# Patient Record
Sex: FEMALE | Race: WHITE | NOT HISPANIC OR LATINO | Employment: OTHER | URBAN - METROPOLITAN AREA
[De-identification: names, ages, dates, MRNs, and addresses within clinical notes are randomized per-mention and may not be internally consistent; named-entity substitution may affect disease eponyms.]

---

## 2021-12-15 ENCOUNTER — HOSPITAL ENCOUNTER (EMERGENCY)
Facility: HOSPITAL | Age: 81
Discharge: HOME | End: 2021-12-15
Attending: EMERGENCY MEDICINE | Admitting: EMERGENCY MEDICINE

## 2021-12-15 ENCOUNTER — APPOINTMENT (OUTPATIENT)
Dept: GENERAL RADIOLOGY | Facility: HOSPITAL | Age: 81
End: 2021-12-15

## 2021-12-15 VITALS
SYSTOLIC BLOOD PRESSURE: 149 MMHG | RESPIRATION RATE: 18 BRPM | TEMPERATURE: 98 F | DIASTOLIC BLOOD PRESSURE: 89 MMHG | HEART RATE: 90 BPM | OXYGEN SATURATION: 99 %

## 2021-12-15 DIAGNOSIS — I10 HYPERTENSION, UNSPECIFIED TYPE: ICD-10-CM

## 2021-12-15 DIAGNOSIS — R06.02 SHORTNESS OF BREATH: Primary | ICD-10-CM
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
## 2021-12-15 PROCEDURE — 99283 EMERGENCY DEPT VISIT LOW MDM: CPT

## 2021-12-15 PROCEDURE — 71046 X-RAY EXAM CHEST 2 VIEWS: CPT
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
## 2021-12-15 NOTE — DISCHARGE INSTRUCTIONS
Continue current home medications  Activity as tolerated  Follow up with PMD in 1 week for recheck  Return to the ER for fever, chills, chest pain, shortness of breath, neck pain, back pain, dizziness, weakness or any new or worsening symptoms
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
## 2023-04-21 ENCOUNTER — APPOINTMENT (OUTPATIENT)
Dept: WOMENS IMAGING | Facility: HOSPITAL | Age: 83
End: 2023-04-21
Payer: MEDICARE

## 2023-04-21 DIAGNOSIS — C50.919 MALIGNANT NEOPLASM OF FEMALE BREAST, UNSPECIFIED ESTROGEN RECEPTOR STATUS, UNSPECIFIED LATERALITY, UNSPECIFIED SITE OF BREAST: Primary | ICD-10-CM

## 2023-04-21 PROCEDURE — 88305 TISSUE EXAM BY PATHOLOGIST: CPT

## 2023-04-21 PROCEDURE — A4648 IMPLANTABLE TISSUE MARKER: HCPCS | Performed by: RADIOLOGY

## 2023-04-21 PROCEDURE — 19083 BX BREAST 1ST LESION US IMAG: CPT | Performed by: RADIOLOGY

## 2023-04-21 PROCEDURE — 76642 ULTRASOUND BREAST LIMITED: CPT | Performed by: RADIOLOGY

## 2023-04-21 PROCEDURE — G0279 TOMOSYNTHESIS, MAMMO: HCPCS | Performed by: RADIOLOGY

## 2023-04-21 PROCEDURE — 77066 DX MAMMO INCL CAD BI: CPT | Performed by: RADIOLOGY

## 2023-04-21 PROCEDURE — 88361 TUMOR IMMUNOHISTOCHEM/COMPUT: CPT

## 2023-04-21 PROCEDURE — 38505 NEEDLE BIOPSY LYMPH NODES: CPT | Performed by: RADIOLOGY

## 2023-04-27 ENCOUNTER — TELEPHONE (OUTPATIENT)
Dept: SURGERY | Facility: CLINIC | Age: 83
End: 2023-04-27
Payer: MEDICARE

## 2023-04-27 DIAGNOSIS — C50.919 MALIGNANT NEOPLASM OF FEMALE BREAST, UNSPECIFIED ESTROGEN RECEPTOR STATUS, UNSPECIFIED LATERALITY, UNSPECIFIED SITE OF BREAST: Primary | ICD-10-CM

## 2023-04-27 DIAGNOSIS — C50.411 MALIGNANT NEOPLASM OF UPPER-OUTER QUADRANT OF RIGHT FEMALE BREAST, UNSPECIFIED ESTROGEN RECEPTOR STATUS: ICD-10-CM

## 2023-04-27 NOTE — TELEPHONE ENCOUNTER
LM FOR PT TO CALL ME BACK REGARDING BONE SCAN & CT C,A,P SCHEDULED ON 5/11 @ 9 AM, ARRIVAL 8:45 AM FOR [facility]. CT'S WILL FOLLOW THE BONE SCAN. PT'S TO HAVE LIQUIDS ONLY 4 HRS PRIOR TO 12:30 PM. THE MRI BREAST IS SCHEDULED ON 4/29 @ 3:30 PM, ARRIVAL 3 PM AT [facility]. PT'S TO WEAR NO METALS TO THE MRI APPT.

## 2023-04-29 ENCOUNTER — HOSPITAL ENCOUNTER (OUTPATIENT)
Dept: MRI IMAGING | Facility: HOSPITAL | Age: 83
Discharge: HOME | End: 2023-04-29
Payer: MEDICARE

## 2023-04-29 DIAGNOSIS — C50.411 MALIGNANT NEOPLASM OF UPPER-OUTER QUADRANT OF RIGHT FEMALE BREAST, UNSPECIFIED ESTROGEN RECEPTOR STATUS: ICD-10-CM

## 2023-04-29 DIAGNOSIS — C50.919 MALIGNANT NEOPLASM OF FEMALE BREAST, UNSPECIFIED ESTROGEN RECEPTOR STATUS, UNSPECIFIED LATERALITY, UNSPECIFIED SITE OF BREAST: ICD-10-CM

## 2023-04-29 PROCEDURE — 0 GADOBENATE DIMEGLUMINE 529 MG/ML SOLUTION: Performed by: STUDENT IN AN ORGANIZED HEALTH CARE EDUCATION/TRAINING PROGRAM

## 2023-04-29 PROCEDURE — 82565 ASSAY OF CREATININE: CPT

## 2023-04-29 PROCEDURE — C8937 CAD BREAST MRI: HCPCS

## 2023-04-29 PROCEDURE — A9577 INJ MULTIHANCE: HCPCS | Performed by: STUDENT IN AN ORGANIZED HEALTH CARE EDUCATION/TRAINING PROGRAM

## 2023-04-29 PROCEDURE — C8908 MRI W/O FOL W/CONT, BREAST,: HCPCS

## 2023-04-29 RX ADMIN — GADOBENATE DIMEGLUMINE 15 ML: 529 INJECTION, SOLUTION INTRAVENOUS at 16:05

## 2023-04-30 LAB — CREAT BLDA-MCNC: 0.9 MG/DL (ref 0.6–1.3)

## 2023-05-01 ENCOUNTER — OFFICE VISIT (OUTPATIENT)
Dept: SURGERY | Facility: CLINIC | Age: 83
End: 2023-05-01
Payer: MEDICARE

## 2023-05-01 VITALS
BODY MASS INDEX: 28.74 KG/M2 | SYSTOLIC BLOOD PRESSURE: 150 MMHG | DIASTOLIC BLOOD PRESSURE: 85 MMHG | HEIGHT: 61 IN | WEIGHT: 152.2 LBS

## 2023-05-01 DIAGNOSIS — D49.89 NEOPLASM OF UNSPECIFIED BEHAVIOR OF OTHER SPECIFIED SITES: ICD-10-CM

## 2023-05-01 DIAGNOSIS — C50.919 MALIGNANT NEOPLASM OF FEMALE BREAST, UNSPECIFIED ESTROGEN RECEPTOR STATUS, UNSPECIFIED LATERALITY, UNSPECIFIED SITE OF BREAST: Primary | ICD-10-CM

## 2023-05-01 PROCEDURE — 88305 TISSUE EXAM BY PATHOLOGIST: CPT | Performed by: STUDENT IN AN ORGANIZED HEALTH CARE EDUCATION/TRAINING PROGRAM

## 2023-05-01 RX ORDER — VALSARTAN AND HYDROCHLOROTHIAZIDE 160; 12.5 MG/1; MG/1
1 TABLET, FILM COATED ORAL DAILY
COMMUNITY
Start: 2023-04-04

## 2023-05-01 RX ORDER — LINAGLIPTIN 5 MG/1
1 TABLET, FILM COATED ORAL DAILY
COMMUNITY
Start: 2023-04-04

## 2023-05-01 RX ORDER — GLIPIZIDE AND METFORMIN HCL 2.5; 5 MG/1; MG/1
TABLET, FILM COATED ORAL
COMMUNITY
Start: 2023-02-02

## 2023-05-01 RX ORDER — SIMVASTATIN 10 MG
10 TABLET ORAL NIGHTLY
COMMUNITY
Start: 2023-02-01

## 2023-05-01 NOTE — PROGRESS NOTES
General Surgery Breast Cancer History and Physical Exam     Summary:    Veronica Royal is a 82 y.o. 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      A multidisciplinary plan has been formulated for the patient:    (1) Breast Surgical Oncology:  -Follow up [employer] 9 panel genetic testing. I will call her with results.   -Nurse navigator consult.   -Punch biopsy performed today in the office to evaluate for an inflammatory component.  We did discuss that with locally advanced disease, will need to be seen by oncology to evaluate for the need for neoadjuvant chemotherapy.  -Plastic surgery referral deferred at this time.    (2) Medical Oncology:  -Referral to Dr. Yee to evaluate for neoadjuvant chemotherapy.    (3) Radiation Oncology:  -Will refer postoperatively for evaluation for radiation therapy as clinically indicated.    Referring Provider: No ref. provider found    Chief Complaint: breast mass    History of Present Illness: Ms. Veronica Royal is a 82 y.o. year old lady, seen at the request of No ref. provider found for a new diagnosis of right breast cancer.      This was initially detected as a palpable mass in her right breast. She first noticed it 2-3 months ago. Her last mammogram was 25 years ago. She denies any prior history of abnormal mammograms or breast biopsies. Her work-up is detailed in the oncologic history below.     She denies any breast lumps, pain, skin changes, or nipple discharge. She denies any family history of breast cancer. Her sister had ovarian cancer 25 years ago at age 58.      Workup of Current Diagnosis:    4/21/2023 bilateral diagnostic mammogram with ultrasound:  Finding 1: There is a high density irregular mass measuring 44 mm with spiculated margins and associated amorphous and fine pleomorphic calcifications, skin retraction, and  skin and trabecular thickening in the right breast at 9:00.  On ultrasound there is an irregular mass with indistinct and spiculated margins in the right breast at 9:00 3 cm from the nipple.  The mass extends into the skin there is visible external ulceration and scabbing.  Diffuse skin thickening and focal skin retraction are noted with skin thickening up to 11 mm.  The mass measures 38 x 28 x 42 mm.  Highly suggestive of malignancy.  Ultrasound-guided biopsy is recommended.  Finding 2: There are true intramammary lymph nodes measuring 5 mm and 6 mm seen in the posterior upper outer axillary tail that are slightly round.  There is visualization of one of the 2 right axillary lymph nodes that measure 5 mm with borderline cortical thickening.  Suspicious.  Appropriate action should be taken.  Finding 3: There are 2 prominent right axillary lymph nodes largest which measures 26 mm and contains extensive coarse heterogeneous calcifications.  Suspicious.  Ultrasound-guided biopsy is recommended.  Finding 4: On ultrasound there is an asymmetry in the superior region of the left breast that resolved with spot compression benign.  BI-RADS Category 5    4/21/2023 right breast ultrasound-guided biopsy:  The right breast at 9:00 was imaged and the mass was localized.  8 cores were obtained.  A mini cork tissue marker was placed.  Clip was in the expected position.  Pathology returned as invasive ductal carcinoma grade 3 which is malignant and concordant.  Surgical consult is recommended.    The patient's right breast at the axillary position was imaged and the abnormal lymph node was localized.  4 cores were obtained.  A spring shaped HydroMARK tissue marker was placed.  Clip was in the expected position.  Pathology returned as high-grade invasive mammary carcinoma with apron features.  The differential includes multifocal carcinoma versus a completely replaced lymph node.  Pathology is malignant and concordant.    4/21/2023  pathology:   1.  Breast, right, 9:00, biopsy:  Invasive mammary carcinoma, grade 3  2.  Axilla, right, biopsy:  High-grade invasive mammary carcinoma with apocrine features involving soft tissue and areas of necrosis    2023 Bilateral Breast MRI   IMPRESSION:  1. Biopsy-proven malignancy in the right breast in the posterior one third at the 9 o'clock position that measures on the order of 4.3 cm in greatest dimension and contains the internal mini cork-shaped metallic clip. The mass shows central hypoenhancement suggestive of central necrosis. Attachment to the overlying skin as described is noted and there is diffuse right breast skin edema and trabecular edema. Evidence of right axillary adenopathy is noted.  2. There are no findings suspicious for malignancy in the left breast.  BI-RADS category 6: Known biopsy-proven malignancy.    Gynecologic History:   . P:1 AB:1  Age at first childbirth: 35  Lactation/How long: none  Age at menarche: 11  Age at menopause: 50  Total years of oral contraceptive use: 3-4 years previously  Total years of hormone replacement therapy: none    Past Medical History:   • DM  • HTN  • HLD    Past Surgical History:    • None    Family History:    • As above    Social History:  • Denies tobacco use  • Occasional alcohol use    Allergies:   No Known Allergies    Medications:     Current Outpatient Medications:   •  glipiZIDE-metFORMIN (METAGLIP) 2.5-500 MG per tablet, TAKE 1 TABLET BY MOUTH THREE TIMES DAILY(2 TABLETS IN THE MORNING AND 1 TABLET IN THE EVENING), Disp: , Rfl:   •  simvastatin (ZOCOR) 10 MG tablet, Take 1 tablet by mouth Every Night., Disp: , Rfl:   •  Tradjenta 5 MG tablet tablet, Take 1 tablet by mouth Daily., Disp: , Rfl:   •  valsartan-hydrochlorothiazide (DIOVAN-HCT) 160-12.5 MG per tablet, Take 1 tablet by mouth Daily., Disp: , Rfl:     Laboratory Values:    Labs from 2023 reviewed    Review of Systems:   Influenza-like illness: no fever, no  cough, no  sore  throat, no  body aches, no loss of sense of taste or smell, no known exposure to person with Covid-19.  Constitutional: Negative for fevers or chills  HENT: Negative for hearing loss or runny nose  Eyes: Negative for vision changes or scleral icterus  Respiratory: Negative for cough or shortness of breath  Cardiovascular: Negative for chest pain or heart palpitations  Gastrointestinal: Negative for abdominal pain, nausea, vomiting, constipation, melena, or hematochezia  Genitourinary: Negative for hematuria or dysuria  Musculoskeletal: Negative for joint swelling or gait instability  Neurologic: Negative for tremors or seizures  Psychiatric: Negative for suicidal ideations or depression  All other systems reviewed and negative    Physical Exam:   • ECO - Asymptomatic  • Constitutional: Well-developed well-nourished, no acute distress  • Eyes: Conjunctiva normal, sclera nonicteric  • ENMT: Hearing grossly normal, oral mucosa moist  • Neck: Supple, no palpable mass, trachea midline  • Respiratory: Clear to auscultation, normal inspiratory effort  • Cardiovascular: Regular rate, no peripheral edema, no jugular venous distention  • Breast: symmetric  o Right: Ulcerated mass in the right outer mid-lower breast approximately 3x3cm of ulceration, palpable firm mobile mass, palpable axillary adenopathy  o Left: No visible abnormalities on inspection while seated, with arms raised or hands on hips. No masses, skin changes, or nipple abnormalities.  o Biopsy site appreciated in right outer breast, otherwise no skin changes.   o No clinical chest wall involvement.  • Gastrointestinal: Soft, nontender  • Lymphatics (palpable nodes): No left sided cervical, supraclavicular or axillary lymphadenopathy  • Skin:  Warm, dry, no rash on visualized skin surfaces  • Musculoskeletal: Symmetric strength, normal gait  • Psychiatric: Alert and oriented ×3, normal affect     Discussion:  I had an extensive discussion with the patient  and her family about the nature of her breast cancer diagnosis. We reviewed the components of breast tissue including ducts and lobules. We reviewed her pathology report in detail. We reviewed breast cancer histology, including stage, grade, ER/NJ receptors, HER2 receptors and how this applies to her diagnosis. We reviewed the basics of systemic and local/regional management of breast cancer.     We discussed that most breast cancer is not hereditary, however given her personal history, this may play a role in her case. I believe genetic testing is warranted and could affect surgical decision making.     We reviewed potential surgical treatments to include partial mastectomy, mastectomy, sentinel lymph node biopsy and axillary node dissection and discussed the rationale associated with each approach. Regarding radiation therapy, we discussed that radiation is indicated in all cases of breast conservation and in only limited circumstances following mastectomy. We discussed that the primary goal of adjuvant radiation is to decrease the likelihood of local recurrence.     We discussed axillary staging. I described the procedure for sentinel lymph node biopsy in detail, including the preoperative injection of technetium sulfur colloid and intraoperative injection of lymphazurin blue dye. I explained that this is a mapping test and not a cancer test, that all of the lymph nodes containing these dyes will be removed for complete testing by pathology, and that the results could impact the decision for adjuvant treatment or additional surgery.    I described additional risks and potential complications associated with surgery, including, but not limited to, bleeding, infection, complications related to blue dye, lymphedema, deformity/poor cosmetic result, chronic pain, neurovascular injury, numbness, seroma, hematoma, deep venous thrombosis, skin flap necrosis, disease recurrence and the possibility of requiring additional  surgery. We also discussed other treatment options including the option of not undergoing any surgical treatment and the risks associated with this including disease progression. She expressed an understanding of these factors and wished to proceed.      We discussed that in her case, systemic treatment would involve endocrine therapy and possibly chemotherapy. She will be referred to medical oncology to discuss this further.     SHAR MCELROY M.D.  General and Endoscopic Surgery  [facility]    [address], [address]  [place], [place], [address]  P: [phone]  F: [phone]

## 2023-05-01 NOTE — H&P (VIEW-ONLY)
General Surgery Breast Cancer History and Physical Exam     Summary:    Veronica Royal is a 82 y.o. 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      A multidisciplinary plan has been formulated for the patient:    (1) Breast Surgical Oncology:  -Follow up [employer] 9 panel genetic testing. I will call her with results.   -Nurse navigator consult.   -Punch biopsy performed today in the office to evaluate for an inflammatory component.  We did discuss that with locally advanced disease, will need to be seen by oncology to evaluate for the need for neoadjuvant chemotherapy.  -Plastic surgery referral deferred at this time.    (2) Medical Oncology:  -Referral to Dr. Yee to evaluate for neoadjuvant chemotherapy.    (3) Radiation Oncology:  -Will refer postoperatively for evaluation for radiation therapy as clinically indicated.    Referring Provider: No ref. provider found    Chief Complaint: breast mass    History of Present Illness: Ms. Veronica Royal is a 82 y.o. year old lady, seen at the request of No ref. provider found for a new diagnosis of right breast cancer.      This was initially detected as a palpable mass in her right breast. She first noticed it 2-3 months ago. Her last mammogram was 25 years ago. She denies any prior history of abnormal mammograms or breast biopsies. Her work-up is detailed in the oncologic history below.     She denies any breast lumps, pain, skin changes, or nipple discharge. She denies any family history of breast cancer. Her sister had ovarian cancer 25 years ago at age 58.      Workup of Current Diagnosis:    4/21/2023 bilateral diagnostic mammogram with ultrasound:  Finding 1: There is a high density irregular mass measuring 44 mm with spiculated margins and associated amorphous and fine pleomorphic calcifications, skin retraction, and  skin and trabecular thickening in the right breast at 9:00.  On ultrasound there is an irregular mass with indistinct and spiculated margins in the right breast at 9:00 3 cm from the nipple.  The mass extends into the skin there is visible external ulceration and scabbing.  Diffuse skin thickening and focal skin retraction are noted with skin thickening up to 11 mm.  The mass measures 38 x 28 x 42 mm.  Highly suggestive of malignancy.  Ultrasound-guided biopsy is recommended.  Finding 2: There are true intramammary lymph nodes measuring 5 mm and 6 mm seen in the posterior upper outer axillary tail that are slightly round.  There is visualization of one of the 2 right axillary lymph nodes that measure 5 mm with borderline cortical thickening.  Suspicious.  Appropriate action should be taken.  Finding 3: There are 2 prominent right axillary lymph nodes largest which measures 26 mm and contains extensive coarse heterogeneous calcifications.  Suspicious.  Ultrasound-guided biopsy is recommended.  Finding 4: On ultrasound there is an asymmetry in the superior region of the left breast that resolved with spot compression benign.  BI-RADS Category 5    4/21/2023 right breast ultrasound-guided biopsy:  The right breast at 9:00 was imaged and the mass was localized.  8 cores were obtained.  A mini cork tissue marker was placed.  Clip was in the expected position.  Pathology returned as invasive ductal carcinoma grade 3 which is malignant and concordant.  Surgical consult is recommended.    The patient's right breast at the axillary position was imaged and the abnormal lymph node was localized.  4 cores were obtained.  A spring shaped HydroMARK tissue marker was placed.  Clip was in the expected position.  Pathology returned as high-grade invasive mammary carcinoma with apron features.  The differential includes multifocal carcinoma versus a completely replaced lymph node.  Pathology is malignant and concordant.    4/21/2023  pathology:   1.  Breast, right, 9:00, biopsy:  Invasive mammary carcinoma, grade 3  2.  Axilla, right, biopsy:  High-grade invasive mammary carcinoma with apocrine features involving soft tissue and areas of necrosis    2023 Bilateral Breast MRI   IMPRESSION:  1. Biopsy-proven malignancy in the right breast in the posterior one third at the 9 o'clock position that measures on the order of 4.3 cm in greatest dimension and contains the internal mini cork-shaped metallic clip. The mass shows central hypoenhancement suggestive of central necrosis. Attachment to the overlying skin as described is noted and there is diffuse right breast skin edema and trabecular edema. Evidence of right axillary adenopathy is noted.  2. There are no findings suspicious for malignancy in the left breast.  BI-RADS category 6: Known biopsy-proven malignancy.    Gynecologic History:   . P:1 AB:1  Age at first childbirth: 35  Lactation/How long: none  Age at menarche: 11  Age at menopause: 50  Total years of oral contraceptive use: 3-4 years previously  Total years of hormone replacement therapy: none    Past Medical History:   • DM  • HTN  • HLD    Past Surgical History:    • None    Family History:    • As above    Social History:  • Denies tobacco use  • Occasional alcohol use    Allergies:   No Known Allergies    Medications:     Current Outpatient Medications:   •  glipiZIDE-metFORMIN (METAGLIP) 2.5-500 MG per tablet, TAKE 1 TABLET BY MOUTH THREE TIMES DAILY(2 TABLETS IN THE MORNING AND 1 TABLET IN THE EVENING), Disp: , Rfl:   •  simvastatin (ZOCOR) 10 MG tablet, Take 1 tablet by mouth Every Night., Disp: , Rfl:   •  Tradjenta 5 MG tablet tablet, Take 1 tablet by mouth Daily., Disp: , Rfl:   •  valsartan-hydrochlorothiazide (DIOVAN-HCT) 160-12.5 MG per tablet, Take 1 tablet by mouth Daily., Disp: , Rfl:     Laboratory Values:    Labs from 2023 reviewed    Review of Systems:   Influenza-like illness: no fever, no  cough, no  sore  throat, no  body aches, no loss of sense of taste or smell, no known exposure to person with Covid-19.  Constitutional: Negative for fevers or chills  HENT: Negative for hearing loss or runny nose  Eyes: Negative for vision changes or scleral icterus  Respiratory: Negative for cough or shortness of breath  Cardiovascular: Negative for chest pain or heart palpitations  Gastrointestinal: Negative for abdominal pain, nausea, vomiting, constipation, melena, or hematochezia  Genitourinary: Negative for hematuria or dysuria  Musculoskeletal: Negative for joint swelling or gait instability  Neurologic: Negative for tremors or seizures  Psychiatric: Negative for suicidal ideations or depression  All other systems reviewed and negative    Physical Exam:   • ECO - Asymptomatic  • Constitutional: Well-developed well-nourished, no acute distress  • Eyes: Conjunctiva normal, sclera nonicteric  • ENMT: Hearing grossly normal, oral mucosa moist  • Neck: Supple, no palpable mass, trachea midline  • Respiratory: Clear to auscultation, normal inspiratory effort  • Cardiovascular: Regular rate, no peripheral edema, no jugular venous distention  • Breast: symmetric  o Right: Ulcerated mass in the right outer mid-lower breast approximately 3x3cm of ulceration, palpable firm mobile mass, palpable axillary adenopathy  o Left: No visible abnormalities on inspection while seated, with arms raised or hands on hips. No masses, skin changes, or nipple abnormalities.  o Biopsy site appreciated in right outer breast, otherwise no skin changes.   o No clinical chest wall involvement.  • Gastrointestinal: Soft, nontender  • Lymphatics (palpable nodes): No left sided cervical, supraclavicular or axillary lymphadenopathy  • Skin:  Warm, dry, no rash on visualized skin surfaces  • Musculoskeletal: Symmetric strength, normal gait  • Psychiatric: Alert and oriented ×3, normal affect     Discussion:  I had an extensive discussion with the patient  and her family about the nature of her breast cancer diagnosis. We reviewed the components of breast tissue including ducts and lobules. We reviewed her pathology report in detail. We reviewed breast cancer histology, including stage, grade, ER/OH receptors, HER2 receptors and how this applies to her diagnosis. We reviewed the basics of systemic and local/regional management of breast cancer.     We discussed that most breast cancer is not hereditary, however given her personal history, this may play a role in her case. I believe genetic testing is warranted and could affect surgical decision making.     We reviewed potential surgical treatments to include partial mastectomy, mastectomy, sentinel lymph node biopsy and axillary node dissection and discussed the rationale associated with each approach. Regarding radiation therapy, we discussed that radiation is indicated in all cases of breast conservation and in only limited circumstances following mastectomy. We discussed that the primary goal of adjuvant radiation is to decrease the likelihood of local recurrence.     We discussed axillary staging. I described the procedure for sentinel lymph node biopsy in detail, including the preoperative injection of technetium sulfur colloid and intraoperative injection of lymphazurin blue dye. I explained that this is a mapping test and not a cancer test, that all of the lymph nodes containing these dyes will be removed for complete testing by pathology, and that the results could impact the decision for adjuvant treatment or additional surgery.    I described additional risks and potential complications associated with surgery, including, but not limited to, bleeding, infection, complications related to blue dye, lymphedema, deformity/poor cosmetic result, chronic pain, neurovascular injury, numbness, seroma, hematoma, deep venous thrombosis, skin flap necrosis, disease recurrence and the possibility of requiring additional  surgery. We also discussed other treatment options including the option of not undergoing any surgical treatment and the risks associated with this including disease progression. She expressed an understanding of these factors and wished to proceed.      We discussed that in her case, systemic treatment would involve endocrine therapy and possibly chemotherapy. She will be referred to medical oncology to discuss this further.     SHAR MCELROY M.D.  General and Endoscopic Surgery  [facility]    [address], [address]  [place], [place], [address]  P: [phone]  F: [phone]

## 2023-05-02 ENCOUNTER — TELEPHONE (OUTPATIENT)
Dept: SURGERY | Facility: CLINIC | Age: 83
End: 2023-05-02
Payer: MEDICARE

## 2023-05-02 ENCOUNTER — PATIENT OUTREACH (OUTPATIENT)
Dept: OTHER | Facility: HOSPITAL | Age: 83
End: 2023-05-02
Payer: MEDICARE

## 2023-05-02 NOTE — TELEPHONE ENCOUNTER
SPOKE TO PT REGARDING ECHO THAT'S SCHEDULED ON 5/9 @ 10 AM, ARRIVAL 9:45 AM FOR [facility], [address] [facility], [facility] [place]. GAVE TESTING LOCATION OVER THE PHONE TO PT.

## 2023-05-02 NOTE — PROGRESS NOTES
Referral received from Dr. Michael's office. I called Ms. Royal's daughter and introduced myself and navigational services. She stated the consult with Dr. Michael went well and she will not know next steps until the scans come back. They have a good understanding that chemo will be needed they will meet with oncology in the next week or so. She was able to verbalize teach back on her plan of care.      She stated she has a wonderful support system with family and friends. She stated she feels comfortable talking to them about needs or issues.      She stated she has no financial or transportation concerns at this time. She has no resource needs or ongoing concerns at this time.      She stated she is doing well despite her diagnosis. We discussed we have support options if the need arises. She was thankful for the information.      We discussed integrative therapies and other services [facility]. She will received a navigation folder with the following information in the mail:     [facility], Cancer and Restorative Exercise (CARE), [employer] Exercise program, Guide for the Newly Diagnosed, [employer], [facility], Massage Therapy, Reiki Therapy, [employer] [place], Cancer Nutrition, and Survivorship Clinic.     She verbalized appreciation for navigational services and she has my contact information and will call with any questions that arise.

## 2023-05-03 LAB
LAB AP CASE REPORT: NORMAL
LAB AP CLINICAL INFORMATION: NORMAL
LAB AP DIAGNOSIS COMMENT: NORMAL
PATH REPORT.FINAL DX SPEC: NORMAL
PATH REPORT.GROSS SPEC: NORMAL

## 2023-05-04 ENCOUNTER — LAB REQUISITION (OUTPATIENT)
Dept: LAB | Facility: HOSPITAL | Age: 83
End: 2023-05-04
Payer: MEDICARE

## 2023-05-04 DIAGNOSIS — Z00.00 ENCOUNTER FOR GENERAL ADULT MEDICAL EXAMINATION WITHOUT ABNORMAL FINDINGS: ICD-10-CM

## 2023-05-04 PROCEDURE — 88342 IMHCHEM/IMCYTCHM 1ST ANTB: CPT | Performed by: STUDENT IN AN ORGANIZED HEALTH CARE EDUCATION/TRAINING PROGRAM

## 2023-05-05 LAB
LAB AP CASE REPORT: NORMAL
LAB AP DIAGNOSIS COMMENT: NORMAL
PATH REPORT.FINAL DX SPEC: NORMAL
PATH REPORT.GROSS SPEC: NORMAL

## 2023-05-09 ENCOUNTER — HOSPITAL ENCOUNTER (OUTPATIENT)
Dept: CARDIOLOGY | Facility: HOSPITAL | Age: 83
Discharge: HOME | End: 2023-05-09
Admitting: STUDENT IN AN ORGANIZED HEALTH CARE EDUCATION/TRAINING PROGRAM
Payer: MEDICARE

## 2023-05-09 VITALS
HEART RATE: 111 BPM | SYSTOLIC BLOOD PRESSURE: 140 MMHG | DIASTOLIC BLOOD PRESSURE: 84 MMHG | WEIGHT: 152 LBS | HEIGHT: 61 IN | OXYGEN SATURATION: 94 % | BODY MASS INDEX: 28.7 KG/M2

## 2023-05-09 DIAGNOSIS — D49.89 NEOPLASM OF UNSPECIFIED BEHAVIOR OF OTHER SPECIFIED SITES: ICD-10-CM

## 2023-05-09 DIAGNOSIS — C50.919 MALIGNANT NEOPLASM OF FEMALE BREAST, UNSPECIFIED ESTROGEN RECEPTOR STATUS, UNSPECIFIED LATERALITY, UNSPECIFIED SITE OF BREAST: ICD-10-CM

## 2023-05-09 LAB
AORTIC ARCH: 1.6 CM
ASCENDING AORTA: 2.1 CM
BH CV ECHO LEFT VENTRICLE GLOBAL LONGITUDINAL STRAIN: -20.3 %
BH CV ECHO MEAS - ACS: 1.39 CM
BH CV ECHO MEAS - AO MAX PG: 10.1 MMHG
BH CV ECHO MEAS - AO MEAN PG: 5.6 MMHG
BH CV ECHO MEAS - AO ROOT DIAM: 2.44 CM
BH CV ECHO MEAS - AO V2 MAX: 158.8 CM/SEC
BH CV ECHO MEAS - AO V2 VTI: 27.8 CM
BH CV ECHO MEAS - AVA(I,D): 1.59 CM2
BH CV ECHO MEAS - EDV(CUBED): 75.5 ML
BH CV ECHO MEAS - EDV(MOD-SP2): 78 ML
BH CV ECHO MEAS - EDV(MOD-SP4): 87 ML
BH CV ECHO MEAS - EF(MOD-BP): 51.4 %
BH CV ECHO MEAS - EF(MOD-SP2): 50 %
BH CV ECHO MEAS - EF(MOD-SP4): 55.2 %
BH CV ECHO MEAS - ESV(CUBED): 21.2 ML
BH CV ECHO MEAS - ESV(MOD-SP2): 39 ML
BH CV ECHO MEAS - ESV(MOD-SP4): 39 ML
BH CV ECHO MEAS - FS: 34.6 %
BH CV ECHO MEAS - IVS/LVPW: 0.96 CM
BH CV ECHO MEAS - IVSD: 0.86 CM
BH CV ECHO MEAS - LAT PEAK E' VEL: 5 CM/SEC
BH CV ECHO MEAS - LV DIASTOLIC VOL/BSA (35-75): 51.8 CM2
BH CV ECHO MEAS - LV MASS(C)D: 116.2 GRAMS
BH CV ECHO MEAS - LV MAX PG: 3.6 MMHG
BH CV ECHO MEAS - LV MEAN PG: 1.91 MMHG
BH CV ECHO MEAS - LV SYSTOLIC VOL/BSA (12-30): 23.2 CM2
BH CV ECHO MEAS - LV V1 MAX: 94.5 CM/SEC
BH CV ECHO MEAS - LV V1 VTI: 16.6 CM
BH CV ECHO MEAS - LVIDD: 4.2 CM
BH CV ECHO MEAS - LVIDS: 2.8 CM
BH CV ECHO MEAS - LVOT AREA: 2.7 CM2
BH CV ECHO MEAS - LVOT DIAM: 1.84 CM
BH CV ECHO MEAS - LVPWD: 0.9 CM
BH CV ECHO MEAS - MED PEAK E' VEL: 6.1 CM/SEC
BH CV ECHO MEAS - MR MAX PG: 105.3 MMHG
BH CV ECHO MEAS - MR MAX VEL: 513.1 CM/SEC
BH CV ECHO MEAS - MV A DUR: 0.11 SEC
BH CV ECHO MEAS - MV A MAX VEL: 154.2 CM/SEC
BH CV ECHO MEAS - MV DEC SLOPE: 1068 CM/SEC2
BH CV ECHO MEAS - MV DEC TIME: 0.12 MSEC
BH CV ECHO MEAS - MV E MAX VEL: 99.8 CM/SEC
BH CV ECHO MEAS - MV E/A: 0.65
BH CV ECHO MEAS - MV MAX PG: 12.8 MMHG
BH CV ECHO MEAS - MV MEAN PG: 6 MMHG
BH CV ECHO MEAS - MV P1/2T: 31.3 MSEC
BH CV ECHO MEAS - MV V2 VTI: 25.6 CM
BH CV ECHO MEAS - MVA(P1/2T): 7 CM2
BH CV ECHO MEAS - MVA(VTI): 1.72 CM2
BH CV ECHO MEAS - PA ACC TIME: 0.07 SEC
BH CV ECHO MEAS - PA PR(ACCEL): 48.1 MMHG
BH CV ECHO MEAS - PA V2 MAX: 82.8 CM/SEC
BH CV ECHO MEAS - PULM A REVS DUR: 0.12 SEC
BH CV ECHO MEAS - PULM A REVS VEL: 25.7 CM/SEC
BH CV ECHO MEAS - PULM DIAS VEL: 46.6 CM/SEC
BH CV ECHO MEAS - PULM S/D: 0.85
BH CV ECHO MEAS - PULM SYS VEL: 39.6 CM/SEC
BH CV ECHO MEAS - QP/QS: 0.75
BH CV ECHO MEAS - RAP SYSTOLE: 3 MMHG
BH CV ECHO MEAS - RV MAX PG: 2.43 MMHG
BH CV ECHO MEAS - RV V1 MAX: 78 CM/SEC
BH CV ECHO MEAS - RV V1 VTI: 13.2 CM
BH CV ECHO MEAS - RVOT DIAM: 1.79 CM
BH CV ECHO MEAS - RVSP: 17 MMHG
BH CV ECHO MEAS - SI(MOD-SP2): 23.2 ML/M2
BH CV ECHO MEAS - SI(MOD-SP4): 28.6 ML/M2
BH CV ECHO MEAS - SUP REN AO DIAM: 2 CM
BH CV ECHO MEAS - SV(LVOT): 44.2 ML
BH CV ECHO MEAS - SV(MOD-SP2): 39 ML
BH CV ECHO MEAS - SV(MOD-SP4): 48 ML
BH CV ECHO MEAS - SV(RVOT): 33.1 ML
BH CV ECHO MEAS - TAPSE (>1.6): 2 CM
BH CV ECHO MEAS - TR MAX PG: 14 MMHG
BH CV ECHO MEAS - TR MAX VEL: 187.3 CM/SEC
BH CV ECHO MEASUREMENTS AVERAGE E/E' RATIO: 17.98
BH CV XLRA - RV BASE: 2.17 CM
BH CV XLRA - RV LENGTH: 6.5 CM
BH CV XLRA - RV MID: 2.31 CM
BH CV XLRA - TDI S': 16.3 CM/SEC
LEFT ATRIUM VOLUME INDEX: 21.5 ML/M2
MAXIMAL PREDICTED HEART RATE: 138 BPM
SINUS: 2.44 CM
STJ: 2.4 CM
STRESS TARGET HR: 117 BPM

## 2023-05-09 PROCEDURE — 93356 MYOCRD STRAIN IMG SPCKL TRCK: CPT

## 2023-05-09 PROCEDURE — 93306 TTE W/DOPPLER COMPLETE: CPT

## 2023-05-09 PROCEDURE — 25510000001 PERFLUTREN (DEFINITY) 8.476 MG IN SODIUM CHLORIDE (PF) 0.9 % 10 ML INJECTION: Performed by: STUDENT IN AN ORGANIZED HEALTH CARE EDUCATION/TRAINING PROGRAM

## 2023-05-09 RX ADMIN — PERFLUTREN 1.5 ML: 6.52 INJECTION, SUSPENSION INTRAVENOUS at 10:31

## 2023-05-11 ENCOUNTER — TELEPHONE (OUTPATIENT)
Dept: SURGERY | Facility: CLINIC | Age: 83
End: 2023-05-11
Payer: MEDICARE

## 2023-05-11 ENCOUNTER — HOSPITAL ENCOUNTER (OUTPATIENT)
Dept: CT IMAGING | Facility: HOSPITAL | Age: 83
Discharge: HOME | End: 2023-05-11
Admitting: STUDENT IN AN ORGANIZED HEALTH CARE EDUCATION/TRAINING PROGRAM
Payer: MEDICARE

## 2023-05-11 ENCOUNTER — HOSPITAL ENCOUNTER (OUTPATIENT)
Dept: NUCLEAR MEDICINE | Facility: HOSPITAL | Age: 83
Discharge: HOME | End: 2023-05-11
Payer: MEDICARE

## 2023-05-11 DIAGNOSIS — C50.919 MALIGNANT NEOPLASM OF FEMALE BREAST, UNSPECIFIED ESTROGEN RECEPTOR STATUS, UNSPECIFIED LATERALITY, UNSPECIFIED SITE OF BREAST: ICD-10-CM

## 2023-05-11 LAB — CREAT BLDA-MCNC: 1.3 MG/DL (ref 0.6–1.3)

## 2023-05-11 PROCEDURE — 78306 BONE IMAGING WHOLE BODY: CPT

## 2023-05-11 PROCEDURE — 0 DIATRIZOATE MEGLUMINE & SODIUM PER 1 ML: Performed by: STUDENT IN AN ORGANIZED HEALTH CARE EDUCATION/TRAINING PROGRAM

## 2023-05-11 PROCEDURE — 82565 ASSAY OF CREATININE: CPT

## 2023-05-11 PROCEDURE — 25510000001 IOPAMIDOL 61 % SOLUTION: Performed by: STUDENT IN AN ORGANIZED HEALTH CARE EDUCATION/TRAINING PROGRAM

## 2023-05-11 PROCEDURE — 74177 CT ABD & PELVIS W/CONTRAST: CPT

## 2023-05-11 PROCEDURE — A9503 TC99M MEDRONATE: HCPCS | Performed by: STUDENT IN AN ORGANIZED HEALTH CARE EDUCATION/TRAINING PROGRAM

## 2023-05-11 PROCEDURE — 71260 CT THORAX DX C+: CPT

## 2023-05-11 PROCEDURE — 0 TECHNETIUM MEDRONATE KIT: Performed by: STUDENT IN AN ORGANIZED HEALTH CARE EDUCATION/TRAINING PROGRAM

## 2023-05-11 RX ORDER — TC 99M MEDRONATE 20 MG/10ML
20 INJECTION, POWDER, LYOPHILIZED, FOR SOLUTION INTRAVENOUS
Status: COMPLETED | OUTPATIENT
Start: 2023-05-11 | End: 2023-05-11

## 2023-05-11 RX ADMIN — Medication 20 MILLICURIE: at 09:55

## 2023-05-11 RX ADMIN — IOPAMIDOL 85 ML: 612 INJECTION, SOLUTION INTRAVENOUS at 10:43

## 2023-05-11 RX ADMIN — DIATRIZOATE MEGLUMINE AND DIATRIZOATE SODIUM 30 ML: 600; 100 SOLUTION ORAL; RECTAL at 09:33

## 2023-05-11 NOTE — TELEPHONE ENCOUNTER
Tried calling patient back in regards to the [facility] paperwork. No answer, LVM to return my call.

## 2023-05-12 ENCOUNTER — TELEPHONE (OUTPATIENT)
Dept: SURGERY | Facility: CLINIC | Age: 83
End: 2023-05-12
Payer: MEDICARE

## 2023-05-12 NOTE — TELEPHONE ENCOUNTER
Called to inform patient that her staging scans were negative.    Continue with appointment with Dr. Yee 5/16/2023 as scheduled.

## 2023-05-16 ENCOUNTER — LAB (OUTPATIENT)
Dept: LAB | Facility: HOSPITAL | Age: 83
End: 2023-05-16
Payer: MEDICARE

## 2023-05-16 ENCOUNTER — CONSULT (OUTPATIENT)
Dept: ONCOLOGY | Facility: CLINIC | Age: 83
End: 2023-05-16
Payer: MEDICARE

## 2023-05-16 VITALS
TEMPERATURE: 98 F | DIASTOLIC BLOOD PRESSURE: 93 MMHG | HEIGHT: 61 IN | OXYGEN SATURATION: 95 % | SYSTOLIC BLOOD PRESSURE: 178 MMHG | BODY MASS INDEX: 29.09 KG/M2 | WEIGHT: 154.1 LBS | HEART RATE: 100 BPM | RESPIRATION RATE: 16 BRPM

## 2023-05-16 DIAGNOSIS — Z79.899 HIGH RISK MEDICATION USE: ICD-10-CM

## 2023-05-16 DIAGNOSIS — C50.919 INFLAMMATORY CARCINOMA OF BREAST, UNSPECIFIED LATERALITY: Primary | ICD-10-CM

## 2023-05-16 DIAGNOSIS — C50.411 MALIGNANT NEOPLASM OF UPPER-OUTER QUADRANT OF RIGHT BREAST IN FEMALE, ESTROGEN RECEPTOR NEGATIVE: ICD-10-CM

## 2023-05-16 DIAGNOSIS — K74.60 HEPATIC CIRRHOSIS, UNSPECIFIED HEPATIC CIRRHOSIS TYPE, UNSPECIFIED WHETHER ASCITES PRESENT: ICD-10-CM

## 2023-05-16 DIAGNOSIS — N18.9 CHRONIC KIDNEY DISEASE, UNSPECIFIED CKD STAGE: ICD-10-CM

## 2023-05-16 DIAGNOSIS — E87.5 HYPERKALEMIA: ICD-10-CM

## 2023-05-16 DIAGNOSIS — E11.9 TYPE 2 DIABETES MELLITUS WITHOUT COMPLICATION, UNSPECIFIED WHETHER LONG TERM INSULIN USE: Primary | ICD-10-CM

## 2023-05-16 DIAGNOSIS — Z17.1 MALIGNANT NEOPLASM OF UPPER-OUTER QUADRANT OF RIGHT BREAST IN FEMALE, ESTROGEN RECEPTOR NEGATIVE: ICD-10-CM

## 2023-05-16 PROBLEM — Z45.2 ENCOUNTER FOR FITTING AND ADJUSTMENT OF VASCULAR CATHETER: Status: ACTIVE | Noted: 2023-05-16

## 2023-05-16 LAB
BASOPHILS # BLD AUTO: 0.05 10*3/MM3 (ref 0–0.2)
BASOPHILS NFR BLD AUTO: 0.7 % (ref 0–1.5)
DEPRECATED RDW RBC AUTO: 40.3 FL (ref 37–54)
EOSINOPHIL # BLD AUTO: 0.17 10*3/MM3 (ref 0–0.4)
EOSINOPHIL NFR BLD AUTO: 2.4 % (ref 0.3–6.2)
ERYTHROCYTE [DISTWIDTH] IN BLOOD BY AUTOMATED COUNT: 13.2 % (ref 12.3–15.4)
HCT VFR BLD AUTO: 37 % (ref 34–46.6)
HGB BLD-MCNC: 12 G/DL (ref 12–15.9)
IMM GRANULOCYTES # BLD AUTO: 0.03 10*3/MM3 (ref 0–0.05)
IMM GRANULOCYTES NFR BLD AUTO: 0.4 % (ref 0–0.5)
LYMPHOCYTES # BLD AUTO: 1.98 10*3/MM3 (ref 0.7–3.1)
LYMPHOCYTES NFR BLD AUTO: 28 % (ref 19.6–45.3)
MCH RBC QN AUTO: 27.3 PG (ref 26.6–33)
MCHC RBC AUTO-ENTMCNC: 32.4 G/DL (ref 31.5–35.7)
MCV RBC AUTO: 84.3 FL (ref 79–97)
MONOCYTES # BLD AUTO: 0.4 10*3/MM3 (ref 0.1–0.9)
MONOCYTES NFR BLD AUTO: 5.7 % (ref 5–12)
NEUTROPHILS NFR BLD AUTO: 4.44 10*3/MM3 (ref 1.7–7)
NEUTROPHILS NFR BLD AUTO: 62.8 % (ref 42.7–76)
NRBC BLD AUTO-RTO: 0 /100 WBC (ref 0–0.2)
PLATELET # BLD AUTO: 199 10*3/MM3 (ref 140–450)
PMV BLD AUTO: 10.2 FL (ref 6–12)
RBC # BLD AUTO: 4.39 10*6/MM3 (ref 3.77–5.28)
WBC NRBC COR # BLD: 7.07 10*3/MM3 (ref 3.4–10.8)

## 2023-05-16 PROCEDURE — 36415 COLL VENOUS BLD VENIPUNCTURE: CPT

## 2023-05-16 PROCEDURE — 85025 COMPLETE CBC W/AUTO DIFF WBC: CPT

## 2023-05-16 RX ORDER — SODIUM CHLORIDE 0.9 % (FLUSH) 0.9 %
10 SYRINGE (ML) INJECTION AS NEEDED
OUTPATIENT
Start: 2023-05-16

## 2023-05-16 RX ORDER — HEPARIN SODIUM (PORCINE) LOCK FLUSH IV SOLN 100 UNIT/ML 100 UNIT/ML
500 SOLUTION INTRAVENOUS AS NEEDED
OUTPATIENT
Start: 2023-05-16

## 2023-05-16 RX ORDER — IBUPROFEN 200 MG
200 TABLET ORAL EVERY 6 HOURS PRN
COMMUNITY

## 2023-05-16 NOTE — PROGRESS NOTES
Subjective   Veronica Royal is a 82 y.o. female.  Referred by Dr. Michael for right breast inflammatory breast cancer    History of Present Illness   Ms. Royal is a 82-year-old postmenopausal  lady with hypertension, diabetes, hyperlipidemia, chronic kidney disease, hyperkalemia-chronic presented with a palpable abnormality in the right breast.  Subsequent imaging was performed.  Patient has not had a mammogram prior to this in the past 25 years.    4/21/2023-bilateral diagnostic mammogram-high density irregular mass measuring 44 mm with spiculated margins and amorphus and fine pleomorphic calcifications with skin thickening in the right breast at 9:00.  2 intramammary lymph nodes in the upper outer axillary tail region are slightly rounded  2 prominent right axillary lymph nodes largest of which measures 26 mm.  Asymmetry noted in the left breast.    Right breast ultrasound at 9 o'clock position, 3 cm from the nipple there is a 38 x 28 x 42 mm mass.  Skin thickness measuring 11 mm near the mass.  One of the 2 rounded axillary tail lymph nodes noted.  Borderline cortical thickening.  2 abnormal axillary lymph nodes.  1 lymph node displaced loss of normal morphology with a round heterogeneous appearance and echogenic foci.  Most consistent with calcified lymph node measuring 26 mm.  Second lymph node measures 12 mm.  Displaced cortical thickening.  Ultrasound-guided biopsy of the mass in the axillary lymph nodes recommended.    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  Ki-67 38.45%    2.right axillary biopsy-soft tissue involved by invasive ductal carcinoma with apocrine features  Associated microcalcification  No definite lymph node tissue identified.    4/29/2023-MRI of the breast-biopsy-proven  malignancy in the right breast at 9:00 measuring 4.3 cm in greatest dimension.  Attachment overlying skin and diffuse right breast edema noted.  Right axillary lymphadenopathy.  No suspicious findings in the left breast.    5/11/2023-CT of the chest abdomen and pelvis-no evidence of metastatic disease.  Liver is cirrhotic in configuration.    5/11/2023-bone scan negative    Patient presents today to the clinic for discussing neoadjuvant chemotherapy.  She is accompanied by her daughter.  Patient denies any recent changes in weight, she reports some pain at the site of malignancy.  A punch biopsy was performed and was consistent with inflammatory breast cancer.  At the site of punch biopsy there is an ulcerated lesion.    She has poorly controlled diabetes currently on glipizide metformin and Tradjenta.  Hemoglobin A1c recently was noted to be 9.9.    Also has chronic hyperkalemia, explanation unclear.    Blood pressure poorly controlled.    She reports good functional status lives independently.  Able to manage all her bills and independent of ADLs.  She has good support system in terms of her daughter.    Family history significant for brother with pancreatic cancer at the age of 68, sister with ovarian cancer at the age of 58      The following portions of the patient's history were reviewed and updated as appropriate: allergies, current medications, past family history, past medical history, past social history, past surgical history and problem list.    Past Medical History:   Diagnosis Date   • Breast cancer    • Diabetes mellitus    • High cholesterol    • Hypertension         No past surgical history on file.     Family History   Problem Relation Age of Onset   • Heart attack Father    • Ovarian cancer Sister    • Pancreatic cancer Brother         Social History     Socioeconomic History   • Marital status:    Tobacco Use   • Smoking status: Never   • Smokeless tobacco: Never   Vaping Use   • Vaping  "Use: Never used   Substance and Sexual Activity   • Alcohol use: Not Currently   • Drug use: Never   • Sexual activity: Defer        OB History    No obstetric history on file.      Age at menarche-11  Age at first live childbirth-35   2 para 1  1  Age at menopause-50  Oral conceptive pill use for 3 to 4 years    No Known Allergies         Review of Systems   Constitutional: Negative.    HENT: Negative.    Eyes: Negative.    Respiratory: Negative.    Cardiovascular: Negative.    Gastrointestinal: Negative.    Genitourinary: Positive for amenorrhea and breast lump.   Musculoskeletal: Negative.    Allergic/Immunologic: Negative.    Neurological: Negative.    Hematological: Negative.    Psychiatric/Behavioral: Negative.          Objective   Blood pressure 178/93, pulse 100, temperature 98 °F (36.7 °C), temperature source Temporal, resp. rate 16, height 154.9 cm (60.98\"), weight 69.9 kg (154 lb 1.6 oz), SpO2 95 %.   Physical Exam  Breast Exam: Left breast appears normal on inspection.  No palpable abnormalities of the left breast.  Right breast on inspection there is an ulcerated lesion at between 9 to 10 o'clock position.  On palpation there is a 5 cm mass in the upper outer quadrant.  On examination of the right axilla there is a firm mass occupying the anterior aspect of the axilla which measures approximately 3 cm.  Could be matted lymph nodes.    Lab on 2023   Component Date Value Ref Range Status   • WBC 2023 7.07  3.40 - 10.80 10*3/mm3 Final   • RBC 2023 4.39  3.77 - 5.28 10*6/mm3 Final   • Hemoglobin 2023 12.0  12.0 - 15.9 g/dL Final   • Hematocrit 2023 37.0  34.0 - 46.6 % Final   • MCV 2023 84.3  79.0 - 97.0 fL Final   • MCH 2023 27.3  26.6 - 33.0 pg Final   • MCHC 2023 32.4  31.5 - 35.7 g/dL Final   • RDW 2023 13.2  12.3 - 15.4 % Final   • RDW-SD 2023 40.3  37.0 - 54.0 fl Final   • MPV 2023 10.2  6.0 - 12.0 fL Final   • Platelets " 05/16/2023 199  140 - 450 10*3/mm3 Final   • Neutrophil % 05/16/2023 62.8  42.7 - 76.0 % Final   • Lymphocyte % 05/16/2023 28.0  19.6 - 45.3 % Final   • Monocyte % 05/16/2023 5.7  5.0 - 12.0 % Final   • Eosinophil % 05/16/2023 2.4  0.3 - 6.2 % Final   • Basophil % 05/16/2023 0.7  0.0 - 1.5 % Final   • Immature Grans % 05/16/2023 0.4  0.0 - 0.5 % Final   • Neutrophils, Absolute 05/16/2023 4.44  1.70 - 7.00 10*3/mm3 Final   • Lymphocytes, Absolute 05/16/2023 1.98  0.70 - 3.10 10*3/mm3 Final   • Monocytes, Absolute 05/16/2023 0.40  0.10 - 0.90 10*3/mm3 Final   • Eosinophils, Absolute 05/16/2023 0.17  0.00 - 0.40 10*3/mm3 Final   • Basophils, Absolute 05/16/2023 0.05  0.00 - 0.20 10*3/mm3 Final   • Immature Grans, Absolute 05/16/2023 0.03  0.00 - 0.05 10*3/mm3 Final   • nRBC 05/16/2023 0.0  0.0 - 0.2 /100 WBC Final   Hospital Outpatient Visit on 05/11/2023   Component Date Value Ref Range Status   • Creatinine 05/11/2023 1.30  0.60 - 1.30 mg/dL Final    Serial Number: [id number]:  [id number]   Hospital Outpatient Visit on 05/09/2023   Component Date Value Ref Range Status   • Target HR (85%) 05/09/2023 117  bpm Final   • Max. Pred. HR (100%) 05/09/2023 138  bpm Final   • Ascending aorta 05/09/2023 2.1  cm Final   • Aortic arch 05/09/2023 1.6  cm Final   • Abdo Ao Diam 05/09/2023 2.0  cm Final   • EF(MOD-bp) 05/09/2023 51.4  % Final   • LV GLOBAL STRAIN  05/09/2023 -20.3  % Final   • LVIDd 05/09/2023 4.2  cm Final   • LVIDs 05/09/2023 2.8  cm Final   • IVSd 05/09/2023 0.86  cm Final   • LVPWd 05/09/2023 0.90  cm Final   • FS 05/09/2023 34.6  % Final   • IVS/LVPW 05/09/2023 0.96  cm Final   • ESV(cubed) 05/09/2023 21.2  ml Final   • LV Sys Vol (BSA corrected) 05/09/2023 23.2  cm2 Final   • EDV(cubed) 05/09/2023 75.5  ml Final   • LV Aguilar Vol (BSA corrected) 05/09/2023 51.8  cm2 Final   • LVOT area 05/09/2023 2.7  cm2 Final   • LV mass(C)d 05/09/2023 116.2  grams Final   • LVOT diam 05/09/2023 1.84  cm Final   •  EDV(MOD-sp2) 05/09/2023 78.0  ml Final   • EDV(MOD-sp4) 05/09/2023 87.0  ml Final   • ESV(MOD-sp2) 05/09/2023 39.0  ml Final   • ESV(MOD-sp4) 05/09/2023 39.0  ml Final   • SV(MOD-sp2) 05/09/2023 39.0  ml Final   • SV(MOD-sp4) 05/09/2023 48.0  ml Final   • SI(MOD-sp2) 05/09/2023 23.2  ml/m2 Final   • SI(MOD-sp4) 05/09/2023 28.6  ml/m2 Final   • EF(MOD-sp2) 05/09/2023 50.0  % Final   • EF(MOD-sp4) 05/09/2023 55.2  % Final   • MV E max lamonte 05/09/2023 99.8  cm/sec Final   • MV A max lamonte 05/09/2023 154.2  cm/sec Final   • MV dec time 05/09/2023 0.12  msec Final   • MV E/A 05/09/2023 0.65   Final   • Pulm A Revs Dur 05/09/2023 0.12  sec Final   • MV A dur 05/09/2023 0.11  sec Final   • LA ESV Index (BP) 05/09/2023 21.5  ml/m2 Final   • Med Peak E' Lamonte 05/09/2023 6.1  cm/sec Final   • Lat Peak E' Lamonte 05/09/2023 5.0  cm/sec Final   • Avg E/e' ratio 05/09/2023 17.98   Final   • SV(LVOT) 05/09/2023 44.2  ml Final   • SV(RVOT) 05/09/2023 33.1  ml Final   • Qp/Qs 05/09/2023 0.75   Final   • RV Base 05/09/2023 2.17  cm Final   • RV Mid 05/09/2023 2.31  cm Final   • RV Length 05/09/2023 6.5  cm Final   • TAPSE (>1.6) 05/09/2023 2.00  cm Final   • RV S' 05/09/2023 16.3  cm/sec Final   • Pulm Sys Lamonte 05/09/2023 39.6  cm/sec Final   • Pulm Aguilar Lamonte 05/09/2023 46.6  cm/sec Final   • Pulm S/D 05/09/2023 0.85   Final   • Pulm A Revs Lamonte 05/09/2023 25.7  cm/sec Final   • LV V1 max 05/09/2023 94.5  cm/sec Final   • LV V1 max PG 05/09/2023 3.6  mmHg Final   • LV V1 mean PG 05/09/2023 1.91  mmHg Final   • LV V1 VTI 05/09/2023 16.6  cm Final   • Ao pk lamonte 05/09/2023 158.8  cm/sec Final   • Ao max PG 05/09/2023 10.1  mmHg Final   • Ao mean PG 05/09/2023 5.6  mmHg Final   • Ao V2 VTI 05/09/2023 27.8  cm Final   • JEFE(I,D) 05/09/2023 1.59  cm2 Final   • MV max PG 05/09/2023 12.8  mmHg Final   • MV mean PG 05/09/2023 6.0  mmHg Final   • MV V2 VTI 05/09/2023 25.6  cm Final   • MV P1/2t 05/09/2023 31.3  msec Final   • MVA(P1/2t) 05/09/2023 7.0   cm2 Final   • MVA(VTI) 05/09/2023 1.72  cm2 Final   • MV dec slope 05/09/2023 1,068  cm/sec2 Final   • MR max rajan 05/09/2023 513.1  cm/sec Final   • MR max PG 05/09/2023 105.3  mmHg Final   • TR max rajan 05/09/2023 187.3  cm/sec Final   • TR max PG 05/09/2023 14.0  mmHg Final   • RVSP(TR) 05/09/2023 17.0  mmHg Final   • RAP systole 05/09/2023 3.0  mmHg Final   • RVOT diam 05/09/2023 1.79  cm Final   • RV V1 max PG 05/09/2023 2.43  mmHg Final   • RV V1 max 05/09/2023 78.0  cm/sec Final   • RV V1 VTI 05/09/2023 13.2  cm Final   • PA V2 max 05/09/2023 82.8  cm/sec Final   • PA acc time 05/09/2023 0.07  sec Final   • PA pr(Accel) 05/09/2023 48.1  mmHg Final   • Ao root diam 05/09/2023 2.44  cm Final   • ACS 05/09/2023 1.39  cm Final   • Sinus 05/09/2023 2.44  cm Final   • STJ 05/09/2023 2.40  cm Final   Lab Requisition on 05/04/2023   Component Date Value Ref Range Status   • Case Report 05/04/2023    Final                    Value:Surgical Pathology Report                         Case: [id number]                                  Authorizing Provider:  Rosa Michael MD      Collected:           05/04/2023 10:20 AM          Ordering Location:     [facility]  Received:            05/04/2023 10:21 AM                                 LABORATORY                                                                   Pathologist:           Isaiah Dee MD                                                         Specimens:   1) - Consultation Slides, [id number], 2 h&e, 3 UNS                                                  2) - Consultation Slides, [id number], 1 h&e, 3 UNS                                        • Final Diagnosis 05/04/2023    Final                    Value:This result contains rich text formatting which cannot be displayed here.   • Comment 05/04/2023    Final                    Value:This result contains rich text formatting which cannot be displayed here.   • Gross Description 05/04/2023     Final                    Value:This result contains rich text formatting which cannot be displayed here.   Office Visit on 05/01/2023   Component Date Value Ref Range Status   • Case Report 05/01/2023    Final                    Value:Surgical Pathology Report                         Case: [id number]                                  Authorizing Provider:  Rosa Michael MD      Collected:           05/01/2023 02:50 PM          Ordering Location:     [facility]     Received:            05/01/2023 10:33 PM                                 [facility]                                                        Pathologist:           Maki Lopez MD                                                    Specimen:    Breast, Right                                                                             • Clinical Information 05/01/2023    Final                    Value:This result contains rich text formatting which cannot be displayed here.   • Final Diagnosis 05/01/2023    Final                    Value:This result contains rich text formatting which cannot be displayed here.   • Comment 05/01/2023    Final                    Value:This result contains rich text formatting which cannot be displayed here.   • Gross Description 05/01/2023    Final                    Value:This result contains rich text formatting which cannot be displayed here.   Hospital Outpatient Visit on 04/29/2023   Component Date Value Ref Range Status   • Creatinine 04/29/2023 0.90  0.60 - 1.30 mg/dL Final    Serial Number: [id number]:  [id number]        CT Chest With Contrast Diagnostic    Result Date: 5/11/2023  1. Right breast cancer with right axillary lymphadenopathy 2. No convincing evidence of distant metastatic disease. Incidental findings as above.  This report was finalized on 5/11/2023 12:48 PM by Dr. Jasvir Jha M.D.      NM Bone Scan Whole Body    Result Date: 5/12/2023  Abnormal soft tissue activity in  the right breast corresponding to known disease in that location. No evidence of osseous metastasis.  This report was finalized on 5/12/2023 12:04 PM by Dr. Kannan Carr M.D.      CT Abdomen Pelvis With Contrast    Result Date: 5/11/2023  1. Right breast cancer with right axillary lymphadenopathy 2. No convincing evidence of distant metastatic disease. Incidental findings as above.  This report was finalized on 5/11/2023 12:48 PM by Dr. Jasvir Jha M.D.      MRI Breast Bilateral With & Without Contrast    Result Date: 5/1/2023  1. Biopsy-proven malignancy in the right breast in the posterior one third at the 9 o'clock position that measures on the order of 4.3 cm in greatest dimension and contains the internal mini cork-shaped metallic clip. The mass shows central hypoenhancement suggestive of central necrosis. Attachment to the overlying skin as described is noted and there is diffuse right breast skin edema and trabecular edema. Evidence of right axillary adenopathy is noted. 2. There are no findings suspicious for malignancy in the left breast.  BI-RADS category 6: Known biopsy-proven malignancy.  This report was finalized on 5/1/2023 12:30 PM by Dr. Edwardo Smith M.D.           Assessment & Plan       *Right breast inflammatory breast cancer  · T4d N1 M0  · Stage IIIb  · ER negative, AZ negative, HER2 2+ on immunohistochemistry but amplified on FISH.  Invasive ductal carcinoma with apocrine features, grade 3, Ki-67 38%  Discussed at length the details of imaging and pathology report.Discussed the origin of breast cancer from the ducts and the lobules and the histological type of breast cancer based on site of origin. Discussed the tumor size, lymph node status and stage of the cancer. Explained the presence of DCIS. Discussed the receptor status including ER, AZ and her-2 destiny and their significance in determining the biology and treatment. Also discussed the importance of grade and ki-67.   The steps of  curative intent breast cancer treatment have been explained, including surgery, possible chemotherapy, possible radiation and possible endocrine therapy.   CT scans and bone scan without any obvious evidence of metastatic disease  · Discussed the rationale for neoadjuvant chemotherapy for HER2 positive breast cancer as well as for inflammatory breast cancer.  · Given her age and significant comorbidities I would like to start chemotherapy with Taxol Herceptin and Perjeta, and if she tolerates this well we will proceed with Adriamycin and cyclophosphamide.  · Echocardiogram has been performed and ejection fraction normal.  · Liver shows cirrhotic morphology.  · LFTs otherwise normal and total bilirubin normal as well as protein normal.  It appears that the synthetic function of the liver is still within normal limits.  · Adverse effects of chemotherapy including but not limited to myelosuppression, increased risk of infections, nausea, vomiting, arthralgias, alopecia, mucositis, diarrhea, cardiotoxicity, risk of leukemia, hepatotoxicity, risk of allergic reactions, risk of extravasation discussed.  · She will require port placement by Dr. Michael.    *Diabetes  · Poorly controlled  · Referred to endocrinology or diabetic nurse educator    *Hyperkalemia and chronic kidney disease  · Unclear etiology  · Could be secondary to RTA  · Refer to nephrology for further management    *?  Cirrhotic appearance of the liver on the CT scan  · Referred to gastroenterology  · Synthetic function appears to be normal  · We will start with Taxol to see if she would tolerate the chemotherapy     *Hypertension-currently on valsartan and hydrochlorothiazide.  Valsartan could be contributing to hyperkalemia.  Will refer to cardiology ASAP for management of medications and cardiac clearance for proceeding with chemotherapy  Recent echocardiogram normal    *Genetic testing-obtained at Dr. Michael's office, results pending    *Follow-up-1 week  to start Taxol Perjeta Herceptin    95 minutes have been spent on the encounter today including reviewing the medical records, reviewing the imaging, care coordination, face-to-face time and documentation on the same day

## 2023-05-17 ENCOUNTER — TELEPHONE (OUTPATIENT)
Dept: SURGERY | Facility: CLINIC | Age: 83
End: 2023-05-17
Payer: MEDICARE

## 2023-05-17 ENCOUNTER — PREP FOR SURGERY (OUTPATIENT)
Dept: OTHER | Facility: HOSPITAL | Age: 83
End: 2023-05-17
Payer: MEDICARE

## 2023-05-17 DIAGNOSIS — C50.919 MALIGNANT NEOPLASM OF FEMALE BREAST, UNSPECIFIED ESTROGEN RECEPTOR STATUS, UNSPECIFIED LATERALITY, UNSPECIFIED SITE OF BREAST: Primary | ICD-10-CM

## 2023-05-17 RX ORDER — CEFAZOLIN SODIUM 2 G/100ML
2 INJECTION, SOLUTION INTRAVENOUS ONCE
Status: CANCELLED | OUTPATIENT
Start: 2023-05-19 | End: 2023-05-17

## 2023-05-18 ENCOUNTER — TELEPHONE (OUTPATIENT)
Dept: OTHER | Facility: HOSPITAL | Age: 83
End: 2023-05-18
Payer: MEDICARE

## 2023-05-19 ENCOUNTER — TELEPHONE (OUTPATIENT)
Dept: OTHER | Facility: HOSPITAL | Age: 83
End: 2023-05-19
Payer: MEDICARE

## 2023-05-19 ENCOUNTER — APPOINTMENT (OUTPATIENT)
Dept: GENERAL RADIOLOGY | Facility: HOSPITAL | Age: 83
End: 2023-05-19
Payer: MEDICARE

## 2023-05-19 ENCOUNTER — HOSPITAL ENCOUNTER (OUTPATIENT)
Facility: HOSPITAL | Age: 83
Setting detail: HOSPITAL OUTPATIENT SURGERY
Discharge: HOME | End: 2023-05-19
Attending: STUDENT IN AN ORGANIZED HEALTH CARE EDUCATION/TRAINING PROGRAM | Admitting: STUDENT IN AN ORGANIZED HEALTH CARE EDUCATION/TRAINING PROGRAM
Payer: MEDICARE

## 2023-05-19 ENCOUNTER — ANESTHESIA EVENT (OUTPATIENT)
Dept: PERIOP | Facility: HOSPITAL | Age: 83
End: 2023-05-19
Payer: MEDICARE

## 2023-05-19 ENCOUNTER — ANESTHESIA (OUTPATIENT)
Dept: PERIOP | Facility: HOSPITAL | Age: 83
End: 2023-05-19
Payer: MEDICARE

## 2023-05-19 VITALS
SYSTOLIC BLOOD PRESSURE: 141 MMHG | WEIGHT: 154.1 LBS | DIASTOLIC BLOOD PRESSURE: 65 MMHG | HEART RATE: 82 BPM | RESPIRATION RATE: 16 BRPM | TEMPERATURE: 98 F | OXYGEN SATURATION: 95 % | HEIGHT: 60 IN | BODY MASS INDEX: 30.25 KG/M2

## 2023-05-19 DIAGNOSIS — C50.919 MALIGNANT NEOPLASM OF FEMALE BREAST, UNSPECIFIED ESTROGEN RECEPTOR STATUS, UNSPECIFIED LATERALITY, UNSPECIFIED SITE OF BREAST: ICD-10-CM

## 2023-05-19 LAB
GLUCOSE BLDC GLUCOMTR-MCNC: 198 MG/DL (ref 70–130)
GLUCOSE BLDC GLUCOMTR-MCNC: 263 MG/DL (ref 70–130)
GLUCOSE BLDC GLUCOMTR-MCNC: 294 MG/DL (ref 70–130)

## 2023-05-19 PROCEDURE — 77001 FLUOROGUIDE FOR VEIN DEVICE: CPT

## 2023-05-19 PROCEDURE — 63710000001 INSULIN REGULAR HUMAN PER 5 UNITS: Performed by: ANESTHESIOLOGY

## 2023-05-19 PROCEDURE — 76000 FLUOROSCOPY <1 HR PHYS/QHP: CPT

## 2023-05-19 PROCEDURE — 76937 US GUIDE VASCULAR ACCESS: CPT

## 2023-05-19 PROCEDURE — 36561 INSERT TUNNELED CV CATH: CPT | Performed by: STUDENT IN AN ORGANIZED HEALTH CARE EDUCATION/TRAINING PROGRAM

## 2023-05-19 PROCEDURE — C1788 PORT, INDWELLING, IMP: HCPCS | Performed by: STUDENT IN AN ORGANIZED HEALTH CARE EDUCATION/TRAINING PROGRAM

## 2023-05-19 PROCEDURE — 25010000002 PROPOFOL 10 MG/ML EMULSION: Performed by: REGISTERED NURSE

## 2023-05-19 PROCEDURE — 25010000002 CEFAZOLIN IN DEXTROSE 2-4 GM/100ML-% SOLUTION: Performed by: STUDENT IN AN ORGANIZED HEALTH CARE EDUCATION/TRAINING PROGRAM

## 2023-05-19 PROCEDURE — 36561 INSERT TUNNELED CV CATH: CPT

## 2023-05-19 PROCEDURE — 82948 REAGENT STRIP/BLOOD GLUCOSE: CPT

## 2023-05-19 PROCEDURE — 25010000002 KETOROLAC TROMETHAMINE PER 15 MG: Performed by: REGISTERED NURSE

## 2023-05-19 PROCEDURE — 25010000002 HEPARIN (PORCINE) PER 1000 UNITS: Performed by: STUDENT IN AN ORGANIZED HEALTH CARE EDUCATION/TRAINING PROGRAM

## 2023-05-19 DEVICE — PRT INTRO VASC/INTERV VORTEX FILL/HL DETACH/POLYURET/CATH 8F: Type: IMPLANTABLE DEVICE | Site: CHEST WALL | Status: FUNCTIONAL

## 2023-05-19 RX ORDER — SODIUM CHLORIDE 0.9 % (FLUSH) 0.9 %
3 SYRINGE (ML) INJECTION EVERY 12 HOURS SCHEDULED
Status: DISCONTINUED | OUTPATIENT
Start: 2023-05-19 | End: 2023-05-19 | Stop reason: HOSPADM

## 2023-05-19 RX ORDER — SODIUM CHLORIDE, SODIUM LACTATE, POTASSIUM CHLORIDE, CALCIUM CHLORIDE 600; 310; 30; 20 MG/100ML; MG/100ML; MG/100ML; MG/100ML
9 INJECTION, SOLUTION INTRAVENOUS CONTINUOUS
Status: DISCONTINUED | OUTPATIENT
Start: 2023-05-19 | End: 2023-05-19 | Stop reason: HOSPADM

## 2023-05-19 RX ORDER — FLUMAZENIL 0.1 MG/ML
0.2 INJECTION INTRAVENOUS AS NEEDED
Status: DISCONTINUED | OUTPATIENT
Start: 2023-05-19 | End: 2023-05-19 | Stop reason: HOSPADM

## 2023-05-19 RX ORDER — PROMETHAZINE HYDROCHLORIDE 25 MG/1
25 SUPPOSITORY RECTAL ONCE AS NEEDED
Status: DISCONTINUED | OUTPATIENT
Start: 2023-05-19 | End: 2023-05-19 | Stop reason: HOSPADM

## 2023-05-19 RX ORDER — FAMOTIDINE 10 MG/ML
20 INJECTION, SOLUTION INTRAVENOUS ONCE
Status: COMPLETED | OUTPATIENT
Start: 2023-05-19 | End: 2023-05-19

## 2023-05-19 RX ORDER — ONDANSETRON 2 MG/ML
4 INJECTION INTRAMUSCULAR; INTRAVENOUS ONCE AS NEEDED
Status: DISCONTINUED | OUTPATIENT
Start: 2023-05-19 | End: 2023-05-19 | Stop reason: HOSPADM

## 2023-05-19 RX ORDER — HYDROCODONE BITARTRATE AND ACETAMINOPHEN 5; 325 MG/1; MG/1
1 TABLET ORAL ONCE AS NEEDED
Status: DISCONTINUED | OUTPATIENT
Start: 2023-05-19 | End: 2023-05-19 | Stop reason: HOSPADM

## 2023-05-19 RX ORDER — DROPERIDOL 2.5 MG/ML
0.62 INJECTION, SOLUTION INTRAMUSCULAR; INTRAVENOUS
Status: DISCONTINUED | OUTPATIENT
Start: 2023-05-19 | End: 2023-05-19 | Stop reason: HOSPADM

## 2023-05-19 RX ORDER — KETOROLAC TROMETHAMINE 30 MG/ML
INJECTION, SOLUTION INTRAMUSCULAR; INTRAVENOUS AS NEEDED
Status: DISCONTINUED | OUTPATIENT
Start: 2023-05-19 | End: 2023-05-19 | Stop reason: SURG

## 2023-05-19 RX ORDER — FENTANYL CITRATE 50 UG/ML
25 INJECTION, SOLUTION INTRAMUSCULAR; INTRAVENOUS
Status: DISCONTINUED | OUTPATIENT
Start: 2023-05-19 | End: 2023-05-19 | Stop reason: HOSPADM

## 2023-05-19 RX ORDER — IPRATROPIUM BROMIDE AND ALBUTEROL SULFATE 2.5; .5 MG/3ML; MG/3ML
3 SOLUTION RESPIRATORY (INHALATION) ONCE AS NEEDED
Status: DISCONTINUED | OUTPATIENT
Start: 2023-05-19 | End: 2023-05-19 | Stop reason: HOSPADM

## 2023-05-19 RX ORDER — PROPOFOL 10 MG/ML
VIAL (ML) INTRAVENOUS AS NEEDED
Status: DISCONTINUED | OUTPATIENT
Start: 2023-05-19 | End: 2023-05-19 | Stop reason: SURG

## 2023-05-19 RX ORDER — DIPHENHYDRAMINE HYDROCHLORIDE 50 MG/ML
12.5 INJECTION INTRAMUSCULAR; INTRAVENOUS
Status: DISCONTINUED | OUTPATIENT
Start: 2023-05-19 | End: 2023-05-19 | Stop reason: HOSPADM

## 2023-05-19 RX ORDER — LABETALOL HYDROCHLORIDE 5 MG/ML
5 INJECTION, SOLUTION INTRAVENOUS
Status: DISCONTINUED | OUTPATIENT
Start: 2023-05-19 | End: 2023-05-19 | Stop reason: HOSPADM

## 2023-05-19 RX ORDER — LIDOCAINE HYDROCHLORIDE 10 MG/ML
0.5 INJECTION, SOLUTION EPIDURAL; INFILTRATION; INTRACAUDAL; PERINEURAL ONCE AS NEEDED
Status: DISCONTINUED | OUTPATIENT
Start: 2023-05-19 | End: 2023-05-19 | Stop reason: HOSPADM

## 2023-05-19 RX ORDER — EPHEDRINE SULFATE 50 MG/ML
5 INJECTION, SOLUTION INTRAVENOUS ONCE AS NEEDED
Status: DISCONTINUED | OUTPATIENT
Start: 2023-05-19 | End: 2023-05-19 | Stop reason: HOSPADM

## 2023-05-19 RX ORDER — BUPIVACAINE HYDROCHLORIDE AND EPINEPHRINE 5; 5 MG/ML; UG/ML
INJECTION, SOLUTION EPIDURAL; INTRACAUDAL; PERINEURAL AS NEEDED
Status: DISCONTINUED | OUTPATIENT
Start: 2023-05-19 | End: 2023-05-19 | Stop reason: HOSPADM

## 2023-05-19 RX ORDER — FENTANYL CITRATE 50 UG/ML
50 INJECTION, SOLUTION INTRAMUSCULAR; INTRAVENOUS ONCE AS NEEDED
Status: DISCONTINUED | OUTPATIENT
Start: 2023-05-19 | End: 2023-05-19 | Stop reason: HOSPADM

## 2023-05-19 RX ORDER — MIDAZOLAM HYDROCHLORIDE 1 MG/ML
0.5 INJECTION INTRAMUSCULAR; INTRAVENOUS
Status: DISCONTINUED | OUTPATIENT
Start: 2023-05-19 | End: 2023-05-19 | Stop reason: HOSPADM

## 2023-05-19 RX ORDER — SODIUM CHLORIDE 0.9 % (FLUSH) 0.9 %
3-10 SYRINGE (ML) INJECTION AS NEEDED
Status: DISCONTINUED | OUTPATIENT
Start: 2023-05-19 | End: 2023-05-19 | Stop reason: HOSPADM

## 2023-05-19 RX ORDER — PROMETHAZINE HYDROCHLORIDE 25 MG/1
25 TABLET ORAL ONCE AS NEEDED
Status: DISCONTINUED | OUTPATIENT
Start: 2023-05-19 | End: 2023-05-19 | Stop reason: HOSPADM

## 2023-05-19 RX ORDER — HYDROCODONE BITARTRATE AND ACETAMINOPHEN 7.5; 325 MG/1; MG/1
1 TABLET ORAL EVERY 4 HOURS PRN
Status: DISCONTINUED | OUTPATIENT
Start: 2023-05-19 | End: 2023-05-19 | Stop reason: HOSPADM

## 2023-05-19 RX ORDER — HYDRALAZINE HYDROCHLORIDE 20 MG/ML
5 INJECTION INTRAMUSCULAR; INTRAVENOUS
Status: DISCONTINUED | OUTPATIENT
Start: 2023-05-19 | End: 2023-05-19 | Stop reason: HOSPADM

## 2023-05-19 RX ORDER — HYDROMORPHONE HYDROCHLORIDE 1 MG/ML
0.25 INJECTION, SOLUTION INTRAMUSCULAR; INTRAVENOUS; SUBCUTANEOUS
Status: DISCONTINUED | OUTPATIENT
Start: 2023-05-19 | End: 2023-05-19 | Stop reason: HOSPADM

## 2023-05-19 RX ORDER — LIDOCAINE HYDROCHLORIDE 20 MG/ML
INJECTION, SOLUTION EPIDURAL; INFILTRATION; INTRACAUDAL; PERINEURAL AS NEEDED
Status: DISCONTINUED | OUTPATIENT
Start: 2023-05-19 | End: 2023-05-19 | Stop reason: SURG

## 2023-05-19 RX ORDER — NALOXONE HCL 0.4 MG/ML
0.2 VIAL (ML) INJECTION AS NEEDED
Status: DISCONTINUED | OUTPATIENT
Start: 2023-05-19 | End: 2023-05-19 | Stop reason: HOSPADM

## 2023-05-19 RX ORDER — CEFAZOLIN SODIUM 2 G/100ML
2 INJECTION, SOLUTION INTRAVENOUS ONCE
Status: COMPLETED | OUTPATIENT
Start: 2023-05-19 | End: 2023-05-19

## 2023-05-19 RX ADMIN — SODIUM CHLORIDE, POTASSIUM CHLORIDE, SODIUM LACTATE AND CALCIUM CHLORIDE 9 ML/HR: 600; 310; 30; 20 INJECTION, SOLUTION INTRAVENOUS at 13:12

## 2023-05-19 RX ADMIN — KETOROLAC TROMETHAMINE 30 MG: 30 INJECTION, SOLUTION INTRAMUSCULAR at 14:42

## 2023-05-19 RX ADMIN — PROPOFOL 50 MG: 10 INJECTION, EMULSION INTRAVENOUS at 14:23

## 2023-05-19 RX ADMIN — PROPOFOL 75 MCG/KG/MIN: 10 INJECTION, EMULSION INTRAVENOUS at 14:23

## 2023-05-19 RX ADMIN — LIDOCAINE HYDROCHLORIDE 100 MG: 20 INJECTION, SOLUTION EPIDURAL; INFILTRATION; INTRACAUDAL; PERINEURAL at 14:23

## 2023-05-19 RX ADMIN — CEFAZOLIN SODIUM 2 G: 2 INJECTION, SOLUTION INTRAVENOUS at 14:22

## 2023-05-19 RX ADMIN — INSULIN HUMAN 5 UNITS: 100 INJECTION, SOLUTION PARENTERAL at 12:48

## 2023-05-19 RX ADMIN — FAMOTIDINE 20 MG: 10 INJECTION INTRAVENOUS at 13:11

## 2023-05-19 NOTE — ANESTHESIA PREPROCEDURE EVALUATION
Anesthesia Evaluation     NPO Solid Status: > 8 hours  NPO Liquid Status: > 8 hours           Airway   Mallampati: I  TM distance: >3 FB  No difficulty expected  Dental      Pulmonary    Cardiovascular   Exercise tolerance: good (4-7 METS)    (+) hypertension, hyperlipidemia,     ROS comment: •  Left ventricular systolic function is normal. Left ventricular ejection fraction appears to be 56 - 60%.  •  Left ventricular diastolic function is consistent with (grade Ia w/high LAP) impaired relaxation.  •  There is calcification of the aortic valve.  •  Estimated right ventricular systolic pressure from tricuspid regurgitation is normal (<35 mmHg). Calculated right ventricular systolic pressure from tricuspid regurgitation is 17 mmHg.         Neuro/Psych  GI/Hepatic/Renal/Endo    (+)   diabetes mellitus,     Musculoskeletal     Abdominal    Substance History      OB/GYN          Other      history of cancer                  Anesthesia Plan    ASA 3     MAC     intravenous induction     Anesthetic plan, risks, benefits, and alternatives have been provided, discussed and informed consent has been obtained with: patient.    Plan discussed with CRNA.        CODE STATUS:

## 2023-05-19 NOTE — ANESTHESIA POSTPROCEDURE EVALUATION
Patient: Veronica Royal    Procedure Summary     Date: 05/19/23 Room / Location:  [facility] /  [facility]    Anesthesia Start: 1416 Anesthesia Stop: 1451    Procedure: INSERTION VENOUS ACCESS DEVICE Diagnosis:       Malignant neoplasm of female breast, unspecified estrogen receptor status, unspecified laterality, unspecified site of breast      (Malignant neoplasm of female breast, unspecified estrogen receptor status, unspecified laterality, unspecified site of breast [C50.919])    Surgeons: Rosa Michael MD Provider: Rome Singh MD    Anesthesia Type: MAC ASA Status: 3          Anesthesia Type: MAC    Vitals  Vitals Value Taken Time   /65 05/19/23 1454   Temp 36.7 °C (98 °F) 05/19/23 1454   Pulse 82 05/19/23 1454   Resp 16 05/19/23 1454   SpO2 95 % 05/19/23 1454           Post Anesthesia Care and Evaluation    Patient location during evaluation: bedside  Patient participation: complete - patient participated  Level of consciousness: awake  Pain management: adequate    Airway patency: patent  Anesthetic complications: No anesthetic complications  PONV Status: none  Cardiovascular status: acceptable  Respiratory status: acceptable  Hydration status: acceptable  Post Neuraxial Block status: Motor and sensory function returned to baseline

## 2023-05-19 NOTE — TELEPHONE ENCOUNTER
Oncology Social Work  Distress Screening Follow-up    Diagnosis:Right breast inflammatory breast cancer    Distress Level: 7 (5/16/2023  9:00 AM)     Interventions:     Emotional Needs: emotional suppport/coping strategies    Comments:  Spoke to the patient; explained role of OSW and supportive oncology services.  Discussed patient’s distress screening score of 7 from 5/16/23.  OSW actively listened as the patient discussed her distress being caused by all of her doctors appointments.  The patient states that she has her daughter and son who are available to assist her in keeping up with her appointments and transporting her to her appointments.  The patient states that she does still drive and her son actually has taken FMLA to assist her.  The patient states that she lives alone, has a tremendous amount of support, uses no DME and currently denies any supportive oncology needs.  The patient was informed that a letter with OSW contact information will be mailed to her should any future psychosocial needs arise.  YESSI Saunders

## 2023-05-22 ENCOUNTER — OFFICE VISIT (OUTPATIENT)
Dept: ONCOLOGY | Facility: CLINIC | Age: 83
End: 2023-05-22
Payer: MEDICARE

## 2023-05-22 VITALS
TEMPERATURE: 99.1 F | WEIGHT: 152.6 LBS | DIASTOLIC BLOOD PRESSURE: 83 MMHG | HEART RATE: 122 BPM | OXYGEN SATURATION: 95 % | HEIGHT: 60 IN | SYSTOLIC BLOOD PRESSURE: 159 MMHG | BODY MASS INDEX: 29.96 KG/M2

## 2023-05-22 DIAGNOSIS — C50.411 MALIGNANT NEOPLASM OF UPPER-OUTER QUADRANT OF RIGHT BREAST IN FEMALE, ESTROGEN RECEPTOR NEGATIVE: Primary | ICD-10-CM

## 2023-05-22 DIAGNOSIS — Z17.1 MALIGNANT NEOPLASM OF UPPER-OUTER QUADRANT OF RIGHT BREAST IN FEMALE, ESTROGEN RECEPTOR NEGATIVE: Primary | ICD-10-CM

## 2023-05-22 PROCEDURE — 99215 OFFICE O/P EST HI 40 MIN: CPT | Performed by: NURSE PRACTITIONER

## 2023-05-22 PROCEDURE — 1160F RVW MEDS BY RX/DR IN RCRD: CPT | Performed by: NURSE PRACTITIONER

## 2023-05-22 PROCEDURE — 1126F AMNT PAIN NOTED NONE PRSNT: CPT | Performed by: NURSE PRACTITIONER

## 2023-05-22 PROCEDURE — 1159F MED LIST DOCD IN RCRD: CPT | Performed by: NURSE PRACTITIONER

## 2023-05-22 RX ORDER — DEXAMETHASONE 4 MG/1
TABLET ORAL
Qty: 4 TABLET | Refills: 0 | Status: SHIPPED | OUTPATIENT
Start: 2023-05-22

## 2023-05-22 RX ORDER — LIDOCAINE AND PRILOCAINE 25; 25 MG/G; MG/G
CREAM TOPICAL
Qty: 30 G | Refills: 5 | Status: SHIPPED | OUTPATIENT
Start: 2023-05-22

## 2023-05-22 RX ORDER — ONDANSETRON HYDROCHLORIDE 8 MG/1
8 TABLET, FILM COATED ORAL 3 TIMES DAILY PRN
Qty: 30 TABLET | Refills: 5 | Status: SHIPPED | OUTPATIENT
Start: 2023-05-22

## 2023-05-22 NOTE — PROGRESS NOTES
[facility] Hematology/Oncology Treatment Plan Summary    Name: Veronica Royal  [facility]# [id number]  MD: Dr. Yee    Diagnosis:     ICD-10-CM ICD-9-CM   1. Malignant neoplasm of upper-outer quadrant of right breast in female, estrogen receptor negative  C50.411 174.4    Z17.1 V86.1     Stage: IIIB      Goal of treatment: neoadjuvant    Treatment Medication(s):   1. Taxol  2. Herceptin  3. Perjeta  4. Adriamycin  5. Cytoxan    Frequency: Taxol weekly x12, Herceptin Perjeta every 3 weeks along with Taxol.  Followed by Adriamycin Cytoxan every 3 weeks x 4    Number of cycles: 12 weeks of Taxol followed by Adriamycin Cytoxan x4    Starting on: Wednesday, 5/24/2023    Items for home use: Imodium AD (for diarrhea) and Tylenol (for fever and/or pain)    Rx written for: [] Nausea    [] Pre-Treatment   dexamethasone 4mg tabs. 2 tabs twice daily on the day prior to starting paclitaxel , EMLA cream to port site 30 minutes prior to access and ondansetron 8 mg by mouth every 8 hours as needed for nausea    Next Steps: PORT and MUGA/ECHO     Completing Provider: CARL Walker             Date/time: 05/22/2023      Please note: You will be seen by a provider frequently with your treatment plan. This plan may change depending on many factors, if so, this will be discussed with you by your physician.  Last update 03/2022.

## 2023-05-22 NOTE — PROGRESS NOTES
TREATMENT  PREPARATION    Veronica Royal  [id number]  1940    Chief Complaint: Treatment preparation and needs assessment    History of present illness:  Veronica Royal is a 82 y.o. year old female who is here today for treatment preparation and needs assessment.  The patient has been diagnosed with   Encounter Diagnosis   Name Primary?   • Malignant neoplasm of upper-outer quadrant of right breast in female, estrogen receptor negative Yes    and is scheduled to begin treatment with:     Oncology History:    Oncology/Hematology History   Malignant neoplasm of upper-outer quadrant of right breast in female, estrogen receptor negative   5/16/2023 Initial Diagnosis    Malignant neoplasm of upper-outer quadrant of right breast in female, estrogen receptor negative     5/24/2023 -  Chemotherapy    OP BREAST Pertuzumab / Trastuzumab / PACLitaxel         The current medication list and allergy list were reviewed and reconciled.     Past Medical History, Past Surgical History, Social History, Family History have been reviewed and are without significant changes except as mentioned.    Physical Exam:    Vitals:    05/22/23 1525   BP: 159/83   Pulse: (!) 122   Temp: 99.1 °F (37.3 °C)   SpO2: 95%     Vitals:    05/22/23 1525   PainSc: 0-No pain        ECOG score: 0             Physical Exam      NEEDS ASSESSMENTS    Genetics  The patient's new diagnosis and family history have been reviewed for genetic counseling needs. The patient will not be referred..     Psychosocial and Barriers to care  The patient has completed a PHQ-9 Depression Screening and the Distress Thermometer (DT) today.  PHQ-9 results show PHQ-2 Total Score:   PHQ-9 Total Score: PHQ-9 Total Score:       The patient scored their distress today as Distress Level: 7 on a scale of 0-10 with 0 being no distress and 10 being extreme distress. Problems marked by the patient as being an issue for them within the last week include   .      Results were reviewed  along with psychosocial resources offered by our cancer center.  Our Supportive Oncology team will be flagged for a score of 4 or above, and a same day call will be made for a score of 9 or 10.  A mental health referral is offered at that time. Patients who score less than 4 have been educated on our support services and can be referred to our  upon request.  The patient will not be referred to our .       Nutrition  The patient has completed the malnutrition screening today. They scored Malnutrition Screening Tool  Have you recently lost weight without trying?  If yes, how much weight have you lost?: 1--> Yes, 2-13 lbs  Have you been eating poorly because of a decreased appetite?: 0--> No  MST score: 1   with a score of 0-1 meaning not at risk in a score of 2 or greater meaning at risk.  Patients with a score of 3 or higher will be referred to our oncology dietitian for support. Patients beginning at risk treatment regimens or who have dietary concerns will also be referred to our oncology dietitian. The patient will be referred.    Functional Assessment  Persons who are age 70 or greater will be screened for qualification of a comprehensive geriatric assessment by our survivorship nurse practitioner.  Older adults with cancer face unique challenges. These may include an increased risk of drug reactions, financial burdens, and caregiver stress. The patient scored G8 Questionnaire  Has food intake declined over the past 3 months due to loss of appetite, digestive problems, chewing or swallowing difficulties?: No decrease in food intake  Weight loss during the last 3 months: No weight loss  Mobility: Goes out  Neuropsychological Problems: No psychological problems  Body Mass Index (BMI (weight in kg) / (height in m2)): BMI 23 and > 23  Takes more than 3 medications a day: Yes, takes more than 3 prescription drugs per day  In comparison with other people of the same age, how does the patient  "consider his/her health status?: As good  Age: 80 to 85  Total Score: 14 . Patients scoring 14 or lower will referred for an older adult functional assessment with the survivorship advanced practice registered nurse to ensure all needed support is provided as patients plan for their treatments. The patient will be referred.    Intravenous Access Assessment  The patient and I discussed planned intravenous chemo/biotherapy as well as other IV treatments that are often needed throughout the course of treatment. These may include, but are not limited to blood transfusions, antibiotics, and IV hydration. Discussed that depending on selected treatment and vein assessment, patient may require venous access device (VAD) which could include but not limited to a Mediport or PICC line. Risks and benefits of VADs reviewed. The patient will be treated via Port.    Reproductive/Sexual Activity   People should avoid becoming pregnant and should not get a partner pregnant while undergoing chemo/biotherapy.  People of childbearing age should use effective contraception during active therapy. The best recommendation for all people is to use a barrier method for a minimum of 1 week after the last infusion of chemo/biotherapy to prevent your partner being exposed to byproducts from treatment medications in bodily fluids. Effective contraception should be discussed with your oncology team to make sure it is safe to take based on your diagnosis. Possible options include oral contraceptives, barrier methods. Chemo/biotherapy can change your ability to reproduce children in the future.  There are options for fertility preservation. NOT APPLICABLE    Advanced Care Planning  Advance Care Planning   The patient and I discussed advanced care planning, \"Conversations that Matter\".   This service is offered for development of advance directives with a certified ACP facilitator.  The patient does not have an up-to-date advanced directive. This " document is not on file with our office. The patient is interested in an appointment with one of our facilitators to create or update their advanced directives.    Have you reviewed your Advance Directive and is it valid for this stay?: Not applicable          Smoking cessation  Tobacco Use: Low Risk    • Smoking Tobacco Use: Never   • Smokeless Tobacco Use: Never   • Passive Exposure: Not on file       Patient and I discussed their tobacco use history. Referral will not be made for smoking cessation.      Palliative Care  When appropriate, the patient and I discussed the availability palliative care services and when appropriate Hospice care. Palliative care is not the same as Hospice care which was explained to the patient.NOT APPLICABLE.    Survivorship   When appropriate, we discussed that we will refer the patient to survivorship clinic to discuss next steps following completion of planned treatment.  Reviewed this visit will include assessment of your physical, psychological, functional, and spiritual needs as a survivor and the need at attend this visit when scheduled.    TREATMENT EDUCATION    Today I met with the patient to discuss the chemo/biotherapy regimen recommended for treatment of Malignant neoplasm of upper-outer quadrant of right breast in female, estrogen receptor negative  .  The patient was given explanation of treatment premed side effects including office policy that prohibits patients to drive if sedating medications are administered, MD explanation given regarding benefits, side effects, toxicities and goals of treatment.  The patient received a Chemotherapy/Biotherapy Plan Summary including diagnosis and explanation of specific treatment plan.    SIDE EFFECTS:  Common side effects were discussed with the patient and/or significant other.  Discussion included where applicable hair loss/discoloration, anemia/fatigue, infection/chills/fever, appetite, bleeding risk/precautions, constipation,  diarrhea, mouth sores, taste alteration, loss of appetite, nausea/vomiting, peripheral neuropathy, skin/nail changes, rash, muscle aches/weakness, photosensitivity, weight gain/loss, hearing loss, dizziness, menopausal symptoms, menstrual irregularity, sterility, high blood pressure, heart damage, liver damage, lung damage, kidney damage, DVT/PE risk, fluid retention, pleural/pericardial effusion, somnolence, electrolyte/LFT imbalance, vein exercises and/or the possible need for vascular access/port placement.  The patient was advised that although uncommon, leakage of an infused medication from the vein or venous access device may lead to skin breakdown and/or other tissue damage.  The patient was advised that he/she may have pain, bleeding, and/or bruising from the insertion of a needle in their vein or venous access device (port).  The patient was further advised that, in spite of proper technique, infection with redness and irritation may rarely occur at the site where the needle was inserted.  The patient was advised that if complications occur, additional medical treatment is available.  Finally, where applicable we have reviewed rare but potential immune mediated side effects including shortness of breath, cough, chest pain (pneumonitis), abdominal pain, diarrhea (colitis), thyroiditis (hypothyroid or hyperthyroid), hepatitis and liver dysfunction, nephritis and renal dysfunction.    Discussion also included side effects specific to drugs in the treatment plan, specifically:    Treatment Plans     Name Type Plan Dates Plan Provider         Active    OP BREAST Pertuzumab / Trastuzumab / PACLitaxel ONCOLOGY TREATMENT  5/23/2023 - Present Sheila Yee MD                    Questions answered and additional information discussed on topics including:  Anemia, Thrombocytopenia, Neutropenia, Nutrition and appetite changes, Constipation, Diarrhea, Nausea & vomiting, Mouth sores, Alopecia, Nervous system changes,  Pain, Skin & nail changes and Organ toxicities       Assessment and Plan:    Diagnoses and all orders for this visit:    1. Malignant neoplasm of upper-outer quadrant of right breast in female, estrogen receptor negative (Primary)      No orders of the defined types were placed in this encounter.        1. The patient and I have reviewed their diagnosis and scheduled treatment plan. Needs assessment was completed where applicable including genetics, psychosocial needs, barriers to care, VAD evaluation, advanced care planning, survivorship, and palliative care services where indicated. Referrals have been ordered as appropriate based upon evaluation today and patient desires.   2. Chemo/biotherapy teaching was completed today and consent obtained. See separate documentation for further details.  3. Adequate time was given to answer questions.  Patient made aware of their care team members and contact information if they have questions or problems throughout the treatment course.  4. Discussion held and written information provided describing frequency of office visits and ongoing monitoring throughout the treatment plan.     5. Reviewed with patient any prescribed medication sent to pharmacy.  Education provided regarding proper storage, safe handling, and proper disposal of unused medication.  6. Proper handling of body fluids and waste discussed and written information provided.  7. If appropriate, patient had pretreatment labs drawn today.    Learning assessment completed at initial patient encounter. See separate flowsheet. Chemo/biotherapy education comprehension assessed at today's visit.    I spent 55 minutes caring for Veronica on this date of service. This time includes time spent by me in the following activities: preparing for the visit, obtaining and/or reviewing a separately obtained history, performing a medically appropriate examination and/or evaluation, counseling and educating the  patient/family/caregiver, ordering medications, tests, or procedures, referring and communicating with other health care professionals, documenting information in the medical record and care coordination.     Aislinn Gonzalez, APRN   05/22/23

## 2023-05-23 ENCOUNTER — TELEPHONE (OUTPATIENT)
Dept: SURGERY | Facility: CLINIC | Age: 83
End: 2023-05-23
Payer: MEDICARE

## 2023-05-23 NOTE — OP NOTE
PREOPERATIVE DIAGNOSIS:  Right breast cancer    POSTOPERATIVE DIAGNOSIS AND FINDINGS:  same    PROCEDURE:  Left internal jugular Powerport placement with fluoroscopic guidance    DATE OF PROCEDURE:   5/19/2023    SURGEON:  Rosa Mcelroy MD    ASSISTANT:  none    ANESTHESIA:  MAC    EBL:  Minimal    SPECIMEN:  none    DESCRIPTION:  All risks, benefits, and alternatives were explained and informed consent was obtained. The patient was taken to the operating room, transferred onto the operating room table in the supine position, underwent monitored anesthesia, and was prepped and draped in the usual sterile manner.  Half percent marcaine with epinephrine was administered.  The left internal jugular vein was accessed with an 18-gauge needle under ultrasound guidance, and a guidewire was inserted under fluoroscopic guidance into the superior vena cava.  A subcutaneous pocket was then created with electrocautery on the left chest wall. The port was placed in the pocket and secured in two points with 2-0 prolene. The tubing was then tunneled from the subcutaneous pocket to the location of the wire in the neck. The vein dilator and sheath were introduced, and the dilator and guidewire were removed.  The port tubing was inserted to the cavoatrial junction, and position of tip was confirmed with fluoroscopic guidance. The port was connected to the tubing and the cap was secured. Venous blood was easily aspirated from the port, and the port was flushed with heparinized saline. There was good hemostasis noted. The skin was then closed with 3-0 Vicryl deep dermal and 4-0 Vicryl subcuticular sutures. Dermabond was placed over the wound. The patient tolerated the procedure well, and was transferred to the recovery area in stable condition. Recovery room CXR was ordered to confirm placement.    ROSA MCELROY M.D.  General and Endoscopic Surgery  [facility]    [address], [address]  [place], [place],  [address]  P: [phone]  F: [phone]

## 2023-05-24 ENCOUNTER — OFFICE VISIT (OUTPATIENT)
Dept: OTHER | Facility: HOSPITAL | Age: 83
End: 2023-05-24
Payer: MEDICARE

## 2023-05-24 ENCOUNTER — INFUSION (OUTPATIENT)
Dept: ONCOLOGY | Facility: HOSPITAL | Age: 83
End: 2023-05-24
Payer: MEDICARE

## 2023-05-24 VITALS
DIASTOLIC BLOOD PRESSURE: 83 MMHG | BODY MASS INDEX: 29.92 KG/M2 | TEMPERATURE: 97.5 F | SYSTOLIC BLOOD PRESSURE: 137 MMHG | RESPIRATION RATE: 16 BRPM | HEART RATE: 98 BPM | OXYGEN SATURATION: 95 % | WEIGHT: 153.2 LBS

## 2023-05-24 DIAGNOSIS — C50.411 MALIGNANT NEOPLASM OF UPPER-OUTER QUADRANT OF RIGHT BREAST IN FEMALE, ESTROGEN RECEPTOR NEGATIVE: Primary | ICD-10-CM

## 2023-05-24 DIAGNOSIS — Z17.1 MALIGNANT NEOPLASM OF UPPER-OUTER QUADRANT OF RIGHT BREAST IN FEMALE, ESTROGEN RECEPTOR NEGATIVE: Primary | ICD-10-CM

## 2023-05-24 DIAGNOSIS — R73.09 ELEVATED GLUCOSE: ICD-10-CM

## 2023-05-24 LAB
ALBUMIN SERPL-MCNC: 4.1 G/DL (ref 3.5–5.2)
ALBUMIN/GLOB SERPL: 1.3 G/DL
ALP SERPL-CCNC: 52 U/L (ref 39–117)
ALT SERPL W P-5'-P-CCNC: 14 U/L (ref 1–33)
ANION GAP SERPL CALCULATED.3IONS-SCNC: 19 MMOL/L (ref 5–15)
AST SERPL-CCNC: 15 U/L (ref 1–32)
BASOPHILS # BLD AUTO: 0.01 10*3/MM3 (ref 0–0.2)
BASOPHILS NFR BLD AUTO: 0.1 % (ref 0–1.5)
BILIRUB SERPL-MCNC: 0.4 MG/DL (ref 0–1.2)
BUN SERPL-MCNC: 38 MG/DL (ref 8–23)
BUN/CREAT SERPL: 30.4 (ref 7–25)
CALCIUM SPEC-SCNC: 9.8 MG/DL (ref 8.6–10.5)
CHLORIDE SERPL-SCNC: 99 MMOL/L (ref 98–107)
CO2 SERPL-SCNC: 19 MMOL/L (ref 22–29)
CREAT SERPL-MCNC: 1.25 MG/DL (ref 0.57–1)
DEPRECATED RDW RBC AUTO: 39.9 FL (ref 37–54)
EGFRCR SERPLBLD CKD-EPI 2021: 43.1 ML/MIN/1.73
EOSINOPHIL # BLD AUTO: 0 10*3/MM3 (ref 0–0.4)
EOSINOPHIL NFR BLD AUTO: 0 % (ref 0.3–6.2)
ERYTHROCYTE [DISTWIDTH] IN BLOOD BY AUTOMATED COUNT: 13.1 % (ref 12.3–15.4)
GLOBULIN UR ELPH-MCNC: 3.1 GM/DL
GLUCOSE BLDC-MCNC: 337 MG/DL (ref 74–124)
GLUCOSE SERPL-MCNC: 517 MG/DL (ref 65–99)
HCT VFR BLD AUTO: 34.6 % (ref 34–46.6)
HGB BLD-MCNC: 11.3 G/DL (ref 12–15.9)
IMM GRANULOCYTES # BLD AUTO: 0.1 10*3/MM3 (ref 0–0.05)
IMM GRANULOCYTES NFR BLD AUTO: 0.8 % (ref 0–0.5)
LYMPHOCYTES # BLD AUTO: 1.35 10*3/MM3 (ref 0.7–3.1)
LYMPHOCYTES NFR BLD AUTO: 10.3 % (ref 19.6–45.3)
Lab: ABNORMAL
MCH RBC QN AUTO: 27.7 PG (ref 26.6–33)
MCHC RBC AUTO-ENTMCNC: 32.7 G/DL (ref 31.5–35.7)
MCV RBC AUTO: 84.8 FL (ref 79–97)
MONOCYTES # BLD AUTO: 0.3 10*3/MM3 (ref 0.1–0.9)
MONOCYTES NFR BLD AUTO: 2.3 % (ref 5–12)
NEUTROPHILS NFR BLD AUTO: 11.32 10*3/MM3 (ref 1.7–7)
NEUTROPHILS NFR BLD AUTO: 86.5 % (ref 42.7–76)
NRBC BLD AUTO-RTO: 0 /100 WBC (ref 0–0.2)
PLATELET # BLD AUTO: 224 10*3/MM3 (ref 140–450)
PMV BLD AUTO: 10.4 FL (ref 6–12)
POTASSIUM SERPL-SCNC: 4 MMOL/L (ref 3.5–5.2)
PROT SERPL-MCNC: 7.2 G/DL (ref 6–8.5)
RBC # BLD AUTO: 4.08 10*6/MM3 (ref 3.77–5.28)
SODIUM SERPL-SCNC: 137 MMOL/L (ref 136–145)
WBC NRBC COR # BLD: 13.08 10*3/MM3 (ref 3.4–10.8)

## 2023-05-24 PROCEDURE — 96415 CHEMO IV INFUSION ADDL HR: CPT

## 2023-05-24 PROCEDURE — 96376 TX/PRO/DX INJ SAME DRUG ADON: CPT

## 2023-05-24 PROCEDURE — 63710000001 INSULIN REGULAR HUMAN PER 5 UNITS: Performed by: INTERNAL MEDICINE

## 2023-05-24 PROCEDURE — 96413 CHEMO IV INFUSION 1 HR: CPT

## 2023-05-24 PROCEDURE — 96417 CHEMO IV INFUS EACH ADDL SEQ: CPT

## 2023-05-24 PROCEDURE — 25010000002 PERTUZUMAB 420 MG/14ML SOLUTION 420 MG VIAL: Performed by: INTERNAL MEDICINE

## 2023-05-24 PROCEDURE — 85025 COMPLETE CBC W/AUTO DIFF WBC: CPT

## 2023-05-24 PROCEDURE — 96372 THER/PROPH/DIAG INJ SC/IM: CPT

## 2023-05-24 PROCEDURE — 25010000002 DEXAMETHASONE SODIUM PHOSPHATE 100 MG/10ML SOLUTION: Performed by: INTERNAL MEDICINE

## 2023-05-24 PROCEDURE — 25010000002 DIPHENHYDRAMINE PER 50 MG: Performed by: INTERNAL MEDICINE

## 2023-05-24 PROCEDURE — 25010000002 TRASTUZUMAB PER 10 MG: Performed by: INTERNAL MEDICINE

## 2023-05-24 PROCEDURE — 96375 TX/PRO/DX INJ NEW DRUG ADDON: CPT

## 2023-05-24 PROCEDURE — 82948 REAGENT STRIP/BLOOD GLUCOSE: CPT

## 2023-05-24 PROCEDURE — 25010000002 PACLITAXEL PER 1 MG: Performed by: INTERNAL MEDICINE

## 2023-05-24 PROCEDURE — 80053 COMPREHEN METABOLIC PANEL: CPT | Performed by: INTERNAL MEDICINE

## 2023-05-24 RX ORDER — SODIUM CHLORIDE 9 MG/ML
250 INJECTION, SOLUTION INTRAVENOUS ONCE
Status: COMPLETED | OUTPATIENT
Start: 2023-05-24 | End: 2023-05-24

## 2023-05-24 RX ORDER — SODIUM CHLORIDE 9 MG/ML
250 INJECTION, SOLUTION INTRAVENOUS ONCE
OUTPATIENT
Start: 2023-05-31

## 2023-05-24 RX ORDER — FAMOTIDINE 10 MG/ML
20 INJECTION, SOLUTION INTRAVENOUS AS NEEDED
OUTPATIENT
Start: 2023-05-31

## 2023-05-24 RX ORDER — DIPHENHYDRAMINE HYDROCHLORIDE 50 MG/ML
50 INJECTION INTRAMUSCULAR; INTRAVENOUS AS NEEDED
Status: CANCELLED | OUTPATIENT
Start: 2023-05-24

## 2023-05-24 RX ORDER — FAMOTIDINE 10 MG/ML
20 INJECTION, SOLUTION INTRAVENOUS ONCE
Status: COMPLETED | OUTPATIENT
Start: 2023-05-24 | End: 2023-05-24

## 2023-05-24 RX ORDER — DIPHENHYDRAMINE HYDROCHLORIDE 50 MG/ML
50 INJECTION INTRAMUSCULAR; INTRAVENOUS AS NEEDED
OUTPATIENT
Start: 2023-05-31

## 2023-05-24 RX ORDER — FAMOTIDINE 10 MG/ML
20 INJECTION, SOLUTION INTRAVENOUS ONCE
Status: CANCELLED | OUTPATIENT
Start: 2023-05-24 | End: 2023-05-24

## 2023-05-24 RX ORDER — FAMOTIDINE 10 MG/ML
20 INJECTION, SOLUTION INTRAVENOUS AS NEEDED
Status: CANCELLED | OUTPATIENT
Start: 2023-05-24

## 2023-05-24 RX ORDER — FAMOTIDINE 10 MG/ML
20 INJECTION, SOLUTION INTRAVENOUS ONCE
OUTPATIENT
Start: 2023-06-07

## 2023-05-24 RX ORDER — SODIUM CHLORIDE 9 MG/ML
250 INJECTION, SOLUTION INTRAVENOUS ONCE
Status: CANCELLED | OUTPATIENT
Start: 2023-05-24

## 2023-05-24 RX ORDER — DIPHENHYDRAMINE HYDROCHLORIDE 50 MG/ML
50 INJECTION INTRAMUSCULAR; INTRAVENOUS AS NEEDED
OUTPATIENT
Start: 2023-06-07

## 2023-05-24 RX ORDER — FAMOTIDINE 10 MG/ML
20 INJECTION, SOLUTION INTRAVENOUS ONCE
Status: CANCELLED | OUTPATIENT
Start: 2023-05-24

## 2023-05-24 RX ORDER — FAMOTIDINE 10 MG/ML
20 INJECTION, SOLUTION INTRAVENOUS AS NEEDED
OUTPATIENT
Start: 2023-06-07

## 2023-05-24 RX ORDER — SODIUM CHLORIDE 9 MG/ML
250 INJECTION, SOLUTION INTRAVENOUS ONCE
OUTPATIENT
Start: 2023-06-07

## 2023-05-24 RX ORDER — FAMOTIDINE 10 MG/ML
20 INJECTION, SOLUTION INTRAVENOUS ONCE
OUTPATIENT
Start: 2023-05-31

## 2023-05-24 RX ADMIN — DEXAMETHASONE SODIUM PHOSPHATE 12 MG: 10 INJECTION, SOLUTION INTRAMUSCULAR; INTRAVENOUS at 13:26

## 2023-05-24 RX ADMIN — FAMOTIDINE 20 MG: 10 INJECTION INTRAVENOUS at 08:52

## 2023-05-24 RX ADMIN — PACLITAXEL 135 MG: 6 INJECTION, SOLUTION INTRAVENOUS at 14:37

## 2023-05-24 RX ADMIN — TRASTUZUMAB 560 MG: 150 INJECTION, POWDER, LYOPHILIZED, FOR SOLUTION INTRAVENOUS at 11:53

## 2023-05-24 RX ADMIN — SODIUM CHLORIDE 250 ML: 9 INJECTION, SOLUTION INTRAVENOUS at 08:52

## 2023-05-24 RX ADMIN — DIPHENHYDRAMINE HYDROCHLORIDE 25 MG: 50 INJECTION, SOLUTION INTRAMUSCULAR; INTRAVENOUS at 08:52

## 2023-05-24 RX ADMIN — FAMOTIDINE 20 MG: 10 INJECTION INTRAVENOUS at 13:25

## 2023-05-24 RX ADMIN — HUMAN INSULIN 5 UNITS: 100 INJECTION, SOLUTION SUBCUTANEOUS at 11:10

## 2023-05-24 RX ADMIN — PERTUZUMAB 840 MG: 30 INJECTION, SOLUTION, CONCENTRATE INTRAVENOUS at 09:44

## 2023-05-24 RX ADMIN — DIPHENHYDRAMINE HYDROCHLORIDE 25 MG: 50 INJECTION, SOLUTION INTRAMUSCULAR; INTRAVENOUS at 13:45

## 2023-05-24 NOTE — PROGRESS NOTES
"OUTPATIENT ONCOLOGY NUTRITION ASSESSMENT    Patient Name: Veronica Royal  YOB: 1940  MRN: [id number]  Assessment Date: 5/24/2023    COMMENTS: Met patient in the infusion area today. Receiving cycle one TCHP.   Spoke to patient and daughter.   The patient lives alone. Eats breakfast at home, and an early dinner at [facility] almost daily.  Blood sugar was 517 this am at 8am, 337 fingerstick at 12. Ate a packet of oatmeal this am for breakfast.   Educated patient on the importance of including a protein food with every meal and snack to to limit portions of carbohydrates, especially when taking steroids with treatments. Gave examples.  Provided a handout on blood sugar management during treatment.   Will follow for tolerance and labs.           Reason for Assessment New assessment, Nurse Practitioner referral , Education      Diagnosis/Problem   R breast cancer   Treatment Plan Chemotherapy  TCHP   Frequency    Goal of cancer treatment Neoadjuvant   Comments:        Encounter Information        Nutrition/Diet History:     Oral Nutrition Supplements:    Factors/Symptoms Affecting Intake: Hyperglycemia   Comments:      Medical/Surgical History Past Medical History:   Diagnosis Date   • Basal cell carcinoma     Left thumb   • Breast cancer 2023    Right   • Diabetes mellitus    • High cholesterol    • Hypertension        Past Surgical History:   Procedure Laterality Date   • EYE SURGERY      Muscle repair age 21   • VENOUS ACCESS DEVICE (PORT) INSERTION N/A 5/19/2023    Procedure: INSERTION VENOUS ACCESS DEVICE;  Surgeon: Rosa Michael MD;  Location: [facility];  Service: General;  Laterality: N/A;          Anthropometrics        Current Height Ht Readings from Last 1 Encounters:   05/22/23 152.4 cm (60\")      Current Weight Wt Readings from Last 1 Encounters:   05/24/23 69.5 kg (153 lb 3.2 oz)      BMI  29.92   Ideal Body Weight (IBW) 100 lb   Usual Body Weight (UBW) 152 lb   Weight " Change/Trend Stable   Weight History Wt Readings from Last 30 Encounters:   05/24/23 0802 69.5 kg (153 lb 3.2 oz)   05/22/23 1525 69.2 kg (152 lb 9.6 oz)   05/19/23 1225 69.9 kg (154 lb 1.6 oz)   05/16/23 0831 69.9 kg (154 lb 1.6 oz)   05/09/23 1030 68.9 kg (152 lb)   05/01/23 1420 69 kg (152 lb 3.2 oz)          Medications           Current medications: dexamethasone, glipiZIDE-metFORMIN, ibuprofen, lidocaine-prilocaine, linagliptin, ondansetron, simvastatin, and valsartan-hydrochlorothiazide                 Tests/Procedures        Tests/Procedures Chemotherapy     Labs       Pertinent Labs    Results from last 7 days   Lab Units 05/24/23  0800   SODIUM mmol/L 137   POTASSIUM mmol/L 4.0   CHLORIDE mmol/L 99   CO2 mmol/L 19.0*   BUN mg/dL 38*   CREATININE mg/dL 1.25*   CALCIUM mg/dL 9.8   BILIRUBIN mg/dL 0.4   ALK PHOS U/L 52   ALT (SGPT) U/L 14   AST (SGOT) U/L 15   GLUCOSE mg/dL 517*     Results from last 7 days   Lab Units 05/24/23  0800   HEMOGLOBIN g/dL 11.3*   HEMATOCRIT % 34.6   WBC 10*3/mm3 13.08*   ALBUMIN g/dL 4.1     Results from last 7 days   Lab Units 05/24/23  0800   PLATELETS 10*3/mm3 224     No results found for: COVID19  Lab Results   Component Value Date    HGBA1C 9.9 (H) 05/08/2023          Physical Findings        Physical Appearance overweight     Edema  no edema   Gastrointestinal None   Tubes/Drains none   Oral/Mouth Cavity WNL   Skin        Estimated/Assessed Needs        Energy Requirements    Height for Calculation      Weight for Calculation 70 kg   Method for Estimation  20 kcal/kg   EST Needs (kcal/day) 1400 kcals/d       Protein Requirements    Weight for Calculation 70 kg   EST Protein Needs (g/kg) 1.0 gm/kg   EST Daily Needs (g/day) 70 g/d       Fluid Requirements     Method for Estimation 1 mL/kcal    Estimated Needs (mL/day) 1400 ml/d           PES STATEMENT / NUTRITION DIAGNOSIS      Nutrition Dx Problem Problem:    Knowledge Deficit and Limited Adherence to Diet Modifications      Etiology:  Medical diagnosis: Breast cancer  Factors affecting nutrition: Reported/Observed By      Signs/Symptoms:  Information Deficit    Comment:      NUTRITION INTERVENTION / PLAN OF CARE      Intervention Goal(s) Improved nutrition related labs, Provide information, Disease management/therapy, Tolerate PO  and Maintain weight         RD Intervention/Action Follow Tx progress, Recommended/ordered         Recommendations:       PO Diet Consistent CHO diet      Supplements       Snacks          Monitor/Evaluation PO intake, Pertinent labs, Symptoms, Follow up as needed   Education Education provided   --    RD to follow per protocol.    Electronically signed by:  Shaylee Spencer RD, LD  05/24/23 13:15 EDT

## 2023-05-25 ENCOUNTER — OFFICE VISIT (OUTPATIENT)
Dept: GASTROENTEROLOGY | Facility: CLINIC | Age: 83
End: 2023-05-25
Payer: MEDICARE

## 2023-05-25 ENCOUNTER — PREP FOR SURGERY (OUTPATIENT)
Dept: SURGERY | Facility: SURGERY CENTER | Age: 83
End: 2023-05-25
Payer: MEDICARE

## 2023-05-25 ENCOUNTER — LAB (OUTPATIENT)
Dept: LAB | Facility: HOSPITAL | Age: 83
End: 2023-05-25
Payer: MEDICARE

## 2023-05-25 ENCOUNTER — PATIENT OUTREACH (OUTPATIENT)
Dept: OTHER | Facility: HOSPITAL | Age: 83
End: 2023-05-25
Payer: MEDICARE

## 2023-05-25 VITALS
TEMPERATURE: 97 F | OXYGEN SATURATION: 98 % | SYSTOLIC BLOOD PRESSURE: 134 MMHG | BODY MASS INDEX: 30.02 KG/M2 | DIASTOLIC BLOOD PRESSURE: 78 MMHG | HEIGHT: 60 IN | HEART RATE: 103 BPM | WEIGHT: 152.9 LBS

## 2023-05-25 DIAGNOSIS — K74.60 CIRRHOSIS OF LIVER WITHOUT ASCITES, UNSPECIFIED HEPATIC CIRRHOSIS TYPE: Primary | ICD-10-CM

## 2023-05-25 DIAGNOSIS — C50.411 MALIGNANT NEOPLASM OF UPPER-OUTER QUADRANT OF RIGHT BREAST IN FEMALE, ESTROGEN RECEPTOR NEGATIVE: ICD-10-CM

## 2023-05-25 DIAGNOSIS — Z17.1 MALIGNANT NEOPLASM OF UPPER-OUTER QUADRANT OF RIGHT BREAST IN FEMALE, ESTROGEN RECEPTOR NEGATIVE: ICD-10-CM

## 2023-05-25 LAB
ALBUMIN SERPL-MCNC: 4.1 G/DL (ref 3.5–5.2)
ALBUMIN/GLOB SERPL: 1.4 G/DL
ALP SERPL-CCNC: 52 U/L (ref 39–117)
ALPHA-FETOPROTEIN: <2 NG/ML (ref 0–8.3)
ALPHA1 GLOB MFR UR ELPH: 165 MG/DL (ref 90–200)
ALT SERPL W P-5'-P-CCNC: 19 U/L (ref 1–33)
AMMONIA BLD-SCNC: 28 UMOL/L (ref 11–51)
ANION GAP SERPL CALCULATED.3IONS-SCNC: 16.9 MMOL/L (ref 5–15)
AST SERPL-CCNC: 16 U/L (ref 1–32)
BASOPHILS # BLD AUTO: 0.02 10*3/MM3 (ref 0–0.2)
BASOPHILS NFR BLD AUTO: 0.1 % (ref 0–1.5)
BILIRUB SERPL-MCNC: 0.3 MG/DL (ref 0–1.2)
BUN SERPL-MCNC: 46 MG/DL (ref 8–23)
BUN/CREAT SERPL: 34.6 (ref 7–25)
CALCIUM SPEC-SCNC: 9.6 MG/DL (ref 8.6–10.5)
CERULOPLASMIN SERPL-MCNC: 24 MG/DL (ref 19–39)
CHLORIDE SERPL-SCNC: 104 MMOL/L (ref 98–107)
CO2 SERPL-SCNC: 20.1 MMOL/L (ref 22–29)
CREAT SERPL-MCNC: 1.33 MG/DL (ref 0.57–1)
DEPRECATED RDW RBC AUTO: 41 FL (ref 37–54)
EGFRCR SERPLBLD CKD-EPI 2021: 40 ML/MIN/1.73
EOSINOPHIL # BLD AUTO: 0 10*3/MM3 (ref 0–0.4)
EOSINOPHIL NFR BLD AUTO: 0 % (ref 0.3–6.2)
ERYTHROCYTE [DISTWIDTH] IN BLOOD BY AUTOMATED COUNT: 13.3 % (ref 12.3–15.4)
FERRITIN SERPL-MCNC: 48.4 NG/ML (ref 13–150)
GGT SERPL-CCNC: 13 U/L (ref 5–36)
GLOBULIN UR ELPH-MCNC: 2.9 GM/DL
GLUCOSE SERPL-MCNC: 294 MG/DL (ref 65–99)
HAV IGM SERPL QL IA: NORMAL
HBV CORE IGM SERPL QL IA: NORMAL
HBV SURFACE AG SERPL QL IA: NORMAL
HCT VFR BLD AUTO: 34.3 % (ref 34–46.6)
HCV AB SER DONR QL: NORMAL
HGB BLD-MCNC: 11.3 G/DL (ref 12–15.9)
IGA1 MFR SER: 446 MG/DL (ref 70–400)
IGG1 SER-MCNC: 1012 MG/DL (ref 700–1600)
IGM SERPL-MCNC: 48 MG/DL (ref 40–230)
IMM GRANULOCYTES # BLD AUTO: 0.08 10*3/MM3 (ref 0–0.05)
IMM GRANULOCYTES NFR BLD AUTO: 0.6 % (ref 0–0.5)
INR PPP: 1.06 (ref 0.9–1.1)
IRON 24H UR-MRATE: 57 MCG/DL (ref 37–145)
IRON SATN MFR SERPL: 13 % (ref 20–50)
LYMPHOCYTES # BLD AUTO: 1.06 10*3/MM3 (ref 0.7–3.1)
LYMPHOCYTES NFR BLD AUTO: 7.6 % (ref 19.6–45.3)
MCH RBC QN AUTO: 27.8 PG (ref 26.6–33)
MCHC RBC AUTO-ENTMCNC: 32.9 G/DL (ref 31.5–35.7)
MCV RBC AUTO: 84.3 FL (ref 79–97)
MONOCYTES # BLD AUTO: 0.46 10*3/MM3 (ref 0.1–0.9)
MONOCYTES NFR BLD AUTO: 3.3 % (ref 5–12)
NEUTROPHILS NFR BLD AUTO: 12.26 10*3/MM3 (ref 1.7–7)
NEUTROPHILS NFR BLD AUTO: 88.4 % (ref 42.7–76)
NRBC BLD AUTO-RTO: 0 /100 WBC (ref 0–0.2)
PLATELET # BLD AUTO: 238 10*3/MM3 (ref 140–450)
PMV BLD AUTO: 10.4 FL (ref 6–12)
POTASSIUM SERPL-SCNC: 4.2 MMOL/L (ref 3.5–5.2)
PROT SERPL-MCNC: 7 G/DL (ref 6–8.5)
PROTHROMBIN TIME: 13.9 SECONDS (ref 11.7–14.2)
RBC # BLD AUTO: 4.07 10*6/MM3 (ref 3.77–5.28)
SODIUM SERPL-SCNC: 141 MMOL/L (ref 136–145)
TIBC SERPL-MCNC: 429 MCG/DL (ref 298–536)
TRANSFERRIN SERPL-MCNC: 288 MG/DL (ref 200–360)
WBC NRBC COR # BLD: 13.88 10*3/MM3 (ref 3.4–10.8)

## 2023-05-25 PROCEDURE — 86015 ACTIN ANTIBODY EACH: CPT | Performed by: NURSE PRACTITIONER

## 2023-05-25 PROCEDURE — 86038 ANTINUCLEAR ANTIBODIES: CPT | Performed by: NURSE PRACTITIONER

## 2023-05-25 PROCEDURE — 80074 ACUTE HEPATITIS PANEL: CPT | Performed by: NURSE PRACTITIONER

## 2023-05-25 PROCEDURE — 82140 ASSAY OF AMMONIA: CPT | Performed by: NURSE PRACTITIONER

## 2023-05-25 PROCEDURE — 82977 ASSAY OF GGT: CPT | Performed by: NURSE PRACTITIONER

## 2023-05-25 PROCEDURE — 86231 EMA EACH IG CLASS: CPT | Performed by: NURSE PRACTITIONER

## 2023-05-25 PROCEDURE — 36415 COLL VENOUS BLD VENIPUNCTURE: CPT | Performed by: NURSE PRACTITIONER

## 2023-05-25 PROCEDURE — 82105 ALPHA-FETOPROTEIN SERUM: CPT | Performed by: NURSE PRACTITIONER

## 2023-05-25 PROCEDURE — 83540 ASSAY OF IRON: CPT | Performed by: NURSE PRACTITIONER

## 2023-05-25 PROCEDURE — 84466 ASSAY OF TRANSFERRIN: CPT | Performed by: NURSE PRACTITIONER

## 2023-05-25 PROCEDURE — 82728 ASSAY OF FERRITIN: CPT | Performed by: NURSE PRACTITIONER

## 2023-05-25 PROCEDURE — 85610 PROTHROMBIN TIME: CPT | Performed by: NURSE PRACTITIONER

## 2023-05-25 PROCEDURE — 82390 ASSAY OF CERULOPLASMIN: CPT | Performed by: NURSE PRACTITIONER

## 2023-05-25 PROCEDURE — 86364 TISS TRNSGLTMNASE EA IG CLAS: CPT | Performed by: NURSE PRACTITIONER

## 2023-05-25 PROCEDURE — 82784 ASSAY IGA/IGD/IGG/IGM EACH: CPT | Performed by: NURSE PRACTITIONER

## 2023-05-25 PROCEDURE — 86381 MITOCHONDRIAL ANTIBODY EACH: CPT | Performed by: NURSE PRACTITIONER

## 2023-05-25 PROCEDURE — 86225 DNA ANTIBODY NATIVE: CPT | Performed by: NURSE PRACTITIONER

## 2023-05-25 PROCEDURE — 80053 COMPREHEN METABOLIC PANEL: CPT

## 2023-05-25 PROCEDURE — 85025 COMPLETE CBC W/AUTO DIFF WBC: CPT

## 2023-05-25 PROCEDURE — 82103 ALPHA-1-ANTITRYPSIN TOTAL: CPT | Performed by: NURSE PRACTITIONER

## 2023-05-25 RX ORDER — SODIUM CHLORIDE 0.9 % (FLUSH) 0.9 %
10 SYRINGE (ML) INJECTION AS NEEDED
OUTPATIENT
Start: 2023-05-25

## 2023-05-25 RX ORDER — SODIUM CHLORIDE, SODIUM LACTATE, POTASSIUM CHLORIDE, CALCIUM CHLORIDE 600; 310; 30; 20 MG/100ML; MG/100ML; MG/100ML; MG/100ML
30 INJECTION, SOLUTION INTRAVENOUS CONTINUOUS PRN
OUTPATIENT
Start: 2023-05-25

## 2023-05-25 RX ORDER — SODIUM CHLORIDE 0.9 % (FLUSH) 0.9 %
3 SYRINGE (ML) INJECTION EVERY 12 HOURS SCHEDULED
OUTPATIENT
Start: 2023-05-25

## 2023-05-25 NOTE — PROGRESS NOTES
"Chief Complaint   Patient presents with   • Hepatic Disease         History of Present Illness  Patient is an 82-year-old female who presents today for Evaluation.  She was recently diagnosed with breast cancer and as part of this evaluation underwent a CT scan of the abdomen and pelvis that showed cirrhosis of the liver.    Patient reports no past history of liver disease.  She denies alcohol use.  She denies any GI complaints.  She does have some swelling in her ankles but reports this has been present for decades with no change.  No ascites.  Reports some memory issues though feels this is related to aging and not a new problem.    She started chemotherapy yesterday.     Result Review :       CT Abdomen Pelvis With Contrast (05/11/2023 10:43)   Consult with Sheila Yee MD (05/16/2023)   Referral to Gastroenterology for Hepatic cirrhosis, unspecified hepatic cirrhosis type, unspecified whether ascites present (05/16/2023)       Vital Signs:   /78   Pulse 103   Temp 97 °F (36.1 °C)   Ht 152.4 cm (60\")   Wt 69.4 kg (152 lb 14.4 oz)   SpO2 98%   BMI 29.86 kg/m²     Body mass index is 29.86 kg/m².     Physical Exam  Vitals reviewed.   Constitutional:       General: She is not in acute distress.     Appearance: She is well-developed.   HENT:      Head: Normocephalic and atraumatic.   Pulmonary:      Effort: Pulmonary effort is normal. No respiratory distress.   Abdominal:      General: Abdomen is flat.   Skin:     General: Skin is dry.      Coloration: Skin is not pale.   Neurological:      Mental Status: She is alert and oriented to person, place, and time.   Psychiatric:         Thought Content: Thought content normal.           Assessment and Plan    Diagnoses and all orders for this visit:    1. Cirrhosis of liver without ascites, unspecified hepatic cirrhosis type (Primary)  -     AFP Tumor Marker  -     Protime-INR  -     Ammonia  -     US Liver; Future  -     Alpha - 1 - Antitrypsin  -     " ANISH  -     Anti-Smooth Muscle Antibody Titer  -     Ferritin  -     Celiac Disease Panel  -     Ceruloplasmin  -     Gamma GT  -     Hepatitis Panel, Acute  -     IgG, IgA, IgM  -     Iron Profile  -     Mitochondrial Antibodies, M2         Discussion  Patient presents today for evaluation following new diagnosis of cirrhosis.  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  I did recommend additional lab work to evaluate for potential infectious, autoimmune, and metabolic causes of cirrhosis.  We will also check lab work to calculate MELD score and to check alpha-fetoprotein and ammonia level.    Discussed at this time clinically and based on recent lab work cirrhosis appears to be well compensated.  Discussed recommendation for EGD to assess for esophageal varices which we will arrange.  Discussed increased risk of hepatocellular carcinoma in patients with cirrhosis and we will check alpha-fetoprotein level and recommended reimaging in 6 months.          Follow Up   Return in about 6 months (around 11/25/2023).    Patient Instructions   Check lab work today to evaluate for cause of cirrhosis and for further evaluation of cirrhosis.    Schedule EGD to evaluate for esophageal varices.    Recommend vaccination for hepatitis a and B if these have not previously been received.    Due to presence of cirrhosis, do not eat raw shellfish.    For fatty liver, weight loss is recommended. Recommend following a low fat and low sugar diet. Recommend management of diabetes and elevated cholesterol with primary care provider if indicated. Regular exercise is recommended. Alcohol avoidance is recommended.

## 2023-05-25 NOTE — PROGRESS NOTES
Called MsKatie Genny to see how she was doing. Left a message with my contact information and asked her to call back at her convenience.

## 2023-05-26 LAB
ANA SER QL: POSITIVE
DSDNA AB SER-ACNC: 1 IU/ML (ref 0–9)
ENDOMYSIUM IGA SER QL: NEGATIVE
IGA SERPL-MCNC: 441 MG/DL (ref 64–422)
Lab: NORMAL
MITOCHONDRIA M2 IGG SER-ACNC: <20 UNITS (ref 0–20)
SMA IGG SER-ACNC: 9 UNITS (ref 0–19)
TTG IGA SER-ACNC: <2 U/ML (ref 0–3)

## 2023-05-30 ENCOUNTER — OFFICE VISIT (OUTPATIENT)
Dept: ENDOCRINOLOGY | Age: 83
End: 2023-05-30

## 2023-05-30 VITALS
TEMPERATURE: 97.3 F | SYSTOLIC BLOOD PRESSURE: 136 MMHG | DIASTOLIC BLOOD PRESSURE: 80 MMHG | HEART RATE: 91 BPM | OXYGEN SATURATION: 97 % | WEIGHT: 146.4 LBS | HEIGHT: 60 IN | BODY MASS INDEX: 28.74 KG/M2

## 2023-05-30 DIAGNOSIS — K74.60 CIRRHOSIS OF LIVER WITHOUT ASCITES, UNSPECIFIED HEPATIC CIRRHOSIS TYPE: Primary | ICD-10-CM

## 2023-05-30 DIAGNOSIS — E11.65 TYPE 2 DIABETES MELLITUS WITH HYPERGLYCEMIA, WITHOUT LONG-TERM CURRENT USE OF INSULIN: Primary | ICD-10-CM

## 2023-05-30 DIAGNOSIS — E78.5 HYPERLIPIDEMIA, UNSPECIFIED HYPERLIPIDEMIA TYPE: ICD-10-CM

## 2023-05-30 RX ORDER — LANCETS 30 GAUGE
EACH MISCELLANEOUS
Qty: 30 EACH | Refills: 4 | Status: SHIPPED | OUTPATIENT
Start: 2023-05-30

## 2023-05-30 RX ORDER — BLOOD-GLUCOSE METER
KIT MISCELLANEOUS
Qty: 1 EACH | Refills: 0 | Status: SHIPPED | OUTPATIENT
Start: 2023-05-30

## 2023-05-30 NOTE — PROGRESS NOTES
Referring provider: Sheila Yee MD     Reason for consult:  T2DM    HPI:   - 82 year old female here for management of diabetes mellitus type 2  - She is undergoing treatment for breast cancer and required an insulin injection before chemotherapy recently due to BG of 500.  She did take dexamethasone prior to her chemo but her daughter says she will likely not be on long term corticosteroids.  - She states since starting chemotherapy she has lost her appetite  - Has had diabetes for over 10 years  - Complications include CKD 3  - Is currently taking Tradjenta and metformin 500 mg daily/glipizide 2.5 mg, 2 tablets in the am and 1 tablet in the pm  - Denies hypoglycemia  - She does not monitor her BG  - She is on simvastatin 10 mg daily      The following portions of the patient's history were reviewed and updated as appropriate: allergies, current medications, past family history, past medical history, past social history, past surgical history and problem list.      Objective     Vitals:    05/30/23 1443   BP: 136/80   Pulse: 91   Temp: 97.3 °F (36.3 °C)   SpO2: 97%        Physical Exam  Constitutional:       Appearance: Normal appearance.   HENT:      Head: Normocephalic and atraumatic.   Eyes:      General: No scleral icterus.  Pulmonary:      Effort: Pulmonary effort is normal. No respiratory distress.   Neurological:      Mental Status: She is alert.      Gait: Gait normal.   Psychiatric:         Mood and Affect: Mood normal.         Behavior: Behavior normal.         Thought Content: Thought content normal.         Judgment: Judgment normal.       Labs/Imaging:  A1c was 9.9% and GFR was 40 in 5/2023    Assessment & Plan   1. T2DM, uncontrolled due to hyperglycemia  - Is currently taking Tradjenta and metformin 500 mg daily/glipizide 2.5 mg, 2 tablets in the am and 1 tablet in the pm  - Patient is very hesitant to check her BG or take insulin  - I will start by having monitor her BG once daily and see her  back in 1 month  - If she has BG levels over 250 insulin will likely be the best option  - We did discuss dietary changes to minimize BG however she is undergoing chemotherapy and I do not want to be overly restrictive with her diet    2. Hyperlipidemia  - She is on simvastatin 10 mg daily    - Return to clinic in 1 month

## 2023-05-31 ENCOUNTER — INFUSION (OUTPATIENT)
Dept: ONCOLOGY | Facility: HOSPITAL | Age: 83
End: 2023-05-31

## 2023-05-31 ENCOUNTER — OFFICE VISIT (OUTPATIENT)
Dept: ONCOLOGY | Facility: CLINIC | Age: 83
End: 2023-05-31

## 2023-05-31 VITALS — DIASTOLIC BLOOD PRESSURE: 75 MMHG | HEART RATE: 80 BPM | SYSTOLIC BLOOD PRESSURE: 130 MMHG

## 2023-05-31 VITALS
HEART RATE: 75 BPM | DIASTOLIC BLOOD PRESSURE: 63 MMHG | OXYGEN SATURATION: 98 % | WEIGHT: 145.7 LBS | TEMPERATURE: 98 F | BODY MASS INDEX: 28.61 KG/M2 | HEIGHT: 60 IN | SYSTOLIC BLOOD PRESSURE: 103 MMHG

## 2023-05-31 DIAGNOSIS — K52.1 CHEMOTHERAPY INDUCED DIARRHEA: ICD-10-CM

## 2023-05-31 DIAGNOSIS — Z17.1 MALIGNANT NEOPLASM OF UPPER-OUTER QUADRANT OF RIGHT BREAST IN FEMALE, ESTROGEN RECEPTOR NEGATIVE: Primary | ICD-10-CM

## 2023-05-31 DIAGNOSIS — C50.411 MALIGNANT NEOPLASM OF UPPER-OUTER QUADRANT OF RIGHT BREAST IN FEMALE, ESTROGEN RECEPTOR NEGATIVE: Primary | ICD-10-CM

## 2023-05-31 DIAGNOSIS — R63.4 WEIGHT LOSS: ICD-10-CM

## 2023-05-31 DIAGNOSIS — Z79.899 HIGH RISK MEDICATION USE: ICD-10-CM

## 2023-05-31 DIAGNOSIS — T45.1X5A CHEMOTHERAPY INDUCED DIARRHEA: ICD-10-CM

## 2023-05-31 DIAGNOSIS — Z17.1 MALIGNANT NEOPLASM OF UPPER-OUTER QUADRANT OF RIGHT BREAST IN FEMALE, ESTROGEN RECEPTOR NEGATIVE: ICD-10-CM

## 2023-05-31 DIAGNOSIS — C50.411 MALIGNANT NEOPLASM OF UPPER-OUTER QUADRANT OF RIGHT BREAST IN FEMALE, ESTROGEN RECEPTOR NEGATIVE: ICD-10-CM

## 2023-05-31 DIAGNOSIS — Z45.2 ENCOUNTER FOR FITTING AND ADJUSTMENT OF VASCULAR CATHETER: ICD-10-CM

## 2023-05-31 LAB
ALBUMIN SERPL-MCNC: 3.9 G/DL (ref 3.5–5.2)
ALBUMIN/GLOB SERPL: 1.2 G/DL (ref 1.1–2.4)
ALP SERPL-CCNC: 48 U/L (ref 38–116)
ALT SERPL W P-5'-P-CCNC: 43 U/L (ref 0–33)
ANION GAP SERPL CALCULATED.3IONS-SCNC: 15.7 MMOL/L (ref 5–15)
AST SERPL-CCNC: 55 U/L (ref 0–32)
BASOPHILS # BLD AUTO: 0.04 10*3/MM3 (ref 0–0.2)
BASOPHILS NFR BLD AUTO: 0.7 % (ref 0–1.5)
BILIRUB SERPL-MCNC: 0.4 MG/DL (ref 0.2–1.2)
BUN SERPL-MCNC: 46 MG/DL (ref 6–20)
BUN/CREAT SERPL: 34.1 (ref 7.3–30)
CALCIUM SPEC-SCNC: 10.3 MG/DL (ref 8.5–10.2)
CHLORIDE SERPL-SCNC: 104 MMOL/L (ref 98–107)
CO2 SERPL-SCNC: 20.3 MMOL/L (ref 22–29)
CREAT SERPL-MCNC: 1.35 MG/DL (ref 0.6–1.1)
DEPRECATED RDW RBC AUTO: 42.8 FL (ref 37–54)
EGFRCR SERPLBLD CKD-EPI 2021: 39.3 ML/MIN/1.73
EOSINOPHIL # BLD AUTO: 0.16 10*3/MM3 (ref 0–0.4)
EOSINOPHIL NFR BLD AUTO: 2.6 % (ref 0.3–6.2)
ERYTHROCYTE [DISTWIDTH] IN BLOOD BY AUTOMATED COUNT: 13.3 % (ref 12.3–15.4)
GLOBULIN UR ELPH-MCNC: 3.3 GM/DL (ref 1.8–3.5)
GLUCOSE SERPL-MCNC: 251 MG/DL (ref 74–124)
HCT VFR BLD AUTO: 35.1 % (ref 34–46.6)
HGB BLD-MCNC: 11.1 G/DL (ref 12–15.9)
IMM GRANULOCYTES # BLD AUTO: 0.05 10*3/MM3 (ref 0–0.05)
IMM GRANULOCYTES NFR BLD AUTO: 0.8 % (ref 0–0.5)
LYMPHOCYTES # BLD AUTO: 1.84 10*3/MM3 (ref 0.7–3.1)
LYMPHOCYTES NFR BLD AUTO: 30.4 % (ref 19.6–45.3)
MCH RBC QN AUTO: 27.8 PG (ref 26.6–33)
MCHC RBC AUTO-ENTMCNC: 31.6 G/DL (ref 31.5–35.7)
MCV RBC AUTO: 87.8 FL (ref 79–97)
MONOCYTES # BLD AUTO: 0.22 10*3/MM3 (ref 0.1–0.9)
MONOCYTES NFR BLD AUTO: 3.6 % (ref 5–12)
NEUTROPHILS NFR BLD AUTO: 3.74 10*3/MM3 (ref 1.7–7)
NEUTROPHILS NFR BLD AUTO: 61.9 % (ref 42.7–76)
NRBC BLD AUTO-RTO: 0 /100 WBC (ref 0–0.2)
PLATELET # BLD AUTO: 256 10*3/MM3 (ref 140–450)
PMV BLD AUTO: 10.1 FL (ref 6–12)
POTASSIUM SERPL-SCNC: 4.2 MMOL/L (ref 3.5–4.7)
PROT SERPL-MCNC: 7.2 G/DL (ref 6.3–8)
RBC # BLD AUTO: 4 10*6/MM3 (ref 3.77–5.28)
SODIUM SERPL-SCNC: 140 MMOL/L (ref 134–145)
WBC NRBC COR # BLD: 6.05 10*3/MM3 (ref 3.4–10.8)

## 2023-05-31 PROCEDURE — 96375 TX/PRO/DX INJ NEW DRUG ADDON: CPT

## 2023-05-31 PROCEDURE — 25010000002 HEPARIN LOCK FLUSH PER 10 UNITS: Performed by: INTERNAL MEDICINE

## 2023-05-31 PROCEDURE — 96413 CHEMO IV INFUSION 1 HR: CPT

## 2023-05-31 PROCEDURE — 25010000002 PACLITAXEL PER 1 MG: Performed by: INTERNAL MEDICINE

## 2023-05-31 PROCEDURE — 96361 HYDRATE IV INFUSION ADD-ON: CPT

## 2023-05-31 PROCEDURE — 85025 COMPLETE CBC W/AUTO DIFF WBC: CPT

## 2023-05-31 PROCEDURE — 80053 COMPREHEN METABOLIC PANEL: CPT

## 2023-05-31 PROCEDURE — 25010000002 DEXAMETHASONE SODIUM PHOSPHATE 100 MG/10ML SOLUTION: Performed by: INTERNAL MEDICINE

## 2023-05-31 PROCEDURE — 25010000002 DIPHENHYDRAMINE PER 50 MG: Performed by: INTERNAL MEDICINE

## 2023-05-31 RX ORDER — SODIUM CHLORIDE 9 MG/ML
250 INJECTION, SOLUTION INTRAVENOUS ONCE
Status: COMPLETED | OUTPATIENT
Start: 2023-05-31 | End: 2023-05-31

## 2023-05-31 RX ORDER — PANTOPRAZOLE SODIUM 40 MG/1
40 TABLET, DELAYED RELEASE ORAL DAILY
Qty: 30 TABLET | Refills: 3 | Status: SHIPPED | OUTPATIENT
Start: 2023-05-31

## 2023-05-31 RX ORDER — FAMOTIDINE 10 MG/ML
20 INJECTION, SOLUTION INTRAVENOUS ONCE
Status: COMPLETED | OUTPATIENT
Start: 2023-05-31 | End: 2023-05-31

## 2023-05-31 RX ORDER — SODIUM CHLORIDE 0.9 % (FLUSH) 0.9 %
10 SYRINGE (ML) INJECTION AS NEEDED
Status: CANCELLED | OUTPATIENT
Start: 2023-05-31

## 2023-05-31 RX ORDER — HEPARIN SODIUM (PORCINE) LOCK FLUSH IV SOLN 100 UNIT/ML 100 UNIT/ML
500 SOLUTION INTRAVENOUS AS NEEDED
Status: DISCONTINUED | OUTPATIENT
Start: 2023-05-31 | End: 2023-05-31 | Stop reason: HOSPADM

## 2023-05-31 RX ORDER — SODIUM CHLORIDE 9 MG/ML
500 INJECTION, SOLUTION INTRAVENOUS ONCE
Status: COMPLETED | OUTPATIENT
Start: 2023-05-31 | End: 2023-05-31

## 2023-05-31 RX ORDER — HEPARIN SODIUM (PORCINE) LOCK FLUSH IV SOLN 100 UNIT/ML 100 UNIT/ML
500 SOLUTION INTRAVENOUS AS NEEDED
Status: CANCELLED | OUTPATIENT
Start: 2023-05-31

## 2023-05-31 RX ORDER — SODIUM CHLORIDE 0.9 % (FLUSH) 0.9 %
10 SYRINGE (ML) INJECTION AS NEEDED
Status: DISCONTINUED | OUTPATIENT
Start: 2023-05-31 | End: 2023-05-31 | Stop reason: HOSPADM

## 2023-05-31 RX ADMIN — DIPHENHYDRAMINE HYDROCHLORIDE 25 MG: 50 INJECTION, SOLUTION INTRAMUSCULAR; INTRAVENOUS at 13:23

## 2023-05-31 RX ADMIN — FAMOTIDINE 20 MG: 10 INJECTION INTRAVENOUS at 13:02

## 2023-05-31 RX ADMIN — Medication 500 UNITS: at 15:17

## 2023-05-31 RX ADMIN — SODIUM CHLORIDE 500 ML: 9 INJECTION, SOLUTION INTRAVENOUS at 12:17

## 2023-05-31 RX ADMIN — Medication 10 ML: at 15:16

## 2023-05-31 RX ADMIN — SODIUM CHLORIDE 250 ML: 9 INJECTION, SOLUTION INTRAVENOUS at 13:00

## 2023-05-31 RX ADMIN — DEXAMETHASONE SODIUM PHOSPHATE 12 MG: 10 INJECTION, SOLUTION INTRAMUSCULAR; INTRAVENOUS at 13:05

## 2023-05-31 RX ADMIN — PACLITAXEL 135 MG: 6 INJECTION, SOLUTION INTRAVENOUS at 14:11

## 2023-05-31 NOTE — NURSING NOTE
Lab Results   Component Value Date    GLUCOSE 251 (H) 05/31/2023    BUN 46 (H) 05/31/2023    CREATININE 1.35 (H) 05/31/2023    EGFR 39.3 (L) 05/31/2023    BCR 34.1 (H) 05/31/2023    K 4.2 05/31/2023    CO2 20.3 (L) 05/31/2023    CALCIUM 10.3 (H) 05/31/2023    ALBUMIN 3.9 05/31/2023    BILITOT 0.4 05/31/2023    AST 55 (H) 05/31/2023    ALT 43 (H) 05/31/2023   Lab values noted.  OK to treat per LIYAH HENRY.

## 2023-05-31 NOTE — PROGRESS NOTES
Subjective   Veronica Royal is a 82 y.o. female.  Referred by Dr. Michael for right breast inflammatory breast cancer    History of Present Illness   Ms. Royal is a 82-year-old postmenopausal  lady with hypertension, diabetes, hyperlipidemia, chronic kidney disease, hyperkalemia-chronic presented with a palpable abnormality in the right breast.  Subsequent imaging was performed.  Patient has not had a mammogram prior to this in the past 25 years.    4/21/2023-bilateral diagnostic mammogram-high density irregular mass measuring 44 mm with spiculated margins and amorphus and fine pleomorphic calcifications with skin thickening in the right breast at 9:00.  2 intramammary lymph nodes in the upper outer axillary tail region are slightly rounded  2 prominent right axillary lymph nodes largest of which measures 26 mm.  Asymmetry noted in the left breast.    Right breast ultrasound at 9 o'clock position, 3 cm from the nipple there is a 38 x 28 x 42 mm mass.  Skin thickness measuring 11 mm near the mass.  One of the 2 rounded axillary tail lymph nodes noted.  Borderline cortical thickening.  2 abnormal axillary lymph nodes.  1 lymph node displaced loss of normal morphology with a round heterogeneous appearance and echogenic foci.  Most consistent with calcified lymph node measuring 26 mm.  Second lymph node measures 12 mm.  Displaced cortical thickening.  Ultrasound-guided biopsy of the mass in the axillary lymph nodes recommended.    4/21/2023  1.right breast 9:00 biopsy-invasive ductal carcinoma with apocrine features  Grade 3  ER negative  ND negative  HER2 2+ on immunohistochemistry  HER2 amplified on FISH with HER2 copy number of 7.58, OPAL seventeen 3.18, HER2/OPAL 17 ratio of 2.4.  Ki-67 38.45%    2.right axillary biopsy-soft tissue involved by invasive ductal carcinoma with apocrine features  Associated microcalcification  No definite lymph node tissue identified.    4/29/2023-MRI of the breast-biopsy-proven  malignancy in the right breast at 9:00 measuring 4.3 cm in greatest dimension.  Attachment overlying skin and diffuse right breast edema noted.  Right axillary lymphadenopathy.  No suspicious findings in the left breast.    5/11/2023-CT of the chest abdomen and pelvis-no evidence of metastatic disease.  Liver is cirrhotic in configuration.    5/11/2023-bone scan negative    Patient presents today to the clinic for discussing neoadjuvant chemotherapy.  She is accompanied by her daughter.  Patient denies any recent changes in weight, she reports some pain at the site of malignancy.  A punch biopsy was performed and was consistent with inflammatory breast cancer.  At the site of punch biopsy there is an ulcerated lesion.    She has poorly controlled diabetes currently on glipizide metformin and Tradjenta.  Hemoglobin A1c recently was noted to be 9.9.    Also has chronic hyperkalemia, explanation unclear.    Blood pressure poorly controlled.    She reports good functional status lives independently.  Able to manage all her bills and independent of ADLs.  She has good support system in terms of her daughter.    Family history significant for brother with pancreatic cancer at the age of 68, sister with ovarian cancer at the age of 58    INTERVAL HISTORY:   The patient is a 82 y.o. female with the above mentioned history who returns to the office today in anticipation of cycle 1 day 8 Taxol.  1 week ago, she initiated therapy with Taxol, Herceptin, Perjeta.  She is seen today with her daughter.  Unfortunately, she has not felt well this past week.  She has had little to no intake with a 7 pound weight loss.  She has had diarrhea.  She denies significant nausea though reports she is belching frequently.  She reports she has not been taking medications consistently as she is somewhat confused as what medications provide with benefit.  She is seen with her daughter today who expresses frustration regarding the patient's  "inability to manage medications at home by herself.  The patient is rather forgetful regarding the timeline of her diagnosis and symptom onset.  She has several specialist who she follows up with regularly.  She reports she has not had vomiting.  She does report having an upset stomach though took only 1 dose of Zofran over the past week.  She also reports loose bowels though again took only 1 Imodium.  She reported the Imodium \" causes diarrhea\"    The following portions of the patient's history were reviewed and updated as appropriate: allergies, current medications, past family history, past medical history, past social history, past surgical history and problem list.    Past Medical History:   Diagnosis Date   • Basal cell carcinoma     Left thumb   • Breast cancer     Right   • Diabetes mellitus    • High cholesterol    • Hypertension         Past Surgical History:   Procedure Laterality Date   • EYE SURGERY      Muscle repair age 21   • VENOUS ACCESS DEVICE (PORT) INSERTION N/A 2023    Procedure: INSERTION VENOUS ACCESS DEVICE;  Surgeon: Rosa Michael MD;  [place] [facility] [place];  Service: General;  Laterality: N/A;        Family History   Problem Relation Age of Onset   • Alzheimer's disease Mother    • Heart disease Father    • Heart attack Father    • Ovarian cancer Sister    • Pancreatic cancer Brother    • Malig Hyperthermia Neg Hx    • Colon cancer Neg Hx    • Colon polyps Neg Hx    • Crohn's disease Neg Hx    • Irritable bowel syndrome Neg Hx    • Ulcerative colitis Neg Hx         Social History     Socioeconomic History   • Marital status:    Tobacco Use   • Smoking status: Never   • Smokeless tobacco: Never   Vaping Use   • Vaping Use: Never used   Substance and Sexual Activity   • Alcohol use: Not Currently   • Drug use: Never   • Sexual activity: Never        OB History    No obstetric history on file.      Age at menarche-11  Age at first live childbirth-35   2 para 1 " " 1  Age at menopause-50  Oral conceptive pill use for 3 to 4 years    No Known Allergies     Review of Systems  ROS as per HPI    Objective   Blood pressure 103/63, pulse 75, temperature 98 °F (36.7 °C), temperature source Temporal, height 152.4 cm (60\"), weight 66.1 kg (145 lb 11.2 oz), SpO2 98 %.     Physical Exam  Constitutional:       General: She is not in acute distress.     Appearance: She is well-developed.   HENT:      Head: Normocephalic.   Eyes:      Pupils: Pupils are equal, round, and reactive to light.   Cardiovascular:      Rate and Rhythm: Normal rate and regular rhythm.      Pulses: Normal pulses.      Heart sounds: Normal heart sounds.   Pulmonary:      Effort: Pulmonary effort is normal. No respiratory distress.      Breath sounds: Normal breath sounds. No wheezing or rales.   Abdominal:      General: Bowel sounds are normal. There is no distension.      Palpations: Abdomen is soft. There is no mass.      Tenderness: There is no abdominal tenderness.   Musculoskeletal:         General: No deformity. Normal range of motion.      Cervical back: Normal range of motion.   Lymphadenopathy:      Cervical: No cervical adenopathy.   Skin:     General: Skin is warm and dry.      Coloration: Skin is not pale.      Findings: No rash.   Neurological:      Mental Status: She is alert and oriented to person, place, and time.      Cranial Nerves: No cranial nerve deficit.   Psychiatric:         Speech: Speech is tangential.         Behavior: Behavior normal.         Cognition and Memory: Memory is impaired.        I have reexamined the patient and the results are consistent with the previously documented exam. CARL Walker    Breast Exam:   2023: Left breast with bruising from Mediport placement.  Right breast with area of ulceration between the 9 and 10 o'clock position.  A firm fixed approximately 5 cm mass in the upper outer quadrant.  The area of ulceration is approximately 1 cm " without bleeding.  There is no erythema of the breast.  Right axillary adenopathy noted    5/16/2023 left breast appears normal on inspection.  No palpable abnormalities of the left breast.  Right breast on inspection there is an ulcerated lesion at between 9 to 10 o'clock position.  On palpation there is a 5 cm mass in the upper outer quadrant.  On examination of the right axilla there is a firm mass occupying the anterior aspect of the axilla which measures approximately 3 cm.  Could be matted lymph nodes.    Results from last 7 days   Lab Units 05/31/23  1110 05/25/23  1105   WBC 10*3/mm3 6.05 13.88*   NEUTROS ABS 10*3/mm3 3.74 12.26*   HEMOGLOBIN g/dL 11.1* 11.3*   HEMATOCRIT % 35.1 34.3   PLATELETS 10*3/mm3 256 238     Results from last 7 days   Lab Units 05/31/23  1110 05/25/23  1105   SODIUM mmol/L 140 141   POTASSIUM mmol/L 4.2 4.2   CHLORIDE mmol/L 104 104   CO2 mmol/L 20.3* 20.1*   BUN mg/dL 46* 46*   CREATININE mg/dL 1.35* 1.33*   CALCIUM mg/dL 10.3* 9.6   ALBUMIN g/dL 3.9 4.1   BILIRUBIN mg/dL 0.4 0.3   ALK PHOS U/L 48 52   ALT (SGPT) U/L 43* 19   AST (SGOT) U/L 55* 16   GLUCOSE mg/dL 251* 294*   FERRITIN ng/mL  --  48.40   IRON mcg/dL  --  57   TIBC mcg/dL  --  429     Results from last 7 days   Lab Units 05/25/23  1105   INR  1.06       CT Chest With Contrast Diagnostic    Result Date: 5/11/2023  1. Right breast cancer with right axillary lymphadenopathy 2. No convincing evidence of distant metastatic disease. Incidental findings as above.  This report was finalized on 5/11/2023 12:48 PM by Dr. Jasvir Jha M.D.      NM Bone Scan Whole Body    Result Date: 5/12/2023  Abnormal soft tissue activity in the right breast corresponding to known disease in that location. No evidence of osseous metastasis.  This report was finalized on 5/12/2023 12:04 PM by Dr. Kannan Rothpletz, M.D.      CT Abdomen Pelvis With Contrast    Result Date: 5/11/2023  1. Right breast cancer with right axillary lymphadenopathy 2. No  convincing evidence of distant metastatic disease. Incidental findings as above.  This report was finalized on 5/11/2023 12:48 PM by Dr. Jasvir Jha M.D.           Assessment & Plan       *Right breast inflammatory breast cancer  · T4d N1 M0  · Stage IIIb  · ER negative, HI negative, HER2 2+ on immunohistochemistry but amplified on FISH.  Invasive ductal carcinoma with apocrine features, grade 3, Ki-67 38%  Discussed at length the details of imaging and pathology report.Discussed the origin of breast cancer from the ducts and the lobules and the histological type of breast cancer based on site of origin. Discussed the tumor size, lymph node status and stage of the cancer. Explained the presence of DCIS. Discussed the receptor status including ER, HI and her-2 destiny and their significance in determining the biology and treatment. Also discussed the importance of grade and ki-67.   The steps of curative intent breast cancer treatment have been explained, including surgery, possible chemotherapy, possible radiation and possible endocrine therapy.   CT scans and bone scan without any obvious evidence of metastatic disease  · Discussed the rationale for neoadjuvant chemotherapy for HER2 positive breast cancer as well as for inflammatory breast cancer.  · Given her age and significant comorbidities I would like to start chemotherapy with Taxol Herceptin and Perjeta, and if she tolerates this well we will proceed with Adriamycin and cyclophosphamide.  · Echocardiogram has been performed and ejection fraction normal.  · Liver shows cirrhotic morphology.  · LFTs otherwise normal and total bilirubin normal as well as protein normal.  It appears that the synthetic function of the liver is still within normal limits.  · Adverse effects of chemotherapy including but not limited to myelosuppression, increased risk of infections, nausea, vomiting, arthralgias, alopecia, mucositis, diarrhea, cardiotoxicity, risk of leukemia,  hepatotoxicity, risk of allergic reactions, risk of extravasation discussed.  · Mediport placed 5/19/2023  · Taxol, Herceptin, Perjeta initiated 5/24/2023  · Proceed today, 5/31/2023 with C1D8 Taxol    *Diabetes  · Poorly controlled  · Evaluation by endocrinology 5/30/2023 with recommendations for dietary changes and home blood sugar monitoring.  The patient's daughter reports today she is struggling with compliance.    *Hyperkalemia and chronic kidney disease  · Unclear etiology  · Could be secondary to RTA  · Refer to nephrology for further management    *?  Cirrhotic appearance of the liver on the CT scan  · Referred to gastroenterology  · Synthetic function appears to be normal  · We will start with Taxol to see if she would tolerate the chemotherapy   · Slight elevation of intervention studies today with ALT 43, AST 55.  Continue to monitor weekly    *Hypertension-currently on valsartan and hydrochlorothiazide.  Valsartan could be contributing to hyperkalemia.  Will refer to cardiology ASAP for management of medications and cardiac clearance for proceeding with chemotherapy  Recent echocardiogram normal    *Genetic testing-obtained at Dr. Michael's office, results pending    *Decreased oral intake secondary to chemotherapy  · 7 pound weight loss after cycle 1 day 1 Taxol, Herceptin, Perjeta  · Continue to follow-up with oncology dietitian  · Additional IV fluid support provided today    *Frequent belching  · Begin Protonix 40 mg nightly    *Cognitive impairment  · It is unclear if this is the patient's baseline or mental status has been exacerbated by recent chemotherapy  · The patient is struggling with compliance with her medications.  · The patient's daughter expresses frustration today as the patient is struggling to care for herself at home with managing medications and symptom management.  · We will ask oncology social work to follow-up with the patient as well as CARL Antonio to urgently evaluate  the patient for adult functional assessment    *Chemotherapy-induced diarrhea  · Utilizing only a single dose of Imodium over the past week  · Discussed increasing frequency of Imodium with IV fluid support    PLAN:  1. Additional 500 mL normal saline given in the infusion area today for decreased oral intake and weight loss  2. Proceed today with C1 D8 Taxol  3. Begin Protonix 40 mg nightly  4. We reviewed the importance of supportive care including Zofran as needed for nausea and Imodium as needed for diarrhea  5. Referral to CARL Antonio for adult functional assessment  6. Oncology dietitian to follow  7. Oncology social work to follow  8. Return Friday, 6/2/2023 for IV fluid support with 1 L normal saline  9. Return in 1 week for CBC, CMP, nurse practitioner follow-up, C1 D15 Taxol  10. Return in 2 weeks for CBC, CMP, MD follow-up with Dr. Yee, C2 D1 Taxol, Herceptin, Perjeta  11. Clinical cycle exam, the patient's breast cancer seems overall stable today.  We will continue to monitor closely in light of her inflammatory disease and risk of poor tolerance to chemotherapy given her age.     I spent 50 minutes caring for Veronica on this date of service. This time includes time spent by me in the following activities: preparing for the visit, reviewing tests, obtaining and/or reviewing a separately obtained history, performing a medically appropriate examination and/or evaluation, counseling and educating the patient/family/caregiver, ordering medications, tests, or procedures, referring and communicating with other health care professionals, documenting information in the medical record, independently interpreting results and communicating that information with the patient/family/caregiver and care coordination.     Today's plan of care was discussed reviewed with Dr. Yee who is in agreement.  Also spoke with the patient's daughter who was present during the visit today.    Aislinn Gonzalez,  APRN  05/31/2023

## 2023-06-01 ENCOUNTER — PATIENT ROUNDING (BHMG ONLY) (OUTPATIENT)
Dept: ENDOCRINOLOGY | Age: 83
End: 2023-06-01

## 2023-06-02 ENCOUNTER — OFFICE VISIT (OUTPATIENT)
Dept: CARDIOLOGY | Facility: CLINIC | Age: 83
End: 2023-06-02
Payer: MEDICARE

## 2023-06-02 ENCOUNTER — RESEARCH ENCOUNTER (OUTPATIENT)
Dept: CARDIOLOGY | Facility: CLINIC | Age: 83
End: 2023-06-02

## 2023-06-02 ENCOUNTER — HOSPITAL ENCOUNTER (OUTPATIENT)
Dept: CARDIOLOGY | Facility: HOSPITAL | Age: 83
Discharge: HOME | End: 2023-06-02

## 2023-06-02 ENCOUNTER — INFUSION (OUTPATIENT)
Dept: ONCOLOGY | Facility: HOSPITAL | Age: 83
End: 2023-06-02

## 2023-06-02 VITALS
BODY MASS INDEX: 28.66 KG/M2 | HEIGHT: 60 IN | SYSTOLIC BLOOD PRESSURE: 144 MMHG | WEIGHT: 146 LBS | HEART RATE: 75 BPM | DIASTOLIC BLOOD PRESSURE: 68 MMHG

## 2023-06-02 VITALS
OXYGEN SATURATION: 98 % | TEMPERATURE: 97.5 F | SYSTOLIC BLOOD PRESSURE: 126 MMHG | DIASTOLIC BLOOD PRESSURE: 55 MMHG | WEIGHT: 144.8 LBS | BODY MASS INDEX: 28.28 KG/M2 | HEART RATE: 102 BPM | RESPIRATION RATE: 16 BRPM

## 2023-06-02 DIAGNOSIS — N28.9 RENAL INSUFFICIENCY: ICD-10-CM

## 2023-06-02 DIAGNOSIS — I10 PRIMARY HYPERTENSION: ICD-10-CM

## 2023-06-02 DIAGNOSIS — Z79.899 ENCOUNTER FOR MONITORING CARDIOTOXIC DRUG THERAPY: ICD-10-CM

## 2023-06-02 DIAGNOSIS — C50.411 MALIGNANT NEOPLASM OF UPPER-OUTER QUADRANT OF RIGHT BREAST IN FEMALE, ESTROGEN RECEPTOR NEGATIVE: ICD-10-CM

## 2023-06-02 DIAGNOSIS — Z17.1 MALIGNANT NEOPLASM OF UPPER-OUTER QUADRANT OF RIGHT BREAST IN FEMALE, ESTROGEN RECEPTOR NEGATIVE: ICD-10-CM

## 2023-06-02 DIAGNOSIS — R06.02 SOB (SHORTNESS OF BREATH): ICD-10-CM

## 2023-06-02 DIAGNOSIS — Z51.81 ENCOUNTER FOR MONITORING CARDIOTOXIC DRUG THERAPY: Primary | ICD-10-CM

## 2023-06-02 DIAGNOSIS — Z51.81 ENCOUNTER FOR MONITORING CARDIOTOXIC DRUG THERAPY: ICD-10-CM

## 2023-06-02 DIAGNOSIS — C50.411 MALIGNANT NEOPLASM OF UPPER-OUTER QUADRANT OF RIGHT BREAST IN FEMALE, ESTROGEN RECEPTOR NEGATIVE: Primary | ICD-10-CM

## 2023-06-02 DIAGNOSIS — Z17.1 MALIGNANT NEOPLASM OF UPPER-OUTER QUADRANT OF RIGHT BREAST IN FEMALE, ESTROGEN RECEPTOR NEGATIVE: Primary | ICD-10-CM

## 2023-06-02 DIAGNOSIS — Z79.899 ENCOUNTER FOR MONITORING CARDIOTOXIC DRUG THERAPY: Primary | ICD-10-CM

## 2023-06-02 LAB
ANION GAP SERPL CALCULATED.3IONS-SCNC: 14.2 MMOL/L (ref 5–15)
BUN SERPL-MCNC: 43 MG/DL (ref 6–20)
BUN/CREAT SERPL: 39.4 (ref 7.3–30)
CALCIUM SPEC-SCNC: 9.2 MG/DL (ref 8.5–10.2)
CHLORIDE SERPL-SCNC: 107 MMOL/L (ref 98–107)
CO2 SERPL-SCNC: 14.8 MMOL/L (ref 22–29)
CREAT SERPL-MCNC: 1.09 MG/DL (ref 0.6–1.1)
EGFRCR SERPLBLD CKD-EPI 2021: 50.8 ML/MIN/1.73
GLUCOSE SERPL-MCNC: 235 MG/DL (ref 74–124)
NT-PROBNP SERPL-MCNC: 40.2 PG/ML (ref 0–1800)
POTASSIUM SERPL-SCNC: 3.5 MMOL/L (ref 3.5–4.7)
SODIUM SERPL-SCNC: 136 MMOL/L (ref 134–145)
TROPONIN T SERPL HS-MCNC: 12 NG/L

## 2023-06-02 PROCEDURE — 83880 ASSAY OF NATRIURETIC PEPTIDE: CPT | Performed by: INTERNAL MEDICINE

## 2023-06-02 PROCEDURE — 80048 BASIC METABOLIC PNL TOTAL CA: CPT

## 2023-06-02 PROCEDURE — 96360 HYDRATION IV INFUSION INIT: CPT

## 2023-06-02 PROCEDURE — 36415 COLL VENOUS BLD VENIPUNCTURE: CPT

## 2023-06-02 PROCEDURE — 96361 HYDRATE IV INFUSION ADD-ON: CPT

## 2023-06-02 PROCEDURE — 84484 ASSAY OF TROPONIN QUANT: CPT | Performed by: INTERNAL MEDICINE

## 2023-06-02 RX ORDER — LOPERAMIDE HYDROCHLORIDE 2 MG/1
2 CAPSULE ORAL DAILY
COMMUNITY

## 2023-06-02 RX ORDER — SODIUM CHLORIDE 9 MG/ML
1000 INJECTION, SOLUTION INTRAVENOUS ONCE
Status: COMPLETED | OUTPATIENT
Start: 2023-06-02 | End: 2023-06-02

## 2023-06-02 RX ORDER — NIFEDIPINE 30 MG/1
30 TABLET, EXTENDED RELEASE ORAL DAILY
Qty: 30 TABLET | Refills: 11 | COMMUNITY
Start: 2023-06-01 | End: 2024-05-31

## 2023-06-02 RX ADMIN — SODIUM CHLORIDE 1000 ML: 9 INJECTION, SOLUTION INTRAVENOUS at 08:47

## 2023-06-02 NOTE — PROGRESS NOTES
Date of Office Visit: 2023  Encounter Provider: Cassidy Maldonado MD  Place of Service: [facility] CARDIOLOGY  Patient Name: Veronica Royal  :1940    Chief complaint  Consult requested by Dr. Yee for cardio oncology care.    History of Present Illness  Patient is a 82-year-old female with diabetes, hypertension, hyperlipidemia, chronic renal insufficiency, hyperkalemia and cirrhosis..  She was diagnosed with right-sided inflammatory breast cancer 2023.  She is considered for adjuvant chemotherapy with Taxol Herceptin Perjeta and if she tolerates this to proceed with AC therapy.  She started THP 2023.    Baseline echo with EF 56 to 60% with GLS of -20.3% with grade 1A diastolic dysfunction, aortic valve calcification without stenosis, trivial mitral and tricuspid valve regurgitation with normal right-sided pressures.    Patient does not exercise and ambulates modestly around her home.  She notes that blood pressure has been low since initiation of chemotherapy and valsartan hydrochlorothiazide was switched to nifedipine 3 days ago but she has not picked up the nifedipine.  She did not take any antihypertensive today.  She plans to pick this up later today.  She has had no chest pain palpitations.  She has modest dependent edema though none at the moment.  She has had dyspnea on exertion that is slightly worse since starting chemotherapy.  Her blood sugar was quite poorly controlled and she was hyperkalemic.  Both have improved.  Her STOP-BANG score is 2.  Her father had a myocardial infarction at age 55.  She does not smoke.    Blood work on  blood work on 2023 includes potassium 3.5, creatinine 1.09, glucose improved significantly to 36 hemoglobin 11.1.  No recent lipid panel.    Past Medical History:   Diagnosis Date    Basal cell carcinoma     Left thumb    Breast cancer     Right    Diabetes mellitus     High cholesterol     Hypertension      Past  Surgical History:   Procedure Laterality Date    EYE SURGERY      Muscle repair age 21    VENOUS ACCESS DEVICE (PORT) INSERTION N/A 5/19/2023    Procedure: INSERTION VENOUS ACCESS DEVICE;  Surgeon: Rosa Michael MD;  Location: [facility] OR [facility] [place];  Service: General;  Laterality: N/A;     Outpatient Medications Prior to Visit   Medication Sig Dispense Refill    dexamethasone (DECADRON) 4 MG tablet Take 2 tablets in the morning and afternoon the day before first treatment. Take Tuesday 5/23/2023 4 tablet 0    glipiZIDE-metFORMIN (METAGLIP) 2.5-500 MG per tablet TAKE 1 TABLET BY MOUTH THREE TIMES DAILY(2 TABLETS IN THE MORNING AND 1 TABLET IN THE EVENING)      glucose blood test strip Check BG once daily, E11.9 30 each 4    glucose monitor monitoring kit Dispense glucometer with brand based off insurance coverage, check BG daily, E11.9 1 each 0    Lancets misc Check BG once daily, E11.9 30 each 4    lidocaine-prilocaine (EMLA) 2.5-2.5 % cream Apply nickel size amount to port site 30 min before appt time do not rub in cover with plastic wrap 30 g 5    loperamide (IMODIUM) 2 MG capsule Take 1 capsule by mouth Daily.      NIFEdipine XL (PROCARDIA XL) 30 MG 24 hr tablet Take 1 tablet by mouth Daily. 30 tablet 11    ondansetron (ZOFRAN) 8 MG tablet Take 1 tablet by mouth 3 (Three) Times a Day As Needed for Nausea or Vomiting. 30 tablet 5    pantoprazole (PROTONIX) 40 MG EC tablet Take 1 tablet by mouth Daily. 30 tablet 3    simvastatin (ZOCOR) 10 MG tablet Take 1 tablet by mouth Every Night.      Tradjenta 5 MG tablet tablet Take 1 tablet by mouth Daily.      ibuprofen (ADVIL,MOTRIN) 200 MG tablet Take 1 tablet by mouth Every 6 (Six) Hours As Needed for Mild Pain.      valsartan-hydrochlorothiazide (DIOVAN-HCT) 160-12.5 MG per tablet Take 1 tablet by mouth Daily. (Patient not taking: Reported on 6/2/2023)       No facility-administered medications prior to visit.       Allergies as of 06/02/2023    (No Known Allergies)  "    Social History     Socioeconomic History    Marital status:    Tobacco Use    Smoking status: Never    Smokeless tobacco: Never   Vaping Use    Vaping Use: Never used   Substance and Sexual Activity    Alcohol use: Not Currently    Drug use: Never    Sexual activity: Never     Family History   Problem Relation Age of Onset    Alzheimer's disease Mother     Heart disease Father     Heart attack Father     Ovarian cancer Sister     Pancreatic cancer Brother     Malig Hyperthermia Neg Hx     Colon cancer Neg Hx     Colon polyps Neg Hx     Crohn's disease Neg Hx     Irritable bowel syndrome Neg Hx     Ulcerative colitis Neg Hx      Review of Systems   Constitutional: Negative for chills, fever, weight gain and weight loss.   Cardiovascular:  Positive for dyspnea on exertion. Negative for leg swelling.   Respiratory:  Negative for cough, snoring and wheezing.    Hematologic/Lymphatic: Negative for bleeding problem. Does not bruise/bleed easily.   Skin:  Negative for color change.   Musculoskeletal:  Negative for falls, joint pain and myalgias.   Gastrointestinal:  Negative for melena.   Genitourinary:  Negative for hematuria.   Neurological:  Negative for excessive daytime sleepiness.   Psychiatric/Behavioral:  Negative for depression. The patient is not nervous/anxious.       Objective:     Vitals:    06/02/23 1147 06/02/23 1214   BP: 132/74 144/68   BP Location: Left arm Right arm   Pulse: 75    Weight: 66.2 kg (146 lb)    Height: 152.4 cm (60\")      Body mass index is 28.51 kg/m².    Vitals reviewed.   Constitutional:       Appearance: Well-developed.   Eyes:      General: No scleral icterus.        Right eye: No discharge.      Conjunctiva/sclera: Conjunctivae normal.      Pupils: Pupils are equal, round, and reactive to light.   HENT:      Head: Normocephalic.      Nose: Nose normal.   Neck:      Thyroid: No thyromegaly.      Vascular: No JVD.   Pulmonary:      Effort: Pulmonary effort is normal. No " respiratory distress.      Breath sounds: Normal breath sounds. No wheezing. No rales.   Cardiovascular:      Normal rate. Regular rhythm. Normal S1. Normal S2.       Murmurs: There is a grade 1to 2/6 systolic murmur at the URSB and ULSB.      No gallop.    Pulses:     Intact distal pulses.   Edema:     Peripheral edema absent.   Abdominal:      General: Bowel sounds are normal. There is no distension.      Palpations: Abdomen is soft.      Tenderness: There is no abdominal tenderness. There is no rebound.   Musculoskeletal: Normal range of motion.         General: No tenderness.      Cervical back: Normal range of motion and neck supple. Skin:     General: Skin is warm and dry.      Findings: No erythema or rash.   Neurological:      Mental Status: Alert and oriented to person, place, and time.   Psychiatric:         Behavior: Behavior normal.         Thought Content: Thought content normal.         Judgment: Judgment normal.     Lab Review:         ECG 12 Lead    Date/Time: 6/2/2023 12:14 PM  Performed by: Cassidy Maldonado MD  Authorized by: Cassidy Maldonado MD   Comparison: not compared with previous ECG   Rhythm: sinus rhythm    Clinical impression: normal ECG      Assessment:       Diagnosis Plan   1. Encounter for monitoring cardiotoxic drug therapy  ECG 12 Lead    proBNP    High Sensitivity Troponin T      2. SOB (shortness of breath)  proBNP    High Sensitivity Troponin T      3. Malignant neoplasm of upper-outer quadrant of right breast in female, estrogen receptor negative        4. Primary hypertension        5. Renal insufficiency          Plan:       1.  Left-sided inflammatory breast cancer.  Per ESC guidelines she is considered high risk plans are for THP and then to follow with AC therapy if she tolerates THC.  2.  Cardio oncology care.  As above she is a high risk patient and to potentially receive multiple cardiotoxic agents.  She has had dyspnea on exertion and has coronary calcification on CT imaging.   Some involving the left main.  With this in mind we will check a Lexiscan Cardiolite stress test.  3.  Coronary artery disease.  Coronary calcification noted on CT scan of the chest on 5/2023.  As above   4.  Hypertension, lower since chemo and yesterday Valsartan/HCTZ was stopped and to start Nifedipine 30 mg a day and she has not taken any today.  Of note is that she was hyperkalemic and this has improved with further treatment metabolic derangements.  We will check with nephrology if we can consider all the possible use of valsartan once more as there are cardioprotective effects of ARB's and ACE inhibitors in this population.  5.  Hyperlipidemia.  Lipids at [facility] showed elevated triglycerides of 268, HDL slightly low and LDL of 106.  Consider increasing simvastatin to 20 mg nightly.  6.  Diabetes  7   Chronic renal insufficiency baseline creatinine 1.3  8.  Chronic mild anemia as well      Time Spent: I spent 55 minutes caring for Veronica on this date of service. This time includes time spent by me in the following activities: preparing for the visit, reviewing tests, obtaining and/or reviewing a separately obtained history, performing a medically appropriate examination and/or evaluation, counseling and educating the patient/family/caregiver, ordering medications, tests, or procedures, documenting information in the medical record, and independently interpreting results and communicating that information with the patient/family/caregiver.   I spent 1 minutes on the separately reported service of ECG. This time is not included in the time used to support the E/M service also reported today.        Your medication list            Accurate as of June 2, 2023 11:59 PM. If you have any questions, ask your nurse or doctor.                CONTINUE taking these medications        Instructions Last Dose Given Next Dose Due   dexamethasone 4 MG tablet  Commonly known as: DECADRON      Take 2 tablets in the  morning and afternoon the day before first treatment. Take Tuesday 5/23/2023       glipiZIDE-metFORMIN 2.5-500 MG per tablet  Commonly known as: METAGLIP      TAKE 1 TABLET BY MOUTH THREE TIMES DAILY(2 TABLETS IN THE MORNING AND 1 TABLET IN THE EVENING)       glucose blood test strip      Check BG once daily, E11.9       glucose monitor monitoring kit      Dispense glucometer with brand based off insurance coverage, check BG daily, E11.9       Lancets misc      Check BG once daily, E11.9       lidocaine-prilocaine 2.5-2.5 % cream  Commonly known as: EMLA      Apply nickel size amount to port site 30 min before appt time do not rub in cover with plastic wrap       loperamide 2 MG capsule  Commonly known as: IMODIUM      Take 1 capsule by mouth Daily.       NIFEdipine XL 30 MG 24 hr tablet  Commonly known as: PROCARDIA XL      Take 1 tablet by mouth Daily.       ondansetron 8 MG tablet  Commonly known as: ZOFRAN      Take 1 tablet by mouth 3 (Three) Times a Day As Needed for Nausea or Vomiting.       pantoprazole 40 MG EC tablet  Commonly known as: PROTONIX      Take 1 tablet by mouth Daily.       simvastatin 10 MG tablet  Commonly known as: ZOCOR      Take 1 tablet by mouth Every Night.       Tradjenta 5 MG tablet tablet  Generic drug: linagliptin      Take 1 tablet by mouth Daily.                Patient is no longer taking -.  I corrected the med list to reflect this.  I did not stop these medications.      Dictated utilizing Dragon dictation

## 2023-06-02 NOTE — NURSING NOTE
Pt here for IV fluids today. Pt c/o nausea and diarrhea. Pt denies taking anything at home to help improve symptoms. Daughter said she doesn't understand why she needs to take imodium and zofran, states pt thinks her nausea is not r/t chemo. Expressed the importance of pt taking zofran for nausea and imodium for diarrhea, that it would help improve symptoms, and hopefully lead to pt eating and drinking more.     Offered pt to try enterade, pt tasted it and could not tolerate it.     Reviewed above with Dr. Yee who came to chairside to speak with pt. Will check a BMP today and bring pt back in Monday for addition IV fluids.

## 2023-06-05 ENCOUNTER — INFUSION (OUTPATIENT)
Dept: ONCOLOGY | Facility: HOSPITAL | Age: 83
End: 2023-06-05
Payer: MEDICARE

## 2023-06-05 VITALS
WEIGHT: 145 LBS | SYSTOLIC BLOOD PRESSURE: 113 MMHG | HEART RATE: 104 BPM | TEMPERATURE: 97 F | RESPIRATION RATE: 15 BRPM | DIASTOLIC BLOOD PRESSURE: 71 MMHG | OXYGEN SATURATION: 97 % | HEIGHT: 61 IN | BODY MASS INDEX: 27.38 KG/M2

## 2023-06-05 DIAGNOSIS — Z45.2 ENCOUNTER FOR FITTING AND ADJUSTMENT OF VASCULAR CATHETER: Primary | ICD-10-CM

## 2023-06-05 PROBLEM — I10 HYPERTENSION: Status: ACTIVE | Noted: 2023-06-05

## 2023-06-05 PROCEDURE — 25010000002 HEPARIN LOCK FLUSH PER 10 UNITS: Performed by: INTERNAL MEDICINE

## 2023-06-05 PROCEDURE — 96360 HYDRATION IV INFUSION INIT: CPT

## 2023-06-05 PROCEDURE — 96361 HYDRATE IV INFUSION ADD-ON: CPT

## 2023-06-05 RX ORDER — SODIUM CHLORIDE 0.9 % (FLUSH) 0.9 %
10 SYRINGE (ML) INJECTION AS NEEDED
Status: CANCELLED | OUTPATIENT
Start: 2023-06-05

## 2023-06-05 RX ORDER — SODIUM CHLORIDE 9 MG/ML
1000 INJECTION, SOLUTION INTRAVENOUS CONTINUOUS
Status: DISCONTINUED | OUTPATIENT
Start: 2023-06-05 | End: 2023-06-05 | Stop reason: HOSPADM

## 2023-06-05 RX ORDER — HEPARIN SODIUM (PORCINE) LOCK FLUSH IV SOLN 100 UNIT/ML 100 UNIT/ML
500 SOLUTION INTRAVENOUS AS NEEDED
Status: DISCONTINUED | OUTPATIENT
Start: 2023-06-05 | End: 2023-06-05 | Stop reason: HOSPADM

## 2023-06-05 RX ORDER — HEPARIN SODIUM (PORCINE) LOCK FLUSH IV SOLN 100 UNIT/ML 100 UNIT/ML
500 SOLUTION INTRAVENOUS AS NEEDED
Status: CANCELLED | OUTPATIENT
Start: 2023-06-05

## 2023-06-05 RX ORDER — SODIUM CHLORIDE 0.9 % (FLUSH) 0.9 %
10 SYRINGE (ML) INJECTION AS NEEDED
Status: DISCONTINUED | OUTPATIENT
Start: 2023-06-05 | End: 2023-06-05 | Stop reason: HOSPADM

## 2023-06-05 RX ADMIN — Medication 10 ML: at 15:55

## 2023-06-05 RX ADMIN — Medication 500 UNITS: at 15:55

## 2023-06-05 RX ADMIN — SODIUM CHLORIDE 1000 ML: 9 INJECTION, SOLUTION INTRAVENOUS at 14:10

## 2023-06-07 ENCOUNTER — INFUSION (OUTPATIENT)
Dept: ONCOLOGY | Facility: HOSPITAL | Age: 83
End: 2023-06-07
Payer: MEDICARE

## 2023-06-07 ENCOUNTER — OFFICE VISIT (OUTPATIENT)
Dept: ONCOLOGY | Facility: CLINIC | Age: 83
End: 2023-06-07
Payer: MEDICARE

## 2023-06-07 VITALS
HEIGHT: 61 IN | HEART RATE: 78 BPM | SYSTOLIC BLOOD PRESSURE: 121 MMHG | WEIGHT: 144.1 LBS | BODY MASS INDEX: 27.2 KG/M2 | DIASTOLIC BLOOD PRESSURE: 70 MMHG | TEMPERATURE: 97.8 F | OXYGEN SATURATION: 100 %

## 2023-06-07 DIAGNOSIS — C50.411 MALIGNANT NEOPLASM OF UPPER-OUTER QUADRANT OF RIGHT BREAST IN FEMALE, ESTROGEN RECEPTOR NEGATIVE: ICD-10-CM

## 2023-06-07 DIAGNOSIS — Z17.1 MALIGNANT NEOPLASM OF UPPER-OUTER QUADRANT OF RIGHT BREAST IN FEMALE, ESTROGEN RECEPTOR NEGATIVE: ICD-10-CM

## 2023-06-07 DIAGNOSIS — K52.1 DIARRHEA DUE TO DRUG: ICD-10-CM

## 2023-06-07 DIAGNOSIS — Z17.1 MALIGNANT NEOPLASM OF UPPER-OUTER QUADRANT OF RIGHT BREAST IN FEMALE, ESTROGEN RECEPTOR NEGATIVE: Primary | ICD-10-CM

## 2023-06-07 DIAGNOSIS — Z45.2 ENCOUNTER FOR FITTING AND ADJUSTMENT OF VASCULAR CATHETER: ICD-10-CM

## 2023-06-07 DIAGNOSIS — C50.411 MALIGNANT NEOPLASM OF UPPER-OUTER QUADRANT OF RIGHT BREAST IN FEMALE, ESTROGEN RECEPTOR NEGATIVE: Primary | ICD-10-CM

## 2023-06-07 LAB
ALBUMIN SERPL-MCNC: 3.7 G/DL (ref 3.5–5.2)
ALBUMIN/GLOB SERPL: 1.2 G/DL (ref 1.1–2.4)
ALP SERPL-CCNC: 51 U/L (ref 38–116)
ALT SERPL W P-5'-P-CCNC: 26 U/L (ref 0–33)
ANION GAP SERPL CALCULATED.3IONS-SCNC: 15.9 MMOL/L (ref 5–15)
AST SERPL-CCNC: 23 U/L (ref 0–32)
BASOPHILS # BLD AUTO: 0.03 10*3/MM3 (ref 0–0.2)
BASOPHILS NFR BLD AUTO: 0.7 % (ref 0–1.5)
BILIRUB SERPL-MCNC: 0.3 MG/DL (ref 0.2–1.2)
BUN SERPL-MCNC: 32 MG/DL (ref 6–20)
BUN/CREAT SERPL: 24.2 (ref 7.3–30)
CALCIUM SPEC-SCNC: 9.1 MG/DL (ref 8.5–10.2)
CHLORIDE SERPL-SCNC: 108 MMOL/L (ref 98–107)
CO2 SERPL-SCNC: 14.1 MMOL/L (ref 22–29)
CREAT SERPL-MCNC: 1.32 MG/DL (ref 0.6–1.1)
DEPRECATED RDW RBC AUTO: 42.5 FL (ref 37–54)
EGFRCR SERPLBLD CKD-EPI 2021: 40.4 ML/MIN/1.73
EOSINOPHIL # BLD AUTO: 0.03 10*3/MM3 (ref 0–0.4)
EOSINOPHIL NFR BLD AUTO: 0.7 % (ref 0.3–6.2)
ERYTHROCYTE [DISTWIDTH] IN BLOOD BY AUTOMATED COUNT: 13.6 % (ref 12.3–15.4)
GLOBULIN UR ELPH-MCNC: 3.1 GM/DL (ref 1.8–3.5)
GLUCOSE SERPL-MCNC: 265 MG/DL (ref 74–124)
HCT VFR BLD AUTO: 32.3 % (ref 34–46.6)
HGB BLD-MCNC: 10.5 G/DL (ref 12–15.9)
IMM GRANULOCYTES # BLD AUTO: 0.02 10*3/MM3 (ref 0–0.05)
IMM GRANULOCYTES NFR BLD AUTO: 0.5 % (ref 0–0.5)
LYMPHOCYTES # BLD AUTO: 1.61 10*3/MM3 (ref 0.7–3.1)
LYMPHOCYTES NFR BLD AUTO: 37.9 % (ref 19.6–45.3)
MCH RBC QN AUTO: 27.9 PG (ref 26.6–33)
MCHC RBC AUTO-ENTMCNC: 32.5 G/DL (ref 31.5–35.7)
MCV RBC AUTO: 85.9 FL (ref 79–97)
MONOCYTES # BLD AUTO: 0.32 10*3/MM3 (ref 0.1–0.9)
MONOCYTES NFR BLD AUTO: 7.5 % (ref 5–12)
NEUTROPHILS NFR BLD AUTO: 2.24 10*3/MM3 (ref 1.7–7)
NEUTROPHILS NFR BLD AUTO: 52.7 % (ref 42.7–76)
NRBC BLD AUTO-RTO: 0 /100 WBC (ref 0–0.2)
PLATELET # BLD AUTO: 223 10*3/MM3 (ref 140–450)
PMV BLD AUTO: 10.3 FL (ref 6–12)
POTASSIUM SERPL-SCNC: 3.7 MMOL/L (ref 3.5–4.7)
PROT SERPL-MCNC: 6.8 G/DL (ref 6.3–8)
RBC # BLD AUTO: 3.76 10*6/MM3 (ref 3.77–5.28)
SODIUM SERPL-SCNC: 138 MMOL/L (ref 134–145)
WBC NRBC COR # BLD: 4.25 10*3/MM3 (ref 3.4–10.8)

## 2023-06-07 PROCEDURE — 25010000002 DIPHENHYDRAMINE PER 50 MG: Performed by: INTERNAL MEDICINE

## 2023-06-07 PROCEDURE — 80053 COMPREHEN METABOLIC PANEL: CPT

## 2023-06-07 PROCEDURE — 25010000002 DEXAMETHASONE SODIUM PHOSPHATE 100 MG/10ML SOLUTION: Performed by: INTERNAL MEDICINE

## 2023-06-07 PROCEDURE — 25010000002 HEPARIN LOCK FLUSH PER 10 UNITS: Performed by: INTERNAL MEDICINE

## 2023-06-07 PROCEDURE — 25010000002 PACLITAXEL PER 1 MG: Performed by: INTERNAL MEDICINE

## 2023-06-07 PROCEDURE — 96375 TX/PRO/DX INJ NEW DRUG ADDON: CPT

## 2023-06-07 PROCEDURE — 96413 CHEMO IV INFUSION 1 HR: CPT

## 2023-06-07 PROCEDURE — 85025 COMPLETE CBC W/AUTO DIFF WBC: CPT

## 2023-06-07 RX ORDER — SODIUM CHLORIDE 0.9 % (FLUSH) 0.9 %
10 SYRINGE (ML) INJECTION AS NEEDED
Status: DISCONTINUED | OUTPATIENT
Start: 2023-06-07 | End: 2023-06-07 | Stop reason: HOSPADM

## 2023-06-07 RX ORDER — SODIUM CHLORIDE 9 MG/ML
250 INJECTION, SOLUTION INTRAVENOUS ONCE
Status: COMPLETED | OUTPATIENT
Start: 2023-06-07 | End: 2023-06-07

## 2023-06-07 RX ORDER — HEPARIN SODIUM (PORCINE) LOCK FLUSH IV SOLN 100 UNIT/ML 100 UNIT/ML
500 SOLUTION INTRAVENOUS AS NEEDED
Status: DISCONTINUED | OUTPATIENT
Start: 2023-06-07 | End: 2023-06-07 | Stop reason: HOSPADM

## 2023-06-07 RX ORDER — HEPARIN SODIUM (PORCINE) LOCK FLUSH IV SOLN 100 UNIT/ML 100 UNIT/ML
500 SOLUTION INTRAVENOUS AS NEEDED
OUTPATIENT
Start: 2023-06-07

## 2023-06-07 RX ORDER — FAMOTIDINE 10 MG/ML
20 INJECTION, SOLUTION INTRAVENOUS ONCE
Status: COMPLETED | OUTPATIENT
Start: 2023-06-07 | End: 2023-06-07

## 2023-06-07 RX ORDER — SODIUM CHLORIDE 0.9 % (FLUSH) 0.9 %
10 SYRINGE (ML) INJECTION AS NEEDED
OUTPATIENT
Start: 2023-06-07

## 2023-06-07 RX ADMIN — Medication 500 UNITS: at 14:04

## 2023-06-07 RX ADMIN — Medication 10 ML: at 14:04

## 2023-06-07 RX ADMIN — DEXAMETHASONE SODIUM PHOSPHATE 12 MG: 10 INJECTION, SOLUTION INTRAMUSCULAR; INTRAVENOUS at 12:10

## 2023-06-07 RX ADMIN — SODIUM CHLORIDE 250 ML: 9 INJECTION, SOLUTION INTRAVENOUS at 11:49

## 2023-06-07 RX ADMIN — PACLITAXEL 135 MG: 6 INJECTION, SOLUTION INTRAVENOUS at 12:55

## 2023-06-07 RX ADMIN — DIPHENHYDRAMINE HYDROCHLORIDE 25 MG: 50 INJECTION, SOLUTION INTRAMUSCULAR; INTRAVENOUS at 11:51

## 2023-06-07 RX ADMIN — FAMOTIDINE 20 MG: 10 INJECTION INTRAVENOUS at 11:49

## 2023-06-07 NOTE — PROGRESS NOTES
Subjective   Veronica Royal is a 82 y.o. female.  Referred by Dr. Michael for right breast inflammatory breast cancer    History of Present Illness   Ms. Royal is a 82-year-old postmenopausal  lady with hypertension, diabetes, hyperlipidemia, chronic kidney disease, hyperkalemia-chronic presented with a palpable abnormality in the right breast.  Subsequent imaging was performed.  Patient has not had a mammogram prior to this in the past 25 years.    4/21/2023-bilateral diagnostic mammogram-high density irregular mass measuring 44 mm with spiculated margins and amorphus and fine pleomorphic calcifications with skin thickening in the right breast at 9:00.  2 intramammary lymph nodes in the upper outer axillary tail region are slightly rounded  2 prominent right axillary lymph nodes largest of which measures 26 mm.  Asymmetry noted in the left breast.    Right breast ultrasound at 9 o'clock position, 3 cm from the nipple there is a 38 x 28 x 42 mm mass.  Skin thickness measuring 11 mm near the mass.  One of the 2 rounded axillary tail lymph nodes noted.  Borderline cortical thickening.  2 abnormal axillary lymph nodes.  1 lymph node displaced loss of normal morphology with a round heterogeneous appearance and echogenic foci.  Most consistent with calcified lymph node measuring 26 mm.  Second lymph node measures 12 mm.  Displaced cortical thickening.  Ultrasound-guided biopsy of the mass in the axillary lymph nodes recommended.    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  Ki-67 38.45%    2.right axillary biopsy-soft tissue involved by invasive ductal carcinoma with apocrine features  Associated microcalcification  No definite lymph node tissue identified.    4/29/2023-MRI of the breast-biopsy-proven  malignancy in the right breast at 9:00 measuring 4.3 cm in greatest dimension.  Attachment overlying skin and diffuse right breast edema noted.  Right axillary lymphadenopathy.  No suspicious findings in the left breast.    5/11/2023-CT of the chest abdomen and pelvis-no evidence of metastatic disease.  Liver is cirrhotic in configuration.    5/11/2023-bone scan negative    Patient presents today to the clinic for discussing neoadjuvant chemotherapy.  She is accompanied by her daughter.  Patient denies any recent changes in weight, she reports some pain at the site of malignancy.  A punch biopsy was performed and was consistent with inflammatory breast cancer.  At the site of punch biopsy there is an ulcerated lesion.    She has poorly controlled diabetes currently on glipizide metformin and Tradjenta.  Hemoglobin A1c recently was noted to be 9.9.    Also has chronic hyperkalemia, explanation unclear.    Blood pressure poorly controlled.    She reports good functional status lives independently.  Able to manage all her bills and independent of ADLs.  She has good support system in terms of her daughter.    Family history significant for brother with pancreatic cancer at the age of 68, sister with ovarian cancer at the age of 58    INTERVAL HISTORY:   The patient is a 82 y.o. female with the above-mentioned history today for lab review and evaluation prior to cycle 1 day 15 Taxol.  Patient reports that she continues to have issues with diarrhea.  Patient's daughter is assisting with history, although it is somewhat difficult to tell from the patient's given history exactly how severe her symptoms are.  The patient continues to report diarrhea.  She states that yesterday she only required 2 Imodium yesterday morning after having about 1-3 loose bowel movements.  She has not had any diarrhea since then, and has not had any diarrhea this morning.  Patient states the most Imodium she is taking is 6 pills in 1 day.  She  was actually given some Enterade last week while receiving IV fluids at [facility].  She has been drinking 1 bottle per day and does feel like that has slowed down her diarrhea.  Patient was given the wild berry flavor, as that was the only available flavor.  She states that she does not love it, but is currently mixing it with sugar-free Aramis-Aid which makes it tolerable.  She does report some nausea.  She was started on Protonix has not seen improvements in her nausea with the addition of that, but it has helped  She denies vomiting.    She denies neuropathy symptoms.      The following portions of the patient's history were reviewed and updated as appropriate: allergies, current medications, past family history, past medical history, past social history, past surgical history and problem list.    Past Medical History:   Diagnosis Date    Basal cell carcinoma     Left thumb    Breast cancer 2023    Right    Diabetes mellitus     High cholesterol     Hypertension         Past Surgical History:   Procedure Laterality Date    EYE SURGERY      Muscle repair age 21    VENOUS ACCESS DEVICE (PORT) INSERTION N/A 5/19/2023    Procedure: INSERTION VENOUS ACCESS DEVICE;  Surgeon: Rosa Michael MD;  Location: [facility] OR [facility];  Service: General;  Laterality: N/A;        Family History   Problem Relation Age of Onset    Alzheimer's disease Mother     Heart disease Father     Heart attack Father     Ovarian cancer Sister     Pancreatic cancer Brother     Malig Hyperthermia Neg Hx     Colon cancer Neg Hx     Colon polyps Neg Hx     Crohn's disease Neg Hx     Irritable bowel syndrome Neg Hx     Ulcerative colitis Neg Hx         Social History     Socioeconomic History    Marital status:    Tobacco Use    Smoking status: Never    Smokeless tobacco: Never   Vaping Use    Vaping Use: Never used   Substance and Sexual Activity    Alcohol use: Not Currently    Drug use: Never    Sexual activity: Never        OB  "History    No obstetric history on file.      Age at menarche-11  Age at first live childbirth-35   2 para 1  1  Age at menopause-50  Oral conceptive pill use for 3 to 4 years    No Known Allergies     Review of Systems  ROS as per HPI    Objective   Blood pressure 121/70, pulse 78, temperature 97.8 °F (36.6 °C), temperature source Temporal, height 154.9 cm (60.98\"), weight 65.4 kg (144 lb 1.6 oz), SpO2 100 %.     Physical Exam  Constitutional:       General: She is not in acute distress.     Appearance: She is well-developed.   HENT:      Head: Normocephalic.   Eyes:      Pupils: Pupils are equal, round, and reactive to light.   Cardiovascular:      Rate and Rhythm: Normal rate and regular rhythm.      Pulses: Normal pulses.      Heart sounds: Normal heart sounds.   Pulmonary:      Effort: Pulmonary effort is normal. No respiratory distress.      Breath sounds: Normal breath sounds. No wheezing or rales.   Abdominal:      General: Bowel sounds are normal. There is no distension.      Palpations: Abdomen is soft. There is no mass.      Tenderness: There is no abdominal tenderness.   Musculoskeletal:         General: No deformity. Normal range of motion.      Cervical back: Normal range of motion.   Lymphadenopathy:      Cervical: No cervical adenopathy.   Skin:     General: Skin is warm and dry.      Coloration: Skin is not pale.      Findings: No rash.   Neurological:      Mental Status: She is alert and oriented to person, place, and time.      Cranial Nerves: No cranial nerve deficit.   Psychiatric:         Speech: Speech is tangential.         Behavior: Behavior normal.         Cognition and Memory: Memory is impaired.      I have reexamined the patient and the results are consistent with the previously documented exam. CARL Ham    Breast Exam:   2023 left breast remains some faint bruising from Mediport placement.  Right breast with an area of ulceration about 1cm between the " 9 to 10 o'clock position with a Band-Aid covering the area, no significant drainage/bleeding.  Patient has a firm/fixed still measuring approximately 5 cm mass in the upper outer quadrant.    5/31/2023: Left breast with bruising from Mediport placement.  Right breast with area of ulceration between the 9 and 10 o'clock position.  A firm fixed approximately 5 cm mass in the upper outer quadrant.  The area of ulceration is approximately 1 cm without bleeding.  There is no erythema of the breast.  Right axillary adenopathy noted    5/16/2023 left breast appears normal on inspection.  No palpable abnormalities of the left breast.  Right breast on inspection there is an ulcerated lesion at between 9 to 10 o'clock position.  On palpation there is a 5 cm mass in the upper outer quadrant.  On examination of the right axilla there is a firm mass occupying the anterior aspect of the axilla which measures approximately 3 cm.  Could be matted lymph nodes.    Results from last 7 days   Lab Units 06/07/23  1031   WBC 10*3/mm3 4.25   NEUTROS ABS 10*3/mm3 2.24   HEMOGLOBIN g/dL 10.5*   HEMATOCRIT % 32.3*   PLATELETS 10*3/mm3 223     Results from last 7 days   Lab Units 06/07/23  1033 06/02/23  0923   SODIUM mmol/L 138 136   POTASSIUM mmol/L 3.7 3.5   CHLORIDE mmol/L 108* 107   CO2 mmol/L 14.1* 14.8*   BUN mg/dL 32* 43*   CREATININE mg/dL 1.32* 1.09   CALCIUM mg/dL 9.1 9.2   ALBUMIN g/dL 3.7  --    BILIRUBIN mg/dL 0.3  --    ALK PHOS U/L 51  --    ALT (SGPT) U/L 26  --    AST (SGOT) U/L 23  --    GLUCOSE mg/dL 265* 235*             CT Chest With Contrast Diagnostic    Result Date: 5/11/2023  1. Right breast cancer with right axillary lymphadenopathy 2. No convincing evidence of distant metastatic disease. Incidental findings as above.  This report was finalized on 5/11/2023 12:48 PM by Dr. Jasvir Jha M.D.      NM Bone Scan Whole Body    Result Date: 5/12/2023  Abnormal soft tissue activity in the right breast corresponding to  known disease in that location. No evidence of osseous metastasis.  This report was finalized on 5/12/2023 12:04 PM by Dr. Kannan Carr M.D.      CT Abdomen Pelvis With Contrast    Result Date: 5/11/2023  1. Right breast cancer with right axillary lymphadenopathy 2. No convincing evidence of distant metastatic disease. Incidental findings as above.  This report was finalized on 5/11/2023 12:48 PM by Dr. Jasvir Jha M.D.            Assessment & Plan       *Right breast inflammatory breast cancer  T4d N1 M0  Stage IIIb  ER negative, SC negative, HER2 2+ on immunohistochemistry but amplified on FISH.  Invasive ductal carcinoma with apocrine features, grade 3, Ki-67 38%  Discussed at length the details of imaging and pathology report.Discussed the origin of breast cancer from the ducts and the lobules and the histological type of breast cancer based on site of origin. Discussed the tumor size, lymph node status and stage of the cancer. Explained the presence of DCIS. Discussed the receptor status including ER, SC and her-2 destiny and their significance in determining the biology and treatment. Also discussed the importance of grade and ki-67.   The steps of curative intent breast cancer treatment have been explained, including surgery, possible chemotherapy, possible radiation and possible endocrine therapy.   CT scans and bone scan without any obvious evidence of metastatic disease  Discussed the rationale for neoadjuvant chemotherapy for HER2 positive breast cancer as well as for inflammatory breast cancer.  Given her age and significant comorbidities I would like to start chemotherapy with Taxol Herceptin and Perjeta, and if she tolerates this well we will proceed with Adriamycin and cyclophosphamide.  Echocardiogram has been performed and ejection fraction normal.  Liver shows cirrhotic morphology.  LFTs otherwise normal and total bilirubin normal as well as protein normal.  It appears that the synthetic function  of the liver is still within normal limits.  Adverse effects of chemotherapy including but not limited to myelosuppression, increased risk of infections, nausea, vomiting, arthralgias, alopecia, mucositis, diarrhea, cardiotoxicity, risk of leukemia, hepatotoxicity, risk of allergic reactions, risk of extravasation discussed.  Mediport placed 5/19/2023  Taxol, Herceptin, Perjeta initiated 5/24/2023 5/31/2023 with C1D8 Taxol  6/7/2023 C1D15 Taxol    *Diabetes  Poorly controlled  Evaluation by endocrinology 5/30/2023 with recommendations for dietary changes and home blood sugar monitoring.  The patient's daughter reports today she is struggling with compliance.    *Hyperkalemia and chronic kidney disease  Unclear etiology  Could be secondary to RTA  Refer to nephrology for further management    *?  Cirrhotic appearance of the liver on the CT scan  Referred to gastroenterology  Synthetic function appears to be normal  We will start with Taxol to see if she would tolerate the chemotherapy   Slight elevation of intervention studies today with ALT 43, AST 55.  Continue to monitor weekly  6/7/2023 AST 23, ALT 26    *Hypertension-currently on valsartan and hydrochlorothiazide.  Valsartan could be contributing to hyperkalemia.  Will refer to cardiology ASAP for management of medications and cardiac clearance for proceeding with chemotherapy  Recent echocardiogram normal    *Genetic testing-obtained at Dr. Michael's office, results pending    *Decreased oral intake secondary to chemotherapy  7 pound weight loss after cycle 1 day 1 Taxol, Herceptin, Perjeta  Continue to follow-up with oncology dietitian  Additional IV fluid support provided today    *Frequent belching  Begin Protonix 40 mg nightly    *Cognitive impairment  It is unclear if this is the patient's baseline or mental status has been exacerbated by recent chemotherapy  The patient is struggling with compliance with her medications.  The patient's daughter expresses  frustration today as the patient is struggling to care for herself at home with managing medications and symptom management.  We will ask oncology social work to follow-up with the patient as well as CARL Antonio to urgently evaluate the patient for adult functional assessment    *Chemotherapy-induced diarrhea  Utilizing only a single dose of Imodium over the past week  Discussed increasing frequency of Imodium with IV fluid support  6/5/2023 patient given Enterade (Wild Berry flavor).  She has been drinking 1/day and has seen an improvement in her diarrhea.  She does not necessarily like the flavor (currently mixing with sugar-free Aramis-Aid which does help).  Patient will be given orange flavor today and instructed to continue to drink 1 bottle per day for now, optimally would drink 2 bottles per day especially when having problems with diarrhea but we will have her meet with the oncology dietitian next week when she returns for treatment.    PLAN:  Proceed with cycle 1, D15 Taxol.   Continue Enterade.  Continue Zofran for nausea control.  Continue Imodium for diarrhea control.  Continue Protonix 40 mg nightly.  Oncology dietitian to follow.  Return in 1 week for follow-up with Dr. Yee with repeat labs reevaluation due for C2 D1 Taxol, Herceptin, Perjeta.  Call/ return sooner should the patient develop any new concerns or problems.    Patient is on a high risk medication requiring close monitoring for toxicity.      CARL Ham  06/07/2023

## 2023-06-07 NOTE — NURSING NOTE
Lab Results   Component Value Date    GLUCOSE 265 (H) 06/07/2023    BUN 32 (H) 06/07/2023    CREATININE 1.32 (H) 06/07/2023    EGFR 40.4 (L) 06/07/2023    BCR 24.2 06/07/2023    K 3.7 06/07/2023    CO2 14.1 (L) 06/07/2023    CALCIUM 9.1 06/07/2023    ALBUMIN 3.7 06/07/2023    BILITOT 0.3 06/07/2023    AST 23 06/07/2023    ALT 26 06/07/2023    Reviewed labs with Deepthi HENRY. Per CARL okay to treat with Taxol today. No new orders received.   Pt does report still experiencing diarrhea. Pt educated on taking up to 8 Immodiums daily. Pt also given supply of Enterade. Pt v/u.

## 2023-06-08 ENCOUNTER — OFFICE VISIT (OUTPATIENT)
Dept: OTHER | Facility: HOSPITAL | Age: 83
End: 2023-06-08
Payer: MEDICARE

## 2023-06-08 VITALS
HEART RATE: 78 BPM | OXYGEN SATURATION: 99 % | SYSTOLIC BLOOD PRESSURE: 121 MMHG | WEIGHT: 142 LBS | BODY MASS INDEX: 26.84 KG/M2 | TEMPERATURE: 97.3 F | DIASTOLIC BLOOD PRESSURE: 82 MMHG

## 2023-06-08 DIAGNOSIS — H91.93 BILATERAL HEARING LOSS, UNSPECIFIED HEARING LOSS TYPE: ICD-10-CM

## 2023-06-08 DIAGNOSIS — Z71.89 ADVANCED CARE PLANNING/COUNSELING DISCUSSION: ICD-10-CM

## 2023-06-08 DIAGNOSIS — Z91.89 AT HIGH RISK FOR MALNUTRITION: Primary | ICD-10-CM

## 2023-06-08 DIAGNOSIS — C50.919 MALIGNANT NEOPLASM OF FEMALE BREAST, UNSPECIFIED ESTROGEN RECEPTOR STATUS, UNSPECIFIED LATERALITY, UNSPECIFIED SITE OF BREAST: ICD-10-CM

## 2023-06-08 PROCEDURE — G0463 HOSPITAL OUTPT CLINIC VISIT: HCPCS | Performed by: NURSE PRACTITIONER

## 2023-06-08 NOTE — PROGRESS NOTES
[facility] MULTIDISCIPLINARY CLINIC  SURVIVORSHIP VISIT: IN CLINIC  Older Adult Functional Assessment and Advance Care Planning Initial Visit        Veronica Royal is a pleasant 82 y.o. female being followed by Sheila Yee MD for right breast inflammatory carcinoma. Reviewed today in Multidisciplinary Clinic, for initial older adult functional assessment and advance care planning visit.     HPI  Presents today with her daughter Norman whom the patient has given permission to participate in the conversation today.    82-year-old patient with hypertension, diabetes poorly controlled, hyperlipidemia, chronic kidney disease, hyperkalemia chronically who had not had a screening mammogram in 25 years presented with breast abnormality right side.  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  She received cycle 1 neoadjuvant Taxol Perjeta Herceptin on 5/24/2023.  When the patient returned for cycle 1 day 8 Taxol she was found to have had severely diminished appetite with a 7 pound weight loss reporting diarrhea significant nausea and belching and affirms she had not been using her as needed medications consistently and also some concern regarding patient understanding of each medicines indication.    She received cycle 1 day 15 Taxol on 6/7/2023, yesterday.  She is happy to report she has not had any diarrhea or nausea thus far.    The patient notes she may possibly have some hearing loss and her primary care did refer her to an audiologist for evaluation but the patient states she never did follow through.  She had a prior experience with an ENT maybe 6 years ago in which it sounds like they disimpacted some cerumen and it was painful and so she has been a little fearful to get back there.    She also notes some scattered skin lesions particularly on inner aspect of her left thumb which she feels is not bothering her and so she has not sought any further evaluation or treatment.  Her  daughter does note some concern about this.    The patient lives independently in her own home.  She lives by herself.  Her daughter lives maybe 10 minutes away and checks on her frequently.  The patient also has an adult son.  Typically the patient enjoys bowling in the wintertime.  She gets together with 7 or 8 friends about once a week to play rummy and she enjoys going to the [employer].     She does note that her mom had dementia that she lived with at least the last 5 years of her life.    Older Adult Functional Assessment:  The patient's G8 score is 8 today, indicating at risk of functional decline related to cancer treatment.     Patient is able to perform most instrumental activities of daily living independently. totally unable to do own  work.     How many hospitalizations have you had in the last year? 0   Because of a health problem, do you have difficulty pushing or pulling objects like a living room chair? no   Do you use any vision aids? Yes, glasses   Do you use any hearing aids? No   Do you use any mobility aids? No   Do you feel unsteady when standing or walking? No   Do you worry about falling? No   Have you had any falls in the last 6 months? No       History of autoimmune disorders? No   History of organ/stem cell transplant? No     Self-reported symptoms per ESAS listed below  Detailed Symptom Assessment  Symptom Distress  Pain Score: no pain   ESAS Tiredness Score: 6  ESAS Nausea Score: 5  ESAS Depression Score: 5  ESAS Anxiety Score: 3  ESAS Drowsiness Score: 3  ESAS Lack of Appetite Score: 9  ESAS Wellbeing Score: Best wellbeing  ESAS Dyspnea Score: 2  ESAS Source of Information: patient  Health Care Directives/Treatment Preferences  Pre-existing AND/MOST/POLST Order: No  Advance Directive Status: Patient does not have advance directive      TREATMENT TOXICITY PREDICTIVE ASSESSMENT:    RAW SCORE REPORTING:  ECOG 3   G8 Questionnaire 8/17   Mini Nutritional Assessment (MNA) Screening  score: 9/14  Assessment (if screen <11): 7.5/16  Total assessment: 16.5/30   Mini-Cog 4/5   Timed Up and Go (TUG) 3 meters (Goal <12 seconds): 11.6 seconds     TREATMENT HISTORY:     Oncology/Hematology History   Malignant neoplasm of upper-outer quadrant of right breast in female, estrogen receptor negative   5/16/2023 Initial Diagnosis    Malignant neoplasm of upper-outer quadrant of right breast in female, estrogen receptor negative     5/24/2023 -  Chemotherapy    OP BREAST Pertuzumab / Trastuzumab / PACLitaxel           Past Medical History:   Diagnosis Date    Basal cell carcinoma     Left thumb    Breast cancer 2023    Right    Diabetes mellitus     High cholesterol     Hypertension        Past Surgical History:   Procedure Laterality Date    EYE SURGERY      Muscle repair age 21    VENOUS ACCESS DEVICE (PORT) INSERTION N/A 5/19/2023    Procedure: INSERTION VENOUS ACCESS DEVICE;  Surgeon: Rosa Michael MD;  Location: [facility];  Service: General;  Laterality: N/A;       Social History     Socioeconomic History    Marital status:    Tobacco Use    Smoking status: Never    Smokeless tobacco: Never   Vaping Use    Vaping Use: Never used   Substance and Sexual Activity    Alcohol use: Not Currently    Drug use: Never    Sexual activity: Never         No results found for: LDH, URICACID    Lab Results   Component Value Date    GLUCOSE 265 (H) 06/07/2023    BUN 32 (H) 06/07/2023    CREATININE 1.32 (H) 06/07/2023    BCR 24.2 06/07/2023    K 3.7 06/07/2023    CO2 14.1 (L) 06/07/2023    CALCIUM 9.1 06/07/2023    ALBUMIN 3.7 06/07/2023    LABIL2 1.2 03/01/2022    AST 23 06/07/2023    ALT 26 06/07/2023       CBC w/diff          5/25/2023    11:05 5/31/2023    11:10 6/7/2023    10:31   CBC w/Diff   WBC 13.88  6.05  4.25    RBC 4.07  4.00  3.76    Hemoglobin 11.3  11.1  10.5    Hematocrit 34.3  35.1  32.3    MCV 84.3  87.8  85.9    MCH 27.8  27.8  27.9    MCHC 32.9  31.6  32.5    RDW 13.3  13.3  13.6     Platelets 238  256  223    Neutrophil Rel % 88.4  61.9  52.7    Immature Granulocyte Rel % 0.6  0.8  0.5    Lymphocyte Rel % 7.6  30.4  37.9    Monocyte Rel % 3.3  3.6  7.5    Eosinophil Rel % 0.0  2.6  0.7    Basophil Rel % 0.1  0.7  0.7        Allergies as of 06/08/2023    (No Known Allergies)       MEDICATIONS:  Medication list reviewed today    Review of Systems   Constitutional:  Positive for appetite change. Negative for activity change, fatigue and unexpected weight change.   HENT:  Positive for hearing loss.    Respiratory:  Negative for chest tightness and shortness of breath.    Cardiovascular:  Negative for chest pain and leg swelling.   Gastrointestinal:  Positive for diarrhea and nausea. Negative for blood in stool and constipation.   Genitourinary:  Negative for dysuria and hematuria.   Musculoskeletal:  Negative for arthralgias and myalgias.   Neurological:  Negative for weakness.   Psychiatric/Behavioral:  Positive for decreased concentration, dysphoric mood and sleep disturbance. Negative for self-injury and suicidal ideas. The patient is not nervous/anxious.      /82   Pulse 78   Temp 97.3 °F (36.3 °C)   Wt 64.4 kg (142 lb)   SpO2 99%   BMI 26.84 kg/m²     Wt Readings from Last 3 Encounters:   06/08/23 64.4 kg (142 lb)   06/07/23 65.4 kg (144 lb 1.6 oz)   06/05/23 65.8 kg (145 lb)        Pain Score    06/08/23 0925   PainSc: 0-No pain       PHQ-9 Total Score: 8   GAD7 Total Score: 5   Distress Score: N/A        Physical Exam  Constitutional:       Appearance: Normal appearance. She is well-groomed.   HENT:      Head: Normocephalic and atraumatic.      Right Ear: Decreased hearing noted.      Left Ear: Decreased hearing noted.   Cardiovascular:      Rate and Rhythm: Normal rate and regular rhythm.   Pulmonary:      Effort: No tachypnea or respiratory distress.   Abdominal:      General: Abdomen is flat. There is no distension.   Musculoskeletal:      Right ankle: No swelling. No  tenderness. Normal pulse.      Left ankle: No swelling. No tenderness. Normal pulse.   Skin:     General: Skin is warm and dry.      Comments: Crusted lesion inner aspect left thumb   Neurological:      Mental Status: She is alert and oriented to person, place, and time.   Psychiatric:         Attention and Perception: Attention and perception normal.         Mood and Affect: Mood and affect normal.         Speech: Speech normal.         Behavior: Behavior normal. Behavior is cooperative.         Thought Content: Thought content normal.         Cognition and Memory: She exhibits impaired recent memory.         Judgment: Judgment normal.         Advance Care Planning     Patient does not have advance care planning complete, we do not have a copy on file    Patient has not designated a healthcare surrogate: But she is certain she would choose her daughter Norman Root.  They have had discussions regarding the patient's wishes in the past.    Patient describes caregiving for the last 5 years of her mother's life.  Patient reports her mom had dementia and her life was prolonged with artificial nutrition and hydration and reflecting on this she feels like it may not have been the best thing for her and wishes they had more information at the time to make a different decision.  She feels like making the sorts of decisions are important.  She has completed an advance directive in the past but had been told it had .    Explored preferences for life-prolonging treatment: In the event of a sudden accident or illness leaving the patient unable to communicate and with no reasonable chance of recovery she indicates she would not want her life prolonged with mechanical ventilation nor artificial nutrition or hydration.    We reviewed each section of a [place] living will document today and discussed how to complete this to reflect the wishes we discussed today.  She would like to take this home and complete it.  We  "discussed it can be finalized by either having 2 witnesses unrelated to her or having the document notarized.          DISCUSSION HELD TODAY:   GOALS OF CARE:  Cure the cancer      Problems identified:  Nutrition: Mini nutrition assessment screening reveals high risk for malnutrition.  Noted that patient did lose about 7 pounds in 1 week after initiation of her chemotherapy.  There is some been some concern regarding patient understanding and use of as needed medications.  We did review again instructions for Imodium and I did provide some written instructions for her to refer to as well.  She does note she has had some intermittent difficulty swallowing but the patient attributes this to the chemotherapy while her daughter attributes this to dehydration.  I cannot say I have a clear sense of what is going on at this time but I have asked her to continue to monitor and report ongoing symptoms.    She has been connected to oncology dietitian, enrolled in Enterade study, using 1 Enterade daily.  She was is having some significant issues with hyperglycemia upon initiation of therapy.  The patient was advised by nutrition to avoid all carbohydrates however the patient and daughter state at her follow-up with endocrinology they felt this might be too extreme as an approach and were concerned this may be worsening her rapid weight loss.  Patient has been given some modified instructions.  She notes she has gotten the greenlight from her endocrinologist to have 1 small scoop of ice cream a week.  The patient has struggled with diarrhea since initiation of therapy.  We reviewed instructions for Imodium today and I did provide some written instructions for her to refer to a home.  Reinforced take 2 Imodium with initial episode of diarrhea and 1 tablet after each subsequent episode not to exceed 8 tablets within 24 hours  Cognition: Scores 4/5 on mini cog today unable to recall the word \"baby\".  She does note her mom had a " dementia diagnosis for at least the last 5 years of her life.  But the patient notices some mild changes in short-term memory but otherwise no instances of behavior change, wandering, getting lost.  She reports she is generally sleeping okay most nights.  She does not drink any alcohol.  She stays socially engaged and meets with friends weekly.  She does seem to have some pronounced hearing loss and we discussed that this can be a contributing factor to development of mild cognitive impairment and potentially dementia.  I have strongly encouraged her to follow through with the hearing evaluation.  I have offered to get her connected but she does have the name of an office she would be willing to see.  Emphasized that this is an important step in preservation of her cognitive function  Mobility: Timed up and go just over 11 seconds today, gait is steady.  She has no history of falls is not worried about falling and does not have the sense of being unsteady on her feet.  She denies any neuropathic symptoms in her feet or hands.  She does get a little short of breath with prolonged exertion but otherwise stays pretty active.  She will be interested in returning to previous activities after treatment which does include bowling and we discussed we can consider physical therapy for help reestablishing strength and stamina after we get her through the bulk of her treatment.  We also reviewed importance of regular hydration, advised okay to use sugar-free flavoring packets for water to make it more appealing.      Plan and recommendations:  Currently followed by oncology nutrition however I will send them a message regarding updates from endocrinology  Patient continues neoadjuvant treatment for her right inflammatory breast cancer with Taxol Perjeta Herceptin  Continue close follow-up with endocrinology  Continue physical activity and social activities as tolerated  I have asked her to follow-up on her hearing  evaluation  Encouraged her to get to dermatology for evaluation of skin lesion  Written instructions provided for use of Imodium, call Dr. Yee's team if uncontrolled  Reviewed signs and symptoms of peripheral neuropathy to report promptly to her treatment team  We can expect this patient will need routine review and reinforcement of all treatment recommendations, navigation is currently following as well and she has social work. While some baseline mild cognitive impairment with regard to short term memory is suspected, more than anything I fear her hearing loss is contributing to her ability to learn and retain new information. Extended conversation regarding this today.  We have made arrangements for her to return to the clinic in 3 months to review her progress and reassess for any additional needs.  Call my office as needed at [phone] for additional information, resources or support.      Diagnoses and all orders for this visit:    1. At high risk for malnutrition (Primary)    2. Bilateral hearing loss, unspecified hearing loss type    3. Malignant neoplasm of female breast, unspecified estrogen receptor status, unspecified laterality, unspecified site of breast    4. Advanced care planning/counseling discussion            I spent 60 minutes caring for this patient on this date of service by face-to-face counseling. This time includes time spent by me in the following activities: preparing for the visit, reviewing tests, obtaining and/or reviewing a separately obtained history, performing a medically appropriate examination and/or evaluation, counseling and educating the patient/family/caregiver, referring and communicating with other health care professionals, documenting information in the medical record, and care coordination she did added and therefore you out everything is in her thinking all her packet

## 2023-06-09 ENCOUNTER — TELEPHONE (OUTPATIENT)
Dept: OTHER | Facility: HOSPITAL | Age: 83
End: 2023-06-09
Payer: MEDICARE

## 2023-06-09 NOTE — TELEPHONE ENCOUNTER
[facility] MULTIDISCIPLINARY CLINIC  SURVIVORSHIP SERVICES CARE COORDINATION NOTE  PHONE      Call placed to patient  RE:  follow up  older adult functional assessment visit.    Left voice mail for patient    Let her know I was calling to see how she was feeling after her last chemo infusion  and that I am available to clarify any symptom management questions. Let her know I am available today and reminded her to call Dr Yee's office if any concerns over the weekend [phone] .        Patient encouraged to call the office at any point for additional information, resources or support.

## 2023-06-13 ENCOUNTER — TRANSCRIBE ORDERS (OUTPATIENT)
Dept: ADMINISTRATIVE | Facility: HOSPITAL | Age: 83
End: 2023-06-13
Payer: MEDICARE

## 2023-06-13 DIAGNOSIS — I12.9 BENIGN HYPERTENSIVE CKD, STAGE 1-4 OR UNSPECIFIED CHRONIC KIDNEY DISEASE: ICD-10-CM

## 2023-06-13 DIAGNOSIS — N18.30 CHRONIC RENAL FAILURE, STAGE 3 (MODERATE), UNSPECIFIED WHETHER STAGE 3A OR 3B CKD: Primary | ICD-10-CM

## 2023-06-14 ENCOUNTER — INFUSION (OUTPATIENT)
Dept: ONCOLOGY | Facility: HOSPITAL | Age: 83
End: 2023-06-14
Payer: MEDICARE

## 2023-06-14 ENCOUNTER — OFFICE VISIT (OUTPATIENT)
Dept: ONCOLOGY | Facility: CLINIC | Age: 83
End: 2023-06-14
Payer: MEDICARE

## 2023-06-14 ENCOUNTER — NUTRITION (OUTPATIENT)
Dept: OTHER | Facility: HOSPITAL | Age: 83
End: 2023-06-14
Payer: MEDICARE

## 2023-06-14 VITALS
OXYGEN SATURATION: 96 % | SYSTOLIC BLOOD PRESSURE: 126 MMHG | BODY MASS INDEX: 27.02 KG/M2 | HEIGHT: 61 IN | WEIGHT: 143.1 LBS | DIASTOLIC BLOOD PRESSURE: 70 MMHG | RESPIRATION RATE: 16 BRPM | HEART RATE: 102 BPM | TEMPERATURE: 97.1 F

## 2023-06-14 DIAGNOSIS — C50.411 MALIGNANT NEOPLASM OF UPPER-OUTER QUADRANT OF RIGHT BREAST IN FEMALE, ESTROGEN RECEPTOR NEGATIVE: Primary | ICD-10-CM

## 2023-06-14 DIAGNOSIS — C50.411 MALIGNANT NEOPLASM OF UPPER-OUTER QUADRANT OF RIGHT BREAST IN FEMALE, ESTROGEN RECEPTOR NEGATIVE: ICD-10-CM

## 2023-06-14 DIAGNOSIS — Z17.1 MALIGNANT NEOPLASM OF UPPER-OUTER QUADRANT OF RIGHT BREAST IN FEMALE, ESTROGEN RECEPTOR NEGATIVE: ICD-10-CM

## 2023-06-14 DIAGNOSIS — Z17.1 MALIGNANT NEOPLASM OF UPPER-OUTER QUADRANT OF RIGHT BREAST IN FEMALE, ESTROGEN RECEPTOR NEGATIVE: Primary | ICD-10-CM

## 2023-06-14 LAB
ALBUMIN SERPL-MCNC: 3.7 G/DL (ref 3.5–5.2)
ALBUMIN/GLOB SERPL: 1.2 G/DL (ref 1.1–2.4)
ALP SERPL-CCNC: 57 U/L (ref 38–116)
ALT SERPL W P-5'-P-CCNC: 28 U/L (ref 0–33)
ANION GAP SERPL CALCULATED.3IONS-SCNC: 17 MMOL/L (ref 5–15)
AST SERPL-CCNC: 27 U/L (ref 0–32)
BASOPHILS # BLD AUTO: 0.04 10*3/MM3 (ref 0–0.2)
BASOPHILS NFR BLD AUTO: 0.5 % (ref 0–1.5)
BILIRUB SERPL-MCNC: 0.3 MG/DL (ref 0.2–1.2)
BUN SERPL-MCNC: 26 MG/DL (ref 6–20)
BUN/CREAT SERPL: 17.7 (ref 7.3–30)
CALCIUM SPEC-SCNC: 9.7 MG/DL (ref 8.5–10.2)
CHLORIDE SERPL-SCNC: 107 MMOL/L (ref 98–107)
CO2 SERPL-SCNC: 15 MMOL/L (ref 22–29)
CREAT SERPL-MCNC: 1.47 MG/DL (ref 0.6–1.1)
DEPRECATED RDW RBC AUTO: 43.8 FL (ref 37–54)
EGFRCR SERPLBLD CKD-EPI 2021: 35.5 ML/MIN/1.73
EOSINOPHIL # BLD AUTO: 0.05 10*3/MM3 (ref 0–0.4)
EOSINOPHIL NFR BLD AUTO: 0.6 % (ref 0.3–6.2)
ERYTHROCYTE [DISTWIDTH] IN BLOOD BY AUTOMATED COUNT: 14.4 % (ref 12.3–15.4)
GLOBULIN UR ELPH-MCNC: 3.2 GM/DL (ref 1.8–3.5)
GLUCOSE SERPL-MCNC: 249 MG/DL (ref 74–124)
HCT VFR BLD AUTO: 32.9 % (ref 34–46.6)
HGB BLD-MCNC: 10.7 G/DL (ref 12–15.9)
IMM GRANULOCYTES # BLD AUTO: 0.12 10*3/MM3 (ref 0–0.05)
IMM GRANULOCYTES NFR BLD AUTO: 1.4 % (ref 0–0.5)
LYMPHOCYTES # BLD AUTO: 2.19 10*3/MM3 (ref 0.7–3.1)
LYMPHOCYTES NFR BLD AUTO: 25.4 % (ref 19.6–45.3)
MCH RBC QN AUTO: 27.8 PG (ref 26.6–33)
MCHC RBC AUTO-ENTMCNC: 32.5 G/DL (ref 31.5–35.7)
MCV RBC AUTO: 85.5 FL (ref 79–97)
MONOCYTES # BLD AUTO: 0.46 10*3/MM3 (ref 0.1–0.9)
MONOCYTES NFR BLD AUTO: 5.3 % (ref 5–12)
NEUTROPHILS NFR BLD AUTO: 5.77 10*3/MM3 (ref 1.7–7)
NEUTROPHILS NFR BLD AUTO: 66.8 % (ref 42.7–76)
NRBC BLD AUTO-RTO: 0 /100 WBC (ref 0–0.2)
PLATELET # BLD AUTO: 291 10*3/MM3 (ref 140–450)
PMV BLD AUTO: 9.9 FL (ref 6–12)
POTASSIUM SERPL-SCNC: 3.6 MMOL/L (ref 3.5–4.7)
PROT SERPL-MCNC: 6.9 G/DL (ref 6.3–8)
RBC # BLD AUTO: 3.85 10*6/MM3 (ref 3.77–5.28)
SODIUM SERPL-SCNC: 139 MMOL/L (ref 134–145)
WBC NRBC COR # BLD: 8.63 10*3/MM3 (ref 3.4–10.8)

## 2023-06-14 PROCEDURE — 25010000002 DIPHENHYDRAMINE PER 50 MG: Performed by: INTERNAL MEDICINE

## 2023-06-14 PROCEDURE — 99214 OFFICE O/P EST MOD 30 MIN: CPT | Performed by: INTERNAL MEDICINE

## 2023-06-14 PROCEDURE — 25010000002 PACLITAXEL PER 1 MG: Performed by: INTERNAL MEDICINE

## 2023-06-14 PROCEDURE — 1160F RVW MEDS BY RX/DR IN RCRD: CPT | Performed by: INTERNAL MEDICINE

## 2023-06-14 PROCEDURE — 25010000002 TRASTUZUMAB PER 10 MG: Performed by: INTERNAL MEDICINE

## 2023-06-14 PROCEDURE — 85025 COMPLETE CBC W/AUTO DIFF WBC: CPT

## 2023-06-14 PROCEDURE — 96415 CHEMO IV INFUSION ADDL HR: CPT

## 2023-06-14 PROCEDURE — 96417 CHEMO IV INFUS EACH ADDL SEQ: CPT

## 2023-06-14 PROCEDURE — 25010000002 DEXAMETHASONE SODIUM PHOSPHATE 100 MG/10ML SOLUTION: Performed by: INTERNAL MEDICINE

## 2023-06-14 PROCEDURE — 96367 TX/PROPH/DG ADDL SEQ IV INF: CPT

## 2023-06-14 PROCEDURE — 3078F DIAST BP <80 MM HG: CPT | Performed by: INTERNAL MEDICINE

## 2023-06-14 PROCEDURE — 96413 CHEMO IV INFUSION 1 HR: CPT

## 2023-06-14 PROCEDURE — 25010000002 PERTUZUMAB 420 MG/14ML SOLUTION 420 MG VIAL: Performed by: INTERNAL MEDICINE

## 2023-06-14 PROCEDURE — 80053 COMPREHEN METABOLIC PANEL: CPT

## 2023-06-14 PROCEDURE — 1126F AMNT PAIN NOTED NONE PRSNT: CPT | Performed by: INTERNAL MEDICINE

## 2023-06-14 PROCEDURE — 96375 TX/PRO/DX INJ NEW DRUG ADDON: CPT

## 2023-06-14 PROCEDURE — 1159F MED LIST DOCD IN RCRD: CPT | Performed by: INTERNAL MEDICINE

## 2023-06-14 PROCEDURE — 3074F SYST BP LT 130 MM HG: CPT | Performed by: INTERNAL MEDICINE

## 2023-06-14 RX ORDER — SODIUM CHLORIDE 9 MG/ML
250 INJECTION, SOLUTION INTRAVENOUS ONCE
OUTPATIENT
Start: 2023-06-21

## 2023-06-14 RX ORDER — FAMOTIDINE 10 MG/ML
20 INJECTION, SOLUTION INTRAVENOUS ONCE
OUTPATIENT
Start: 2023-06-21

## 2023-06-14 RX ORDER — FAMOTIDINE 10 MG/ML
20 INJECTION, SOLUTION INTRAVENOUS AS NEEDED
OUTPATIENT
Start: 2023-06-28

## 2023-06-14 RX ORDER — FAMOTIDINE 10 MG/ML
20 INJECTION, SOLUTION INTRAVENOUS ONCE
Status: CANCELLED | OUTPATIENT
Start: 2023-06-14

## 2023-06-14 RX ORDER — DIPHENHYDRAMINE HYDROCHLORIDE 50 MG/ML
50 INJECTION INTRAMUSCULAR; INTRAVENOUS AS NEEDED
OUTPATIENT
Start: 2023-06-21

## 2023-06-14 RX ORDER — SODIUM CHLORIDE 9 MG/ML
250 INJECTION, SOLUTION INTRAVENOUS ONCE
Status: CANCELLED | OUTPATIENT
Start: 2023-06-14

## 2023-06-14 RX ORDER — SODIUM CHLORIDE 9 MG/ML
250 INJECTION, SOLUTION INTRAVENOUS ONCE
Status: COMPLETED | OUTPATIENT
Start: 2023-06-14 | End: 2023-06-14

## 2023-06-14 RX ORDER — FAMOTIDINE 10 MG/ML
20 INJECTION, SOLUTION INTRAVENOUS ONCE
Status: COMPLETED | OUTPATIENT
Start: 2023-06-14 | End: 2023-06-14

## 2023-06-14 RX ORDER — SODIUM CHLORIDE 9 MG/ML
1000 INJECTION, SOLUTION INTRAVENOUS CONTINUOUS
Status: CANCELLED
Start: 2023-06-16

## 2023-06-14 RX ORDER — DIPHENHYDRAMINE HYDROCHLORIDE 50 MG/ML
50 INJECTION INTRAMUSCULAR; INTRAVENOUS AS NEEDED
OUTPATIENT
Start: 2023-06-28

## 2023-06-14 RX ORDER — DIAPER,BRIEF,INFANT-TODD,DISP
1 EACH MISCELLANEOUS 2 TIMES DAILY
Qty: 30 G | Refills: 0 | Status: SHIPPED | OUTPATIENT
Start: 2023-06-14

## 2023-06-14 RX ORDER — SODIUM CHLORIDE 9 MG/ML
250 INJECTION, SOLUTION INTRAVENOUS ONCE
OUTPATIENT
Start: 2023-06-28

## 2023-06-14 RX ORDER — FAMOTIDINE 10 MG/ML
20 INJECTION, SOLUTION INTRAVENOUS AS NEEDED
Status: CANCELLED | OUTPATIENT
Start: 2023-06-14

## 2023-06-14 RX ORDER — SODIUM CHLORIDE 9 MG/ML
500 INJECTION, SOLUTION INTRAVENOUS ONCE
Status: COMPLETED | OUTPATIENT
Start: 2023-06-14 | End: 2023-06-14

## 2023-06-14 RX ORDER — FAMOTIDINE 10 MG/ML
20 INJECTION, SOLUTION INTRAVENOUS AS NEEDED
OUTPATIENT
Start: 2023-06-21

## 2023-06-14 RX ORDER — FAMOTIDINE 10 MG/ML
20 INJECTION, SOLUTION INTRAVENOUS ONCE
OUTPATIENT
Start: 2023-06-28

## 2023-06-14 RX ORDER — DIPHENHYDRAMINE HYDROCHLORIDE 50 MG/ML
50 INJECTION INTRAMUSCULAR; INTRAVENOUS AS NEEDED
Status: CANCELLED | OUTPATIENT
Start: 2023-06-14

## 2023-06-14 RX ADMIN — DEXAMETHASONE SODIUM PHOSPHATE 12 MG: 10 INJECTION, SOLUTION INTRAMUSCULAR; INTRAVENOUS at 11:50

## 2023-06-14 RX ADMIN — DIPHENHYDRAMINE HYDROCHLORIDE 25 MG: 50 INJECTION, SOLUTION INTRAMUSCULAR; INTRAVENOUS at 11:32

## 2023-06-14 RX ADMIN — SODIUM CHLORIDE 500 ML: 9 INJECTION, SOLUTION INTRAVENOUS at 10:37

## 2023-06-14 RX ADMIN — SODIUM CHLORIDE 250 ML: 9 INJECTION, SOLUTION INTRAVENOUS at 10:38

## 2023-06-14 RX ADMIN — PACLITAXEL 135 MG: 6 INJECTION, SOLUTION INTRAVENOUS at 12:42

## 2023-06-14 RX ADMIN — TRASTUZUMAB 420 MG: 150 INJECTION, POWDER, LYOPHILIZED, FOR SOLUTION INTRAVENOUS at 12:06

## 2023-06-14 RX ADMIN — PERTUZUMAB 420 MG: 30 INJECTION, SOLUTION, CONCENTRATE INTRAVENOUS at 10:58

## 2023-06-14 RX ADMIN — FAMOTIDINE 20 MG: 10 INJECTION INTRAVENOUS at 11:32

## 2023-06-14 NOTE — PROGRESS NOTES
"OUTPATIENT ONCOLOGY NUTRITION ASSESSMENT    Patient Name: Veronica Royal  YOB: 1940  MRN: [id number]  Assessment Date: 6/14/2023    COMMENTS: Follow up with patient in infusion today. Receiving cycle 2 THP today and additional fluids due to elevated Cr. Patient reports she is having intermittent diarrhea. Also coming back for fluids Friday. The patient reports she knows she is not taking enough fluids. Discussed strategies to increase fluid intake. Also reports decreased appetite. Encouraged small meals that are protein rich with healthy carbohydrates in limited portions. The patient's blood glucose is still elevated butis improved.   She has been taking enterade but was mixing it with sugar free koolaid-- advised patient not to add anything to enterade except for water or ice, however.   Provided her with a box of enterade today to take home- trying different flavor after she was given a taste and she finds it more palatable.   Will follow.           Reason for Assessment Follow up     Diagnosis/Problem   R breast cancer   Treatment Plan Chemotherapy THP   Frequency    Goal of cancer treatment Neoadjuvant   Comments:        Encounter Information        Nutrition/Diet History:     Oral Nutrition Supplements:    Factors/Symptoms Affecting Intake: Anorexia, Diarrhea   Comments:      Medical/Surgical History Past Medical History:   Diagnosis Date   • Basal cell carcinoma     Left thumb   • Breast cancer 2023    Right   • Diabetes mellitus    • High cholesterol    • Hypertension        Past Surgical History:   Procedure Laterality Date   • EYE SURGERY      Muscle repair age 21   • VENOUS ACCESS DEVICE (PORT) INSERTION N/A 5/19/2023    Procedure: INSERTION VENOUS ACCESS DEVICE;  Surgeon: Rosa Michael MD;  Location: [facility];  Service: General;  Laterality: N/A;          Anthropometrics        Current Height Ht Readings from Last 1 Encounters:   06/14/23 154.9 cm (60.98\")      Current Weight Wt " Readings from Last 1 Encounters:   06/14/23 64.9 kg (143 lb 1.6 oz)      Weight Change/Trend Loss  Decreased over last month but stable for the last 2 weeks   Weight History Wt Readings from Last 30 Encounters:   06/14/23 0936 64.9 kg (143 lb 1.6 oz)   06/08/23 0925 64.4 kg (142 lb)   06/07/23 1053 65.4 kg (144 lb 1.6 oz)   06/05/23 1345 65.8 kg (145 lb)   06/02/23 1147 66.2 kg (146 lb)   06/02/23 0841 65.7 kg (144 lb 12.8 oz)   05/31/23 1134 66.1 kg (145 lb 11.2 oz)   05/30/23 1443 66.4 kg (146 lb 6.4 oz)   05/25/23 1008 69.4 kg (152 lb 14.4 oz)   05/24/23 0802 69.5 kg (153 lb 3.2 oz)   05/22/23 1525 69.2 kg (152 lb 9.6 oz)   05/19/23 1225 69.9 kg (154 lb 1.6 oz)   05/16/23 0831 69.9 kg (154 lb 1.6 oz)   05/09/23 1030 68.9 kg (152 lb)   05/01/23 1420 69 kg (152 lb 3.2 oz)          Medications           Current medications: Cholecalciferol, Lancets, NIFEdipine XL, dexamethasone, glipiZIDE-metFORMIN, glucose blood, glucose monitor, hydrocortisone, lidocaine-prilocaine, linagliptin, loperamide, ondansetron, pantoprazole, and simvastatin                 Tests/Procedures        Tests/Procedures Chemotherapy     Labs       Pertinent Labs    Results from last 7 days   Lab Units 06/14/23  0921   SODIUM mmol/L 139   POTASSIUM mmol/L 3.6   CHLORIDE mmol/L 107   CO2 mmol/L 15.0*   BUN mg/dL 26*   CREATININE mg/dL 1.47*   CALCIUM mg/dL 9.7   BILIRUBIN mg/dL 0.3   ALK PHOS U/L 57   ALT (SGPT) U/L 28   AST (SGOT) U/L 27   GLUCOSE mg/dL 249*     Results from last 7 days   Lab Units 06/14/23  0921   HEMOGLOBIN g/dL 10.7*   HEMATOCRIT % 32.9*   WBC 10*3/mm3 8.63   ALBUMIN g/dL 3.7     Results from last 7 days   Lab Units 06/14/23  0921   PLATELETS 10*3/mm3 291     No results found for: COVID19  Lab Results   Component Value Date    HGBA1C 9.9 (H) 05/08/2023          Physical Findings        Physical Appearance alert     Edema  no edema   Gastrointestinal diarrhea   Tubes/Drains none   Oral/Mouth Cavity WNL   Skin      PES STATEMENT  / NUTRITION DIAGNOSIS        Nutrition Dx Problem Problem:    Knowledge Deficit and Limited Adherence to Diet Modifications      Etiology:  Medical diagnosis: Breast cancer  Factors affecting nutrition: Reported/Observed By        Signs/Symptoms:  Information Deficit     Comment:       NUTRITION INTERVENTION / PLAN OF CARE        Intervention Goal(s) Improved nutrition related labs, Provide information, Disease management/therapy, Tolerate PO  and Maintain weight            RD Intervention/Action Follow Tx progress, Recommended/ordered            Recommendations:        PO Diet Consistent CHO diet, small frequent meals, encourage hydration      Supplements  premier protein, Ensure max, or similar      Snacks              Monitor/Evaluation PO intake, Pertinent labs, Symptoms, Follow up as needed   Education Education provided           RD to follow per protocol.    Electronically signed by:  Shaylee Spencer RD, LD  06/14/23 15:22 EDT

## 2023-06-14 NOTE — PROGRESS NOTES
Subjective   Veronica Royal is a 82 y.o. female.  Referred by Dr. Michael for right breast inflammatory breast cancer    History of Present Illness   Ms. Royal is a 82-year-old postmenopausal  lady with hypertension, diabetes, hyperlipidemia, chronic kidney disease, hyperkalemia-chronic presented with a palpable abnormality in the right breast.  Subsequent imaging was performed.  Patient has not had a mammogram prior to this in the past 25 years.    4/21/2023-bilateral diagnostic mammogram-high density irregular mass measuring 44 mm with spiculated margins and amorphus and fine pleomorphic calcifications with skin thickening in the right breast at 9:00.  2 intramammary lymph nodes in the upper outer axillary tail region are slightly rounded  2 prominent right axillary lymph nodes largest of which measures 26 mm.  Asymmetry noted in the left breast.    Right breast ultrasound at 9 o'clock position, 3 cm from the nipple there is a 38 x 28 x 42 mm mass.  Skin thickness measuring 11 mm near the mass.  One of the 2 rounded axillary tail lymph nodes noted.  Borderline cortical thickening.  2 abnormal axillary lymph nodes.  1 lymph node displaced loss of normal morphology with a round heterogeneous appearance and echogenic foci.  Most consistent with calcified lymph node measuring 26 mm.  Second lymph node measures 12 mm.  Displaced cortical thickening.  Ultrasound-guided biopsy of the mass in the axillary lymph nodes recommended.    4/21/2023  1.right breast 9:00 biopsy-invasive ductal carcinoma with apocrine features  Grade 3  ER negative  ND negative  HER2 2+ on immunohistochemistry  HER2 amplified on FISH with HER2 copy number of 7.58, OPAL seventeen 3.18, HER2/OPAL 17 ratio of 2.4.  Ki-67 38.45%    2.right axillary biopsy-soft tissue involved by invasive ductal carcinoma with apocrine features  Associated microcalcification  No definite lymph node tissue identified.    4/29/2023-MRI of the breast-biopsy-proven  malignancy in the right breast at 9:00 measuring 4.3 cm in greatest dimension.  Attachment overlying skin and diffuse right breast edema noted.  Right axillary lymphadenopathy.  No suspicious findings in the left breast.    5/11/2023-CT of the chest abdomen and pelvis-no evidence of metastatic disease.  Liver is cirrhotic in configuration.    5/11/2023-bone scan negative    Patient presents today to the clinic for discussing neoadjuvant chemotherapy.  She is accompanied by her daughter.  Patient denies any recent changes in weight, she reports some pain at the site of malignancy.  A punch biopsy was performed and was consistent with inflammatory breast cancer.  At the site of punch biopsy there is an ulcerated lesion.    She has poorly controlled diabetes currently on glipizide metformin and Tradjenta.  Hemoglobin A1c recently was noted to be 9.9.    Also has chronic hyperkalemia, explanation unclear.    Blood pressure poorly controlled.    She reports good functional status lives independently.  Able to manage all her bills and independent of ADLs.  She has good support system in terms of her daughter.    Family history significant for brother with pancreatic cancer at the age of 68, sister with ovarian cancer at the age of 58    INTERVAL HISTORY:   The patient is a 82 y.o. female with the above-mentioned history presents today for follow-up.  She is scheduled for Taxol Herceptin and Perjeta today.  She has been drinking 1 enterade every day and diarrhea seems to be well controlled.  She maybe has 1-2 loose stools in a day.  She has been using Imodium as needed.  Continues using Zofran as needed for the nausea.  She has a maculopapular rash involving the face the chest and upper extremities.  This started 2 to 3 days ago.  She has not used any topical steroids to help with the rash.  She does report feeling weak and reports a decreased appetite but weight remains stable.  Denies any lightheadedness or dizziness or  "dyspnea.      The following portions of the patient's history were reviewed and updated as appropriate: allergies, current medications, past family history, past medical history, past social history, past surgical history and problem list.    Past Medical History:   Diagnosis Date    Basal cell carcinoma     Left thumb    Breast cancer     Right    Diabetes mellitus     High cholesterol     Hypertension         Past Surgical History:   Procedure Laterality Date    EYE SURGERY      Muscle repair age 21    VENOUS ACCESS DEVICE (PORT) INSERTION N/A 2023    Procedure: INSERTION VENOUS ACCESS DEVICE;  Surgeon: Rosa Michael MD;  [facility] [place];  Service: General;  Laterality: N/A;        Family History   Problem Relation Age of Onset    Alzheimer's disease Mother     Heart disease Father     Heart attack Father     Ovarian cancer Sister     Pancreatic cancer Brother     Malig Hyperthermia Neg Hx     Colon cancer Neg Hx     Colon polyps Neg Hx     Crohn's disease Neg Hx     Irritable bowel syndrome Neg Hx     Ulcerative colitis Neg Hx         Social History     Socioeconomic History    Marital status:    Tobacco Use    Smoking status: Never    Smokeless tobacco: Never   Vaping Use    Vaping Use: Never used   Substance and Sexual Activity    Alcohol use: Not Currently    Drug use: Never    Sexual activity: Never        OB History    No obstetric history on file.      Age at menarche-11  Age at first live childbirth-35   2 para 1  1  Age at menopause-50  Oral conceptive pill use for 3 to 4 years    No Known Allergies     Review of Systems  ROS as per HPI    Objective   Blood pressure 126/70, pulse 102, temperature 97.1 °F (36.2 °C), temperature source Temporal, resp. rate 16, height 154.9 cm (60.98\"), weight 64.9 kg (143 lb 1.6 oz), SpO2 96 %.     Physical Exam  Constitutional:       General: She is not in acute distress.     Appearance: She is well-developed.   HENT:      Head: " Normocephalic.   Eyes:      Pupils: Pupils are equal, round, and reactive to light.   Cardiovascular:      Rate and Rhythm: Normal rate and regular rhythm.      Pulses: Normal pulses.      Heart sounds: Normal heart sounds.   Pulmonary:      Effort: Pulmonary effort is normal. No respiratory distress.      Breath sounds: Normal breath sounds. No wheezing or rales.   Abdominal:      General: Bowel sounds are normal. There is no distension.      Palpations: Abdomen is soft. There is no mass.      Tenderness: There is no abdominal tenderness.   Musculoskeletal:         General: No deformity. Normal range of motion.      Cervical back: Normal range of motion.   Lymphadenopathy:      Cervical: No cervical adenopathy.   Skin:     General: Skin is warm and dry.      Coloration: Skin is not pale.      Findings: No rash.   Neurological:      Mental Status: She is alert and oriented to person, place, and time.      Cranial Nerves: No cranial nerve deficit.   Psychiatric:         Speech: Speech is tangential.         Behavior: Behavior normal.         Cognition and Memory: Memory is impaired.      I have reexamined the patient and the results are consistent with the previously documented exam. Sheila Yee MD    Breast Exam:   6/14/2023-right breast with an ulcerated lesion with a scab at 10 o'clock position, far lateral position.  In the upper outer quadrant there is a 4 x 4.5 cm mass which is firm.  Slight decrease in size compared to prior exam.  Right axilla lymphadenopathy noted..    5/31/2023: Left breast with bruising from Mediport placement.  Right breast with area of ulceration between the 9 and 10 o'clock position.  A firm fixed approximately 5 cm mass in the upper outer quadrant.  The area of ulceration is approximately 1 cm without bleeding.  There is no erythema of the breast.  Right axillary adenopathy noted    5/16/2023 left breast appears normal on inspection.  No palpable abnormalities of the left  breast.  Right breast on inspection there is an ulcerated lesion at between 9 to 10 o'clock position.  On palpation there is a 5 cm mass in the upper outer quadrant.  On examination of the right axilla there is a firm mass occupying the anterior aspect of the axilla which measures approximately 3 cm.  Could be matted lymph nodes.    Results from last 7 days   Lab Units 06/14/23  0921 06/07/23  1031   WBC 10*3/mm3 8.63 4.25   NEUTROS ABS 10*3/mm3 5.77 2.24   HEMOGLOBIN g/dL 10.7* 10.5*   HEMATOCRIT % 32.9* 32.3*   PLATELETS 10*3/mm3 291 223       Results from last 7 days   Lab Units 06/07/23  1033   SODIUM mmol/L 138   POTASSIUM mmol/L 3.7   CHLORIDE mmol/L 108*   CO2 mmol/L 14.1*   BUN mg/dL 32*   CREATININE mg/dL 1.32*   CALCIUM mg/dL 9.1   ALBUMIN g/dL 3.7   BILIRUBIN mg/dL 0.3   ALK PHOS U/L 51   ALT (SGPT) U/L 26   AST (SGOT) U/L 23   GLUCOSE mg/dL 265*               No radiology results for the last 30 days.         Assessment & Plan       *Right breast inflammatory breast cancer  T4d N1 M0  Stage IIIb  ER negative, AK negative, HER2 2+ on immunohistochemistry but amplified on FISH.  Invasive ductal carcinoma with apocrine features, grade 3, Ki-67 38%  Discussed at length the details of imaging and pathology report.Discussed the origin of breast cancer from the ducts and the lobules and the histological type of breast cancer based on site of origin. Discussed the tumor size, lymph node status and stage of the cancer. Explained the presence of DCIS. Discussed the receptor status including ER, AK and her-2 destiny and their significance in determining the biology and treatment. Also discussed the importance of grade and ki-67.   The steps of curative intent breast cancer treatment have been explained, including surgery, possible chemotherapy, possible radiation and possible endocrine therapy.   CT scans and bone scan without any obvious evidence of metastatic disease  Discussed the rationale for neoadjuvant  chemotherapy for HER2 positive breast cancer as well as for inflammatory breast cancer.  Given her age and significant comorbidities I would like to start chemotherapy with Taxol Herceptin and Perjeta, and if she tolerates this well we will proceed with Adriamycin and cyclophosphamide.  Echocardiogram has been performed and ejection fraction normal.  Liver shows cirrhotic morphology.  LFTs otherwise normal and total bilirubin normal as well as protein normal.  It appears that the synthetic function of the liver is still within normal limits.  Adverse effects of chemotherapy including but not limited to myelosuppression, increased risk of infections, nausea, vomiting, arthralgias, alopecia, mucositis, diarrhea, cardiotoxicity, risk of leukemia, hepatotoxicity, risk of allergic reactions, risk of extravasation discussed.  Mediport placed 5/19/2023  Taxol, Herceptin, Perjeta initiated 5/24/2023 5/31/2023 with C1D8 Taxol  6/14/2023-cycle 2-day 1 of Taxol Herceptin and Perjeta  Labs reviewed and stable to proceed with chemotherapy today  Creatinine mildly elevated at 1.47.  Likely secondary to dehydration.  Additional fluids will be administered today    *Diabetes  Poorly controlled  Evaluation by endocrinology 5/30/2023 with recommendations for dietary changes and home blood sugar monitoring.  The patient's daughter reports today she is struggling with compliance.  Emphasized on regular intake of food with diabetes and also to maintain weight.    *Hyperkalemia and chronic kidney disease  Unclear etiology  Could be secondary to RTA  She has an appointment with nephrology.  CMP reviewed and creatinine elevated at 1.47.  500 cc of normal saline will be administered today.  Patient will be brought back again on Friday for additional fluids.    *?  Cirrhotic appearance of the liver on the CT scan  Referred to gastroenterology  Synthetic function appears to be normal  We will start with Taxol to see if she would tolerate the  chemotherapy   Slight elevation of intervention studies today with ALT 43, AST 55.  Continue to monitor weekly  6/14/2023-LFTs normal    *Hypertension-currently on valsartan and hydrochlorothiazide.  Valsartan could be contributing to hyperkalemia.  Will refer to cardiology ASAP for management of medications and cardiac clearance for proceeding with chemotherapy  Recent echocardiogram normal  She has been evaluated by Dr. Maldonado with cardio oncology and scheduled for stress test    *Genetic testing-[employer] 9 gene stat panel negative,     *Decreased oral intake secondary to chemotherapy  Weight remains stable  She continues with decreased oral intake.  We will bring her back for additional IV fluids on Friday    *Frequent belching  Continue Protonix 40 mg daily    *Cognitive impairment  It is unclear if this is the patient's baseline or mental status has been exacerbated by recent chemotherapy  The patient is struggling with compliance with her medications.  The patient's daughter expresses frustration today as the patient is struggling to care for herself at home with managing medications and symptom management.  Evaluated by CARL Antonio     *Chemotherapy-induced diarrhea  6/5/2023 patient given Enterade (Wild Berry flavor).  She has been drinking 1 a day.  She does not necessarily like the flavor (currently mixing with sugar-free Aramis-Aid which does help).  Encouraged her to increase to 2 a day.    *Maculopapular rash  Secondary to HER2 directed therapy  Recommend starting topical steroids to help with the rash  If no improvement then consider doxycycline    PLAN:  Proceed with cycle 2-day 1 of Taxol Herceptin and Perjeta  Continue Enterade.  Continue Zofran for nausea control.  Continue Imodium for diarrhea control.  Continue Protonix 40 mg nightly.  Oncology dietitian to follow.  1 L normal saline on Friday  CARL in 1 week and MD in 2 weeks    Patient is on a high risk medication requiring close monitoring  for toxicity.      Sheila Yee MD  06/14/2023

## 2023-06-16 ENCOUNTER — INFUSION (OUTPATIENT)
Dept: ONCOLOGY | Facility: HOSPITAL | Age: 83
End: 2023-06-16
Payer: MEDICARE

## 2023-06-16 VITALS
TEMPERATURE: 97.5 F | OXYGEN SATURATION: 98 % | RESPIRATION RATE: 16 BRPM | BODY MASS INDEX: 27.53 KG/M2 | DIASTOLIC BLOOD PRESSURE: 68 MMHG | HEART RATE: 102 BPM | WEIGHT: 145.6 LBS | SYSTOLIC BLOOD PRESSURE: 120 MMHG

## 2023-06-16 DIAGNOSIS — Z17.1 MALIGNANT NEOPLASM OF UPPER-OUTER QUADRANT OF RIGHT BREAST IN FEMALE, ESTROGEN RECEPTOR NEGATIVE: Primary | ICD-10-CM

## 2023-06-16 DIAGNOSIS — C50.411 MALIGNANT NEOPLASM OF UPPER-OUTER QUADRANT OF RIGHT BREAST IN FEMALE, ESTROGEN RECEPTOR NEGATIVE: Primary | ICD-10-CM

## 2023-06-16 DIAGNOSIS — Z45.2 ENCOUNTER FOR FITTING AND ADJUSTMENT OF VASCULAR CATHETER: ICD-10-CM

## 2023-06-16 PROCEDURE — 96360 HYDRATION IV INFUSION INIT: CPT

## 2023-06-16 PROCEDURE — 25010000002 HEPARIN LOCK FLUSH PER 10 UNITS: Performed by: INTERNAL MEDICINE

## 2023-06-16 RX ORDER — HEPARIN SODIUM (PORCINE) LOCK FLUSH IV SOLN 100 UNIT/ML 100 UNIT/ML
500 SOLUTION INTRAVENOUS AS NEEDED
OUTPATIENT
Start: 2023-06-16

## 2023-06-16 RX ORDER — SODIUM CHLORIDE 0.9 % (FLUSH) 0.9 %
10 SYRINGE (ML) INJECTION AS NEEDED
OUTPATIENT
Start: 2023-06-16

## 2023-06-16 RX ORDER — SODIUM CHLORIDE 0.9 % (FLUSH) 0.9 %
10 SYRINGE (ML) INJECTION AS NEEDED
Status: DISCONTINUED | OUTPATIENT
Start: 2023-06-16 | End: 2023-06-16 | Stop reason: HOSPADM

## 2023-06-16 RX ORDER — HEPARIN SODIUM (PORCINE) LOCK FLUSH IV SOLN 100 UNIT/ML 100 UNIT/ML
500 SOLUTION INTRAVENOUS AS NEEDED
Status: DISCONTINUED | OUTPATIENT
Start: 2023-06-16 | End: 2023-06-16 | Stop reason: HOSPADM

## 2023-06-16 RX ORDER — SODIUM CHLORIDE 9 MG/ML
1000 INJECTION, SOLUTION INTRAVENOUS CONTINUOUS
Status: DISCONTINUED | OUTPATIENT
Start: 2023-06-16 | End: 2023-06-16 | Stop reason: HOSPADM

## 2023-06-16 RX ADMIN — SODIUM CHLORIDE 1000 ML: 9 INJECTION, SOLUTION INTRAVENOUS at 15:50

## 2023-06-16 RX ADMIN — Medication 500 UNITS: at 17:07

## 2023-06-16 RX ADMIN — Medication 10 ML: at 17:07

## 2023-07-12 ENCOUNTER — HOSPITAL ENCOUNTER (INPATIENT)
Facility: HOSPITAL | Age: 83
LOS: 6 days | Discharge: NURSING, REHAB OR LONG-TERM CARE | DRG: 682 | End: 2023-07-20
Attending: INTERNAL MEDICINE | Admitting: INTERNAL MEDICINE
Payer: MEDICARE

## 2023-07-12 PROBLEM — E11.9 TYPE 2 DIABETES MELLITUS: Status: ACTIVE | Noted: 2023-07-12

## 2023-07-12 PROBLEM — N17.9 AKI (ACUTE KIDNEY INJURY): Status: ACTIVE | Noted: 2023-07-12

## 2023-07-12 PROBLEM — K74.60 CIRRHOSIS OF LIVER: Status: ACTIVE | Noted: 2023-07-12

## 2023-07-12 LAB
ANION GAP SERPL CALCULATED.3IONS-SCNC: 12 MMOL/L (ref 5–15)
BUN SERPL-MCNC: 33 MG/DL (ref 8–23)
BUN/CREAT SERPL: 16.6 (ref 7–25)
CALCIUM SPEC-SCNC: 9.8 MG/DL (ref 8.6–10.5)
CHLORIDE SERPL-SCNC: 111 MMOL/L (ref 98–107)
CO2 SERPL-SCNC: 18 MMOL/L (ref 22–29)
CREAT SERPL-MCNC: 1.99 MG/DL (ref 0.57–1)
EGFRCR SERPLBLD CKD-EPI 2021: 24.7 ML/MIN/1.73
GLUCOSE BLDC GLUCOMTR-MCNC: 173 MG/DL (ref 70–130)
GLUCOSE BLDC GLUCOMTR-MCNC: 192 MG/DL (ref 70–130)
GLUCOSE SERPL-MCNC: 173 MG/DL (ref 65–99)
LIPASE SERPL-CCNC: 21 U/L (ref 13–60)
MAGNESIUM SERPL-MCNC: 1.8 MG/DL (ref 1.6–2.4)
POTASSIUM SERPL-SCNC: 3.1 MMOL/L (ref 3.5–5.2)
SODIUM SERPL-SCNC: 141 MMOL/L (ref 136–145)

## 2023-07-12 PROCEDURE — G0378 HOSPITAL OBSERVATION PER HR: HCPCS

## 2023-07-12 PROCEDURE — 83735 ASSAY OF MAGNESIUM: CPT | Performed by: INTERNAL MEDICINE

## 2023-07-12 PROCEDURE — 83690 ASSAY OF LIPASE: CPT | Performed by: INTERNAL MEDICINE

## 2023-07-12 PROCEDURE — 82948 REAGENT STRIP/BLOOD GLUCOSE: CPT

## 2023-07-12 RX ORDER — DEXTROSE MONOHYDRATE 25 G/50ML
25 INJECTION, SOLUTION INTRAVENOUS
Status: DISCONTINUED | OUTPATIENT
Start: 2023-07-12 | End: 2023-07-20 | Stop reason: HOSPADM

## 2023-07-12 RX ORDER — DIAPER,BRIEF,INFANT-TODD,DISP
1 EACH MISCELLANEOUS 2 TIMES DAILY
Status: DISCONTINUED | OUTPATIENT
Start: 2023-07-12 | End: 2023-07-20 | Stop reason: HOSPADM

## 2023-07-12 RX ORDER — ONDANSETRON 2 MG/ML
4 INJECTION INTRAMUSCULAR; INTRAVENOUS EVERY 6 HOURS PRN
Status: DISCONTINUED | OUTPATIENT
Start: 2023-07-12 | End: 2023-07-20 | Stop reason: HOSPADM

## 2023-07-12 RX ORDER — ATORVASTATIN CALCIUM 20 MG/1
10 TABLET, FILM COATED ORAL DAILY
Status: DISCONTINUED | OUTPATIENT
Start: 2023-07-12 | End: 2023-07-20 | Stop reason: HOSPADM

## 2023-07-12 RX ORDER — INSULIN LISPRO 100 [IU]/ML
2-9 INJECTION, SOLUTION INTRAVENOUS; SUBCUTANEOUS
Status: DISCONTINUED | OUTPATIENT
Start: 2023-07-12 | End: 2023-07-20 | Stop reason: HOSPADM

## 2023-07-12 RX ORDER — OLANZAPINE 5 MG/1
5 TABLET ORAL NIGHTLY
Status: DISCONTINUED | OUTPATIENT
Start: 2023-07-12 | End: 2023-07-20 | Stop reason: HOSPADM

## 2023-07-12 RX ORDER — SODIUM CHLORIDE 9 MG/ML
125 INJECTION, SOLUTION INTRAVENOUS CONTINUOUS
Status: DISCONTINUED | OUTPATIENT
Start: 2023-07-12 | End: 2023-07-14

## 2023-07-12 RX ORDER — ACETAMINOPHEN 160 MG/5ML
650 SOLUTION ORAL EVERY 4 HOURS PRN
Status: DISCONTINUED | OUTPATIENT
Start: 2023-07-12 | End: 2023-07-20 | Stop reason: HOSPADM

## 2023-07-12 RX ORDER — NIFEDIPINE 30 MG/1
30 TABLET, EXTENDED RELEASE ORAL DAILY
Status: DISCONTINUED | OUTPATIENT
Start: 2023-07-12 | End: 2023-07-20 | Stop reason: HOSPADM

## 2023-07-12 RX ORDER — SODIUM CHLORIDE 0.9 % (FLUSH) 0.9 %
10 SYRINGE (ML) INJECTION AS NEEDED
Status: DISCONTINUED | OUTPATIENT
Start: 2023-07-12 | End: 2023-07-20 | Stop reason: HOSPADM

## 2023-07-12 RX ORDER — NICOTINE POLACRILEX 4 MG
15 LOZENGE BUCCAL
Status: DISCONTINUED | OUTPATIENT
Start: 2023-07-12 | End: 2023-07-20 | Stop reason: HOSPADM

## 2023-07-12 RX ORDER — BISACODYL 5 MG/1
5 TABLET, DELAYED RELEASE ORAL DAILY PRN
Status: DISCONTINUED | OUTPATIENT
Start: 2023-07-12 | End: 2023-07-20 | Stop reason: HOSPADM

## 2023-07-12 RX ORDER — ONDANSETRON 4 MG/1
4 TABLET, FILM COATED ORAL EVERY 6 HOURS PRN
Status: DISCONTINUED | OUTPATIENT
Start: 2023-07-12 | End: 2023-07-20 | Stop reason: HOSPADM

## 2023-07-12 RX ORDER — IBUPROFEN 600 MG/1
1 TABLET ORAL
Status: DISCONTINUED | OUTPATIENT
Start: 2023-07-12 | End: 2023-07-20 | Stop reason: HOSPADM

## 2023-07-12 RX ORDER — ACETAMINOPHEN 650 MG/1
650 SUPPOSITORY RECTAL EVERY 4 HOURS PRN
Status: DISCONTINUED | OUTPATIENT
Start: 2023-07-12 | End: 2023-07-20 | Stop reason: HOSPADM

## 2023-07-12 RX ORDER — SODIUM CHLORIDE 9 MG/ML
40 INJECTION, SOLUTION INTRAVENOUS AS NEEDED
Status: DISCONTINUED | OUTPATIENT
Start: 2023-07-12 | End: 2023-07-20 | Stop reason: HOSPADM

## 2023-07-12 RX ORDER — ACETAMINOPHEN 325 MG/1
650 TABLET ORAL EVERY 4 HOURS PRN
Status: DISCONTINUED | OUTPATIENT
Start: 2023-07-12 | End: 2023-07-20 | Stop reason: HOSPADM

## 2023-07-12 RX ORDER — AMOXICILLIN 250 MG
2 CAPSULE ORAL 2 TIMES DAILY
Status: DISCONTINUED | OUTPATIENT
Start: 2023-07-12 | End: 2023-07-20 | Stop reason: HOSPADM

## 2023-07-12 RX ORDER — POLYETHYLENE GLYCOL 3350 17 G/17G
17 POWDER, FOR SOLUTION ORAL DAILY PRN
Status: DISCONTINUED | OUTPATIENT
Start: 2023-07-12 | End: 2023-07-20 | Stop reason: HOSPADM

## 2023-07-12 RX ORDER — SODIUM CHLORIDE 0.9 % (FLUSH) 0.9 %
10 SYRINGE (ML) INJECTION EVERY 12 HOURS SCHEDULED
Status: DISCONTINUED | OUTPATIENT
Start: 2023-07-12 | End: 2023-07-20 | Stop reason: HOSPADM

## 2023-07-12 RX ORDER — BISACODYL 10 MG
10 SUPPOSITORY, RECTAL RECTAL DAILY PRN
Status: DISCONTINUED | OUTPATIENT
Start: 2023-07-12 | End: 2023-07-20 | Stop reason: HOSPADM

## 2023-07-12 RX ORDER — PANTOPRAZOLE SODIUM 40 MG/1
40 TABLET, DELAYED RELEASE ORAL DAILY
Status: DISCONTINUED | OUTPATIENT
Start: 2023-07-12 | End: 2023-07-20 | Stop reason: HOSPADM

## 2023-07-12 RX ADMIN — ATORVASTATIN CALCIUM 10 MG: 20 TABLET, FILM COATED ORAL at 17:17

## 2023-07-12 RX ADMIN — NIFEDIPINE 30 MG: 30 TABLET, FILM COATED, EXTENDED RELEASE ORAL at 20:52

## 2023-07-12 RX ADMIN — SODIUM CHLORIDE 125 ML/HR: 9 INJECTION, SOLUTION INTRAVENOUS at 15:03

## 2023-07-12 RX ADMIN — Medication 10 ML: at 20:54

## 2023-07-12 RX ADMIN — OLANZAPINE 5 MG: 5 TABLET ORAL at 20:52

## 2023-07-12 RX ADMIN — SODIUM CHLORIDE 125 ML/HR: 9 INJECTION, SOLUTION INTRAVENOUS at 20:51

## 2023-07-12 RX ADMIN — HYDROCORTISONE ACETATE 1 APPLICATION: 1 CREAM TOPICAL at 20:52

## 2023-07-12 RX ADMIN — PANTOPRAZOLE SODIUM 40 MG: 40 TABLET, DELAYED RELEASE ORAL at 17:17

## 2023-07-12 NOTE — H&P
Patient Name:  Veronica Royal  YOB: 1940  MRN:  [id number]  Admit Date:  7/12/2023  Patient Care Team:  Princess Cruz MD as PCP - General (Internal Medicine)  Susana Ballesteros, RN as Nurse Navigator (Oncology)  Sheila Yee MD as Consulting Physician (Hematology and Oncology)  Rosa Michael MD as Referring Physician (General Surgery)  Cesar Estrella MD as Consulting Physician (Nephrology)  Shaylee Spencer RD, DAVID as Dietitian (Nutrition)      Subjective   History Present Illness     No chief complaint on file.  KAMILA/nausea vomiting, ER/UT negative HER2 positive breast cancer    Ms. Royal is a 82 y.o. with a history of breast cancer with lymph node involvement, cirrhosis, hypertension, diabetes that presents to [facility] from her oncology clinic after she was found to have acute kidney injury.  She had been having worsening nausea vomiting decreased intake over the past several days but had not notified the oncology clinic.  She reports that she had some diarrhea as well but this has been improving with antidiarrheal treatments.  She is not reporting any abdominal pain or swelling.  No fevers or chills.  She reports that the nausea and vomiting feels better now than it did yesterday evening.  She has had some difficulty swallowing her medications and also keeping up with oral intake family.  Earlier today she was having some difficulty with boost shakes earlier today as well.  She has had some cutaneous breakouts with chemotherapy and reports that these are better.  She is not reporting any chest pain palpitations or shortness of breath.  Not reporting any dysuria or flank pain.  Reports she was due to see nephrology about a left renal mass tomorrow.  This was found on ultrasound last week.      Review of Systems   Constitutional:  Positive for fatigue. Negative for chills and fever.   HENT:  Positive for trouble swallowing. Negative for sore throat.     Eyes:  Negative for pain and redness.   Respiratory:  Negative for cough, shortness of breath and wheezing.    Cardiovascular:  Negative for chest pain, palpitations and leg swelling.   Gastrointestinal:  Positive for diarrhea, nausea and vomiting. Negative for constipation.   Endocrine: Negative for cold intolerance and heat intolerance.   Genitourinary:  Negative for dysuria and flank pain.   Musculoskeletal:  Negative for neck pain and neck stiffness.   Skin:  Negative for pallor and wound.   Allergic/Immunologic: Negative for environmental allergies and food allergies.   Neurological:  Negative for seizures and syncope.   Hematological:  Negative for adenopathy. Does not bruise/bleed easily.   Psychiatric/Behavioral:  Negative for confusion.       Personal History     Past Medical History:   Diagnosis Date   • Basal cell carcinoma     Left thumb   • Breast cancer 2023    Right   • Diabetes mellitus    • High cholesterol    • Hypertension      Past Surgical History:   Procedure Laterality Date   • EYE SURGERY      Muscle repair age 21   • VENOUS ACCESS DEVICE (PORT) INSERTION N/A 5/19/2023    Procedure: INSERTION VENOUS ACCESS DEVICE;  Surgeon: Rosa Michael MD;  [place] [facility] [place] [facility] [place]  Service: General;  Laterality: N/A;     Family History   Problem Relation Age of Onset   • Alzheimer's disease Mother    • Heart disease Father    • Heart attack Father    • Ovarian cancer Sister    • Pancreatic cancer Brother    • Malig Hyperthermia Neg Hx    • Colon cancer Neg Hx    • Colon polyps Neg Hx    • Crohn's disease Neg Hx    • Irritable bowel syndrome Neg Hx    • Ulcerative colitis Neg Hx      Social History     Tobacco Use   • Smoking status: Never   • Smokeless tobacco: Never   Vaping Use   • Vaping Use: Never used   Substance Use Topics   • Alcohol use: Not Currently   • Drug use: Never     No current facility-administered medications on file prior to encounter.     Current Outpatient Medications on File  Prior to Encounter   Medication Sig Dispense Refill   • Cholecalciferol 50 MCG (2000 UT) tablet Take 1 tablet by mouth.     • glipiZIDE-metFORMIN (METAGLIP) 2.5-500 MG per tablet TAKE 1 TABLET BY MOUTH THREE TIMES DAILY(2 TABLETS IN THE MORNING AND 1 TABLET IN THE EVENING)     • glucose blood test strip Check BG once daily, E11.9 30 each 4   • glucose monitor monitoring kit Dispense glucometer with brand based off insurance coverage, check BG daily, E11.9 1 each 0   • hydrocortisone 1 % cream Apply 1 application topically to the appropriate area as directed 2 (Two) Times a Day. 453.6 g 0   • Lancets misc Check BG once daily, E11.9 30 each 4   • lidocaine-prilocaine (EMLA) 2.5-2.5 % cream Apply nickel size amount to port site 30 min before appt time do not rub in cover with plastic wrap 30 g 5   • loperamide (IMODIUM) 2 MG capsule Take 1 capsule by mouth Daily.     • NIFEdipine XL (PROCARDIA XL) 30 MG 24 hr tablet Take 1 tablet by mouth Daily. 30 tablet 11   • OLANZapine (ZyPREXA) 5 MG tablet Take 1 tablet by mouth Every Night. 30 tablet 3   • pantoprazole (PROTONIX) 40 MG EC tablet Take 1 tablet by mouth Daily. 30 tablet 3   • potassium chloride (K-DUR,KLOR-CON) 20 MEQ CR tablet Take 1 tablet by mouth 2 (Two) Times a Day. 60 tablet 1   • simvastatin (ZOCOR) 10 MG tablet Take 1 tablet by mouth Every Night.     • sodium bicarbonate 650 MG tablet Take 1 tablet by mouth.     • Tradjenta 5 MG tablet tablet Take 1 tablet by mouth Daily.     • valsartan-hydrochlorothiazide (DIOVAN-HCT) 160-12.5 MG per tablet Take 1 tablet by mouth Daily.     • dexamethasone (DECADRON) 4 MG tablet Take 2 tablets in the morning and afternoon the day before first treatment. Take Tuesday 5/23/2023 4 tablet 0   • diphenoxylate-atropine (LOMOTIL) 2.5-0.025 MG per tablet Take 1 tablet by mouth 4 (Four) Times a Day As Needed for Diarrhea. 60 tablet 0   • ondansetron (ZOFRAN) 8 MG tablet Take 1 tablet by mouth 3 (Three) Times a Day As Needed for  Nausea or Vomiting. 30 tablet 5   • prochlorperazine (COMPAZINE) 10 MG tablet Take 1 tablet by mouth Every 6 (Six) Hours As Needed for Nausea or Vomiting. 60 tablet 3     No Known Allergies    Objective    Objective     Vital Signs  Temp:  [96.9 °F (36.1 °C)-97.5 °F (36.4 °C)] 96.9 °F (36.1 °C)  Heart Rate:  [77-92] 77  Resp:  [16] 16  BP: (124-143)/(65-81) 143/65  SpO2:  [98 %-100 %] 98 %  on   ;   Device (Oxygen Therapy): room air  There is no height or weight on file to calculate BMI.    Physical Exam  Vitals and nursing note reviewed.   Constitutional:       General: She is not in acute distress.     Appearance: She is not diaphoretic.   HENT:      Head: Atraumatic.   Eyes:      General: No scleral icterus.     Pupils: Pupils are equal, round, and reactive to light.   Cardiovascular:      Rate and Rhythm: Normal rate and regular rhythm.      Pulses: Normal pulses.   Pulmonary:      Effort: Pulmonary effort is normal.      Breath sounds: No wheezing.   Abdominal:      General: There is no distension.      Palpations: Abdomen is soft.      Tenderness: There is no abdominal tenderness. There is no guarding or rebound.   Musculoskeletal:         General: No swelling or tenderness.      Cervical back: Neck supple. No tenderness.   Skin:     General: Skin is warm and dry.   Neurological:      Mental Status: She is alert.      Cranial Nerves: No cranial nerve deficit.   Psychiatric:         Mood and Affect: Mood normal.         Behavior: Behavior normal.       Results Review:  I reviewed the patient's new clinical results.  I reviewed the patient's new imaging results and agree with the interpretation.  I personally viewed and interpreted the patient's EKG/Telemetry data  I reviewed prior records.    Lab Results (last 24 hours)       Procedure Component Value Units Date/Time    CBC and Differential [[id number]]  (Abnormal) Collected: 07/12/23 0854    Specimen: Blood Updated: 07/12/23 0912    Narrative:      The  following orders were created for panel order CBC and Differential.  Procedure                               Abnormality         Status                     ---------                               -----------         ------                     CBC Auto Differential[[id number]]        Abnormal            Final result                 Please view results for these tests on the individual orders.    Comprehensive metabolic panel [[id number]]  (Abnormal) Collected: 07/12/23 0854    Specimen: Blood Updated: 07/12/23 1015     Glucose 238 mg/dL      BUN 37 mg/dL      Creatinine 2.63 mg/dL      Sodium 138 mmol/L      Potassium 3.1 mmol/L      Chloride 104 mmol/L      CO2 14.4 mmol/L      Calcium 10.3 mg/dL      Total Protein 6.8 g/dL      Albumin 3.5 g/dL      ALT (SGPT) 20 U/L      AST (SGOT) 14 U/L      Alkaline Phosphatase 57 U/L      Total Bilirubin 0.4 mg/dL      Globulin 3.3 gm/dL      A/G Ratio 1.1 g/dL      BUN/Creatinine Ratio 14.1     Anion Gap 19.6 mmol/L      eGFR 17.7 mL/min/1.73     Narrative:      GFR Normal >60  Chronic Kidney Disease <60  Kidney Failure <15    The GFR formula is only valid for adults with stable renal function between ages 18 and 70.    CBC Auto Differential [[id number]]  (Abnormal) Collected: 07/12/23 0854    Specimen: Blood Updated: 07/12/23 0912     WBC 10.22 10*3/mm3      RBC 3.84 10*6/mm3      Hemoglobin 10.8 g/dL      Hematocrit 32.9 %      MCV 85.7 fL      MCH 28.1 pg      MCHC 32.8 g/dL      RDW 16.3 %      RDW-SD 49.6 fl      MPV 9.4 fL      Platelets 340 10*3/mm3      Neutrophil % 79.2 %      Lymphocyte % 13.7 %      Monocyte % 4.8 %      Eosinophil % 0.6 %      Basophil % 0.3 %      Immature Grans % 1.4 %      Neutrophils, Absolute 8.10 10*3/mm3      Lymphocytes, Absolute 1.40 10*3/mm3      Monocytes, Absolute 0.49 10*3/mm3      Eosinophils, Absolute 0.06 10*3/mm3      Basophils, Absolute 0.03 10*3/mm3      Immature Grans, Absolute 0.14 10*3/mm3      nRBC 0.0 /100 WBC     POC Glucose  Once [[id number]]  (Abnormal) Collected: 07/12/23 1618    Specimen: Blood Updated: 07/12/23 1619     Glucose 173 mg/dL      Comment: Meter: [id number] : [id number] Asad NIXON               Imaging Results (Last 24 Hours)       ** No results found for the last 24 hours. **            Results for orders placed during the hospital encounter of 05/09/23    Adult Transthoracic Echo Complete W/ Cont if Necessary Per Protocol    Interpretation Summary  •  Left ventricular systolic function is normal. Left ventricular ejection fraction appears to be 56 - 60%.  •  Left ventricular diastolic function is consistent with (grade Ia w/high LAP) impaired relaxation.  •  There is calcification of the aortic valve.  •  Estimated right ventricular systolic pressure from tricuspid regurgitation is normal (<35 mmHg). Calculated right ventricular systolic pressure from tricuspid regurgitation is 17 mmHg.      No orders to display        Assessment/Plan     Active Hospital Problems    Diagnosis  POA   • **KAMILA (acute kidney injury) [N17.9]  Yes   • Cirrhosis of liver [K74.60]  Yes   • Type 2 diabetes mellitus [E11.9]  Yes   • Hypertension [I10]  Yes   • Malignant neoplasm of upper-outer quadrant of right breast in female, estrogen receptor negative [C50.411, Z17.1]  Not Applicable      Resolved Hospital Problems   No resolved problems to display.       Ms. Royal is a 82 y.o.     KAMILA: Most likely prerenal with her history of decreased intake nausea vomiting diarrhea.  She has been given some fluids at oncology clinic prior to having a bed become available here.  Repeat BMP now and also will check a magnesium level.  Continue IV fluids.  Consult nephrology.  Hold nephrotoxic's.  Left renal lesion: Potential mass.  Contrasted CT versus MRI when renal function improved.  Cirrhosis/nausea and vomiting: No abdominal pain.  LFTs were okay.  With the nausea and vomiting I am going to check lipase level.  Potential abdominal imaging  depending on her progression.  Consult SLP with the potential dysphagia issues.  Clear liquid diet trial.  Diabetes with hyperglycemia: Check A1c.  Start SSI.  Hold orals.  Discharged [place]  HER2 positive, ER/[place] negative breast cancer: Oncology consult  PPx: SCD  I discussed the patient's findings and my recommendations with patient, family, and consulting provider.    Kenny Jeter MD  [facility]  07/12/23  16:26 EDT    Dictated portions of note using dragon dictation software.

## 2023-07-12 NOTE — PLAN OF CARE
Problem: Adult Inpatient Plan of Care  Goal: Plan of Care Review  Outcome: Ongoing, Progressing  Flowsheets (Taken 7/12/2023 1422)  Progress: improving  Plan of Care Reviewed With: patient  Goal: Patient-Specific Goal (Individualized)  Outcome: Ongoing, Progressing  Goal: Absence of Hospital-Acquired Illness or Injury  Outcome: Ongoing, Progressing  Goal: Optimal Comfort and Wellbeing  Outcome: Ongoing, Progressing  Goal: Readiness for Transition of Care  Outcome: Ongoing, Progressing  Intervention: Mutually Develop Transition Plan  Recent Flowsheet Documentation  Taken 7/12/2023 1359 by Carmen Burch, RN  Equipment Currently Used at Home: none  Taken 7/12/2023 1356 by Carmen Burch, RN  Transportation Anticipated: family or friend will provide  Patient/Family Anticipated Services at Transition: none  Patient/Family Anticipates Transition to: home     Problem: Fall Injury Risk  Goal: Absence of Fall and Fall-Related Injury  Outcome: Ongoing, Progressing   Goal Outcome Evaluation:  Plan of Care Reviewed With: patient        Progress: improving

## 2023-07-13 PROBLEM — T45.1X5A ANEMIA DUE TO CHEMOTHERAPY: Status: ACTIVE | Noted: 2023-07-13

## 2023-07-13 PROBLEM — D64.81 ANEMIA DUE TO CHEMOTHERAPY: Status: ACTIVE | Noted: 2023-07-13

## 2023-07-13 PROBLEM — N28.89 LEFT RENAL MASS: Status: ACTIVE | Noted: 2023-07-13

## 2023-07-13 PROBLEM — E87.6 HYPOKALEMIA: Status: ACTIVE | Noted: 2023-07-13

## 2023-07-13 LAB
ALBUMIN SERPL-MCNC: 2.7 G/DL (ref 3.5–5.2)
ALBUMIN SERPL-MCNC: 2.7 G/DL (ref 3.5–5.2)
ALBUMIN/GLOB SERPL: 1.3 G/DL
ALP SERPL-CCNC: 46 U/L (ref 39–117)
ALT SERPL W P-5'-P-CCNC: 14 U/L (ref 1–33)
ANION GAP SERPL CALCULATED.3IONS-SCNC: 11.3 MMOL/L (ref 5–15)
ANION GAP SERPL CALCULATED.3IONS-SCNC: 12.6 MMOL/L (ref 5–15)
AST SERPL-CCNC: 15 U/L (ref 1–32)
BACTERIA UR QL AUTO: ABNORMAL /HPF
BASOPHILS # BLD AUTO: 0.02 10*3/MM3 (ref 0–0.2)
BASOPHILS NFR BLD AUTO: 0.4 % (ref 0–1.5)
BILIRUB SERPL-MCNC: 0.4 MG/DL (ref 0–1.2)
BILIRUB UR QL STRIP: NEGATIVE
BUN SERPL-MCNC: 26 MG/DL (ref 8–23)
BUN SERPL-MCNC: 26 MG/DL (ref 8–23)
BUN/CREAT SERPL: 18.2 (ref 7–25)
BUN/CREAT SERPL: 18.4 (ref 7–25)
CALCIUM SPEC-SCNC: 8.6 MG/DL (ref 8.6–10.5)
CALCIUM SPEC-SCNC: 8.7 MG/DL (ref 8.6–10.5)
CHLORIDE SERPL-SCNC: 113 MMOL/L (ref 98–107)
CHLORIDE SERPL-SCNC: 114 MMOL/L (ref 98–107)
CHLORIDE UR-SCNC: 159 MMOL/L
CLARITY UR: ABNORMAL
CO2 SERPL-SCNC: 14.7 MMOL/L (ref 22–29)
CO2 SERPL-SCNC: 15.4 MMOL/L (ref 22–29)
COLOR UR: YELLOW
CREAT SERPL-MCNC: 1.41 MG/DL (ref 0.57–1)
CREAT SERPL-MCNC: 1.43 MG/DL (ref 0.57–1)
CREAT UR-MCNC: 45.8 MG/DL
DEPRECATED RDW RBC AUTO: 46.8 FL (ref 37–54)
EGFRCR SERPLBLD CKD-EPI 2021: 36.7 ML/MIN/1.73
EGFRCR SERPLBLD CKD-EPI 2021: 37.3 ML/MIN/1.73
EOSINOPHIL # BLD AUTO: 0.08 10*3/MM3 (ref 0–0.4)
EOSINOPHIL NFR BLD AUTO: 1.4 % (ref 0.3–6.2)
ERYTHROCYTE [DISTWIDTH] IN BLOOD BY AUTOMATED COUNT: 16 % (ref 12.3–15.4)
GLOBULIN UR ELPH-MCNC: 2.1 GM/DL
GLUCOSE BLDC GLUCOMTR-MCNC: 144 MG/DL (ref 70–130)
GLUCOSE BLDC GLUCOMTR-MCNC: 144 MG/DL (ref 70–130)
GLUCOSE BLDC GLUCOMTR-MCNC: 158 MG/DL (ref 70–130)
GLUCOSE BLDC GLUCOMTR-MCNC: 205 MG/DL (ref 70–130)
GLUCOSE SERPL-MCNC: 136 MG/DL (ref 65–99)
GLUCOSE SERPL-MCNC: 136 MG/DL (ref 65–99)
GLUCOSE UR STRIP-MCNC: NEGATIVE MG/DL
HBA1C MFR BLD: 8.6 % (ref 4.8–5.6)
HCT VFR BLD AUTO: 24.6 % (ref 34–46.6)
HGB BLD-MCNC: 8 G/DL (ref 12–15.9)
HGB UR QL STRIP.AUTO: ABNORMAL
HYALINE CASTS UR QL AUTO: ABNORMAL /LPF
IMM GRANULOCYTES # BLD AUTO: 0.07 10*3/MM3 (ref 0–0.05)
IMM GRANULOCYTES NFR BLD AUTO: 1.3 % (ref 0–0.5)
KETONES UR QL STRIP: NEGATIVE
LEUKOCYTE ESTERASE UR QL STRIP.AUTO: ABNORMAL
LYMPHOCYTES # BLD AUTO: 0.66 10*3/MM3 (ref 0.7–3.1)
LYMPHOCYTES NFR BLD AUTO: 11.9 % (ref 19.6–45.3)
MAGNESIUM SERPL-MCNC: 1.4 MG/DL (ref 1.6–2.4)
MCH RBC QN AUTO: 27 PG (ref 26.6–33)
MCHC RBC AUTO-ENTMCNC: 32.5 G/DL (ref 31.5–35.7)
MCV RBC AUTO: 83.1 FL (ref 79–97)
MONOCYTES # BLD AUTO: 0.39 10*3/MM3 (ref 0.1–0.9)
MONOCYTES NFR BLD AUTO: 7 % (ref 5–12)
NEUTROPHILS NFR BLD AUTO: 4.34 10*3/MM3 (ref 1.7–7)
NEUTROPHILS NFR BLD AUTO: 78 % (ref 42.7–76)
NITRITE UR QL STRIP: POSITIVE
NRBC BLD AUTO-RTO: 0.2 /100 WBC (ref 0–0.2)
PH UR STRIP.AUTO: 5.5 [PH] (ref 5–8)
PHOSPHATE SERPL-MCNC: 2.3 MG/DL (ref 2.5–4.5)
PLATELET # BLD AUTO: 234 10*3/MM3 (ref 140–450)
PMV BLD AUTO: 10.2 FL (ref 6–12)
POTASSIUM SERPL-SCNC: 2.5 MMOL/L (ref 3.5–5.2)
POTASSIUM SERPL-SCNC: 2.6 MMOL/L (ref 3.5–5.2)
POTASSIUM SERPL-SCNC: 3.5 MMOL/L (ref 3.5–5.2)
PROT ?TM UR-MCNC: 36.9 MG/DL
PROT SERPL-MCNC: 4.8 G/DL (ref 6–8.5)
PROT UR QL STRIP: ABNORMAL
PROT/CREAT UR: 805.7 MG/G CREA (ref 0–200)
RBC # BLD AUTO: 2.96 10*6/MM3 (ref 3.77–5.28)
RBC # UR STRIP: ABNORMAL /HPF
REF LAB TEST METHOD: ABNORMAL
SODIUM SERPL-SCNC: 140 MMOL/L (ref 136–145)
SODIUM SERPL-SCNC: 141 MMOL/L (ref 136–145)
SODIUM UR-SCNC: 126 MMOL/L
SP GR UR STRIP: 1.01 (ref 1–1.03)
SQUAMOUS #/AREA URNS HPF: ABNORMAL /HPF
UROBILINOGEN UR QL STRIP: ABNORMAL
WBC # UR STRIP: ABNORMAL /HPF
WBC NRBC COR # BLD: 5.56 10*3/MM3 (ref 3.4–10.8)

## 2023-07-13 PROCEDURE — G0378 HOSPITAL OBSERVATION PER HR: HCPCS

## 2023-07-13 PROCEDURE — 82436 ASSAY OF URINE CHLORIDE: CPT | Performed by: INTERNAL MEDICINE

## 2023-07-13 PROCEDURE — 87186 SC STD MICRODIL/AGAR DIL: CPT | Performed by: INTERNAL MEDICINE

## 2023-07-13 PROCEDURE — 97535 SELF CARE MNGMENT TRAINING: CPT

## 2023-07-13 PROCEDURE — 82570 ASSAY OF URINE CREATININE: CPT | Performed by: INTERNAL MEDICINE

## 2023-07-13 PROCEDURE — 81001 URINALYSIS AUTO W/SCOPE: CPT | Performed by: INTERNAL MEDICINE

## 2023-07-13 PROCEDURE — 87077 CULTURE AEROBIC IDENTIFY: CPT | Performed by: INTERNAL MEDICINE

## 2023-07-13 PROCEDURE — 84300 ASSAY OF URINE SODIUM: CPT | Performed by: INTERNAL MEDICINE

## 2023-07-13 PROCEDURE — 83036 HEMOGLOBIN GLYCOSYLATED A1C: CPT | Performed by: INTERNAL MEDICINE

## 2023-07-13 PROCEDURE — 84132 ASSAY OF SERUM POTASSIUM: CPT | Performed by: HOSPITALIST

## 2023-07-13 PROCEDURE — 99215 OFFICE O/P EST HI 40 MIN: CPT | Performed by: INTERNAL MEDICINE

## 2023-07-13 PROCEDURE — 92610 EVALUATE SWALLOWING FUNCTION: CPT

## 2023-07-13 PROCEDURE — 87086 URINE CULTURE/COLONY COUNT: CPT | Performed by: INTERNAL MEDICINE

## 2023-07-13 PROCEDURE — 80053 COMPREHEN METABOLIC PANEL: CPT | Performed by: INTERNAL MEDICINE

## 2023-07-13 PROCEDURE — 84100 ASSAY OF PHOSPHORUS: CPT | Performed by: INTERNAL MEDICINE

## 2023-07-13 PROCEDURE — 84156 ASSAY OF PROTEIN URINE: CPT | Performed by: INTERNAL MEDICINE

## 2023-07-13 PROCEDURE — 83735 ASSAY OF MAGNESIUM: CPT | Performed by: INTERNAL MEDICINE

## 2023-07-13 PROCEDURE — 85025 COMPLETE CBC W/AUTO DIFF WBC: CPT | Performed by: INTERNAL MEDICINE

## 2023-07-13 PROCEDURE — 97166 OT EVAL MOD COMPLEX 45 MIN: CPT

## 2023-07-13 PROCEDURE — 82948 REAGENT STRIP/BLOOD GLUCOSE: CPT

## 2023-07-13 RX ORDER — POTASSIUM CHLORIDE 750 MG/1
40 TABLET, FILM COATED, EXTENDED RELEASE ORAL DAILY
Status: DISCONTINUED | OUTPATIENT
Start: 2023-07-13 | End: 2023-07-20 | Stop reason: HOSPADM

## 2023-07-13 RX ORDER — POTASSIUM CHLORIDE 750 MG/1
60 TABLET, FILM COATED, EXTENDED RELEASE ORAL ONCE
Status: COMPLETED | OUTPATIENT
Start: 2023-07-13 | End: 2023-07-13

## 2023-07-13 RX ADMIN — HYDROCORTISONE ACETATE 1 APPLICATION: 1 CREAM TOPICAL at 19:54

## 2023-07-13 RX ADMIN — OLANZAPINE 5 MG: 5 TABLET ORAL at 19:52

## 2023-07-13 RX ADMIN — NIFEDIPINE 30 MG: 30 TABLET, FILM COATED, EXTENDED RELEASE ORAL at 08:37

## 2023-07-13 RX ADMIN — ATORVASTATIN CALCIUM 10 MG: 20 TABLET, FILM COATED ORAL at 08:37

## 2023-07-13 RX ADMIN — PANTOPRAZOLE SODIUM 40 MG: 40 TABLET, DELAYED RELEASE ORAL at 08:37

## 2023-07-13 RX ADMIN — SODIUM CHLORIDE 125 ML/HR: 9 INJECTION, SOLUTION INTRAVENOUS at 19:56

## 2023-07-13 RX ADMIN — Medication 10 ML: at 19:54

## 2023-07-13 RX ADMIN — POTASSIUM CHLORIDE 60 MEQ: 750 TABLET, EXTENDED RELEASE ORAL at 08:37

## 2023-07-13 RX ADMIN — Medication 10 ML: at 08:38

## 2023-07-13 RX ADMIN — POTASSIUM CHLORIDE 40 MEQ: 750 TABLET, EXTENDED RELEASE ORAL at 08:38

## 2023-07-13 RX ADMIN — HYDROCORTISONE ACETATE 1 APPLICATION: 1 CREAM TOPICAL at 08:37

## 2023-07-13 NOTE — CONSULTS
[facility] INITIAL INPATIENT CONSULTATION NOTE    REASON FOR CONSULTATION:    Breast cancer currently on neoadjuvant therapy with Taxol, Herceptin, Perjeta  Adverse effects of therapy including nausea, vomiting, diarrhea  Volume depletion  Acute kidney injury  Hypokalemia    HISTORY OF PRESENT ILLNESS:  Veronica Royal is a 82 y.o. female who we are asked to see today in consultation for breast cancer with complications including nausea, vomiting, diarrhea,    She is currently receiving Taxol, Herceptin, and Perjeta for locally advanced breast cancer in the neoadjuvant setting.  She recently received cycle number 3-day 1 of therapy on 7/5/2023.  She presented to the office on 7/12 not doing well with nausea vomiting diarrhea acute kidney injury related to volume depletion and hypokalemia.  Therapy was held and she was admitted for further care.  Otherwise she complains of difficulty swallowing.  Swallowing evaluation is planned.  She states that today she has no nausea vomiting or diarrhea and is feeling better at this time.  In addition she has a rash on her arms and a little bit on her legs.  This does improve with topical hydrocortisone.  It is not pruritic.    Past Medical History:   Diagnosis Date    Basal cell carcinoma     Left thumb    Breast cancer 2023    Right    Diabetes mellitus     High cholesterol     Hypertension        Past Surgical History:   Procedure Laterality Date    EYE SURGERY      Muscle repair age 21    VENOUS ACCESS DEVICE (PORT) INSERTION N/A 5/19/2023    Procedure: INSERTION VENOUS ACCESS DEVICE;  Surgeon: Rosa Michael MD;  [place] [facility] [place] [facility] [place]  Service: General;  Laterality: N/A;       SOCIAL HISTORY:   reports that she has never smoked. She has never used smokeless tobacco. She reports that she does not currently use alcohol. She reports that she does not use drugs.    FAMILY HISTORY:  family history includes Alzheimer's disease in her mother; Heart attack in  "her father; Heart disease in her father; Ovarian cancer in her sister; Pancreatic cancer in her brother.    ALLERGIES:  No Known Allergies    MEDICATIONS:  As listed in the electronic medical record.    Review of Systems   Constitutional:  Positive for fatigue.   Gastrointestinal:  Positive for diarrhea, nausea and vomiting.   Skin:  Positive for rash.   All other systems reviewed and are negative.    Vitals:    07/12/23 1630 07/12/23 2024 07/13/23 0400 07/13/23 0750   BP: 143/65 140/67 132/62 133/64   BP Location: Left arm Left arm Left arm Left arm   Patient Position: Lying Lying Lying Lying   Pulse: 77 76 86 92   Resp: 16 16 16 18   Temp: 96.9 °F (36.1 °C) 96.9 °F (36.1 °C) 97 °F (36.1 °C) 98.2 °F (36.8 °C)   TempSrc: Oral Oral Oral Oral   SpO2: 98% 96% 94% 94%   Weight: 58.7 kg (129 lb 6.4 oz)      Height: 154.9 cm (60.98\")          Physical Exam  Vitals reviewed.   Constitutional:       Appearance: She is well-developed.   HENT:      Head: Normocephalic and atraumatic.      Nose: Nose normal.   Eyes:      Conjunctiva/sclera: Conjunctivae normal.      Pupils: Pupils are equal, round, and reactive to light.   Cardiovascular:      Rate and Rhythm: Normal rate and regular rhythm.      Heart sounds: Normal heart sounds, S1 normal and S2 normal. No murmur heard.    No friction rub. No gallop.   Pulmonary:      Effort: Pulmonary effort is normal. No respiratory distress.      Breath sounds: Normal breath sounds. No stridor. No wheezing, rhonchi or rales.   Chest:      Chest wall: No tenderness.      Comments: Benign-appearing Mediport present in the right subclavian area  Abdominal:      General: Bowel sounds are normal. There is no distension.      Palpations: Abdomen is soft. There is no mass.      Tenderness: There is no abdominal tenderness. There is no guarding or rebound.   Musculoskeletal:         General: Normal range of motion.      Cervical back: Neck supple.   Lymphadenopathy:      Cervical: No cervical " adenopathy.      Upper Body:      Right upper body: No supraclavicular adenopathy.      Left upper body: No supraclavicular adenopathy.   Skin:     General: Skin is warm and dry.      Findings: Rash present. No erythema. Rash is macular.      Comments: Macular rash scattered on her arms and a little bit on her legs   Neurological:      Mental Status: She is alert and oriented to person, place, and time.      Cranial Nerves: No cranial nerve deficit.      Sensory: No sensory deficit.   Psychiatric:         Behavior: Behavior normal.         Thought Content: Thought content normal.         Judgment: Judgment normal.       DIAGNOSTIC DATA:  Results from last 7 days   Lab Units 07/13/23  0602   WBC 10*3/mm3 5.56   HEMOGLOBIN g/dL 8.0*   HEMATOCRIT % 24.6*   PLATELETS 10*3/mm3 234     Lab Results   Component Value Date    NEUTROABS 4.34 07/13/2023     Results from last 7 days   Lab Units 07/13/23  0602   SODIUM mmol/L 140  141   POTASSIUM mmol/L 2.5*  2.6*   CHLORIDE mmol/L 114*  113*   CO2 mmol/L 14.7*  15.4*   BUN mg/dL 26*  26*   CREATININE mg/dL 1.41*  1.43*   GLUCOSE mg/dL 136*  136*   CALCIUM mg/dL 8.7  8.6         Results from last 7 days   Lab Units 07/13/23  0602   MAGNESIUM mg/dL 1.4*       IMAGING:      US Renal Bilateral (07/06/2023 17:31)     CT Abdomen Pelvis With Contrast (05/11/2023 10:43)  CT Chest With Contrast Diagnostic (05/11/2023 10:43)    I did go back and review her CT imaging from 5/11/2023.  There is no definite abnormality present in the left kidney.    ASSESSMENT:  This is a 82 y.o. 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  Invasive ductal carcinoma with apocrine features, grade 3, Ki-67 38%  CT scans and bone scan without any obvious evidence of metastatic disease  Plan Taxol Herceptin and Perjeta, and if she tolerates this well we will proceed with Adriamycin and  cyclophosphamide.  Echocardiogram has been performed and ejection fraction normal.  Liver shows cirrhotic morphology.  Mediport placed 5/19/2023  Taxol, Herceptin, Perjeta initiated 5/24/2023 5/31/2023 C1D8 Taxol  6/28/2023-cycle 2-day 15 of Taxol.  She is overall tolerating therapy well with expected side effects.  7/5/23: cycle 3 day 1 and she received taxol, herceptin, perjeta  7/12/2023-cycle 3-day 8 of TPH.  Taxol will be held today due to severe dehydration, poorly controlled nausea vomiting, KAMILA. Admitted for these symptoms.     *Diabetes  Poorly controlled  Evaluation by endocrinology 5/30/2023 with recommendations for dietary changes and home blood sugar monitoring.  The patient's daughter reports today she is struggling with compliance.  Her oral intake is still fairly poor.  Emphasized on regular food intake to maintain blood sugars.  According to daughter blood sugars have been elevated in the 300s and 400s.  They are not able to control blood sugars adequately.     *Hyperkalemia and chronic kidney disease  Unclear etiology  Could be secondary to RTA  She has an appointment with nephrology.  6/14/2023 CMP reviewed and creatinine elevated at 1.47.  500 cc of normal saline will be administered today. Patient will be brought back again on Friday for additional fluids.  7/5/2023 Potassium 3.1.  Upon further questioning patient was seen by nephrology this past Friday was found to have an elevated potassium at their office and prescribed Kayexalate, this potentially the increase of her diarrhea.  She was advised to discontinue the Kayexalate.  Potassium low at 2.9 on admission 7/12.     *KAMILA/CKD  Creatinine elevated at 2.63, BUN 37 at presentation on 7/12.  Secondary to severe volume depletion.  Calcium also elevated at 10.3  Reason for admission  Creatinine 1.43, from 1.99, from 2.63, from 0.95     *Cirrhotic appearance of the liver on the CT scan  Referred to gastroenterology  Synthetic function appears to  be normal  We will start with Taxol to see if she would tolerate the chemotherapy   Slight elevation of intervention studies today with ALT 43, AST 55.  Continue to monitor weekly  6/28/2023-mild elevation in the liver labs.  Stable compared to previous labs.  Okay to proceed with chemotherapy.     *Decreased oral intake secondary to chemotherapy  She has lost a significant amount of weight..  Continue follow-up with nutrition  Continue entergrade outpatient     *Frequent eructation  Continue Protonix 40 mg daily     *Cognitive impairment  It is unclear if this is the patient's baseline or mental status has been exacerbated by recent chemotherapy  The patient is struggling with compliance with her medications.  The patient's daughter expresses frustration today as the patient is struggling to care for herself at home with managing medications and symptom management.  Evaluated by CARL Antonio      *Chemotherapy-induced diarrhea  6/5/2023 patient given Enterade (Wild Berry flavor).  She has been drinking 1 a day.  She does not necessarily like the flavor (currently mixing with sugar-free Aramis-Aid which does help).  Encouraged her to increase to 2 a day.  6/21/2023 patient reports that she had stopped drinking her Enterade because she was waking up in the middle the night having diarrhea although she did not know if it was due to the Enterade her protein shakes.  I have encouraged the patient to continue Enterade, drinking it in the morning.  Try discontinuing the protein shakes and see how she does.  Patient states that she will try this  Reports 2-3 loose stools.  Continue Enterade and Imodium.  7/5/2023 continuing to have issues with diarrhea up to 3-4 bowel movements a day, patient also recently prescribed Kayexalate for an apparently elevated potassium.  Potassium only 3.1 today.  She advised to discontinue the Kayexalate she was given IV hydration today.  We will also prescribe Lomotil to use if needed  to help better control her diarrhea.  Patient is not drinking Enterade regularly.  This is better controlled with Lomotil     *Maculopapular rash  Secondary to HER2 directed therapy  Recommend starting topical steroids to help with the rash  She quit taking doxycycline as she developed a blister with that.  Medrol Dosepak prescribed.  Continue topical steroids     *Severe nausea and vomiting  Patient has been using Zofran regularly.  Will nee to utilize Zyprexa and Compazine for future outpatient use.  Reason for admission  Olanzapine 5 mg at night recently initiated     *Normocytic anemia  Likely due to chemo and IV fluid hydration  Hgb 8.0, from 10.8, from 9.7    *Renal u/s on 7/6 with possible left renal mass  Not noted on CT imaging from 5/11/23. No further imaging at this time.     RECOMMENDATIONS/PLAN:  Holding chemotherapy which has been poorly tolerated  IV fluids  Potassium repletion  Nephrology following. Valsartan and HCTZ being held  Topical steroids for the rash  Symptom management for nausea and vomiting.  She continues olanzapine 5 mg nightly.  Can further evaluate L kidney as an outpatient when she's clinically doing better and labs have normalized. Breast cancer needs attention first.  Nothing was noted in the left kidney on CT imaging done on 5/11/2023  Dr. Yee plans to obtain an ultrasound of the right breast as an outpatient and then discuss with Dr. Michael the possibility of surgery given poor tolerance of therapy  Daily labs    We will continue to follow.      Kingsley Ortiz MD

## 2023-07-13 NOTE — CONSULTS
Nutrition Services    Patient Name:  Veronica Royal  YOB: 1940  MRN: [id number]  Admit Date:  7/12/2023  Assessment Date:  07/13/23    Comment: Consult per nurse admission screen and MST score 3.    82yoF with malignant neoplasm of upper-quadrant of R breast with lymph node involvement on chemo (Taxol, herceptin perjeta) presents as direct admit from outpatient oncology for management of KAMILA and n/v/d.     Received consult for nutrition assessment per nurse admission screen and MST score 3. Pt followed by outpatient Oncology RD, last seen yesterday and is drinking Enterade daily at home per notes. Pt with difficulty swallowing. Swallow eval completed today with pt cleared for regular texture. SLP suspects dysphagia is related to Xerostomia. Pt admitted on clear liquid diet, diet upgraded to full liquids today per MD. RD will add ONS TID with meals for additional nutrition support. Weight history reviewed with 25 lb (16%) weight loss past 2 months. Pt with OT while RD rounding on floor, will plan to f/u at later date to complete. Pt does meet criteria for severe malnutrition based on decreased PO intake and significant weight loss.     Recommendations:   PO diet advancement as medically appropriate per MD.   Add Boost Soothe TID with meals for additional nutrition support.   Add Boost Glucose Control TID with meals.   If PO intake <50% est needs and medically appropriate, may consider initiating nutrition support.     RD will continue to follow course for PO intake and tolerance.     CLINICAL NUTRITION ASSESSMENT      Reason for Assessment MST score 2+, Nurse Admission Screen     Diagnosis/Problem     KAMILA (acute kidney injury)    Malignant neoplasm of upper-outer quadrant of right breast in female, estrogen receptor negative    Hypertension    Cirrhosis of liver    Type 2 diabetes mellitus    Hypokalemia    Anemia due to chemotherapy    Left renal mass     Medical/Surgical History Past Medical  "History:   Diagnosis Date    Basal cell carcinoma     Left thumb    Breast cancer 2023    Right    Diabetes mellitus     High cholesterol     Hypertension        Past Surgical History:   Procedure Laterality Date    EYE SURGERY      Muscle repair age 21    VENOUS ACCESS DEVICE (PORT) INSERTION N/A 5/19/2023    Procedure: INSERTION VENOUS ACCESS DEVICE;  Surgeon: Rosa Michael MD;  [place]: [facility] [place] [facility] [place]  Service: General;  Laterality: N/A;        Encounter Information        Nutrition History:     Food Preferences:    Supplements:    Factors Affecting Intake: decreased appetite, swallow impairment, taste changes     Anthropometrics        Current Height  Current Weight  BMI kg/m2 Height: 154.9 cm (60.98\")  Weight: 58.7 kg (129 lb 6.4 oz) (07/12/23 1630)  Body mass index is 24.46 kg/m².   Adjusted BMI (if applicable)    BMI Category Normal/Healthy (18.4 - 24.9)       Admission Weight 129 lb (58.7 kg)       Ideal Body Weight (IBW) 105 lb (48 kg)   Adjusted IBW (if applicable)        Usual Body Weight (UBW) 154 lb (5/19/23)   Weight Change/Trend Loss, Amount/Timeframe: 25 lb (16%) weight loss past 2 months per weight history       Weight History Wt Readings from Last 30 Encounters:   07/12/23 1630 58.7 kg (129 lb 6.4 oz)   07/12/23 0935 58.7 kg (129 lb 6.4 oz)   07/07/23 1419 62 kg (136 lb 9.6 oz)   07/05/23 0832 61.5 kg (135 lb 9.6 oz)   06/29/23 1139 65.8 kg (145 lb)   06/28/23 0904 63.6 kg (140 lb 4.8 oz)   06/21/23 0917 65.9 kg (145 lb 3.2 oz)   06/16/23 1532 66 kg (145 lb 9.6 oz)   06/14/23 0936 64.9 kg (143 lb 1.6 oz)   06/08/23 0925 64.4 kg (142 lb)   06/07/23 1053 65.4 kg (144 lb 1.6 oz)   06/05/23 1345 65.8 kg (145 lb)   06/02/23 1147 66.2 kg (146 lb)   06/02/23 0841 65.7 kg (144 lb 12.8 oz)   05/31/23 1134 66.1 kg (145 lb 11.2 oz)   05/30/23 1443 66.4 kg (146 lb 6.4 oz)   05/25/23 1008 69.4 kg (152 lb 14.4 oz)   05/24/23 0802 69.5 kg (153 lb 3.2 oz)   05/22/23 1525 69.2 kg (152 lb 9.6 oz)   05/19/23 " 1225 69.9 kg (154 lb 1.6 oz)   05/16/23 0831 69.9 kg (154 lb 1.6 oz)   05/09/23 1030 68.9 kg (152 lb)   05/01/23 1420 69 kg (152 lb 3.2 oz)             Estimated/Assessed Needs        Current Weight  Weight: 58.7 kg (129 lb 6.4 oz) (07/12/23 1630)       Energy Requirements    Weight for Calculation 58.7 kg   Method for Estimation  30-35 kcal/kg   EST Needs (kcal/day) 5281-2051 kcal       Protein Requirements    Weight for Calculation 58.7 kg   EST Protein Needs (g/kg) 1.2 - 1.5 gm/kg   EST Daily Needs (g/day) 70-88 gm       Fluid Requirements     Method for Estimation 1 mL/kcal    EST Needs (mL/day)      Tests/Procedures        Tests/Procedures Clinical Swallow Evaluation     Labs       Pertinent Labs    Results from last 7 days   Lab Units 07/13/23 0602 07/12/23  1619 07/12/23  0854 07/07/23  1421   SODIUM mmol/L 140  141 141 138 139   POTASSIUM mmol/L 2.5*  2.6* 3.1* 3.1* 2.9*   CHLORIDE mmol/L 114*  113* 111* 104 110*   CO2 mmol/L 14.7*  15.4* 18.0* 14.4* 17.2*   BUN mg/dL 26*  26* 33* 37* 17   CREATININE mg/dL 1.41*  1.43* 1.99* 2.63* 0.95   CALCIUM mg/dL 8.7  8.6 9.8 10.3* 8.7   BILIRUBIN mg/dL 0.4  --  0.4 0.4   ALK PHOS U/L 46  --  57 47   ALT (SGPT) U/L 14  --  20 19   AST (SGOT) U/L 15  --  14 25   GLUCOSE mg/dL 136*  136* 173* 238* 128*     Results from last 7 days   Lab Units 07/13/23  0602 07/12/23  1619 07/12/23  0854 07/07/23  1421   MAGNESIUM mg/dL 1.4* 1.8  --  1.6   PHOSPHORUS mg/dL 2.3*  --   --   --    HEMOGLOBIN g/dL 8.0*  --    < > 9.7*   HEMATOCRIT % 24.6*  --    < > 29.2*   WBC 10*3/mm3 5.56  --    < > 6.19   ALBUMIN g/dL 2.7*  2.7*  --    < > 3.5    < > = values in this interval not displayed.     Results from last 7 days   Lab Units 07/13/23  0602 07/12/23  0854 07/07/23  1421   PLATELETS 10*3/mm3 234 340 236     No results found for: COVID19  Lab Results   Component Value Date    HGBA1C 8.60 (H) 07/13/2023          Medications           Scheduled Medications atorvastatin, 10 mg,  Oral, Daily  hydrocortisone, 1 application , Topical, BID  insulin lispro, 2-9 Units, Subcutaneous, 4x Daily AC & at Bedtime  NIFEdipine XL, 30 mg, Oral, Daily  OLANZapine, 5 mg, Oral, Nightly  pantoprazole, 40 mg, Oral, Daily  potassium chloride, 40 mEq, Oral, Daily  senna-docusate sodium, 2 tablet, Oral, BID  sodium chloride, 10 mL, Intravenous, Q12H       Infusions sodium chloride, 125 mL/hr, Last Rate: 125 mL/hr (07/12/23 2051)       PRN Medications   acetaminophen **OR** acetaminophen **OR** acetaminophen    senna-docusate sodium **AND** polyethylene glycol **AND** bisacodyl **AND** bisacodyl    dextrose    dextrose    glucagon (human recombinant)    ondansetron **OR** ondansetron    sodium chloride    sodium chloride     Physical Findings          Physical Appearance alert, hard of hearing, oriented, room air   Oral/Mouth Cavity WNL   Edema  no edema   Gastrointestinal nausea, non-distended , vomiting, last bowel movement: 7/12   Skin  pressure injury stage 1 lower sacral spine    Tubes/Drains/Lines implantable port   NFPE Pending, due to: pt receiving therapy at time RD rounding     Malnutrition Severity Assessment      Patient meets criteria for : Severe Malnutrition (intake <75% est needs for at least past 3 months, 16% weight loss past 2 months and measurably reduced functional capacity)  Malnutrition Type (last 8 hours)       Malnutrition Severity Assessment       Row Name 07/13/23 1716       Malnutrition Severity Assessment    Malnutrition Type Chronic Disease - Related Malnutrition      Row Name 07/13/23 1716       Insufficient Energy Intake     Insufficient Energy Intake Findings Severe    Insufficient Energy Intake  <75% of est. energy requirement for > or equal to 3 months      Row Name 07/13/23 1716       Unintentional Weight Loss     Unintentional Weight Loss Findings Severe    Unintentional Weight Loss  Weight loss greater than 7.5% in three months  25 lb (16%) weight loss past 2 months per weight  history      Row Name 07/13/23 1716       Declining Functional Status    Declining Functional Status Findings Measurably Reduced      Row Name 07/13/23 1716       Criteria Met (Must meet criteria for severity in at least 2 of these categories: M Wasting, Fat Loss, Fluid, Secondary Signs, Wt. Status, Intake)    Patient meets criteria for  Severe Malnutrition  intake <75% est needs for at least past 3 months, 16% weight loss past 2 months and measurably reduced functional capacity                       Current Nutrition Orders & Evaluation of Intake       Oral Nutrition     Food Allergies NKFA   Current PO Diet Diet: Liquid Diets; Clear Liquid; Texture: Regular Texture (IDDSI 7); Fluid Consistency: Thin (IDDSI 0)   Supplement n/a   PO Evaluation     % PO Intake     # of Days Evaluated    --  PES STATEMENT / NUTRITION DIAGNOSIS      Nutrition Dx Problem  Problem: Malnutrition  Etiology: Medical Diagnosis and Factors Affecting NutritionR inflammatory breast cancer   Signs/Symptoms: Report of Minimal PO Intake, Unintended Weight Change, and Report/Observation    Comment:    --  NUTRITION INTERVENTION / PLAN OF CARE      Intervention Goal(s) Nutrition support treatment, Improved nutrition related labs, Reduce/improve symptoms, Meet estimated needs, Disease management/therapy, Tolerate PO , Advance diet, and Maintain weight         RD Intervention/Action Encourage intake, Follow Tx Progress, Care plan reviewed, and Recommend/ordered:          Prescription/Orders:       PO Diet       Supplements Boost Soothe TID with meals, Boost Glucose Control TID with meals        Snacks       Enteral Nutrition       Parenteral Nutrition    New Prescription Ordered? Yes   --      Monitor/Evaluation Per protocol, I&O, PO intake, Supplement intake, Pertinent labs, Weight, Skin status, GI status, Symptoms, POC/GOC, Swallow function   Discharge Plan/Needs Pending clinical course   Education Will instruct as appropriate   --    RD to follow  per protocol.      Electronically signed by:  Kerline Zeng RD  07/13/23 15:16 EDT

## 2023-07-13 NOTE — CONSULTS
FIRST UROLOGY CONSULT      Patient Identification:  NAME:  Veronica Royal  Age:  82 y.o.   Sex:  female   :  1940   MRN:  [id number]       Chief complaint: Renal mass    History of present illness:  This is an 82 year old woman with a history of breast cancer currently undergoing neoadjuvant chemotherapy who was admitted with KAMILA. Renal US demonstrated a 1.7 cm solid-appearing renam mass of unclear etiology.     Baseline Cr 0.95, most recent 1.43.    She denies flank pain or gross hematuria.    Past medical history:  Past Medical History:   Diagnosis Date    Basal cell carcinoma     Left thumb    Breast cancer     Right    Diabetes mellitus     High cholesterol     Hypertension        Past surgical history:  Past Surgical History:   Procedure Laterality Date    EYE SURGERY      Muscle repair age 21    VENOUS ACCESS DEVICE (PORT) INSERTION N/A 2023    Procedure: INSERTION VENOUS ACCESS DEVICE;  Surgeon: Rosa Michael MD;  Location: [facility] OR [facility];  Service: General;  Laterality: N/A;       Allergies:  Patient has no known allergies.    Home medications:  Facility-Administered Medications Prior to Admission   Medication Dose Route Frequency Provider Last Rate Last Admin    [COMPLETED] ondansetron (ZOFRAN) injection 8 mg  8 mg Intravenous Once Sheila Yee MD   8 mg at 23 1014    [COMPLETED] sodium chloride 0.9 % infusion 1,000 mL  1,000 mL Intravenous Once Sheila Yee MD   Stopped at 23 1135     Medications Prior to Admission   Medication Sig Dispense Refill Last Dose    Cholecalciferol 50 MCG (2000) tablet Take 1 tablet by mouth.   2023    glipiZIDE-metFORMIN (METAGLIP) 2.5-500 MG per tablet TAKE 1 TABLET BY MOUTH THREE TIMES DAILY(2 TABLETS IN THE MORNING AND 1 TABLET IN THE EVENING)   2023    glucose blood test strip Check BG once daily, E11.9 30 each 4 2023    glucose monitor monitoring kit Dispense glucometer with brand based off insurance  coverage, check BG daily, E11.9 1 each 0 7/11/2023    hydrocortisone 1 % cream Apply 1 application topically to the appropriate area as directed 2 (Two) Times a Day. 453.6 g 0 7/11/2023    Lancets misc Check BG once daily, E11.9 30 each 4 7/11/2023    lidocaine-prilocaine (EMLA) 2.5-2.5 % cream Apply nickel size amount to port site 30 min before appt time do not rub in cover with plastic wrap 30 g 5 7/11/2023    loperamide (IMODIUM) 2 MG capsule Take 1 capsule by mouth Daily.   7/11/2023    NIFEdipine XL (PROCARDIA XL) 30 MG 24 hr tablet Take 1 tablet by mouth Daily. 30 tablet 11 7/11/2023    OLANZapine (ZyPREXA) 5 MG tablet Take 1 tablet by mouth Every Night. 30 tablet 3 7/11/2023    pantoprazole (PROTONIX) 40 MG EC tablet Take 1 tablet by mouth Daily. 30 tablet 3 7/11/2023    potassium chloride (K-DUR,KLOR-CON) 20 MEQ CR tablet Take 1 tablet by mouth 2 (Two) Times a Day. 60 tablet 1 7/11/2023    simvastatin (ZOCOR) 10 MG tablet Take 1 tablet by mouth Every Night.   7/11/2023    sodium bicarbonate 650 MG tablet Take 1 tablet by mouth.   7/11/2023    Tradjenta 5 MG tablet tablet Take 1 tablet by mouth Daily.   7/11/2023    valsartan-hydrochlorothiazide (DIOVAN-HCT) 160-12.5 MG per tablet Take 1 tablet by mouth Daily.   7/11/2023    dexamethasone (DECADRON) 4 MG tablet Take 2 tablets in the morning and afternoon the day before first treatment. Take Tuesday 5/23/2023 4 tablet 0 More than a month    diphenoxylate-atropine (LOMOTIL) 2.5-0.025 MG per tablet Take 1 tablet by mouth 4 (Four) Times a Day As Needed for Diarrhea. 60 tablet 0 Unknown    ondansetron (ZOFRAN) 8 MG tablet Take 1 tablet by mouth 3 (Three) Times a Day As Needed for Nausea or Vomiting. 30 tablet 5     prochlorperazine (COMPAZINE) 10 MG tablet Take 1 tablet by mouth Every 6 (Six) Hours As Needed for Nausea or Vomiting. 60 tablet 3 Unknown        Hospital medications:  atorvastatin, 10 mg, Oral, Daily  hydrocortisone, 1 application , Topical,  BID  insulin lispro, 2-9 Units, Subcutaneous, 4x Daily AC & at Bedtime  NIFEdipine XL, 30 mg, Oral, Daily  OLANZapine, 5 mg, Oral, Nightly  pantoprazole, 40 mg, Oral, Daily  potassium chloride, 40 mEq, Oral, Daily  senna-docusate sodium, 2 tablet, Oral, BID  sodium chloride, 10 mL, Intravenous, Q12H      sodium chloride, 125 mL/hr, Last Rate: 125 mL/hr (23)        acetaminophen **OR** acetaminophen **OR** acetaminophen    senna-docusate sodium **AND** polyethylene glycol **AND** bisacodyl **AND** bisacodyl    dextrose    dextrose    glucagon (human recombinant)    ondansetron **OR** ondansetron    sodium chloride    sodium chloride    Family history:  Family History   Problem Relation Age of Onset    Alzheimer's disease Mother     Heart disease Father     Heart attack Father     Ovarian cancer Sister     Pancreatic cancer Brother     Malig Hyperthermia Neg Hx     Colon cancer Neg Hx     Colon polyps Neg Hx     Crohn's disease Neg Hx     Irritable bowel syndrome Neg Hx     Ulcerative colitis Neg Hx        Social history:  Social History     Tobacco Use    Smoking status: Never    Smokeless tobacco: Never   Vaping Use    Vaping Use: Never used   Substance Use Topics    Alcohol use: Not Currently    Drug use: Never       Review of systems:      Positive for:  as per HPI  Negative for:  As per HPI    Objective:  TMax 24 hours:   Temp (24hrs), Av.4 °F (36.3 °C), Min:96.9 °F (36.1 °C), Max:98.2 °F (36.8 °C)      Vitals Ranges:   Temp:  [96.9 °F (36.1 °C)-98.2 °F (36.8 °C)] 97.6 °F (36.4 °C)  Heart Rate:  [74-92] 74  Resp:  [16-18] 18  BP: (124-140)/(62-81) 130/76    Intake/Output Last 3 shifts:  No intake/output data recorded.     Physical Exam:    General Appearance:    Alert, cooperative, NAD   HEENT:    No trauma, pupils reactive, hearing intact   Back:     No CVA tenderness           Abdomen:     Soft, NDNT, no masses, no guarding                   Neuro/Psych:   Orientation intact, mood/affect  pleasant, no focal findings       Results review:   I reviewed the patient's new clinical results.    Data review:  Lab Results (last 24 hours)       Procedure Component Value Units Date/Time    POC Glucose Once [[id number]]  (Abnormal) Collected: 07/13/23 1715    Specimen: Blood Updated: 07/13/23 1716     Glucose 158 mg/dL      Comment: Meter: [id number] : [id number] Mamedova Saodat NA       Potassium [[id number]]  (Normal) Collected: 07/13/23 1557    Specimen: Blood Updated: 07/13/23 1656     Potassium 3.5 mmol/L     Protein / Creatinine Ratio, Urine - Urine, Clean Catch [[id number]]  (Abnormal) Collected: 07/13/23 1024    Specimen: Urine, Clean Catch Updated: 07/13/23 1157     Protein/Creatinine Ratio, Urine 805.7 mg/G Crea      Creatinine, Urine 45.8 mg/dL      Total Protein, Urine 36.9 mg/dL     POC Glucose Once [[id number]]  (Abnormal) Collected: 07/13/23 1117    Specimen: Blood Updated: 07/13/23 1118     Glucose 144 mg/dL      Comment: Meter: [id number] : [id number] [facility] Boxdat NA       Sodium, Urine, Random - Urine, Clean Catch [[id number]] Collected: 07/13/23 1033    Specimen: Urine, Clean Catch Updated: 07/13/23 1055     Sodium, Urine 126 mmol/L     Narrative:      Reference intervals for random urine have not been established.  Clinical usage is dependent upon physician's interpretation in combination with other laboratory tests.       Chloride, Urine, Random - Urine, Clean Catch [[id number]] Collected: 07/13/23 1033    Specimen: Urine, Clean Catch Updated: 07/13/23 1055     Chloride, Urine 159 mmol/L     Narrative:      Reference intervals for random urine have not been established.  Clinical usage is dependent upon physician's interpretation in combination with other laboratory tests.       Urinalysis, Microscopic Only - Urine, Clean Catch [[id number]]  (Abnormal) Collected: 07/13/23 1033    Specimen: Urine, Clean Catch Updated: 07/13/23 1051     RBC, UA 3-5 /HPF      WBC, UA Too Numerous to Count  /HPF      Bacteria, UA 4+ /HPF      Squamous Epithelial Cells, UA 0-2 /HPF      Hyaline Casts, UA None Seen /LPF      Methodology Automated Microscopy    Urinalysis With Culture If Indicated - Urine, Clean Catch [[id number]]  (Abnormal) Collected: 07/13/23 1033    Specimen: Urine, Clean Catch Updated: 07/13/23 1051     Color, UA Yellow     Appearance, UA Cloudy     pH, UA 5.5     Specific Gravity, UA 1.010     Glucose, UA Negative     Ketones, UA Negative     Bilirubin, UA Negative     Blood, UA Trace     Protein, UA 30 mg/dL (1+)     Leuk Esterase, UA Moderate (2+)     Nitrite, UA Positive     Urobilinogen, UA 0.2 E.U./dL    Narrative:      In absence of clinical symptoms, the presence of pyuria, bacteria, and/or nitrites on the urinalysis result does not correlate with infection.    Urine Culture - Urine, Urine, Clean Catch [[id number]] Collected: 07/13/23 1033    Specimen: Urine, Clean Catch Updated: 07/13/23 1051    Comprehensive Metabolic Panel [[id number]]  (Abnormal) Collected: 07/13/23 0602    Specimen: Blood Updated: 07/13/23 0915     Glucose 136 mg/dL      BUN 26 mg/dL      Creatinine 1.41 mg/dL      Sodium 140 mmol/L      Potassium 2.5 mmol/L      Comment: Slight hemolysis detected by analyzer. Results may be affected.        Chloride 114 mmol/L      CO2 14.7 mmol/L      Calcium 8.7 mg/dL      Total Protein 4.8 g/dL      Albumin 2.7 g/dL      ALT (SGPT) 14 U/L      AST (SGOT) 15 U/L      Alkaline Phosphatase 46 U/L      Total Bilirubin 0.4 mg/dL      Globulin 2.1 gm/dL      A/G Ratio 1.3 g/dL      BUN/Creatinine Ratio 18.4     Anion Gap 11.3 mmol/L      eGFR 37.3 mL/min/1.73     Narrative:      GFR Normal >60  Chronic Kidney Disease <60  Kidney Failure <15    The GFR formula is only valid for adults with stable renal function between ages 18 and 70.    POC Glucose Once [[id number]]  (Abnormal) Collected: 07/13/23 0744    Specimen: Blood Updated: 07/13/23 0746     Glucose 144 mg/dL      Comment: Meter:  [id number] : [id number] Asad NIXON       Renal Function Panel [[id number]]  (Abnormal) Collected: 07/13/23 0602    Specimen: Blood Updated: 07/13/23 0738     Glucose 136 mg/dL      BUN 26 mg/dL      Creatinine 1.43 mg/dL      Sodium 141 mmol/L      Potassium 2.6 mmol/L      Chloride 113 mmol/L      CO2 15.4 mmol/L      Calcium 8.6 mg/dL      Albumin 2.7 g/dL      Phosphorus 2.3 mg/dL      Anion Gap 12.6 mmol/L      BUN/Creatinine Ratio 18.2     eGFR 36.7 mL/min/1.73     Narrative:      GFR Normal >60  Chronic Kidney Disease <60  Kidney Failure <15    The GFR formula is only valid for adults with stable renal function between ages 18 and 70.    Magnesium [[id number]]  (Abnormal) Collected: 07/13/23 0602    Specimen: Blood Updated: 07/13/23 0731     Magnesium 1.4 mg/dL     CBC Auto Differential [[id number]]  (Abnormal) Collected: 07/13/23 0602    Specimen: Blood Updated: 07/13/23 0728     WBC 5.56 10*3/mm3      RBC 2.96 10*6/mm3      Hemoglobin 8.0 g/dL      Hematocrit 24.6 %      MCV 83.1 fL      MCH 27.0 pg      MCHC 32.5 g/dL      RDW 16.0 %      RDW-SD 46.8 fl      MPV 10.2 fL      Platelets 234 10*3/mm3      Neutrophil % 78.0 %      Lymphocyte % 11.9 %      Monocyte % 7.0 %      Eosinophil % 1.4 %      Basophil % 0.4 %      Immature Grans % 1.3 %      Neutrophils, Absolute 4.34 10*3/mm3      Lymphocytes, Absolute 0.66 10*3/mm3      Monocytes, Absolute 0.39 10*3/mm3      Eosinophils, Absolute 0.08 10*3/mm3      Basophils, Absolute 0.02 10*3/mm3      Immature Grans, Absolute 0.07 10*3/mm3      nRBC 0.2 /100 WBC     Hemoglobin A1c [[id number]]  (Abnormal) Collected: 07/13/23 0602    Specimen: Blood Updated: 07/13/23 0725     Hemoglobin A1C 8.60 %     Narrative:      Hemoglobin A1C Ranges:    Increased Risk for Diabetes  5.7% to 6.4%  Diabetes                     >= 6.5%  Diabetic Goal                < 7.0%    POC Glucose Once [[id number]]  (Abnormal) Collected: 07/12/23 2024    Specimen: Blood Updated:  07/12/23 2025     Glucose 192 mg/dL      Comment: Meter: [id number] : [id number] Vázquezjuanita Figueroa NA                Imaging:  Imaging Results (Last 24 Hours)       ** No results found for the last 24 hours. **               Assessment:       KAMILA (acute kidney injury)    Malignant neoplasm of upper-outer quadrant of right breast in female, estrogen receptor negative    Hypertension    Cirrhosis of liver    Type 2 diabetes mellitus    Hypokalemia    Anemia due to chemotherapy    Left renal mass    Left renal mass    Plan:     Schedule for follow up in 3-4 months with CT abd w/wo contrast prior. Since she will be having surveillance imaging with Dr. Harrison, she can plan to have this CT concurrently.   - will sign off    Kannan Fisher Jr., MD  07/13/23  18:50 EDT

## 2023-07-13 NOTE — THERAPY EVALUATION
Patient Name: Veronica Royal  : 1940    MRN: [id number]                              Today's Date: 2023       Admit Date: 2023    Visit Dx: No diagnosis found.  Patient Active Problem List   Diagnosis    Malignant neoplasm of upper-outer quadrant of right breast in female, estrogen receptor negative    Encounter for fitting and adjustment of vascular catheter    Hypertension    At high risk for malnutrition    Bilateral hearing loss    Malignant neoplasm of female breast    Advanced care planning/counseling discussion    KAMILA (acute kidney injury)    Cirrhosis of liver    Type 2 diabetes mellitus    Hypokalemia    Anemia due to chemotherapy    Left renal mass     Past Medical History:   Diagnosis Date    Basal cell carcinoma     Left thumb    Breast cancer     Right    Diabetes mellitus     High cholesterol     Hypertension      Past Surgical History:   Procedure Laterality Date    EYE SURGERY      Muscle repair age 21    VENOUS ACCESS DEVICE (PORT) INSERTION N/A 2023    Procedure: INSERTION VENOUS ACCESS DEVICE;  Surgeon: Rosa Michael MD;  Location: [facility] OR [facility];  Service: General;  Laterality: N/A;      General Information       Row Name 23 1541          OT Time and Intention    Document Type evaluation  -CE     Mode of Treatment individual therapy;occupational therapy  -CE       Row Name 23 1541          General Information    Patient Profile Reviewed yes  -CE     Prior Level of Function independent:;ADL's;min assist:;home management;cleaning;shopping  dtr assists with home mgt tasks and pt driving until very recently  -CE     Existing Precautions/Restrictions fall  -CE     Barriers to Rehab previous functional deficit  -CE       Row Name 23 1541          Living Environment    People in Home alone  -CE       Row Name 23 1541          Home Main Entrance    Number of Stairs, Main Entrance five  -CE     Stair Railings, Main Entrance none  -CE       Row Name  Patient to ER w/ complaints of mid abd pain that began two days ago. Complains of nausea and vomiting. 07/13/23 1541          Stairs Within Home, Primary    Stairs, Within Home, Primary pt states she has a trash can in back to use for support  -CE     Number of Stairs, Within Home, Primary none  -CE     Stairs Comment, Within Home, Primary pt has tub/sh combo and 1 level home; pt states she does not own any DME  -CE       Row Name 07/13/23 1541          Cognition    Orientation Status (Cognition) oriented x 3  -CE       Row Name 07/13/23 1541          Safety Issues, Functional Mobility    Impairments Affecting Function (Mobility) balance;endurance/activity tolerance;strength  -CE               User Key  (r) = Recorded By, (t) = Taken By, (c) = Cosigned By      Initials Name Provider Type    CE Latoay Jimenez OT Occupational Therapist                     Mobility/ADL's       Row Name 07/13/23 1543          Bed Mobility    Bed Mobility sit-supine  -CE     Sit-Supine [place] (Bed Mobility) standby assist  -CE     Assistive Device (Bed Mobility) head of bed elevated;bed rails  -CE       Row Name 07/13/23 1543          Transfers    Transfers sit-stand transfer;toilet transfer  -CE       Row Name 07/13/23 1543          Sit-Stand Transfer    Sit-Stand [place] (Transfers) 1 person assist;contact guard  -CE     Assistive Device (Sit-Stand Transfers) walker, front-wheeled  -CE       Row Name 07/13/23 1543          Toilet Transfer    Type (Toilet Transfer) sit-stand;stand-sit  -CE     [place] Level (Toilet Transfer) 1 person assist;contact guard  -CE     Assistive Device (Toilet Transfer) walker, front-wheeled  -CE       Row Name 07/13/23 1543          Functional Mobility    Functional Mobility- Comment Pt able to ambulate short distance to bathroom with CGA x 1 using FWW  -CE       Row Name 07/13/23 1543          Activities of Daily Living    BADL Assessment/Intervention toileting  -CE       Row Name 07/13/23 1543          Toileting Assessment/Training    [place] Level (Toileting) standby assist  -CE      Assistive Devices (Toileting) grab bar/safety frame  -CE               User Key  (r) = Recorded By, (t) = Taken By, (c) = Cosigned By      Initials Name Provider Type    Latoya Parekh OT Occupational Therapist                   Obj/Interventions       Row Name 07/13/23 1544          Sensory Assessment (Somatosensory)    Sensory Assessment (Somatosensory) sensation intact  -CE       Row Name 07/13/23 1544          Vision Assessment/Intervention    Visual Impairment/Limitations WFL;corrective lenses for distance;corrective lenses for reading  -CE       Row Name 07/13/23 1544          Range of Motion Comprehensive    General Range of Motion no range of motion deficits identified  -CE       Row Name 07/13/23 1544          Strength Comprehensive (MMT)    Comment, General Manual Muscle Testing (MMT) Assessment generalized weakness throughout but grossly >/= 3+/5 with activity  -CE       Row Name 07/13/23 1544          Motor Skills    Motor Skills functional endurance  -CE     Functional Endurance fair  -               User Key  (r) = Recorded By, (t) = Taken By, (c) = Cosigned By      Initials Name Provider Type    Latoya Parekh OT Occupational Therapist                   Goals/Plan       Row Name 07/13/23 1547          Transfer Goal 1 (OT)    Activity/Assistive Device (Transfer Goal 1, OT) transfers, all  -CE     [place] Level/Cues Needed (Transfer Goal 1, OT) modified independence  -CE     Time Frame (Transfer Goal 1, OT) short term goal (STG);2 weeks  -CE       Row Name 07/13/23 1547          Dressing Goal 1 (OT)    Activity/Device (Dressing Goal 1, OT) dressing skills, all  -CE     [place]/Cues Needed (Dressing Goal 1, OT) modified independence  -CE     Time Frame (Dressing Goal 1, OT) short term goal (STG);2 weeks  -CE       Row Name 07/13/23 1547          Toileting Goal 1 (OT)    Activity/Device (Toileting Goal 1, OT) toileting skills, all  -CE     [place] Level/Cues Needed  (Toileting Goal 1, OT) modified independence  -CE     Time Frame (Toileting Goal 1, OT) short term goal (STG);2 weeks  -CE       Row Name 07/13/23 1547          Grooming Goal 1 (OT)    Activity/Device (Grooming Goal 1, OT) grooming skills, all  -CE     [place] (Grooming Goal 1, OT) modified independence  -CE     Time Frame (Grooming Goal 1, OT) short term goal (STG);2 weeks  -CE       Row Name 07/13/23 1547          Strength Goal 1 (OT)    Strength Goal 1 (OT) Pt will be Ind w/ HEP focusing on increasing strength in prep for I/ADLs.  -CE     Time Frame (Strength Goal 1, OT) short term goal (STG);2 weeks  -CE       Row Name 07/13/23 154          Therapy Assessment/Plan (OT)    Planned Therapy Interventions (OT) activity tolerance training;adaptive equipment training;BADL retraining;strengthening exercise;transfer/mobility retraining;functional balance retraining  -CE               User Key  (r) = Recorded By, (t) = Taken By, (c) = Cosigned By      Initials Name Provider Type    CE Latoya Jimenez, OT Occupational Therapist                   Clinical Impression       Row Name 07/13/23 154          Pain Assessment    Pretreatment Pain Rating 0/10 - no pain  -CE     Posttreatment Pain Rating 0/10 - no pain  -CE       Row Name 07/13/23 1542          Plan of Care Review    Plan of Care Reviewed With patient  -CE     Outcome Evaluation Pt seen for OT eval after admission for KAMILA. Pt lives alone and has prn assist from dtr for IADLs, although it seems recently pt has declined and endurance/strength are impaired. Pt was able to mobilize in room with CGA x 1 using FWW and complete LB ADLs with SBA using grab bar and FWW. OT will con't to follow for stated goals. Per pt, she does not have someone who could assist on a regular basis in home and thus OT would recommend d/c to SNF.  -CE       Row Name 07/13/23 1543          Therapy Assessment/Plan (OT)    Rehab Potential (OT) good, to achieve stated therapy goals  -CE      Criteria for Skilled Therapeutic Interventions Met (OT) yes  -CE     Therapy Frequency (OT) 3 times/wk  -CE       Row Name 07/13/23 1545          Therapy Plan Review/Discharge Plan (OT)    Anticipated Discharge Disposition (OT) skilled nursing facility  -CE       Row Name 07/13/23 1545          Vital Signs    O2 Delivery Pre Treatment room air  -CE     O2 Delivery Intra Treatment room air  -CE     O2 Delivery Post Treatment room air  -CE     Pre Patient Position Sitting  -CE     Intra Patient Position Standing  -CE     Post Patient Position Supine  -CE       Row Name 07/13/23 1545          Positioning and Restraints    Pre-Treatment Position sitting in chair/recliner  -CE     Post Treatment Position bed  -CE     In Bed notified nsg;supine;call light within reach;encouraged to call for assist;exit alarm on  -CE               User Key  (r) = Recorded By, (t) = Taken By, (c) = Cosigned By      Initials Name Provider Type    CE Latoya Jimenez, OT Occupational Therapist                   Outcome Measures       Row Name 07/13/23 1548          How much help from another is currently needed...    Putting on and taking off regular lower body clothing? 3  -CE     Bathing (including washing, rinsing, and drying) 3  -CE     Toileting (which includes using toilet bed pan or urinal) 3  -CE     Putting on and taking off regular upper body clothing 4  -CE     Taking care of personal grooming (such as brushing teeth) 4  -CE     Eating meals 4  -CE     AM-PAC 6 Clicks Score (OT) 21  -CE       Row Name 07/13/23 1100          How much help from another person do you currently need...    Turning from your back to your side while in flat bed without using bedrails? 4  -AW     Moving from lying on back to sitting on the side of a flat bed without bedrails? 3  -AW     Moving to and from a bed to a chair (including a wheelchair)? 3  -AW     Standing up from a chair using your arms (e.g., wheelchair, bedside chair)? 3  -AW     Climbing  3-5 steps with a railing? 2  -AW     To walk in hospital room? 3  -AW     AM-PAC 6 Clicks Score (PT) 18  -AW     Highest level of mobility 6 --> Walked 10 steps or more  -AW       Row Name 07/13/23 1548          Functional Assessment    Outcome Measure Options AM-PAC 6 Clicks Daily Activity (OT)  -CE               User Key  (r) = Recorded By, (t) = Taken By, (c) = Cosigned By      Initials Name Provider Type    Latoya Parekh OT Occupational Therapist    Carmen Barba RN Registered Nurse                    Occupational Therapy Education       Title: PT OT SLP Therapies (In Progress)       Topic: Occupational Therapy (Done)       Point: ADL training (Done)       Description:   Instruct learner(s) on proper safety adaptation and remediation techniques during self care or transfers.   Instruct in proper use of assistive devices.                  Learning Progress Summary             Patient Acceptance, E, VU,NR by CE at 7/13/2023 1549                         Point: Home exercise program (Done)       Description:   Instruct learner(s) on appropriate technique for monitoring, assisting and/or progressing therapeutic exercises/activities.                  Learning Progress Summary             Patient Acceptance, E, VU,NR by CE at 7/13/2023 1549                         Point: Precautions (Done)       Description:   Instruct learner(s) on prescribed precautions during self-care and functional transfers.                  Learning Progress Summary             Patient Acceptance, E, VU,NR by CE at 7/13/2023 1549                         Point: Body mechanics (Done)       Description:   Instruct learner(s) on proper positioning and spine alignment during self-care, functional mobility activities and/or exercises.                  Learning Progress Summary             Patient Acceptance, E, VU,NR by CE at 7/13/2023 1549                                         User Key       Initials Effective Dates Name Provider Type  Discipline    CE 10/17/22 -  Latoya Jimenez OT Occupational Therapist OT                  OT Recommendation and Plan  Planned Therapy Interventions (OT): activity tolerance training, adaptive equipment training, BADL retraining, strengthening exercise, transfer/mobility retraining, functional balance retraining  Therapy Frequency (OT): 3 times/wk  Plan of Care Review  Plan of Care Reviewed With: patient  Outcome Evaluation: Pt seen for OT eval after admission for KAMILA. Pt lives alone and has prn assist from dtr for IADLs, although it seems recently pt has declined and endurance/strength are impaired. Pt was able to mobilize in room with CGA x 1 using FWW and complete LB ADLs with SBA using grab bar and FWW. OT will con't to follow for stated goals. Per pt, she does not have someone who could assist on a regular basis in home and thus OT would recommend d/c to SNF.     Time Calculation:    Time Calculation- OT       Row Name 07/13/23 1550             Time Calculation- OT    OT Start Time 1309  -CE      OT Stop Time 1332  -CE      OT Time Calculation (min) 23 min  -CE      Total Timed Code Minutes- OT 10 minute(s)  -CE      OT Received On 07/13/23  -CE      OT - Next Appointment 07/17/23  -CE      OT Goal Re-Cert Due Date 07/27/23  -CE         Timed Charges    95330 - OT Self Care/Mgmt Minutes 10  -CE         Total Minutes    Timed Charges Total Minutes 10  -CE       Total Minutes 10  -CE                User Key  (r) = Recorded By, (t) = Taken By, (c) = Cosigned By      Initials Name Provider Type    CE Latoya Jimenez OT Occupational Therapist                  Therapy Charges for Today       Code Description Service Date Service Provider Modifiers Qty    [id number]  OT SELF CARE/MGMT/TRAIN EA 15 MIN 7/13/2023 Latoya Jimenez OT GO 1    [id number]  OT EVAL MOD COMPLEXITY 2 7/13/2023 Latoya Jimenez OT GO 1                 Latoya Jimenez OT  7/13/2023

## 2023-07-13 NOTE — SIGNIFICANT NOTE
07/13/23 1538   OTHER   Discipline physical therapist   Rehab Time/Intention   Session Not Performed other (see comments)  (pt just finished working with OT upon arrival, too fatigued for further mobility. PT to recheck)   Therapy Assessment/Plan (PT)   Criteria for Skilled Interventions Met (PT) yes   Recommendation   PT - Next Appointment 07/14/23

## 2023-07-13 NOTE — CASE MANAGEMENT/SOCIAL WORK
Discharge Planning Assessment  [facility]     Patient Name: Veronica Royal  MRN: [id number]  Today's Date: 7/13/2023    Admit Date: 7/12/2023    Plan: Home, pending medical course.   Discharge Needs Assessment       Row Name 07/13/23 1025       Living Environment    People in Home alone    Current Living Arrangements home    Potentially Unsafe Housing Conditions none    Primary Care Provided by self    Provides Primary Care For no one    Family Caregiver if Needed child(arvind), adult    Quality of Family Relationships helpful;involved;supportive    Able to Return to Prior Arrangements yes       Resource/Environmental Concerns    Resource/Environmental Concerns home accessibility    Home Accessibility Concerns stairs to enter home    Transportation Concerns none       Transition Planning    Patient/Family Anticipates Transition to home    Patient/Family Anticipated Services at Transition none    Transportation Anticipated family or friend will provide       Discharge Needs Assessment    Equipment Currently Used at Home none    Concerns to be Addressed no discharge needs identified;denies needs/concerns at this time    Anticipated Changes Related to Illness none    Equipment Needed After Discharge none    Provided Post Acute Provider List? N/A    Provided Post Acute Provider Quality & Resource List? N/A                   Discharge Plan       Row Name 07/13/23 1025       Plan    Plan Home, pending medical course.    Patient/Family in Agreement with Plan yes    Plan Comments MACK noted. CCP met with patient at bedside, introduced self and explained role. Patient confirmed the information on her face sheet is accurate. Patient states that she lives at home alone. Patient confirmed her PCP is Princess Cruz. Patient denies a history of HH/SNF. Patient states she has a lift chair but denies using or owning any other DME. Patient states her pharmacy is [facility] on [facility]. Patient states she has 5 steps to  enter her home without any handrails. Patient denies any steps inside the home. Patient denies any needs at this time and states one of her children can transport her home at discharge. CCP to follow.                  Continued Care and Services - Admitted Since 7/12/2023    Coordination has not been started for this encounter.       Expected Discharge Date and Time       Expected Discharge Date Expected Discharge Time    Jul 14, 2023            Demographic Summary       Row Name 07/13/23 1024       General Information    Admission Type observation    Arrived From physician office - external    Required Notices Provided Observation Status Notice    Referral Source admission list    Reason for Consult discharge planning    Preferred Language English                   Functional Status       Row Name 07/13/23 1024       Functional Status    Usual Activity Tolerance good    Current Activity Tolerance good       Functional Status, IADL    Medications independent    Meal Preparation independent    Housekeeping independent    Laundry independent    Shopping independent       Mental Status    General Appearance WDL WDL       Mental Status Summary    Recent Changes in Mental Status/Cognitive Functioning no changes       Employment/    Employment Status retired                   Psychosocial    No documentation.                  Abuse/Neglect    No documentation.                  Legal    No documentation.                  Substance Abuse    No documentation.                  Patient Forms    No documentation.

## 2023-07-13 NOTE — CONSULTS
[facility] Consult Note      Patient Name: Veronica Royal  : 1940  MRN: [id number]  Primary Care Physician:  Princess Cruz MD  Referring Physician: Sheila Yee MD  Date of admission: 2023    Subjective     Reason for Consult: Acute kidney injury    HPI:   Veronica Royal is a 82 y.o. female with past medical history of breast cancer negative ER/RI and positive HER 2 positive who presented to the hospital from her oncology clinic after she was found to have acute kidney injury.  The patient reported worsening nausea vomiting and decreased oral intake.  Her labs on admission showed creatinine of 2.6 with potassium 3.1.  Were consulted to help with management of her acute kidney injury hyperkalemia and metabolic acidosis.  Patient was started on IV fluid and her creatinine down to 1.9 mg/dL.    Review of Systems:   14 point review of systems is otherwise negative except for mentioned above on HPI    Personal History     Past Medical History:   Diagnosis Date   • Basal cell carcinoma     Left thumb   • Breast cancer     Right   • Diabetes mellitus    • High cholesterol    • Hypertension        Past Surgical History:   Procedure Laterality Date   • EYE SURGERY      Muscle repair age 21   • VENOUS ACCESS DEVICE (PORT) INSERTION N/A 2023    Procedure: INSERTION VENOUS ACCESS DEVICE;  Surgeon: Rsoa Michael MD;  [place] [facility] [place] [facility] [place]  Service: General;  Laterality: N/A;       Family History: family history includes Alzheimer's disease in her mother; Heart attack in her father; Heart disease in her father; Ovarian cancer in her sister; Pancreatic cancer in her brother.    Social History:  reports that she has never smoked. She has never used smokeless tobacco. She reports that she does not currently use alcohol. She reports that she does not use drugs.    Home Medications:  Prior to Admission medications    Medication Sig Start Date End Date Taking?  Authorizing Provider   Cholecalciferol 50 MCG (2000 UT) tablet Take 1 tablet by mouth.   Yes Annika Downs MD   glipiZIDE-metFORMIN (METAGLIP) 2.5-500 MG per tablet TAKE 1 TABLET BY MOUTH THREE TIMES DAILY(2 TABLETS IN THE MORNING AND 1 TABLET IN THE EVENING) 2/2/23  Yes Annika Downs MD   glucose blood test strip Check BG once daily, E11.9 5/30/23  Yes Shar Pat,    glucose monitor monitoring kit Dispense glucometer with brand based off insurance coverage, check BG daily, E11.9 5/30/23  Yes Shar Pat,    hydrocortisone 1 % cream Apply 1 application topically to the appropriate area as directed 2 (Two) Times a Day. 6/21/23  Yes Deepthi Bender APRN   Lancets misc Check BG once daily, E11.9 5/30/23  Yes Shar Pat DO   lidocaine-prilocaine (EMLA) 2.5-2.5 % cream Apply nickel size amount to port site 30 min before appt time do not rub in cover with plastic wrap 6/21/23  Yes Deepthi Bender APRN   loperamide (IMODIUM) 2 MG capsule Take 1 capsule by mouth Daily.   Yes Annika Downs MD   NIFEdipine XL (PROCARDIA XL) 30 MG 24 hr tablet Take 1 tablet by mouth Daily. 6/1/23 5/31/24 Yes Annika Downs MD   OLANZapine (ZyPREXA) 5 MG tablet Take 1 tablet by mouth Every Night. 7/12/23  Yes Sheila Yee MD   pantoprazole (PROTONIX) 40 MG EC tablet Take 1 tablet by mouth Daily. 5/31/23  Yes Aislinn Gonzalez APRN   potassium chloride (K-DUR,KLOR-CON) 20 MEQ CR tablet Take 1 tablet by mouth 2 (Two) Times a Day. 7/7/23  Yes Deepthi Bender APRN   simvastatin (ZOCOR) 10 MG tablet Take 1 tablet by mouth Every Night. 2/1/23  Yes Annika Downs MD   sodium bicarbonate 650 MG tablet Take 1 tablet by mouth. 7/3/23 7/2/24 Yes Annika Downs MD   Tradjenta 5 MG tablet tablet Take 1 tablet by mouth Daily. 4/4/23  Yes Annika Downs MD   valsartan-hydrochlorothiazide (DIOVAN-HCT) 160-12.5 MG per tablet Take 1 tablet by mouth Daily. 7/4/23   Yes Provider, MD Annika   dexamethasone (DECADRON) 4 MG tablet Take 2 tablets in the morning and afternoon the day before first treatment. Take Tuesday 5/23/2023 5/22/23   Aislinn Gonzalez APRN   diphenoxylate-atropine (LOMOTIL) 2.5-0.025 MG per tablet Take 1 tablet by mouth 4 (Four) Times a Day As Needed for Diarrhea. 7/5/23   Sheila Yee MD   ondansetron (ZOFRAN) 8 MG tablet Take 1 tablet by mouth 3 (Three) Times a Day As Needed for Nausea or Vomiting. 5/22/23   Aislinn Gonzalez APRN   prochlorperazine (COMPAZINE) 10 MG tablet Take 1 tablet by mouth Every 6 (Six) Hours As Needed for Nausea or Vomiting. 7/12/23   Sheila Yee MD       Allergies:  No Known Allergies    Objective     Vitals:   Temp:  [96.9 °F (36.1 °C)-97.5 °F (36.4 °C)] 97 °F (36.1 °C)  Heart Rate:  [76-92] 86  Resp:  [16] 16  BP: (124-143)/(62-81) 132/62  No intake or output data in the 24 hours ending 07/13/23 0722    Physical Exam:    General Appearance: alert, oriented x 3, no acute distress   Skin: warm and dry  HEENT: oral mucosa normal, nonicteric sclera  Neck: supple, no JVD  Lungs: CTA  Heart: RRR, normal S1 and S2  Abdomen: soft, nontender, nondistended  : no palpable bladder  Extremities: no edema, cyanosis or clubbing  Neuro: normal speech and mental status     Scheduled Meds:     atorvastatin, 10 mg, Oral, Daily  hydrocortisone, 1 application , Topical, BID  insulin lispro, 2-9 Units, Subcutaneous, 4x Daily AC & at Bedtime  NIFEdipine XL, 30 mg, Oral, Daily  OLANZapine, 5 mg, Oral, Nightly  pantoprazole, 40 mg, Oral, Daily  senna-docusate sodium, 2 tablet, Oral, BID  sodium chloride, 10 mL, Intravenous, Q12H      IV Meds:   sodium chloride, 125 mL/hr, Last Rate: 125 mL/hr (07/12/23 2051)        Results Reviewed:   I have personally reviewed the results from the time of this admission to 7/13/2023 07:22 EDT     Lab Results   Component Value Date    GLUCOSE 173 (H) 07/12/2023    CALCIUM 9.8 07/12/2023      07/12/2023    K 3.1 (L) 07/12/2023    CO2 18.0 (L) 07/12/2023     (H) 07/12/2023    BUN 33 (H) 07/12/2023    CREATININE 1.99 (H) 07/12/2023    BCR 16.6 07/12/2023    ANIONGAP 12.0 07/12/2023      Lab Results   Component Value Date    MG 1.8 07/12/2023    ALBUMIN 3.5 07/12/2023     US Renal Bilateral    Result Date: 7/8/2023  Examination: Renal sonogram  TECHNIQUE: Sonographic images of the kidneys and urinary bladder were obtained  HISTORY:Chronic renal disease  COMPARISON: None available  FINDINGS: There is a possible left renal mid zone isoechoic mass measuring 1.7 x 1.3 x 1.6 cm. The kidneys are normal in echogenicity, without hydronephrosis, the right measuring approximately 9 cm, and the left 10 cm. The urinary bladder is nearly decompressed, grossly normal, with bilateral ureteral jets seen.      Impression: 1. Left renal possible mass. Consider renal mass protocol CT or MRI if clinically indicated 2. Otherwise normal appearing kidneys, without hydronephrosis seen.  This report was finalized on 7/8/2023 12:38 PM by Dr. Jasvir Jha M.D.        Assessment / Plan     ASSESSMENT:    Acute kidney injury: Appears to be due to intravascular volume depletion.  Creatinine improved down to 1.9 mg/dL with IV hydration.  Renal ultrasound showed possible left renal mass.  We will obtain urine analysis and urine electrolytes.  We will adjust medications for creatinine clearance less than 30 ml/min/m2  Hypokalemia: Replete potassium  History of hypertension: We will hold valsartan and hydrochlorothiazide.  Continue nifedipine  History of dyslipidemia  Nausea and vomiting  History of breast cancer positive HER2 negative ER/ME  Renal mass on latest renal ultrasound: Further work-up related    PLAN:    Continue IV fluid  Replete potassium  Obtain UA and urine electrolytes  Continue to hold valsartan and hydrochlorothiazide for now and at time of discharge  Work-up for renal mass   surveillance labs      Thank you  for involving us in the care of Veronica Royal.  Please feel free to call with any questions.    Cesar Estrella MD  07/13/23  07:22 EDT    [facility]  [phone]

## 2023-07-13 NOTE — PLAN OF CARE
Goal Outcome Evaluation:  Plan of Care Reviewed With: patient           Outcome Evaluation: Pt seen for OT eval after admission for KAMILA. Pt lives alone and has prn assist from dtr for IADLs, although it seems recently pt has declined and endurance/strength are impaired. Pt was able to mobilize in room with CGA x 1 using FWW and complete LB ADLs with SBA using grab bar and FWW. OT will con't to follow for stated goals. Per pt, she does not have someone who could assist on a regular basis in home and thus OT would recommend d/c to SNF.      Anticipated Discharge Disposition (OT): skilled nursing facility

## 2023-07-13 NOTE — PROGRESS NOTES
Name: Veronica Royal ADMIT: 2023   : 1940  PCP: Princess Cruz MD    MRN: [id number] LOS: 0 days   AGE/SEX: 82 y.o. female  ROOM: [facility]     Subjective   Subjective   Feeling better this AM. Tolerating CLD. No N/V/D. No choking. Voiding well. No abd pain or flank pain. No F/C/NS. No SOA or CP or palp.        Objective   Objective   Vital Signs  Temp:  [96.9 °F (36.1 °C)-98.2 °F (36.8 °C)] 98.2 °F (36.8 °C)  Heart Rate:  [76-92] 92  Resp:  [16-18] 18  BP: (124-143)/(62-81) 133/64  SpO2:  [94 %-100 %] 94 %  on   ;   Device (Oxygen Therapy): room air  Body mass index is 24.46 kg/m².  Physical Exam  Vitals and nursing note reviewed. Exam conducted with a chaperone present (RN).   Constitutional:       General: She is not in acute distress.     Appearance: She is ill-appearing (chronically). She is not toxic-appearing or diaphoretic.   HENT:      Head: Normocephalic.      Nose: Nose normal.      Mouth/Throat:      Mouth: Mucous membranes are moist.      Pharynx: Oropharynx is clear.   Eyes:      General: No scleral icterus.        Right eye: No discharge.         Left eye: No discharge.      Extraocular Movements: Extraocular movements intact.      Conjunctiva/sclera: Conjunctivae normal.   Cardiovascular:      Rate and Rhythm: Normal rate and regular rhythm.      Pulses: Normal pulses.      Comments: Port left upper chest  Pulmonary:      Effort: Pulmonary effort is normal. No respiratory distress.      Breath sounds: Normal breath sounds. No wheezing or rales.   Abdominal:      General: Bowel sounds are normal. There is no distension.      Palpations: Abdomen is soft.      Tenderness: There is no abdominal tenderness.   Musculoskeletal:         General: Swelling (doughy chronic edema in BLEs) present. No deformity. Normal range of motion.      Cervical back: Neck supple. No rigidity.   Lymphadenopathy:      Cervical: No cervical adenopathy.   Skin:     General: Skin is warm and dry.      Capillary  Refill: Capillary refill takes less than 2 seconds.      Coloration: Skin is not jaundiced.      Findings: Rash (scattered across upper extremities and right cheek) present.   Neurological:      General: No focal deficit present.      Mental Status: She is alert and oriented to person, place, and time. Mental status is at baseline.      Cranial Nerves: No cranial nerve deficit.      Coordination: Coordination normal.   Psychiatric:         Mood and Affect: Mood normal.         Behavior: Behavior normal.     Results Review     I reviewed the patient's new clinical results.  Results from last 7 days   Lab Units 07/13/23  0602 07/12/23  0854 07/07/23  1421   WBC 10*3/mm3 5.56 10.22 6.19   HEMOGLOBIN g/dL 8.0* 10.8* 9.7*   PLATELETS 10*3/mm3 234 340 236     Results from last 7 days   Lab Units 07/13/23  0602 07/12/23  1619 07/12/23  0854 07/07/23  1421   SODIUM mmol/L 141 141 138 139   POTASSIUM mmol/L 2.6* 3.1* 3.1* 2.9*   CHLORIDE mmol/L 113* 111* 104 110*   CO2 mmol/L 15.4* 18.0* 14.4* 17.2*   BUN mg/dL 26* 33* 37* 17   CREATININE mg/dL 1.43* 1.99* 2.63* 0.95   GLUCOSE mg/dL 136* 173* 238* 128*   EGFR mL/min/1.73 36.7* 24.7* 17.7* 59.9*     Results from last 7 days   Lab Units 07/13/23  0602 07/12/23  0854 07/07/23  1421   ALBUMIN g/dL 2.7* 3.5 3.5   BILIRUBIN mg/dL  --  0.4 0.4   ALK PHOS U/L  --  57 47   AST (SGOT) U/L  --  14 25   ALT (SGPT) U/L  --  20 19     Results from last 7 days   Lab Units 07/13/23  0602 07/12/23  1619 07/12/23  0854 07/07/23  1421   CALCIUM mg/dL 8.6 9.8 10.3* 8.7   ALBUMIN g/dL 2.7*  --  3.5 3.5   MAGNESIUM mg/dL 1.4* 1.8  --  1.6   PHOSPHORUS mg/dL 2.3*  --   --   --        Hemoglobin A1C   Date/Time Value Ref Range Status   07/13/2023 0602 8.60 (H) 4.80 - 5.60 % Final     Glucose   Date/Time Value Ref Range Status   07/13/2023 0744 144 (H) 70 - 130 mg/dL Final     Comment:     Meter: [id number] : [id number] Asad NIXON   07/12/2023 2024 192 (H) 70 - 130 mg/dL Final      Comment:     Meter: [id number] : [id number] Gurpreet NIXON   07/12/2023 1618 173 (H) 70 - 130 mg/dL Final     Comment:     Meter: [id number] : [id number] Asad NIXON       No radiology results for the last day  I have personally reviewed all medications:  Scheduled Medications  atorvastatin, 10 mg, Oral, Daily  hydrocortisone, 1 application , Topical, BID  insulin lispro, 2-9 Units, Subcutaneous, 4x Daily AC & at Bedtime  NIFEdipine XL, 30 mg, Oral, Daily  OLANZapine, 5 mg, Oral, Nightly  pantoprazole, 40 mg, Oral, Daily  potassium chloride, 40 mEq, Oral, Daily  potassium chloride, 60 mEq, Oral, Once  senna-docusate sodium, 2 tablet, Oral, BID  sodium chloride, 10 mL, Intravenous, Q12H    Infusions  sodium chloride, 125 mL/hr, Last Rate: 125 mL/hr (07/12/23 2051)    Diet  Diet: Liquid Diets; Clear Liquid; Texture: Regular Texture (IDDSI 7); Fluid Consistency: Thin (IDDSI 0)    I have personally reviewed:  [x]  Laboratory   []  Microbiology   []  Radiology   []  EKG/Telemetry  []  Cardiology/Vascular   []  Pathology    [x]  Records       Assessment/Plan     Active Hospital Problems    Diagnosis  POA    **KAMILA (acute kidney injury) [N17.9]  Yes    Hypokalemia [E87.6]  Yes    Anemia due to chemotherapy [D64.81, T45.1X5A]  Yes    Cirrhosis of liver [K74.60]  Yes    Type 2 diabetes mellitus [E11.9]  Yes    Hypertension [I10]  Yes    Malignant neoplasm of upper-outer quadrant of right breast in female, estrogen receptor negative [C50.411, Z17.1]  Not Applicable      Resolved Hospital Problems   No resolved problems to display.       81yo woman with h/o DM2, cirrhosis, HTN, HLD, left renal mass, and breast CA, who was directly admitted to our service from Heme/Onc office with KAMILA due to N/V/D and poor po intake.    KAMILA/CKD?: appreciate Renal attention to pt, Cr falling with IVFs, suspect due to dehydration from next dx    N/V/D: Dr. Yee feels due to chemotx intolerance, will ask GI to weigh in as  below, much improved today    Dysphagia: SLP eval pending, tolerating current CLD    Hypokalemia: Renal is replacing orally    Cirrhosis NOS: noted on imaging, LFTs wnl, given GI symptoms and Dr. Yee's plan to refer pt to GI as outpt, will ask them to see here    Left renal mass: have asked  to see for further eval/recs    DM2: OHGs on hold, sugars acceptable on SSI alone for now but of course po intake is fairly limited, A1c 8.6    HTN: BPs acceptable on Nifedipine, continue to hold Diovan-HCT given her KAMILA    Breast CA: Heme/Onc consulted, not tolerating chemotx very well with GI symptoms and skin rash (on topical steroid per Heme/Onc)    SCDs for DVT prophylaxis.  Full code.  Discussed with patient and RN.  Anticipate discharge TBD          Kannan Camacho MD  [facility]  07/13/23  08:29 EDT

## 2023-07-13 NOTE — PLAN OF CARE
Goal Outcome Evaluation:  Plan of Care Reviewed With: patient        Progress: improving  Outcome Evaluation: Patient seen for clinical swallow assessment. Pt reports difficulty initiating a swallow with PO at times, which began with chemo tx. Upper dentures are ill fitting,but no focal weakness noted in oral mech exam. No overt s/s of aspiration with ice, thins via cup/straw, puree, mixed mech soft, or regular solids. SLP recs upgrade to regular and thins, meds as elder. Unsure if team wants a slow progression to full liquids first, therefore will defer diet upgrade to attending. Suspect oral dysphagia complaints are 2/2 Xerostomia. ST to sign off.

## 2023-07-13 NOTE — THERAPY EVALUATION
Acute Care - Speech Language Pathology   Swallow Initial Evaluation [facility]     Patient Name: Veronica Royal  : 1940  MRN: [id number]  Today's Date: 2023               Admit Date: 2023    Visit Dx:   No diagnosis found.  Patient Active Problem List   Diagnosis    Malignant neoplasm of upper-outer quadrant of right breast in female, estrogen receptor negative    Encounter for fitting and adjustment of vascular catheter    Hypertension    At high risk for malnutrition    Bilateral hearing loss    Malignant neoplasm of female breast    Advanced care planning/counseling discussion    KAMILA (acute kidney injury)    Cirrhosis of liver    Type 2 diabetes mellitus    Hypokalemia    Anemia due to chemotherapy    Left renal mass     Past Medical History:   Diagnosis Date    Basal cell carcinoma     Left thumb    Breast cancer     Right    Diabetes mellitus     High cholesterol     Hypertension      Past Surgical History:   Procedure Laterality Date    EYE SURGERY      Muscle repair age 21    VENOUS ACCESS DEVICE (PORT) INSERTION N/A 2023    Procedure: INSERTION VENOUS ACCESS DEVICE;  Surgeon: Rosa Michael MD;  [place] [facility] [place] [facility] [place];  Service: General;  Laterality: N/A;       SLP Recommendation and Plan  SLP Swallowing Diagnosis: swallow WFL/no suspected pharyngeal impairment (23 1500)  SLP Diet Recommendation: regular textures, thin liquids (23)  Recommended Precautions and Strategies: upright posture during/after eating, small bites of food and sips of liquid (23)  SLP Rec. for Method of Medication Administration: meds whole, as tolerated (23)     Monitor for Signs of Aspiration: yes, notify SLP if any concerns (23)              Therapy Frequency (Swallow): evaluation only (23)                                           Plan of Care Reviewed With: patient  Progress: improving  Outcome Evaluation: Patient seen for clinical  swallow assessment. Pt reports difficulty initiating a swallow with PO at times, which began with chemo tx. Upper dentures are ill fitting,but no focal weakness noted in oral mech exam. No overt s/s of aspiration with ice, thins via cup/straw, puree, mixed mech soft, or regular solids. SLP recs upgrade to regular and thins, meds as elder. Suspect oral dysphagia complaints are 2/2 Xerostomia. ST to sign off.      SWALLOW EVALUATION (last 72 hours)       SLP Adult Swallow Evaluation       Row Name 07/13/23 1500                   Rehab Evaluation    Document Type evaluation  -        Patient Effort excellent  -           General Information    Patient Profile Reviewed yes  -        Pertinent History Of Current Problem Right breast inflammatory breast cancer, Decreased oral intake secondary to chemotherapy, Severe nausea and vomiting  -        Current Method of Nutrition clear liquids  -        Precautions/Limitations, Vision WFL with corrective lenses  -        Precautions/Limitations, Hearing WFL;for purposes of eval  -        Prior Level of Function-Communication other (see comments)  cognitive changes  -        Prior Level of Function-Swallowing no diet consistency restrictions  -        Plans/Goals Discussed with patient;agreed upon  -        Barriers to Rehab cognitive status  -           Oral Motor Structure and Function    Dentition Assessment upper dentures/partial in place;lower dentures/partial in place  -           Clinical Swallow Eval    Clinical Swallow Evaluation Summary Patient seen for clinical swallow assessment. Pt reports difficulty initiating a swallow with PO at times, which began with chemo tx. Upper dentures are ill fitting,but no focal weakness noted in oral mech exam. No overt s/s of aspiration with ice, thins via cup/straw, puree, mixed mech soft, or regular solids. SLP recs upgrade to regular and thins, meds as elder. Unsure if team wants a slow progression to full  liquids first, therefore will defer diet upgrade to attending. Suspect oral dysphagia complaints are 2/2 Xerostomia. ST to sign off.  -           SLP Evaluation Clinical Impression    SLP Swallowing Diagnosis swallow WFL/no suspected pharyngeal impairment  -           Recommendations    Therapy Frequency (Swallow) evaluation only  -[facility] Diet Recommendation regular textures;thin liquids  -        Recommended Precautions and Strategies upright posture during/after eating;small bites of food and sips of liquid  -        Oral Care Recommendations Oral Care BID/PRN  -        SLP Rec. for Method of Medication Administration meds whole;as tolerated  -        Monitor for Signs of Aspiration yes;notify SLP if any concerns  -                  User Key  (r) = Recorded By, (t) = Taken By, (c) = Cosigned By      Initials Name Effective Dates     Keesha Sherman MS CCC-SLP 06/16/21 -                     EDUCATION  The patient has been educated in the following areas:   Dysphagia (Swallowing Impairment).              Time Calculation:    Time Calculation- SLP       Row Name 07/13/23 1534             Time Calculation- [facility]    SLP Start Time 1300  -      SLP Received On 07/13/23  -         Untimed Charges    87329-BQ Eval Oral Pharyng Swallow Minutes 60  -         Total Minutes    Untimed Charges Total Minutes 60  -       Total Minutes 60  -                User Key  (r) = Recorded By, (t) = Taken By, (c) = Cosigned By      Initials Name Provider Type     Keesha Sherman MS CCC-SLP Speech and Language Pathologist                    Therapy Charges for Today       Code Description Service Date Service Provider Modifiers Qty    [id number] HC ST EVAL ORAL PHARYNG SWALLOW 4 7/13/2023 Keesha Sherman MS CCC-SLP GN 1                 MS DEEPAK Valdovinos  7/13/2023

## 2023-07-13 NOTE — PLAN OF CARE
Problem: Adult Inpatient Plan of Care  Goal: Plan of Care Review  Outcome: Ongoing, Progressing  Flowsheets (Taken 7/13/2023 0955)  Progress: improving  Plan of Care Reviewed With: patient  Goal: Patient-Specific Goal (Individualized)  Outcome: Ongoing, Progressing  Goal: Absence of Hospital-Acquired Illness or Injury  Outcome: Ongoing, Progressing  Intervention: Identify and Manage Fall Risk  Recent Flowsheet Documentation  Taken 7/13/2023 0800 by Carmen Burch RN  Safety Promotion/Fall Prevention: safety round/check completed  Intervention: Prevent Skin Injury  Recent Flowsheet Documentation  Taken 7/13/2023 0800 by Carmen Burch RN  Body Position: position changed independently  Intervention: Prevent and Manage VTE (Venous Thromboembolism) Risk  Recent Flowsheet Documentation  Taken 7/13/2023 0800 by Carmen Burch RN  Activity Management:   ambulated in room   ambulated to bathroom  Goal: Optimal Comfort and Wellbeing  Outcome: Ongoing, Progressing  Goal: Readiness for Transition of Care  Outcome: Ongoing, Progressing     Problem: Fall Injury Risk  Goal: Absence of Fall and Fall-Related Injury  Outcome: Ongoing, Progressing  Intervention: Identify and Manage Contributors  Recent Flowsheet Documentation  Taken 7/13/2023 0800 by Carmen Burch RN  Medication Review/Management: medications reviewed  Intervention: Promote Injury-Free Environment  Recent Flowsheet Documentation  Taken 7/13/2023 0800 by Carmen Burch RN  Safety Promotion/Fall Prevention: safety round/check completed   Goal Outcome Evaluation:  Plan of Care Reviewed With: patient        Progress: improving

## 2023-07-13 NOTE — PLAN OF CARE
Goal Outcome Evaluation:  Plan of Care Reviewed With: patient           VSS. On heart monitor.     Problem: Adult Inpatient Plan of Care  Goal: Plan of Care Review  Outcome: Ongoing, Progressing  Flowsheets (Taken 7/13/2023 0406)  Progress: improving  Plan of Care Reviewed With: patient  Goal: Patient-Specific Goal (Individualized)  Outcome: Ongoing, Progressing  Goal: Absence of Hospital-Acquired Illness or Injury  Outcome: Ongoing, Progressing  Intervention: Identify and Manage Fall Risk  Recent Flowsheet Documentation  Taken 7/13/2023 0259 by Alyce Musa, RN  Safety Promotion/Fall Prevention: safety round/check completed  Taken 7/13/2023 0055 by Alyce Musa, RN  Safety Promotion/Fall Prevention: safety round/check completed  Taken 7/12/2023 2222 by Alyce Musa, RN  Safety Promotion/Fall Prevention: safety round/check completed  Taken 7/12/2023 2015 by Alyce Musa RN  Safety Promotion/Fall Prevention:   activity supervised   assistive device/personal items within reach   clutter free environment maintained   safety round/check completed  Intervention: Prevent Infection  Recent Flowsheet Documentation  Taken 7/12/2023 2015 by Alyce Musa, RN  Infection Prevention:   hand hygiene promoted   rest/sleep promoted  Goal: Optimal Comfort and Wellbeing  Outcome: Ongoing, Progressing  Intervention: Provide Person-Centered Care  Recent Flowsheet Documentation  Taken 7/12/2023 2015 by Alyce Musa, RN  Trust Relationship/Rapport:   care explained   choices provided   emotional support provided  Goal: Readiness for Transition of Care  Outcome: Ongoing, Progressing     Problem: Fall Injury Risk  Goal: Absence of Fall and Fall-Related Injury  Outcome: Ongoing, Progressing  Intervention: Identify and Manage Contributors  Recent Flowsheet Documentation  Taken 7/12/2023 2015 by Alyce Musa, RN  Medication Review/Management: medications reviewed  Intervention: Promote Injury-Free Environment  Recent Flowsheet  Documentation  Taken 7/13/2023 0259 by Alyce Musa, RN  Safety Promotion/Fall Prevention: safety round/check completed  Taken 7/13/2023 0055 by Alyce Musa, RN  Safety Promotion/Fall Prevention: safety round/check completed  Taken 7/12/2023 2222 by Alyce Musa, RN  Safety Promotion/Fall Prevention: safety round/check completed  Taken 7/12/2023 2015 by Alyce Musa, RN  Safety Promotion/Fall Prevention:   activity supervised   assistive device/personal items within reach   clutter free environment maintained   safety round/check completed

## 2023-07-14 PROBLEM — N39.0 ACUTE UTI (URINARY TRACT INFECTION): Status: ACTIVE | Noted: 2023-07-14

## 2023-07-14 PROBLEM — E83.42 HYPOMAGNESEMIA: Status: ACTIVE | Noted: 2023-07-14

## 2023-07-14 LAB
ALBUMIN SERPL-MCNC: 2.7 G/DL (ref 3.5–5.2)
ALBUMIN/GLOB SERPL: 1.2 G/DL
ALP SERPL-CCNC: 50 U/L (ref 39–117)
ALT SERPL W P-5'-P-CCNC: 13 U/L (ref 1–33)
ANION GAP SERPL CALCULATED.3IONS-SCNC: 8.1 MMOL/L (ref 5–15)
AST SERPL-CCNC: 16 U/L (ref 1–32)
BILIRUB SERPL-MCNC: 0.4 MG/DL (ref 0–1.2)
BUN SERPL-MCNC: 14 MG/DL (ref 8–23)
BUN/CREAT SERPL: 15.1 (ref 7–25)
CALCIUM SPEC-SCNC: 8.3 MG/DL (ref 8.6–10.5)
CHLORIDE SERPL-SCNC: 114 MMOL/L (ref 98–107)
CO2 SERPL-SCNC: 15.9 MMOL/L (ref 22–29)
CREAT SERPL-MCNC: 0.93 MG/DL (ref 0.57–1)
DEPRECATED RDW RBC AUTO: 48.9 FL (ref 37–54)
EGFRCR SERPLBLD CKD-EPI 2021: 61.5 ML/MIN/1.73
ERYTHROCYTE [DISTWIDTH] IN BLOOD BY AUTOMATED COUNT: 16.2 % (ref 12.3–15.4)
GLOBULIN UR ELPH-MCNC: 2.2 GM/DL
GLUCOSE BLDC GLUCOMTR-MCNC: 149 MG/DL (ref 70–130)
GLUCOSE BLDC GLUCOMTR-MCNC: 170 MG/DL (ref 70–130)
GLUCOSE BLDC GLUCOMTR-MCNC: 197 MG/DL (ref 70–130)
GLUCOSE BLDC GLUCOMTR-MCNC: 201 MG/DL (ref 70–130)
GLUCOSE BLDC GLUCOMTR-MCNC: 212 MG/DL (ref 70–130)
GLUCOSE SERPL-MCNC: 199 MG/DL (ref 65–99)
HCT VFR BLD AUTO: 26.2 % (ref 34–46.6)
HGB BLD-MCNC: 8.6 G/DL (ref 12–15.9)
LIPASE SERPL-CCNC: 24 U/L (ref 13–60)
MAGNESIUM SERPL-MCNC: 1.2 MG/DL (ref 1.6–2.4)
MCH RBC QN AUTO: 27.7 PG (ref 26.6–33)
MCHC RBC AUTO-ENTMCNC: 32.8 G/DL (ref 31.5–35.7)
MCV RBC AUTO: 84.2 FL (ref 79–97)
PLATELET # BLD AUTO: 235 10*3/MM3 (ref 140–450)
PMV BLD AUTO: 10.1 FL (ref 6–12)
POTASSIUM SERPL-SCNC: 3.2 MMOL/L (ref 3.5–5.2)
PROT SERPL-MCNC: 4.9 G/DL (ref 6–8.5)
RBC # BLD AUTO: 3.11 10*6/MM3 (ref 3.77–5.28)
SODIUM SERPL-SCNC: 138 MMOL/L (ref 136–145)
WBC NRBC COR # BLD: 6.42 10*3/MM3 (ref 3.4–10.8)

## 2023-07-14 PROCEDURE — 63710000001 ONDANSETRON PER 8 MG: Performed by: INTERNAL MEDICINE

## 2023-07-14 PROCEDURE — 99232 SBSQ HOSP IP/OBS MODERATE 35: CPT | Performed by: INTERNAL MEDICINE

## 2023-07-14 PROCEDURE — 85027 COMPLETE CBC AUTOMATED: CPT | Performed by: HOSPITALIST

## 2023-07-14 PROCEDURE — 83735 ASSAY OF MAGNESIUM: CPT | Performed by: HOSPITALIST

## 2023-07-14 PROCEDURE — 63710000001 INSULIN LISPRO (HUMAN) PER 5 UNITS: Performed by: INTERNAL MEDICINE

## 2023-07-14 PROCEDURE — 80053 COMPREHEN METABOLIC PANEL: CPT | Performed by: HOSPITALIST

## 2023-07-14 PROCEDURE — 97162 PT EVAL MOD COMPLEX 30 MIN: CPT

## 2023-07-14 PROCEDURE — 83690 ASSAY OF LIPASE: CPT | Performed by: HOSPITALIST

## 2023-07-14 PROCEDURE — 25010000002 MAGNESIUM SULFATE 2 GM/50ML SOLUTION: Performed by: INTERNAL MEDICINE

## 2023-07-14 PROCEDURE — 82948 REAGENT STRIP/BLOOD GLUCOSE: CPT

## 2023-07-14 PROCEDURE — 25010000002 CEFTRIAXONE PER 250 MG: Performed by: HOSPITALIST

## 2023-07-14 PROCEDURE — 99221 1ST HOSP IP/OBS SF/LOW 40: CPT

## 2023-07-14 RX ORDER — POTASSIUM CHLORIDE 750 MG/1
60 TABLET, FILM COATED, EXTENDED RELEASE ORAL ONCE
Status: COMPLETED | OUTPATIENT
Start: 2023-07-14 | End: 2023-07-14

## 2023-07-14 RX ORDER — MAGNESIUM SULFATE HEPTAHYDRATE 40 MG/ML
2 INJECTION, SOLUTION INTRAVENOUS ONCE
Status: COMPLETED | OUTPATIENT
Start: 2023-07-14 | End: 2023-07-14

## 2023-07-14 RX ADMIN — HYDROCORTISONE ACETATE 1 APPLICATION: 1 CREAM TOPICAL at 08:42

## 2023-07-14 RX ADMIN — Medication 10 ML: at 20:16

## 2023-07-14 RX ADMIN — INSULIN LISPRO 4 UNITS: 100 INJECTION, SOLUTION INTRAVENOUS; SUBCUTANEOUS at 22:13

## 2023-07-14 RX ADMIN — MAGNESIUM SULFATE HEPTAHYDRATE 2 G: 2 INJECTION, SOLUTION INTRAVENOUS at 08:46

## 2023-07-14 RX ADMIN — PANTOPRAZOLE SODIUM 40 MG: 40 TABLET, DELAYED RELEASE ORAL at 08:52

## 2023-07-14 RX ADMIN — HYDROCORTISONE ACETATE 1 APPLICATION: 1 CREAM TOPICAL at 20:17

## 2023-07-14 RX ADMIN — POTASSIUM CHLORIDE 60 MEQ: 750 TABLET, EXTENDED RELEASE ORAL at 08:48

## 2023-07-14 RX ADMIN — NIFEDIPINE 30 MG: 30 TABLET, FILM COATED, EXTENDED RELEASE ORAL at 08:53

## 2023-07-14 RX ADMIN — Medication 10 ML: at 08:52

## 2023-07-14 RX ADMIN — SODIUM CHLORIDE 125 ML/HR: 9 INJECTION, SOLUTION INTRAVENOUS at 03:19

## 2023-07-14 RX ADMIN — ATORVASTATIN CALCIUM 10 MG: 20 TABLET, FILM COATED ORAL at 08:51

## 2023-07-14 RX ADMIN — CEFTRIAXONE SODIUM 1000 MG: 1 INJECTION, POWDER, FOR SOLUTION INTRAMUSCULAR; INTRAVENOUS at 11:48

## 2023-07-14 RX ADMIN — POTASSIUM CHLORIDE 40 MEQ: 750 TABLET, EXTENDED RELEASE ORAL at 08:48

## 2023-07-14 RX ADMIN — SENNOSIDES AND DOCUSATE SODIUM 2 TABLET: 50; 8.6 TABLET ORAL at 08:51

## 2023-07-14 RX ADMIN — INSULIN LISPRO 2 UNITS: 100 INJECTION, SOLUTION INTRAVENOUS; SUBCUTANEOUS at 12:57

## 2023-07-14 RX ADMIN — ONDANSETRON HYDROCHLORIDE 4 MG: 4 TABLET, FILM COATED ORAL at 20:16

## 2023-07-14 RX ADMIN — LINAGLIPTIN 5 MG: 5 TABLET, FILM COATED ORAL at 11:48

## 2023-07-14 RX ADMIN — OLANZAPINE 5 MG: 5 TABLET ORAL at 20:16

## 2023-07-14 NOTE — PLAN OF CARE
Goal Outcome Evaluation:  Plan of Care Reviewed With: patient      Pt is 81 y/o F admitted to [facility] on 7/12/23 with KAMILA, pt has hx of breast cancer. At baseline pt is indep with mobility, lives alone, 5 [address]. States she does not use AD for ambulation. Pt currently demos min A for bed mobility, CGA for transfers and ambulation up to 15', HHA as pt states she doesn't use AD. Mild unsteadiness noted and VC for safety throughout. Pt demos mobility below baseline and therefore would benefit from acute skilled PT to address functional mobility deficits. Anticipate d/c SNU as pt lives alone.              Anticipated Discharge Disposition (PT): skilled nursing facility

## 2023-07-14 NOTE — PROGRESS NOTES
Continued Stay Note  [facility]     Patient Name: Veronica Royal  MRN: [id number]  Today's Date: 7/14/2023    Admit Date: 7/12/2023    Plan: SNF placement   Discharge Plan       Row Name 07/14/23 1419       Plan    Plan SNF placement    Plan Comments Discussed with patient and her daughter, Norman Root, PT eval and recommendation that patient will require assistance at discharge. They are agreeable to SNF referral for short term rehab to [facility] [place], and [facility]. [facility] is not in network with her insurance. [facility] has no bed available today, but may have some opening up over weekend or by Tuesday. Will f/u with Norman for other choices. CKnabel                   Discharge Codes    No documentation.                 Expected Discharge Date and Time       Expected Discharge Date Expected Discharge Time    Jul 17, 2023               Renu Lemons RN

## 2023-07-14 NOTE — PLAN OF CARE
Goal Outcome Evaluation:  Plan of Care Reviewed With: patient        Progress: improving  Outcome Evaluation: Pt cont on cardiac monitoring. Receiving IV abx. Magnesium and potassium replaced this shift. L-chest port accessed with good blood return. Up in chair and and ambulated to bathroom with asstx1 and walker throughout shift.

## 2023-07-14 NOTE — CONSULTS
Gastroenterology   Initial Inpatient Consult Note    Referring Provider: Dr. Camacho    Reason for Consultation: Cirrhosis on imaging, nausea/vomiting    Subjective     History of present illness:    82 y.o. female patient of Dr. Mc who we have been asked to see for incidental finding of cirrhosis on imaging, nausea and vomiting after she was found to have an acute kidney injury during her oncology visit at [facility] after experiencing decreased oral intake.  She has a significant past medical history of breast cancer followed by Dr. Yee and treated with chemo therapy, chemotherapy induced diarrhea, renal mass followed by nephrology.    She was last evaluated in our office by CARL Shaver on 5/25/2023 with liver serologies to assess for underlying etiology to cirrhosis were completed which were negative and support a Farley induced cirrhosis.  MELD score calculated at this time was 10 with recommendation for follow-up monitoring in 6 months by way of liver ultrasound and AFP.    Patient reports that most concerning symptom at this time is proximal oropharyngeal dysphagia most notably with pills requiring forceful regurgitation.  Denies heartburn.  Reports that nausea and vomiting is controlled on current regimen.    Labs significant for hemoglobin 8.6, albumin 2.7, total protein 4.9, total bilirubin 0.4, transaminases within normal range.      Past Medical History:  Past Medical History:   Diagnosis Date    Basal cell carcinoma     Left thumb    Breast cancer 2023    Right    Diabetes mellitus     High cholesterol     Hypertension      Past Surgical History:  Past Surgical History:   Procedure Laterality Date    EYE SURGERY      Muscle repair age 21    VENOUS ACCESS DEVICE (PORT) INSERTION N/A 5/19/2023    Procedure: INSERTION VENOUS ACCESS DEVICE;  Surgeon: Rosa Michael MD;  [place] [facility] [place] [facility]  Service: General;  Laterality: N/A;      Social History:   Social History      Tobacco Use    Smoking status: Never    Smokeless tobacco: Never   Substance Use Topics    Alcohol use: Not Currently      Family History:  Family History   Problem Relation Age of Onset    Alzheimer's disease Mother     Heart disease Father     Heart attack Father     Ovarian cancer Sister     Pancreatic cancer Brother     Malig Hyperthermia Neg Hx     Colon cancer Neg Hx     Colon polyps Neg Hx     Crohn's disease Neg Hx     Irritable bowel syndrome Neg Hx     Ulcerative colitis Neg Hx        Home Meds:  Facility-Administered Medications Prior to Admission   Medication Dose Route Frequency Provider Last Rate Last Admin    [COMPLETED] ondansetron (ZOFRAN) injection 8 mg  8 mg Intravenous Once Sheila Yee MD   8 mg at 07/12/23 1014    [COMPLETED] sodium chloride 0.9 % infusion 1,000 mL  1,000 mL Intravenous Once Sheila Yee MD   Stopped at 07/12/23 1135     Medications Prior to Admission   Medication Sig Dispense Refill Last Dose    Cholecalciferol 50 MCG (2000 UT) tablet Take 1 tablet by mouth.   7/11/2023    glipiZIDE-metFORMIN (METAGLIP) 2.5-500 MG per tablet TAKE 1 TABLET BY MOUTH THREE TIMES DAILY(2 TABLETS IN THE MORNING AND 1 TABLET IN THE EVENING)   7/11/2023    glucose blood test strip Check BG once daily, E11.9 30 each 4 7/11/2023    glucose monitor monitoring kit Dispense glucometer with brand based off insurance coverage, check BG daily, E11.9 1 each 0 7/11/2023    hydrocortisone 1 % cream Apply 1 application topically to the appropriate area as directed 2 (Two) Times a Day. 453.6 g 0 7/11/2023    Lancets misc Check BG once daily, E11.9 30 each 4 7/11/2023    lidocaine-prilocaine (EMLA) 2.5-2.5 % cream Apply nickel size amount to port site 30 min before appt time do not rub in cover with plastic wrap 30 g 5 7/11/2023    loperamide (IMODIUM) 2 MG capsule Take 1 capsule by mouth Daily.   7/11/2023    NIFEdipine XL (PROCARDIA XL) 30 MG 24 hr tablet Take 1 tablet by mouth Daily. 30 tablet 11  7/11/2023    OLANZapine (ZyPREXA) 5 MG tablet Take 1 tablet by mouth Every Night. 30 tablet 3 7/11/2023    pantoprazole (PROTONIX) 40 MG EC tablet Take 1 tablet by mouth Daily. 30 tablet 3 7/11/2023    potassium chloride (K-DUR,KLOR-CON) 20 MEQ CR tablet Take 1 tablet by mouth 2 (Two) Times a Day. 60 tablet 1 7/11/2023    simvastatin (ZOCOR) 10 MG tablet Take 1 tablet by mouth Every Night.   7/11/2023    sodium bicarbonate 650 MG tablet Take 1 tablet by mouth.   7/11/2023    Tradjenta 5 MG tablet tablet Take 1 tablet by mouth Daily.   7/11/2023    valsartan-hydrochlorothiazide (DIOVAN-HCT) 160-12.5 MG per tablet Take 1 tablet by mouth Daily.   7/11/2023    dexamethasone (DECADRON) 4 MG tablet Take 2 tablets in the morning and afternoon the day before first treatment. Take Tuesday 5/23/2023 4 tablet 0 More than a month    diphenoxylate-atropine (LOMOTIL) 2.5-0.025 MG per tablet Take 1 tablet by mouth 4 (Four) Times a Day As Needed for Diarrhea. 60 tablet 0 Unknown    ondansetron (ZOFRAN) 8 MG tablet Take 1 tablet by mouth 3 (Three) Times a Day As Needed for Nausea or Vomiting. 30 tablet 5     prochlorperazine (COMPAZINE) 10 MG tablet Take 1 tablet by mouth Every 6 (Six) Hours As Needed for Nausea or Vomiting. 60 tablet 3 Unknown     Current Meds:   atorvastatin, 10 mg, Oral, Daily  cefTRIAXone, 1,000 mg, Intravenous, Q24H  hydrocortisone, 1 application , Topical, BID  insulin lispro, 2-9 Units, Subcutaneous, 4x Daily AC & at Bedtime  linagliptin, 5 mg, Oral, Daily  NIFEdipine XL, 30 mg, Oral, Daily  OLANZapine, 5 mg, Oral, Nightly  pantoprazole, 40 mg, Oral, Daily  potassium chloride, 40 mEq, Oral, Daily  senna-docusate sodium, 2 tablet, Oral, BID  sodium chloride, 10 mL, Intravenous, Q12H      Allergies:  No Known Allergies  Review of Systems  Pertinent items are noted in HPI, all other systems reviewed and negative    Objective     Vital Signs  Temp:  [97.6 °F (36.4 °C)-100.3 °F (37.9 °C)] 100.3 °F (37.9 °C)  Heart  Rate:  [] 96  Resp:  [16-18] 16  BP: (112-130)/(60-76) 124/61    Physical Exam:  CONSTITUTIONAL:  today's vital signs reviewed  EARS NOSE THROAT: trachea midline and no deformity of the nares  EYES: no scleral icterus  GASTROINTESTINAL: abdomen is soft, nontender, nondistended with normal active bowel sounds, no masses are appreciated  PSYCHIATRIC: appropriate mood and affect  RESPIRATORY: normal inspiratory effort with no increased work of breathing  NEUROLOGIC: patient is awake and alert  DERMATOLOGIC: skin is warm with no cyanosis  LYMPHATIC: no periumbilical lymphadenopathy     Results Review:              I reviewed the patient's new clinical results.    Results from last 7 days   Lab Units 07/14/23  0538 07/13/23  0602 07/12/23  0854   WBC 10*3/mm3 6.42 5.56 10.22   HEMOGLOBIN g/dL 8.6* 8.0* 10.8*   HEMATOCRIT % 26.2* 24.6* 32.9*   PLATELETS 10*3/mm3 235 234 340     Results from last 7 days   Lab Units 07/14/23  0538 07/13/23  1557 07/13/23  0602 07/12/23  1619 07/12/23  0854   SODIUM mmol/L 138  --  140  141 141 138   POTASSIUM mmol/L 3.2* 3.5 2.5*  2.6* 3.1* 3.1*   CHLORIDE mmol/L 114*  --  114*  113* 111* 104   CO2 mmol/L 15.9*  --  14.7*  15.4* 18.0* 14.4*   BUN mg/dL 14  --  26*  26* 33* 37*   CREATININE mg/dL 0.93  --  1.41*  1.43* 1.99* 2.63*   CALCIUM mg/dL 8.3*  --  8.7  8.6 9.8 10.3*   BILIRUBIN mg/dL 0.4  --  0.4  --  0.4   ALK PHOS U/L 50  --  46  --  57   ALT (SGPT) U/L 13  --  14  --  20   AST (SGOT) U/L 16  --  15  --  14   GLUCOSE mg/dL 199*  --  136*  136* 173* 238*         Lab Results   Lab Value Date/Time    LIPASE 24 07/14/2023 0538    LIPASE 21 07/12/2023 1619       Radiology:  No orders to display       Assessment & Plan   Active Hospital Problems    Diagnosis     **KAMILA (acute kidney injury)     Acute UTI (urinary tract infection)     Hypomagnesemia     Hypokalemia     Anemia due to chemotherapy     Left renal mass     Cirrhosis of liver     Type 2 diabetes mellitus      Hypertension     Malignant neoplasm of upper-outer quadrant of right breast in female, estrogen receptor negative        Assessment:  Malignant neoplasm of upper outer quadrant of right breast  KAMILA likely secondary to #6  Anemia due to chemotherapy  Left renal mass  Type 2 diabetes  Cirrhosis of the liver, negative outpatient liver serologies  Oropharyngeal dysphagia with pills  Nausea and vomiting, improved      Plan:  Continue with supportive care and antiemetics per primary team  Outpatient liver serologies for cirrhosis unremarkable for underlying etiology and likely secondary to TELLEZ  For oropharyngeal dysphagia was placed for bedside speech eval and appreciate recommendations  Clear liquid diet  Monitor H&H per primary  Follow CBC and CMP, monitor for overt signs of GI bleeding.    Thank you for allowing us to participate in the care of this patient.   Please call us with questions or if we can be of further assistance.     Discussed negative outpatient work-up for underlying etiology to cirrhosis and likely secondary to TELLEZ.  They are agreeable with proceeding with bedside speech eval.    I discussed the patients findings and my recommendations with patient, family, and Dr. Mc .             CARL Mcgregor  [facility]  [address] [place] [address]

## 2023-07-14 NOTE — THERAPY EVALUATION
Patient Name: Veronica Royal  : 1940    MRN: [id number]                              Today's Date: 2023       Admit Date: 2023    Visit Dx: No diagnosis found.  Patient Active Problem List   Diagnosis    Malignant neoplasm of upper-outer quadrant of right breast in female, estrogen receptor negative    Encounter for fitting and adjustment of vascular catheter    Hypertension    At high risk for malnutrition    Bilateral hearing loss    Malignant neoplasm of female breast    Advanced care planning/counseling discussion    KAMILA (acute kidney injury)    Cirrhosis of liver    Type 2 diabetes mellitus    Hypokalemia    Anemia due to chemotherapy    Left renal mass    Acute UTI (urinary tract infection)    Hypomagnesemia     Past Medical History:   Diagnosis Date    Basal cell carcinoma     Left thumb    Breast cancer     Right    Diabetes mellitus     High cholesterol     Hypertension      Past Surgical History:   Procedure Laterality Date    EYE SURGERY      Muscle repair age 21    VENOUS ACCESS DEVICE (PORT) INSERTION N/A 2023    Procedure: INSERTION VENOUS ACCESS DEVICE;  Surgeon: Rosa Michael MD;  Location: [facility] [place];  Service: General;  Laterality: N/A;      General Information       Row Name 23 1125          Physical Therapy Time and Intention    Document Type evaluation  -ST     Mode of Treatment individual therapy;physical therapy  -       Row Name 23 1125          General Information    Patient Profile Reviewed yes  -ST     Prior Level of Function independent:;all household mobility;community mobility  -ST     Existing Precautions/Restrictions fall  -ST     Barriers to Rehab previous functional deficit  -       Row Name 23 1125          Living Environment    People in Home alone  -ST       Row Name 23 1125          Home Main Entrance    Number of Stairs, Main Entrance five  -ST     Stair Railings, Main Entrance none  -ST       Row Name 23  1125          Stairs Within Home, Primary    Number of Stairs, Within Home, Primary none  -       Row Name 07/14/23 1125          Cognition    Orientation Status (Cognition) oriented x 3  -       Row Name 07/14/23 1125          Safety Issues, Functional Mobility    Safety Issues Affecting Function (Mobility) awareness of need for assistance;insight into deficits/self-awareness  -     Impairments Affecting Function (Mobility) balance;endurance/activity tolerance;strength  -     Comment, Safety Issues/Impairments (Mobility) gait belt, nonskid socks donned  -               User Key  (r) = Recorded By, (t) = Taken By, (c) = Cosigned By      Initials Name Provider Type    Krys Lamas PT Physical Therapist                   Mobility       Row Name 07/14/23 1126          Bed Mobility    Bed Mobility sit-supine  -     Sit-Supine Crowley (Bed Mobility) minimum assist (75% patient effort)  -     Assistive Device (Bed Mobility) head of bed elevated;bed rails  -[facility] Name 07/14/23 1126          Sit-Stand Transfer    Sit-Stand Crowley (Transfers) 1 person assist;contact guard  -[facility] 07/14/23 1126          Gait/Stairs (Locomotion)    Crowley Level (Gait) verbal cues;contact guard  -     Distance in Feet (Gait) 15' HHA  -     Deviations/Abnormal Patterns (Gait) bilateral deviations;gregory decreased;gait speed decreased;stride length decreased  -     Bilateral Gait Deviations forward flexed posture  -     Comment, (Gait/Stairs) short shuffling steps noted  -               User Key  (r) = Recorded By, (t) = Taken By, (c) = Cosigned By      Initials Name Provider Type    Krys Lamas PT Physical Therapist                   Obj/Interventions       Row Name 07/14/23 1126          Range of Motion Comprehensive    General Range of Motion no range of motion deficits identified  -       Row Name 07/14/23 1126          Strength Comprehensive (MMT)     Comment, General Manual Muscle Testing (MMT) Assessment bilat LE WFL but grossly 3+/5  -ST       Row Name 07/14/23 1126          Balance    Comment, Balance CGA for dynamic balance, mild unsteadiness with ambulation but no LOB  -ST               User Key  (r) = Recorded By, (t) = Taken By, (c) = Cosigned By      Initials Name Provider Type    ST Michael, Krys, PT Physical Therapist                   Goals/Plan       Row Name 07/14/23 1127          Bed Mobility Goal 1 (PT)    Activity/Assistive Device (Bed Mobility Goal 1, PT) bed mobility activities, all  -ST     Addison Level/Cues Needed (Bed Mobility Goal 1, PT) modified independence  -ST     Time Frame (Bed Mobility Goal 1, PT) 1 week  -ST     Progress/Outcomes (Bed Mobility Goal 1, PT) new goal  -ST       Row Name 07/14/23 1127          Transfer Goal 1 (PT)    Activity/Assistive Device (Transfer Goal 1, PT) sit-to-stand/stand-to-sit;bed-to-chair/chair-to-bed  -ST     Addison Level/Cues Needed (Transfer Goal 1, PT) modified independence  -ST     Time Frame (Transfer Goal 1, PT) 1 week  -ST     Progress/Outcome (Transfer Goal 1, PT) new goal  -ST       Row Name 07/14/23 1127          Gait Training Goal 1 (PT)    Activity/Assistive Device (Gait Training Goal 1, PT) gait (walking locomotion);assistive device use  -ST     Addison Level (Gait Training Goal 1, PT) modified independence  -ST     Distance (Gait Training Goal 1, PT) 100'  -ST     Time Frame (Gait Training Goal 1, PT) 1 week  -ST     Progress/Outcome (Gait Training Goal 1, PT) new goal  -ST               User Key  (r) = Recorded By, (t) = Taken By, (c) = Cosigned By      Initials Name Provider Type    ST Michael Krys, PT Physical Therapist                   Clinical Impression       Row Name 07/14/23 1127          Pain    Pretreatment Pain Rating 0/10 - no pain  -ST     Posttreatment Pain Rating 0/10 - no pain  -ST       Row Name 07/14/23 1127          Plan of Care Review     Plan of Care Reviewed With patient  -ST     Outcome Evaluation Pt is 81 y/o F admitted to [facility] on 7/12/23 with KAMILA, pt has hx of breast cancer. At baseline pt is indep with mobility, lives alone, 5 GLENNA. States she does not use AD for ambulation. Pt currently demos min A for bed mobility, CGA for transfers and ambulation up to 15', HHA as pt states she doesn't use AD. Mild unsteadiness noted and VC for safety throughout. Pt demos mobility below baseline and therefore would benefit from acute skilled PT to address functional mobility deficits. Anticipate d/c SNU as pt lives alone.  -ST       Row Name 07/14/23 1127          Therapy Assessment/Plan (PT)    Rehab Potential (PT) fair, will monitor progress closely  -ST     Criteria for Skilled Interventions Met (PT) yes  -ST     Therapy Frequency (PT) 5 times/wk  -ST     Predicted Duration of Therapy Intervention (PT) 1 week  -ST       Row Name 07/14/23 1127          Positioning and Restraints    Pre-Treatment Position in bed  -ST     Post Treatment Position chair  -ST     In Chair reclined;call light within reach;encouraged to call for assist;exit alarm on  -ST               User Key  (r) = Recorded By, (t) = Taken By, (c) = Cosigned By      Initials Name Provider Type    Krys Lamas, PT Physical Therapist                   Outcome Measures       Row Name 07/14/23 1128 07/14/23 0900       How much help from another person do you currently need...    Turning from your back to your side while in flat bed without using bedrails? 4  -ST 4  -ME    Moving from lying on back to sitting on the side of a flat bed without bedrails? 3  -ST 3  -ME    Moving to and from a bed to a chair (including a wheelchair)? 3  -ST 3  -ME    Standing up from a chair using your arms (e.g., wheelchair, bedside chair)? 3  -ST 3  -ME    Climbing 3-5 steps with a railing? 2  -ST 2  -ME    To walk in hospital room? 3  -ST 3  -ME    AM-PAC 6 Clicks Score (PT) 18  -ST 18  -ME    Highest level  of mobility 6 --> Walked 10 steps or more  - 6 --> Walked 10 steps or more  -ME              User Key  (r) = Recorded By, (t) = Taken By, (c) = Cosigned By      Initials Name Provider Type    ME Juliet Woodruff, RN Registered Nurse    Krys Lamas PT Physical Therapist                                 Physical Therapy Education       Title: PT OT SLP Therapies (In Progress)       Topic: Physical Therapy (In Progress)       Point: Mobility training (In Progress)       Learning Progress Summary             Patient Acceptance, E,TB, NR by  at 7/14/2023 1128                         Point: Home exercise program (Not Started)       Learner Progress:  Not documented in this visit.              Point: Body mechanics (In Progress)       Learning Progress Summary             Patient Acceptance, E,TB, NR by  at 7/14/2023 1128                         Point: Precautions (Not Started)       Learner Progress:  Not documented in this visit.                              User Key       Initials Effective Dates Name Provider Type Discipline     09/22/22 -  Krys Rodriguez PT Physical Therapist PT                  PT Recommendation and Plan     Plan of Care Reviewed With: patient  Outcome Evaluation: Pt is 81 y/o F admitted to [facility] on 7/12/23 with KAMILA, pt has hx of breast cancer. At baseline pt is indep with mobility, lives alone, 5 GLENNA. States she does not use AD for ambulation. Pt currently demos min A for bed mobility, CGA for transfers and ambulation up to 15', HHA as pt states she doesn't use AD. Mild unsteadiness noted and VC for safety throughout. Pt demos mobility below baseline and therefore would benefit from acute skilled PT to address functional mobility deficits. Anticipate d/c SNU as pt lives alone.     Time Calculation:    PT Charges       Row Name 07/14/23 1130             Time Calculation    Start Time 1046  -ST      Stop Time 1056  -ST      Time Calculation (min) 10 min  -ST      PT Received On  07/14/23  -ST      PT - Next Appointment 07/17/23  -ST      PT Goal Re-Cert Due Date 07/21/23  -ST         Time Calculation- PT    Total Timed Code Minutes- PT 0 minute(s)  -ST                User Key  (r) = Recorded By, (t) = Taken By, (c) = Cosigned By      Initials Name Provider Type    Krys Lamas, PT Physical Therapist                  Therapy Charges for Today       Code Description Service Date Service Provider Modifiers Qty    65100741913 HC PT EVAL MOD COMPLEXITY 2 7/14/2023 Krys Rodriguez, PT GP 1            PT G-Codes  Outcome Measure Options: AM-PAC 6 Clicks Daily Activity (OT)  AM-PAC 6 Clicks Score (PT): 18  AM-PAC 6 Clicks Score (OT): 21  PT Discharge Summary  Anticipated Discharge Disposition (PT): skilled nursing facility    Krys Rodriguez, PT  7/14/2023     none

## 2023-07-14 NOTE — PROGRESS NOTES
[facility] GROUP INPATIENT PROGRESS NOTE    Length of Stay:  0 days    CHIEF COMPLAINT/REASON FOR VISIT:  Breast cancer currently on neoadjuvant therapy with Taxol, Herceptin, Perjeta  Adverse effects of therapy including nausea, vomiting, diarrhea  Volume depletion  Acute kidney injury  Hypokalemia    SUBJECTIVE: Afebrile. Normotensive. On 2L NC.  Keeping liquids down. Denies pain.      ROS:  14 systems reviewed with pertinent positives and negatives in the HPI. Reviewed today.    OBJECTIVE:  Vitals:    07/13/23 1544 07/13/23 2011 07/14/23 0533 07/14/23 0747   BP: 130/76 112/60 118/64 124/65   BP Location: Left arm Left arm Left arm Left arm   Patient Position: Lying Lying Lying Lying   Pulse: 74 76 103 89   Resp: 18 16 16 16   Temp: 97.6 °F (36.4 °C) 99.8 °F (37.7 °C) 97.6 °F (36.4 °C) 98.7 °F (37.1 °C)   TempSrc: Oral Oral Oral Oral   SpO2: 98% 97% 93% 96%   Weight:       Height:             PHYSICAL EXAMINATION:   General: NAD, in bed, alert/oriented  Chest/Lungs: CTAB, mediport present  Heart: RRR  Abdomen/GI: soft, NT, ND, NABS  Extremities: WWP, no edema  Skin: erythema on the arms improved    DIAGNOSTIC DATA:  Results Review:     I reviewed the patient's new clinical results.    Results from last 7 days   Lab Units 07/14/23  0538   WBC 10*3/mm3 6.42   HEMOGLOBIN g/dL 8.6*   HEMATOCRIT % 26.2*   PLATELETS 10*3/mm3 235     Lab Results   Component Value Date    NEUTROABS 4.34 07/13/2023     Results from last 7 days   Lab Units 07/14/23  0538   SODIUM mmol/L 138   POTASSIUM mmol/L 3.2*   CHLORIDE mmol/L 114*   CO2 mmol/L 15.9*   BUN mg/dL 14   CREATININE mg/dL 0.93   GLUCOSE mg/dL 199*   CALCIUM mg/dL 8.3*         Results from last 7 days   Lab Units 07/14/23  0538   MAGNESIUM mg/dL 1.2*         IMAGING:    None reviewed    ASSESSMENT:  This is a 82 y.o. 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  Invasive ductal carcinoma with apocrine features, grade 3, Ki-67 38%  CT scans and bone scan without any obvious evidence of metastatic disease  Plan Taxol Herceptin and Perjeta, and if she tolerates this well we will proceed with Adriamycin and cyclophosphamide.  Echocardiogram has been performed and ejection fraction normal.  Liver shows cirrhotic morphology.  Mediport placed 5/19/2023  Taxol, Herceptin, Perjeta initiated 5/24/2023 5/31/2023 C1D8 Taxol  6/28/2023-cycle 2-day 15 of Taxol.  She is overall tolerating therapy well with expected side effects.  7/5/23: cycle 3 day 1 and she received taxol, herceptin, perjeta  7/12/2023-cycle 3-day 8 of TPH.  Taxol held due to severe dehydration, poorly controlled nausea vomiting, KAMILA. Admitted for these symptoms.     *Diabetes  Poorly controlled  Evaluation by endocrinology 5/30/2023 with recommendations for dietary changes and home blood sugar monitoring.  The patient's daughter reports today she is struggling with compliance.  Her oral intake is still fairly poor.  Emphasized on regular food intake to maintain blood sugars.  According to daughter blood sugars have been elevated in the 300s and 400s.  They are not able to control blood sugars adequately.     *Hyperkalemia and chronic kidney disease  Unclear etiology  Could be secondary to RTA  She has an appointment with nephrology.  6/14/2023 CMP reviewed and creatinine elevated at 1.47.  500 cc of normal saline will be administered today. Patient will be brought back again on Friday for additional fluids.  7/5/2023 Potassium 3.1.  Upon further questioning patient was seen by nephrology this past Friday was found to have an elevated potassium at their office and prescribed Kayexalate, this potentially the increase of her diarrhea.  She was advised to discontinue the Kayexalate.  Potassium low at 2.9 on admission 7/12.     *KAMILA/CKD  Creatinine elevated at 2.63, BUN 37 at presentation on 7/12.   Secondary to severe volume depletion.  Calcium also elevated at 10.3  Reason for admission  Creatinine 0.93, from 1.43, from 1.99, from 2.63, from 0.95     *Cirrhotic appearance of the liver on the CT scan  Referred to gastroenterology  Synthetic function appears to be normal  We will start with Taxol to see if she would tolerate the chemotherapy   Slight elevation of intervention studies today with ALT 43, AST 55.  Continue to monitor weekly  6/28/2023-mild elevation in the liver labs.  Stable compared to previous labs.  Okay to proceed with chemotherapy.     *Decreased oral intake secondary to chemotherapy  She has lost a significant amount of weight..  Continue follow-up with nutrition  Continue entergrade outpatient     *Frequent eructation  Continue Protonix 40 mg daily     *Cognitive impairment  It is unclear if this is the patient's baseline or mental status has been exacerbated by recent chemotherapy  The patient is struggling with compliance with her medications.  The patient's daughter expresses frustration today as the patient is struggling to care for herself at home with managing medications and symptom management.  Evaluated by CARL Antonio      *Chemotherapy-induced diarrhea  6/5/2023 patient given Enterade (Wild Berry flavor).  She has been drinking 1 a day.  She does not necessarily like the flavor (currently mixing with sugar-free Aramis-Aid which does help).  Encouraged her to increase to 2 a day.  6/21/2023 patient reports that she had stopped drinking her Enterade because she was waking up in the middle the night having diarrhea although she did not know if it was due to the Enterade her protein shakes.  I have encouraged the patient to continue Enterade, drinking it in the morning.  Try discontinuing the protein shakes and see how she does.  Patient states that she will try this  Reports 2-3 loose stools.  Continue Enterade and Imodium.  7/5/2023 continuing to have issues with diarrhea  up to 3-4 bowel movements a day, patient also recently prescribed Kayexalate for an apparently elevated potassium.  Potassium only 3.1 today.  She advised to discontinue the Kayexalate she was given IV hydration today.  We will also prescribe Lomotil to use if needed to help better control her diarrhea.  Patient is not drinking Enterade regularly.  This is better controlled with Lomotil     *Maculopapular rash  Secondary to HER2 directed therapy  Recommend starting topical steroids to help with the rash  She quit taking doxycycline as she developed a blister with that.  Medrol Dosepak prescribed.  Continue topical steroids     *Severe nausea and vomiting  Patient has been using Zofran regularly.  Will nee to utilize Zyprexa and Compazine for future outpatient use.  Reason for admission  Olanzapine 5 mg at night recently initiated     *Normocytic anemia  Likely due to chemo and IV fluid hydration  Hgb 8.6, from 8.0, from 10.8, from 9.7     *Renal u/s on 7/6 with possible left renal mass  Not noted on CT imaging from 5/11/23. No further imaging at this time.      *UTI  Urine culture with >100,000 GNR  On Rocephin    RECOMMENDATIONS/PLAN:  Holding chemotherapy which has been poorly tolerated  IV fluids to be stopped today  Potassium repletion  Rocephin continues, urine culture final results pending  Nephrology following. Valsartan and HCTZ being held for now and at discharge  Topical steroids for the rash  Symptom management for nausea and vomiting.  She continues olanzapine 5 mg nightly.  Can further evaluate L kidney as an outpatient when she's clinically doing better and labs have normalized. Breast cancer needs attention first.  Nothing was noted in the left kidney on CT imaging done on 5/11/2023  Dr. Yee plans to obtain an ultrasound of the right breast as an outpatient and then discuss with Dr. Michael the possibility of surgery given poor tolerance of therapy  Daily labs while admitted  Will follow    Kingsley  MD Angel

## 2023-07-14 NOTE — PROGRESS NOTES
Name: Veronica Royal ADMIT: 2023   : 1940  PCP: Princess Cruz MD    MRN: [id number] LOS: 0 days   AGE/SEX: 82 y.o. female  ROOM: [facility]     Subjective   Subjective   Feeling better again this AM. Tolerating FLD. No N/V. No choking. Voiding well. She does report frequency and urgency. No other LUTS. No abd pain or flank pain. No F/C/NS. No SOA or CP or palp. Had one episode loose stool this AM.       Objective   Objective   Vital Signs  Temp:  [97.4 °F (36.3 °C)-99.8 °F (37.7 °C)] 98.7 °F (37.1 °C)  Heart Rate:  [] 89  Resp:  [16-18] 16  BP: (112-130)/(60-81) 124/65  SpO2:  [93 %-100 %] 96 %  on  Flow (L/min):  [2] 2;   Device (Oxygen Therapy): nasal cannula  Body mass index is 24.46 kg/m².    (No change in exam today)    Physical Exam  Vitals and nursing note reviewed.   Constitutional:       General: She is not in acute distress.     Appearance: She is ill-appearing (chronically). She is not toxic-appearing or diaphoretic.   HENT:      Head: Normocephalic.      Nose: Nose normal.      Mouth/Throat:      Mouth: Mucous membranes are moist.      Pharynx: Oropharynx is clear.   Eyes:      General: No scleral icterus.        Right eye: No discharge.         Left eye: No discharge.      Extraocular Movements: Extraocular movements intact.      Conjunctiva/sclera: Conjunctivae normal.   Cardiovascular:      Rate and Rhythm: Normal rate and regular rhythm.      Pulses: Normal pulses.      Comments: Port left upper chest  Pulmonary:      Effort: Pulmonary effort is normal. No respiratory distress.      Breath sounds: Normal breath sounds. No wheezing or rales.   Abdominal:      General: Bowel sounds are normal. There is no distension.      Palpations: Abdomen is soft.      Tenderness: There is no abdominal tenderness.   Musculoskeletal:         General: Swelling (doughy chronic edema in BLEs) present. No deformity. Normal range of motion.      Cervical back: Neck supple. No rigidity.    Lymphadenopathy:      Cervical: No cervical adenopathy.   Skin:     General: Skin is warm and dry.      Capillary Refill: Capillary refill takes less than 2 seconds.      Coloration: Skin is not jaundiced.      Findings: Rash (scattered across upper extremities and right cheek) present.   Neurological:      General: No focal deficit present.      Mental Status: She is alert and oriented to person, place, and time. Mental status is at baseline.      Cranial Nerves: No cranial nerve deficit.      Coordination: Coordination normal.   Psychiatric:         Mood and Affect: Mood normal.         Behavior: Behavior normal.     Results Review     I reviewed the patient's new clinical results.  Results from last 7 days   Lab Units 07/14/23 0538 07/13/23  0602 07/12/23  0854 07/07/23  1421   WBC 10*3/mm3 6.42 5.56 10.22 6.19   HEMOGLOBIN g/dL 8.6* 8.0* 10.8* 9.7*   PLATELETS 10*3/mm3 235 234 340 236       Results from last 7 days   Lab Units 07/14/23 0538 07/13/23  1557 07/13/23  0602 07/12/23  1619 07/12/23  0854   SODIUM mmol/L 138  --  140  141 141 138   POTASSIUM mmol/L 3.2* 3.5 2.5*  2.6* 3.1* 3.1*   CHLORIDE mmol/L 114*  --  114*  113* 111* 104   CO2 mmol/L 15.9*  --  14.7*  15.4* 18.0* 14.4*   BUN mg/dL 14  --  26*  26* 33* 37*   CREATININE mg/dL 0.93  --  1.41*  1.43* 1.99* 2.63*   GLUCOSE mg/dL 199*  --  136*  136* 173* 238*   EGFR mL/min/1.73 61.5  --  37.3*  36.7* 24.7* 17.7*       Results from last 7 days   Lab Units 07/14/23  0538 07/13/23  0602 07/12/23  0854 07/07/23  1421   ALBUMIN g/dL 2.7* 2.7*  2.7* 3.5 3.5   BILIRUBIN mg/dL 0.4 0.4 0.4 0.4   ALK PHOS U/L 50 46 57 47   AST (SGOT) U/L 16 15 14 25   ALT (SGPT) U/L 13 14 20 19       Results from last 7 days   Lab Units 07/14/23  0538 07/13/23  0602 07/12/23  1619 07/12/23  0854 07/07/23  1421   CALCIUM mg/dL 8.3* 8.7  8.6 9.8 10.3* 8.7   ALBUMIN g/dL 2.7* 2.7*  2.7*  --  3.5 3.5   MAGNESIUM mg/dL 1.2* 1.4* 1.8  --  1.6   PHOSPHORUS mg/dL  --   2.3*  --   --   --          Hemoglobin A1C   Date/Time Value Ref Range Status   07/13/2023 0602 8.60 (H) 4.80 - 5.60 % Final     Glucose   Date/Time Value Ref Range Status   07/14/2023 0749 212 (H) 70 - 130 mg/dL Final     Comment:     Meter: [id number] : [id number] Jason Andrea NA   07/14/2023 0009 170 (H) 70 - 130 mg/dL Final     Comment:     Meter: [id number] : [id number] Erik Dumont NA   07/13/2023 2017 205 (H) 70 - 130 mg/dL Final     Comment:     Meter: [id number] : [id number] Robb Carson NA   07/13/2023 1715 158 (H) 70 - 130 mg/dL Final     Comment:     Meter: [id number] : [id number] Mamedova Saodat NA   07/13/2023 1117 144 (H) 70 - 130 mg/dL Final     Comment:     Meter: [id number] : [id number] Mamedova Saodat NA   07/13/2023 0744 144 (H) 70 - 130 mg/dL Final     Comment:     Meter: [id number] : [id number] Mamedova Saodat NA   07/12/2023 2024 192 (H) 70 - 130 mg/dL Final     Comment:     Meter: [id number] : [id number] Gurpreet Velascoleon NA       No radiology results for the last day  I have personally reviewed all medications:  Scheduled Medications  atorvastatin, 10 mg, Oral, Daily  hydrocortisone, 1 application , Topical, BID  insulin lispro, 2-9 Units, Subcutaneous, 4x Daily AC & at Bedtime  NIFEdipine XL, 30 mg, Oral, Daily  OLANZapine, 5 mg, Oral, Nightly  pantoprazole, 40 mg, Oral, Daily  potassium chloride, 40 mEq, Oral, Daily  senna-docusate sodium, 2 tablet, Oral, BID  sodium chloride, 10 mL, Intravenous, Q12H    Infusions     Diet  Diet: Liquid Diets, Diabetic Diets; Full Liquid; Consistent Carbohydrate; Texture: Regular Texture (IDDSI 7); Fluid Consistency: Thin (IDDSI 0)    I have personally reviewed:  [x]  Laboratory   []  Microbiology   []  Radiology   []  EKG/Telemetry  []  Cardiology/Vascular   []  Pathology    []  Records       Assessment/Plan     Active Hospital Problems    Diagnosis  POA    **KAMILA (acute kidney injury) [N17.9]  Yes    Hypokalemia [E87.6]   Yes    Anemia due to chemotherapy [D64.81, T45.1X5A]  Yes    Left renal mass [N28.89]  Yes    Cirrhosis of liver [K74.60]  Yes    Type 2 diabetes mellitus [E11.9]  Yes    Hypertension [I10]  Yes    Malignant neoplasm of upper-outer quadrant of right breast in female, estrogen receptor negative [C50.411, Z17.1]  Not Applicable      Resolved Hospital Problems   No resolved problems to display.       83yo woman with h/o DM2, cirrhosis, HTN, HLD, left renal mass, and breast CA, who was directly admitted to our service from Heme/Onc office with KAMILA due to N/V/D and poor po intake.    KAMILA/CKD?: appreciate Renal attention to pt, Cr has normalized with IVFs--IVFs dc'd today, suspect due to dehydration from next dx    UTI: UA ordered as part of KAMILA w/u was positive for infection, urine culture growing >100k GNR, will start IV CTX while culture pending    N/V/D: Dr. Yee feels due to chemotx intolerance, will ask GI to weigh in as below, much improved today    Dysphagia: SLP eval noted, swallow difficulties due to dry mouth, tolerating current FLD, will advance to regular today    Hypokalemia  Hypomagnesemia: Renal is replacing K+ orally and Mg++ IV    Cirrhosis NOS: noted on imaging, LFTs wnl, have asked GI to see here for N/V/D, pt seen in their office on May 25th, they felt her cirrhosis was most likely due to TELLEZ    Left renal mass: asked  to see for further eval/recs--they recommended simply repeating CT in 3-4 months    DM2: OHGs on hold, sugars acceptable on SSI alone for now but of course po intake is fairly limited, A1c 8.6, restart Tradjenta today    HTN: BPs remain acceptable on Nifedipine, continue to hold Diovan-HCT given her KAMILA    Breast CA: Heme/Onc consulted, not tolerating chemotx very well with GI symptoms and skin rash (on topical steroid per Heme/Onc)    SCDs for DVT prophylaxis.  Full code.  Discussed with patient and RN.  Anticipate discharge TBD . PT eval pending.        Kannan Camacho,  MD [facility]  07/14/23  09:12 EDT

## 2023-07-14 NOTE — PROGRESS NOTES
"Nutrition Services    Patient Name:  Veronica Royal  YOB: 1940  MRN: [id number]  Admit Date:  7/12/2023  Assessment Date:  07/14/23    FOLLOW UP - CLINICAL NUTRITION    Encounter Information         Reason for Encounter RD f/u.       Current Issues Spoke with pt at bedside who reports poor PO intake for prolonged period of time. Endorses decreased appetite, taste change (every tastes too salty), dry mouth and intermittent nausea past several months. Unsure of weight loss PTA. NFPE completed with moderate/severe subcutaneous fat and muscle wasting observed. Agreeable to ONS TID with meals. Will plan to provide pt with information on management of cancer treatment-related side effects on nutrition. RD will continue to follow course for PO intake and tolerance.      Current Nutrition Orders & Evaluation of Intake       Oral Nutrition     Current PO Diet Diet: Regular/House Diet, Diabetic Diets; Consistent Carbohydrate; Texture: Regular Texture (IDDSI 7); Fluid Consistency: Thin (IDDSI 0)   Supplement Boost Breeze, Boost Soothe, TID   PO Evaluation     % PO Intake % x 3 meals     # of Days Evaluated 2    Factors Affecting Intake  decreased appetite, nausea, taste changes, Other:dry mouth    --  Anthropometrics          Height    Weight Height: 154.9 cm (60.98\")  Weight: 58.7 kg (129 lb 6.4 oz) (07/12/23 1630)    BMI kg/m2 Body mass index is 24.46 kg/m².  Normal/Healthy (18.4 - 24.9)    Weight trend No new weight available     Labs        Pertinent Labs Reviewed, listed below     Results from last 7 days   Lab Units 07/14/23  0538 07/13/23  1557 07/13/23  0602 07/12/23  1619 07/12/23  0854   SODIUM mmol/L 138  --  140  141 141 138   POTASSIUM mmol/L 3.2* 3.5 2.5*  2.6* 3.1* 3.1*   CHLORIDE mmol/L 114*  --  114*  113* 111* 104   CO2 mmol/L 15.9*  --  14.7*  15.4* 18.0* 14.4*   BUN mg/dL 14  --  26*  26* 33* 37*   CREATININE mg/dL 0.93  --  1.41*  1.43* 1.99* 2.63*   CALCIUM mg/dL 8.3*  --  " 8.7  8.6 9.8 10.3*   BILIRUBIN mg/dL 0.4  --  0.4  --  0.4   ALK PHOS U/L 50  --  46  --  57   ALT (SGPT) U/L 13  --  14  --  20   AST (SGOT) U/L 16  --  15  --  14   GLUCOSE mg/dL 199*  --  136*  136* 173* 238*     Results from last 7 days   Lab Units 07/14/23  0538 07/13/23  0602 07/12/23  1619   MAGNESIUM mg/dL 1.2* 1.4* 1.8   PHOSPHORUS mg/dL  --  2.3*  --    HEMOGLOBIN g/dL 8.6* 8.0*  --    HEMATOCRIT % 26.2* 24.6*  --    WBC 10*3/mm3 6.42 5.56  --    ALBUMIN g/dL 2.7* 2.7*  2.7*  --      Results from last 7 days   Lab Units 07/14/23 0538 07/13/23  0602 07/12/23  0854   PLATELETS 10*3/mm3 235 234 340     No results found for: COVID19  Lab Results   Component Value Date    HGBA1C 8.60 (H) 07/13/2023          Medications            Scheduled Medications atorvastatin, 10 mg, Oral, Daily  cefTRIAXone, 1,000 mg, Intravenous, Q24H  hydrocortisone, 1 application , Topical, BID  insulin lispro, 2-9 Units, Subcutaneous, 4x Daily AC & at Bedtime  linagliptin, 5 mg, Oral, Daily  NIFEdipine XL, 30 mg, Oral, Daily  OLANZapine, 5 mg, Oral, Nightly  pantoprazole, 40 mg, Oral, Daily  potassium chloride, 40 mEq, Oral, Daily  senna-docusate sodium, 2 tablet, Oral, BID  sodium chloride, 10 mL, Intravenous, Q12H        Infusions      PRN Medications   acetaminophen **OR** acetaminophen **OR** acetaminophen    senna-docusate sodium **AND** polyethylene glycol **AND** bisacodyl **AND** bisacodyl    dextrose    dextrose    glucagon (human recombinant)    ondansetron **OR** ondansetron    sodium chloride    sodium chloride     Physical Findings          Physical Appearance alert, flat affect, loss of muscle mass, loss of subcutaneous fat, oriented, on oxygen therapy   Oral/Mouth Cavity dry mouth, edentulous, mucositis   Edema  lower extremity , 2+ (mild)   Gastrointestinal last bowel movement: 7/14   Skin  excoriation, pressure injury ( stage 1/lower sacral spine stage/location)   Tubes/Drains/Lines implantable port   NFPE Date  Completed: 7/14   --  NUTRITION INTERVENTION / PLAN OF CARE  Intervention Goal         Intervention Goal(s) Nutrition support treatment, Improved nutrition related labs, Provide information, Reduce/improve symptoms, Meet estimated needs, Disease management/therapy, Establish PO intake, Tolerate PO , and Maintain weight     Nutrition Intervention         RD Action Supplement provided, Encourage intake, Follow Tx Progress, Care plan reviewed, and Recommend/ordered:      Nutrition Prescription         Diet Prescription     Supplement Prescription Boost Glucose Control, Boost Soothe, TID   EN/PN Prescription    New Prescription Ordered? Yes   --  Monitor/Evaluation        Monitor Per protocol, I&O, PO intake, Supplement intake, Pertinent labs, Weight, Skin status, GI status, Symptoms, Swallow function   Discharge Needs Pending clinical course   Education Will instruct as appropriate   --    RD to follow up per protocol.    Electronically signed by:  Kerline Zeng RD  07/14/23 15:05 EDT

## 2023-07-14 NOTE — DISCHARGE PLACEMENT REQUEST
"Veronica Sotelo (82 y.o. Female)       Date of Birth   1940    Social Security Number       Address   [address] [place] [address]    Home Phone   [phone]    MRN   [id number]       Hinduism   [employer]    Marital Status                               Admission Date   7/12/23    Admission Type   Urgent    Admitting Provider   Kenny Jeter MD    Attending Provider   Kannan Camacho MD    Department, Room/Bed   [address], P395/1       Discharge Date       Discharge Disposition       Discharge Destination                                 Attending Provider: Kannan Camacho MD    Allergies: No Known Allergies    Isolation: None   Infection: None   Code Status: CPR    Ht: 154.9 cm (60.98\")   Wt: 58.7 kg (129 lb 6.4 oz)    Admission Cmt: None   Principal Problem: KAMILA (acute kidney injury) [N17.9]                   Active Insurance as of 7/12/2023       Primary Coverage       Payor Plan Insurance Group Employer/Plan Group    ANTHEM MEDICARE REPLACEMENT ANTHEM MEDICARE ADVANTAGE [id number]       Payor Plan Address Payor Plan Phone Number Payor Plan Fax Number Effective Dates    [address] [phone]  1/1/2018 - None Entered    [facility] [id number]         Subscriber Name Subscriber Birth Date Member ID       VERONICA SOTELO 1940 [id number]                     Emergency Contacts        (Rel.) Home Phone Work Phone Mobile Phone    MAK THAKKAR (Daughter) [phone] -- [phone]                "

## 2023-07-14 NOTE — PROGRESS NOTES
[facility] Progress Note      Patient Name: Veronica Royal  : 1940  MRN: [id number]  Primary Care Physician:  Princess Cruz MD  Date of admission: 2023    Subjective     Interval History:     Seen and examined.  Feeling better.  Denies shortness of air or chest pain.  Currently on room air.    Review of Systems:   As noted above    Objective     Vitals:   Temp:  [97.4 °F (36.3 °C)-99.8 °F (37.7 °C)] 97.6 °F (36.4 °C)  Heart Rate:  [] 103  Resp:  [16-18] 16  BP: (112-133)/(60-81) 118/64  Flow (L/min):  [2] 2    Intake/Output Summary (Last 24 hours) at 2023 0736  Last data filed at 2023 1300  Gross per 24 hour   Intake 120 ml   Output 200 ml   Net -80 ml       Physical Exam:    General Appearance: alert, oriented x 3, no acute distress   Skin: warm and dry  HEENT: oral mucosa normal, nonicteric sclera  Neck: supple, no JVD  Lungs: CTA  Heart: RRR, normal S1 and S2  Abdomen: soft, nontender, nondistended  : no palpable bladder  Extremities: no edema, cyanosis or clubbing  Neuro: normal speech and mental status     Scheduled Meds:     atorvastatin, 10 mg, Oral, Daily  hydrocortisone, 1 application , Topical, BID  insulin lispro, 2-9 Units, Subcutaneous, 4x Daily AC & at Bedtime  NIFEdipine XL, 30 mg, Oral, Daily  OLANZapine, 5 mg, Oral, Nightly  pantoprazole, 40 mg, Oral, Daily  potassium chloride, 40 mEq, Oral, Daily  senna-docusate sodium, 2 tablet, Oral, BID  sodium chloride, 10 mL, Intravenous, Q12H      IV Meds:   sodium chloride, 125 mL/hr, Last Rate: 125 mL/hr (23)        Results Reviewed:   I have personally reviewed the results from the time of this admission to 2023 07:36 EDT     Results from last 7 days   Lab Units 23  0538 23  1557 23  0602 23  1619 07/12/23  0854   SODIUM mmol/L 138  --  140  141 141 138   POTASSIUM mmol/L 3.2* 3.5 2.5*  2.6* 3.1* 3.1*   CHLORIDE mmol/L 114*  --  114*  113* 111* 104    CO2 mmol/L 15.9*  --  14.7*  15.4* 18.0* 14.4*   BUN mg/dL 14  --  26*  26* 33* 37*   CREATININE mg/dL 0.93  --  1.41*  1.43* 1.99* 2.63*   CALCIUM mg/dL 8.3*  --  8.7  8.6 9.8 10.3*   BILIRUBIN mg/dL 0.4  --  0.4  --  0.4   ALK PHOS U/L 50  --  46  --  57   ALT (SGPT) U/L 13  --  14  --  20   AST (SGOT) U/L 16  --  15  --  14   GLUCOSE mg/dL 199*  --  136*  136* 173* 238*     Estimated Creatinine Clearance: 38.4 mL/min (by C-G formula based on SCr of 0.93 mg/dL).  Results from last 7 days   Lab Units 07/14/23  0538 07/13/23  0602 07/12/23  1619   MAGNESIUM mg/dL 1.2* 1.4* 1.8   PHOSPHORUS mg/dL  --  2.3*  --          Results from last 7 days   Lab Units 07/14/23  0538 07/13/23  0602 07/12/23  0854 07/07/23  1421   WBC 10*3/mm3 6.42 5.56 10.22 6.19   HEMOGLOBIN g/dL 8.6* 8.0* 10.8* 9.7*   PLATELETS 10*3/mm3 235 234 340 236           Assessment / Plan     ASSESSMENT:  Acute kidney injury: Appears to be due to intravascular volume depletion.  Creatinine improved down to 1.9 mg/dL with IV hydration.  Renal ultrasound showed possible left renal mass.  Urinalysis might be suggestive of UTI.  Will order urine culture  Hypokalemia: Replete potassium  History of hypertension: We will hold valsartan and hydrochlorothiazide.  Continue nifedipine  History of dyslipidemia  Nausea and vomiting  History of breast cancer positive HER2 negative [place]  Renal mass on latest renal ultrasound: Further work-up related     PLAN:     We will discontinue IV fluid   Replete potassium and other electrolytes as needed  Continue to hold valsartan and hydrochlorothiazide for now and at time of discharge  Work-up for renal mass as an outpatient per urology  surveillance labs    Thank you for involving us in the care of Veronica M Genny.  Please feel free to call with any questions.    Cesar Estrella MD  07/14/23  07:36 EDT    [facility]  [phone]

## 2023-07-14 NOTE — PLAN OF CARE
Goal Outcome Evaluation:  Plan of Care Reviewed With: patient        Progress: no change            Problem: Adult Inpatient Plan of Care  Goal: Plan of Care Review  Outcome: Ongoing, Progressing  Flowsheets (Taken 7/14/2023 0024)  Progress: no change  Plan of Care Reviewed With: patient  Goal: Patient-Specific Goal (Individualized)  Outcome: Ongoing, Progressing  Goal: Absence of Hospital-Acquired Illness or Injury  Outcome: Ongoing, Progressing  Intervention: Identify and Manage Fall Risk  Recent Flowsheet Documentation  Taken 7/14/2023 0014 by Alyce Musa RN  Safety Promotion/Fall Prevention: safety round/check completed  Taken 7/13/2023 2200 by Alyce Musa RN  Safety Promotion/Fall Prevention: safety round/check completed  Taken 7/13/2023 2050 by Alyce Musa RN  Safety Promotion/Fall Prevention:   activity supervised   assistive device/personal items within reach   clutter free environment maintained   safety round/check completed  Goal: Optimal Comfort and Wellbeing  Outcome: Ongoing, Progressing  Goal: Readiness for Transition of Care  Outcome: Ongoing, Progressing     Problem: Fall Injury Risk  Goal: Absence of Fall and Fall-Related Injury  Outcome: Ongoing, Progressing  Intervention: Identify and Manage Contributors  Recent Flowsheet Documentation  Taken 7/13/2023 2050 by Alyce Musa RN  Medication Review/Management: medications reviewed  Intervention: Promote Injury-Free Environment  Recent Flowsheet Documentation  Taken 7/14/2023 0014 by Alyce Musa RN  Safety Promotion/Fall Prevention: safety round/check completed  Taken 7/13/2023 2200 by Alyce Musa RN  Safety Promotion/Fall Prevention: safety round/check completed  Taken 7/13/2023 2050 by Alyce Musa RN  Safety Promotion/Fall Prevention:   activity supervised   assistive device/personal items within reach   clutter free environment maintained   safety round/check completed

## 2023-07-14 NOTE — NURSING NOTE
Pt diet is Full Liquids. When entering pt room, pt was eating a cheeseburger and fries. Pt educated on diet order. Pt refusing insulin.

## 2023-07-15 PROBLEM — E43 SEVERE MALNUTRITION: Status: ACTIVE | Noted: 2023-07-15

## 2023-07-15 LAB
ALBUMIN SERPL-MCNC: 2.6 G/DL (ref 3.5–5.2)
ALBUMIN/GLOB SERPL: 1.2 G/DL
ALP SERPL-CCNC: 44 U/L (ref 39–117)
ALT SERPL W P-5'-P-CCNC: 11 U/L (ref 1–33)
ANION GAP SERPL CALCULATED.3IONS-SCNC: 8.3 MMOL/L (ref 5–15)
AST SERPL-CCNC: 15 U/L (ref 1–32)
BACTERIA SPEC AEROBE CULT: ABNORMAL
BILIRUB SERPL-MCNC: 0.3 MG/DL (ref 0–1.2)
BUN SERPL-MCNC: 11 MG/DL (ref 8–23)
BUN/CREAT SERPL: 12.2 (ref 7–25)
CALCIUM SPEC-SCNC: 8 MG/DL (ref 8.6–10.5)
CHLORIDE SERPL-SCNC: 119 MMOL/L (ref 98–107)
CO2 SERPL-SCNC: 14.7 MMOL/L (ref 22–29)
CREAT SERPL-MCNC: 0.9 MG/DL (ref 0.57–1)
DEPRECATED RDW RBC AUTO: 47.6 FL (ref 37–54)
EGFRCR SERPLBLD CKD-EPI 2021: 64 ML/MIN/1.73
ERYTHROCYTE [DISTWIDTH] IN BLOOD BY AUTOMATED COUNT: 15.8 % (ref 12.3–15.4)
GLOBULIN UR ELPH-MCNC: 2.1 GM/DL
GLUCOSE BLDC GLUCOMTR-MCNC: 160 MG/DL (ref 70–130)
GLUCOSE BLDC GLUCOMTR-MCNC: 167 MG/DL (ref 70–130)
GLUCOSE BLDC GLUCOMTR-MCNC: 175 MG/DL (ref 70–130)
GLUCOSE BLDC GLUCOMTR-MCNC: 198 MG/DL (ref 70–130)
GLUCOSE SERPL-MCNC: 126 MG/DL (ref 65–99)
HCT VFR BLD AUTO: 24.4 % (ref 34–46.6)
HGB BLD-MCNC: 8.1 G/DL (ref 12–15.9)
MAGNESIUM SERPL-MCNC: 1.5 MG/DL (ref 1.6–2.4)
MCH RBC QN AUTO: 27.5 PG (ref 26.6–33)
MCHC RBC AUTO-ENTMCNC: 33.2 G/DL (ref 31.5–35.7)
MCV RBC AUTO: 82.7 FL (ref 79–97)
PLATELET # BLD AUTO: 234 10*3/MM3 (ref 140–450)
PMV BLD AUTO: 9.9 FL (ref 6–12)
POTASSIUM SERPL-SCNC: 3 MMOL/L (ref 3.5–5.2)
PROT SERPL-MCNC: 4.7 G/DL (ref 6–8.5)
RBC # BLD AUTO: 2.95 10*6/MM3 (ref 3.77–5.28)
SODIUM SERPL-SCNC: 142 MMOL/L (ref 136–145)
WBC NRBC COR # BLD: 5.13 10*3/MM3 (ref 3.4–10.8)

## 2023-07-15 PROCEDURE — 25010000002 CEFTRIAXONE PER 250 MG: Performed by: HOSPITALIST

## 2023-07-15 PROCEDURE — 25010000002 MAGNESIUM SULFATE 2 GM/50ML SOLUTION: Performed by: INTERNAL MEDICINE

## 2023-07-15 PROCEDURE — 82948 REAGENT STRIP/BLOOD GLUCOSE: CPT

## 2023-07-15 PROCEDURE — 99232 SBSQ HOSP IP/OBS MODERATE 35: CPT | Performed by: INTERNAL MEDICINE

## 2023-07-15 PROCEDURE — 63710000001 ONDANSETRON PER 8 MG: Performed by: INTERNAL MEDICINE

## 2023-07-15 PROCEDURE — 85027 COMPLETE CBC AUTOMATED: CPT | Performed by: HOSPITALIST

## 2023-07-15 PROCEDURE — 83735 ASSAY OF MAGNESIUM: CPT | Performed by: HOSPITALIST

## 2023-07-15 PROCEDURE — 80053 COMPREHEN METABOLIC PANEL: CPT | Performed by: HOSPITALIST

## 2023-07-15 PROCEDURE — 63710000001 INSULIN LISPRO (HUMAN) PER 5 UNITS: Performed by: INTERNAL MEDICINE

## 2023-07-15 PROCEDURE — 92526 ORAL FUNCTION THERAPY: CPT

## 2023-07-15 RX ORDER — POTASSIUM CHLORIDE 750 MG/1
40 TABLET, FILM COATED, EXTENDED RELEASE ORAL EVERY 4 HOURS
Status: DISCONTINUED | OUTPATIENT
Start: 2023-07-15 | End: 2023-07-15

## 2023-07-15 RX ORDER — MAGNESIUM SULFATE HEPTAHYDRATE 40 MG/ML
2 INJECTION, SOLUTION INTRAVENOUS
Status: DISPENSED | OUTPATIENT
Start: 2023-07-15 | End: 2023-07-16

## 2023-07-15 RX ORDER — POTASSIUM CHLORIDE 1.5 G/1.58G
40 POWDER, FOR SOLUTION ORAL 2 TIMES DAILY
Status: DISPENSED | OUTPATIENT
Start: 2023-07-15 | End: 2023-07-16

## 2023-07-15 RX ADMIN — HYDROCORTISONE ACETATE 1 APPLICATION: 1 CREAM TOPICAL at 09:38

## 2023-07-15 RX ADMIN — INSULIN LISPRO 2 UNITS: 100 INJECTION, SOLUTION INTRAVENOUS; SUBCUTANEOUS at 21:41

## 2023-07-15 RX ADMIN — MAGNESIUM SULFATE HEPTAHYDRATE 2 G: 2 INJECTION, SOLUTION INTRAVENOUS at 18:27

## 2023-07-15 RX ADMIN — NIFEDIPINE 30 MG: 30 TABLET, FILM COATED, EXTENDED RELEASE ORAL at 09:35

## 2023-07-15 RX ADMIN — MAGNESIUM SULFATE HEPTAHYDRATE 2 G: 2 INJECTION, SOLUTION INTRAVENOUS at 21:41

## 2023-07-15 RX ADMIN — Medication 10 ML: at 21:43

## 2023-07-15 RX ADMIN — INSULIN LISPRO 2 UNITS: 100 INJECTION, SOLUTION INTRAVENOUS; SUBCUTANEOUS at 09:55

## 2023-07-15 RX ADMIN — OLANZAPINE 5 MG: 5 TABLET ORAL at 21:41

## 2023-07-15 RX ADMIN — LINAGLIPTIN 5 MG: 5 TABLET, FILM COATED ORAL at 09:36

## 2023-07-15 RX ADMIN — INSULIN LISPRO 2 UNITS: 100 INJECTION, SOLUTION INTRAVENOUS; SUBCUTANEOUS at 12:08

## 2023-07-15 RX ADMIN — POTASSIUM CHLORIDE 40 MEQ: 750 TABLET, EXTENDED RELEASE ORAL at 18:25

## 2023-07-15 RX ADMIN — INSULIN LISPRO 2 UNITS: 100 INJECTION, SOLUTION INTRAVENOUS; SUBCUTANEOUS at 17:22

## 2023-07-15 RX ADMIN — POTASSIUM CHLORIDE 40 MEQ: 750 TABLET, EXTENDED RELEASE ORAL at 09:34

## 2023-07-15 RX ADMIN — Medication 10 ML: at 09:34

## 2023-07-15 RX ADMIN — CEFTRIAXONE SODIUM 1000 MG: 1 INJECTION, POWDER, FOR SOLUTION INTRAMUSCULAR; INTRAVENOUS at 09:37

## 2023-07-15 RX ADMIN — HYDROCORTISONE ACETATE 1 APPLICATION: 1 CREAM TOPICAL at 21:43

## 2023-07-15 RX ADMIN — PANTOPRAZOLE SODIUM 40 MG: 40 TABLET, DELAYED RELEASE ORAL at 09:34

## 2023-07-15 RX ADMIN — ATORVASTATIN CALCIUM 10 MG: 20 TABLET, FILM COATED ORAL at 09:34

## 2023-07-15 RX ADMIN — ONDANSETRON HYDROCHLORIDE 4 MG: 4 TABLET, FILM COATED ORAL at 21:41

## 2023-07-15 NOTE — PLAN OF CARE
Goal Outcome Evaluation:  Plan of Care Reviewed With: patient        Progress: no change  Outcome Evaluation: Cardiac monitor in place, VSS, pt had some nausea resolved with antiemetics and clears. Pt had three loose bms with mucus. Hold stool softeners and continue monitoring. Bottom is excoriated and red from BMs cream and foam dressing in place. gait steady, high fall risk due to urgency.

## 2023-07-15 NOTE — PLAN OF CARE
Problem: Adult Inpatient Plan of Care  Goal: Plan of Care Review  Outcome: Ongoing, Progressing   Goal Outcome Evaluation:            Pt seen for bedside swallow evaluation due to concern by her family that she was coughing with with thin liquids.( Per RN). Pt broke her bottom dentures last night and these were not in during the evaluation. Pt exhibited decreased mastication with regular consistency. Throat clearing noted with thin liquids when used as a wash. Increased mastication time also noted due to missing lower dentures. SLP recommends a mechanical sft diet with nectar liquids with meals. Pt may have thin liquids between meals. Thirty minutes before and 30 minutes after. Meals.  SLP also recommend medication crushed in puree. Oral care before and after meals.

## 2023-07-15 NOTE — THERAPY TREATMENT NOTE
Acute Care - Speech Language Pathology   Swallow Treatment Note [facility]     Patient Name: Veronica Royal  : 1940  MRN: [id number]  Today's Date: 7/15/2023               Admit Date: 2023    Visit Dx:   No diagnosis found.  Patient Active Problem List   Diagnosis    Malignant neoplasm of upper-outer quadrant of right breast in female, estrogen receptor negative    Encounter for fitting and adjustment of vascular catheter    Hypertension    At high risk for malnutrition    Bilateral hearing loss    Malignant neoplasm of female breast    Advanced care planning/counseling discussion    KAMILA (acute kidney injury)    Cirrhosis of liver    Type 2 diabetes mellitus    Hypokalemia    Anemia due to chemotherapy    Left renal mass    Acute UTI (urinary tract infection)    Hypomagnesemia     Past Medical History:   Diagnosis Date    Basal cell carcinoma     Left thumb    Breast cancer     Right    Diabetes mellitus     High cholesterol     Hypertension      Past Surgical History:   Procedure Laterality Date    EYE SURGERY      Muscle repair age 21    VENOUS ACCESS DEVICE (PORT) INSERTION N/A 2023    Procedure: INSERTION VENOUS ACCESS DEVICE;  Surgeon: Rosa Michael MD;  [facility]  Service: General;  Laterality: N/A;       SLP Recommendation and Plan     SLP Diet Recommendation: soft to chew textures, nectar thick liquids (07/15/23 0900)  Recommended Precautions and Strategies: upright posture during/after eating, small bites of food and sips of liquid (07/15/23 0900)  SLP Rec. for Method of Medication Administration: meds crushed, with puree (07/15/23 0900)        Recommended Diagnostics: reassess via clinical swallow evaluation (07/15/23 0900)           Therapy Frequency (Swallow): PRN (07/15/23 0900)  Predicted Duration Therapy Intervention (Days): until discharge (07/15/23 0900)                                               SWALLOW EVALUATION (last 72 hours)       SLP Adult  Swallow Evaluation       Row Name 07/15/23 0900 07/13/23 1500                Rehab Evaluation    Document Type evaluation  -LS evaluation  -       Patient Effort good  -LS excellent  -          General Information    Patient Profile Reviewed yes  -LS yes  -       Pertinent History Of Current Problem Right breast inflammatory breast cancer, Decreased oral intake secondary to chemotherapy, Severe nausea and vomiting  - Right breast inflammatory breast cancer, Decreased oral intake secondary to chemotherapy, Severe nausea and vomiting  -       Current Method of Nutrition regular textures;thin liquids  RN reports pt is coughing with thin liquids which  concerns the family.  - clear liquids  -       Precautions/Limitations, Vision WFL;for purposes of eval  -LS WFL with corrective lenses  -       Precautions/Limitations, Hearing WFL;for purposes of eval  -LS WFL;for purposes of eval  -       Prior Level of Function-Communication WFL  - other (see comments)  cognitive changes  -       Prior Level of Function-Swallowing no diet consistency restrictions  pt reported loss of appetite  for periods of time.  - no diet consistency restrictions  -       Plans/Goals Discussed with -- patient;agreed upon  -       Barriers to Rehab -- cognitive status  -          Oral Motor Structure and Function    Dentition Assessment upper dentures/partial in place  Pt has dentures.  However, pt stated she was reashing for something on her table and knocked the container with her dentures onto the floor.  Her bottom dentures broke at one end.  Upper dentures are in tact.  - upper dentures/partial in place;lower dentures/partial in place  -       Secretion Management WNL/WFL  - --       Mucosal Quality moist, healthy  - --          Oral Musculature and Cranial Nerve Assessment    Oral Motor General Assessment WFL  - --          General Eating/Swallowing Observations    Eating/Swallowing Skills self-fed  -  --       Positioning During Eating upright 90 degree;upright in bed  -LS --       Utensils Used spoon;cup  -LS --       Consistencies Trialed regular textures;soft to chew textures;thin liquids;nectar/syrup-thick liquids  -LS --          Clinical Swallow Eval    Oral Prep Phase impaired  -LS --       Oral Transit WFL  -LS --       Oral Residue WFL  -LS --       Pharyngeal Phase suspected pharyngeal impairment  -LS --       Clinical Swallow Evaluation Summary Pt seen for bedside swallow evaluation due to concern by her family that she was having difficulty with thin liquids.  Pt broke her bottom dentures last night and these were not in during the evaluation.  Pt exhibited decreased mastication with regular consistency.  Throat clearing noted with thin liquids when used as a wash.  Increased mastication time also noted due to missing  lower dentures.  SLP recommends a mechanical sft diet with nectar liquids with meals.  Pt may have thin liquids between meals. Thirty minutes before and 30 minutes after.  -LS Patient seen for clinical swallow assessment. Pt reports difficulty initiating a swallow with PO at times, which began with chemo tx. Upper dentures are ill fitting,but no focal weakness noted in oral mech exam. No overt s/s of aspiration with ice, thins via cup/straw, puree, mixed mech soft, or regular solids. SLP recs upgrade to regular and thins, meds as elder. Unsure if team wants a slow progression to full liquids first, therefore will defer diet upgrade to attending. Suspect oral dysphagia complaints are 2/2 Xerostomia. ST to sign off.  -SH          Oral Prep Concerns    Oral Prep Concerns prolonged mastication  -LS --       Prolonged Mastication regular consistencies  -LS --          Pharyngeal Phase Concerns    Pharyngeal Phase Concerns throat clear  -LS --       Throat Clear thin  When used as a wash.  -LS --          SLP Evaluation Clinical Impression    SLP Swallowing Diagnosis -- swallow WFL/no suspected  pharyngeal impairment  -          Recommendations    Therapy Frequency (Swallow) PRN  -LS evaluation only  -       Predicted Duration Therapy Intervention (Days) until discharge  - --       SLP Diet Recommendation soft to chew textures;nectar thick liquids  - regular textures;thin liquids  -       Recommended Diagnostics reassess via clinical swallow evaluation  - --       Recommended Precautions and Strategies upright posture during/after eating;small bites of food and sips of liquid  - upright posture during/after eating;small bites of food and sips of liquid  -       Oral Care Recommendations Oral Care before breakfast, after meals and PRN  - Oral Care BID/PRN  -       SLP Rec. for Method of Medication Administration meds crushed;with puree  - meds whole;as tolerated  -       Monitor for Signs of Aspiration -- yes;notify SLP if any concerns  -                 User Key  (r) = Recorded By, (t) = Taken By, (c) = Cosigned By      Initials Name Effective Dates     Марина Monterroso MS CCC-SLP 06/16/21 -      Keesha Sherman MS CCC-SLP 06/16/21 -                     EDUCATION  The patient has been educated in the following areas:   Dysphagia (Swallowing Impairment).              Time Calculation:       Therapy Charges for Today       Code Description Service Date Service Provider Modifiers Qty    [id number]  ST TREATMENT SWALLOW 5 7/15/2023 Марина Monterroso MS CCC-SLP GN 1                 MS DEEPAK Raymond  7/15/2023

## 2023-07-15 NOTE — PROGRESS NOTES
[facility]  Inpatient Progress Note    Reason for Follow Up: Imaging showing cirrhosis, nausea, vomiting    Subjective     Interval History:   Tolerating diet as per speech.  No diarrhea.  No abdominal pain.  No further nausea or vomiting.    Current Facility-Administered Medications:     acetaminophen (TYLENOL) tablet 650 mg, 650 mg, Oral, Q4H PRN **OR** acetaminophen (TYLENOL) 160 MG/5ML solution 650 mg, 650 mg, Oral, Q4H PRN **OR** acetaminophen (TYLENOL) suppository 650 mg, 650 mg, Rectal, Q4H PRN, Kenny Jeter MD    atorvastatin (LIPITOR) tablet 10 mg, 10 mg, Oral, Daily, Kenny Jeter MD, 10 mg at 07/15/23 0934    sennosides-docusate (PERICOLACE) 8.6-50 MG per tablet 2 tablet, 2 tablet, Oral, BID, 2 tablet at 07/14/23 0851 **AND** polyethylene glycol (MIRALAX) packet 17 g, 17 g, Oral, Daily PRN **AND** bisacodyl (DULCOLAX) EC tablet 5 mg, 5 mg, Oral, Daily PRN **AND** bisacodyl (DULCOLAX) suppository 10 mg, 10 mg, Rectal, Daily PRN, Kenny Jeter MD    cefTRIAXone (ROCEPHIN) 1,000 mg in sodium chloride 0.9 % 100 mL IVPB-VTB, 1,000 mg, Intravenous, Q24H, Kannan Camacho MD, Last Rate: 200 mL/hr at 07/15/23 0937, 1,000 mg at 07/15/23 0937    dextrose (D50W) (25 g/50 mL) IV injection 25 g, 25 g, Intravenous, Q15 Min PRN, Kenny Jeter MD    dextrose (GLUTOSE) oral gel 15 g, 15 g, Oral, Q15 Min PRN, Kenny Jeter MD    glucagon (GLUCAGEN) injection 1 mg, 1 mg, Intramuscular, Q15 Min PRN, Kenny Jeter MD    hydrocortisone 1 % cream 1 application , 1 application , Topical, BID, Kenny Jeter MD, 1 application  at 07/15/23 0938    insulin lispro (HUMALOG/ADMELOG) injection 2-9 Units, 2-9 Units, Subcutaneous, 4x Daily AC & at Bedtime, Kenny Jeter MD, 2 Units at 07/15/23 1208    linagliptin (TRADJENTA) tablet 5 mg, 5 mg, Oral, Daily, Kannan Camacho MD, 5 mg at 07/15/23 0936    NIFEdipine XL (PROCARDIA XL) 24 hr tablet 30 mg, 30 mg, Oral,  Daily, Kenny Jeter MD, 30 mg at 07/15/23 0935    OLANZapine (zyPREXA) tablet 5 mg, 5 mg, Oral, Nightly, Kenny Jeter MD, 5 mg at 07/14/23 2016    ondansetron (ZOFRAN) tablet 4 mg, 4 mg, Oral, Q6H PRN, 4 mg at 07/14/23 2016 **OR** ondansetron (ZOFRAN) injection 4 mg, 4 mg, Intravenous, Q6H PRN, Kenny Jeter MD    pantoprazole (PROTONIX) EC tablet 40 mg, 40 mg, Oral, Daily, Kenny Jeter MD, 40 mg at 07/15/23 0934    potassium chloride (K-DUR,KLOR-CON) ER tablet 40 mEq, 40 mEq, Oral, Daily, Kannan Camacho MD, 40 mEq at 07/15/23 0934    sodium chloride 0.9 % flush 10 mL, 10 mL, Intravenous, Q12H, Kenny Jeter MD, 10 mL at 07/15/23 0934    sodium chloride 0.9 % flush 10 mL, 10 mL, Intravenous, PRN, Kenny Jeter MD    sodium chloride 0.9 % infusion 40 mL, 40 mL, Intravenous, PRN, Kenny Jeter MD  Review of Systems:     The following systems were reviewed and negative: Constitution, pulmonary, cardiac, gastrointestinal, genitourinary        Objective     Vital Signs  Temp:  [97.8 °F (36.6 °C)-99.1 °F (37.3 °C)] 98.3 °F (36.8 °C)  Heart Rate:  [86-99] 87  Resp:  [16-17] 17  BP: ()/(52-64) 115/63  Body mass index is 24.46 kg/m².    Intake/Output Summary (Last 24 hours) at 7/15/2023 1401  Last data filed at 7/15/2023 0948  Gross per 24 hour   Intake 610 ml   Output --   Net 610 ml     I/O this shift:  In: 360 [P.O.:360]  Out: -      Physical Exam:   General: patient awake, alert and cooperative   Eyes: Normal lids and lashes, no scleral icterus   Neck: supple, normal ROM   Skin: warm and dry, not jaundiced   Cardiovascular: regular rhythm and rate, no murmurs auscultated   Pulm: clear to auscultation bilaterally, regular and unlabored   Abdomen: soft, nontender, nondistended; normal bowel sounds   Rectal: deferred   Extremities: no rash or edema   Psychiatric: Normal mood and behavior; memory intact     Results Review:     I reviewed the patient's new clinical  results.    Results from last 7 days   Lab Units 07/15/23  0547 07/14/23  0538 07/13/23  0602   WBC 10*3/mm3 5.13 6.42 5.56   HEMOGLOBIN g/dL 8.1* 8.6* 8.0*   HEMATOCRIT % 24.4* 26.2* 24.6*   PLATELETS 10*3/mm3 234 235 234     Results from last 7 days   Lab Units 07/15/23  0547 07/14/23  0538 07/13/23  1557 07/13/23  0602   SODIUM mmol/L 142 138  --  140  141   POTASSIUM mmol/L 3.0* 3.2* 3.5 2.5*  2.6*   CHLORIDE mmol/L 119* 114*  --  114*  113*   CO2 mmol/L 14.7* 15.9*  --  14.7*  15.4*   BUN mg/dL 11 14  --  26*  26*   CREATININE mg/dL 0.90 0.93  --  1.41*  1.43*   CALCIUM mg/dL 8.0* 8.3*  --  8.7  8.6   BILIRUBIN mg/dL 0.3 0.4  --  0.4   ALK PHOS U/L 44 50  --  46   ALT (SGPT) U/L 11 13  --  14   AST (SGOT) U/L 15 16  --  15   GLUCOSE mg/dL 126* 199*  --  136*  136*         Lab Results   Lab Value Date/Time    LIPASE 24 07/14/2023 0538    LIPASE 21 07/12/2023 1619       Radiology:  No orders to display       Assessment & Plan     Active Hospital Problems    Diagnosis     **KAMILA (acute kidney injury)     Severe malnutrition     Acute UTI (urinary tract infection)     Hypomagnesemia     Hypokalemia     Anemia due to chemotherapy     Left renal mass     Cirrhosis of liver     Type 2 diabetes mellitus     Hypertension     Malignant neoplasm of upper-outer quadrant of right breast in female, estrogen receptor negative        Impression  1.  Cirrhosis: Suspected TELLEZ due to negative outpatient serologies.  Normal transaminases.  Normal synthetic function    2.  Pill dysphagia: Speech therapy evaluated and recommended diet    3.  Nausea and vomiting: Resolved    4.  Right breast cancer    5.  Chemotherapy associated anemia    Plan  Continue supportive care measures  Diet as tolerated and as per speech  Pills in applesauce and crushed when able  Low-sodium diet  Frequent small meals  Continue Zyprexa for nausea and vomiting  Follow-up in the outpatient setting with Dr. Mc's office    I discussed the patients  findings and my recommendations with patient and family.    All necessary PPE, including face mask and eye protection, were worn during this encounter.  Hand sanitization was performed both before and after the patient interaction.    Over 25 minutes was spent reviewing the patient's chart and records, in discussion with the patient, in examination of the patient, and in discussion with members of the patient's medical team.    Keesha Kenney MD

## 2023-07-15 NOTE — PLAN OF CARE
Goal Outcome Evaluation:  Plan of Care Reviewed With: patient        Progress: improving  Outcome Evaluation: Cont on cardiac monitor. Receiving IV abx. Magnesium and potassium replacement initiated. No c/o of nausea or diarrhea this shift; appetite improved. VSS.

## 2023-07-15 NOTE — PROGRESS NOTES
Name: Veronica Royal ADMIT: 2023   : 1940  PCP: Princess Cruz MD    MRN: [id number] LOS: 1 days   AGE/SEX: 82 y.o. female  ROOM: [facility]     Subjective   Subjective     Patient is lying in the bed and does not appear to be any major distress.  Denies nausea, vomiting abdominal pain, chest pain.       Objective   Objective   Vital Signs  Temp:  [97.8 °F (36.6 °C)-98.6 °F (37 °C)] 98.2 °F (36.8 °C)  Heart Rate:  [86-99] 87  Resp:  [16-17] 17  BP: ()/(52-64) 121/61  SpO2:  [94 %-95 %] 94 %  on   ;   Device (Oxygen Therapy): room air  Body mass index is 24.46 kg/m².    (No change in exam today)    Physical Exam  Vitals and nursing note reviewed.   Constitutional:       General: She is not in acute distress.     Appearance: She is ill-appearing (chronically). She is not toxic-appearing or diaphoretic.   HENT:      Head: Normocephalic.      Nose: Nose normal.      Mouth/Throat:      Mouth: Mucous membranes are moist.      Pharynx: Oropharynx is clear.   Eyes:      General: No scleral icterus.        Right eye: No discharge.         Left eye: No discharge.      Extraocular Movements: Extraocular movements intact.      Conjunctiva/sclera: Conjunctivae normal.   Cardiovascular:      Rate and Rhythm: Normal rate and regular rhythm.      Pulses: Normal pulses.      Comments: Port left upper chest  Pulmonary:      Effort: Pulmonary effort is normal. No respiratory distress.      Breath sounds: Normal breath sounds. No wheezing or rales.   Abdominal:      General: Bowel sounds are normal. There is no distension.      Palpations: Abdomen is soft.      Tenderness: There is no abdominal tenderness.   Musculoskeletal:         General: Swelling (doughy chronic edema in BLEs) present. No deformity. Normal range of motion.      Cervical back: Neck supple. No rigidity.   Lymphadenopathy:      Cervical: No cervical adenopathy.   Skin:     General: Skin is warm and dry.      Capillary Refill: Capillary refill  takes less than 2 seconds.      Coloration: Skin is not jaundiced.      Findings: Rash (scattered across upper extremities and right cheek) present.   Neurological:      General: No focal deficit present.      Mental Status: She is alert and oriented to person, place, and time. Mental status is at baseline.      Cranial Nerves: No cranial nerve deficit.      Coordination: Coordination normal.   Psychiatric:         Mood and Affect: Mood normal.         Behavior: Behavior normal.     Results Review     I reviewed the patient's new clinical results.  Results from last 7 days   Lab Units 07/15/23  0547 07/14/23  0538 07/13/23  0602 07/12/23  0854   WBC 10*3/mm3 5.13 6.42 5.56 10.22   HEMOGLOBIN g/dL 8.1* 8.6* 8.0* 10.8*   PLATELETS 10*3/mm3 234 235 234 340       Results from last 7 days   Lab Units 07/15/23  0547 07/14/23  0538 07/13/23  1557 07/13/23 0602 07/12/23  1619   SODIUM mmol/L 142 138  --  140  141 141   POTASSIUM mmol/L 3.0* 3.2* 3.5 2.5*  2.6* 3.1*   CHLORIDE mmol/L 119* 114*  --  114*  113* 111*   CO2 mmol/L 14.7* 15.9*  --  14.7*  15.4* 18.0*   BUN mg/dL 11 14  --  26*  26* 33*   CREATININE mg/dL 0.90 0.93  --  1.41*  1.43* 1.99*   GLUCOSE mg/dL 126* 199*  --  136*  136* 173*   EGFR mL/min/1.73 64.0 61.5  --  37.3*  36.7* 24.7*       Results from last 7 days   Lab Units 07/15/23  0547 07/14/23  0538 07/13/23  0602 07/12/23  0854   ALBUMIN g/dL 2.6* 2.7* 2.7*  2.7* 3.5   BILIRUBIN mg/dL 0.3 0.4 0.4 0.4   ALK PHOS U/L 44 50 46 57   AST (SGOT) U/L 15 16 15 14   ALT (SGPT) U/L 11 13 14 20       Results from last 7 days   Lab Units 07/15/23  0547 07/14/23  0538 07/13/23  0602 07/12/23  1619 07/12/23  0854   CALCIUM mg/dL 8.0* 8.3* 8.7  8.6 9.8 10.3*   ALBUMIN g/dL 2.6* 2.7* 2.7*  2.7*  --  3.5   MAGNESIUM mg/dL 1.5* 1.2* 1.4* 1.8  --    PHOSPHORUS mg/dL  --   --  2.3*  --   --          Hemoglobin A1C   Date/Time Value Ref Range Status   07/13/2023 0602 8.60 (H) 4.80 - 5.60 % Final     Glucose    Date/Time Value Ref Range Status   07/15/2023 1624 175 (H) 70 - 130 mg/dL Final     Comment:     Meter: [id number] : [id number] Ni Vinson NA   07/15/2023 1127 167 (H) 70 - 130 mg/dL Final     Comment:     Meter: [id number] : [id number] Ni Vinson NA   07/15/2023 0946 160 (H) 70 - 130 mg/dL Final     Comment:     Meter: [id number] : [id number] Ranjan Chung RN   07/14/2023 2201 201 (H) 70 - 130 mg/dL Final     Comment:     Meter: [id number] : [id number] Jokalyn Jazzy ELLIOTTA   07/14/2023 1703 149 (H) 70 - 130 mg/dL Final     Comment:     Meter: [id number] : [id number] Jason NIXON   07/14/2023 1226 197 (H) 70 - 130 mg/dL Final     Comment:     Meter: [id number] : [id number] Jason NIXON   07/14/2023 0749 212 (H) 70 - 130 mg/dL Final     Comment:     Meter: [id number] : [id number] Jason NIXON       No radiology results for the last day  I have personally reviewed all medications:  Scheduled Medications  atorvastatin, 10 mg, Oral, Daily  cefTRIAXone, 1,000 mg, Intravenous, Q24H  hydrocortisone, 1 application , Topical, BID  insulin lispro, 2-9 Units, Subcutaneous, 4x Daily AC & at Bedtime  linagliptin, 5 mg, Oral, Daily  NIFEdipine XL, 30 mg, Oral, Daily  OLANZapine, 5 mg, Oral, Nightly  pantoprazole, 40 mg, Oral, Daily  potassium chloride, 40 mEq, Oral, Daily  senna-docusate sodium, 2 tablet, Oral, BID  sodium chloride, 10 mL, Intravenous, Q12H    Infusions     Diet  Diet: Regular/House Diet; Texture: Mechanical Ground (NDD 2); Fluid Consistency: Nectar Thick    I have personally reviewed:  [x]  Laboratory   []  Microbiology   []  Radiology   []  EKG/Telemetry  []  Cardiology/Vascular   []  Pathology    []  Records       Assessment/Plan     Active Hospital Problems    Diagnosis  POA    **KAMILA (acute kidney injury) [N17.9]  Yes    Severe malnutrition [E43]  Yes    Acute UTI (urinary tract infection) [N39.0]  Yes    Hypomagnesemia [E83.42]  No    Hypokalemia [E87.6]   Yes    Anemia due to chemotherapy [D64.81, T45.1X5A]  Yes    Left renal mass [N28.89]  Yes    Cirrhosis of liver [K74.60]  Yes    Type 2 diabetes mellitus [E11.9]  Yes    Hypertension [I10]  Yes    Malignant neoplasm of upper-outer quadrant of right breast in female, estrogen receptor negative [C50.411, Z17.1]  Not Applicable      Resolved Hospital Problems   No resolved problems to display.       81yo woman with h/o DM2, cirrhosis, HTN, HLD, left renal mass, and breast CA, who was directly admitted to our service from Heme/Onc office with KAMILA due to N/V/D and poor po intake.    KAMILA/CKD?:  Creatinine did peak at 2.63 and has improved to 0.90.  Appears to have underlying CKD stage III and avoid nephrotoxins.  Off IV fluids.    UTI: Cultures grew gram-negative organism and is currently on IV Rocephin which will be continued.    N/V/D: Likely due to chemotherapy and these are improving.  GI did evaluate and no plans for any endoscopy at this point.    Dysphagia: Speech did evaluate and diet as per the recommendations.    Hypokalemia  Hypomagnesemia: Replace per protocol.    Cirrhosis NOS: noted on imaging, LFTs wnl, have asked GI to see here for N/V/D, pt seen in their office on May 25th, they felt her cirrhosis was most likely due to TELLEZ    Left renal mass: asked  to see for further eval/recs--they recommended simply repeating CT in 3-4 months    DM2: OHGs on hold, sugars acceptable on SSI alone for now but of course po intake is fairly limited, A1c 8.6, restart Tradjenta today    HTN: BPs remain acceptable on Nifedipine, continue to hold Diovan-HCT given her KAMILA    Breast CA: Heme/Onc consulted, not tolerating chemotx very well with GI symptoms and skin rash (on topical steroid per Heme/Onc)    SCDs for DVT prophylaxis.  Full code.  Discussed with patient and RN.  Anticipate discharge TBD .    Copied text on this note has been reviewed by me on 7/15/2023    Brenard Damon MD  [facility]  07/15/23  17:06 EDT

## 2023-07-15 NOTE — PROGRESS NOTES
[facility] GROUP INPATIENT PROGRESS NOTE    Length of Stay:  1 days    CHIEF COMPLAINT/REASON FOR VISIT:  Breast cancer currently on neoadjuvant therapy with Taxol, Herceptin, Perjeta  Adverse effects of therapy including nausea, vomiting, diarrhea  Volume depletion  Acute kidney injury  Hypokalemia    SUBJECTIVE: Tmax 100.3.  Normotensive to mildly hypotensive.  On room air.  Sleeping today.    OBJECTIVE:  Vitals:    07/14/23 1604 07/14/23 2101 07/15/23 0410 07/15/23 0725   BP: 109/55 124/64 115/60 98/52   BP Location: Left arm Left arm Left arm Left arm   Patient Position: Lying Lying Lying Lying   Pulse: 91 99 86    Resp: 16 16 16 17   Temp: 99.1 °F (37.3 °C) 98.2 °F (36.8 °C) 97.8 °F (36.6 °C) 98.6 °F (37 °C)   TempSrc: Oral Oral Oral Oral   SpO2: 95% 95% 94%    Weight:       Height:             PHYSICAL EXAMINATION:   Sleeping comfortably.  Not otherwise examined.    DIAGNOSTIC DATA:  Results Review:     I reviewed the patient's new clinical results.    Results from last 7 days   Lab Units 07/15/23  0547   WBC 10*3/mm3 5.13   HEMOGLOBIN g/dL 8.1*   HEMATOCRIT % 24.4*   PLATELETS 10*3/mm3 234     Lab Results   Component Value Date    NEUTROABS 4.34 07/13/2023     Results from last 7 days   Lab Units 07/15/23  0547   SODIUM mmol/L 142   POTASSIUM mmol/L 3.0*   CHLORIDE mmol/L 119*   CO2 mmol/L 14.7*   BUN mg/dL 11   CREATININE mg/dL 0.90   GLUCOSE mg/dL 126*   CALCIUM mg/dL 8.0*         Results from last 7 days   Lab Units 07/15/23  0547   MAGNESIUM mg/dL 1.5*         IMAGING:    None reviewed    ASSESSMENT:  This is a 82 y.o. 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  Invasive ductal carcinoma with apocrine features, grade 3, Ki-67 38%  CT scans and bone scan without any obvious evidence of metastatic disease  Plan Taxol Herceptin and Perjeta, and if she tolerates this well we will  proceed with Adriamycin and cyclophosphamide.  Echocardiogram has been performed and ejection fraction normal.  Liver shows cirrhotic morphology.  Mediport placed 5/19/2023  Taxol, Herceptin, Perjeta initiated 5/24/2023 5/31/2023 C1D8 Taxol  6/28/2023-cycle 2-day 15 of Taxol.  She is overall tolerating therapy well with expected side effects.  7/5/23: cycle 3 day 1 and she received taxol, herceptin, perjeta  7/12/2023-cycle 3-day 8 of TPH.  Taxol held due to severe dehydration, poorly controlled nausea vomiting, KAMILA. Admitted for these symptoms.     *Diabetes  Poorly controlled  Evaluation by endocrinology 5/30/2023 with recommendations for dietary changes and home blood sugar monitoring.  The patient's daughter reports today she is struggling with compliance.  Her oral intake is still fairly poor.  Emphasized on regular food intake to maintain blood sugars.  According to daughter blood sugars have been elevated in the 300s and 400s.  They are not able to control blood sugars adequately.     *Hyperkalemia and chronic kidney disease, now with hypokalemia on admission  Potassium remains low at 3.0.  Continue potassium repletion.    *Hypomagnesemia  Magnesium 1.5, from 1.2  Continue repletion     *KAMILA/CKD  Creatinine elevated at 2.63, BUN 37 at presentation on 7/12.  Secondary to severe volume depletion.  Calcium also elevated at 10.3  Reason for admission  Creatinine 0.90, from 0.93, from 1.43, from 1.99, from 2.63, from 0.95     *Cirrhotic appearance of the liver on the CT scan  Referred to gastroenterology  Synthetic function appears to be normal  We will start with Taxol to see if she would tolerate the chemotherapy   Slight elevation of intervention studies today with ALT 43, AST 55.  Continue to monitor weekly  6/28/2023-mild elevation in the liver labs.  Stable compared to previous labs.  Okay to proceed with chemotherapy.     *Decreased oral intake secondary to chemotherapy  She has lost a significant amount  of weight..  Continue follow-up with nutrition  Continue entergrade outpatient     *Frequent eructation  Continue Protonix 40 mg daily     *Cognitive impairment  It is unclear if this is the patient's baseline or mental status has been exacerbated by recent chemotherapy  The patient is struggling with compliance with her medications.  The patient's daughter expresses frustration today as the patient is struggling to care for herself at home with managing medications and symptom management.  Evaluated by CARL Antonio      *Chemotherapy-induced diarrhea  6/5/2023 patient given Enterade (Wild Berry flavor).  She has been drinking 1 a day.  She does not necessarily like the flavor (currently mixing with sugar-free Aramis-Aid which does help).  Encouraged her to increase to 2 a day.  6/21/2023 patient reports that she had stopped drinking her Enterade because she was waking up in the middle the night having diarrhea although she did not know if it was due to the Enterade her protein shakes.  I have encouraged the patient to continue Enterade, drinking it in the morning.  Try discontinuing the protein shakes and see how she does.  Patient states that she will try this  Reports 2-3 loose stools.  Continue Enterade and Imodium.  7/5/2023 continuing to have issues with diarrhea up to 3-4 bowel movements a day, patient also recently prescribed Kayexalate for an apparently elevated potassium.  Potassium only 3.1 today.  She advised to discontinue the Kayexalate she was given IV hydration today.  We will also prescribe Lomotil to use if needed to help better control her diarrhea.  Patient is not drinking Enterade regularly.  This is better controlled with Lomotil     *Maculopapular rash  Secondary to HER2 directed therapy  Recommend starting topical steroids to help with the rash  She quit taking doxycycline as she developed a blister with that.  Medrol Dosepak prescribed.  Continue topical steroids     *Severe nausea  and vomiting  Patient has been using Zofran regularly.  Will nee to utilize Zyprexa and Compazine for future outpatient use.  Reason for admission  Olanzapine 5 mg at night recently initiated     *Normocytic anemia  Likely due to chemo and IV fluid hydration  Hgb 8.6, from 8.0, from 10.8, from 9.7     *Renal u/s on 7/6 with possible left renal mass  Not noted on CT imaging from 5/11/23. No further imaging at this time.   Urology evaluated.  We will be having follow-up imaging from an oncology standpoint so this will be monitored     *UTI  Urine culture with >100,000 GNR, Klebsiella pneumoniae, resistant to ampicillin but otherwise susceptible  On Rocephin    RECOMMENDATIONS/PLAN:  Holding chemotherapy which has been poorly tolerated  Potassium and magnesium repletion  Rocephin continues for Klebsiella urinary tract infection  Nephrology following. Valsartan and HCTZ being held for now and at discharge  Topical steroids for the rash  Symptom management for nausea and vomiting.  She continues olanzapine 5 mg nightly.  Can further evaluate L kidney as an outpatient when she's clinically doing better and labs have normalized. Breast cancer needs attention first.  Nothing was noted in the left kidney on CT imaging done on 5/11/2023  Dr. Yee plans to obtain an ultrasound of the right breast as an outpatient and then discuss with Dr. Michael the possibility of surgery given poor tolerance of therapy  Daily labs while admitted  Will follow  Plan discharge to a skilled nursing facility for rehabilitation, likely [facility]    Kingsley Ortiz MD

## 2023-07-15 NOTE — PROGRESS NOTES
[facility] Progress Note      Patient Name: Veronica Royal  : 1940  MRN: [id number]  Primary Care Physician:  Princess Cruz MD  Date of admission: 2023    Subjective     Interval History:     Seen and examined.  Feeling better.  Denies shortness of air or chest pain.  Currently on room air.    Review of Systems:   As noted above    Objective     Vitals:   Temp:  [97.8 °F (36.6 °C)-100.3 °F (37.9 °C)] 98.6 °F (37 °C)  Heart Rate:  [86-99] 86  Resp:  [16-17] 17  BP: ()/(52-64) 98/52  Flow (L/min):  [2] 2    Intake/Output Summary (Last 24 hours) at 7/15/2023 0910  Last data filed at 2023  Gross per 24 hour   Intake 460 ml   Output --   Net 460 ml       Physical Exam:    General Appearance: alert, oriented x 3, no acute distress   Skin: warm and dry  HEENT: oral mucosa normal, nonicteric sclera  Neck: supple, no JVD  Lungs: CTA  Heart: RRR, normal S1 and S2  Abdomen: soft, nontender, nondistended  : no palpable bladder  Extremities: no edema, cyanosis or clubbing  Neuro: normal speech and mental status     Scheduled Meds:     atorvastatin, 10 mg, Oral, Daily  cefTRIAXone, 1,000 mg, Intravenous, Q24H  hydrocortisone, 1 application , Topical, BID  insulin lispro, 2-9 Units, Subcutaneous, 4x Daily AC & at Bedtime  linagliptin, 5 mg, Oral, Daily  NIFEdipine XL, 30 mg, Oral, Daily  OLANZapine, 5 mg, Oral, Nightly  pantoprazole, 40 mg, Oral, Daily  potassium chloride, 40 mEq, Oral, Daily  senna-docusate sodium, 2 tablet, Oral, BID  sodium chloride, 10 mL, Intravenous, Q12H      IV Meds:          Results Reviewed:   I have personally reviewed the results from the time of this admission to 7/15/2023 09:10 EDT     Results from last 7 days   Lab Units 07/15/23  0547 23  0538 23  1557 23  0602   SODIUM mmol/L 142 138  --  140  141   POTASSIUM mmol/L 3.0* 3.2* 3.5 2.5*  2.6*   CHLORIDE mmol/L 119* 114*  --  114*  113*   CO2 mmol/L 14.7* 15.9*   --  14.7*  15.4*   BUN mg/dL 11 14  --  26*  26*   CREATININE mg/dL 0.90 0.93  --  1.41*  1.43*   CALCIUM mg/dL 8.0* 8.3*  --  8.7  8.6   BILIRUBIN mg/dL 0.3 0.4  --  0.4   ALK PHOS U/L 44 50  --  46   ALT (SGPT) U/L 11 13  --  14   AST (SGOT) U/L 15 16  --  15   GLUCOSE mg/dL 126* 199*  --  136*  136*     Estimated Creatinine Clearance: 39.7 mL/min (by C-G formula based on SCr of 0.9 mg/dL).  Results from last 7 days   Lab Units 07/15/23  0547 07/14/23  0538 07/13/23  0602   MAGNESIUM mg/dL 1.5* 1.2* 1.4*   PHOSPHORUS mg/dL  --   --  2.3*         Results from last 7 days   Lab Units 07/15/23  0547 07/14/23  0538 07/13/23  0602 07/12/23  0854   WBC 10*3/mm3 5.13 6.42 5.56 10.22   HEMOGLOBIN g/dL 8.1* 8.6* 8.0* 10.8*   PLATELETS 10*3/mm3 234 235 234 340           Assessment / Plan     ASSESSMENT:  Acute kidney injury, prerenal from hypovolemia, resolving nicely, ivf heplocked  Hypokalemia: Replete potassium prn  History of hypertension: We will hold valsartan and hydrochlorothiazide.  Continue nifedipine  History of dyslipidemia  Nausea and vomiting  History of breast cancer positive HER2 negative ER/CA  Renal mass on latest renal ultrasound: Further work-up related     PLAN:       Replete potassium and other electrolytes as needed  We will sign off but remain available, thank you      Thank you for involving us in the care of Veronica Royal.  Please feel free to call with any questions.    Dev Melissa MD  07/15/23  09:10 EDT    [facility]  [phone]

## 2023-07-16 LAB
ALBUMIN SERPL-MCNC: 2.3 G/DL (ref 3.5–5.2)
ALBUMIN/GLOB SERPL: 1.1 G/DL
ALP SERPL-CCNC: 47 U/L (ref 39–117)
ALT SERPL W P-5'-P-CCNC: 13 U/L (ref 1–33)
ANION GAP SERPL CALCULATED.3IONS-SCNC: 9.3 MMOL/L (ref 5–15)
AST SERPL-CCNC: 13 U/L (ref 1–32)
BASOPHILS # BLD AUTO: 0.01 10*3/MM3 (ref 0–0.2)
BASOPHILS NFR BLD AUTO: 0.2 % (ref 0–1.5)
BILIRUB SERPL-MCNC: 0.2 MG/DL (ref 0–1.2)
BUN SERPL-MCNC: 10 MG/DL (ref 8–23)
BUN/CREAT SERPL: 11 (ref 7–25)
CALCIUM SPEC-SCNC: 7.6 MG/DL (ref 8.6–10.5)
CHLORIDE SERPL-SCNC: 117 MMOL/L (ref 98–107)
CO2 SERPL-SCNC: 15.7 MMOL/L (ref 22–29)
CREAT SERPL-MCNC: 0.91 MG/DL (ref 0.57–1)
DEPRECATED RDW RBC AUTO: 47.3 FL (ref 37–54)
EGFRCR SERPLBLD CKD-EPI 2021: 63.1 ML/MIN/1.73
EOSINOPHIL # BLD AUTO: 0.13 10*3/MM3 (ref 0–0.4)
EOSINOPHIL NFR BLD AUTO: 2.1 % (ref 0.3–6.2)
ERYTHROCYTE [DISTWIDTH] IN BLOOD BY AUTOMATED COUNT: 15.7 % (ref 12.3–15.4)
GLOBULIN UR ELPH-MCNC: 2.1 GM/DL
GLUCOSE BLDC GLUCOMTR-MCNC: 175 MG/DL (ref 70–130)
GLUCOSE BLDC GLUCOMTR-MCNC: 185 MG/DL (ref 70–130)
GLUCOSE BLDC GLUCOMTR-MCNC: 195 MG/DL (ref 70–130)
GLUCOSE BLDC GLUCOMTR-MCNC: 209 MG/DL (ref 70–130)
GLUCOSE SERPL-MCNC: 200 MG/DL (ref 65–99)
HCT VFR BLD AUTO: 22.5 % (ref 34–46.6)
HGB BLD-MCNC: 7.6 G/DL (ref 12–15.9)
IMM GRANULOCYTES # BLD AUTO: 0.1 10*3/MM3 (ref 0–0.05)
IMM GRANULOCYTES NFR BLD AUTO: 1.6 % (ref 0–0.5)
LYMPHOCYTES # BLD AUTO: 1.03 10*3/MM3 (ref 0.7–3.1)
LYMPHOCYTES NFR BLD AUTO: 16.7 % (ref 19.6–45.3)
MAGNESIUM SERPL-MCNC: 2.1 MG/DL (ref 1.6–2.4)
MCH RBC QN AUTO: 28.1 PG (ref 26.6–33)
MCHC RBC AUTO-ENTMCNC: 33.8 G/DL (ref 31.5–35.7)
MCV RBC AUTO: 83.3 FL (ref 79–97)
MONOCYTES # BLD AUTO: 0.46 10*3/MM3 (ref 0.1–0.9)
MONOCYTES NFR BLD AUTO: 7.5 % (ref 5–12)
NEUTROPHILS NFR BLD AUTO: 4.42 10*3/MM3 (ref 1.7–7)
NEUTROPHILS NFR BLD AUTO: 71.9 % (ref 42.7–76)
NRBC BLD AUTO-RTO: 0.2 /100 WBC (ref 0–0.2)
PLATELET # BLD AUTO: 214 10*3/MM3 (ref 140–450)
PMV BLD AUTO: 10 FL (ref 6–12)
POTASSIUM SERPL-SCNC: 2.9 MMOL/L (ref 3.5–5.2)
POTASSIUM SERPL-SCNC: 4.2 MMOL/L (ref 3.5–5.2)
PROT SERPL-MCNC: 4.4 G/DL (ref 6–8.5)
RBC # BLD AUTO: 2.7 10*6/MM3 (ref 3.77–5.28)
SODIUM SERPL-SCNC: 142 MMOL/L (ref 136–145)
WBC NRBC COR # BLD: 6.15 10*3/MM3 (ref 3.4–10.8)

## 2023-07-16 PROCEDURE — 84132 ASSAY OF SERUM POTASSIUM: CPT | Performed by: INTERNAL MEDICINE

## 2023-07-16 PROCEDURE — 85025 COMPLETE CBC W/AUTO DIFF WBC: CPT | Performed by: INTERNAL MEDICINE

## 2023-07-16 PROCEDURE — 99232 SBSQ HOSP IP/OBS MODERATE 35: CPT | Performed by: INTERNAL MEDICINE

## 2023-07-16 PROCEDURE — 83735 ASSAY OF MAGNESIUM: CPT | Performed by: INTERNAL MEDICINE

## 2023-07-16 PROCEDURE — 0 POTASSIUM CHLORIDE PER 2 MEQ: Performed by: INTERNAL MEDICINE

## 2023-07-16 PROCEDURE — 63710000001 INSULIN LISPRO (HUMAN) PER 5 UNITS: Performed by: INTERNAL MEDICINE

## 2023-07-16 PROCEDURE — 80053 COMPREHEN METABOLIC PANEL: CPT | Performed by: INTERNAL MEDICINE

## 2023-07-16 PROCEDURE — 25010000002 CEFTRIAXONE PER 250 MG: Performed by: HOSPITALIST

## 2023-07-16 PROCEDURE — 82948 REAGENT STRIP/BLOOD GLUCOSE: CPT

## 2023-07-16 RX ORDER — POTASSIUM CHLORIDE 29.8 MG/ML
20 INJECTION INTRAVENOUS
Status: COMPLETED | OUTPATIENT
Start: 2023-07-16 | End: 2023-07-16

## 2023-07-16 RX ADMIN — INSULIN LISPRO 2 UNITS: 100 INJECTION, SOLUTION INTRAVENOUS; SUBCUTANEOUS at 12:45

## 2023-07-16 RX ADMIN — SENNOSIDES AND DOCUSATE SODIUM 2 TABLET: 50; 8.6 TABLET ORAL at 21:11

## 2023-07-16 RX ADMIN — HYDROCORTISONE ACETATE 1 APPLICATION: 1 CREAM TOPICAL at 21:11

## 2023-07-16 RX ADMIN — HYDROCORTISONE ACETATE 1 APPLICATION: 1 CREAM TOPICAL at 09:22

## 2023-07-16 RX ADMIN — POTASSIUM CHLORIDE 20 MEQ: 29.8 INJECTION, SOLUTION INTRAVENOUS at 11:45

## 2023-07-16 RX ADMIN — NIFEDIPINE 30 MG: 30 TABLET, FILM COATED, EXTENDED RELEASE ORAL at 09:24

## 2023-07-16 RX ADMIN — POTASSIUM CHLORIDE 20 MEQ: 29.8 INJECTION, SOLUTION INTRAVENOUS at 10:54

## 2023-07-16 RX ADMIN — INSULIN LISPRO 2 UNITS: 100 INJECTION, SOLUTION INTRAVENOUS; SUBCUTANEOUS at 16:45

## 2023-07-16 RX ADMIN — INSULIN LISPRO 2 UNITS: 100 INJECTION, SOLUTION INTRAVENOUS; SUBCUTANEOUS at 21:12

## 2023-07-16 RX ADMIN — CEFTRIAXONE SODIUM 1000 MG: 1 INJECTION, POWDER, FOR SOLUTION INTRAMUSCULAR; INTRAVENOUS at 09:56

## 2023-07-16 RX ADMIN — Medication 10 ML: at 09:27

## 2023-07-16 RX ADMIN — Medication 10 ML: at 21:12

## 2023-07-16 RX ADMIN — PANTOPRAZOLE SODIUM 40 MG: 40 TABLET, DELAYED RELEASE ORAL at 09:23

## 2023-07-16 RX ADMIN — OLANZAPINE 5 MG: 5 TABLET ORAL at 21:11

## 2023-07-16 RX ADMIN — ATORVASTATIN CALCIUM 10 MG: 20 TABLET, FILM COATED ORAL at 09:22

## 2023-07-16 RX ADMIN — POTASSIUM CHLORIDE 40 MEQ: 750 TABLET, EXTENDED RELEASE ORAL at 09:27

## 2023-07-16 RX ADMIN — LINAGLIPTIN 5 MG: 5 TABLET, FILM COATED ORAL at 09:23

## 2023-07-16 RX ADMIN — INSULIN LISPRO 4 UNITS: 100 INJECTION, SOLUTION INTRAVENOUS; SUBCUTANEOUS at 09:22

## 2023-07-16 RX ADMIN — POTASSIUM CHLORIDE 20 MEQ: 29.8 INJECTION, SOLUTION INTRAVENOUS at 12:41

## 2023-07-16 NOTE — PROGRESS NOTES
[facility]  Inpatient Progress Note    Reason for Follow Up: Imaging showing cirrhosis, nausea, vomiting    Subjective     Interval History:   Hemoglobin a bit lower today.  She is not having any nausea or vomiting.  She is tolerating her diet.  She is anxious to get out of the hospital.  Struggles with potassium tablets as they are large but she took it in applesauce this morning and did well      Current Facility-Administered Medications:     acetaminophen (TYLENOL) tablet 650 mg, 650 mg, Oral, Q4H PRN **OR** acetaminophen (TYLENOL) 160 MG/5ML solution 650 mg, 650 mg, Oral, Q4H PRN **OR** acetaminophen (TYLENOL) suppository 650 mg, 650 mg, Rectal, Q4H PRN, Kenny Jeter MD    atorvastatin (LIPITOR) tablet 10 mg, 10 mg, Oral, Daily, Kenny Jeter MD, 10 mg at 07/16/23 0922    sennosides-docusate (PERICOLACE) 8.6-50 MG per tablet 2 tablet, 2 tablet, Oral, BID, 2 tablet at 07/14/23 0851 **AND** polyethylene glycol (MIRALAX) packet 17 g, 17 g, Oral, Daily PRN **AND** bisacodyl (DULCOLAX) EC tablet 5 mg, 5 mg, Oral, Daily PRN **AND** bisacodyl (DULCOLAX) suppository 10 mg, 10 mg, Rectal, Daily PRN, Kenny Jeter MD    Calcium Replacement - Follow Nurse / BPA Driven Protocol, , Does not apply, PRN, Bernard Damon MD    cefTRIAXone (ROCEPHIN) 1,000 mg in sodium chloride 0.9 % 100 mL IVPB-VTB, 1,000 mg, Intravenous, Q24H, Kannan Camacho MD, Last Rate: 200 mL/hr at 07/16/23 0956, 1,000 mg at 07/16/23 0956    dextrose (D50W) (25 g/50 mL) IV injection 25 g, 25 g, Intravenous, Q15 Min PRN, Kenny Jeter MD    dextrose (GLUTOSE) oral gel 15 g, 15 g, Oral, Q15 Min PRN, Kenny Jeter MD    glucagon (GLUCAGEN) injection 1 mg, 1 mg, Intramuscular, Q15 Min PRN, Kenny Jeter MD    hydrocortisone 1 % cream 1 application , 1 application , Topical, BID, Kenny Jeter MD, 1 application  at 07/16/23 0922    insulin lispro (HUMALOG/ADMELOG) injection 2-9 Units,  2-9 Units, Subcutaneous, 4x Daily AC & at Bedtime, Kenny Jeter MD, 2 Units at 07/16/23 1245    linagliptin (TRADJENTA) tablet 5 mg, 5 mg, Oral, Daily, Kannan Camacho MD, 5 mg at 07/16/23 0923    Magnesium Standard Dose Replacement - Follow Nurse / BPA Driven Protocol, , Does not apply, Nelson CASTORENA Rahul Kandada, MD    NIFEdipine XL (PROCARDIA XL) 24 hr tablet 30 mg, 30 mg, Oral, Daily, Kenny Jeter MD, 30 mg at 07/16/23 0924    OLANZapine (zyPREXA) tablet 5 mg, 5 mg, Oral, Nightly, Kenny Jeter MD, 5 mg at 07/15/23 2141    ondansetron (ZOFRAN) tablet 4 mg, 4 mg, Oral, Q6H PRN, 4 mg at 07/15/23 2141 **OR** ondansetron (ZOFRAN) injection 4 mg, 4 mg, Intravenous, Q6H PRN, Kenny Jeter MD    pantoprazole (PROTONIX) EC tablet 40 mg, 40 mg, Oral, Daily, Kenny Jeter MD, 40 mg at 07/16/23 0923    Phosphorus Replacement - Follow Nurse / BPA Driven Protocol, , Does not apply, Nelson CASTORENA Rahul Kandada, MD    potassium chloride (K-DUR,KLOR-CON) ER tablet 40 mEq, 40 mEq, Oral, Daily, Kannan Camacho MD, 40 mEq at 07/16/23 0927    potassium chloride (KLOR-CON) packet 40 mEq, 40 mEq, Oral, BID, Mitchell Mc, APRN    Potassium Replacement - Follow Nurse / BPA Driven Protocol, , Does not apply, Nelson CASTORENA Rahul Kandada, MD    sodium chloride 0.9 % flush 10 mL, 10 mL, Intravenous, Q12H, Kenny Jeter MD, 10 mL at 07/16/23 0927    sodium chloride 0.9 % flush 10 mL, 10 mL, Intravenous, PRN, Kenny Jeter MD    sodium chloride 0.9 % infusion 40 mL, 40 mL, Intravenous, PRN, Kenny Jeter MD  Review of Systems:     The following systems were reviewed and negative: Constitution, pulmonary, cardiac, gastrointestinal, genitourinary        Objective     Vital Signs  Temp:  [98.2 °F (36.8 °C)-100.1 °F (37.8 °C)] 98.8 °F (37.1 °C)  Heart Rate:  [78-96] 95  Resp:  [17-18] 18  BP: (116-133)/(62-70) 126/68  Body mass index is 24.46 kg/m².    Intake/Output Summary (Last 24 hours) at  7/16/2023 1542  Last data filed at 7/16/2023 1300  Gross per 24 hour   Intake 630 ml   Output --   Net 630 ml     I/O this shift:  In: 330 [P.O.:330]  Out: -      Physical Exam:   General: patient awake, alert and cooperative, appears elderly and frail   Eyes: Normal lids and lashes, no scleral icterus   Neck: supple, normal ROM   Skin: warm and dry, not jaundiced   Cardiovascular: regular rhythm and rate, no murmurs auscultated   Pulm: clear to auscultation bilaterally, regular and unlabored   Abdomen: soft, nontender, nondistended; normal bowel sounds   Rectal: deferred   Extremities: no rash or edema   Psychiatric: Normal mood and behavior; memory intact     Results Review:     I reviewed the patient's new clinical results.    Results from last 7 days   Lab Units 07/16/23  0550 07/15/23  0547 07/14/23  0538   WBC 10*3/mm3 6.15 5.13 6.42   HEMOGLOBIN g/dL 7.6* 8.1* 8.6*   HEMATOCRIT % 22.5* 24.4* 26.2*   PLATELETS 10*3/mm3 214 234 235     Results from last 7 days   Lab Units 07/16/23  0550 07/15/23  0547 07/14/23  0538   SODIUM mmol/L 142 142 138   POTASSIUM mmol/L 2.9* 3.0* 3.2*   CHLORIDE mmol/L 117* 119* 114*   CO2 mmol/L 15.7* 14.7* 15.9*   BUN mg/dL 10 11 14   CREATININE mg/dL 0.91 0.90 0.93   CALCIUM mg/dL 7.6* 8.0* 8.3*   BILIRUBIN mg/dL 0.2 0.3 0.4   ALK PHOS U/L 47 44 50   ALT (SGPT) U/L 13 11 13   AST (SGOT) U/L 13 15 16   GLUCOSE mg/dL 200* 126* 199*         Lab Results   Lab Value Date/Time    LIPASE 24 07/14/2023 0538    LIPASE 21 07/12/2023 1619       Radiology:  No orders to display       Assessment & Plan     Active Hospital Problems    Diagnosis     **KAMILA (acute kidney injury)     Severe malnutrition     Acute UTI (urinary tract infection)     Hypomagnesemia     Hypokalemia     Anemia due to chemotherapy     Left renal mass     Cirrhosis of liver     Type 2 diabetes mellitus     Hypertension     Malignant neoplasm of upper-outer quadrant of right breast in female, estrogen receptor negative         Impression  1.  Cirrhosis: Suspected TELLEZ due to negative outpatient serologies.  Normal transaminases.  Normal synthetic function.  Followed by Dr. Mc    2.  Pill dysphagia: Speech therapy evaluated and recommended diet    3.  Nausea and vomiting: Resolved    4.  Right breast cancer    5.  Chemotherapy associated anemia: Stable    Plan  Continue frequent small meals, diet as per speech  Recommend pills taken in applesauce  Outpatient follow-up with Dr. Mc for ongoing monitoring of cirrhosis, dysphagia and symptoms of nausea and vomiting    GI will sign off but remain available as needed in this patient's care.  Thank you.        I discussed the patients findings and my recommendations with patient and family.    All necessary PPE, including face mask and eye protection, were worn during this encounter.  Hand sanitization was performed both before and after the patient interaction.    Over 25 minutes was spent reviewing the patient's chart and records, in discussion with the patient, in examination of the patient, and in discussion with members of the patient's medical team.    Keesha Kenney MD

## 2023-07-16 NOTE — PROGRESS NOTES
Name: Veronica Royal ADMIT: 2023   : 1940  PCP: Princess Cruz MD    MRN: [id number] LOS: 2 days   AGE/SEX: 82 y.o. female  ROOM: [facility]     Subjective   Subjective     Patient is lying in the bed and does not appear to be any major distress.  Denies nausea, vomiting abdominal pain, chest pain.       Objective   Objective   Vital Signs  Temp:  [98.2 °F (36.8 °C)-100.1 °F (37.8 °C)] 98.8 °F (37.1 °C)  Heart Rate:  [78-96] 95  Resp:  [17-18] 18  BP: (116-133)/(62-70) 126/68  SpO2:  [92 %-95 %] 92 %  on   ;   Device (Oxygen Therapy): room air  Body mass index is 24.46 kg/m².    (No change in exam today)    Physical Exam  Vitals and nursing note reviewed.   Constitutional:       General: She is not in acute distress.     Appearance: She is ill-appearing (chronically). She is not toxic-appearing or diaphoretic.   HENT:      Head: Normocephalic.      Nose: Nose normal.      Mouth/Throat:      Mouth: Mucous membranes are moist.      Pharynx: Oropharynx is clear.   Eyes:      General: No scleral icterus.        Right eye: No discharge.         Left eye: No discharge.      Extraocular Movements: Extraocular movements intact.      Conjunctiva/sclera: Conjunctivae normal.   Cardiovascular:      Rate and Rhythm: Normal rate and regular rhythm.      Pulses: Normal pulses.      Comments: Port left upper chest  Pulmonary:      Effort: Pulmonary effort is normal. No respiratory distress.      Breath sounds: Normal breath sounds. No wheezing or rales.   Abdominal:      General: Bowel sounds are normal. There is no distension.      Palpations: Abdomen is soft.      Tenderness: There is no abdominal tenderness.   Musculoskeletal:         General: Swelling (doughy chronic edema in BLEs) present. No deformity. Normal range of motion.      Cervical back: Neck supple. No rigidity.   Lymphadenopathy:      Cervical: No cervical adenopathy.   Skin:     General: Skin is warm and dry.      Capillary Refill: Capillary  refill takes less than 2 seconds.      Coloration: Skin is not jaundiced.      Findings: Rash (scattered across upper extremities and right cheek) present.   Neurological:      General: No focal deficit present.      Mental Status: She is alert and oriented to person, place, and time. Mental status is at baseline.      Cranial Nerves: No cranial nerve deficit.      Coordination: Coordination normal.   Psychiatric:         Mood and Affect: Mood normal.         Behavior: Behavior normal.     Results Review     I reviewed the patient's new clinical results.  Results from last 7 days   Lab Units 07/16/23  0550 07/15/23  0547 07/14/23  0538 07/13/23  0602   WBC 10*3/mm3 6.15 5.13 6.42 5.56   HEMOGLOBIN g/dL 7.6* 8.1* 8.6* 8.0*   PLATELETS 10*3/mm3 214 234 235 234       Results from last 7 days   Lab Units 07/16/23  0550 07/15/23  0547 07/14/23  0538 07/13/23  1557 07/13/23  0602   SODIUM mmol/L 142 142 138  --  140  141   POTASSIUM mmol/L 2.9* 3.0* 3.2* 3.5 2.5*  2.6*   CHLORIDE mmol/L 117* 119* 114*  --  114*  113*   CO2 mmol/L 15.7* 14.7* 15.9*  --  14.7*  15.4*   BUN mg/dL 10 11 14  --  26*  26*   CREATININE mg/dL 0.91 0.90 0.93  --  1.41*  1.43*   GLUCOSE mg/dL 200* 126* 199*  --  136*  136*   EGFR mL/min/1.73 63.1 64.0 61.5  --  37.3*  36.7*       Results from last 7 days   Lab Units 07/16/23  0550 07/15/23  0547 07/14/23  0538 07/13/23  0602   ALBUMIN g/dL 2.3* 2.6* 2.7* 2.7*  2.7*   BILIRUBIN mg/dL 0.2 0.3 0.4 0.4   ALK PHOS U/L 47 44 50 46   AST (SGOT) U/L 13 15 16 15   ALT (SGPT) U/L 13 11 13 14       Results from last 7 days   Lab Units 07/16/23  0550 07/15/23  0547 07/14/23  0538 07/13/23  0602   CALCIUM mg/dL 7.6* 8.0* 8.3* 8.7  8.6   ALBUMIN g/dL 2.3* 2.6* 2.7* 2.7*  2.7*   MAGNESIUM mg/dL 2.1 1.5* 1.2* 1.4*   PHOSPHORUS mg/dL  --   --   --  2.3*         Glucose   Date/Time Value Ref Range Status   07/16/2023 1118 175 (H) 70 - 130 mg/dL Final     Comment:     Meter: [id number] : [id number]  Asad Burks NA   07/16/2023 0807 209 (H) 70 - 130 mg/dL Final     Comment:     Meter: [id number] : [id number] Asad Burks NA   07/15/2023 2123 198 (H) 70 - 130 mg/dL Final     Comment:     Meter: [id number] : [id number] Marquez ELLIOTTA   07/15/2023 1624 175 (H) 70 - 130 mg/dL Final     Comment:     Meter: [id number] : [id number] Dan Karel NA   07/15/2023 1127 167 (H) 70 - 130 mg/dL Final     Comment:     Meter: [id number] : [id number] Dan Karel NA   07/15/2023 0946 160 (H) 70 - 130 mg/dL Final     Comment:     Meter: [id number] : [id number] Ranjan Chung RN   07/14/2023 2201 201 (H) 70 - 130 mg/dL Final     Comment:     Meter: [id number] : [id number] Marquez Purcell CNA       No radiology results for the last day  I have personally reviewed all medications:  Scheduled Medications  atorvastatin, 10 mg, Oral, Daily  cefTRIAXone, 1,000 mg, Intravenous, Q24H  hydrocortisone, 1 application , Topical, BID  insulin lispro, 2-9 Units, Subcutaneous, 4x Daily AC & at Bedtime  linagliptin, 5 mg, Oral, Daily  NIFEdipine XL, 30 mg, Oral, Daily  OLANZapine, 5 mg, Oral, Nightly  pantoprazole, 40 mg, Oral, Daily  potassium chloride, 40 mEq, Oral, Daily  potassium chloride, 40 mEq, Oral, BID  senna-docusate sodium, 2 tablet, Oral, BID  sodium chloride, 10 mL, Intravenous, Q12H    Infusions     Diet  Diet: Regular/House Diet; Texture: Mechanical Ground (NDD 2); Fluid Consistency: Nectar Thick    I have personally reviewed:  [x]  Laboratory   []  Microbiology   []  Radiology   []  EKG/Telemetry  []  Cardiology/Vascular   []  Pathology    []  Records       Assessment/Plan     Active Hospital Problems    Diagnosis  POA    **KAMILA (acute kidney injury) [N17.9]  Yes    Severe malnutrition [E43]  Yes    Acute UTI (urinary tract infection) [N39.0]  Yes    Hypomagnesemia [E83.42]  No    Hypokalemia [E87.6]  Yes    Anemia due to chemotherapy [D64.81, T45.1X5A]  Yes    Left renal mass [N28.89]  Yes     Cirrhosis of liver [K74.60]  Yes    Type 2 diabetes mellitus [E11.9]  Yes    Hypertension [I10]  Yes    Malignant neoplasm of upper-outer quadrant of right breast in female, estrogen receptor negative [C50.411, Z17.1]  Not Applicable      Resolved Hospital Problems   No resolved problems to display.       83yo woman with h/o DM2, cirrhosis, HTN, HLD, left renal mass, and breast CA, who was directly admitted to our service from Heme/Onc office with KAMILA due to N/V/D and poor po intake.    KAMILA/CKD?:  Creatinine did peak at 2.63 and has improved to 0.90.  Appears to have underlying CKD stage III and avoid nephrotoxins.  Off IV fluids.  Of note patient is off valsartan and HCTZ per nephrology recommendations.    UTI: Cultures grew Klebsiella pneumonia and is currently on IV Rocephin which will be continued.  Await further sensitivities.    N/V/D: Likely due to chemotherapy and these are improving.  GI did evaluate and no plans for any endoscopy at this point.    Dysphagia: Speech did evaluate and diet as per the recommendations.    Hypokalemia: Potassium low 2.9 and is being replaced per protocol.  Hypomagnesemia: Replace per protocol.    Cirrhosis NOS: noted on imaging, LFTs wnl, have asked GI to see here for N/V/D, pt seen in their office on May 25th, they felt her cirrhosis was most likely due to TELLEZ    Left renal mass: asked  to see for further eval/recs--they recommended simply repeating CT in 3-4 months    DM2: OHGs on hold, sugars acceptable on SSI alone for now but of course po intake is fairly limited, A1c 8.6, Tradjenta has been resumed.    HTN: BPs remain acceptable on Nifedipine, continue to hold Diovan-HCT given her KAMILA    Breast CA: Heme/Onc consulted, not tolerating chemotx very well with GI symptoms and skin rash (on topical steroid per Heme/Onc)    SCDs for DVT prophylaxis.  Full code.  Discussed with patient and RN.  Anticipate discharge, to rehab once bed is available.    Copied text on this note  has been reviewed by me on 7/16/2023    Bernard Damon MD  [facility]  07/16/23  16:13 EDT

## 2023-07-16 NOTE — PROGRESS NOTES
[facility] GROUP INPATIENT PROGRESS NOTE    Length of Stay:  2 days    CHIEF COMPLAINT/REASON FOR VISIT:  Breast cancer currently on neoadjuvant therapy with Taxol, Herceptin, Perjeta  Adverse effects of therapy including nausea, vomiting, diarrhea  Volume depletion  Acute kidney injury  Hypokalemia    SUBJECTIVE: Afebrile. Normotensive. On room air. Feels better today.    OBJECTIVE:  Vitals:    07/15/23 2031 07/16/23 0429 07/16/23 0815 07/16/23 0900   BP: 116/62 133/68 122/70    BP Location: Right arm Left arm Left arm    Patient Position: Lying Lying Lying    Pulse: 92 95 96 78   Resp: 17 18 18    Temp: 100.1 °F (37.8 °C) 98.7 °F (37.1 °C) 98.2 °F (36.8 °C)    TempSrc: Oral Oral Oral    SpO2: 95% 94% 95%    Weight:       Height:             PHYSICAL EXAMINATION:   General:  No acute distress, awake, alert and oriented. Up in a chair.   Skin:  Warm and dry, no visible rash  HEENT:  Normocephalic/atraumatic.    Chest:  Normal respiratory effort. Mediport present in the L chest, accessed  Extremities:  No visible clubbing, cyanosis, or edema  Neuro/psych:  Grossly nonfocal.  Normal mood and affect.       DIAGNOSTIC DATA:  Results Review:     I reviewed the patient's new clinical results.    Results from last 7 days   Lab Units 07/16/23  0550   WBC 10*3/mm3 6.15   HEMOGLOBIN g/dL 7.6*   HEMATOCRIT % 22.5*   PLATELETS 10*3/mm3 214     Lab Results   Component Value Date    NEUTROABS 4.42 07/16/2023     Results from last 7 days   Lab Units 07/16/23  0550   SODIUM mmol/L 142   POTASSIUM mmol/L 2.9*   CHLORIDE mmol/L 117*   CO2 mmol/L 15.7*   BUN mg/dL 10   CREATININE mg/dL 0.91   GLUCOSE mg/dL 200*   CALCIUM mg/dL 7.6*         Results from last 7 days   Lab Units 07/16/23  0550   MAGNESIUM mg/dL 2.1         IMAGING:    None reviewed    ASSESSMENT:  This is a 82 y.o. 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  Invasive ductal carcinoma with apocrine features, grade 3, Ki-67 38%  CT scans and bone scan without any obvious evidence of metastatic disease  Plan Taxol Herceptin and Perjeta, and if she tolerates this well we will proceed with Adriamycin and cyclophosphamide.  Echocardiogram has been performed and ejection fraction normal.  Liver shows cirrhotic morphology.  Mediport placed 5/19/2023  Taxol, Herceptin, Perjeta initiated 5/24/2023 5/31/2023 C1D8 Taxol  6/28/2023-cycle 2-day 15 of Taxol.  She is overall tolerating therapy well with expected side effects.  7/5/23: cycle 3 day 1 and she received taxol, herceptin, perjeta  7/12/2023-cycle 3-day 8 of TPH.  Taxol held due to severe dehydration, poorly controlled nausea vomiting, KAMILA. Admitted for these symptoms.     *Diabetes  Poorly controlled  Evaluation by endocrinology 5/30/2023 with recommendations for dietary changes and home blood sugar monitoring.  The patient's daughter reports today she is struggling with compliance.  Her oral intake is still fairly poor.  Emphasized on regular food intake to maintain blood sugars.  According to daughter blood sugars have been elevated in the 300s and 400s.  They are not able to control blood sugars adequately.     *Hyperkalemia and chronic kidney disease, now with hypokalemia on admission  Potassium remains low at 2.9.  Continue potassium repletion.    *Hypomagnesemia  Magnesium 2.1, from 1.5, from 1.2  Continue repletion prn     *KAMILA/CKD  Creatinine elevated at 2.63, BUN 37 at presentation on 7/12.  Secondary to severe volume depletion.  Calcium also elevated at 10.3  Reason for admission  Creatinine 0.91, from 0.90, from 0.93, from 1.43, from 1.99, from 2.63, from 0.95     *Cirrhotic appearance of the liver on the CT scan  Referred to gastroenterology  Synthetic function appears to be normal  We will start with Taxol to see if she would tolerate the chemotherapy   Slight elevation of intervention  studies today with ALT 43, AST 55.  Continue to monitor weekly  6/28/2023-mild elevation in the liver labs.  Stable compared to previous labs.  Okay to proceed with chemotherapy.     *Decreased oral intake secondary to chemotherapy  She has lost a significant amount of weight..  Continue follow-up with nutrition  Continue entergrade outpatient     *Frequent eructation  Continue Protonix 40 mg daily     *Cognitive impairment  It is unclear if this is the patient's baseline or mental status has been exacerbated by recent chemotherapy  The patient is struggling with compliance with her medications.  The patient's daughter expresses frustration today as the patient is struggling to care for herself at home with managing medications and symptom management.  Evaluated by CARL Antonio      *Chemotherapy-induced diarrhea  6/5/2023 patient given Enterade (Wild Berry flavor).  She has been drinking 1 a day.  She does not necessarily like the flavor (currently mixing with sugar-free Aramis-Aid which does help).  Encouraged her to increase to 2 a day.  6/21/2023 patient reports that she had stopped drinking her Enterade because she was waking up in the middle the night having diarrhea although she did not know if it was due to the Enterade her protein shakes.  I have encouraged the patient to continue Enterade, drinking it in the morning.  Try discontinuing the protein shakes and see how she does.  Patient states that she will try this  Reports 2-3 loose stools.  Continue Enterade and Imodium.  7/5/2023 continuing to have issues with diarrhea up to 3-4 bowel movements a day, patient also recently prescribed Kayexalate for an apparently elevated potassium.  Potassium only 3.1 today.  She advised to discontinue the Kayexalate she was given IV hydration today.  We will also prescribe Lomotil to use if needed to help better control her diarrhea.  Patient is not drinking Enterade regularly.  This has been better controlled  with Lomotil as an outpatient     *Maculopapular rash  Secondary to HER2 directed therapy  Recommend starting topical steroids to help with the rash  She quit taking doxycycline as she developed a blister with that.  Medrol Dosepak prescribed.  Continue topical steroids     *Severe nausea and vomiting  Patient has been using Zofran regularly.  Will nee to utilize Zyprexa and Compazine for future outpatient use.  Reason for admission  Olanzapine 5 mg at night recently initiated     *Normocytic anemia  Likely due to chemo and IV fluid hydration  Hgb 7.6, from 8.6, from 8.0, from 10.8, from 9.7     *Renal u/s on 7/6 with possible left renal mass  Not noted on CT imaging from 5/11/23. No further imaging at this time.   Urology evaluated.  We will be having follow-up imaging from an oncology standpoint so this will be monitored     *UTI  Urine culture with >100,000 GNR, Klebsiella pneumoniae, resistant to ampicillin but otherwise susceptible  On Rocephin    RECOMMENDATIONS/PLAN:  Holding chemotherapy which has been poorly tolerated  Potassium and magnesium repletion continue as needed  Rocephin continues for Klebsiella urinary tract infection  Valsartan and HCTZ being held for now and at discharge per nephrology recommendations  Topical steroids for the rash which is improving  Symptom management for nausea and vomiting.  She continues olanzapine 5 mg nightly.  Can further evaluate L kidney as an outpatient when she's clinically doing better and labs have normalized. Breast cancer needs attention first.  Nothing was noted in the left kidney on CT imaging done on 5/11/2023  Dr. Yee plans to obtain an ultrasound of the right breast as an outpatient and then discuss with Dr. Micheal the possibility of surgery given poor tolerance of therapy  Daily labs while admitted  Will follow  Plan discharge to a skilled nursing facility for rehabilitation, likely [facility], when appropriate    Kingsley Ortiz MD

## 2023-07-17 ENCOUNTER — APPOINTMENT (OUTPATIENT)
Dept: ULTRASOUND IMAGING | Facility: HOSPITAL | Age: 83
DRG: 682 | End: 2023-07-17
Payer: MEDICARE

## 2023-07-17 LAB
ALBUMIN SERPL-MCNC: 2.5 G/DL (ref 3.5–5.2)
ALBUMIN/GLOB SERPL: 1 G/DL
ALP SERPL-CCNC: 57 U/L (ref 39–117)
ALT SERPL W P-5'-P-CCNC: 13 U/L (ref 1–33)
ANION GAP SERPL CALCULATED.3IONS-SCNC: 7.9 MMOL/L (ref 5–15)
AST SERPL-CCNC: 15 U/L (ref 1–32)
BASOPHILS # BLD AUTO: 0.03 10*3/MM3 (ref 0–0.2)
BASOPHILS NFR BLD AUTO: 0.4 % (ref 0–1.5)
BILIRUB SERPL-MCNC: 0.3 MG/DL (ref 0–1.2)
BUN SERPL-MCNC: 13 MG/DL (ref 8–23)
BUN/CREAT SERPL: 13.3 (ref 7–25)
CALCIUM SPEC-SCNC: 8.3 MG/DL (ref 8.6–10.5)
CHLORIDE SERPL-SCNC: 117 MMOL/L (ref 98–107)
CO2 SERPL-SCNC: 17.1 MMOL/L (ref 22–29)
CREAT SERPL-MCNC: 0.98 MG/DL (ref 0.57–1)
DEPRECATED RDW RBC AUTO: 48.7 FL (ref 37–54)
EGFRCR SERPLBLD CKD-EPI 2021: 57.7 ML/MIN/1.73
EOSINOPHIL # BLD AUTO: 0.16 10*3/MM3 (ref 0–0.4)
EOSINOPHIL NFR BLD AUTO: 2.1 % (ref 0.3–6.2)
ERYTHROCYTE [DISTWIDTH] IN BLOOD BY AUTOMATED COUNT: 16.6 % (ref 12.3–15.4)
GLOBULIN UR ELPH-MCNC: 2.5 GM/DL
GLUCOSE BLDC GLUCOMTR-MCNC: 133 MG/DL (ref 70–130)
GLUCOSE BLDC GLUCOMTR-MCNC: 154 MG/DL (ref 70–130)
GLUCOSE BLDC GLUCOMTR-MCNC: 169 MG/DL (ref 70–130)
GLUCOSE BLDC GLUCOMTR-MCNC: 184 MG/DL (ref 70–130)
GLUCOSE SERPL-MCNC: 134 MG/DL (ref 65–99)
HCT VFR BLD AUTO: 25.1 % (ref 34–46.6)
HGB BLD-MCNC: 8.3 G/DL (ref 12–15.9)
IMM GRANULOCYTES # BLD AUTO: 0.13 10*3/MM3 (ref 0–0.05)
IMM GRANULOCYTES NFR BLD AUTO: 1.7 % (ref 0–0.5)
LYMPHOCYTES # BLD AUTO: 1.69 10*3/MM3 (ref 0.7–3.1)
LYMPHOCYTES NFR BLD AUTO: 22.2 % (ref 19.6–45.3)
MAGNESIUM SERPL-MCNC: 1.7 MG/DL (ref 1.6–2.4)
MCH RBC QN AUTO: 27.6 PG (ref 26.6–33)
MCHC RBC AUTO-ENTMCNC: 33.1 G/DL (ref 31.5–35.7)
MCV RBC AUTO: 83.4 FL (ref 79–97)
MONOCYTES # BLD AUTO: 0.6 10*3/MM3 (ref 0.1–0.9)
MONOCYTES NFR BLD AUTO: 7.9 % (ref 5–12)
NEUTROPHILS NFR BLD AUTO: 5 10*3/MM3 (ref 1.7–7)
NEUTROPHILS NFR BLD AUTO: 65.7 % (ref 42.7–76)
NRBC BLD AUTO-RTO: 0 /100 WBC (ref 0–0.2)
PLATELET # BLD AUTO: 271 10*3/MM3 (ref 140–450)
PMV BLD AUTO: 10 FL (ref 6–12)
POTASSIUM SERPL-SCNC: 4 MMOL/L (ref 3.5–5.2)
PROT SERPL-MCNC: 5 G/DL (ref 6–8.5)
RBC # BLD AUTO: 3.01 10*6/MM3 (ref 3.77–5.28)
SODIUM SERPL-SCNC: 142 MMOL/L (ref 136–145)
WBC NRBC COR # BLD: 7.61 10*3/MM3 (ref 3.4–10.8)

## 2023-07-17 PROCEDURE — 25010000002 CEFTRIAXONE PER 250 MG: Performed by: HOSPITALIST

## 2023-07-17 PROCEDURE — 76642 ULTRASOUND BREAST LIMITED: CPT

## 2023-07-17 PROCEDURE — 99232 SBSQ HOSP IP/OBS MODERATE 35: CPT | Performed by: INTERNAL MEDICINE

## 2023-07-17 PROCEDURE — 85025 COMPLETE CBC W/AUTO DIFF WBC: CPT | Performed by: INTERNAL MEDICINE

## 2023-07-17 PROCEDURE — 80053 COMPREHEN METABOLIC PANEL: CPT | Performed by: INTERNAL MEDICINE

## 2023-07-17 PROCEDURE — 97530 THERAPEUTIC ACTIVITIES: CPT

## 2023-07-17 PROCEDURE — 82948 REAGENT STRIP/BLOOD GLUCOSE: CPT

## 2023-07-17 PROCEDURE — 83735 ASSAY OF MAGNESIUM: CPT | Performed by: INTERNAL MEDICINE

## 2023-07-17 PROCEDURE — 63710000001 INSULIN LISPRO (HUMAN) PER 5 UNITS: Performed by: INTERNAL MEDICINE

## 2023-07-17 RX ADMIN — LINAGLIPTIN 5 MG: 5 TABLET, FILM COATED ORAL at 08:33

## 2023-07-17 RX ADMIN — INSULIN LISPRO 2 UNITS: 100 INJECTION, SOLUTION INTRAVENOUS; SUBCUTANEOUS at 08:31

## 2023-07-17 RX ADMIN — HYDROCORTISONE ACETATE 1 APPLICATION: 1 CREAM TOPICAL at 20:03

## 2023-07-17 RX ADMIN — POTASSIUM CHLORIDE 40 MEQ: 750 TABLET, EXTENDED RELEASE ORAL at 08:31

## 2023-07-17 RX ADMIN — INSULIN LISPRO 2 UNITS: 100 INJECTION, SOLUTION INTRAVENOUS; SUBCUTANEOUS at 21:40

## 2023-07-17 RX ADMIN — OLANZAPINE 5 MG: 5 TABLET ORAL at 20:03

## 2023-07-17 RX ADMIN — HYDROCORTISONE ACETATE 1 APPLICATION: 1 CREAM TOPICAL at 08:32

## 2023-07-17 RX ADMIN — Medication 10 ML: at 20:03

## 2023-07-17 RX ADMIN — SENNOSIDES AND DOCUSATE SODIUM 2 TABLET: 50; 8.6 TABLET ORAL at 08:31

## 2023-07-17 RX ADMIN — ACETAMINOPHEN 650 MG: 325 TABLET, FILM COATED ORAL at 08:30

## 2023-07-17 RX ADMIN — Medication 10 ML: at 12:50

## 2023-07-17 RX ADMIN — INSULIN LISPRO 2 UNITS: 100 INJECTION, SOLUTION INTRAVENOUS; SUBCUTANEOUS at 12:50

## 2023-07-17 RX ADMIN — ATORVASTATIN CALCIUM 10 MG: 20 TABLET, FILM COATED ORAL at 08:31

## 2023-07-17 RX ADMIN — PANTOPRAZOLE SODIUM 40 MG: 40 TABLET, DELAYED RELEASE ORAL at 08:31

## 2023-07-17 RX ADMIN — NIFEDIPINE 30 MG: 30 TABLET, FILM COATED, EXTENDED RELEASE ORAL at 08:33

## 2023-07-17 RX ADMIN — CEFTRIAXONE SODIUM 1000 MG: 1 INJECTION, POWDER, FOR SOLUTION INTRAMUSCULAR; INTRAVENOUS at 12:50

## 2023-07-17 NOTE — THERAPY TREATMENT NOTE
Patient Name: Veronica Royal  : 1940    MRN: [id number]                              Today's Date: 2023       Admit Date: 2023    Visit Dx: No diagnosis found.  Patient Active Problem List   Diagnosis    Malignant neoplasm of upper-outer quadrant of right breast in female, estrogen receptor negative    Encounter for fitting and adjustment of vascular catheter    Hypertension    At high risk for malnutrition    Bilateral hearing loss    Malignant neoplasm of female breast    Advanced care planning/counseling discussion    KAMILA (acute kidney injury)    Cirrhosis of liver    Type 2 diabetes mellitus    Hypokalemia    Anemia due to chemotherapy    Left renal mass    Acute UTI (urinary tract infection)    Hypomagnesemia    Severe malnutrition     Past Medical History:   Diagnosis Date    Basal cell carcinoma     Left thumb    Breast cancer     Right    Diabetes mellitus     High cholesterol     Hypertension      Past Surgical History:   Procedure Laterality Date    EYE SURGERY      Muscle repair age 21    VENOUS ACCESS DEVICE (PORT) INSERTION N/A 2023    Procedure: INSERTION VENOUS ACCESS DEVICE;  Surgeon: Rosa Michael MD;  Location: [facility] OR [facility];  Service: General;  Laterality: N/A;      General Information       Row Name 23 1126          Physical Therapy Time and Intention    Document Type therapy note (daily note)  -ST     Mode of Treatment individual therapy;physical therapy  -ST       Row Name 23 1126          General Information    Patient Profile Reviewed yes  -ST     Existing Precautions/Restrictions fall  -ST       Row Name 23 112          Cognition    Orientation Status (Cognition) oriented x 3  -ST       Row Name 23 1126          Safety Issues, Functional Mobility    Safety Issues Affecting Function (Mobility) awareness of need for assistance  -ST     Impairments Affecting Function (Mobility) balance;endurance/activity tolerance;strength  -ST      Comment, Safety Issues/Impairments (Mobility) gait belt, nonskid socks donned  -ST               User Key  (r) = Recorded By, (t) = Taken By, (c) = Cosigned By      Initials Name Provider Type    Krys Lamas, GENO Physical Therapist                   Mobility       Row Name 07/17/23 1126          Bed Mobility    Comment, (Bed Mobility) sitting EOB upon arrival  -ST       [facility] Name 07/17/23 1126          Sit-Stand Transfer    Sit-Stand [place] (Transfers) 1 person assist;contact guard  -ST     Assistive Device (Sit-Stand Transfers) walker, front-wheeled  -ST       Row Name 07/17/23 1126          Gait/Stairs (Locomotion)    [place] Level (Gait) verbal cues;contact guard  -ST     Assistive Device (Gait) walker, front-wheeled  -ST     Distance in Feet (Gait) 35'  -ST     Deviations/Abnormal Patterns (Gait) bilateral deviations;gregory decreased;gait speed decreased;stride length decreased  -ST     Bilateral Gait Deviations forward flexed posture  -ST     Comment, (Gait/Stairs) VC for posture and safety awareness. fatigues quickly  -ST               User Key  (r) = Recorded By, (t) = Taken By, (c) = Cosigned By      Initials Name Provider Type    Krys Lamas, GENO Physical Therapist                   Obj/Interventions       Row Name 07/17/23 1127          Balance    Comment, Balance CGA for dynamic balance, no LOB today with improved steadiness using walker.  -ST               User Key  (r) = Recorded By, (t) = Taken By, (c) = Cosigned By      Initials Name Provider Type    Krys Lamas, GENO Physical Therapist                   Goals/Plan    No documentation.                  Clinical Impression       Row Name 07/17/23 1127          Pain    Pretreatment Pain Rating 0/10 - no pain  -ST     Posttreatment Pain Rating 0/10 - no pain  -ST       Row Name 07/17/23 1127          Plan of Care Review    Plan of Care Reviewed With patient  -ST     Progress improving  -ST     Outcome Evaluation Pt  seen for PT this AM - improved mobility today. Able to ambulate up to 35' CGA with rwx, improved balance noted with walker but VC for safety awareness at times. VC for hand placement and safe walker use. 1 LOB with transfers today - min A to safely sit in chair. Recommend SNU at d/c, acute PT to continue to follow and address mobility goals.  -       Row Name 07/17/23 1127          Therapy Assessment/Plan (PT)    Rehab Potential (PT) fair, will monitor progress closely  -ST     Criteria for Skilled Interventions Met (PT) yes  -ST     Therapy Frequency (PT) 5 times/wk  -       Row Name 07/17/23 1127          Positioning and Restraints    Pre-Treatment Position in bed  -ST     Post Treatment Position bed  -ST     In Bed sitting EOB;call light within reach;encouraged to call for assist;exit alarm on  -ST               User Key  (r) = Recorded By, (t) = Taken By, (c) = Cosigned By      Initials Name Provider Type    Krys Lamas PT Physical Therapist                   Outcome Measures       Row Name 07/17/23 1128          How much help from another person do you currently need...    Turning from your back to your side while in flat bed without using bedrails? 4  -ST     Moving from lying on back to sitting on the side of a flat bed without bedrails? 3  -ST     Moving to and from a bed to a chair (including a wheelchair)? 3  -ST     Standing up from a chair using your arms (e.g., wheelchair, bedside chair)? 3  -ST     Climbing 3-5 steps with a railing? 2  -ST     To walk in hospital room? 3  -ST     AM-PAC 6 Clicks Score (PT) 18  -ST     Highest level of mobility 6 --> Walked 10 steps or more  -       Row Name 07/17/23 1128          Functional Assessment    Outcome Measure Options AM-PAC 6 Clicks Basic Mobility (PT)  -ST               User Key  (r) = Recorded By, (t) = Taken By, (c) = Cosigned By      Initials Name Provider Type    Krys Lamas PT Physical Therapist                                  Physical Therapy Education       Title: PT OT SLP Therapies (In Progress)       Topic: Physical Therapy (In Progress)       Point: Mobility training (Done)       Learning Progress Summary             Patient Acceptance, E,TB, VU,NR by  at 7/17/2023 1129    Acceptance, E,TB, NR by  at 7/14/2023 1128                         Point: Home exercise program (Not Started)       Learner Progress:  Not documented in this visit.              Point: Body mechanics (Done)       Learning Progress Summary             Patient Acceptance, E,TB, VU,NR by  at 7/17/2023 1129    Acceptance, E,TB, NR by ST at 7/14/2023 1128                         Point: Precautions (Not Started)       Learner Progress:  Not documented in this visit.                              User Key       Initials Effective Dates Name Provider Type Discipline     09/22/22 -  Krys Rodriguez PT Physical Therapist PT                  PT Recommendation and Plan     Plan of Care Reviewed With: patient  Progress: improving  Outcome Evaluation: Pt seen for PT this AM - improved mobility today. Able to ambulate up to 35' CGA with rwx, improved balance noted with walker but VC for safety awareness at times. VC for hand placement and safe walker use. 1 LOB with transfers today - min A to safely sit in chair. Recommend SNU at d/c, acute PT to continue to follow and address mobility goals.     Time Calculation:    PT Charges       Row Name 07/17/23 1129             Time Calculation    Start Time 1059  -ST      Stop Time 1113  -ST      Time Calculation (min) 14 min  -ST      PT Received On 07/17/23  -ST      PT - Next Appointment 07/18/23  -ST         Time Calculation- PT    Total Timed Code Minutes- PT 14 minute(s)  -ST         Timed Charges    26073 - PT Therapeutic Activity Minutes 14  -ST         Total Minutes    Timed Charges Total Minutes 14  -ST       Total Minutes 14  -ST                User Key  (r) = Recorded By, (t) = Taken By, (c) = Cosigned By       Initials Name Provider Type    Krys Lamas, GENO Physical Therapist                  Therapy Charges for Today       Code Description Service Date Service Provider Modifiers Qty    18184165113 HC PT THERAPEUTIC ACT EA 15 MIN 7/17/2023 Krys Rodriguez PT GP 1            PT G-Codes  Outcome Measure Options: AM-PAC 6 Clicks Basic Mobility (PT)  AM-PAC 6 Clicks Score (PT): 18  AM-PAC 6 Clicks Score (OT): 21  PT Discharge Summary  Anticipated Discharge Disposition (PT): skilled nursing facility    Krys Rodriguez PT  7/17/2023

## 2023-07-17 NOTE — PLAN OF CARE
Goal Outcome Evaluation:  Plan of Care Reviewed With: patient                Patient has hard of hearing. All scheduled medications were given. All concerns and questions were acknowledged and addressed accordingly. Comfort and safety measures provided. Vital signs kept within set parameters. Will continue to monitor and provide care accordingly.

## 2023-07-17 NOTE — PROGRESS NOTES
Name: Veronica Royal ADMIT: 2023   : 1940  PCP: Princess Cruz MD    MRN: [id number] LOS: 3 days   AGE/SEX: 82 y.o. female  ROOM: [facility]     Subjective   Subjective     Patient is lying in the bed and does not appear to be any major distress.  Denies nausea, vomiting abdominal pain, chest pain.       Objective   Objective   Vital Signs  Temp:  [98.1 °F (36.7 °C)-101.1 °F (38.4 °C)] 98.8 °F (37.1 °C)  Heart Rate:  [] 102  Resp:  [18] 18  BP: ()/(56-78) 99/58  SpO2:  [92 %-97 %] 97 %  on   ;   Device (Oxygen Therapy): room air  Body mass index is 24.46 kg/m².    (No change in exam today)    Physical Exam  Vitals and nursing note reviewed.   Constitutional:       General: She is not in acute distress.     Appearance: She is ill-appearing (chronically). She is not toxic-appearing or diaphoretic.   HENT:      Head: Normocephalic.      Nose: Nose normal.      Mouth/Throat:      Mouth: Mucous membranes are moist.      Pharynx: Oropharynx is clear.   Eyes:      General: No scleral icterus.        Right eye: No discharge.         Left eye: No discharge.      Extraocular Movements: Extraocular movements intact.      Conjunctiva/sclera: Conjunctivae normal.   Cardiovascular:      Rate and Rhythm: Normal rate and regular rhythm.      Pulses: Normal pulses.      Comments: Port left upper chest  Pulmonary:      Effort: Pulmonary effort is normal. No respiratory distress.      Breath sounds: Normal breath sounds. No wheezing or rales.   Abdominal:      General: Bowel sounds are normal. There is no distension.      Palpations: Abdomen is soft.      Tenderness: There is no abdominal tenderness.   Musculoskeletal:         General: Swelling (doughy chronic edema in BLEs) present. No deformity. Normal range of motion.      Cervical back: Neck supple. No rigidity.   Lymphadenopathy:      Cervical: No cervical adenopathy.   Skin:     General: Skin is warm and dry.      Capillary Refill: Capillary refill  takes less than 2 seconds.      Coloration: Skin is not jaundiced.      Findings: Rash (scattered across upper extremities and right cheek) present.   Neurological:      General: No focal deficit present.      Mental Status: She is alert and oriented to person, place, and time. Mental status is at baseline.      Cranial Nerves: No cranial nerve deficit.      Coordination: Coordination normal.   Psychiatric:         Mood and Affect: Mood normal.         Behavior: Behavior normal.     Results Review     I reviewed the patient's new clinical results.  Results from last 7 days   Lab Units 07/17/23  0529 07/16/23  0550 07/15/23  0547 07/14/23  0538   WBC 10*3/mm3 7.61 6.15 5.13 6.42   HEMOGLOBIN g/dL 8.3* 7.6* 8.1* 8.6*   PLATELETS 10*3/mm3 271 214 234 235       Results from last 7 days   Lab Units 07/17/23  0529 07/16/23  1644 07/16/23  0550 07/15/23  0547 07/14/23  0538   SODIUM mmol/L 142  --  142 142 138   POTASSIUM mmol/L 4.0 4.2 2.9* 3.0* 3.2*   CHLORIDE mmol/L 117*  --  117* 119* 114*   CO2 mmol/L 17.1*  --  15.7* 14.7* 15.9*   BUN mg/dL 13  --  10 11 14   CREATININE mg/dL 0.98  --  0.91 0.90 0.93   GLUCOSE mg/dL 134*  --  200* 126* 199*   EGFR mL/min/1.73 57.7*  --  63.1 64.0 61.5       Results from last 7 days   Lab Units 07/17/23  0529 07/16/23  0550 07/15/23  0547 07/14/23  0538   ALBUMIN g/dL 2.5* 2.3* 2.6* 2.7*   BILIRUBIN mg/dL 0.3 0.2 0.3 0.4   ALK PHOS U/L 57 47 44 50   AST (SGOT) U/L 15 13 15 16   ALT (SGPT) U/L 13 13 11 13       Results from last 7 days   Lab Units 07/17/23  0529 07/16/23  0550 07/15/23  0547 07/14/23  0538 07/13/23  0602   CALCIUM mg/dL 8.3* 7.6* 8.0* 8.3* 8.7  8.6   ALBUMIN g/dL 2.5* 2.3* 2.6* 2.7* 2.7*  2.7*   MAGNESIUM mg/dL 1.7 2.1 1.5* 1.2* 1.4*   PHOSPHORUS mg/dL  --   --   --   --  2.3*         Glucose   Date/Time Value Ref Range Status   07/17/2023 1102 154 (H) 70 - 130 mg/dL Final     Comment:     Meter: [id number] : [id number] Asad NIXON   07/17/2023 0740 169  (H) 70 - 130 mg/dL Final     Comment:     Meter: [id number] : [id number] Asad Burks NA   07/16/2023 1950 185 (H) 70 - 130 mg/dL Final     Comment:     Meter: [id number] : [id number] Gurpreet Figueroa    07/16/2023 1621 195 (H) 70 - 130 mg/dL Final     Comment:     Meter: [id number] : [id number] [facility]    07/16/2023 1118 175 (H) 70 - 130 mg/dL Final     Comment:     Meter: [id number] : [id number] [facility]   07/16/2023 0807 209 (H) 70 - 130 mg/dL Final     Comment:     Meter: [id number] : [id number] Asad Burks NA   07/15/2023 2123 198 (H) 70 - 130 mg/dL Final     Comment:     Meter: [id number] : [id number] Marquez Purcell CNA       No radiology results for the last day  I have personally reviewed all medications:  Scheduled Medications  atorvastatin, 10 mg, Oral, Daily  cefTRIAXone, 1,000 mg, Intravenous, Q24H  hydrocortisone, 1 application , Topical, BID  insulin lispro, 2-9 Units, Subcutaneous, 4x Daily AC & at Bedtime  linagliptin, 5 mg, Oral, Daily  NIFEdipine XL, 30 mg, Oral, Daily  OLANZapine, 5 mg, Oral, Nightly  pantoprazole, 40 mg, Oral, Daily  potassium chloride, 40 mEq, Oral, Daily  senna-docusate sodium, 2 tablet, Oral, BID  sodium chloride, 10 mL, Intravenous, Q12H    Infusions     Diet  Diet: Regular/House Diet; Texture: Mechanical Ground (NDD 2); Fluid Consistency: Nectar Thick    I have personally reviewed:  [x]  Laboratory   []  Microbiology   []  Radiology   []  EKG/Telemetry  []  Cardiology/Vascular   []  Pathology    []  Records       Assessment/Plan     Active Hospital Problems    Diagnosis  POA    **KAMILA (acute kidney injury) [N17.9]  Yes    Severe malnutrition [E43]  Yes    Acute UTI (urinary tract infection) [N39.0]  Yes    Hypomagnesemia [E83.42]  No    Hypokalemia [E87.6]  Yes    Anemia due to chemotherapy [D64.81, T45.1X5A]  Yes    Left renal mass [N28.89]  Yes    Cirrhosis of liver [K74.60]  Yes    Type 2 diabetes mellitus [E11.9]  Yes     Hypertension [I10]  Yes    Malignant neoplasm of upper-outer quadrant of right breast in female, estrogen receptor negative [C50.411, Z17.1]  Not Applicable      Resolved Hospital Problems   No resolved problems to display.       81yo woman with h/o DM2, cirrhosis, HTN, HLD, left renal mass, and breast CA, who was directly admitted to our service from Heme/Onc office with KAMILA due to N/V/D and poor po intake.    KAMILA/CKD?:  Creatinine did peak at 2.63 and has improved to 0.90.  Appears to have underlying CKD stage III and avoid nephrotoxins.  Off IV fluids.  Of note patient is off valsartan and HCTZ per nephrology recommendations.    UTI: Cultures grew Klebsiella pneumonia and is currently on IV Rocephin which will be continued.  Await further sensitivities.    N/V/D: Likely due to chemotherapy and these are improving.  GI did evaluate and no plans for any endoscopy at this point.    Dysphagia: Speech did evaluate and diet as per the recommendations.    Hypokalemia: Replaced and potassium is better today at 4.  Hypomagnesemia: Replace per protocol.    Cirrhosis NOS: noted on imaging, LFTs wnl, have asked GI to see here for N/V/D, pt seen in their office on May 25th, they felt her cirrhosis was most likely due to TELLEZ    Left renal mass: asked  to see for further eval/recs--they recommended simply repeating CT in 3-4 months    DM2: Hemoglobin A1c is 8.6 and is currently on Tradjenta and corrective dose insulin.    HTN: Diovan and HCTZ have been held secondary to acute kidney injury.  Nephrology recommended to discontinue this upon discharge and is on nifedipine which will be continued.    Breast CA: Heme/Onc consulted, not tolerating chemotx very well with GI symptoms and skin rash (on topical steroid per Heme/Onc)    SCDs for DVT prophylaxis.  Full code.  Discussed with patient and RN.  Anticipate discharge, to rehab once bed is available.    Copied text on this note has been reviewed by me on 7/17/2023    Bernard  Jose Damon MD  [facility]  07/17/23  11:53 EDT

## 2023-07-17 NOTE — CONSULTS
General Surgery Breast Cancer History and Physical Exam      Summary:    Veronica Royal is a 82 y.o. 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       A multidisciplinary plan has been formulated for the patient:    (1) Breast Surgical Oncology:  -Follow up [employer] 9 panel genetic testing: negative.   -Will likely need to stop neoadjuvant chemotherapy due to toxicity. Some response seen on repeat US yesterday. Skin invasion somewhat improved. Will plan for right breast modified radical mastectomy in 3-4 weeks once acute issues have resolved/improved. Will arrange 2 week follow up in the office.   -Plastic surgery referral declined.      (2) Medical Oncology:  -Following Dr. Yee.      (3) Radiation Oncology:  -Will refer postoperatively for evaluation for radiation therapy as clinically indicated.     Referring Provider: No ref. provider found     Chief Complaint: breast mass     History of Present Illness: Ms. Veronica Royal is a 82 y.o. year old lady, seen at the request of No ref. provider found for a new diagnosis of right breast cancer.       This was initially detected as a palpable mass in her right breast. She first noticed it 2-3 months ago. Her last mammogram was 25 years ago. She denies any prior history of abnormal mammograms or breast biopsies. Her work-up is detailed in the oncologic history below.      She denies any breast lumps, pain, skin changes, or nipple discharge. She denies any family history of breast cancer. Her sister had ovarian cancer 25 years ago at age 58.      7/18/2023 She was admitted 7/12/2023 with nausea, vomiting and diarrhea. She received cycle 3 day 1 of neoadjuvant chemotherapy for inflammatory breast cancer on 7/5/2023. I spoke with Dr. Nelson yesterday and it is thought that she will not be able to continue with chemotherapy and will need to proceed with surgery.       Workup of Current Diagnosis:    4/21/2023 bilateral diagnostic mammogram with ultrasound:  Finding 1: There is a high density irregular mass measuring 44 mm with spiculated margins and associated amorphous and fine pleomorphic calcifications, skin retraction, and skin and trabecular thickening in the right breast at 9:00.  On ultrasound there is an irregular mass with indistinct and spiculated margins in the right breast at 9:00 3 cm from the nipple.  The mass extends into the skin there is visible external ulceration and scabbing.  Diffuse skin thickening and focal skin retraction are noted with skin thickening up to 11 mm.  The mass measures 38 x 28 x 42 mm.  Highly suggestive of malignancy.  Ultrasound-guided biopsy is recommended.  Finding 2: There are true intramammary lymph nodes measuring 5 mm and 6 mm seen in the posterior upper outer axillary tail that are slightly round.  There is visualization of one of the 2 right axillary lymph nodes that measure 5 mm with borderline cortical thickening.  Suspicious.  Appropriate action should be taken.  Finding 3: There are 2 prominent right axillary lymph nodes largest which measures 26 mm and contains extensive coarse heterogeneous calcifications.  Suspicious.  Ultrasound-guided biopsy is recommended.  Finding 4: On ultrasound there is an asymmetry in the superior region of the left breast that resolved with spot compression benign.  BI-RADS Category 5     4/21/2023 right breast ultrasound-guided biopsy:  The right breast at 9:00 was imaged and the mass was localized.  8 cores were obtained.  A mini cork tissue marker was placed.  Clip was in the expected position.  Pathology returned as invasive ductal carcinoma grade 3 which is malignant and concordant.  Surgical consult is recommended.     The patient's right breast at the axillary position was imaged and the abnormal lymph node was localized.  4 cores were obtained.  A spring shaped HydroMARK tissue marker was  placed.  Clip was in the expected position.  Pathology returned as high-grade invasive mammary carcinoma with apron features.  The differential includes multifocal carcinoma versus a completely replaced lymph node.  Pathology is malignant and concordant.     4/21/2023 pathology:   1.  Breast, right, 9:00, biopsy:  Invasive mammary carcinoma, grade 3  2.  Axilla, right, biopsy:  High-grade invasive mammary carcinoma with apocrine features involving soft tissue and areas of necrosis     4/29/2023 Bilateral Breast MRI   IMPRESSION:  1. Biopsy-proven malignancy in the right breast in the posterior one third at the 9 o'clock position that measures on the order of 4.3 cm in greatest dimension and contains the internal mini cork-shaped metallic clip. The mass shows central hypoenhancement suggestive of central necrosis. Attachment to the overlying skin as described is noted and there is diffuse right breast skin edema and trabecular edema. Evidence of right axillary adenopathy is noted.  2. There are no findings suspicious for malignancy in the left breast.  BI-RADS category 6: Known biopsy-proven malignancy.     5/11/2023 CT Chest, Abdomen, Pelvis   IMPRESSION:  1. Right breast cancer with right axillary lymphadenopathy  2. No convincing evidence of distant metastatic disease. Incidental findings as above.    5/11/2023 Bone Scan   IMPRESSION:  Abnormal soft tissue activity in the right breast corresponding to known disease in that location. No evidence of osseous metastasis.    5/19/2023 Port placement     7/6/2023 Renal US   IMPRESSION:  1. Left renal possible mass. Consider renal mass protocol CT or MRI if clinically indicated  2. Otherwise normal appearing kidneys, without hydronephrosis seen.    7/17/2023 Right Breast US:   IMPRESSION:  Biopsy-proven malignancy at 9:00 in the right breast is similar to slightly smaller when accounting for differences in technique. A 2.6 cm right axillary mass is not significantly  changed, although an adjacent  lymph node has decreased in size. Continued oncologic and surgical management are recommended.  BI-RADS Category 6: Known biopsy-proven malignancy    Gynecologic History:   . P:1 AB:1  Age at first childbirth: 35  Lactation/How long: none  Age at menarche: 11  Age at menopause: 50  Total years of oral contraceptive use: 3-4 years previously  Total years of hormone replacement therapy: none     Past Medical History:   DM  HTN  HLD     Past Surgical History:    None     Family History:    As above     Social History:  Denies tobacco use  Occasional alcohol use     Allergies:   No Known Allergies     Medications:      Current Outpatient Medications:     glipiZIDE-metFORMIN (METAGLIP) 2.5-500 MG per tablet, TAKE 1 TABLET BY MOUTH THREE TIMES DAILY(2 TABLETS IN THE MORNING AND 1 TABLET IN THE EVENING), Disp: , Rfl:     simvastatin (ZOCOR) 10 MG tablet, Take 1 tablet by mouth Every Night., Disp: , Rfl:     Tradjenta 5 MG tablet tablet, Take 1 tablet by mouth Daily., Disp: , Rfl:     valsartan-hydrochlorothiazide (DIOVAN-HCT) 160-12.5 MG per tablet, Take 1 tablet by mouth Daily., Disp: , Rfl:      Laboratory Values:    Labs from 2023 reviewed     Review of Systems:   Influenza-like illness: no fever, no  cough, no  sore throat, no  body aches, no loss of sense of taste or smell, no known exposure to person with Covid-19.  Constitutional: Negative for fevers or chills  HENT: Negative for hearing loss or runny nose  Eyes: Negative for vision changes or scleral icterus  Respiratory: Negative for cough or shortness of breath  Cardiovascular: Negative for chest pain or heart palpitations  Gastrointestinal: Negative for abdominal pain, nausea, vomiting, constipation, melena, or hematochezia  Genitourinary: Negative for hematuria or dysuria  Musculoskeletal: Negative for joint swelling or gait instability  Neurologic: Negative for tremors or seizures  Psychiatric: Negative for suicidal  ideations or depression  All other systems reviewed and negative     Physical Exam:   ECO - Asymptomatic  Constitutional: Well-developed well-nourished, no acute distress  Eyes: Conjunctiva normal, sclera nonicteric  ENMT: Hearing grossly normal, oral mucosa moist  Neck: Supple, no palpable mass, trachea midline  Respiratory: Clear to auscultation, normal inspiratory effort  Cardiovascular: Regular rate, no peripheral edema, no jugular venous distention  Breast: symmetric  Right: Ulcerated mass in the right outer mid-lower breast approximately 2x2cm of ulceration, palpable firm mobile mass, palpable axillary adenopathy  Left: No visible abnormalities on inspection while seated, with arms raised or hands on hips. No masses, skin changes, or nipple abnormalities.  No clinical chest wall involvement.  Gastrointestinal: Soft, nontender  Lymphatics (palpable nodes): No left sided cervical, supraclavicular or axillary lymphadenopathy  Skin:  Warm, dry, no rash on visualized skin surfaces  Musculoskeletal: Symmetric strength, normal gait  Psychiatric: Alert and oriented ×3, normal affect      Discussion:  I had an extensive discussion with the patient and her family about the nature of her breast cancer diagnosis. We reviewed the components of breast tissue including ducts and lobules. We reviewed her pathology report in detail. We reviewed breast cancer histology, including stage, grade, ER/NH receptors, HER2 receptors and how this applies to her diagnosis. We reviewed the basics of systemic and local/regional management of breast cancer.      We discussed that most breast cancer is not hereditary, however given her personal history, this may play a role in her case. I believe genetic testing is warranted and could affect surgical decision making.      We reviewed potential surgical treatments to include partial mastectomy, mastectomy, sentinel lymph node biopsy and axillary node dissection and discussed the rationale  associated with each approach. Regarding radiation therapy, we discussed that radiation is indicated in all cases of breast conservation and in only limited circumstances following mastectomy. We discussed that the primary goal of adjuvant radiation is to decrease the likelihood of local recurrence.      We discussed axillary staging. I described the procedure for sentinel lymph node biopsy in detail, including the preoperative injection of technetium sulfur colloid and intraoperative injection of lymphazurin blue dye. I explained that this is a mapping test and not a cancer test, that all of the lymph nodes containing these dyes will be removed for complete testing by pathology, and that the results could impact the decision for adjuvant treatment or additional surgery.     I described additional risks and potential complications associated with surgery, including, but not limited to, bleeding, infection, complications related to blue dye, lymphedema, deformity/poor cosmetic result, chronic pain, neurovascular injury, numbness, seroma, hematoma, deep venous thrombosis, skin flap necrosis, disease recurrence and the possibility of requiring additional surgery. We also discussed other treatment options including the option of not undergoing any surgical treatment and the risks associated with this including disease progression. She expressed an understanding of these factors and wished to proceed.     We discussed that in her case, systemic treatment would involve endocrine therapy and possibly chemotherapy. She will be referred to medical oncology to discuss this further.      SHAR MCELROY M.D.  General and Endoscopic Surgery  [facility]     [address], [address]  [place], [place], [address]  P: [phone]  F: [phone]

## 2023-07-17 NOTE — PROGRESS NOTES
[facility] GROUP INPATIENT PROGRESS NOTE    Length of Stay:  3 days    CHIEF COMPLAINT/REASON FOR VISIT:  Breast cancer currently on neoadjuvant therapy with Taxol, Herceptin, Perjeta  Adverse effects of therapy including nausea, vomiting, diarrhea  Volume depletion  Acute kidney injury  Hypokalemia    Interval history:  7/17/2023  T90.8, pulse 102, respirations 18, BP 99/58, SPO2 97%  Patient frustrated about side effects of chemotherapy but states that the breasts are clearly improved.  She overall is feeling better.  H&H 8.3 and 25.1, white count of 7610, platelet count 27 1000, BUN/creatinine of 13 and 0.98, calcium 8.3, albumin 2.5, normal transaminases      OBJECTIVE:  Vitals:    07/16/23 1946 07/17/23 0517 07/17/23 0744 07/17/23 1104   BP: 114/56 118/67 137/78 99/58   BP Location: Left arm Left arm Left arm Left arm   Patient Position: Lying Lying Lying Lying   Pulse: 97 89 99 102   Resp: 18 18 18 18   Temp: 98.4 °F (36.9 °C) 98.1 °F (36.7 °C) (!) 101.1 °F (38.4 °C) 98.8 °F (37.1 °C)   TempSrc: Oral Oral Oral Oral   SpO2: 96% 97% 93% 97%   Weight:       Height:             PHYSICAL EXAMINATION:   General:  No acute distress, awake, alert and oriented. Up in a chair.   Skin:  Warm and dry, no visible rash  HEENT:  Normocephalic/atraumatic.    Chest:  Normal respiratory effort. Mediport present in the L chest, accessed.  Right breast retracted but, apparently, improved.  We will follow  Extremities:  No visible clubbing, cyanosis, or edema  Neuro/psych:  Grossly nonfocal.  Normal mood and affect.       DIAGNOSTIC DATA:  Results Review:     I reviewed the patient's new clinical results.    Results from last 7 days   Lab Units 07/17/23  0529   WBC 10*3/mm3 7.61   HEMOGLOBIN g/dL 8.3*   HEMATOCRIT % 25.1*   PLATELETS 10*3/mm3 271     Lab Results   Component Value Date    NEUTROABS 5.00 07/17/2023     Results from last 7 days   Lab Units 07/17/23  0529   SODIUM mmol/L 142   POTASSIUM mmol/L 4.0   CHLORIDE  mmol/L 117*   CO2 mmol/L 17.1*   BUN mg/dL 13   CREATININE mg/dL 0.98   GLUCOSE mg/dL 134*   CALCIUM mg/dL 8.3*         Results from last 7 days   Lab Units 07/17/23  0529   MAGNESIUM mg/dL 1.7         IMAGING:    None reviewed    ASSESSMENT:  This is a 82 y.o. female with:     *Right breast inflammatory breast cancer  T4d N1 M0  Stage IIIb  ER negative, GA negative, HER2 2+ on immunohistochemistry but amplified on FISH.  Invasive ductal carcinoma with apocrine features, grade 3, Ki-67 38%  CT scans and bone scan without any obvious evidence of metastatic disease  Plan Taxol Herceptin and Perjeta, and if she tolerates this well we will proceed with Adriamycin and cyclophosphamide.  Echocardiogram has been performed and ejection fraction normal.  Liver shows cirrhotic morphology.  Mediport placed 5/19/2023  Taxol, Herceptin, Perjeta initiated 5/24/2023 5/31/2023 C1D8 Taxol  6/28/2023-cycle 2-day 15 of Taxol.  She is overall tolerating therapy well with expected side effects.  7/5/23: cycle 3 day 1 and she received taxol, herceptin, perjeta  7/12/2023-cycle 3-day 8 of TPH.  Taxol held due to severe dehydration, poorly controlled nausea vomiting, KAMILA. Admitted for these symptoms.  Fortunately now much improved.     *Diabetes  Poorly controlled  Evaluation by endocrinology 5/30/2023 with recommendations for dietary changes and home blood sugar monitoring.  The patient's daughter reports today she is struggling with compliance.  Her oral intake is still fairly poor.  Emphasized on regular food intake to maintain blood sugars.  According to daughter blood sugars have been elevated in the 300s and 400s.  They are not able to control blood sugars adequately.     *Hyperkalemia and chronic kidney disease, now with hypokalemia on admission  Potassium remains low at 2.9.  Continue potassium repletion.  Normalized 7/17/2020 3-4.0    *Hypomagnesemia  Magnesium 2.1, from 1.5, from 1.2  Continue repletion prn-normalized 7/17/2020  3-1.7     *KAMILA/CKD  Creatinine elevated at 2.63, BUN 37 at presentation on 7/12.  Secondary to severe volume depletion.  Calcium also elevated at 10.3  Reason for admission  Creatinine 0.98 from 0.91, from 0.90, from 0.93, from 1.43, from 1.99, from 2.63, from 0.95     *Cirrhotic appearance of the liver on the CT scan  Referred to gastroenterology  Synthetic function appears to be normal  We will start with Taxol to see if she would tolerate the chemotherapy   Slight elevation of intervention studies today with ALT 43, AST 55.  Continue to monitor weekly  6/28/2023-mild elevation in the liver labs.  Stable compared to previous labs.      *Decreased oral intake secondary to chemotherapy  She has lost a significant amount of weight..  Continue follow-up with nutrition  Continue entergrade outpatient     *Frequent eructation  Continue Protonix 40 mg daily     *Cognitive impairment  It is unclear if this is the patient's baseline or mental status has been exacerbated by recent chemotherapy  The patient is struggling with compliance with her medications.  The patient's daughter expresses frustration today as the patient is struggling to care for herself at home with managing medications and symptom management.  Evaluated by CARL Antonio      *Chemotherapy-induced diarrhea  6/5/2023 patient given Enterade (Wild Berry flavor).  She has been drinking 1 a day.  She does not necessarily like the flavor (currently mixing with sugar-free Aramis-Aid which does help).  Encouraged her to increase to 2 a day.  6/21/2023 patient reports that she had stopped drinking her Enterade because she was waking up in the middle the night having diarrhea although she did not know if it was due to the Enterade her protein shakes.  I have encouraged the patient to continue Enterade, drinking it in the morning.  Try discontinuing the protein shakes and see how she does.  Patient states that she will try this  Reports 2-3 loose stools.   Continue Enterade and Imodium.  7/5/2023 continuing to have issues with diarrhea up to 3-4 bowel movements a day, patient also recently prescribed Kayexalate for an apparently elevated potassium.  Potassium only 3.1 today.  She advised to discontinue the Kayexalate she was given IV hydration today.  We will also prescribe Lomotil to use if needed to help better control her diarrhea.  Patient is not drinking Enterade regularly.  This has been better controlled with Lomotil as an outpatient     *Maculopapular rash  Secondary to HER2 directed therapy  Recommend starting topical steroids to help with the rash  She quit taking doxycycline as she developed a blister with that.  Medrol Dosepak prescribed.  Continue topical steroids     *Severe nausea and vomiting  Patient has been using Zofran regularly.  Will nee to utilize Zyprexa and Compazine for future outpatient use.  Reason for admission  Olanzapine 5 mg at night recently initiated     *Normocytic anemia  Likely due to chemo and IV fluid hydration  Hgb 8.3 from 7.6, from 8.6, from 8.0, from 10.8, from 9.7     *Renal u/s on 7/6 with possible left renal mass  Not noted on CT imaging from 5/11/23. No further imaging at this time.   Urology evaluated.  We will be having follow-up imaging from an oncology standpoint so this will be monitored     *UTI  Urine culture with >100,000 GNR, Klebsiella pneumoniae, resistant to ampicillin but otherwise susceptible  On Rocephin    RECOMMENDATIONS/PLAN:  Holding chemotherapy which has been poorly tolerated.  Circumstances discussed with Dr. Yee and Dr. Michael.  Plan right breast ultrasound to determine whether surgery could possibly proceed sooner.  Dr. Michael will see the patient today.  Rocephin continues for Klebsiella urinary tract infection  Valsartan and HCTZ being held for now and at discharge per nephrology recommendations  Topical steroids for the rash which is improving  Symptom management for nausea and vomiting.   She continues olanzapine 5 mg nightly.  Can further evaluate L kidney as an outpatient when she's clinically doing better and labs have normalized. Breast cancer needs attention first.  Nothing was noted in the left kidney on CT imaging done on 5/11/2023  Daily labs while admitted  Will follow  Plan discharge to a skilled nursing facility for rehabilitation, likely [facility], when appropriate    Hank Nelson MD

## 2023-07-17 NOTE — PLAN OF CARE
Goal Outcome Evaluation: Patient is alert. Room air. No complaints of pain. Port flushed and blood return noted. No n/v or loose stools this shift.

## 2023-07-17 NOTE — PLAN OF CARE
Goal Outcome Evaluation:  Plan of Care Reviewed With: patient        Progress: improving  Outcome Evaluation: Pt seen for PT this AM - improved mobility today. Able to ambulate up to 35' CGA with rwx, improved balance noted with walker but VC for safety awareness at times. VC for hand placement and safe walker use. 1 LOB with transfers today - min A to safely sit in chair. Recommend SNU at d/c, acute PT to continue to follow and address mobility goals.      Anticipated Discharge Disposition (PT): skilled nursing facility

## 2023-07-17 NOTE — CASE MANAGEMENT/SOCIAL WORK
Continued Stay Note  [facility]     Patient Name: Veronica Royal  MRN: [id number]  Today's Date: 7/17/2023    Admit Date: 7/12/2023    Plan: [facility] SNF, pending bed availability, will need pre-cert approval.   Discharge Plan       Row Name 07/17/23 1448       Plan    Plan [facility], pending bed availability, will need pre-cert approval.    Plan Comments CCP followed up with [facility], no beds available. CCP followed up with [facility], no beds available at this time but following patient. CCP to follow.                   Discharge Codes    No documentation.                 Expected Discharge Date and Time       Expected Discharge Date Expected Discharge Time    Jul 19, 2023

## 2023-07-18 LAB
ALBUMIN SERPL-MCNC: 2.6 G/DL (ref 3.5–5.2)
ALBUMIN/GLOB SERPL: 1.1 G/DL
ALP SERPL-CCNC: 62 U/L (ref 39–117)
ALT SERPL W P-5'-P-CCNC: 13 U/L (ref 1–33)
ANION GAP SERPL CALCULATED.3IONS-SCNC: 8.4 MMOL/L (ref 5–15)
AST SERPL-CCNC: 10 U/L (ref 1–32)
BASOPHILS # BLD AUTO: 0.02 10*3/MM3 (ref 0–0.2)
BASOPHILS NFR BLD AUTO: 0.2 % (ref 0–1.5)
BILIRUB SERPL-MCNC: 0.3 MG/DL (ref 0–1.2)
BUN SERPL-MCNC: 14 MG/DL (ref 8–23)
BUN/CREAT SERPL: 15.1 (ref 7–25)
CALCIUM SPEC-SCNC: 8.5 MG/DL (ref 8.6–10.5)
CHLORIDE SERPL-SCNC: 117 MMOL/L (ref 98–107)
CO2 SERPL-SCNC: 16.6 MMOL/L (ref 22–29)
CREAT SERPL-MCNC: 0.93 MG/DL (ref 0.57–1)
DEPRECATED RDW RBC AUTO: 48.7 FL (ref 37–54)
EGFRCR SERPLBLD CKD-EPI 2021: 61.5 ML/MIN/1.73
EOSINOPHIL # BLD AUTO: 0.16 10*3/MM3 (ref 0–0.4)
EOSINOPHIL NFR BLD AUTO: 2 % (ref 0.3–6.2)
ERYTHROCYTE [DISTWIDTH] IN BLOOD BY AUTOMATED COUNT: 16.3 % (ref 12.3–15.4)
GLOBULIN UR ELPH-MCNC: 2.3 GM/DL
GLUCOSE BLDC GLUCOMTR-MCNC: 127 MG/DL (ref 70–130)
GLUCOSE BLDC GLUCOMTR-MCNC: 152 MG/DL (ref 70–130)
GLUCOSE BLDC GLUCOMTR-MCNC: 165 MG/DL (ref 70–130)
GLUCOSE BLDC GLUCOMTR-MCNC: 191 MG/DL (ref 70–130)
GLUCOSE SERPL-MCNC: 130 MG/DL (ref 65–99)
HCT VFR BLD AUTO: 26.8 % (ref 34–46.6)
HGB BLD-MCNC: 9 G/DL (ref 12–15.9)
IMM GRANULOCYTES # BLD AUTO: 0.14 10*3/MM3 (ref 0–0.05)
IMM GRANULOCYTES NFR BLD AUTO: 1.7 % (ref 0–0.5)
LYMPHOCYTES # BLD AUTO: 1.99 10*3/MM3 (ref 0.7–3.1)
LYMPHOCYTES NFR BLD AUTO: 24.5 % (ref 19.6–45.3)
MAGNESIUM SERPL-MCNC: 1.6 MG/DL (ref 1.6–2.4)
MCH RBC QN AUTO: 27.8 PG (ref 26.6–33)
MCHC RBC AUTO-ENTMCNC: 33.6 G/DL (ref 31.5–35.7)
MCV RBC AUTO: 82.7 FL (ref 79–97)
MONOCYTES # BLD AUTO: 0.54 10*3/MM3 (ref 0.1–0.9)
MONOCYTES NFR BLD AUTO: 6.7 % (ref 5–12)
NEUTROPHILS NFR BLD AUTO: 5.27 10*3/MM3 (ref 1.7–7)
NEUTROPHILS NFR BLD AUTO: 64.9 % (ref 42.7–76)
NRBC BLD AUTO-RTO: 0 /100 WBC (ref 0–0.2)
PLATELET # BLD AUTO: 295 10*3/MM3 (ref 140–450)
PMV BLD AUTO: 10.4 FL (ref 6–12)
POTASSIUM SERPL-SCNC: 4 MMOL/L (ref 3.5–5.2)
PROT SERPL-MCNC: 4.9 G/DL (ref 6–8.5)
RBC # BLD AUTO: 3.24 10*6/MM3 (ref 3.77–5.28)
SODIUM SERPL-SCNC: 142 MMOL/L (ref 136–145)
WBC NRBC COR # BLD: 8.12 10*3/MM3 (ref 3.4–10.8)

## 2023-07-18 PROCEDURE — 63710000001 INSULIN LISPRO (HUMAN) PER 5 UNITS: Performed by: INTERNAL MEDICINE

## 2023-07-18 PROCEDURE — 99221 1ST HOSP IP/OBS SF/LOW 40: CPT | Performed by: STUDENT IN AN ORGANIZED HEALTH CARE EDUCATION/TRAINING PROGRAM

## 2023-07-18 PROCEDURE — 82948 REAGENT STRIP/BLOOD GLUCOSE: CPT

## 2023-07-18 PROCEDURE — 99231 SBSQ HOSP IP/OBS SF/LOW 25: CPT | Performed by: INTERNAL MEDICINE

## 2023-07-18 PROCEDURE — 85025 COMPLETE CBC W/AUTO DIFF WBC: CPT | Performed by: INTERNAL MEDICINE

## 2023-07-18 PROCEDURE — 80053 COMPREHEN METABOLIC PANEL: CPT | Performed by: INTERNAL MEDICINE

## 2023-07-18 PROCEDURE — 83735 ASSAY OF MAGNESIUM: CPT | Performed by: INTERNAL MEDICINE

## 2023-07-18 PROCEDURE — 25010000002 CEFTRIAXONE PER 250 MG: Performed by: HOSPITALIST

## 2023-07-18 RX ADMIN — INSULIN LISPRO 2 UNITS: 100 INJECTION, SOLUTION INTRAVENOUS; SUBCUTANEOUS at 20:26

## 2023-07-18 RX ADMIN — Medication 10 ML: at 20:26

## 2023-07-18 RX ADMIN — LINAGLIPTIN 5 MG: 5 TABLET, FILM COATED ORAL at 08:51

## 2023-07-18 RX ADMIN — OLANZAPINE 5 MG: 5 TABLET ORAL at 20:26

## 2023-07-18 RX ADMIN — HYDROCORTISONE ACETATE 1 APPLICATION: 1 CREAM TOPICAL at 20:26

## 2023-07-18 RX ADMIN — Medication 10 ML: at 08:55

## 2023-07-18 RX ADMIN — HYDROCORTISONE ACETATE 1 APPLICATION: 1 CREAM TOPICAL at 08:55

## 2023-07-18 RX ADMIN — INSULIN LISPRO 2 UNITS: 100 INJECTION, SOLUTION INTRAVENOUS; SUBCUTANEOUS at 18:11

## 2023-07-18 RX ADMIN — NIFEDIPINE 30 MG: 30 TABLET, FILM COATED, EXTENDED RELEASE ORAL at 08:51

## 2023-07-18 RX ADMIN — PANTOPRAZOLE SODIUM 40 MG: 40 TABLET, DELAYED RELEASE ORAL at 08:51

## 2023-07-18 RX ADMIN — ATORVASTATIN CALCIUM 10 MG: 20 TABLET, FILM COATED ORAL at 08:51

## 2023-07-18 RX ADMIN — CEFTRIAXONE SODIUM 1000 MG: 1 INJECTION, POWDER, FOR SOLUTION INTRAMUSCULAR; INTRAVENOUS at 12:32

## 2023-07-18 RX ADMIN — SENNOSIDES AND DOCUSATE SODIUM 2 TABLET: 50; 8.6 TABLET ORAL at 08:51

## 2023-07-18 RX ADMIN — INSULIN LISPRO 2 UNITS: 100 INJECTION, SOLUTION INTRAVENOUS; SUBCUTANEOUS at 12:31

## 2023-07-18 RX ADMIN — SENNOSIDES AND DOCUSATE SODIUM 2 TABLET: 50; 8.6 TABLET ORAL at 20:26

## 2023-07-18 RX ADMIN — POTASSIUM CHLORIDE 40 MEQ: 750 TABLET, EXTENDED RELEASE ORAL at 08:51

## 2023-07-18 NOTE — PLAN OF CARE
Goal Outcome Evaluation: Patient is alert. Room air. No complaints of pain. Port flushed and blood return noted. No n/v or loose stools this shift. Waiting placement at SNF

## 2023-07-18 NOTE — PLAN OF CARE
Goal Outcome Evaluation:  Plan of Care Reviewed With: patient        Progress: no change       VSS. Port with blood return, dressing cdi. Up with assist to bathroom.

## 2023-07-18 NOTE — PROGRESS NOTES
[facility] GROUP INPATIENT PROGRESS NOTE    Length of Stay:  4 days    CHIEF COMPLAINT/REASON FOR VISIT:  Breast cancer currently on neoadjuvant therapy with Taxol, Herceptin, Perjeta  Adverse effects of therapy including nausea, vomiting, diarrhea  Volume depletion  Acute kidney injury  Hypokalemia    Interval history:  7/17/2023  T90.8, pulse 102, respirations 18, BP 99/58, SPO2 97%  Patient frustrated about side effects of chemotherapy but states that the breasts are clearly improved.  She overall is feeling better.  H&H 8.3 and 25.1, white count of 7610, platelet count 27 1000, BUN/creatinine of 13 and 0.98, calcium 8.3, albumin 2.5, normal transaminases    7/18/2023  T99.5, pulse 100, respirations 16, /71, SPO2 of 94%  Patient doing about the same  H&H 9.0 and 26.8, white count 8120, platelet count 2 95,000, BUN/creatinine of 14 and 0.93, calcium 8.5, albumin 2.6, normal transaminases.  Ultrasound reveals 3.7 x 2.4 x 3.7 hypoechoic mass extending into the overlying skin previously 3.8 x 2.5 x 4.2, slightly smaller, right axillary mass at 2.6 x 1.5 x 1.9 not significantly changed.      OBJECTIVE:  Vitals:    07/17/23 1542 07/17/23 2039 07/18/23 0550 07/18/23 0739   BP: 144/66 128/72 138/78 129/71   BP Location: Left arm Left arm Left arm Left arm   Patient Position: Lying Lying Lying Lying   Pulse: 92   100   Resp: 18 18 18 16   Temp: 97.1 °F (36.2 °C) 99.7 °F (37.6 °C) 99.5 °F (37.5 °C)    TempSrc: Oral Oral Oral    SpO2: 96% 97% 92% 94%   Weight:       Height:             PHYSICAL EXAMINATION:   General:  No acute distress, awake, alert and oriented.  Sitting up in bed and alert  Skin:  Warm and dry, no visible rash  HEENT:  Normocephalic/atraumatic.    Chest:  Normal respiratory effort. Mediport present in the L chest, accessed.  Right breast retracted but, apparently, improved.  We will follow  Extremities:  No visible clubbing, cyanosis, or edema  Neuro/psych:  Grossly nonfocal.  Normal mood  and affect.       DIAGNOSTIC DATA:  Results Review:     I reviewed the patient's new clinical results.    Results from last 7 days   Lab Units 07/18/23  0608   WBC 10*3/mm3 8.12   HEMOGLOBIN g/dL 9.0*   HEMATOCRIT % 26.8*   PLATELETS 10*3/mm3 295     Lab Results   Component Value Date    NEUTROABS 5.27 07/18/2023     Results from last 7 days   Lab Units 07/18/23  0706   SODIUM mmol/L 142   POTASSIUM mmol/L 4.0   CHLORIDE mmol/L 117*   CO2 mmol/L 16.6*   BUN mg/dL 14   CREATININE mg/dL 0.93   GLUCOSE mg/dL 130*   CALCIUM mg/dL 8.5*         Results from last 7 days   Lab Units 07/18/23  0706   MAGNESIUM mg/dL 1.6         IMAGING:    None reviewed    ASSESSMENT:  This is a 82 y.o. 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  Invasive ductal carcinoma with apocrine features, grade 3, Ki-67 38%  CT scans and bone scan without any obvious evidence of metastatic disease  Plan Taxol Herceptin and Perjeta, and if she tolerates this well we will proceed with Adriamycin and cyclophosphamide.  Echocardiogram has been performed and ejection fraction normal.  Liver shows cirrhotic morphology.  Mediport placed 5/19/2023  Taxol, Herceptin, Perjeta initiated 5/24/2023 5/31/2023 C1D8 Taxol  6/28/2023-cycle 2-day 15 of Taxol.  She is overall tolerating therapy well with expected side effects.  7/5/23: cycle 3 day 1 and she received taxol, herceptin, perjeta  7/12/2023-cycle 3-day 8 of TPH.  Taxol held due to severe dehydration, poorly controlled nausea vomiting, KAMILA. Admitted for these symptoms.  Fortunately now much improved.  7/18/2023, patient improving from symptoms from chemotherapy.  Now trying to determine whether we should try to continue a portion of the same therapy after review by general surgery.     *Diabetes  Poorly controlled  Evaluation by endocrinology 5/30/2023 with recommendations for dietary  changes and home blood sugar monitoring.  The patient's daughter reports today she is struggling with compliance.  Her oral intake is still fairly poor.  Emphasized on regular food intake to maintain blood sugars.  According to daughter blood sugars have been elevated in the 300s and 400s.  They are not able to control blood sugars adequately.     *Hyperkalemia and chronic kidney disease, now with hypokalemia on admission  Potassium remains low at 2.9.  Continue potassium repletion.  Normalized 7/17/2020 3-4.0    *Hypomagnesemia  Magnesium 2.1, from 1.5, from 1.2  Continue repletion prn-normalized 7/17/2020 3-1.7     *KAMILA/CKD  Creatinine elevated at 2.63, BUN 37 at presentation on 7/12.  Secondary to severe volume depletion.  Calcium also elevated at 10.3  Reason for admission  Creatinine 0.93 from 0.98 from 0.91, from 0.90, from 0.93, from 1.43, from 1.99, from 2.63, from 0.95     *Cirrhotic appearance of the liver on the CT scan  Referred to gastroenterology  Synthetic function appears to be normal  We will start with Taxol to see if she would tolerate the chemotherapy   Slight elevation of intervention studies today with ALT 43, AST 55.  Continue to monitor weekly  6/28/2023-mild elevation in the liver labs.  Stable compared to previous labs.      *Decreased oral intake secondary to chemotherapy  She has lost a significant amount of weight..  Continue follow-up with nutrition  Continue entergrade outpatient     *Frequent eructation  Continue Protonix 40 mg daily     *Cognitive impairment  It is unclear if this is the patient's baseline or mental status has been exacerbated by recent chemotherapy  The patient is struggling with compliance with her medications.  The patient's daughter expresses frustration today as the patient is struggling to care for herself at home with managing medications and symptom management.  Evaluated by CARL Antonio      *Chemotherapy-induced diarrhea  6/5/2023 patient given  Enterade (Wild Berry flavor).  She has been drinking 1 a day.  She does not necessarily like the flavor (currently mixing with sugar-free Aramis-Aid which does help).  Encouraged her to increase to 2 a day.  6/21/2023 patient reports that she had stopped drinking her Enterade because she was waking up in the middle the night having diarrhea although she did not know if it was due to the Enterade her protein shakes.  I have encouraged the patient to continue Enterade, drinking it in the morning.  Try discontinuing the protein shakes and see how she does.  Patient states that she will try this  Reports 2-3 loose stools.  Continue Enterade and Imodium.  7/5/2023 continuing to have issues with diarrhea up to 3-4 bowel movements a day, patient also recently prescribed Kayexalate for an apparently elevated potassium.  Potassium only 3.1 today.  She advised to discontinue the Kayexalate she was given IV hydration today.  We will also prescribe Lomotil to use if needed to help better control her diarrhea.  Patient is not drinking Enterade regularly.  This has been better controlled with Lomotil as an outpatient  Assessed 7/18/2023 finally improving.     *Maculopapular rash  Secondary to HER2 directed therapy  Recommend starting topical steroids to help with the rash  She quit taking doxycycline as she developed a blister with that.  Medrol Dosepak prescribed.  Continue topical steroids     *Severe nausea and vomiting  Patient has been using Zofran regularly.  Will nee to utilize Zyprexa and Compazine for future outpatient use.  Reason for admission  Olanzapine 5 mg at night recently initiated     *Normocytic anemia  Likely due to chemo and IV fluid hydration  Hgb 8.3 from 7.6, from 8.6, from 8.0, from 10.8, from 9.7     *Renal u/s on 7/6 with possible left renal mass  Not noted on CT imaging from 5/11/23. No further imaging at this time.   Urology evaluated.  We will be having follow-up imaging from an oncology standpoint so  this will be monitored     *UTI  Urine culture with >100,000 GNR, Klebsiella pneumoniae, resistant to ampicillin but otherwise susceptible  On Rocephin    RECOMMENDATIONS/PLAN:  Holding chemotherapy which has been poorly tolerated.  Circumstances discussed with Dr. Yee and Dr. Michael.  Right breast ultrasound as above, patient to be assessed by Dr. Michael  Rocephin continues for Klebsiella urinary tract infection  Valsartan and HCTZ being held for now and at discharge per nephrology recommendations  Topical steroids for the rash which is improving  Symptom management for nausea and vomiting.  She continues olanzapine 5 mg nightly.  Can further evaluate L kidney as an outpatient when she's clinically doing better and labs have normalized. Breast cancer needs attention first.  Nothing was noted in the left kidney on CT imaging done on 5/11/2023  Daily labs while admitted  Will follow  Plan discharge to a skilled nursing facility for rehabilitation, likely [facility], when appropriate    Hank Nelson MD

## 2023-07-19 LAB
ALBUMIN SERPL-MCNC: 2.5 G/DL (ref 3.5–5.2)
ALBUMIN/GLOB SERPL: 1 G/DL
ALP SERPL-CCNC: 66 U/L (ref 39–117)
ALT SERPL W P-5'-P-CCNC: 9 U/L (ref 1–33)
ANION GAP SERPL CALCULATED.3IONS-SCNC: 8.7 MMOL/L (ref 5–15)
AST SERPL-CCNC: 18 U/L (ref 1–32)
BASOPHILS # BLD AUTO: 0.03 10*3/MM3 (ref 0–0.2)
BASOPHILS NFR BLD AUTO: 0.5 % (ref 0–1.5)
BILIRUB SERPL-MCNC: 0.3 MG/DL (ref 0–1.2)
BUN SERPL-MCNC: 13 MG/DL (ref 8–23)
BUN/CREAT SERPL: 15.1 (ref 7–25)
CALCIUM SPEC-SCNC: 8.6 MG/DL (ref 8.6–10.5)
CHLORIDE SERPL-SCNC: 113 MMOL/L (ref 98–107)
CO2 SERPL-SCNC: 18.3 MMOL/L (ref 22–29)
CREAT SERPL-MCNC: 0.86 MG/DL (ref 0.57–1)
DEPRECATED RDW RBC AUTO: 48.6 FL (ref 37–54)
EGFRCR SERPLBLD CKD-EPI 2021: 67.5 ML/MIN/1.73
EOSINOPHIL # BLD AUTO: 0.22 10*3/MM3 (ref 0–0.4)
EOSINOPHIL NFR BLD AUTO: 3.3 % (ref 0.3–6.2)
ERYTHROCYTE [DISTWIDTH] IN BLOOD BY AUTOMATED COUNT: 16.5 % (ref 12.3–15.4)
GLOBULIN UR ELPH-MCNC: 2.6 GM/DL
GLUCOSE BLDC GLUCOMTR-MCNC: 141 MG/DL (ref 70–130)
GLUCOSE BLDC GLUCOMTR-MCNC: 143 MG/DL (ref 70–130)
GLUCOSE BLDC GLUCOMTR-MCNC: 156 MG/DL (ref 70–130)
GLUCOSE BLDC GLUCOMTR-MCNC: 217 MG/DL (ref 70–130)
GLUCOSE BLDC GLUCOMTR-MCNC: 220 MG/DL (ref 70–130)
GLUCOSE SERPL-MCNC: 138 MG/DL (ref 65–99)
HCT VFR BLD AUTO: 26 % (ref 34–46.6)
HGB BLD-MCNC: 8.6 G/DL (ref 12–15.9)
IMM GRANULOCYTES # BLD AUTO: 0.13 10*3/MM3 (ref 0–0.05)
IMM GRANULOCYTES NFR BLD AUTO: 2 % (ref 0–0.5)
LYMPHOCYTES # BLD AUTO: 1.3 10*3/MM3 (ref 0.7–3.1)
LYMPHOCYTES NFR BLD AUTO: 19.8 % (ref 19.6–45.3)
MAGNESIUM SERPL-MCNC: 1.4 MG/DL (ref 1.6–2.4)
MCH RBC QN AUTO: 27.5 PG (ref 26.6–33)
MCHC RBC AUTO-ENTMCNC: 33.1 G/DL (ref 31.5–35.7)
MCV RBC AUTO: 83.1 FL (ref 79–97)
MONOCYTES # BLD AUTO: 0.58 10*3/MM3 (ref 0.1–0.9)
MONOCYTES NFR BLD AUTO: 8.8 % (ref 5–12)
NEUTROPHILS NFR BLD AUTO: 4.31 10*3/MM3 (ref 1.7–7)
NEUTROPHILS NFR BLD AUTO: 65.6 % (ref 42.7–76)
NRBC BLD AUTO-RTO: 0.2 /100 WBC (ref 0–0.2)
PLATELET # BLD AUTO: 266 10*3/MM3 (ref 140–450)
PMV BLD AUTO: 10 FL (ref 6–12)
POTASSIUM SERPL-SCNC: 3.9 MMOL/L (ref 3.5–5.2)
PROT SERPL-MCNC: 5.1 G/DL (ref 6–8.5)
RBC # BLD AUTO: 3.13 10*6/MM3 (ref 3.77–5.28)
SODIUM SERPL-SCNC: 140 MMOL/L (ref 136–145)
WBC NRBC COR # BLD: 6.57 10*3/MM3 (ref 3.4–10.8)

## 2023-07-19 PROCEDURE — 80053 COMPREHEN METABOLIC PANEL: CPT | Performed by: INTERNAL MEDICINE

## 2023-07-19 PROCEDURE — 85025 COMPLETE CBC W/AUTO DIFF WBC: CPT | Performed by: INTERNAL MEDICINE

## 2023-07-19 PROCEDURE — 63710000001 INSULIN LISPRO (HUMAN) PER 5 UNITS: Performed by: INTERNAL MEDICINE

## 2023-07-19 PROCEDURE — 25010000002 MAGNESIUM SULFATE 2 GM/50ML SOLUTION: Performed by: INTERNAL MEDICINE

## 2023-07-19 PROCEDURE — 99232 SBSQ HOSP IP/OBS MODERATE 35: CPT | Performed by: INTERNAL MEDICINE

## 2023-07-19 PROCEDURE — 82948 REAGENT STRIP/BLOOD GLUCOSE: CPT

## 2023-07-19 PROCEDURE — 83735 ASSAY OF MAGNESIUM: CPT | Performed by: INTERNAL MEDICINE

## 2023-07-19 RX ORDER — MAGNESIUM SULFATE HEPTAHYDRATE 40 MG/ML
2 INJECTION, SOLUTION INTRAVENOUS
Status: COMPLETED | OUTPATIENT
Start: 2023-07-19 | End: 2023-07-19

## 2023-07-19 RX ADMIN — NIFEDIPINE 30 MG: 30 TABLET, FILM COATED, EXTENDED RELEASE ORAL at 08:26

## 2023-07-19 RX ADMIN — Medication 10 ML: at 21:21

## 2023-07-19 RX ADMIN — ACETAMINOPHEN 325 MG: 325 TABLET, FILM COATED ORAL at 21:20

## 2023-07-19 RX ADMIN — LINAGLIPTIN 5 MG: 5 TABLET, FILM COATED ORAL at 08:26

## 2023-07-19 RX ADMIN — INSULIN LISPRO 2 UNITS: 100 INJECTION, SOLUTION INTRAVENOUS; SUBCUTANEOUS at 21:32

## 2023-07-19 RX ADMIN — HYDROCORTISONE ACETATE 1 APPLICATION: 1 CREAM TOPICAL at 08:25

## 2023-07-19 RX ADMIN — MAGNESIUM SULFATE HEPTAHYDRATE 2 G: 2 INJECTION, SOLUTION INTRAVENOUS at 12:50

## 2023-07-19 RX ADMIN — INSULIN LISPRO 4 UNITS: 100 INJECTION, SOLUTION INTRAVENOUS; SUBCUTANEOUS at 17:47

## 2023-07-19 RX ADMIN — Medication 10 ML: at 08:31

## 2023-07-19 RX ADMIN — POTASSIUM CHLORIDE 40 MEQ: 750 TABLET, EXTENDED RELEASE ORAL at 08:23

## 2023-07-19 RX ADMIN — PANTOPRAZOLE SODIUM 40 MG: 40 TABLET, DELAYED RELEASE ORAL at 08:24

## 2023-07-19 RX ADMIN — MAGNESIUM SULFATE HEPTAHYDRATE 2 G: 2 INJECTION, SOLUTION INTRAVENOUS at 08:29

## 2023-07-19 RX ADMIN — OLANZAPINE 5 MG: 5 TABLET ORAL at 21:20

## 2023-07-19 RX ADMIN — MAGNESIUM SULFATE HEPTAHYDRATE 2 G: 2 INJECTION, SOLUTION INTRAVENOUS at 10:46

## 2023-07-19 RX ADMIN — HYDROCORTISONE ACETATE 1 APPLICATION: 1 CREAM TOPICAL at 21:21

## 2023-07-19 RX ADMIN — ATORVASTATIN CALCIUM 10 MG: 20 TABLET, FILM COATED ORAL at 08:23

## 2023-07-19 RX ADMIN — INSULIN LISPRO 4 UNITS: 100 INJECTION, SOLUTION INTRAVENOUS; SUBCUTANEOUS at 12:50

## 2023-07-19 RX ADMIN — MAGNESIUM OXIDE 400 MG (241.3 MG MAGNESIUM) TABLET 400 MG: TABLET at 21:20

## 2023-07-19 NOTE — PLAN OF CARE
Goal Outcome Evaluation:  Plan of Care Reviewed With: patient        Progress: no change       VSS. Pt required 2L NC at night to maintain O2 >90% while sleeping. Port to be change today 7/19.

## 2023-07-19 NOTE — CASE MANAGEMENT/SOCIAL WORK
Continued Stay Note  [facility]     Patient Name: Veronica Royal  MRN: [id number]  Today's Date: 7/19/2023    Admit Date: 7/12/2023    Plan: Skilled bed at [facility] available Friday; will need pre-cert   Discharge Plan       Row Name 07/19/23 1310       Plan    Plan Skilled bed at [facility] available Friday; will need pre-cert    [facility] pending pre-cert. CCP met with patient and patient's daughter at bedside and updated them. Patient and patient's daughter in agreement to [facility]. Patient's daughter to transport. CCP will follow and assist as needed. Serenity DICKSON                   Discharge Codes    No documentation.                 Expected Discharge Date and Time       Expected Discharge Date Expected Discharge Time    Jul 19, 2023               YESSI He

## 2023-07-19 NOTE — PROGRESS NOTES
Name: Veronica Royal ADMIT: 2023   : 1940  PCP: Princess Cruz MD    MRN: [id number] LOS: 5 days   AGE/SEX: 82 y.o. female  ROOM: [facility]     Subjective   Subjective   Patient remains with weakness.  No abdominal pain.  No nausea.  No vomiting.  Improved appetite.  Normal bowel habits yesterday without constipation/diarrhea/bleeding per rectum/melena.  No dysuria or hematuria.  No frequency or urgency.  No diarrhea.  No fever or chills.    Review of Systems  Cardiovascular/respiratory.  No chest pain/no shortness of breath/no palpitations/no cough     Objective   Objective   Vital Signs  Temp:  [99 °F (37.2 °C)-99.4 °F (37.4 °C)] 99.4 °F (37.4 °C)  Heart Rate:  [] 103  Resp:  [20-22] 20  BP: (112-154)/(69-74) 142/70  SpO2:  [92 %-99 %] 92 %  on  Flow (L/min):  [2] 2;   Device (Oxygen Therapy): nasal cannula    Intake/Output Summary (Last 24 hours) at 2023 193  Last data filed at 2023 1250  Gross per 24 hour   Intake 240 ml   Output --   Net 240 ml       Body mass index is 24.46 kg/m².      23  1630   Weight: 58.7 kg (129 lb 6.4 oz)     Physical Exam  General.  Elderly female.  Alert and oriented x3.  No apparent pain/distress/diaphoresis.  Normal mood and affect.  Eyes.  Pupils equal round and reactive.  Intact extraocular musculature.  No pallor or jaundice.  Chest.  Clear to auscultation bilaterally with no added sounds.  Cardio vascular.  Regular rate and rhythm and grade 2 systolic murmur.  Abdomen.  Soft lax.  No tenderness.  No organomegaly.  No guarding or rebound.  .  No CVA tenderness.  Extremities.  No clubbing/cyanosis/edema.  CNS.  No acute focal neurological deficits.  No change in the exam from yesterday.    Results Review:      Results from last 7 days   Lab Units 23  0441 23  0706 23  0529 23  1644 23  0550 07/15/23  0547 23  0538 23  1557 23  0602   SODIUM mmol/L 140 142 142  --  142 142 138  --  140  141    POTASSIUM mmol/L 3.9 4.0 4.0 4.2 2.9* 3.0* 3.2*   < > 2.5*  2.6*   CHLORIDE mmol/L 113* 117* 117*  --  117* 119* 114*  --  114*  113*   CO2 mmol/L 18.3* 16.6* 17.1*  --  15.7* 14.7* 15.9*  --  14.7*  15.4*   BUN mg/dL 13 14 13  --  10 11 14  --  26*  26*   CREATININE mg/dL 0.86 0.93 0.98  --  0.91 0.90 0.93  --  1.41*  1.43*   GLUCOSE mg/dL 138* 130* 134*  --  200* 126* 199*  --  136*  136*   CALCIUM mg/dL 8.6 8.5* 8.3*  --  7.6* 8.0* 8.3*  --  8.7  8.6   AST (SGOT) U/L 18 10 15  --  13 15 16  --  15   ALT (SGPT) U/L 9 13 13  --  13 11 13  --  14    < > = values in this interval not displayed.       Estimated Creatinine Clearance: 41.6 mL/min (by C-G formula based on SCr of 0.86 mg/dL).  Results from last 7 days   Lab Units 07/13/23  0602   HEMOGLOBIN A1C % 8.60*       Results from last 7 days   Lab Units 07/19/23  1617 07/19/23  1205 07/19/23  0826 07/19/23  0556 07/18/23  2002 07/18/23  1652 07/18/23  1216 07/18/23  0733   GLUCOSE mg/dL 217* 220* 141* 143* 191* 165* 152* 127                   Results from last 7 days   Lab Units 07/19/23  0441 07/18/23  0706 07/17/23  0529 07/16/23  0550 07/15/23  0547 07/14/23  0538 07/13/23  0602   MAGNESIUM mg/dL 1.4* 1.6 1.7 2.1 1.5* 1.2* 1.4*             Invalid input(s): LDLCALC  Results from last 7 days   Lab Units 07/19/23  0441 07/18/23  0608 07/17/23  0529 07/16/23  0550 07/15/23  0547 07/14/23  0538 07/13/23  0602   WBC 10*3/mm3 6.57 8.12 7.61 6.15 5.13 6.42 5.56   HEMOGLOBIN g/dL 8.6* 9.0* 8.3* 7.6* 8.1* 8.6* 8.0*   HEMATOCRIT % 26.0* 26.8* 25.1* 22.5* 24.4* 26.2* 24.6*   PLATELETS 10*3/mm3 266 295 271 214 234 235 234   MCV fL 83.1 82.7 83.4 83.3 82.7 84.2 83.1   MCH pg 27.5 27.8 27.6 28.1 27.5 27.7 27.0   MCHC g/dL 33.1 33.6 33.1 33.8 33.2 32.8 32.5   RDW % 16.5* 16.3* 16.6* 15.7* 15.8* 16.2* 16.0*   RDW-SD fl 48.6 48.7 48.7 47.3 47.6 48.9 46.8   MPV fL 10.0 10.4 10.0 10.0 9.9 10.1 10.2   NEUTROPHIL % % 65.6 64.9 65.7 71.9  --   --  78.0*   LYMPHOCYTE % %  19.8 24.5 22.2 16.7*  --   --  11.9*   MONOCYTES % % 8.8 6.7 7.9 7.5  --   --  7.0   EOSINOPHIL % % 3.3 2.0 2.1 2.1  --   --  1.4   BASOPHIL % % 0.5 0.2 0.4 0.2  --   --  0.4   IMM GRAN % % 2.0* 1.7* 1.7* 1.6*  --   --  1.3*   NEUTROS ABS 10*3/mm3 4.31 5.27 5.00 4.42  --   --  4.34   LYMPHS ABS 10*3/mm3 1.30 1.99 1.69 1.03  --   --  0.66*   MONOS ABS 10*3/mm3 0.58 0.54 0.60 0.46  --   --  0.39   EOS ABS 10*3/mm3 0.22 0.16 0.16 0.13  --   --  0.08   BASOS ABS 10*3/mm3 0.03 0.02 0.03 0.01  --   --  0.02   IMMATURE GRANS (ABS) 10*3/mm3 0.13* 0.14* 0.13* 0.10*  --   --  0.07*   NRBC /100 WBC 0.2 0.0 0.0 0.2  --   --  0.2                       Results from last 7 days   Lab Units 07/14/23  0538   LIPASE U/L 24       Results from last 7 days   Lab Units 07/13/23  1033   URINECX  >100,000 CFU/mL Klebsiella pneumoniae ssp pneumoniae*           Results from last 7 days   Lab Units 07/13/23  1033   NITRITE UA  Positive*   WBC UA /HPF Too Numerous to Count*   BACTERIA UA /HPF 4+*   SQUAM EPITHEL UA /HPF 0-2   URINECX  >100,000 CFU/mL Klebsiella pneumoniae ssp pneumoniae*       Results from last 7 days   Lab Units 07/13/23  1033 07/13/23  1024   SODIUM UR mmol/L 126  --    CREATININE UR mg/dL  --  45.8   CHLORIDE UR mmol/L 159  --    PROTEIN TOTAL URINE mg/dL  --  36.9         Imaging:  Imaging Results (Last 24 Hours)       ** No results found for the last 24 hours. **               I reviewed the patient's new clinical results / labs / tests / procedures      Assessment/Plan     Active Hospital Problems    Diagnosis  POA   • **KAMILA (acute kidney injury) [N17.9]  Yes   • Severe malnutrition [E43]  Yes   • Acute UTI (urinary tract infection) [N39.0]  Yes   • Hypomagnesemia [E83.42]  No   • Hypokalemia [E87.6]  Yes   • Anemia due to chemotherapy [D64.81, T45.1X5A]  Yes   • Left renal mass [N28.89]  Yes   • Cirrhosis of liver [K74.60]  Yes   • Type 2 diabetes mellitus [E11.9]  Yes   • Hypertension [I10]  Yes   • Malignant neoplasm  of upper-outer quadrant of right breast in female, estrogen receptor negative [C50.411, Z17.1]  Not Applicable      Resolved Hospital Problems   No resolved problems to display.           Acute kidney failure/hypokalemia/hypomagnesemia/questionable left renal mass by renal ultrasound/UTI with Klebsiella acute kidney failure secondary to hypovolemia because of poor p.o. intake that has resolved with stopping hydrochlorothiazide and valsartan and after IV fluid.  Hypokalemia and hypomagnesemia resolved with substitution.  Positive relapse of the hypomagnesemia and we will continue replacement.  Status post urology consult who recommends 3 months follow-up with CT scan of the abdomen with and without contrast.  She needs a follow-up on questionable left renal mass by ultrasound I am hesitant at this time to give the patient IV contrast with a recent renal failure.  Finished 5 days of IV Rocephin.  Nausea/vomiting/diarrhea secondary to chemotherapy/dysphagia/Farley cirrhosis..  Status post speech evaluation and she is right now on regular diet and thin liquids and tolerating well.  Resolved nausea and vomiting and diarrhea after stopping chemotherapy.  GI does not recommend endoscopies.  Liver function test is normal.  Benign GI examination.  GI follow-up as an outpatient.  Continue Protonix.  Tolerating diet well.  GI follow-up as an outpatient.    Dyslipidemia.  Continue Lipitor.  Type 2 diabetes.  A1c is 8.  Currently on sliding scale insulin and Tradjenta with acceptable Accu-Cheks.  Will need adjustment of oral medication at the time of discharge (e.g. addition of metformin.).  Hypertension.  Good control on nifedipine without evidence of angina or congestive heart failure.  Continue observation.  Right breast cancer.  Right BREAST ultrasound is noted.  Intolerant to chemotherapy.  Surgery recommends surgical resection as an outpatient.  Anemia.  Hemoglobin stable.  Continue to monitor.  Deconditioning.  PT  recommends SNF.  VTE prophylaxis.  Sequential compression devices.      Discussed my findings and plan of treatment with the patient.  Disposition.  To skilled facility once bed is available.        Esme Ramos MD  [facility]  07/19/23  19:34 EDT

## 2023-07-19 NOTE — PROGRESS NOTES
Name: Veronica Royal ADMIT: 2023   : 1940  PCP: Princess Cruz MD    MRN: [id number] LOS: 4 days   AGE/SEX: 82 y.o. female  ROOM: [facility]     Subjective   Subjective   No abdominal pain.  No nausea.  No vomiting.  Improved appetite.  Improved weakness.  No dysuria or hematuria.  No frequency or urgency.  No diarrhea.  No fever or chills.    Review of Systems  Cardiovascular/respiratory.  No chest pain/no shortness of breath/no palpitations/no cough     Objective   Objective   Vital Signs  Temp:  [98.8 °F (37.1 °C)-99.5 °F (37.5 °C)] 98.8 °F (37.1 °C)  Heart Rate:  [] 97  Resp:  [16-20] 20  BP: (120-138)/(64-78) 120/64  SpO2:  [92 %-97 %] 95 %  on  Flow (L/min):  [2] 2;   Device (Oxygen Therapy): nasal cannula    Intake/Output Summary (Last 24 hours) at 2023  Last data filed at 2023 1232  Gross per 24 hour   Intake 160 ml   Output --   Net 160 ml     Body mass index is 24.46 kg/m².      23  1630   Weight: 58.7 kg (129 lb 6.4 oz)     Physical Exam  General.  Elderly female.  Alert and oriented x3.  No apparent pain/distress/diaphoresis.  Normal mood and affect.  Eyes.  Pupils equal round and reactive.  Intact extraocular musculature.  No pallor or jaundice.  Chest.  Clear to auscultation bilaterally with no added sounds.  Cardio vascular.  Regular rate and rhythm and grade 2 systolic murmur.  Abdomen.  Soft lax.  No tenderness.  No organomegaly.  No guarding or rebound.  .  No CVA tenderness.  Extremities.  No clubbing/cyanosis/edema.  CNS.  No acute focal neurological deficits.    Results Review:      Results from last 7 days   Lab Units 23  0706 23  0529 23  1644 23  0550 07/15/23  0547 23  0538 23  1557 23  0602 23  1619 23  0854   SODIUM mmol/L 142 142  --  142 142 138  --  140  141 141 138   POTASSIUM mmol/L 4.0 4.0 4.2 2.9* 3.0* 3.2* 3.5 2.5*  2.6* 3.1* 3.1*   CHLORIDE mmol/L 117* 117*  --  117* 119* 114*   --  114*  113* 111* 104   CO2 mmol/L 16.6* 17.1*  --  15.7* 14.7* 15.9*  --  14.7*  15.4* 18.0* 14.4*   BUN mg/dL 14 13  --  10 11 14  --  26*  26* 33* 37*   CREATININE mg/dL 0.93 0.98  --  0.91 0.90 0.93  --  1.41*  1.43* 1.99* 2.63*   GLUCOSE mg/dL 130* 134*  --  200* 126* 199*  --  136*  136* 173* 238*   CALCIUM mg/dL 8.5* 8.3*  --  7.6* 8.0* 8.3*  --  8.7  8.6 9.8 10.3*   AST (SGOT) U/L 10 15  --  13 15 16  --  15  --  14   ALT (SGPT) U/L 13 13  --  13 11 13  --  14  --  20     Estimated Creatinine Clearance: 38.4 mL/min (by C-G formula based on SCr of 0.93 mg/dL).  Results from last 7 days   Lab Units 07/13/23  0602   HEMOGLOBIN A1C % 8.60*     Results from last 7 days   Lab Units 07/18/23  2002 07/18/23  1652 07/18/23  1216 07/18/23  0733 07/17/23  2044 07/17/23  1636 07/17/23  1102 07/17/23  0740   GLUCOSE mg/dL 191* 165* 152* 127 184* 133* 154* 169*                 Results from last 7 days   Lab Units 07/18/23  0706 07/17/23  0529 07/16/23  0550 07/15/23  0547 07/14/23  0538 07/13/23  0602 07/12/23  1619   MAGNESIUM mg/dL 1.6 1.7 2.1 1.5* 1.2* 1.4* 1.8           Invalid input(s): LDLCALC  Results from last 7 days   Lab Units 07/18/23  0608 07/17/23  0529 07/16/23  0550 07/15/23  0547 07/14/23  0538 07/13/23  0602 07/12/23  0854   WBC 10*3/mm3 8.12 7.61 6.15 5.13 6.42 5.56 10.22   HEMOGLOBIN g/dL 9.0* 8.3* 7.6* 8.1* 8.6* 8.0* 10.8*   HEMATOCRIT % 26.8* 25.1* 22.5* 24.4* 26.2* 24.6* 32.9*   PLATELETS 10*3/mm3 295 271 214 234 235 234 340   MCV fL 82.7 83.4 83.3 82.7 84.2 83.1 85.7   MCH pg 27.8 27.6 28.1 27.5 27.7 27.0 28.1   MCHC g/dL 33.6 33.1 33.8 33.2 32.8 32.5 32.8   RDW % 16.3* 16.6* 15.7* 15.8* 16.2* 16.0* 16.3*   RDW-SD fl 48.7 48.7 47.3 47.6 48.9 46.8 49.6   MPV fL 10.4 10.0 10.0 9.9 10.1 10.2 9.4   NEUTROPHIL % % 64.9 65.7 71.9  --   --  78.0* 79.2*   LYMPHOCYTE % % 24.5 22.2 16.7*  --   --  11.9* 13.7*   MONOCYTES % % 6.7 7.9 7.5  --   --  7.0 4.8*   EOSINOPHIL % % 2.0 2.1 2.1  --   --  1.4 0.6    BASOPHIL % % 0.2 0.4 0.2  --   --  0.4 0.3   IMM GRAN % % 1.7* 1.7* 1.6*  --   --  1.3* 1.4*   NEUTROS ABS 10*3/mm3 5.27 5.00 4.42  --   --  4.34 8.10*   LYMPHS ABS 10*3/mm3 1.99 1.69 1.03  --   --  0.66* 1.40   MONOS ABS 10*3/mm3 0.54 0.60 0.46  --   --  0.39 0.49   EOS ABS 10*3/mm3 0.16 0.16 0.13  --   --  0.08 0.06   BASOS ABS 10*3/mm3 0.02 0.03 0.01  --   --  0.02 0.03   IMMATURE GRANS (ABS) 10*3/mm3 0.14* 0.13* 0.10*  --   --  0.07* 0.14*   NRBC /100 WBC 0.0 0.0 0.2  --   --  0.2 0.0                     Results from last 7 days   Lab Units 07/14/23  0538 07/12/23  1619   LIPASE U/L 24 21     Results from last 7 days   Lab Units 07/13/23  1033   URINECX  >100,000 CFU/mL Klebsiella pneumoniae ssp pneumoniae*         Results from last 7 days   Lab Units 07/13/23  1033   NITRITE UA  Positive*   WBC UA /HPF Too Numerous to Count*   BACTERIA UA /HPF 4+*   SQUAM EPITHEL UA /HPF 0-2   URINECX  >100,000 CFU/mL Klebsiella pneumoniae ssp pneumoniae*     Results from last 7 days   Lab Units 07/13/23  1033 07/13/23  1024   SODIUM UR mmol/L 126  --    CREATININE UR mg/dL  --  45.8   CHLORIDE UR mmol/L 159  --    PROTEIN TOTAL URINE mg/dL  --  36.9       Imaging:  Imaging Results (Last 24 Hours)       ** No results found for the last 24 hours. **               I reviewed the patient's new clinical results / labs / tests / procedures      Assessment/Plan     Active Hospital Problems    Diagnosis  POA    **KAMILA (acute kidney injury) [N17.9]  Yes    Severe malnutrition [E43]  Yes    Acute UTI (urinary tract infection) [N39.0]  Yes    Hypomagnesemia [E83.42]  No    Hypokalemia [E87.6]  Yes    Anemia due to chemotherapy [D64.81, T45.1X5A]  Yes    Left renal mass [N28.89]  Yes    Cirrhosis of liver [K74.60]  Yes    Type 2 diabetes mellitus [E11.9]  Yes    Hypertension [I10]  Yes    Malignant neoplasm of upper-outer quadrant of right breast in female, estrogen receptor negative [C50.411, Z17.1]  Not Applicable      Resolved  Hospital Problems   No resolved problems to display.           Acute kidney failure/hypokalemia/hypomagnesemia/questionable left renal mass by renal ultrasound/UTI with Klebsiella acute kidney failure secondary to hypovolemia because of poor p.o. intake that has resolved with stopping hydrochlorothiazide and valsartan and after IV fluid.  Hypokalemia and hypomagnesemia resolved with substitution.  Status post urology consult who recommends 3 months follow-up with CT scan of the abdomen with and without contrast.  She needs a follow-up on questionable left renal mass by ultrasound I am hesitant at this time to give the patient IV contrast with a recent renal failure.  Finished 5 days of IV Rocephin.  Nausea/vomiting/diarrhea secondary to chemotherapy/dysphagia/Farley cirrhosis..  Status post speech evaluation and she is right now on regular diet and thin liquids and tolerating well.  Resolved nausea and vomiting and diarrhea after stopping chemotherapy.  GI does not recommend endoscopies.  Liver function test is normal.  Benign GI examination.  GI follow-up as an outpatient.  Continue Protonix.  Tolerating diet well.  GI follow-up as an outpatient.  Dyslipidemia.  Continue Lipitor.  Type 2 diabetes.  A1c is 8.  Currently on sliding scale insulin and Tradjenta with acceptable Accu-Cheks.  Hypertension.  Good control on nifedipine without evidence of angina or congestive heart failure.  Continue observation.  Right breast cancer.  Right BREAST ultrasound is noted.  Intolerant to chemotherapy.  Surgery recommends surgical resection as an outpatient.  Anemia.  Hemoglobin stable.  Continue to monitor.  Deconditioning.  PT recommends SNF.  VTE prophylaxis.  Sequential compression devices.      Discussed my findings and plan of treatment with the patient.  Disposition.  To skilled facility once bed is available.        Esme Ramos MD  [facility]  07/18/23  20:40 EDT

## 2023-07-19 NOTE — PROGRESS NOTES
[facility] GROUP INPATIENT PROGRESS NOTE    Length of Stay:  5 days    CHIEF COMPLAINT/REASON FOR VISIT:  Breast cancer currently on neoadjuvant therapy with Taxol, Herceptin, Perjeta  Adverse effects of therapy including nausea, vomiting, diarrhea  Volume depletion  Acute kidney injury  Hypokalemia    Interval history:  7/17/2023  T90.8, pulse 102, respirations 18, BP 99/58, SPO2 97%  Patient frustrated about side effects of chemotherapy but states that the breasts are clearly improved.  She overall is feeling better.  H&H 8.3 and 25.1, white count of 7610, platelet count 27 1000, BUN/creatinine of 13 and 0.98, calcium 8.3, albumin 2.5, normal transaminases    7/18/2023  T99.5, pulse 100, respirations 16, /71, SPO2 of 94%  Patient doing about the same  H&H 9.0 and 26.8, white count 8120, platelet count 2 95,000, BUN/creatinine of 14 and 0.93, calcium 8.5, albumin 2.6, normal transaminases.  Ultrasound reveals 3.7 x 2.4 x 3.7 hypoechoic mass extending into the overlying skin previously 3.8 x 2.5 x 4.2, slightly smaller, right axillary mass at 2.6 x 1.5 x 1.9 not significantly changed.    7/19/2023  T 99 degrees, respirations 22, /74, SPO2 97%  Patient states that she is feeling somewhat better and hoping to undergo short-term rehab.  She has been advised to see Dr. Michael in follow-up appointment for scheduling surgery.      OBJECTIVE:  Vitals:    07/18/23 0739 07/18/23 1106 07/18/23 1924 07/19/23 0403   BP: 129/71 134/71 120/64 154/74   BP Location: Left arm Left arm Left arm Left arm   Patient Position: Lying Lying Lying Lying   Pulse: 100 97 97 95   Resp: 16 18 20 22   Temp: 99.5 °F (37.5 °C) 99.2 °F (37.3 °C) 98.8 °F (37.1 °C) 99 °F (37.2 °C)   TempSrc: Oral  Oral Oral   SpO2: 94% 97% 95% 97%   Weight:       Height:             PHYSICAL EXAMINATION:   General:  No acute distress, awake, alert and oriented.  Sitting up in bed and alert  Skin:  Warm and dry, no visible rash  HEENT:   Normocephalic/atraumatic.    Chest:  Normal respiratory effort. Mediport present in the L chest, accessed.  Right breast retracted but, apparently, improved.  We will follow  Extremities:  No visible clubbing, cyanosis, or edema  Neuro/psych:  Grossly nonfocal.  Normal mood and affect.       DIAGNOSTIC DATA:  Results Review:     I reviewed the patient's new clinical results.    Results from last 7 days   Lab Units 07/19/23  0441   WBC 10*3/mm3 6.57   HEMOGLOBIN g/dL 8.6*   HEMATOCRIT % 26.0*   PLATELETS 10*3/mm3 266     Lab Results   Component Value Date    NEUTROABS 4.31 07/19/2023     Results from last 7 days   Lab Units 07/19/23  0441   SODIUM mmol/L 140   POTASSIUM mmol/L 3.9   CHLORIDE mmol/L 113*   CO2 mmol/L 18.3*   BUN mg/dL 13   CREATININE mg/dL 0.86   GLUCOSE mg/dL 138*   CALCIUM mg/dL 8.6         Results from last 7 days   Lab Units 07/19/23  0441   MAGNESIUM mg/dL 1.4*         IMAGING:    None reviewed    ASSESSMENT:  This is a 82 y.o. 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  Invasive ductal carcinoma with apocrine features, grade 3, Ki-67 38%  CT scans and bone scan without any obvious evidence of metastatic disease  Plan Taxol Herceptin and Perjeta, and if she tolerates this well we will proceed with Adriamycin and cyclophosphamide.  Echocardiogram has been performed and ejection fraction normal.  Liver shows cirrhotic morphology.  Mediport placed 5/19/2023  Taxol, Herceptin, Perjeta initiated 5/24/2023 5/31/2023 C1D8 Taxol  6/28/2023-cycle 2-day 15 of Taxol.  She is overall tolerating therapy well with expected side effects.  7/5/23: cycle 3 day 1 and she received taxol, herceptin, perjeta  7/12/2023-cycle 3-day 8 of TPH.  Taxol held due to severe dehydration, poorly controlled nausea vomiting, KAMILA. Admitted for these symptoms.  Fortunately now much improved.  7/18/2023, patient  improving from symptoms from chemotherapy.  Now trying to determine whether we should try to continue a portion of the same therapy after review by general surgery.  Patient seen 7/19/2023, now surgical candidate to be scheduled in the next several weeks to Dr. Bender.     *Diabetes  Poorly controlled  Evaluation by endocrinology 5/30/2023 with recommendations for dietary changes and home blood sugar monitoring.  The patient's daughter reports today she is struggling with compliance.  Her oral intake is still fairly poor.  Emphasized on regular food intake to maintain blood sugars.  According to daughter blood sugars have been elevated in the 300s and 400s.  They are not able to control blood sugars adequately.     *Hyperkalemia and chronic kidney disease, now with hypokalemia on admission  Potassium remains low at 2.9.  Continue potassium repletion.  Normalized 7/17/2020 3-4.0    *Hypomagnesemia  Magnesium 2.1, from 1.5, from 1.2  Continue repletion prn-normalized 7/17/2020 3-1.7     *KAMILA/CKD  Creatinine elevated at 2.63, BUN 37 at presentation on 7/12.  Secondary to severe volume depletion.  Calcium also elevated at 10.3  Reason for admission  Creatinine 0.86 from 0.93 from 0.98 from 0.91, from 0.90, from 0.93, from 1.43, from 1.99, from 2.63, from 0.95     *Cirrhotic appearance of the liver on the CT scan  Referred to gastroenterology  Synthetic function appears to be normal  We will start with Taxol to see if she would tolerate the chemotherapy   Slight elevation of intervention studies today with ALT 43, AST 55.  Continue to monitor weekly  6/28/2023-mild elevation in the liver labs.  Stable compared to previous labs.      *Decreased oral intake secondary to chemotherapy  She has lost a significant amount of weight..  Continue follow-up with nutrition  Continue entergrade outpatient     *Frequent eructation  Continue Protonix 40 mg daily     *Cognitive impairment  It is unclear if this is the patient's  baseline or mental status has been exacerbated by recent chemotherapy  The patient is struggling with compliance with her medications.  The patient's daughter expresses frustration today as the patient is struggling to care for herself at home with managing medications and symptom management.  Evaluated by CARL Antonio      *Chemotherapy-induced diarrhea  6/5/2023 patient given Enterade (Wild Berry flavor).  She has been drinking 1 a day.  She does not necessarily like the flavor (currently mixing with sugar-free Aramis-Aid which does help).  Encouraged her to increase to 2 a day.  6/21/2023 patient reports that she had stopped drinking her Enterade because she was waking up in the middle the night having diarrhea although she did not know if it was due to the Enterade her protein shakes.  I have encouraged the patient to continue Enterade, drinking it in the morning.  Try discontinuing the protein shakes and see how she does.  Patient states that she will try this  Reports 2-3 loose stools.  Continue Enterade and Imodium.  7/5/2023 continuing to have issues with diarrhea up to 3-4 bowel movements a day, patient also recently prescribed Kayexalate for an apparently elevated potassium.  Potassium only 3.1 today.  She advised to discontinue the Kayexalate she was given IV hydration today.  We will also prescribe Lomotil to use if needed to help better control her diarrhea.  Patient is not drinking Enterade regularly.  This has been better controlled with Lomotil as an outpatient  Assessed 7/18/2023 finally improving.     *Maculopapular rash  Secondary to HER2 directed therapy  Recommend starting topical steroids to help with the rash  She quit taking doxycycline as she developed a blister with that.  Medrol Dosepak prescribed.  Continue topical steroids     *Severe nausea and vomiting  Patient has been using Zofran regularly.  Will nee to utilize Zyprexa and Compazine for future outpatient use.  Reason for  admission  Olanzapine 5 mg at night recently initiated     *Normocytic anemia  Likely due to chemo and IV fluid hydration     *Renal u/s on 7/6 with possible left renal mass  Not noted on CT imaging from 5/11/23. No further imaging at this time.   Urology evaluated.  We will be having follow-up imaging from an oncology standpoint so this will be monitored     *UTI  Urine culture with >100,000 GNR, Klebsiella pneumoniae, resistant to ampicillin but otherwise susceptible  On Rocephin    RECOMMENDATIONS/PLAN:  Holding chemotherapy which has been poorly tolerated.  Patient, now, evidently, a surgical candidate which is to be scheduled after short-term rehabilitation stay  Rocephin continues for Klebsiella urinary tract infection  Valsartan and HCTZ being held for now and at discharge per nephrology recommendations  Topical steroids for the rash which is improving  Symptom management for nausea and vomiting.  She continues olanzapine 5 mg nightly.  Can further evaluate L kidney as an outpatient when she's clinically doing better and labs have normalized. Breast cancer needs attention first.  Nothing was noted in the left kidney on CT imaging done on 5/11/2023  Daily labs while admitted though these will be adjusted  Will follow  Plan discharge to a skilled nursing facility for rehabilitation, likely [facility], when appropriate and bed available    Hank Nelson MD

## 2023-07-20 VITALS
SYSTOLIC BLOOD PRESSURE: 129 MMHG | RESPIRATION RATE: 18 BRPM | HEART RATE: 107 BPM | WEIGHT: 129.4 LBS | TEMPERATURE: 98.6 F | HEIGHT: 61 IN | DIASTOLIC BLOOD PRESSURE: 53 MMHG | BODY MASS INDEX: 24.43 KG/M2 | OXYGEN SATURATION: 99 %

## 2023-07-20 PROBLEM — N39.0 ACUTE UTI (URINARY TRACT INFECTION): Status: RESOLVED | Noted: 2023-07-14 | Resolved: 2023-07-20

## 2023-07-20 PROBLEM — E87.6 HYPOKALEMIA: Status: RESOLVED | Noted: 2023-07-13 | Resolved: 2023-07-20

## 2023-07-20 PROBLEM — E83.42 HYPOMAGNESEMIA: Status: RESOLVED | Noted: 2023-07-14 | Resolved: 2023-07-20

## 2023-07-20 PROBLEM — N17.9 AKI (ACUTE KIDNEY INJURY): Status: RESOLVED | Noted: 2023-07-12 | Resolved: 2023-07-20

## 2023-07-20 LAB
ANION GAP SERPL CALCULATED.3IONS-SCNC: 9 MMOL/L (ref 5–15)
BASOPHILS # BLD AUTO: 0.02 10*3/MM3 (ref 0–0.2)
BASOPHILS NFR BLD AUTO: 0.3 % (ref 0–1.5)
BUN SERPL-MCNC: 11 MG/DL (ref 8–23)
BUN/CREAT SERPL: 13.9 (ref 7–25)
CALCIUM SPEC-SCNC: 8.1 MG/DL (ref 8.6–10.5)
CHLORIDE SERPL-SCNC: 111 MMOL/L (ref 98–107)
CO2 SERPL-SCNC: 21 MMOL/L (ref 22–29)
CREAT SERPL-MCNC: 0.79 MG/DL (ref 0.57–1)
DEPRECATED RDW RBC AUTO: 48.2 FL (ref 37–54)
EGFRCR SERPLBLD CKD-EPI 2021: 74.8 ML/MIN/1.73
EOSINOPHIL # BLD AUTO: 0.2 10*3/MM3 (ref 0–0.4)
EOSINOPHIL NFR BLD AUTO: 2.8 % (ref 0.3–6.2)
ERYTHROCYTE [DISTWIDTH] IN BLOOD BY AUTOMATED COUNT: 16.1 % (ref 12.3–15.4)
GLUCOSE BLDC GLUCOMTR-MCNC: 151 MG/DL (ref 70–130)
GLUCOSE BLDC GLUCOMTR-MCNC: 182 MG/DL (ref 70–130)
GLUCOSE BLDC GLUCOMTR-MCNC: 185 MG/DL (ref 70–130)
GLUCOSE SERPL-MCNC: 148 MG/DL (ref 65–99)
HCT VFR BLD AUTO: 26.8 % (ref 34–46.6)
HGB BLD-MCNC: 8.7 G/DL (ref 12–15.9)
IMM GRANULOCYTES # BLD AUTO: 0.1 10*3/MM3 (ref 0–0.05)
IMM GRANULOCYTES NFR BLD AUTO: 1.4 % (ref 0–0.5)
LYMPHOCYTES # BLD AUTO: 1.21 10*3/MM3 (ref 0.7–3.1)
LYMPHOCYTES NFR BLD AUTO: 16.7 % (ref 19.6–45.3)
MAGNESIUM SERPL-MCNC: 2 MG/DL (ref 1.6–2.4)
MCH RBC QN AUTO: 26.9 PG (ref 26.6–33)
MCHC RBC AUTO-ENTMCNC: 32.5 G/DL (ref 31.5–35.7)
MCV RBC AUTO: 83 FL (ref 79–97)
MONOCYTES # BLD AUTO: 0.57 10*3/MM3 (ref 0.1–0.9)
MONOCYTES NFR BLD AUTO: 7.9 % (ref 5–12)
NEUTROPHILS NFR BLD AUTO: 5.13 10*3/MM3 (ref 1.7–7)
NEUTROPHILS NFR BLD AUTO: 70.9 % (ref 42.7–76)
NRBC BLD AUTO-RTO: 0 /100 WBC (ref 0–0.2)
PLATELET # BLD AUTO: 283 10*3/MM3 (ref 140–450)
PMV BLD AUTO: 9.8 FL (ref 6–12)
POTASSIUM SERPL-SCNC: 3.8 MMOL/L (ref 3.5–5.2)
RBC # BLD AUTO: 3.23 10*6/MM3 (ref 3.77–5.28)
SODIUM SERPL-SCNC: 141 MMOL/L (ref 136–145)
WBC NRBC COR # BLD: 7.23 10*3/MM3 (ref 3.4–10.8)

## 2023-07-20 PROCEDURE — 99231 SBSQ HOSP IP/OBS SF/LOW 25: CPT | Performed by: INTERNAL MEDICINE

## 2023-07-20 PROCEDURE — 85025 COMPLETE CBC W/AUTO DIFF WBC: CPT | Performed by: INTERNAL MEDICINE

## 2023-07-20 PROCEDURE — 83735 ASSAY OF MAGNESIUM: CPT | Performed by: INTERNAL MEDICINE

## 2023-07-20 PROCEDURE — 82948 REAGENT STRIP/BLOOD GLUCOSE: CPT

## 2023-07-20 PROCEDURE — 80048 BASIC METABOLIC PNL TOTAL CA: CPT | Performed by: INTERNAL MEDICINE

## 2023-07-20 PROCEDURE — 97535 SELF CARE MNGMENT TRAINING: CPT

## 2023-07-20 PROCEDURE — 97116 GAIT TRAINING THERAPY: CPT

## 2023-07-20 RX ORDER — POTASSIUM CHLORIDE 20 MEQ/1
40 TABLET, EXTENDED RELEASE ORAL DAILY
Qty: 30 TABLET | Refills: 3 | Status: SHIPPED | OUTPATIENT
Start: 2023-07-21

## 2023-07-20 RX ORDER — GLIPIZIDE AND METFORMIN HCL 2.5; 5 MG/1; MG/1
2 TABLET, FILM COATED ORAL
Qty: 120 TABLET | Refills: 3 | Status: SHIPPED | OUTPATIENT
Start: 2023-07-20

## 2023-07-20 RX ORDER — ATORVASTATIN CALCIUM 10 MG/1
10 TABLET, FILM COATED ORAL DAILY
Qty: 90 TABLET | Refills: 3 | Status: SHIPPED | OUTPATIENT
Start: 2023-07-21

## 2023-07-20 RX ORDER — ACETAMINOPHEN 325 MG/1
650 TABLET ORAL EVERY 4 HOURS PRN
Qty: 120 TABLET | Refills: 3 | Status: SHIPPED | OUTPATIENT
Start: 2023-07-20

## 2023-07-20 RX ADMIN — LINAGLIPTIN 5 MG: 5 TABLET, FILM COATED ORAL at 08:26

## 2023-07-20 RX ADMIN — Medication 10 ML: at 08:25

## 2023-07-20 RX ADMIN — HYDROCORTISONE ACETATE 1 APPLICATION: 1 CREAM TOPICAL at 08:26

## 2023-07-20 RX ADMIN — MAGNESIUM OXIDE 400 MG (241.3 MG MAGNESIUM) TABLET 400 MG: TABLET at 08:26

## 2023-07-20 RX ADMIN — ATORVASTATIN CALCIUM 10 MG: 20 TABLET, FILM COATED ORAL at 08:24

## 2023-07-20 RX ADMIN — NIFEDIPINE 30 MG: 30 TABLET, FILM COATED, EXTENDED RELEASE ORAL at 08:26

## 2023-07-20 RX ADMIN — PANTOPRAZOLE SODIUM 40 MG: 40 TABLET, DELAYED RELEASE ORAL at 08:24

## 2023-07-20 RX ADMIN — POTASSIUM CHLORIDE 40 MEQ: 750 TABLET, EXTENDED RELEASE ORAL at 08:24

## 2023-07-20 NOTE — CASE MANAGEMENT/SOCIAL WORK
Continued Stay Note  [facility]     Patient Name: Veronica Royal  MRN: [id number]  Today's Date: 7/20/2023    Admit Date: 7/12/2023    Plan: [facility] via daughter in private vehicle.   Discharge Plan       Row Name 07/20/23 1326       Plan    Plan [facility] via daughter in private vehicle.    Plan Comments Patient to discharge to [facility] via daughter private vehicle after dinner around 6pm-6:30pm. Patient, nurse, and Gemma/Eli aware of transportation plans. CCP to follow.      Row Name 07/20/23 1059       Plan    Plan Skilled bed at [facility] available today.  Precert obtained.    Plan Comments Precert for [facility] skilled approved for 7/20/2023.  Diana HUGHES-KETURAH                   Discharge Codes    No documentation.                 Expected Discharge Date and Time       Expected Discharge Date Expected Discharge Time    Jul 20, 2023

## 2023-07-20 NOTE — PLAN OF CARE
Goal Outcome Evaluation:  Plan of Care Reviewed With: patient        Progress: improving  Outcome Evaluation: Pt tolerated treatment well this date. Pt was sitting on EOB upon entering room, eating her breakfast. Required min A to stand, then CGA to ambulate 35ft w/ no AD. Slightly unsteady, though no overt LOBs noted. Overall limited d/t fatigue and SOA. Pt returned to bed, and HR was in the 120s, improving after resting. Encouraged pt to ambulate w/ nsg during the day.      Anticipated Discharge Disposition (PT): skilled nursing facility

## 2023-07-20 NOTE — PLAN OF CARE
Problem: Adult Inpatient Plan of Care  Goal: Plan of Care Review  Outcome: Ongoing, Progressing  Flowsheets (Taken 7/20/2023 0901)  Progress: improving  Plan of Care Reviewed With: patient  Goal: Patient-Specific Goal (Individualized)  Outcome: Ongoing, Progressing  Goal: Absence of Hospital-Acquired Illness or Injury  Outcome: Ongoing, Progressing  Intervention: Identify and Manage Fall Risk  Recent Flowsheet Documentation  Taken 7/20/2023 0800 by Carmen Burch RN  Safety Promotion/Fall Prevention: safety round/check completed  Intervention: Prevent Skin Injury  Recent Flowsheet Documentation  Taken 7/20/2023 0800 by Carmen Burch RN  Body Position: position changed independently  Intervention: Prevent and Manage VTE (Venous Thromboembolism) Risk  Recent Flowsheet Documentation  Taken 7/20/2023 0800 by Carmen Burch RN  Activity Management: activity encouraged  Goal: Optimal Comfort and Wellbeing  Outcome: Ongoing, Progressing  Goal: Readiness for Transition of Care  Outcome: Ongoing, Progressing     Problem: Fall Injury Risk  Goal: Absence of Fall and Fall-Related Injury  Outcome: Ongoing, Progressing  Intervention: Identify and Manage Contributors  Recent Flowsheet Documentation  Taken 7/20/2023 0800 by Carmen Burch RN  Medication Review/Management: medications reviewed  Intervention: Promote Injury-Free Environment  Recent Flowsheet Documentation  Taken 7/20/2023 0800 by Carmen Burch RN  Safety Promotion/Fall Prevention: safety round/check completed     Problem: Skin Injury Risk Increased  Goal: Skin Health and Integrity  Outcome: Ongoing, Progressing   Goal Outcome Evaluation:  Plan of Care Reviewed With: patient        Progress: improving

## 2023-07-20 NOTE — DISCHARGE SUMMARY
Patient Name: Veronica Royal  : 1940  MRN: [id number]    Date of Admission: 2023  Date of Discharge:  2023  Primary Care Physician: Princess Cruz MD      Discharge Diagnoses     Active Hospital Problems    Diagnosis  POA    Severe malnutrition [E43]  Yes    Anemia due to chemotherapy [D64.81, T45.1X5A]  Yes    Left renal mass [N28.89]  Yes    Cirrhosis of liver [K74.60]  Yes    Type 2 diabetes mellitus [E11.9]  Yes    Hypertension [I10]  Yes    Malignant neoplasm of upper-outer quadrant of right breast in female, estrogen receptor negative [C50.411, Z17.1]  Not Applicable      Resolved Hospital Problems    Diagnosis Date Resolved POA    Acute UTI (urinary tract infection) [N39.0] 2023 Yes    Hypomagnesemia [E83.42] 2023 No    Hypokalemia [E87.6] 2023 Yes    KAMILA (acute kidney injury) [N17.9] 2023 Yes        Hospital Course     Brief admission history and physical.  Please refer to the H&P for full detail.  A pleasant 82 years old white female with a past history of right breast cancer with lymph node involvement on chemotherapy/TELLEZ cirrhosis/hypertension/type 2 diabetes/dyslipidemia/ anemia of chronic disease who presents to the emergency department with abdominal discomfort associated with nausea vomiting and diarrhea while on chemotherapy.  Positive fatigue.  Positive occasional dysphagia.  Physical examination was remarkable for an afebrile patient with stable vital signs/chronically ill-appearing female in no other abnormalities.  Hospital course.  Initial ER evaluation included a CMP that was normal except random blood sugar of 238, BUN 37, creatinine 2.6, potassium 3.1, CO2 of 14.4, calcium 10.3, anion gap 19.6, GFR of 17.7.  CBC normal except a hemoglobin of 10.8.  Echo on 2023 revealed a normal ejection fraction with grade 1A diastolic dysfunction and calcification of the aortic valve.  Patient was admitted with acute kidney  failure/hypokalemia/dehydration because of her nausea, vomiting, diarrhea secondary to chemotherapy.  She does have a history of Farley cirrhosis.  As far as the acute kidney failure hypokalemia magnesium was checked and was low and both the hypokalemia and hypomagnesemia has resolved with substitution.  Acute kidney failure was thought to be secondary to prerenal azotemia because of dehydration she was challenged with IV fluid and this has resolved and she remained euvolemic thereafter.  Radiological imaging of the abdomen revealed a possible left renal mass and urology was consulted and recommended follow-up as an outpatient.  She was found to have a UTI with Klebsiella and she was treated with a complete course of Rocephin.  She will need a follow-up CAT scan with and without IV contrast as an outpatient.  Urology.  Her nausea vomiting and diarrhea has resolved.  GI has seen the patient because of her Farley cirrhosis and did not recommend any endoscopy at this time.  Her liver function test remained stable.  She is to follow-up with GI as an outpatient.  Her diet was slowly advanced from liquid to regular diabetic and healthy cardiac diet and she was tolerating diet well at the time of discharge.  For her dyslipidemia statins were maintained.  She has a history of type 2 diabetes and her A1c was 8.  We held her metformin and glipizide and placed her on sliding scale insulin and at the time of discharge her Tradjenta was continued together with resumption at the higher dose of metformin and glipizide and this needs to be followed up as an outpatient.  Her blood pressure remained under good control with no evidence of angina or congestive heart failure on nifedipine and held diuretics and ARB's were DC'd because of the renal failure per Nephrology recommendation.  We avoided IV contrast during this hospitalization.  As far as her breast cancer she was not tolerating the chemotherapy hematology oncology was consulted  and a surgical consultation was obtained chemotherapy was placed on hold and surgery saw the patient and recommends surgical intervention for the cancer as an outpatient after rehab.  She was noted to have anemia of chronic disease and her hemoglobin remained stable during this admission.  She was noted to be deconditioned and physical therapy evaluated the patient recommended SNF.  At the time of discharge she was tolerating regular healthy cardiac diabetic diet and was hemodynamically stable.  She is being discharged to skilled facility for the purpose of physical therapy.  She is to follow-up with primary MD/urology/hematology oncology/GI/surgery.    Consult Orders (all) (From admission, onward)       Start     Ordered    07/17/23 1307  Inpatient General Surgery Consult  Once        Specialty:  General Surgery  Provider:  Rosa Michael MD    07/17/23 1306    07/14/23 0916  Inpatient Gastroenterology Consult  Once        Specialty:  Gastroenterology  Provider:  Denia Trammell MD    07/14/23 0915    07/13/23 0831  Inpatient Urology Consult  Once        Specialty:  Urology  Provider:  Kannan Fisher Jr., MD    07/13/23 0830    07/12/23 1641  Inpatient Nutrition Consult  Once        Provider:  (Not yet assigned)    07/12/23 1641    07/12/23 1625  Inpatient Nephrology Consult  Once        Specialty:  Nephrology  Provider:  Cesar Estrella MD    07/12/23 1624    07/12/23 1411  Inpatient Hematology & Oncology Consult  Once        Specialty:  Hematology and Oncology  Provider:  Kingsley Ortiz MD    07/12/23 1412                  Procedures     Imaging Results (All)       Procedure Component Value Units Date/Time    US Breast Right Limited [[id number]]  (Abnormal) Collected: 07/17/23 1443     Updated: 07/17/23 1513    Narrative:      US BREAST RIGHT LIMITED-     CLINICAL INDICATION: 82 years old woman presents for follow-up  ultrasound for biopsy-proven right breast and axillary malignancy.      COMPARISON: 04/21/2023     FINDINGS:  Targeted sonographic evaluation of the right breast and axilla was  performed for follow-up. At 9:00, 6 cm from the nipple, there is an  approximately 3.7 x 2.4 x 3.7 cm irregular hypoechoic mass extending  into the overlying skin, previously 3.8 x 2.5 x 4.2 cm when remeasured.  There is a central echogenic focus from a biopsy clip. This represents  biopsy-proven malignancy. This is similar to slightly smaller when  accounting for differences in technique. In the right axilla, there is a  2.6 x 1.5 x 1.9 cm irregular hypoechoic mass, which is not significantly  changed, previously 1.9 x 1.5 x 2.5 cm. There is an adjacent echogenic  focus from a biopsy clip. This represents biopsy-proven malignancy. An  immediately adjacent 0.7 cm lymph node has decreased in size, previously  1.2 cm.           Impression:      Biopsy-proven malignancy at 9:00 in the right breast is similar to  slightly smaller when accounting for differences in technique. A 2.6 cm  right axillary mass is not significantly changed, although an adjacent  lymph node has decreased in size. Continued oncologic and surgical  management are recommended.     BI-RADS Category 6: Known biopsy-proven malignancy     This report was finalized on 7/17/2023 2:50 PM by Dr. Alyce Eaton M.D.                 Pertinent Labs     Results from last 7 days   Lab Units 07/20/23  0601 07/19/23  0441 07/18/23  0608 07/17/23  0529   WBC 10*3/mm3 7.23 6.57 8.12 7.61   HEMOGLOBIN g/dL 8.7* 8.6* 9.0* 8.3*   PLATELETS 10*3/mm3 283 266 295 271     Results from last 7 days   Lab Units 07/20/23  0601 07/19/23  0441 07/18/23  0706 07/17/23  0529   SODIUM mmol/L 141 140 142 142   POTASSIUM mmol/L 3.8 3.9 4.0 4.0   CHLORIDE mmol/L 111* 113* 117* 117*   CO2 mmol/L 21.0* 18.3* 16.6* 17.1*   BUN mg/dL 11 13 14 13   CREATININE mg/dL 0.79 0.86 0.93 0.98   GLUCOSE mg/dL 148* 138* 130* 134*   Estimated Creatinine Clearance: 45.2 mL/min (by C-G formula  based on SCr of 0.79 mg/dL).  Results from last 7 days   Lab Units 07/19/23  0441 07/18/23  0706 07/17/23  0529 07/16/23  0550   ALBUMIN g/dL 2.5* 2.6* 2.5* 2.3*   BILIRUBIN mg/dL 0.3 0.3 0.3 0.2   ALK PHOS U/L 66 62 57 47   AST (SGOT) U/L 18 10 15 13   ALT (SGPT) U/L 9 13 13 13     Results from last 7 days   Lab Units 07/20/23  0601 07/19/23  0441 07/18/23  0706 07/17/23  0529 07/16/23  0550   CALCIUM mg/dL 8.1* 8.6 8.5* 8.3* 7.6*   ALBUMIN g/dL  --  2.5* 2.6* 2.5* 2.3*   MAGNESIUM mg/dL 2.0 1.4* 1.6 1.7 2.1     Results from last 7 days   Lab Units 07/14/23  0538   LIPASE U/L 24             Invalid input(s): LDLCALC      Imaging Results (Last 24 Hours)       ** No results found for the last 24 hours. **            Test Results Pending at Discharge         Discharge Exam   Physical Exam  Vitals.  Temperature 98.6 a pulse of 107 respirate rate of 18 blood pressure 129/53 and O2 sats of 99% on 2 L  General.  Elderly female.  Alert and oriented x3.  No apparent pain/distress/diaphoresis.  Normal mood and affect.  Eyes.  Pupils equal round and reactive.  Intact extraocular musculature.  No pallor or jaundice.  Chest.  Clear to auscultation bilaterally with no added sounds.  Cardio vascular.  Regular rate and rhythm and grade 2 systolic murmur.  Abdomen.  Soft lax.  No tenderness.  No organomegaly.  No guarding or rebound.  .  No CVA tenderness.  Extremities.  No clubbing/cyanosis/edema.  CNS.  No acute focal neurological deficits.    Discharge Details        Discharge Medications        New Medications        Instructions Start Date   acetaminophen 325 MG tablet  Commonly known as: TYLENOL   650 mg, Oral, Every 4 Hours PRN      atorvastatin 10 MG tablet  Commonly known as: LIPITOR  Replaces: simvastatin 10 MG tablet   10 mg, Oral, Daily   Start Date: July 21, 2023     magnesium oxide 400 tablet tablet  Commonly known as: MAG-OX   400 mg, Oral, Daily   Start Date: July 21, 2023            Changes to Medications         Instructions Start Date   glipiZIDE-metFORMIN 2.5-500 MG per tablet  Commonly known as: METAGLIP  What changed: See the new instructions.   2 tablets, Oral, 2 Times Daily Before Meals      potassium chloride 20 MEQ CR tablet  Commonly known as: K-DUR,KLOR-CON  What changed:   how much to take  when to take this   40 mEq, Oral, Daily   Start Date: July 21, 2023            Continue These Medications        Instructions Start Date   Cholecalciferol 50 MCG (2000 UT) tablet   1 tablet, Oral      glucose blood test strip   Check BG once daily, E11.9      glucose monitor monitoring kit   Dispense glucometer with brand based off insurance coverage, check BG daily, E11.9      hydrocortisone 1 % cream   1 application , Topical, 2 Times Daily      Lancets misc   Check BG once daily, E11.9      lidocaine-prilocaine 2.5-2.5 % cream  Commonly known as: EMLA   Apply nickel size amount to port site 30 min before appt time do not rub in cover with plastic wrap      NIFEdipine XL 30 MG 24 hr tablet  Commonly known as: PROCARDIA XL   30 mg, Oral, Daily      OLANZapine 5 MG tablet  Commonly known as: ZyPREXA   5 mg, Oral, Nightly      ondansetron 8 MG tablet  Commonly known as: ZOFRAN   8 mg, Oral, 3 Times Daily PRN      pantoprazole 40 MG EC tablet  Commonly known as: PROTONIX   40 mg, Oral, Daily      Tradjenta 5 MG tablet tablet  Generic drug: linagliptin   1 tablet, Oral, Daily             Stop These Medications      dexamethasone 4 MG tablet  Commonly known as: DECADRON     diphenoxylate-atropine 2.5-0.025 MG per tablet  Commonly known as: LOMOTIL     loperamide 2 MG capsule  Commonly known as: IMODIUM     prochlorperazine 10 MG tablet  Commonly known as: COMPAZINE     simvastatin 10 MG tablet  Commonly known as: ZOCOR  Replaced by: atorvastatin 10 MG tablet     sodium bicarbonate 650 MG tablet     valsartan-hydrochlorothiazide 160-12.5 MG per tablet  Commonly known as: DIOVAN-HCT              No Known Allergies      Discharge  Disposition:  Condition: Stable    Diet:   Diet Order   Procedures    Diet: Regular/House Diet; Texture: Mechanical Ground (NDD 2); Fluid Consistency: Nectar Thick       Activity:   Activity Instructions       Activity as Tolerated      Other Activity Instructions      Activity Instructions: PT/OT to evaluate and treat    Up WIth Assist              Counseling : Oxygen at 2 L per nasal cannula.  O2 sats less than 90%    CODE STATUS:    Code Status and Medical Interventions:   Ordered at: 07/12/23 1412     Code Status (Patient has no pulse and is not breathing):    CPR (Attempt to Resuscitate)     Medical Interventions (Patient has pulse or is breathing):    Full Support     Comments:    please confirm with patient/medical decision maker       Future Appointments   Date Time Provider Department Center   9/11/2023  9:00 AM Marie Hsu APRN  [facility]   10/17/2023  9:30 AM Shar Pat,  [facility]     Additional Instructions for the Follow-ups that You Need to Schedule       Call MD With Problems / Concerns   As directed      Instructions: Call MD or return to ER if fever or chills/chest pain/shortness of breath/abdominal pain/nausea or vomiting/dysuria or hematuria    Order Comments: Instructions: Call MD or return to ER if fever or chills/chest pain/shortness of breath/abdominal pain/nausea or vomiting/dysuria or hematuria          Discharge Follow-up with PCP   As directed       Currently Documented PCP:    Princess Cruz MD    PCP Phone Number:    [phone]     Follow Up Details: Primary MD.  1 week.  Acute kidney failure/hypokalemia/hypomagnesemia/nausea vomiting and diarrhea/left renal mass/liver cirrhosis secondary to TELLEZ/type 2 diabetes/dyslipidemia/deconditioning/anemia/right breast cancer/hypertension         Discharge Follow-up with Specialty: Surgery; 2 Weeks   As directed      Specialty: Surgery    Follow Up: 2 Weeks    Follow Up Details: Right breast cancer for lumpectomy  versus mastectomy         Discharge Follow-up with Specified Provider: GI; 2 Weeks   As directed      To: GI    Follow Up: 2 Weeks    Follow Up Details: Liver cirrhosis secondary to Tellez         Discharge Follow-up with Specified Provider: Hematology oncology   As directed      To: Hematology oncology    Follow Up Details: Breast cancer/left renal mass         Discharge Follow-up with Specified Provider: Urology; 2 Weeks   As directed      To: Urology    Follow Up: 2 Weeks    Follow Up Details: Left renal mass                Contact information for follow-up providers       Princess Cruz MD .    Specialty: Internal Medicine  Why: Primary MD.  1 week.  Acute kidney failure/hypokalemia/hypomagnesemia/nausea vomiting and diarrhea/left renal mass/liver cirrhosis secondary to TELLEZ/type 2 diabetes/dyslipidemia/deconditioning/anemia/right breast cancer/hypertension  Contact information:  [address]  [place] [address]  [phone]                       Contact information for after-discharge care       Destination       [facility] .    Service: Skilled Nursing  Contact information:  [facility] [address]  [phone]                                     Time Spent on Discharge:  Greater than 30 minutes      Esme Ramos MD  [facility]  07/20/23  12:44 EDT

## 2023-07-20 NOTE — THERAPY TREATMENT NOTE
Patient Name: Veronica Royal  : 1940    MRN: [id number]                              Today's Date: 2023       Admit Date: 2023    Visit Dx: No diagnosis found.  Patient Active Problem List   Diagnosis    Malignant neoplasm of upper-outer quadrant of right breast in female, estrogen receptor negative    Encounter for fitting and adjustment of vascular catheter    Hypertension    At high risk for malnutrition    Bilateral hearing loss    Malignant neoplasm of female breast    Advanced care planning/counseling discussion    KAMILA (acute kidney injury)    Cirrhosis of liver    Type 2 diabetes mellitus    Hypokalemia    Anemia due to chemotherapy    Left renal mass    Acute UTI (urinary tract infection)    Hypomagnesemia    Severe malnutrition     Past Medical History:   Diagnosis Date    Basal cell carcinoma     Left thumb    Breast cancer     Right    Diabetes mellitus     High cholesterol     Hypertension      Past Surgical History:   Procedure Laterality Date    EYE SURGERY      Muscle repair age 21    VENOUS ACCESS DEVICE (PORT) INSERTION N/A 2023    Procedure: INSERTION VENOUS ACCESS DEVICE;  Surgeon: Rosa Michael MD;  [facility];  Service: General;  Laterality: N/A;      General Information       Row Name 23          Physical Therapy Time and Intention    Document Type therapy note (daily note)  -     Mode of Treatment physical therapy  -       Row Name 23          General Information    Existing Precautions/Restrictions fall;oxygen therapy device and L/min  -       Row Name 23          Cognition    Orientation Status (Cognition) oriented x 3  -               User Key  (r) = Recorded By, (t) = Taken By, (c) = Cosigned By      Initials Name Provider Type     Keesha Xiong PTA Physical Therapist Assistant                   Mobility       Row Name 23          Bed Mobility    Bed Mobility sit-supine  -      Sit-Supine Daniels (Bed Mobility) supervision  -     Assistive Device (Bed Mobility) head of bed elevated  -     Comment, (Bed Mobility) pt was sitting on EOB upon entering room  -       Row Name 07/20/23 0922          Sit-Stand Transfer    Sit-Stand Daniels (Transfers) minimum assist (75% patient effort)  -       Row Name 07/20/23 0922          Gait/Stairs (Locomotion)    Daniels Level (Gait) contact guard  -     Assistive Device (Gait) --  no AD  -SM     Distance in Feet (Gait) 35  -     Deviations/Abnormal Patterns (Gait) gregory decreased;stride length decreased  -     Bilateral Gait Deviations forward flexed posture  -     Comment, (Gait/Stairs) limited d/t fatigue and SOA  -SM               User Key  (r) = Recorded By, (t) = Taken By, (c) = Cosigned By      Initials Name Provider Type    Keesha Falcon PTA Physical Therapist Assistant                   Obj/Interventions       Row Name 07/20/23 0927          Motor Skills    Therapeutic Exercise --  seated AP and LAQ x10 reps  -               User Key  (r) = Recorded By, (t) = Taken By, (c) = Cosigned By      Initials Name Provider Type    Keesha Falcon PTA Physical Therapist Assistant                   Goals/Plan    No documentation.                  Clinical Impression       Row Name 07/20/23 0927          Pain    Pretreatment Pain Rating 0/10 - no pain  -SM     Posttreatment Pain Rating 0/10 - no pain  -SM       Row Name 07/20/23 0927          Positioning and Restraints    Pre-Treatment Position in bed  -SM     Post Treatment Position bed  -SM     In Bed supine;call light within reach;encouraged to call for assist;exit alarm on;notified nsg  -SM               User Key  (r) = Recorded By, (t) = Taken By, (c) = Cosigned By      Initials Name Provider Type    Keesha Falcon PTA Physical Therapist Assistant                   Outcome Measures       Row Name 07/20/23 0928 07/20/23 0800       How much help  from another person do you currently need...    Turning from your back to your side while in flat bed without using bedrails? 3  -SM 4  -AW    Moving from lying on back to sitting on the side of a flat bed without bedrails? 3  -SM 3  -AW    Moving to and from a bed to a chair (including a wheelchair)? 3  -SM 3  -AW    Standing up from a chair using your arms (e.g., wheelchair, bedside chair)? 3  -SM 3  -AW    Climbing 3-5 steps with a railing? 2  -SM 2  -AW    To walk in hospital room? 3  -SM 3  -AW    AM-PAC 6 Clicks Score (PT) 17  -SM 18  -AW    Highest level of mobility 5 --> Static standing  - 6 --> Walked 10 steps or more  -AW      Row Name 07/20/23 0928          Functional Assessment    Outcome Measure Options AM-PAC 6 Clicks Basic Mobility (PT)  -               User Key  (r) = Recorded By, (t) = Taken By, (c) = Cosigned By      Initials Name Provider Type    Keesha Falcon PTA Physical Therapist Assistant    Carmen Barba RN Registered Nurse                                 Physical Therapy Education       Title: PT OT SLP Therapies (Done)       Topic: Physical Therapy (Done)       Point: Mobility training (Done)       Learning Progress Summary             Patient Acceptance, E,TB,D, VU,NR by  at 7/20/2023 0928    Acceptance, E, VU by  at 7/20/2023 0901    Acceptance, E, VU,NR by  at 7/18/2023 1743    Acceptance, E,TB, VU,NR by  at 7/17/2023 1129    Acceptance, E,TB, NR by ST at 7/14/2023 1128                         Point: Home exercise program (Done)       Learning Progress Summary             Patient Acceptance, E,TB,D, VU,NR by  at 7/20/2023 0928    Acceptance, E, VU by  at 7/20/2023 0901                         Point: Body mechanics (Done)       Learning Progress Summary             Patient Acceptance, E,TB,D, VU,NR by  at 7/20/2023 0928    Acceptance, E, VU by  at 7/20/2023 0901    Acceptance, E,TB, VU,NR by  at 7/17/2023 1129    Acceptance, E,TB, NR by  at 7/14/2023  1128                         Point: Precautions (Done)       Learning Progress Summary             Patient Acceptance, E,TB,D, VU,NR by  at 7/20/2023 0928    Acceptance, E, VU by  at 7/20/2023 0901                                         User Key       Initials Effective Dates Name Provider Type Discipline     03/07/18 -  Keesha Xiong PTA Physical Therapist Assistant PT     08/04/22 -  Corey Rodgers, RN Registered Nurse Nurse     09/22/22 -  Krys Rodriguez PT Physical Therapist PT     06/02/23 -  Carmen Burch RN Registered Nurse Nurse                  PT Recommendation and Plan     Plan of Care Reviewed With: patient  Progress: improving  Outcome Evaluation: Pt tolerated treatment well this date. Pt was sitting on EOB upon entering room, eating her breakfast. Required min A to stand, then CGA to ambulate 35ft w/ no AD. Slightly unsteady, though no overt LOBs noted. Overall limited d/t fatigue and SOA. Pt returned to bed, and HR was in the 120s, improving after resting. Encouraged pt to ambulate w/ nsg during the day.     Time Calculation:         PT Charges       Row Name 07/20/23 0931             Time Calculation    Start Time 0850  -      Stop Time 0909  -      Time Calculation (min) 19 min  -      PT Received On 07/20/23  -      PT - Next Appointment 07/21/23  -                User Key  (r) = Recorded By, (t) = Taken By, (c) = Cosigned By      Initials Name Provider Type     Tyrese Keesha Arellano PTA Physical Therapist Assistant                  Therapy Charges for Today       Code Description Service Date Service Provider Modifiers Qty    85035640057 HC GAIT TRAINING EA 15 MIN 7/20/2023 Keesha Xiong PTA GP 1            PT G-Codes  Outcome Measure Options: AM-PAC 6 Clicks Basic Mobility (PT)  AM-PAC 6 Clicks Score (PT): 17  AM-PAC 6 Clicks Score (OT): 21  PT Discharge Summary  Anticipated Discharge Disposition (PT): skilled nursing facility    Keesha Xiong  PTA  7/20/2023

## 2023-07-20 NOTE — PLAN OF CARE
Problem: Adult Inpatient Plan of Care  Goal: Plan of Care Review  7/20/2023 1305 by Carmen Burch RN  Outcome: Met  Flowsheets (Taken 7/20/2023 1305)  Progress: improving  Plan of Care Reviewed With: patient  7/20/2023 0901 by Carmen Burch RN  Outcome: Ongoing, Progressing  Flowsheets (Taken 7/20/2023 0901)  Progress: improving  Plan of Care Reviewed With: patient  Goal: Patient-Specific Goal (Individualized)  7/20/2023 1305 by Carmen Burch RN  Outcome: Met  7/20/2023 0901 by Carmen Burch RN  Outcome: Ongoing, Progressing  Goal: Absence of Hospital-Acquired Illness or Injury  7/20/2023 1305 by Carmen Burch RN  Outcome: Met  7/20/2023 0901 by Carmen Burch RN  Outcome: Ongoing, Progressing  Intervention: Identify and Manage Fall Risk  Recent Flowsheet Documentation  Taken 7/20/2023 1200 by Carmen Burch RN  Safety Promotion/Fall Prevention: safety round/check completed  Taken 7/20/2023 1000 by Carmen Burch RN  Safety Promotion/Fall Prevention: safety round/check completed  Taken 7/20/2023 0800 by Carmen Burch RN  Safety Promotion/Fall Prevention: safety round/check completed  Intervention: Prevent Skin Injury  Recent Flowsheet Documentation  Taken 7/20/2023 1200 by Carmen Burch RN  Body Position: position changed independently  Taken 7/20/2023 1000 by Carmen Burch RN  Body Position: position changed independently  Taken 7/20/2023 0800 by Carmen Burch RN  Body Position: position changed independently  Intervention: Prevent and Manage VTE (Venous Thromboembolism) Risk  Recent Flowsheet Documentation  Taken 7/20/2023 1200 by Carmen Burch RN  Activity Management: activity encouraged  Taken 7/20/2023 1000 by Carmen Burch RN  Activity Management: activity encouraged  Taken 7/20/2023 0800 by Carmen Burch RN  Activity Management: activity encouraged  Goal: Optimal Comfort and Wellbeing  7/20/2023 1305 by Carmen Burch RN  Outcome: Met  7/20/2023 0901 by Carmen Burch RN  Outcome: Ongoing,  Progressing  Goal: Readiness for Transition of Care  7/20/2023 1305 by Carmen Burch RN  Outcome: Met  7/20/2023 0901 by Carmen Burch RN  Outcome: Ongoing, Progressing     Problem: Fall Injury Risk  Goal: Absence of Fall and Fall-Related Injury  7/20/2023 1305 by Carmen Burch RN  Outcome: Met  7/20/2023 0901 by Carmen Burch RN  Outcome: Ongoing, Progressing  Intervention: Identify and Manage Contributors  Recent Flowsheet Documentation  Taken 7/20/2023 1200 by Carmen Burch RN  Medication Review/Management: medications reviewed  Taken 7/20/2023 1000 by Carmen Burch RN  Medication Review/Management: medications reviewed  Taken 7/20/2023 0800 by Carmen Burch RN  Medication Review/Management: medications reviewed  Intervention: Promote Injury-Free Environment  Recent Flowsheet Documentation  Taken 7/20/2023 1200 by Carmen Burch RN  Safety Promotion/Fall Prevention: safety round/check completed  Taken 7/20/2023 1000 by Carmen Burch RN  Safety Promotion/Fall Prevention: safety round/check completed  Taken 7/20/2023 0800 by Carmen Burch RN  Safety Promotion/Fall Prevention: safety round/check completed     Problem: Skin Injury Risk Increased  Goal: Skin Health and Integrity  7/20/2023 1305 by Carmen Burch RN  Outcome: Met  7/20/2023 0901 by Carmen Burch RN  Outcome: Ongoing, Progressing   Goal Outcome Evaluation:  Plan of Care Reviewed With: patient        Progress: improving

## 2023-07-20 NOTE — PLAN OF CARE
Goal Outcome Evaluation:  Plan of Care Reviewed With: patient        Progress: no change         VSS. A&O.  Port CDI. Bed alarm on for safety. Up with stand by assist to bathroom.

## 2023-07-20 NOTE — PLAN OF CARE
Goal Outcome Evaluation:  Plan of Care Reviewed With: patient           Outcome Evaluation: Pt seen for OT session this AM. Required SBA for bed mobility, min A for STS and CGA to ambulate short distance into bathroom for grooming and bathing. Pt stood at the sink with CGA for balane to complete grooming. Required rest breaks throughout bathing (min A) and donned gown with min A. Required max A for donning socks. Pt fatigued quickly with bath sinkside. HR was in 120s after activity which improved with resting in bed. Pt continues to benefit from skileld OT to address deficits.      Anticipated Discharge Disposition (OT): skilled nursing facility

## 2023-07-20 NOTE — THERAPY TREATMENT NOTE
Patient Name: Veronica Royal  : 1940    MRN: [id number]                              Today's Date: 2023       Admit Date: 2023    Visit Dx: No diagnosis found.  Patient Active Problem List   Diagnosis    Malignant neoplasm of upper-outer quadrant of right breast in female, estrogen receptor negative    Encounter for fitting and adjustment of vascular catheter    Hypertension    At high risk for malnutrition    Bilateral hearing loss    Malignant neoplasm of female breast    Advanced care planning/counseling discussion    Cirrhosis of liver    Type 2 diabetes mellitus    Anemia due to chemotherapy    Left renal mass    Severe malnutrition     Past Medical History:   Diagnosis Date    Basal cell carcinoma     Left thumb    Breast cancer     Right    Diabetes mellitus     High cholesterol     Hypertension      Past Surgical History:   Procedure Laterality Date    EYE SURGERY      Muscle repair age 21    VENOUS ACCESS DEVICE (PORT) INSERTION N/A 2023    Procedure: INSERTION VENOUS ACCESS DEVICE;  Surgeon: Rosa Michael MD;  Location: [facility] [place];  Service: General;  Laterality: N/A;      General Information       Row Name 23 1249          OT Time and Intention    Document Type therapy note (daily note)  -KA     Mode of Treatment individual therapy;occupational therapy  -KA       Row Name 23 1249          General Information    Patient Profile Reviewed yes  -KA     Existing Precautions/Restrictions fall;oxygen therapy device and L/min  -KA       Row Name 23 1249          Cognition    Orientation Status (Cognition) oriented x 3  -KA       Row Name 23 1249          Safety Issues, Functional Mobility    Impairments Affecting Function (Mobility) balance;endurance/activity tolerance;strength  -KA     Comment, Safety Issues/Impairments (Mobility) gait belt and nonskid socks donned  -KA               User Key  (r) = Recorded By, (t) = Taken By, (c) = Cosigned By       Initials Name Provider Type    LANRE Tari Navarro, OT Occupational Therapist                     Mobility/ADL's       Row Name 07/20/23 1250          Bed Mobility    Bed Mobility sit-supine;supine-sit  -     Supine-Sit [place] (Bed Mobility) standby assist  -     Sit-Supine [place] (Bed Mobility) supervision  -       Row Name 07/20/23 1250          Sit-Stand Transfer    Sit-Stand [place] (Transfers) minimum assist (75% patient effort)  -     Assistive Device (Sit-Stand Transfers) walker, front-wheeled  -       Row Name 07/20/23 1250          Functional Mobility    Functional Mobility- Ind. Level contact guard assist;1 person  -     Functional Mobility- Device walker, front-wheeled  -     Functional Mobility-Distance (Feet) --  within room to sink and back to bed  -       Row Name 07/20/23 1250          Activities of Daily Living    BADL Assessment/Intervention grooming;bathing;lower body dressing  -[facility] Name 07/20/23 1250          Grooming Assessment/Training    [place] Level (Grooming) grooming skills;oral care regimen;wash face, hands;contact guard assist  -     Position (Grooming) supported standing  -     Comment, (Grooming) Stood at the sink to complete  -[facility] Name 07/20/23 1250          Bathing Assessment/Intervention    [place] Level (Bathing) bathing skills;lower body;upper body;minimum assist (75% patient effort)  -KA     Position (Bathing) sink side;supported sitting  -     Comment, (Bathing) Rest breaks required throughout. Fatigued with activity  -[facility] Name 07/20/23 1250          Lower Body Dressing Assessment/Training    [place] Level (Lower Body Dressing) lower body dressing skills;doff;don;socks;dependent (less than 25% patient effort)  -     Position (Lower Body Dressing) supported sitting  -               User Key  (r) = Recorded By, (t) = Taken By, (c) = Cosigned By      Initials Name Provider Type    LANRE Navarro  THERESA Marc Occupational Therapist                   Obj/Interventions       Row Name 07/20/23 1252          Balance    Balance Assessment sitting static balance;sitting dynamic balance;standing static balance;standing dynamic balance  -     Static Sitting Balance standby assist  -     Dynamic Sitting Balance contact guard  -     Static Standing Balance contact guard  -     Dynamic Standing Balance contact guard  -     Position/Device Used, Standing Balance walker, front-wheeled  -     Balance Interventions sitting;standing;occupation based/functional task  -               User Key  (r) = Recorded By, (t) = Taken By, (c) = Cosigned By      Initials Name Provider Type    Tari Colbert OT Occupational Therapist                   Goals/Plan    No documentation.                  Clinical Impression       Row Name 07/20/23 1252          Pain Assessment    Pretreatment Pain Rating 0/10 - no pain  -     Posttreatment Pain Rating 0/10 - no pain  -       Row Name 07/20/23 1252          Plan of Care Review    Plan of Care Reviewed With patient  -     Outcome Evaluation Pt seen for OT session this AM. Required SBA for bed mobility, min A for STS and CGA to ambulate short distance into bathroom for grooming and bathing. Pt stood at the sink with CGA for balane to complete grooming. Required rest breaks throughout bathing (min A) and donned gown with min A. Required max A for donning socks. Pt fatigued quickly with bath sinkside. HR was in 120s after activity which improved with resting in bed. Pt continues to benefit from skileld OT to address deficits.  -       Row Name 07/20/23 1252          Therapy Plan Review/Discharge Plan (OT)    Anticipated Discharge Disposition (OT) skilled nursing facility  -       Row Name 07/20/23 1252          Vital Signs    Pre Patient Position Supine  -     Intra Patient Position Standing  -     Post Patient Position Supine  -       Row Name 07/20/23 1252           Positioning and Restraints    Pre-Treatment Position in bed  -KA     Post Treatment Position bed  -KA     In Bed supine;notified nsg;call light within reach;encouraged to call for assist;exit alarm on  -KA               User Key  (r) = Recorded By, (t) = Taken By, (c) = Cosigned By      Initials Name Provider Type    Tari Colbert, OT Occupational Therapist                   Outcome Measures       Row Name 07/20/23 1254          How much help from another is currently needed...    Putting on and taking off regular lower body clothing? 2  -KA     Bathing (including washing, rinsing, and drying) 3  -KA     Toileting (which includes using toilet bed pan or urinal) 3  -KA     Putting on and taking off regular upper body clothing 4  -KA     Taking care of personal grooming (such as brushing teeth) 4  -KA     Eating meals 4  -KA     AM-PAC 6 Clicks Score (OT) 20  -KA       Row Name 07/20/23 0928 07/20/23 0800       How much help from another person do you currently need...    Turning from your back to your side while in flat bed without using bedrails? 3  -SM 4  -AW    Moving from lying on back to sitting on the side of a flat bed without bedrails? 3  -SM 3  -AW    Moving to and from a bed to a chair (including a wheelchair)? 3  -SM 3  -AW    Standing up from a chair using your arms (e.g., wheelchair, bedside chair)? 3  -SM 3  -AW    Climbing 3-5 steps with a railing? 2  -SM 2  -AW    To walk in hospital room? 3  -SM 3  -AW    AM-PAC 6 Clicks Score (PT) 17  -SM 18  -AW    Highest level of mobility 5 --> Static standing  -SM 6 --> Walked 10 steps or more  -AW      Row Name 07/20/23 1254 07/20/23 0928       Functional Assessment    Outcome Measure Options AM-PAC 6 Clicks Daily Activity (OT)  -KA AM-PAC 6 Clicks Basic Mobility (PT)  -SM              User Key  (r) = Recorded By, (t) = Taken By, (c) = Cosigned By      Initials Name Provider Type    Keesha Falcon, PTA Physical Therapist Assistant    LANRE  Tari Navarro OT Occupational Therapist    Carmen Barba, RN Registered Nurse                    Occupational Therapy Education       Title: PT OT SLP Therapies (Done)       Topic: Occupational Therapy (Done)       Point: ADL training (Done)       Description:   Instruct learner(s) on proper safety adaptation and remediation techniques during self care or transfers.   Instruct in proper use of assistive devices.                  Learning Progress Summary             Patient Acceptance, E, VU by  at 7/20/2023 0901    Acceptance, E, VU,NR by  at 7/18/2023 1743    Acceptance, E, VU,NR by CE at 7/13/2023 1549                         Point: Home exercise program (Done)       Description:   Instruct learner(s) on appropriate technique for monitoring, assisting and/or progressing therapeutic exercises/activities.                  Learning Progress Summary             Patient Acceptance, E, VU by  at 7/20/2023 0901    Acceptance, E, VU,NR by  at 7/18/2023 1743    Acceptance, E, VU,NR by  at 7/13/2023 1549                         Point: Precautions (Done)       Description:   Instruct learner(s) on prescribed precautions during self-care and functional transfers.                  Learning Progress Summary             Patient Acceptance, E, VU by  at 7/20/2023 0901    Acceptance, E, VU,NR by  at 7/18/2023 1743    Acceptance, E, VU,NR by  at 7/13/2023 1549                         Point: Body mechanics (Done)       Description:   Instruct learner(s) on proper positioning and spine alignment during self-care, functional mobility activities and/or exercises.                  Learning Progress Summary             Patient Acceptance, E, VU by  at 7/20/2023 0901    Acceptance, E, VU,NR by  at 7/18/2023 1743    Acceptance, E, VU,NR by  at 7/13/2023 1549                                         User Key       Initials Effective Dates Name Provider Type Discipline     08/04/22 -  Corey Rodgers, RN  Registered Nurse Nurse    CE 10/17/22 -  Latoya Jimenez OT Occupational Therapist OT    AW 06/02/23 -  Carmen Burch, KETURAH Registered Nurse Nurse                  OT Recommendation and Plan     Plan of Care Review  Plan of Care Reviewed With: patient  Outcome Evaluation: Pt seen for OT session this AM. Required SBA for bed mobility, min A for STS and CGA to ambulate short distance into bathroom for grooming and bathing. Pt stood at the sink with CGA for balane to complete grooming. Required rest breaks throughout bathing (min A) and donned gown with min A. Required max A for donning socks. Pt fatigued quickly with bath sinkside. HR was in 120s after activity which improved with resting in bed. Pt continues to benefit from skileld OT to address deficits.     Time Calculation:         Time Calculation- OT       Row Name 07/20/23 1254             Time Calculation- OT    OT Start Time 1020  -KA      OT Stop Time 1044  -KA      OT Time Calculation (min) 24 min  -KA      OT Received On 07/20/23  -KA      OT - Next Appointment 07/21/23  -KA         Timed Charges    57138 - OT Self Care/Mgmt Minutes 24  -KA         Total Minutes    Timed Charges Total Minutes 24  -KA       Total Minutes 24  -KA                User Key  (r) = Recorded By, (t) = Taken By, (c) = Cosigned By      Initials Name Provider Type    Tari Colbert OT Occupational Therapist                  Therapy Charges for Today       Code Description Service Date Service Provider Modifiers Qty    00956862968 HC OT SELF CARE/MGMT/TRAIN EA 15 MIN 7/20/2023 Tari Navarro OT GO 2                 Tari Navarro OT  7/20/2023

## 2023-07-20 NOTE — CASE MANAGEMENT/SOCIAL WORK
Continued Stay Note  [facility]     Patient Name: Veronica Royal  MRN: [id number]  Today's Date: 7/20/2023    Admit Date: 7/12/2023    Plan: Skilled bed at [facility] available today.  Precert obtained.   Discharge Plan       Row Name 07/20/23 1059       Plan    Plan Skilled bed at [facility] available today.  Precert obtained.    Plan Comments Precert for [facility] skilled approved for 7/20/2023.  Diana HENNESSY                   Discharge Codes    No documentation.                 Expected Discharge Date and Time       Expected Discharge Date Expected Discharge Time    Jul 21, 2023               Diana Recinos RN

## 2023-07-25 NOTE — CASE MANAGEMENT/SOCIAL WORK
Case Management Discharge Note      Final Note: [facility].    Provided Post Acute Provider List?: N/A  Provided Post Acute Provider Quality & Resource List?: N/A    Selected Continued Care - Discharged on 7/20/2023 Admission date: 7/12/2023 - Discharge disposition: Skilled Nursing Facility (DC - External)      Destination Coordination complete.      Service Provider Selected Services Address Phone Fax Patient Preferred    [facility] [address] [place] [id number] [phone] --              Durable Medical Equipment    No services have been selected for the patient.                Dialysis/Infusion    No services have been selected for the patient.                Home Medical Care    No services have been selected for the patient.                Therapy    No services have been selected for the patient.                Community Resources    No services have been selected for the patient.                Community & DME    No services have been selected for the patient.                         Final Discharge Disposition Code: 03 - skilled nursing facility (SNF)

## 2023-07-27 ENCOUNTER — TELEPHONE (OUTPATIENT)
Dept: ONCOLOGY | Facility: CLINIC | Age: 83
End: 2023-07-27
Payer: MEDICARE

## 2023-07-27 NOTE — TELEPHONE ENCOUNTER
----- Message from Aislinn Garcia RN sent at 7/26/2023  2:41 PM EDT -----  After further review of her chart, she'll be due for perjeta/herceptin on 8/2. Can we see about adding her on then please    Thank you!    ----- Message -----  From: Aislinn Garcia RN  Sent: 7/26/2023   1:02 PM EDT  To: Barbara Wilson    Need to schedule her soon see Dr Yee's note     Thank you    ----- Message -----  From: Sheila Yee MD  Sent: 7/26/2023  12:59 PM EDT  To: Aislinn Garcia RN    She would be due for Herceptin and Perjeta.  We should probably at least resume Herceptin single agent until she has surgery.  We should probably get her back as soon as possible.   ----- Message -----  From: Aislinn Garcia RN  Sent: 7/26/2023  12:54 PM EDT  To: Sheila Yee MD    Looks like she was discharged from the hospital on the 20th to rehab. Do you want to get her back in soon?      ----- Message -----  From: Sheila Yee MD  Sent: 7/26/2023  12:46 PM EDT  To: Aislinn Garcia RN    She currently does not have a follow-up with us.    ----- Message -----  From: Barbara Wilson  Sent: 7/12/2023  10:53 AM EDT  To: Sheila Yee MD    I need orders please

## 2023-07-31 ENCOUNTER — OFFICE VISIT (OUTPATIENT)
Dept: SURGERY | Facility: CLINIC | Age: 83
End: 2023-07-31
Payer: MEDICARE

## 2023-07-31 VITALS — WEIGHT: 128.6 LBS | HEIGHT: 61 IN | BODY MASS INDEX: 24.28 KG/M2

## 2023-07-31 DIAGNOSIS — C50.919 INFLAMMATORY CARCINOMA OF BREAST, UNSPECIFIED LATERALITY: Primary | ICD-10-CM

## 2023-07-31 NOTE — PROGRESS NOTES
General Surgery Breast Cancer History and Physical Exam     Summary:    Veronica Royal is a 82 y.o. 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      A multidisciplinary plan has been formulated for the patient:    (1) Breast Surgical Oncology:  -[employer] 9 panel genetic testing: negative.   -Discussed recommendation for right breast modified radical mastectomy.   -Plastic surgery referral deferred at this time.    (2) Medical Oncology:  -Following with Dr. Yee.     (3) Radiation Oncology:  -Will refer postoperatively for evaluation for radiation therapy as clinically indicated.    Referring Provider: No ref. provider found    Chief Complaint: breast mass    History of Present Illness: Ms. Veronica Royal is a 82 y.o. year old lady, seen at the request of No ref. provider found for a new diagnosis of right breast cancer.      This was initially detected as a palpable mass in her right breast. She first noticed it 2-3 months ago. Her last mammogram was 25 years ago. She denies any prior history of abnormal mammograms or breast biopsies. Her work-up is detailed in the oncologic history below.     She denies any breast lumps, pain, skin changes, or nipple discharge. She denies any family history of breast cancer. Her sister had ovarian cancer 25 years ago at age 58.      7/31/2023 She presents today for follow up. She has done well since leaving the hospital. She is slowly improving. Her strength is improving as well.     Workup of Current Diagnosis:    4/21/2023 bilateral diagnostic mammogram with ultrasound:  Finding 1: There is a high density irregular mass measuring 44 mm with spiculated margins and associated amorphous and fine pleomorphic calcifications, skin retraction, and skin and trabecular thickening in the right breast at 9:00.  On ultrasound there is an irregular mass with  indistinct and spiculated margins in the right breast at 9:00 3 cm from the nipple.  The mass extends into the skin there is visible external ulceration and scabbing.  Diffuse skin thickening and focal skin retraction are noted with skin thickening up to 11 mm.  The mass measures 38 x 28 x 42 mm.  Highly suggestive of malignancy.  Ultrasound-guided biopsy is recommended.  Finding 2: There are true intramammary lymph nodes measuring 5 mm and 6 mm seen in the posterior upper outer axillary tail that are slightly round.  There is visualization of one of the 2 right axillary lymph nodes that measure 5 mm with borderline cortical thickening.  Suspicious.  Appropriate action should be taken.  Finding 3: There are 2 prominent right axillary lymph nodes largest which measures 26 mm and contains extensive coarse heterogeneous calcifications.  Suspicious.  Ultrasound-guided biopsy is recommended.  Finding 4: On ultrasound there is an asymmetry in the superior region of the left breast that resolved with spot compression benign.  BI-RADS Category 5    4/21/2023 right breast ultrasound-guided biopsy:  The right breast at 9:00 was imaged and the mass was localized.  8 cores were obtained.  A mini cork tissue marker was placed.  Clip was in the expected position.  Pathology returned as invasive ductal carcinoma grade 3 which is malignant and concordant.  Surgical consult is recommended.    The patient's right breast at the axillary position was imaged and the abnormal lymph node was localized.  4 cores were obtained.  A spring shaped HydroMARK tissue marker was placed.  Clip was in the expected position.  Pathology returned as high-grade invasive mammary carcinoma with apron features.  The differential includes multifocal carcinoma versus a completely replaced lymph node.  Pathology is malignant and concordant.    4/21/2023 pathology:   1.  Breast, right, 9:00, biopsy:  Invasive mammary carcinoma, grade 3  2.  Axilla, right,  biopsy:  High-grade invasive mammary carcinoma with apocrine features involving soft tissue and areas of necrosis    2023 Bilateral Breast MRI   IMPRESSION:  1. Biopsy-proven malignancy in the right breast in the posterior one third at the 9 o'clock position that measures on the order of 4.3 cm in greatest dimension and contains the internal mini cork-shaped metallic clip. The mass shows central hypoenhancement suggestive of central necrosis. Attachment to the overlying skin as described is noted and there is diffuse right breast skin edema and trabecular edema. Evidence of right axillary adenopathy is noted.  2. There are no findings suspicious for malignancy in the left breast.  BI-RADS category 6: Known biopsy-proven malignancy.    2023 CT Chest, Abdomen, Pelvis:   IMPRESSION:  1. Right breast cancer with right axillary lymphadenopathy  2. No convincing evidence of distant metastatic disease. Incidental findings as above.    2023 Bone Scan:   IMPRESSION:  Abnormal soft tissue activity in the right breast corresponding to known disease in that location. No evidence of osseous metastasis.    2023 Right Breast US:   IMPRESSION:  Biopsy-proven malignancy at 9:00 in the right breast is similar to slightly smaller when accounting for differences in technique. A 2.6 cm right axillary mass is not significantly changed, although an adjacent lymph node has decreased in size. Continued oncologic and surgical management are recommended.   BI-RADS Category 6: Known biopsy-proven malignancy    Gynecologic History:   . P:1 AB:1  Age at first childbirth: 35  Lactation/How long: none  Age at menarche: 11  Age at menopause: 50  Total years of oral contraceptive use: 3-4 years previously  Total years of hormone replacement therapy: none    Past Medical History:   DM  HTN  HLD    Past Surgical History:    None    Family History:    As above    Social History:  Denies tobacco use  Occasional alcohol  use    Allergies:   No Known Allergies    Medications:     Current Outpatient Medications:     acetaminophen (TYLENOL) 325 MG tablet, Take 2 tablets by mouth Every 4 (Four) Hours As Needed for Mild Pain., Disp: 120 tablet, Rfl: 3    atorvastatin (LIPITOR) 10 MG tablet, Take 1 tablet by mouth Daily., Disp: 90 tablet, Rfl: 3    Cholecalciferol 50 MCG (2000 UT) tablet, Take 1 tablet by mouth., Disp: , Rfl:     glipiZIDE-metFORMIN (METAGLIP) 2.5-500 MG per tablet, Take 2 tablets by mouth 2 (Two) Times a Day Before Meals., Disp: 120 tablet, Rfl: 3    glucose blood test strip, Check BG once daily, E11.9, Disp: 30 each, Rfl: 4    glucose monitor monitoring kit, Dispense glucometer with brand based off insurance coverage, check BG daily, E11.9, Disp: 1 each, Rfl: 0    hydrocortisone 1 % cream, Apply 1 application topically to the appropriate area as directed 2 (Two) Times a Day., Disp: 453.6 g, Rfl: 0    Lancets misc, Check BG once daily, E11.9, Disp: 30 each, Rfl: 4    lidocaine-prilocaine (EMLA) 2.5-2.5 % cream, Apply nickel size amount to port site 30 min before appt time do not rub in cover with plastic wrap, Disp: 30 g, Rfl: 5    magnesium oxide (MAG-OX) 400 tablet tablet, Take 1 tablet by mouth Daily., Disp: 30 tablet, Rfl: 3    NIFEdipine XL (PROCARDIA XL) 30 MG 24 hr tablet, Take 1 tablet by mouth Daily., Disp: 30 tablet, Rfl: 11    OLANZapine (ZyPREXA) 5 MG tablet, Take 1 tablet by mouth Every Night., Disp: 30 tablet, Rfl: 3    ondansetron (ZOFRAN) 8 MG tablet, Take 1 tablet by mouth 3 (Three) Times a Day As Needed for Nausea or Vomiting., Disp: 30 tablet, Rfl: 5    pantoprazole (PROTONIX) 40 MG EC tablet, Take 1 tablet by mouth Daily., Disp: 30 tablet, Rfl: 3    potassium chloride (K-DUR,KLOR-CON) 20 MEQ CR tablet, Take 2 tablets by mouth Daily., Disp: 30 tablet, Rfl: 3    Tradjenta 5 MG tablet tablet, Take 1 tablet by mouth Daily., Disp: , Rfl:     Laboratory Values:    Labs from 8/2/2023 reviewed    Review of  Systems:   Influenza-like illness: no fever, no  cough, no  sore throat, no  body aches, no loss of sense of taste or smell, no known exposure to person with Covid-19.  Constitutional: Negative for fevers or chills  HENT: Negative for hearing loss or runny nose  Eyes: Negative for vision changes or scleral icterus  Respiratory: Negative for cough or shortness of breath  Cardiovascular: Negative for chest pain or heart palpitations  Gastrointestinal: Negative for abdominal pain, nausea, vomiting, constipation, melena, or hematochezia  Genitourinary: Negative for hematuria or dysuria  Musculoskeletal: Negative for joint swelling or gait instability  Neurologic: Negative for tremors or seizures  Psychiatric: Negative for suicidal ideations or depression  All other systems reviewed and negative    Physical Exam:   ECO - Asymptomatic  Constitutional: Well-developed well-nourished, no acute distress  Eyes: Conjunctiva normal, sclera nonicteric  ENMT: Hearing grossly normal, oral mucosa moist  Neck: Supple, no palpable mass, trachea midline  Respiratory: Clear to auscultation, normal inspiratory effort  Cardiovascular: Regular rate, no peripheral edema, no jugular venous distention  Breast: symmetric  Right: Ulcerated mass in the right outer mid-lower breast approximately 1.5x1.5cm of ulceration, palpable firm mobile mass, palpable axillary adenopathy  Left: No visible abnormalities on inspection while seated, with arms raised or hands on hips. No masses, skin changes, or nipple abnormalities.  No clinical chest wall involvement.  Gastrointestinal: Soft, nontender  Lymphatics (palpable nodes): No left sided cervical, supraclavicular or axillary lymphadenopathy  Skin:  Warm, dry, no rash on visualized skin surfaces  Musculoskeletal: Symmetric strength, normal gait  Psychiatric: Alert and oriented x3, normal affect     Discussion:  I had an extensive discussion with the patient and her family about the nature of her  breast cancer diagnosis. We reviewed the components of breast tissue including ducts and lobules. We reviewed her pathology report in detail. We reviewed breast cancer histology, including stage, grade, ER/HI receptors, HER2 receptors and how this applies to her diagnosis. We reviewed the basics of systemic and local/regional management of breast cancer.     We discussed that most breast cancer is not hereditary, however given her personal history, this may play a role in her case. I believe genetic testing is warranted and could affect surgical decision making.     We reviewed potential surgical treatments to include partial mastectomy, mastectomy, sentinel lymph node biopsy and axillary node dissection and discussed the rationale associated with each approach. Regarding radiation therapy, we discussed that radiation is indicated in all cases of breast conservation and in only limited circumstances following mastectomy. We discussed that the primary goal of adjuvant radiation is to decrease the likelihood of local recurrence.     We discussed axillary staging. I described the procedure for sentinel lymph node biopsy in detail, including the preoperative injection of technetium sulfur colloid and intraoperative injection of lymphazurin blue dye. I explained that this is a mapping test and not a cancer test, that all of the lymph nodes containing these dyes will be removed for complete testing by pathology, and that the results could impact the decision for adjuvant treatment or additional surgery.    I described additional risks and potential complications associated with surgery, including, but not limited to, bleeding, infection, complications related to blue dye, lymphedema, deformity/poor cosmetic result, chronic pain, neurovascular injury, numbness, seroma, hematoma, deep venous thrombosis, skin flap necrosis, disease recurrence and the possibility of requiring additional surgery. We also discussed other  treatment options including the option of not undergoing any surgical treatment and the risks associated with this including disease progression. She expressed an understanding of these factors and wished to proceed.    We discussed that in her case, systemic treatment would involve endocrine therapy and possibly chemotherapy. She will be referred to medical oncology to discuss this further.     SHAR MCELROY M.D.  General and Endoscopic Surgery  [facility]    [address], [address]  [place], [place], [address]  P: [phone]  F: [phone]

## 2023-07-31 NOTE — H&P (VIEW-ONLY)
General Surgery Breast Cancer History and Physical Exam     Summary:    Veronica Royal is a 82 y.o. 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      A multidisciplinary plan has been formulated for the patient:    (1) Breast Surgical Oncology:  -[employer] 9 panel genetic testing: negative.   -Discussed recommendation for right breast modified radical mastectomy.   -Plastic surgery referral deferred at this time.    (2) Medical Oncology:  -Following with Dr. Yee.     (3) Radiation Oncology:  -Will refer postoperatively for evaluation for radiation therapy as clinically indicated.    Referring Provider: No ref. provider found    Chief Complaint: breast mass    History of Present Illness: Ms. Veronica Royal is a 82 y.o. year old lady, seen at the request of No ref. provider found for a new diagnosis of right breast cancer.      This was initially detected as a palpable mass in her right breast. She first noticed it 2-3 months ago. Her last mammogram was 25 years ago. She denies any prior history of abnormal mammograms or breast biopsies. Her work-up is detailed in the oncologic history below.     She denies any breast lumps, pain, skin changes, or nipple discharge. She denies any family history of breast cancer. Her sister had ovarian cancer 25 years ago at age 58.      7/31/2023 She presents today for follow up. She has done well since leaving the hospital. She is slowly improving. Her strength is improving as well.     Workup of Current Diagnosis:    4/21/2023 bilateral diagnostic mammogram with ultrasound:  Finding 1: There is a high density irregular mass measuring 44 mm with spiculated margins and associated amorphous and fine pleomorphic calcifications, skin retraction, and skin and trabecular thickening in the right breast at 9:00.  On ultrasound there is an irregular mass with  indistinct and spiculated margins in the right breast at 9:00 3 cm from the nipple.  The mass extends into the skin there is visible external ulceration and scabbing.  Diffuse skin thickening and focal skin retraction are noted with skin thickening up to 11 mm.  The mass measures 38 x 28 x 42 mm.  Highly suggestive of malignancy.  Ultrasound-guided biopsy is recommended.  Finding 2: There are true intramammary lymph nodes measuring 5 mm and 6 mm seen in the posterior upper outer axillary tail that are slightly round.  There is visualization of one of the 2 right axillary lymph nodes that measure 5 mm with borderline cortical thickening.  Suspicious.  Appropriate action should be taken.  Finding 3: There are 2 prominent right axillary lymph nodes largest which measures 26 mm and contains extensive coarse heterogeneous calcifications.  Suspicious.  Ultrasound-guided biopsy is recommended.  Finding 4: On ultrasound there is an asymmetry in the superior region of the left breast that resolved with spot compression benign.  BI-RADS Category 5    4/21/2023 right breast ultrasound-guided biopsy:  The right breast at 9:00 was imaged and the mass was localized.  8 cores were obtained.  A mini cork tissue marker was placed.  Clip was in the expected position.  Pathology returned as invasive ductal carcinoma grade 3 which is malignant and concordant.  Surgical consult is recommended.    The patient's right breast at the axillary position was imaged and the abnormal lymph node was localized.  4 cores were obtained.  A spring shaped HydroMARK tissue marker was placed.  Clip was in the expected position.  Pathology returned as high-grade invasive mammary carcinoma with apron features.  The differential includes multifocal carcinoma versus a completely replaced lymph node.  Pathology is malignant and concordant.    4/21/2023 pathology:   1.  Breast, right, 9:00, biopsy:  Invasive mammary carcinoma, grade 3  2.  Axilla, right,  biopsy:  High-grade invasive mammary carcinoma with apocrine features involving soft tissue and areas of necrosis    2023 Bilateral Breast MRI   IMPRESSION:  1. Biopsy-proven malignancy in the right breast in the posterior one third at the 9 o'clock position that measures on the order of 4.3 cm in greatest dimension and contains the internal mini cork-shaped metallic clip. The mass shows central hypoenhancement suggestive of central necrosis. Attachment to the overlying skin as described is noted and there is diffuse right breast skin edema and trabecular edema. Evidence of right axillary adenopathy is noted.  2. There are no findings suspicious for malignancy in the left breast.  BI-RADS category 6: Known biopsy-proven malignancy.    2023 CT Chest, Abdomen, Pelvis:   IMPRESSION:  1. Right breast cancer with right axillary lymphadenopathy  2. No convincing evidence of distant metastatic disease. Incidental findings as above.    2023 Bone Scan:   IMPRESSION:  Abnormal soft tissue activity in the right breast corresponding to known disease in that location. No evidence of osseous metastasis.    2023 Right Breast US:   IMPRESSION:  Biopsy-proven malignancy at 9:00 in the right breast is similar to slightly smaller when accounting for differences in technique. A 2.6 cm right axillary mass is not significantly changed, although an adjacent lymph node has decreased in size. Continued oncologic and surgical management are recommended.   BI-RADS Category 6: Known biopsy-proven malignancy    Gynecologic History:   . P:1 AB:1  Age at first childbirth: 35  Lactation/How long: none  Age at menarche: 11  Age at menopause: 50  Total years of oral contraceptive use: 3-4 years previously  Total years of hormone replacement therapy: none    Past Medical History:   DM  HTN  HLD    Past Surgical History:    None    Family History:    As above    Social History:  Denies tobacco use  Occasional alcohol  use    Allergies:   No Known Allergies    Medications:     Current Outpatient Medications:     acetaminophen (TYLENOL) 325 MG tablet, Take 2 tablets by mouth Every 4 (Four) Hours As Needed for Mild Pain., Disp: 120 tablet, Rfl: 3    atorvastatin (LIPITOR) 10 MG tablet, Take 1 tablet by mouth Daily., Disp: 90 tablet, Rfl: 3    Cholecalciferol 50 MCG (2000 UT) tablet, Take 1 tablet by mouth., Disp: , Rfl:     glipiZIDE-metFORMIN (METAGLIP) 2.5-500 MG per tablet, Take 2 tablets by mouth 2 (Two) Times a Day Before Meals., Disp: 120 tablet, Rfl: 3    glucose blood test strip, Check BG once daily, E11.9, Disp: 30 each, Rfl: 4    glucose monitor monitoring kit, Dispense glucometer with brand based off insurance coverage, check BG daily, E11.9, Disp: 1 each, Rfl: 0    hydrocortisone 1 % cream, Apply 1 application topically to the appropriate area as directed 2 (Two) Times a Day., Disp: 453.6 g, Rfl: 0    Lancets misc, Check BG once daily, E11.9, Disp: 30 each, Rfl: 4    lidocaine-prilocaine (EMLA) 2.5-2.5 % cream, Apply nickel size amount to port site 30 min before appt time do not rub in cover with plastic wrap, Disp: 30 g, Rfl: 5    magnesium oxide (MAG-OX) 400 tablet tablet, Take 1 tablet by mouth Daily., Disp: 30 tablet, Rfl: 3    NIFEdipine XL (PROCARDIA XL) 30 MG 24 hr tablet, Take 1 tablet by mouth Daily., Disp: 30 tablet, Rfl: 11    OLANZapine (ZyPREXA) 5 MG tablet, Take 1 tablet by mouth Every Night., Disp: 30 tablet, Rfl: 3    ondansetron (ZOFRAN) 8 MG tablet, Take 1 tablet by mouth 3 (Three) Times a Day As Needed for Nausea or Vomiting., Disp: 30 tablet, Rfl: 5    pantoprazole (PROTONIX) 40 MG EC tablet, Take 1 tablet by mouth Daily., Disp: 30 tablet, Rfl: 3    potassium chloride (K-DUR,KLOR-CON) 20 MEQ CR tablet, Take 2 tablets by mouth Daily., Disp: 30 tablet, Rfl: 3    Tradjenta 5 MG tablet tablet, Take 1 tablet by mouth Daily., Disp: , Rfl:     Laboratory Values:    Labs from 8/2/2023 reviewed    Review of  Systems:   Influenza-like illness: no fever, no  cough, no  sore throat, no  body aches, no loss of sense of taste or smell, no known exposure to person with Covid-19.  Constitutional: Negative for fevers or chills  HENT: Negative for hearing loss or runny nose  Eyes: Negative for vision changes or scleral icterus  Respiratory: Negative for cough or shortness of breath  Cardiovascular: Negative for chest pain or heart palpitations  Gastrointestinal: Negative for abdominal pain, nausea, vomiting, constipation, melena, or hematochezia  Genitourinary: Negative for hematuria or dysuria  Musculoskeletal: Negative for joint swelling or gait instability  Neurologic: Negative for tremors or seizures  Psychiatric: Negative for suicidal ideations or depression  All other systems reviewed and negative    Physical Exam:   ECO - Asymptomatic  Constitutional: Well-developed well-nourished, no acute distress  Eyes: Conjunctiva normal, sclera nonicteric  ENMT: Hearing grossly normal, oral mucosa moist  Neck: Supple, no palpable mass, trachea midline  Respiratory: Clear to auscultation, normal inspiratory effort  Cardiovascular: Regular rate, no peripheral edema, no jugular venous distention  Breast: symmetric  Right: Ulcerated mass in the right outer mid-lower breast approximately 1.5x1.5cm of ulceration, palpable firm mobile mass, palpable axillary adenopathy  Left: No visible abnormalities on inspection while seated, with arms raised or hands on hips. No masses, skin changes, or nipple abnormalities.  No clinical chest wall involvement.  Gastrointestinal: Soft, nontender  Lymphatics (palpable nodes): No left sided cervical, supraclavicular or axillary lymphadenopathy  Skin:  Warm, dry, no rash on visualized skin surfaces  Musculoskeletal: Symmetric strength, normal gait  Psychiatric: Alert and oriented ×3, normal affect     Discussion:  I had an extensive discussion with the patient and her family about the nature of her  breast cancer diagnosis. We reviewed the components of breast tissue including ducts and lobules. We reviewed her pathology report in detail. We reviewed breast cancer histology, including stage, grade, ER/DE receptors, HER2 receptors and how this applies to her diagnosis. We reviewed the basics of systemic and local/regional management of breast cancer.     We discussed that most breast cancer is not hereditary, however given her personal history, this may play a role in her case. I believe genetic testing is warranted and could affect surgical decision making.     We reviewed potential surgical treatments to include partial mastectomy, mastectomy, sentinel lymph node biopsy and axillary node dissection and discussed the rationale associated with each approach. Regarding radiation therapy, we discussed that radiation is indicated in all cases of breast conservation and in only limited circumstances following mastectomy. We discussed that the primary goal of adjuvant radiation is to decrease the likelihood of local recurrence.     We discussed axillary staging. I described the procedure for sentinel lymph node biopsy in detail, including the preoperative injection of technetium sulfur colloid and intraoperative injection of lymphazurin blue dye. I explained that this is a mapping test and not a cancer test, that all of the lymph nodes containing these dyes will be removed for complete testing by pathology, and that the results could impact the decision for adjuvant treatment or additional surgery.    I described additional risks and potential complications associated with surgery, including, but not limited to, bleeding, infection, complications related to blue dye, lymphedema, deformity/poor cosmetic result, chronic pain, neurovascular injury, numbness, seroma, hematoma, deep venous thrombosis, skin flap necrosis, disease recurrence and the possibility of requiring additional surgery. We also discussed other  treatment options including the option of not undergoing any surgical treatment and the risks associated with this including disease progression. She expressed an understanding of these factors and wished to proceed.    We discussed that in her case, systemic treatment would involve endocrine therapy and possibly chemotherapy. She will be referred to medical oncology to discuss this further.     SHAR MCELROY M.D.  General and Endoscopic Surgery  [facility]    [address], [address]  [place], [place], [address]  P: [phone]  F: [phone]

## 2023-08-02 ENCOUNTER — INFUSION (OUTPATIENT)
Dept: ONCOLOGY | Facility: HOSPITAL | Age: 83
End: 2023-08-02
Payer: MEDICARE

## 2023-08-02 ENCOUNTER — NUTRITION (OUTPATIENT)
Dept: OTHER | Facility: HOSPITAL | Age: 83
End: 2023-08-02
Payer: MEDICARE

## 2023-08-02 ENCOUNTER — OFFICE VISIT (OUTPATIENT)
Dept: ONCOLOGY | Facility: CLINIC | Age: 83
End: 2023-08-02
Payer: MEDICARE

## 2023-08-02 VITALS
SYSTOLIC BLOOD PRESSURE: 119 MMHG | BODY MASS INDEX: 24.51 KG/M2 | DIASTOLIC BLOOD PRESSURE: 67 MMHG | OXYGEN SATURATION: 95 % | WEIGHT: 129.8 LBS | HEIGHT: 61 IN | TEMPERATURE: 97.7 F | RESPIRATION RATE: 18 BRPM | HEART RATE: 107 BPM

## 2023-08-02 DIAGNOSIS — Z17.1 MALIGNANT NEOPLASM OF UPPER-OUTER QUADRANT OF RIGHT BREAST IN FEMALE, ESTROGEN RECEPTOR NEGATIVE: Primary | ICD-10-CM

## 2023-08-02 DIAGNOSIS — C50.411 MALIGNANT NEOPLASM OF UPPER-OUTER QUADRANT OF RIGHT BREAST IN FEMALE, ESTROGEN RECEPTOR NEGATIVE: Primary | ICD-10-CM

## 2023-08-02 DIAGNOSIS — C50.411 MALIGNANT NEOPLASM OF UPPER-OUTER QUADRANT OF RIGHT BREAST IN FEMALE, ESTROGEN RECEPTOR NEGATIVE: ICD-10-CM

## 2023-08-02 DIAGNOSIS — Z17.1 MALIGNANT NEOPLASM OF UPPER-OUTER QUADRANT OF RIGHT BREAST IN FEMALE, ESTROGEN RECEPTOR NEGATIVE: ICD-10-CM

## 2023-08-02 DIAGNOSIS — Z45.2 ENCOUNTER FOR FITTING AND ADJUSTMENT OF VASCULAR CATHETER: ICD-10-CM

## 2023-08-02 LAB
ALBUMIN SERPL-MCNC: 3.1 G/DL (ref 3.5–5.2)
ALBUMIN/GLOB SERPL: 1 G/DL
ALP SERPL-CCNC: 81 U/L (ref 39–117)
ALT SERPL W P-5'-P-CCNC: 8 U/L (ref 1–33)
ANION GAP SERPL CALCULATED.3IONS-SCNC: 14 MMOL/L (ref 5–15)
AST SERPL-CCNC: 30 U/L (ref 1–32)
BASOPHILS # BLD AUTO: 0.06 10*3/MM3 (ref 0–0.2)
BASOPHILS NFR BLD AUTO: 0.4 % (ref 0–1.5)
BILIRUB SERPL-MCNC: 0.4 MG/DL (ref 0–1.2)
BUN SERPL-MCNC: 25 MG/DL (ref 8–23)
BUN/CREAT SERPL: 19.7 (ref 7–25)
CALCIUM SPEC-SCNC: 8.9 MG/DL (ref 8.6–10.5)
CHLORIDE SERPL-SCNC: 110 MMOL/L (ref 98–107)
CO2 SERPL-SCNC: 17 MMOL/L (ref 22–29)
CREAT SERPL-MCNC: 1.27 MG/DL (ref 0.6–1.1)
DEPRECATED RDW RBC AUTO: 57.6 FL (ref 37–54)
EGFRCR SERPLBLD CKD-EPI 2021: 42.3 ML/MIN/1.73
EOSINOPHIL # BLD AUTO: 0.3 10*3/MM3 (ref 0–0.4)
EOSINOPHIL NFR BLD AUTO: 2.1 % (ref 0.3–6.2)
ERYTHROCYTE [DISTWIDTH] IN BLOOD BY AUTOMATED COUNT: 17.5 % (ref 12.3–15.4)
GLOBULIN UR ELPH-MCNC: 3.1 GM/DL
GLUCOSE SERPL-MCNC: 112 MG/DL (ref 65–99)
HCT VFR BLD AUTO: 32.4 % (ref 34–46.6)
HGB BLD-MCNC: 10 G/DL (ref 12–15.9)
IMM GRANULOCYTES # BLD AUTO: 0.05 10*3/MM3 (ref 0–0.05)
IMM GRANULOCYTES NFR BLD AUTO: 0.3 % (ref 0–0.5)
LYMPHOCYTES # BLD AUTO: 4.79 10*3/MM3 (ref 0.7–3.1)
LYMPHOCYTES NFR BLD AUTO: 33.4 % (ref 19.6–45.3)
MCH RBC QN AUTO: 27.7 PG (ref 26.6–33)
MCHC RBC AUTO-ENTMCNC: 30.9 G/DL (ref 31.5–35.7)
MCV RBC AUTO: 89.8 FL (ref 79–97)
MONOCYTES # BLD AUTO: 0.84 10*3/MM3 (ref 0.1–0.9)
MONOCYTES NFR BLD AUTO: 5.9 % (ref 5–12)
NEUTROPHILS NFR BLD AUTO: 57.9 % (ref 42.7–76)
NEUTROPHILS NFR BLD AUTO: 8.28 10*3/MM3 (ref 1.7–7)
NRBC BLD AUTO-RTO: 0 /100 WBC (ref 0–0.2)
PLATELET # BLD AUTO: 313 10*3/MM3 (ref 140–450)
PMV BLD AUTO: 9.1 FL (ref 6–12)
POTASSIUM SERPL-SCNC: 4.4 MMOL/L (ref 3.5–5.2)
PROT SERPL-MCNC: 6.2 G/DL (ref 6–8.5)
RBC # BLD AUTO: 3.61 10*6/MM3 (ref 3.77–5.28)
SODIUM SERPL-SCNC: 141 MMOL/L (ref 136–145)
WBC NRBC COR # BLD: 14.32 10*3/MM3 (ref 3.4–10.8)

## 2023-08-02 PROCEDURE — 25010000002 TRASTUZUMAB PER 10 MG: Performed by: INTERNAL MEDICINE

## 2023-08-02 PROCEDURE — 96413 CHEMO IV INFUSION 1 HR: CPT

## 2023-08-02 PROCEDURE — 85025 COMPLETE CBC W/AUTO DIFF WBC: CPT

## 2023-08-02 PROCEDURE — 25010000002 HEPARIN LOCK FLUSH PER 10 UNITS: Performed by: INTERNAL MEDICINE

## 2023-08-02 PROCEDURE — 80053 COMPREHEN METABOLIC PANEL: CPT

## 2023-08-02 PROCEDURE — 96361 HYDRATE IV INFUSION ADD-ON: CPT

## 2023-08-02 PROCEDURE — 96375 TX/PRO/DX INJ NEW DRUG ADDON: CPT

## 2023-08-02 PROCEDURE — 25010000002 DIPHENHYDRAMINE PER 50 MG: Performed by: INTERNAL MEDICINE

## 2023-08-02 RX ORDER — SODIUM CHLORIDE 9 MG/ML
250 INJECTION, SOLUTION INTRAVENOUS ONCE
Status: CANCELLED | OUTPATIENT
Start: 2023-08-02

## 2023-08-02 RX ORDER — SODIUM CHLORIDE 9 MG/ML
250 INJECTION, SOLUTION INTRAVENOUS ONCE
Status: COMPLETED | OUTPATIENT
Start: 2023-08-02 | End: 2023-08-02

## 2023-08-02 RX ORDER — HEPARIN SODIUM (PORCINE) LOCK FLUSH IV SOLN 100 UNIT/ML 100 UNIT/ML
500 SOLUTION INTRAVENOUS AS NEEDED
OUTPATIENT
Start: 2023-08-02

## 2023-08-02 RX ORDER — FAMOTIDINE 10 MG/ML
20 INJECTION, SOLUTION INTRAVENOUS ONCE
Status: CANCELLED | OUTPATIENT
Start: 2023-08-02 | End: 2023-08-02

## 2023-08-02 RX ORDER — SODIUM CHLORIDE 9 MG/ML
1000 INJECTION, SOLUTION INTRAVENOUS ONCE
Status: COMPLETED | OUTPATIENT
Start: 2023-08-02 | End: 2023-08-02

## 2023-08-02 RX ORDER — HEPARIN SODIUM (PORCINE) LOCK FLUSH IV SOLN 100 UNIT/ML 100 UNIT/ML
500 SOLUTION INTRAVENOUS AS NEEDED
Status: DISCONTINUED | OUTPATIENT
Start: 2023-08-02 | End: 2023-08-02 | Stop reason: HOSPADM

## 2023-08-02 RX ORDER — SODIUM CHLORIDE 0.9 % (FLUSH) 0.9 %
10 SYRINGE (ML) INJECTION AS NEEDED
Status: DISCONTINUED | OUTPATIENT
Start: 2023-08-02 | End: 2023-08-02 | Stop reason: HOSPADM

## 2023-08-02 RX ORDER — FAMOTIDINE 10 MG/ML
20 INJECTION, SOLUTION INTRAVENOUS ONCE
Status: COMPLETED | OUTPATIENT
Start: 2023-08-02 | End: 2023-08-02

## 2023-08-02 RX ORDER — SODIUM CHLORIDE 9 MG/ML
1000 INJECTION, SOLUTION INTRAVENOUS ONCE
Status: CANCELLED
Start: 2023-08-02 | End: 2023-08-02

## 2023-08-02 RX ORDER — SODIUM CHLORIDE 0.9 % (FLUSH) 0.9 %
10 SYRINGE (ML) INJECTION AS NEEDED
OUTPATIENT
Start: 2023-08-02

## 2023-08-02 RX ADMIN — SODIUM CHLORIDE 1000 ML: 9 INJECTION, SOLUTION INTRAVENOUS at 12:37

## 2023-08-02 RX ADMIN — TRASTUZUMAB 370 MG: 150 INJECTION, POWDER, LYOPHILIZED, FOR SOLUTION INTRAVENOUS at 13:09

## 2023-08-02 RX ADMIN — HEPARIN 500 UNITS: 100 SYRINGE at 14:20

## 2023-08-02 RX ADMIN — Medication 10 ML: at 14:20

## 2023-08-02 RX ADMIN — SODIUM CHLORIDE 250 ML: 9 INJECTION, SOLUTION INTRAVENOUS at 12:37

## 2023-08-02 RX ADMIN — FAMOTIDINE 20 MG: 10 INJECTION INTRAVENOUS at 12:41

## 2023-08-02 RX ADMIN — SODIUM CHLORIDE 25 MG: 9 INJECTION, SOLUTION INTRAVENOUS at 12:43

## 2023-08-03 ENCOUNTER — PATIENT OUTREACH (OUTPATIENT)
Dept: OTHER | Facility: HOSPITAL | Age: 83
End: 2023-08-03
Payer: MEDICARE

## 2023-08-11 NOTE — PROGRESS NOTES
I identified MEKA as a candidate for the registry after receiving a cardio-oncology referral.  She came to the office today for an office visit.  I met MEKA at the end of this encounter to describe the study and assess her interest in participating.     I summarized that the purpose of the study is to learn more about cancer treatment related to cardiovascular disease and its impact on overall prognosis, commonly recommended treatments, and outcomes.  There was no direct benefit to her in participating but other people may be helped by what is learned. I emphasized that participation was completely voluntary and she was not obligated.  In addition, should she consent today, she could withdraw consent at any time in the future without any impact on the care received by providers and staff at [facility] and [facility]. I explained that her role in the study was quite minimal. We ask that she simply keep appointments as recommended by her providers.  I would collect study data from her medical record periodically during the length of the study. I would rarely need to contact her directly.  There are no extra appointments or other activities related to the registry that would need to be completed.    MEKA said that she was interested and would like to hear more. And so we reviewed the study consent form page by page in a private space and I allowed adequate time for questions.  We reviewed the risks, benefits, alternatives, cost to participate (none), patient payments (none), voluntariness, confidentiality, authorization to use and disclose health information, IRB oversight, and who to call with questions. MEKA verbalized understanding of all these areas, and then she initialed and signed where indicated.  I signed as well and then made a photocopy of the signed study consent for her to take home.  I thanked her for enrolling and for her time today, gave her my business card with direct phone number and invited them to call my at  any time with registry questions or concerns.  No study activities were performed prior to the signing of the study consent document. After Dr Maldonado signs the last page of the consent, a copy will be faxed to the [facility] department for inclusion in the medical record.

## 2023-08-13 ENCOUNTER — PREP FOR SURGERY (OUTPATIENT)
Dept: OTHER | Facility: HOSPITAL | Age: 83
End: 2023-08-13
Payer: MEDICARE

## 2023-08-13 DIAGNOSIS — C50.919 INFLAMMATORY CARCINOMA OF BREAST, UNSPECIFIED LATERALITY: Primary | ICD-10-CM

## 2023-08-13 RX ORDER — CEFAZOLIN SODIUM 2 G/100ML
2000 INJECTION, SOLUTION INTRAVENOUS ONCE
OUTPATIENT
Start: 2023-08-13 | End: 2023-08-13

## 2023-08-16 ENCOUNTER — TELEPHONE (OUTPATIENT)
Dept: ONCOLOGY | Facility: CLINIC | Age: 83
End: 2023-08-16
Payer: MEDICARE

## 2023-08-16 NOTE — TELEPHONE ENCOUNTER
Caller: MAK THAKKAR    Relationship: Emergency Contact    Best call back number: [phone]     Who are you requesting to speak with (clinical staff, provider,  specific staff member): CLINICAL    What was the call regarding: PATIENT SCHEDULED FOR SURGERY ON 8/24/23.    CALLING TO VERIFY IF THEY SHOULD KEEP APPTS ON 8/23/23

## 2023-08-17 ENCOUNTER — TELEPHONE (OUTPATIENT)
Dept: ONCOLOGY | Facility: CLINIC | Age: 83
End: 2023-08-17

## 2023-08-17 ENCOUNTER — PRE-ADMISSION TESTING (OUTPATIENT)
Dept: PREADMISSION TESTING | Facility: HOSPITAL | Age: 83
End: 2023-08-17
Payer: MEDICARE

## 2023-08-17 LAB
ANION GAP SERPL CALCULATED.3IONS-SCNC: 11 MMOL/L (ref 5–15)
BASOPHILS # BLD AUTO: 0.05 10*3/MM3 (ref 0–0.2)
BASOPHILS NFR BLD AUTO: 0.4 % (ref 0–1.5)
BUN SERPL-MCNC: 24 MG/DL (ref 8–23)
BUN/CREAT SERPL: 22.9 (ref 7–25)
CALCIUM SPEC-SCNC: 10.1 MG/DL (ref 8.6–10.5)
CHLORIDE SERPL-SCNC: 109 MMOL/L (ref 98–107)
CO2 SERPL-SCNC: 21 MMOL/L (ref 22–29)
CREAT SERPL-MCNC: 1.05 MG/DL (ref 0.57–1)
DEPRECATED RDW RBC AUTO: 50.7 FL (ref 37–54)
EGFRCR SERPLBLD CKD-EPI 2021: 53.2 ML/MIN/1.73
EOSINOPHIL # BLD AUTO: 0.16 10*3/MM3 (ref 0–0.4)
EOSINOPHIL NFR BLD AUTO: 1.3 % (ref 0.3–6.2)
ERYTHROCYTE [DISTWIDTH] IN BLOOD BY AUTOMATED COUNT: 16.2 % (ref 12.3–15.4)
GLUCOSE SERPL-MCNC: 93 MG/DL (ref 65–99)
HCT VFR BLD AUTO: 33.4 % (ref 34–46.6)
HGB BLD-MCNC: 10.8 G/DL (ref 12–15.9)
IMM GRANULOCYTES # BLD AUTO: 0.04 10*3/MM3 (ref 0–0.05)
IMM GRANULOCYTES NFR BLD AUTO: 0.3 % (ref 0–0.5)
LYMPHOCYTES # BLD AUTO: 3.51 10*3/MM3 (ref 0.7–3.1)
LYMPHOCYTES NFR BLD AUTO: 29.4 % (ref 19.6–45.3)
MCH RBC QN AUTO: 27.9 PG (ref 26.6–33)
MCHC RBC AUTO-ENTMCNC: 32.3 G/DL (ref 31.5–35.7)
MCV RBC AUTO: 86.3 FL (ref 79–97)
MONOCYTES # BLD AUTO: 0.63 10*3/MM3 (ref 0.1–0.9)
MONOCYTES NFR BLD AUTO: 5.3 % (ref 5–12)
NEUTROPHILS NFR BLD AUTO: 63.3 % (ref 42.7–76)
NEUTROPHILS NFR BLD AUTO: 7.56 10*3/MM3 (ref 1.7–7)
NRBC BLD AUTO-RTO: 0 /100 WBC (ref 0–0.2)
PLATELET # BLD AUTO: 292 10*3/MM3 (ref 140–450)
PMV BLD AUTO: 9.6 FL (ref 6–12)
POTASSIUM SERPL-SCNC: 5.4 MMOL/L (ref 3.5–5.2)
RBC # BLD AUTO: 3.87 10*6/MM3 (ref 3.77–5.28)
SODIUM SERPL-SCNC: 141 MMOL/L (ref 136–145)
WBC NRBC COR # BLD: 11.95 10*3/MM3 (ref 3.4–10.8)

## 2023-08-17 PROCEDURE — 36415 COLL VENOUS BLD VENIPUNCTURE: CPT

## 2023-08-17 PROCEDURE — 80048 BASIC METABOLIC PNL TOTAL CA: CPT

## 2023-08-17 PROCEDURE — 85025 COMPLETE CBC W/AUTO DIFF WBC: CPT

## 2023-08-17 RX ORDER — SIMVASTATIN 10 MG
10 TABLET ORAL NIGHTLY
COMMUNITY

## 2023-08-17 RX ORDER — CHLORHEXIDINE GLUCONATE 500 MG/1
1 CLOTH TOPICAL
COMMUNITY
End: 2023-08-28 | Stop reason: HOSPADM

## 2023-08-17 NOTE — DISCHARGE INSTRUCTIONS
Take the following medications the morning of surgery:    pantoprazole    If you are on prescription narcotic pain medication to control your pain you may also take that medication the morning of surgery.    General Instructions:  Do not eat solid food after midnight the night before surgery.  You may drink clear liquids day of surgery but must stop at least one hour before your hospital arrival time.  It is beneficial for you to have a clear drink that contains carbohydrates the day of surgery.  We suggest a 12 to 20 ounce bottle of Gatorade or Powerade for non-diabetic patients or a 12 to 20 ounce bottle of G2 or Powerade Zero for diabetic patients. (Pediatric patients, are not advised to drink a 12 to 20 ounce carbohydrate drink)    Clear liquids are liquids you can see through.  Nothing red in color.     Plain water                               Sports drinks  Sodas                                   Gelatin (Jell-O)  Fruit juices without pulp such as white grape juice and apple juice  Popsicles that contain no fruit or yogurt  Tea or coffee (no cream or milk added)  Gatorade / Powerade  G2 / Powerade Zero    Infants may have breast milk up to four hours before surgery.  Infants drinking formula may drink formula up to six hours before surgery.   Patients who avoid smoking, chewing tobacco and alcohol for 4 weeks prior to surgery have a reduced risk of post-operative complications.  Quit smoking as many days before surgery as you can.  Do not smoke, use chewing tobacco or drink alcohol the day of surgery.   If applicable bring your C-PAP/ BI-PAP machine in with you to preop day of surgery.  Bring any papers given to you in the doctor's office.  Wear clean comfortable clothes.  Do not wear contact lenses, false eyelashes or make-up.  Bring a case for your glasses.   Bring crutches or walker if applicable.  Remove all piercings.  Leave jewelry and any other valuables at home.  Hair extensions with metal clips must  be removed prior to surgery.  The Pre-Admission Testing nurse will instruct you to bring medications if unable to obtain an accurate list in Pre-Admission Testing.        If you were given a blood bank ID arm band remember to bring it with you the day of surgery.    Preventing a Surgical Site Infection:  For 2 to 3 days before surgery, avoid shaving with a razor because the razor can irritate skin and make it easier to develop an infection.    Any areas of open skin can increase the risk of a post-operative wound infection by allowing bacteria to enter and travel throughout the body.  Notify your surgeon if you have any skin wounds / rashes even if it is not near the expected surgical site.  The area will need assessed to determine if surgery should be delayed until it is healed.  The night prior to surgery shower using a fresh bar of anti-bacterial soap (such as Dial) and clean washcloth.  Sleep in a clean bed with clean clothing.  Do not allow pets to sleep with you.  Shower on the morning of surgery using a fresh bar of anti-bacterial soap (such as Dial) and clean washcloth.  Dry with a clean towel and dress in clean clothing.  Ask your surgeon if you will be receiving antibiotics prior to surgery.  Make sure you, your family, and all healthcare providers clean their hands with soap and water or an alcohol based hand  before caring for you or your wound.    Day of surgery:8/24/2023   0530  Your arrival time is approximately two hours before your scheduled surgery time.  Upon arrival, a Pre-op nurse and Anesthesiologist will review your health history, obtain vital signs, and answer questions you may have.  The only belongings needed at this time will be a list of your home medications and if applicable your C-PAP/BI-PAP machine.  A Pre-op nurse will start an IV and you may receive medication in preparation for surgery, including something to help you relax.     Please be aware that surgery does come with  discomfort.  We want to make every effort to control your discomfort so please discuss any uncontrolled symptoms with your nurse.   Your doctor will most likely have prescribed pain medications.      If you are going home after surgery you will receive individualized written care instructions before being discharged.  A responsible adult must drive you to and from the hospital on the day of your surgery and stay with you for 24 hours.  Discharge prescriptions can be filled by the hospital pharmacy during regular pharmacy hours.  If you are having surgery late in the day/evening your prescription may be e-prescribed to your pharmacy.  Please verify your pharmacy hours or chose a 24 hour pharmacy to avoid not having access to your prescription because your pharmacy has closed for the day.    If you are staying overnight following surgery, you will be transported to your hospital room following the recovery period.  [facility] has all private rooms.    If you have any questions please call Pre-Admission Testing at [phone].  Deductibles and co-payments are collected on the day of service. Please be prepared to pay the required co-pay, deductible or deposit on the day of service as defined by your plan.    Call your surgeon immediately if you experience any of the following symptoms:  Sore Throat  Shortness of Breath or difficulty breathing  Cough  Chills  Body soreness or muscle pain  Headache  Fever  New loss of taste or smell  Do not arrive for your surgery ill.  Your procedure will need to be rescheduled to another time.  You will need to call your physician before the day of surgery to avoid any unnecessary exposure to hospital staff as well as other patients.  CHLORHEXIDINE CLOTH INSTRUCTIONS  The morning of surgery follow these instructions using the Chlorhexidine cloths you've been given.  These steps reduce bacteria on the body.  Do not use the cloths near your eyes, ears mouth, genitalia or  on open wounds.  Throw the cloths away after use but do not try to flush them down a toilet.      Open and remove one cloth at a time from the package.    Leave the cloth unfolded and begin the bathing.  Massage the skin with the cloths using gentle pressure to remove bacteria.  Do not scrub harshly.   Follow the steps below with one 2% CHG cloth per area (6 total cloths).  One cloth for neck, shoulders and chest.  One cloth for both arms, hands, fingers and underarms (do underarms last).  One cloth for the abdomen followed by groin.  One cloth for right leg and foot including between the toes.  One cloth for left leg and foot including between the toes.  The last cloth is to be used for the back of the neck, back and buttocks.    Allow the CHG to air dry 3 minutes on the skin which will give it time to work and decrease the chance of irritation.  The skin may feel sticky until it is dry.  Do not rinse with water or any other liquid or you will lose the beneficial effects of the CHG.  If mild skin irritation occurs, do rinse the skin to remove the CHG.  Report this to the nurse at time of admission.  Do not apply lotions, creams, ointments, deodorants or perfumes after using the clothes. Dress in clean clothes before coming to the hospital.

## 2023-08-17 NOTE — TELEPHONE ENCOUNTER
Called Norman back to let her know per Dr. Yee that patient could have Hercptin 8/23 and have surgery 8/24. I seen that there was also a call in for eleazar to cancel 8/23 appt. Norman states she is mad at her mom for doing that and that she wanted to talk with her and asked if there was something we could do to keep the appt. I let her know this would have to be her mothers choice and she v/u. I called the patient to see what she wanted to do and she immediately told me she was not getting her infusion, but she was willing to come in to see the NP and labs. I let her know I would make Dr. Yee aware and also scheduling. Patient v/u.

## 2023-08-17 NOTE — TELEPHONE ENCOUNTER
Caller: Veronica Royal    Relationship: Self    Best call back number: [phone]    OK TO LEAVE VOICEMAIL IF UNABLE TO REACH     What is the best time to reach you: ANYTIME          What was the call regarding:     WANTED TO CANCEL APPOINTMENTS ON 08/23    PORT FLUSH, FOLLOW UP AND INFUSION     DUE TO HAVING SURGERY TO HAVE BREAST REMOVED ON 08/24    DOES NOT WISH TO RESCHEDULE       ADDITIONAL NOTE: CALLED  SPOKE WITH YOGESH SHE ADVISED TO SEND MESSAGE TO  POOL.

## 2023-08-24 ENCOUNTER — ANCILLARY PROCEDURE (OUTPATIENT)
Dept: LAB | Facility: HOSPITAL | Age: 83
End: 2023-08-24
Payer: MEDICARE

## 2023-08-24 ENCOUNTER — HOSPITAL ENCOUNTER (OUTPATIENT)
Facility: HOSPITAL | Age: 83
Discharge: NURSING, REHAB OR LONG-TERM CARE | End: 2023-08-28
Attending: STUDENT IN AN ORGANIZED HEALTH CARE EDUCATION/TRAINING PROGRAM | Admitting: STUDENT IN AN ORGANIZED HEALTH CARE EDUCATION/TRAINING PROGRAM
Payer: MEDICARE

## 2023-08-24 ENCOUNTER — ANESTHESIA (OUTPATIENT)
Dept: PERIOP | Facility: HOSPITAL | Age: 83
End: 2023-08-24
Payer: MEDICARE

## 2023-08-24 ENCOUNTER — TRANSCRIBE ORDERS (OUTPATIENT)
Dept: LAB | Facility: HOSPITAL | Age: 83
End: 2023-08-24
Payer: MEDICARE

## 2023-08-24 ENCOUNTER — ANESTHESIA EVENT (OUTPATIENT)
Dept: PERIOP | Facility: HOSPITAL | Age: 83
End: 2023-08-24
Payer: MEDICARE

## 2023-08-24 DIAGNOSIS — C50.911 INVASIVE DUCTAL CARCINOMA OF BREAST, FEMALE, RIGHT: Primary | ICD-10-CM

## 2023-08-24 DIAGNOSIS — C50.919 INFLAMMATORY CARCINOMA OF BREAST, UNSPECIFIED LATERALITY: ICD-10-CM

## 2023-08-24 DIAGNOSIS — C50.911 INVASIVE DUCTAL CARCINOMA OF BREAST, FEMALE, RIGHT: ICD-10-CM

## 2023-08-24 LAB
GLUCOSE BLDC GLUCOMTR-MCNC: 152 MG/DL (ref 70–130)
GLUCOSE BLDC GLUCOMTR-MCNC: 176 MG/DL (ref 70–130)

## 2023-08-24 PROCEDURE — 25010000002 CEFAZOLIN IN DEXTROSE 2-4 GM/100ML-% SOLUTION: Performed by: STUDENT IN AN ORGANIZED HEALTH CARE EDUCATION/TRAINING PROGRAM

## 2023-08-24 PROCEDURE — 25010000002 PHENYLEPHRINE 10 MG/ML SOLUTION: Performed by: NURSE ANESTHETIST, CERTIFIED REGISTERED

## 2023-08-24 PROCEDURE — 25010000002 SUGAMMADEX 200 MG/2ML SOLUTION: Performed by: NURSE ANESTHETIST, CERTIFIED REGISTERED

## 2023-08-24 PROCEDURE — 88360 TUMOR IMMUNOHISTOCHEM/MANUAL: CPT | Performed by: STUDENT IN AN ORGANIZED HEALTH CARE EDUCATION/TRAINING PROGRAM

## 2023-08-24 PROCEDURE — 19307 MAST MOD RAD: CPT | Performed by: STUDENT IN AN ORGANIZED HEALTH CARE EDUCATION/TRAINING PROGRAM

## 2023-08-24 PROCEDURE — 76098 X-RAY EXAM SURGICAL SPECIMEN: CPT

## 2023-08-24 PROCEDURE — 25010000002 ONDANSETRON PER 1 MG: Performed by: NURSE ANESTHETIST, CERTIFIED REGISTERED

## 2023-08-24 PROCEDURE — 88342 IMHCHEM/IMCYTCHM 1ST ANTB: CPT | Performed by: STUDENT IN AN ORGANIZED HEALTH CARE EDUCATION/TRAINING PROGRAM

## 2023-08-24 PROCEDURE — 25010000002 HYDROMORPHONE 1 MG/ML SOLUTION: Performed by: NURSE ANESTHETIST, CERTIFIED REGISTERED

## 2023-08-24 PROCEDURE — 88309 TISSUE EXAM BY PATHOLOGIST: CPT | Performed by: STUDENT IN AN ORGANIZED HEALTH CARE EDUCATION/TRAINING PROGRAM

## 2023-08-24 PROCEDURE — G0378 HOSPITAL OBSERVATION PER HR: HCPCS

## 2023-08-24 PROCEDURE — 88307 TISSUE EXAM BY PATHOLOGIST: CPT | Performed by: STUDENT IN AN ORGANIZED HEALTH CARE EDUCATION/TRAINING PROGRAM

## 2023-08-24 PROCEDURE — 25010000002 PROPOFOL 10 MG/ML EMULSION: Performed by: NURSE ANESTHETIST, CERTIFIED REGISTERED

## 2023-08-24 PROCEDURE — 25010000002 FENTANYL CITRATE (PF) 50 MCG/ML SOLUTION: Performed by: NURSE ANESTHETIST, CERTIFIED REGISTERED

## 2023-08-24 PROCEDURE — 25010000002 DEXAMETHASONE SODIUM PHOSPHATE 20 MG/5ML SOLUTION: Performed by: NURSE ANESTHETIST, CERTIFIED REGISTERED

## 2023-08-24 PROCEDURE — 82948 REAGENT STRIP/BLOOD GLUCOSE: CPT

## 2023-08-24 DEVICE — SEAL HEMO SURG ARISTA/AH ABS/PWDR 3GM: Type: IMPLANTABLE DEVICE | Site: BREAST | Status: FUNCTIONAL

## 2023-08-24 DEVICE — CLIP LIGAT VASC HORIZON TI MD/LG GRN 6CT: Type: IMPLANTABLE DEVICE | Site: BREAST | Status: FUNCTIONAL

## 2023-08-24 RX ORDER — EPHEDRINE SULFATE 50 MG/ML
5 INJECTION, SOLUTION INTRAVENOUS ONCE AS NEEDED
Status: DISCONTINUED | OUTPATIENT
Start: 2023-08-24 | End: 2023-08-24 | Stop reason: HOSPADM

## 2023-08-24 RX ORDER — IBUPROFEN 200 MG
800 TABLET ORAL EVERY 8 HOURS
Status: DISCONTINUED | OUTPATIENT
Start: 2023-08-24 | End: 2023-08-28 | Stop reason: HOSPADM

## 2023-08-24 RX ORDER — AMOXICILLIN 250 MG
2 CAPSULE ORAL 2 TIMES DAILY
Status: DISCONTINUED | OUTPATIENT
Start: 2023-08-24 | End: 2023-08-28 | Stop reason: HOSPADM

## 2023-08-24 RX ORDER — ONDANSETRON 2 MG/ML
INJECTION INTRAMUSCULAR; INTRAVENOUS AS NEEDED
Status: DISCONTINUED | OUTPATIENT
Start: 2023-08-24 | End: 2023-08-24 | Stop reason: SURG

## 2023-08-24 RX ORDER — DIPHENHYDRAMINE HYDROCHLORIDE 50 MG/ML
12.5 INJECTION INTRAMUSCULAR; INTRAVENOUS
Status: DISCONTINUED | OUTPATIENT
Start: 2023-08-24 | End: 2023-08-24 | Stop reason: HOSPADM

## 2023-08-24 RX ORDER — OXYCODONE HYDROCHLORIDE 5 MG/1
5 TABLET ORAL EVERY 4 HOURS PRN
Status: DISCONTINUED | OUTPATIENT
Start: 2023-08-24 | End: 2023-08-28 | Stop reason: HOSPADM

## 2023-08-24 RX ORDER — ACETAMINOPHEN 500 MG
1000 TABLET ORAL ONCE
Status: DISCONTINUED | OUTPATIENT
Start: 2023-08-24 | End: 2023-08-24 | Stop reason: HOSPADM

## 2023-08-24 RX ORDER — HYDROCODONE BITARTRATE AND ACETAMINOPHEN 7.5; 325 MG/1; MG/1
1 TABLET ORAL EVERY 4 HOURS PRN
Status: DISCONTINUED | OUTPATIENT
Start: 2023-08-24 | End: 2023-08-24 | Stop reason: HOSPADM

## 2023-08-24 RX ORDER — MAGNESIUM HYDROXIDE 1200 MG/15ML
LIQUID ORAL AS NEEDED
Status: DISCONTINUED | OUTPATIENT
Start: 2023-08-24 | End: 2023-08-24 | Stop reason: HOSPADM

## 2023-08-24 RX ORDER — IPRATROPIUM BROMIDE AND ALBUTEROL SULFATE 2.5; .5 MG/3ML; MG/3ML
3 SOLUTION RESPIRATORY (INHALATION) ONCE AS NEEDED
Status: DISCONTINUED | OUTPATIENT
Start: 2023-08-24 | End: 2023-08-24 | Stop reason: HOSPADM

## 2023-08-24 RX ORDER — FENTANYL CITRATE 50 UG/ML
INJECTION, SOLUTION INTRAMUSCULAR; INTRAVENOUS AS NEEDED
Status: DISCONTINUED | OUTPATIENT
Start: 2023-08-24 | End: 2023-08-24 | Stop reason: SURG

## 2023-08-24 RX ORDER — LIDOCAINE HYDROCHLORIDE 10 MG/ML
0.5 INJECTION, SOLUTION INFILTRATION; PERINEURAL ONCE AS NEEDED
Status: DISCONTINUED | OUTPATIENT
Start: 2023-08-24 | End: 2023-08-24 | Stop reason: HOSPADM

## 2023-08-24 RX ORDER — FENTANYL CITRATE 50 UG/ML
25 INJECTION, SOLUTION INTRAMUSCULAR; INTRAVENOUS
Status: DISCONTINUED | OUTPATIENT
Start: 2023-08-24 | End: 2023-08-24 | Stop reason: HOSPADM

## 2023-08-24 RX ORDER — PROMETHAZINE HYDROCHLORIDE 25 MG/1
25 SUPPOSITORY RECTAL ONCE AS NEEDED
Status: DISCONTINUED | OUTPATIENT
Start: 2023-08-24 | End: 2023-08-24 | Stop reason: HOSPADM

## 2023-08-24 RX ORDER — ONDANSETRON 2 MG/ML
4 INJECTION INTRAMUSCULAR; INTRAVENOUS ONCE AS NEEDED
Status: DISCONTINUED | OUTPATIENT
Start: 2023-08-24 | End: 2023-08-24 | Stop reason: HOSPADM

## 2023-08-24 RX ORDER — PROPOFOL 10 MG/ML
VIAL (ML) INTRAVENOUS AS NEEDED
Status: DISCONTINUED | OUTPATIENT
Start: 2023-08-24 | End: 2023-08-24 | Stop reason: SURG

## 2023-08-24 RX ORDER — SODIUM CHLORIDE 9 MG/ML
40 INJECTION, SOLUTION INTRAVENOUS AS NEEDED
Status: DISCONTINUED | OUTPATIENT
Start: 2023-08-24 | End: 2023-08-28 | Stop reason: HOSPADM

## 2023-08-24 RX ORDER — HYDROCODONE BITARTRATE AND ACETAMINOPHEN 5; 325 MG/1; MG/1
1 TABLET ORAL ONCE AS NEEDED
Status: DISCONTINUED | OUTPATIENT
Start: 2023-08-24 | End: 2023-08-24 | Stop reason: HOSPADM

## 2023-08-24 RX ORDER — BISACODYL 10 MG
10 SUPPOSITORY, RECTAL RECTAL DAILY PRN
Status: DISCONTINUED | OUTPATIENT
Start: 2023-08-24 | End: 2023-08-28 | Stop reason: HOSPADM

## 2023-08-24 RX ORDER — SODIUM CHLORIDE, SODIUM LACTATE, POTASSIUM CHLORIDE, CALCIUM CHLORIDE 600; 310; 30; 20 MG/100ML; MG/100ML; MG/100ML; MG/100ML
9 INJECTION, SOLUTION INTRAVENOUS CONTINUOUS
Status: DISCONTINUED | OUTPATIENT
Start: 2023-08-24 | End: 2023-08-28 | Stop reason: HOSPADM

## 2023-08-24 RX ORDER — MIDAZOLAM HYDROCHLORIDE 1 MG/ML
0.5 INJECTION INTRAMUSCULAR; INTRAVENOUS
Status: DISCONTINUED | OUTPATIENT
Start: 2023-08-24 | End: 2023-08-24 | Stop reason: HOSPADM

## 2023-08-24 RX ORDER — LABETALOL HYDROCHLORIDE 5 MG/ML
5 INJECTION, SOLUTION INTRAVENOUS
Status: DISCONTINUED | OUTPATIENT
Start: 2023-08-24 | End: 2023-08-24 | Stop reason: HOSPADM

## 2023-08-24 RX ORDER — POLYETHYLENE GLYCOL 3350 17 G/17G
17 POWDER, FOR SOLUTION ORAL DAILY PRN
Status: DISCONTINUED | OUTPATIENT
Start: 2023-08-24 | End: 2023-08-28 | Stop reason: HOSPADM

## 2023-08-24 RX ORDER — LIDOCAINE HYDROCHLORIDE 20 MG/ML
INJECTION, SOLUTION EPIDURAL; INFILTRATION; INTRACAUDAL; PERINEURAL AS NEEDED
Status: DISCONTINUED | OUTPATIENT
Start: 2023-08-24 | End: 2023-08-24 | Stop reason: SURG

## 2023-08-24 RX ORDER — NALOXONE HCL 0.4 MG/ML
0.2 VIAL (ML) INJECTION AS NEEDED
Status: DISCONTINUED | OUTPATIENT
Start: 2023-08-24 | End: 2023-08-24 | Stop reason: HOSPADM

## 2023-08-24 RX ORDER — PROMETHAZINE HYDROCHLORIDE 25 MG/1
25 TABLET ORAL ONCE AS NEEDED
Status: DISCONTINUED | OUTPATIENT
Start: 2023-08-24 | End: 2023-08-24 | Stop reason: HOSPADM

## 2023-08-24 RX ORDER — BISACODYL 5 MG/1
5 TABLET, DELAYED RELEASE ORAL DAILY PRN
Status: DISCONTINUED | OUTPATIENT
Start: 2023-08-24 | End: 2023-08-28 | Stop reason: HOSPADM

## 2023-08-24 RX ORDER — SODIUM CHLORIDE 0.9 % (FLUSH) 0.9 %
10 SYRINGE (ML) INJECTION AS NEEDED
Status: DISCONTINUED | OUTPATIENT
Start: 2023-08-24 | End: 2023-08-28 | Stop reason: HOSPADM

## 2023-08-24 RX ORDER — ACETAMINOPHEN 500 MG
1000 TABLET ORAL EVERY 8 HOURS
Status: DISCONTINUED | OUTPATIENT
Start: 2023-08-24 | End: 2023-08-28 | Stop reason: HOSPADM

## 2023-08-24 RX ORDER — SODIUM CHLORIDE 0.9 % (FLUSH) 0.9 %
3 SYRINGE (ML) INJECTION EVERY 12 HOURS SCHEDULED
Status: DISCONTINUED | OUTPATIENT
Start: 2023-08-24 | End: 2023-08-24 | Stop reason: HOSPADM

## 2023-08-24 RX ORDER — BUPIVACAINE HYDROCHLORIDE AND EPINEPHRINE 5; 5 MG/ML; UG/ML
INJECTION, SOLUTION PERINEURAL AS NEEDED
Status: DISCONTINUED | OUTPATIENT
Start: 2023-08-24 | End: 2023-08-24 | Stop reason: HOSPADM

## 2023-08-24 RX ORDER — SODIUM CHLORIDE 0.9 % (FLUSH) 0.9 %
10 SYRINGE (ML) INJECTION EVERY 12 HOURS SCHEDULED
Status: DISCONTINUED | OUTPATIENT
Start: 2023-08-24 | End: 2023-08-28 | Stop reason: HOSPADM

## 2023-08-24 RX ORDER — CEFAZOLIN SODIUM 2 G/100ML
2000 INJECTION, SOLUTION INTRAVENOUS ONCE
Status: COMPLETED | OUTPATIENT
Start: 2023-08-24 | End: 2023-08-24

## 2023-08-24 RX ORDER — ONDANSETRON 2 MG/ML
4 INJECTION INTRAMUSCULAR; INTRAVENOUS EVERY 6 HOURS PRN
Status: DISCONTINUED | OUTPATIENT
Start: 2023-08-24 | End: 2023-08-28 | Stop reason: HOSPADM

## 2023-08-24 RX ORDER — HYDROMORPHONE HYDROCHLORIDE 1 MG/ML
0.25 INJECTION, SOLUTION INTRAMUSCULAR; INTRAVENOUS; SUBCUTANEOUS
Status: DISCONTINUED | OUTPATIENT
Start: 2023-08-24 | End: 2023-08-24 | Stop reason: HOSPADM

## 2023-08-24 RX ORDER — DROPERIDOL 2.5 MG/ML
0.62 INJECTION, SOLUTION INTRAMUSCULAR; INTRAVENOUS
Status: DISCONTINUED | OUTPATIENT
Start: 2023-08-24 | End: 2023-08-24 | Stop reason: HOSPADM

## 2023-08-24 RX ORDER — FLUMAZENIL 0.1 MG/ML
0.2 INJECTION INTRAVENOUS AS NEEDED
Status: DISCONTINUED | OUTPATIENT
Start: 2023-08-24 | End: 2023-08-24 | Stop reason: HOSPADM

## 2023-08-24 RX ORDER — ROCURONIUM BROMIDE 10 MG/ML
INJECTION, SOLUTION INTRAVENOUS AS NEEDED
Status: DISCONTINUED | OUTPATIENT
Start: 2023-08-24 | End: 2023-08-24 | Stop reason: SURG

## 2023-08-24 RX ORDER — PHENYLEPHRINE HYDROCHLORIDE 10 MG/ML
INJECTION INTRAVENOUS AS NEEDED
Status: DISCONTINUED | OUTPATIENT
Start: 2023-08-24 | End: 2023-08-24 | Stop reason: SURG

## 2023-08-24 RX ORDER — HYDRALAZINE HYDROCHLORIDE 20 MG/ML
5 INJECTION INTRAMUSCULAR; INTRAVENOUS
Status: DISCONTINUED | OUTPATIENT
Start: 2023-08-24 | End: 2023-08-24 | Stop reason: HOSPADM

## 2023-08-24 RX ORDER — SODIUM CHLORIDE 0.9 % (FLUSH) 0.9 %
3-10 SYRINGE (ML) INJECTION AS NEEDED
Status: DISCONTINUED | OUTPATIENT
Start: 2023-08-24 | End: 2023-08-24 | Stop reason: HOSPADM

## 2023-08-24 RX ORDER — DEXAMETHASONE SODIUM PHOSPHATE 4 MG/ML
INJECTION, SOLUTION INTRA-ARTICULAR; INTRALESIONAL; INTRAMUSCULAR; INTRAVENOUS; SOFT TISSUE AS NEEDED
Status: DISCONTINUED | OUTPATIENT
Start: 2023-08-24 | End: 2023-08-24 | Stop reason: SURG

## 2023-08-24 RX ADMIN — HYDROMORPHONE HYDROCHLORIDE 0.25 MG: 1 INJECTION, SOLUTION INTRAMUSCULAR; INTRAVENOUS; SUBCUTANEOUS at 09:32

## 2023-08-24 RX ADMIN — LIDOCAINE HYDROCHLORIDE 80 MG: 20 INJECTION, SOLUTION EPIDURAL; INFILTRATION; INTRACAUDAL; PERINEURAL at 07:32

## 2023-08-24 RX ADMIN — ROCURONIUM BROMIDE 40 MG: 10 INJECTION, SOLUTION INTRAVENOUS at 07:32

## 2023-08-24 RX ADMIN — SENNOSIDES AND DOCUSATE SODIUM 2 TABLET: 50; 8.6 TABLET ORAL at 21:14

## 2023-08-24 RX ADMIN — ACETAMINOPHEN 1000 MG: 500 TABLET, FILM COATED ORAL at 21:14

## 2023-08-24 RX ADMIN — CEFAZOLIN SODIUM 2000 MG: 2 INJECTION, SOLUTION INTRAVENOUS at 07:21

## 2023-08-24 RX ADMIN — ACETAMINOPHEN 1000 MG: 500 TABLET, FILM COATED ORAL at 13:52

## 2023-08-24 RX ADMIN — HYDROMORPHONE HYDROCHLORIDE 0.25 MG: 1 INJECTION, SOLUTION INTRAMUSCULAR; INTRAVENOUS; SUBCUTANEOUS at 08:50

## 2023-08-24 RX ADMIN — FENTANYL CITRATE 25 MCG: 50 INJECTION, SOLUTION INTRAMUSCULAR; INTRAVENOUS at 08:02

## 2023-08-24 RX ADMIN — PROPOFOL 120 MG: 10 INJECTION, EMULSION INTRAVENOUS at 07:32

## 2023-08-24 RX ADMIN — PHENYLEPHRINE HYDROCHLORIDE 100 MCG: 10 INJECTION INTRAVENOUS at 07:40

## 2023-08-24 RX ADMIN — SUGAMMADEX 200 MG: 100 INJECTION, SOLUTION INTRAVENOUS at 08:19

## 2023-08-24 RX ADMIN — SODIUM CHLORIDE, POTASSIUM CHLORIDE, SODIUM LACTATE AND CALCIUM CHLORIDE: 600; 310; 30; 20 INJECTION, SOLUTION INTRAVENOUS at 09:25

## 2023-08-24 RX ADMIN — DEXAMETHASONE SODIUM PHOSPHATE 8 MG: 4 INJECTION, SOLUTION INTRAMUSCULAR; INTRAVENOUS at 07:32

## 2023-08-24 RX ADMIN — PHENYLEPHRINE HYDROCHLORIDE 100 MCG: 10 INJECTION INTRAVENOUS at 07:49

## 2023-08-24 RX ADMIN — ONDANSETRON 4 MG: 2 INJECTION INTRAMUSCULAR; INTRAVENOUS at 09:09

## 2023-08-24 RX ADMIN — PHENYLEPHRINE HYDROCHLORIDE 100 MCG: 10 INJECTION INTRAVENOUS at 08:13

## 2023-08-24 RX ADMIN — SODIUM CHLORIDE, POTASSIUM CHLORIDE, SODIUM LACTATE AND CALCIUM CHLORIDE 9 ML/HR: 600; 310; 30; 20 INJECTION, SOLUTION INTRAVENOUS at 07:24

## 2023-08-24 RX ADMIN — IBUPROFEN 800 MG: 600 TABLET, FILM COATED ORAL at 22:47

## 2023-08-24 RX ADMIN — FENTANYL CITRATE 25 MCG: 50 INJECTION, SOLUTION INTRAMUSCULAR; INTRAVENOUS at 07:32

## 2023-08-24 RX ADMIN — PHENYLEPHRINE HYDROCHLORIDE 100 MCG: 10 INJECTION INTRAVENOUS at 07:42

## 2023-08-24 NOTE — OP NOTE
OPERATIVE REPORT     DATE: 8/24/2023     SURGEON: Rosa Michael MD      ASSISTANT: Cecilia Eisenberg, who was present for necessary suctioning, retracting, suturing throughout the procedure      OPERATION PERFORMED: Right breast modified radical mastectomy     PREOPERATIVE DIAGNOSIS: Inflammatory cancer of the right breast      POSTOPERATIVE DIAGNOSIS: Same     ANESTHESIA: General     SPECIMEN:   Right modified radical mastectomy (stitch at 12:00)  Right additional axillary tissue  Right breast additional lateral skin margin    DRAINS: None     BLOOD LOSS: Minimal     INDICATION FOR OPERATION: Veronica Royal is a 82 y.o. lady who was recently diagnosed with right breast inflammatory cancer.   She underwent neoadjuvant chemotherapy which was stopped short due to side effects and toxicity.  She presents today for right breast modified radical mastectomy. All risks (including bleeding, infection, damage to surrounding structures, need for further surgery, positive margins ), benefits and alternatives were explained to the patient who agreed and wished to proceed. Informed consent was signed.      OPERATIVE COURSE: The patient was taken to the operating room, transferred onto the operating room table, and underwent anesthesia without incident. The patient was prepped and draped in sterile fashion. A time out was performed and preoperative antibiotics were given. An elliptical incision was drawn around the right breast and nipple areolar complex. Half percent Marcaine with epinephrine was injected into the skin and subcutaneous tissues. A scalpel was used to transect the skin and dermal layer. Subcutaneous flaps were created approximately 1cm in thickness circumferentially. These were extended to the clavicle superiorly, the sternum medially, the inframammary fold inferiorly, and the serratus laterally. Once this was circumferentially dissected free with Bovie electrocautery, the breast was removed from the chest wall at the  level of the pectoralis fascia.  It remained laterally to help with the axillary dissection.  It was marked as listed above and passed off for fresh permanent specimen. The area was irrigated and appeared hemostatic. Bovie electrocautery was used for any small muscle bleeding. The remainder of the half percent Marcaine with epinephrine was injected into the intercostal nerve bundles and the subcutaneous tissue.  We then performed the axillary dissection.  The borders of the axilla were defined with sharp dissection and Bovie electrocautery.  The inferior border of the axillary vein, the pectoralis, and the latissimus dorsi were dissected circumferentially.  The long thoracic and thoracodorsal nerves were identified and preserved along the course.  All axillary tissue in this area was then removed.  2 small branches of the axillary vein were doubly clipped and ligated.  Once this was done there was one other small area of axillary tissue in the medial superior aspect of the incision that was dissected free and sent as specimen.  The rest of the local anesthetic was administered.  The tissue was reapproximated after placing a 19 Bahamian Pantera drain x2.  3 g of Ayaka was placed in the wound bed.  A lateral margin was removed for better closure.  The incision was closed with interrupted 3-0 Vicryl sutures and a running 4-0 Monocryl suture.  Skin glue was placed over the incision.  The patient tolerated the procedure well. All needle and lap counts were correct at the end of the case. The patient was then awoken from anesthesia and taken to recovery for further monitoring.     Axillary Lymph Node Dissection for Breast Cancer - Right  Operation performed with curative intent. Yes   Resection was performed within the boundaries of the axillary vein, chest wall (serratus anterior), and latissimus dorsi. Yes   Nerves identified and preserved during dissection (select all that apply). Long thoracic nerve, Thoracodorsal nerve,  and Branches of the intercostobrachial nerves   Level III nodes were removed. No      Rosa Michael MD   General and Endoscopic Surgery  [facility]     [address], [address]  [place], [place], [address]  P: [phone]  F: [phone]

## 2023-08-24 NOTE — ANESTHESIA PREPROCEDURE EVALUATION
Anesthesia Evaluation     Patient summary reviewed and Nursing notes reviewed   no history of anesthetic complications:   NPO Solid Status: > 8 hours  NPO Liquid Status: > 2 hours           Airway   Mallampati: II  TM distance: >3 FB  Neck ROM: full  Dental    (+) partials    Pulmonary    Cardiovascular     (+) hypertension, hyperlipidemia      Neuro/Psych  GI/Hepatic/Renal/Endo    (+) liver disease cirrhosis, renal disease CRI, diabetes mellitus    Musculoskeletal     Abdominal    Substance History      OB/GYN          Other      history of cancer                    Anesthesia Plan    ASA 3     general     intravenous induction     Anesthetic plan, risks, benefits, and alternatives have been provided, discussed and informed consent has been obtained with: patient.      CODE STATUS:

## 2023-08-24 NOTE — ADDENDUM NOTE
Addendum  created 08/24/23 1313 by Chico Burgess MD    Attestation recorded in Intraprocedure, Intraprocedure Attestations filed

## 2023-08-24 NOTE — ANESTHESIA PROCEDURE NOTES
Airway  Urgency: elective    Date/Time: 8/24/2023 7:35 AM  Airway not difficult    General Information and Staff    Patient location during procedure: OR  Anesthesiologist: Chico Burgess MD  CRNA/CAA: Denia Guzman CRNA    Indications and Patient Condition  Indications for airway management: airway protection    Preoxygenated: yes  Mask difficulty assessment: 2 - vent by mask + OA or adjuvant +/- NMBA    Final Airway Details  Final airway type: endotracheal airway      Successful airway: ETT  Cuffed: yes   Successful intubation technique: direct laryngoscopy  Facilitating devices/methods: intubating stylet  Endotracheal tube insertion site: oral  Blade: Kallie  Blade size: 3  ETT size (mm): 7.0  Cormack-Lehane Classification: grade I - full view of glottis  Placement verified by: chest auscultation and capnometry   Cuff volume (mL): 6  Measured from: gums  ETT/EBT to gums (cm): 20  Number of attempts at approach: 1  Assessment: lips, teeth, and gum same as pre-op and atraumatic intubation           meal provided/repositioned

## 2023-08-24 NOTE — DISCHARGE PLACEMENT REQUEST
"GennyRoxie uriosteguinna MERCEDES (82 y.o. Female)       Date of Birth   1940    Social Security Number       Address   [address] [place] [address]    Home Phone   [phone]    MRN   [id number]       Adventism   Anab[employer]ist    Marital Status                               Admission Date   8/24/23    Admission Type   Elective    Admitting Provider   Rosa Michael MD    Attending Provider   Rosa Michael MD    Department, Room/Bed   [address], P680/1       Discharge Date       Discharge Disposition       Discharge Destination                                 Attending Provider: Rosa Michael MD    Allergies: No Known Allergies    Isolation: None   Infection: None   Code Status: Prior    Ht: 154.9 cm (60.98\")   Wt: 58.9 kg (129 lb 12.8 oz)    Admission Cmt: None   Principal Problem: Malignant neoplasm of female breast [C50.919]                   Active Insurance as of 8/24/2023       Primary Coverage       Payor Plan Insurance Group Employer/Plan Group    ANTH MEDICARE REPLACEMENT [facility] MEDICARE ADVANTAGE [id number]       Payor Plan Address Payor Plan Phone Number Payor Plan Fax Number Effective Dates    [address] [phone]  1/1/2018 - None Entered    [facility] [id number]         Subscriber Name Subscriber Birth Date Member ID       VERONICA SOTELO 1940 [id number]                     Emergency Contacts        (Rel.) Home Phone Work Phone Mobile Phone    MAK THAKKAR (Daughter) [phone] -- [phone]                "

## 2023-08-24 NOTE — PROGRESS NOTES
Continued Stay Note  [facility]     Patient Name: Veronica Royal  MRN: [id number]  Today's Date: 8/24/2023    Admit Date: 8/24/2023    Plan: SNF (referral in [facility]/Pending)   Discharge Plan       Row Name 08/24/23 1332       Plan    Plan SNF (referral in [facility]/Pending)    Plan Comments Introduced self and role of CCP.  Patient stated she lives alone and is normally independent with ADL's and uses no DMEs.  Stated she started chemo in late April and after 11 sessions, she broke out and was hospitalized for 8 days then went to rehab ([facility] ) for 11 days. Stated no more Chemo, that is why she had surgery. Will have radiation at one point. Stated [place]. Requested [facility] or [facility]. Referral placed in [facility]. Informed Kiah Benitez of referrals.                   Discharge Codes    No documentation.                 Expected Discharge Date and Time       Expected Discharge Date Expected Discharge Time    Aug 26, 2023               Maki Alas RN

## 2023-08-24 NOTE — ANESTHESIA POSTPROCEDURE EVALUATION
Patient: Veronica Royal    Procedure Summary       Date: 08/24/23 Room / Location:  [facility] [address] [place] [address] [facility]    Anesthesia Start: 0724 Anesthesia Stop: 0946    Procedure: Right breast modified radical mastectomy (Right: Breast) Diagnosis:       Inflammatory carcinoma of breast, unspecified laterality      (Inflammatory carcinoma of breast, unspecified laterality [C50.919])    Surgeons: Rosa Michael MD Provider: Chico Burgess MD    Anesthesia Type: general ASA Status: 3            Anesthesia Type: general    Vitals  Vitals Value Taken Time   /57 08/24/23 1000   Temp 36.4 øC (97.5 øF) 08/24/23 0942   Pulse 71 08/24/23 1002   Resp 15 08/24/23 0950   SpO2 100 % 08/24/23 1002   Vitals shown include unvalidated device data.        Post Anesthesia Care and Evaluation    Patient location during evaluation: bedside  Patient participation: complete - patient participated  Level of consciousness: awake and alert  Pain management: adequate    Airway patency: patent  Anesthetic complications: No anesthetic complications  PONV Status: controlled  Cardiovascular status: blood pressure returned to baseline and acceptable  Respiratory status: acceptable  Hydration status: acceptable

## 2023-08-24 NOTE — PLAN OF CARE
Goal Outcome Evaluation:           Progress: no change  Outcome Evaluation: from PACU. VSS. IVF infusing. no reports of pain. no reports of nausea. right chest dsg CD&I. incision well approximated with dermabond. APRUL x 2 with sangunious drainage. Up with assist x 1. voiding freely. scheduled tylenol and ibuprofen adminsistered. regular/ soft diet ordered. family at bedside updated on plan of care. PT and OT consult placed. Plans for rehab at d/c.

## 2023-08-25 PROCEDURE — G0378 HOSPITAL OBSERVATION PER HR: HCPCS

## 2023-08-25 PROCEDURE — 97535 SELF CARE MNGMENT TRAINING: CPT

## 2023-08-25 PROCEDURE — 97161 PT EVAL LOW COMPLEX 20 MIN: CPT

## 2023-08-25 PROCEDURE — 97165 OT EVAL LOW COMPLEX 30 MIN: CPT

## 2023-08-25 RX ADMIN — ACETAMINOPHEN 1000 MG: 500 TABLET, FILM COATED ORAL at 07:31

## 2023-08-25 RX ADMIN — IBUPROFEN 800 MG: 600 TABLET, FILM COATED ORAL at 21:46

## 2023-08-25 RX ADMIN — ACETAMINOPHEN 1000 MG: 500 TABLET, FILM COATED ORAL at 21:46

## 2023-08-25 RX ADMIN — ACETAMINOPHEN 1000 MG: 500 TABLET, FILM COATED ORAL at 15:54

## 2023-08-25 RX ADMIN — SENNOSIDES AND DOCUSATE SODIUM 2 TABLET: 50; 8.6 TABLET ORAL at 21:46

## 2023-08-25 RX ADMIN — Medication 10 ML: at 08:06

## 2023-08-25 RX ADMIN — Medication 10 ML: at 21:47

## 2023-08-25 NOTE — PLAN OF CARE
Goal Outcome Evaluation:  Plan of Care Reviewed With: patient        Progress: no change  Outcome Evaluation: Pt admitted to [facility] s/p R mastectomy. Lives alone in a 1 story home and is (I) w/ BADLs at baseline. Participated in OT this morning. Performing bed mobility and LBD at EOB w/ SBA. STS transfers, functional mobility, and standing UB grooming/hygiene w/ CGA and no AD used. Mild strength deficits identified, however no balance/endurance deficits. Anticipate d/c home w/ PRN assist. OT will f/u to address strength to perform BADLs.      Anticipated Discharge Disposition (OT): home with assist

## 2023-08-25 NOTE — CONSULTS
Nutrition Services    Patient Name:  Veronica Royal  YOB: 1940  MRN: [id number]  Admit Date:  8/24/2023    Assessment Date:  08/25/23    Comment: Visited patient at bedside who reports her appetite has improved and she reports she does not want to gain any of her weight back. Per wt hx, she has lost 24# (16%) x 3 months related to breast cancer and undergoing chemotherapy which made her sick and altered her taste. Patient may potentially undergo mastectomy. Her daughter was at bedside to reports she has been dehydrated but has been eating. Patient reports some temporary chewing issues since her plates were recently broken. She has trouble chewing meats, so she is agreeable to vanilla magic cups q daily to help meet protein needs. RD encouraged po intake and hydration.     Patient meets ASPEN/AND criteria for nutrition dx of severe malnutrition of chronic disease based on wt loss and energy intake    Will continue to follow     CLINICAL NUTRITION ASSESSMENT      Reason for Assessment Nurse Admission Screen     Diagnosis/Problem   Malignant neoplasm of female breast    Medical/Surgical History Past Medical History:   Diagnosis Date    Anxiety     Arthritis     Basal cell carcinoma     Left thumb    Breast cancer 2023    Right    Depression     Diabetes mellitus     Diarrhea     s/e chemo    High cholesterol     History of chemotherapy 08/2023    Hypertension     Rash 08/2023    s/e chemo    Sacral decubitus ulcer     cleaning with soap & water    Urinary incontinence     wears pads       Past Surgical History:   Procedure Laterality Date    CATARACT EXTRACTION EXTRACAPSULAR W/ INTRAOCULAR LENS IMPLANTATION Right     EYE SURGERY      Muscle repair age 21    TONSILLECTOMY      VENOUS ACCESS DEVICE (PORT) INSERTION N/A 05/19/2023    Procedure: INSERTION VENOUS ACCESS DEVICE;  Surgeon: Rosa Michael MD;  Location: [facility] [place] [place];  Service: General;  Laterality: N/A;        Encounter Information       "  Nutrition History:     Food Preferences:    Supplements:    Factors Affecting Intake: No factors at this time   While undergoing chemo, experienced N/V and altered taste      Anthropometrics        Current Height  Current Weight  BMI kg/m2 Height: 155 cm (61.02\")  Weight: 59 kg (130 lb) (08/24/23 1500)  Body mass index is 24.54 kg/m².   Adjusted BMI (if applicable)    BMI Category Normal/Healthy (18.4 - 24.9)       Admission Weight 130# (59 kg)        Ideal Body Weight (IBW) 105#    Adjusted IBW (if applicable)        Usual Body Weight (UBW) 140s    Weight Trend Loss, Amount/Timeframe:  24# (16%) x 3 months       Weight History Wt Readings from Last 30 Encounters:   08/24/23 1500 59 kg (130 lb)   08/02/23 1153 58.9 kg (129 lb 12.8 oz)   07/31/23 0954 58.3 kg (128 lb 9.6 oz)   07/12/23 1630 58.7 kg (129 lb 6.4 oz)   07/12/23 0935 58.7 kg (129 lb 6.4 oz)   07/07/23 1419 62 kg (136 lb 9.6 oz)   07/05/23 0832 61.5 kg (135 lb 9.6 oz)   06/29/23 1139 65.8 kg (145 lb)   06/28/23 0904 63.6 kg (140 lb 4.8 oz)   06/21/23 0917 65.9 kg (145 lb 3.2 oz)   06/16/23 1532 66 kg (145 lb 9.6 oz)   06/14/23 0936 64.9 kg (143 lb 1.6 oz)   06/08/23 0925 64.4 kg (142 lb)   06/07/23 1053 65.4 kg (144 lb 1.6 oz)   06/05/23 1345 65.8 kg (145 lb)   06/02/23 1147 66.2 kg (146 lb)   06/02/23 0841 65.7 kg (144 lb 12.8 oz)   05/31/23 1134 66.1 kg (145 lb 11.2 oz)   05/30/23 1443 66.4 kg (146 lb 6.4 oz)   05/25/23 1008 69.4 kg (152 lb 14.4 oz)   05/24/23 0802 69.5 kg (153 lb 3.2 oz)   05/22/23 1525 69.2 kg (152 lb 9.6 oz)   05/19/23 1225 69.9 kg (154 lb 1.6 oz)   05/16/23 0831 69.9 kg (154 lb 1.6 oz)   05/09/23 1030 68.9 kg (152 lb)   05/01/23 1420 69 kg (152 lb 3.2 oz)      --  Estimated/Assessed Needs        Current Weight  Weight: 59 kg (130 lb) (08/24/23 1500)       Energy Requirements    Weight for Calculation 130# (59 kg)    Method for Estimation  25 kcal/kg, 30 kcal/kg   EST Needs (kcal/day) 4770-6611       Protein Requirements    Weight " for Calculation 130# (59 kg)    EST Protein Needs (g/kg) 1.2 - 1.5 gm/kg   EST Daily Needs (g/day) 71-89       Fluid Requirements     Method for Estimation 1 mL/kcal    EST Needs (mL/day)      Tests/Procedures        Tests/Procedures X-Ray     Labs       Pertinent Labs          Invalid input(s): LABALBU, PROT          No results found for: COVID19  Lab Results   Component Value Date    HGBA1C 8.60 (H) 07/13/2023          Medications           Scheduled Medications acetaminophen, 1,000 mg, Oral, Q8H  ibuprofen, 800 mg, Oral, Q8H  senna-docusate sodium, 2 tablet, Oral, BID  sodium chloride, 10 mL, Intravenous, Q12H       Infusions lactated ringers, 9 mL/hr, Last Rate: 9 mL/hr (08/24/23 1314)       PRN Medications   senna-docusate sodium **AND** polyethylene glycol **AND** bisacodyl **AND** bisacodyl    ondansetron    oxyCODONE    sodium chloride    sodium chloride     Physical Findings          Physical Appearance alert, hard of hearing, oriented   Oral/Mouth Cavity dental appliance   Edema  no edema   Gastrointestinal hypoactive bowel sounds, last bowel movement: 8/23   Skin  surgical incision: R breast    Tubes/Drains/Lines none   NFPE No clinical signs of muscle wasting or fat loss   --  Malnutrition Severity Assessment      Patient meets criteria for : Severe Malnutrition (Patient meets ASPEN/AND criteria for nutrition dx of severe malnutrition of chronic disease based on wt loss and energy intake)  Malnutrition Type (last 8 hours)       Malnutrition Severity Assessment       Row Name 08/25/23 1503       Malnutrition Severity Assessment    Malnutrition Type Chronic Disease - Related Malnutrition      Row Name 08/25/23 1503       Insufficient Energy Intake     Insufficient Energy Intake Findings Severe    Insufficient Energy Intake  <75% of est. energy requirement for > or equal to 1 month      Row Name 08/25/23 1503       Unintentional Weight Loss     Unintentional Weight Loss Findings Severe  24# (16%) x 3 months     Unintentional Weight Loss  Weight loss greater than 7.5% in three months      Row Name 08/25/23 1503       Criteria Met (Must meet criteria for severity in at least 2 of these categories: M Wasting, Fat Loss, Fluid, Secondary Signs, Wt. Status, Intake)    Patient meets criteria for  Severe Malnutrition  Patient meets ASPEN/AND criteria for nutrition dx of severe malnutrition of chronic disease based on wt loss and energy intake                       Current Nutrition Orders & Evaluation of Intake       Oral Nutrition     Food Allergies NKFA   Current PO Diet Diet: Regular/House Diet, Diabetic Diets; Consistent Carbohydrate; Texture: Soft to Chew (NDD 3); Soft to Chew: Whole Meat; Fluid Consistency: Thin (IDDSI 0)   Supplement n/a   PO Evaluation     % PO Intake/# of Days 25% x 2 meals    --  PES STATEMENT / NUTRITION DIAGNOSIS      Nutrition Dx Problem  Problem: Malnutrition (severe)  Etiology: Medical Diagnosis - breast cancer     Signs/Symptoms: PO intake and Unintended Weight Change    Comment:    --  NUTRITION INTERVENTION / PLAN OF CARE      Intervention Goal(s) Provide information, Reduce/improve symptoms, Meet estimated needs, Disease management/therapy, Tolerate PO , Increase intake, and Maintain weight         RD Intervention/Action Interview for preferences, Supplement provided, Encourage intake, Follow Tx Progress, and Care plan reviewed         Prescription/Orders:       PO Diet       Supplements Vanilla magic cup q daily       Snacks       Enteral Nutrition       Parenteral Nutrition    New Prescription Ordered? Yes   --      Monitor/Evaluation Per protocol, I&O, PO intake, Supplement intake, Pertinent labs, Weight, Skin status, GI status, Symptoms, POC/GOC   Discharge Plan/Needs Pending clinical course   Education Will instruct as appropriate   --    RD to follow per protocol.      Electronically signed by:  Alicia Oro RD  08/25/23 15:53 EDT

## 2023-08-25 NOTE — PLAN OF CARE
Goal Outcome Evaluation:  Plan of Care Reviewed With: patient        Progress: improving  Outcome Evaluation: Right chest incision CD&I, JPx2 with small amount of serosanguinous drainage. No c/o pain. Tolerating regular diet. Up in chair, fall precautions maintained. Plan for discharge to [facility], awaiting pre-cert.

## 2023-08-25 NOTE — THERAPY EVALUATION
Patient Name: Vernoica Royal  : 1940    MRN: [id number]                              Today's Date: 2023       Admit Date: 2023    Visit Dx:     ICD-10-CM ICD-9-CM   1. Inflammatory carcinoma of breast, unspecified laterality  C50.919 174.9     Patient Active Problem List   Diagnosis    Malignant neoplasm of upper-outer quadrant of right breast in female, estrogen receptor negative    Encounter for fitting and adjustment of vascular catheter    Hypertension    At high risk for malnutrition    Bilateral hearing loss    Malignant neoplasm of female breast    Advanced care planning/counseling discussion    Cirrhosis of liver    Type 2 diabetes mellitus    Anemia due to chemotherapy    Left renal mass    Severe malnutrition    Breast cancer     Past Medical History:   Diagnosis Date    Anxiety     Arthritis     Basal cell carcinoma     Left thumb    Breast cancer     Right    Depression     Diabetes mellitus     Diarrhea     s/e chemo    High cholesterol     History of chemotherapy 2023    Hypertension     Rash 2023    s/e chemo    Sacral decubitus ulcer     cleaning with soap & water    Urinary incontinence     wears pads     Past Surgical History:   Procedure Laterality Date    CATARACT EXTRACTION EXTRACAPSULAR W/ INTRAOCULAR LENS IMPLANTATION Right     EYE SURGERY      Muscle repair age 21    TONSILLECTOMY      VENOUS ACCESS DEVICE (PORT) INSERTION N/A 2023    Procedure: INSERTION VENOUS ACCESS DEVICE;  Surgeon: Rosa Michael MD;  Location: [facility] [place];  Service: General;  Laterality: N/A;      General Information       Row Name 23 1426          Physical Therapy Time and Intention    Document Type evaluation  -SM     Mode of Treatment individual therapy;physical therapy  -       Row Name 23 1426          General Information    Patient Profile Reviewed yes  -SM     Existing Precautions/Restrictions fall  -SM       Row Name 23 1426          Living  Environment    People in Home alone  -       Row Name 08/25/23 1426          Home Main Entrance    Number of Stairs, Main Entrance five  -       Row Name 08/25/23 1426          Cognition    Orientation Status (Cognition) oriented x 3  some confusion  -       Row Name 08/25/23 1426          Safety Issues, Functional Mobility    Impairments Affecting Function (Mobility) balance;strength;endurance/activity tolerance  -               User Key  (r) = Recorded By, (t) = Taken By, (c) = Cosigned By      Initials Name Provider Type     Kelsie Cordero PT Physical Therapist                   Mobility       Row Name 08/25/23 1427          Bed Mobility    Comment, (Bed Mobility) Patient UIC at end of session  -       Row Name 08/25/23 1427          Sit-Stand Transfer    Sit-Stand [place] (Transfers) contact guard  -     Assistive Device (Sit-Stand Transfers) other (see comments)  HHA  -       Row Name 08/25/23 1427          Gait/Stairs (Locomotion)    [place] Level (Gait) contact guard  -     Assistive Device (Gait) other (see comments)  HHA  -     Distance in Feet (Gait) 30ft  -     Deviations/Abnormal Patterns (Gait) gait speed decreased;gregory decreased  -     Bilateral Gait Deviations forward flexed posture  -     Comment, (Gait/Stairs) Gait slow and mildly unsteady. No overt LOB noted.  -               User Key  (r) = Recorded By, (t) = Taken By, (c) = Cosigned By      Initials Name Provider Type     Kelsie Cordero PT Physical Therapist                   Obj/Interventions       Row Name 08/25/23 1428          Range of Motion Comprehensive    General Range of Motion bilateral lower extremity ROM WFL  -       Row Name 08/25/23 1428          Strength Comprehensive (MMT)    General Manual Muscle Testing (MMT) Assessment lower extremity strength deficits identified  -     Comment, General Manual Muscle Testing (MMT) Assessment Generalized LE weakness  -       Row Name  08/25/23 1428          Balance    Balance Assessment sitting static balance;sitting dynamic balance;sit to stand dynamic balance;standing static balance;standing dynamic balance  -SM     Static Sitting Balance supervision  -SM     Dynamic Sitting Balance standby assist  -SM     Position, Sitting Balance sitting in chair  -SM     Sit to Stand Dynamic Balance contact guard  -SM     Static Standing Balance contact guard  -SM     Dynamic Standing Balance contact guard  -SM     Position/Device Used, Standing Balance supported;other (see comments)  HHA  -     Balance Interventions sitting;standing;sit to stand;static;supported;dynamic  -SM               User Key  (r) = Recorded By, (t) = Taken By, (c) = Cosigned By      Initials Name Provider Type     Kelsie Cordero, GENO Physical Therapist                   Goals/Plan       Row Name 08/25/23 1435          Bed Mobility Goal 1 (PT)    Activity/Assistive Device (Bed Mobility Goal 1, PT) bed mobility activities, all  -SM     [place] Level/Cues Needed (Bed Mobility Goal 1, PT) modified independence  -SM     Time Frame (Bed Mobility Goal 1, PT) 1 week  -       Row Name 08/25/23 1435          Transfer Goal 1 (PT)    Activity/Assistive Device (Transfer Goal 1, PT) sit-to-stand/stand-to-sit;bed-to-chair/chair-to-bed  -SM     [place] Level/Cues Needed (Transfer Goal 1, PT) modified independence  -SM     Time Frame (Transfer Goal 1, PT) 1 week  -       Row Name 08/25/23 1435          Gait Training Goal 1 (PT)    Activity/Assistive Device (Gait Training Goal 1, PT) gait (walking locomotion);walker, rolling  -SM     [place] Level (Gait Training Goal 1, PT) supervision required  -SM     Distance (Gait Training Goal 1, PT) 200ft  -SM     Time Frame (Gait Training Goal 1, PT) 1 week  -SM               User Key  (r) = Recorded By, (t) = Taken By, (c) = Cosigned By      Initials Name Provider Type     Kelsie Cordero, GENO Physical Therapist                    Clinical Impression       Row Name 08/25/23 1428          Pain    Pretreatment Pain Rating 0/10 - no pain  -SM     Posttreatment Pain Rating 0/10 - no pain  -SM       Row Name 08/25/23 1428          Plan of Care Review    Plan of Care Reviewed With patient  -     Outcome Evaluation Patient is an 82 y.o female who presents POD1 R radical mastectomy. Patient AOx3 throughout session with some occasional confusion. Patient sitting UIC upon PT arrival. Patient lives alone with 5 GLENNA. Patient is independent at baseline with ADLs and typically uses a rwx only for longer distances. Patient reports recent history of falls. Patient performed STS from chair with CGA. Patient ambulated 30ft with HHA and CGA. Gait slow and mildly unsteady. No overt LOB noted. Patient returned to sitting UIC at end of session. Patient would continue to benefit from skilled PT intervention to address deficits in functional mobility and maximize safety and independence. Plan is SNF at d/c. PT will continue to monitor.  -       Row Name 08/25/23 1428          Therapy Assessment/Plan (PT)    Rehab Potential (PT) good, to achieve stated therapy goals  -     Criteria for Skilled Interventions Met (PT) yes  -SM     Therapy Frequency (PT) 6 times/wk  -       Row Name 08/25/23 1428          Vital Signs    Pre Patient Position Sitting  -SM     Intra Patient Position Standing  -SM     Post Patient Position Sitting  -SM       Row Name 08/25/23 1428          Positioning and Restraints    Pre-Treatment Position sitting in chair/recliner  -SM     Post Treatment Position chair  -SM     In Chair reclined;notified nsg;call light within reach;encouraged to call for assist;exit alarm on;with family/caregiver  -               User Key  (r) = Recorded By, (t) = Taken By, (c) = Cosigned By      Initials Name Provider Type    SM Kelsie Cordero, PT Physical Therapist                   Outcome Measures       Row Name 08/25/23 1435 08/25/23 0806       How much  help from another person do you currently need...    Turning from your back to your side while in flat bed without using bedrails? 3  -SM 3  -MA    Moving from lying on back to sitting on the side of a flat bed without bedrails? 3  -SM 3  -MA    Moving to and from a bed to a chair (including a wheelchair)? 3  -SM 3  -MA    Standing up from a chair using your arms (e.g., wheelchair, bedside chair)? 3  -SM 3  -MA    Climbing 3-5 steps with a railing? 2  -SM 3  -MA    To walk in hospital room? 3  -SM 3  -MA    AM-PAC 6 Clicks Score (PT) 17  -SM 18  -MA    Highest level of mobility 5 --> Static standing  - 6 --> Walked 10 steps or more  -MA      Row Name 08/25/23 1059          Modified Itzel Scale    Modified [place] Scale 3 - Moderate disability.  Requiring some help, but able to walk without assistance.  -KG       Row Name 08/25/23 1435 08/25/23 1059       Functional Assessment    Outcome Measure Options AM-PAC 6 Clicks Basic Mobility (PT)  - AM-PAC 6 Clicks Daily Activity (OT);Modified [place]  -KG              User Key  (r) = Recorded By, (t) = Taken By, (c) = Cosigned By      Initials Name Provider Type    Shirley Akers, RN Registered Nurse    Kelsie Osman, PT Physical Therapist    Parish Lynch, OT Occupational Therapist                                 Physical Therapy Education       Title: PT OT SLP Therapies (In Progress)       Topic: Physical Therapy (In Progress)       Point: Mobility training (Done)       Learning Progress Summary             Patient Acceptance, E, VU by  at 8/25/2023 1436                         Point: Home exercise program (Not Started)       Learner Progress:  Not documented in this visit.              Point: Body mechanics (Done)       Learning Progress Summary             Patient Acceptance, E, VU by  at 8/25/2023 1436                         Point: Precautions (Done)       Learning Progress Summary             Patient Acceptance, E, VU by  at 8/25/2023 1436                                          User Key       Initials Effective Dates Name Provider Type Discipline     05/02/22 -  Kelsie Cordero PT Physical Therapist PT                  PT Recommendation and Plan     Plan of Care Reviewed With: patient  Outcome Evaluation: Patient is an 82 y.o female who presents POD1 R radical mastectomy. Patient AOx3 throughout session with some occasional confusion. Patient sitting UIC upon PT arrival. Patient lives alone with 5 GLENNA. Patient is independent at baseline with ADLs and typically uses a rwx only for longer distances. Patient reports recent history of falls. Patient performed STS from chair with CGA. Patient ambulated 30ft with HHA and CGA. Gait slow and mildly unsteady. No overt LOB noted. Patient returned to sitting UIC at end of session. Patient would continue to benefit from skilled PT intervention to address deficits in functional mobility and maximize safety and independence. Plan is SNF at d/c. PT will continue to monitor.     Time Calculation:         PT Charges       Row Name 08/25/23 1436             Time Calculation    Start Time 1333  -      Stop Time 1342  -      Time Calculation (min) 9 min  -      PT Received On 08/25/23  -      PT - Next Appointment 08/26/23  -      PT Goal Re-Cert Due Date 09/01/23  -                User Key  (r) = Recorded By, (t) = Taken By, (c) = Cosigned By      Initials Name Provider Type     Kelsie Cordero PT Physical Therapist                  Therapy Charges for Today       Code Description Service Date Service Provider Modifiers Qty    [id number]  PT EVAL LOW COMPLEXITY 3 8/25/2023 Kelsie Cordero PT GP 1            PT G-Codes  Outcome Measure Options: AM-PAC 6 Clicks Basic Mobility (PT)  AM-PAC 6 Clicks Score (PT): 17  AM-PAC 6 Clicks Score (OT): 22  Modified Itzel Scale: 3 - Moderate disability.  Requiring some help, but able to walk without assistance.  PT Discharge Summary  Anticipated Discharge  Disposition (PT): skilled nursing facility    Kelsie Cordero, PT  8/25/2023

## 2023-08-25 NOTE — PROGRESS NOTES
Continued Stay Note  [facility]     Patient Name: Veronica Royal  MRN: [id number]  Today's Date: 8/25/2023    Admit Date: 8/24/2023    Plan: [facility] PENDING Pre-cert   Discharge Plan       Row Name 08/25/23 1457       Plan    Plan [facility] PENDING Pre-cert    Plan Comments PT and OT evals in EPIC. Spoke to patient to inform bed available at [facility] pending pre-cert. Patient agreeable. Ms sent and spoke to Ange (manager) to begin Pre-cert. Gemma with [facility].                   Discharge Codes    No documentation.                 Expected Discharge Date and Time       Expected Discharge Date Expected Discharge Time    Aug 26, 2023               Maki Alas RN

## 2023-08-25 NOTE — PLAN OF CARE
Goal Outcome Evaluation:  Plan of Care Reviewed With: patient           Outcome Evaluation: Patient is an 82 y.o female who presents POD1 R radical mastectomy. Patient AOx3 throughout session with some occasional confusion. Patient sitting UIC upon PT arrival. Patient lives alone with 5 GLENNA. Patient is independent at baseline with ADLs and typically uses a rwx only for longer distances. Patient reports recent history of falls. Patient performed STS from chair with CGA. Patient ambulated 30ft with HHA and CGA. Gait slow and mildly unsteady. No overt LOB noted. Patient returned to sitting UIC at end of session. Patient would continue to benefit from skilled PT intervention to address deficits in functional mobility and maximize safety and independence. Plan is SNF at d/c. PT will continue to monitor.      Anticipated Discharge Disposition (PT): skilled nursing facility

## 2023-08-25 NOTE — PLAN OF CARE
Goal Outcome Evaluation:  Plan of Care Reviewed With: patient        Progress: improving  Outcome Evaluation: VSS. Scheduled tylenol and motrin given for pain.  Right cheast incision with dermabond, covered with kerlix and ace wrap.  Frantz x 2 with small output.

## 2023-08-25 NOTE — CASE MANAGEMENT/SOCIAL WORK
Continued Stay Note  [facility]     Patient Name: Veronica Royal  MRN: [id number]  Today's Date: 8/25/2023    Admit Date: 8/24/2023    Plan: [facility] PENDING Pre-cert   Discharge Plan       Row Name 08/25/23 1517       Plan    Plan Comments Precert for [facility] skilled submitted and pending.  Diana HENNESSY      Row Name            Plan     Plan Comments                    Discharge Codes    No documentation.                 Expected Discharge Date and Time       Expected Discharge Date Expected Discharge Time    Aug 26, 2023               Diana Recinos RN

## 2023-08-25 NOTE — THERAPY EVALUATION
Patient Name: Veronica Royal  : 1940    MRN: [id number]                              Today's Date: 2023       Admit Date: 2023    Visit Dx:     ICD-10-CM ICD-9-CM   1. Inflammatory carcinoma of breast, unspecified laterality  C50.919 174.9     Patient Active Problem List   Diagnosis    Malignant neoplasm of upper-outer quadrant of right breast in female, estrogen receptor negative    Encounter for fitting and adjustment of vascular catheter    Hypertension    At high risk for malnutrition    Bilateral hearing loss    Malignant neoplasm of female breast    Advanced care planning/counseling discussion    Cirrhosis of liver    Type 2 diabetes mellitus    Anemia due to chemotherapy    Left renal mass    Severe malnutrition    Breast cancer     Past Medical History:   Diagnosis Date    Anxiety     Arthritis     Basal cell carcinoma     Left thumb    Breast cancer     Right    Depression     Diabetes mellitus     Diarrhea     s/e chemo    High cholesterol     History of chemotherapy 2023    Hypertension     Rash 2023    s/e chemo    Sacral decubitus ulcer     cleaning with soap & water    Urinary incontinence     wears pads     Past Surgical History:   Procedure Laterality Date    CATARACT EXTRACTION EXTRACAPSULAR W/ INTRAOCULAR LENS IMPLANTATION Right     EYE SURGERY      Muscle repair age 21    TONSILLECTOMY      VENOUS ACCESS DEVICE (PORT) INSERTION N/A 2023    Procedure: INSERTION VENOUS ACCESS DEVICE;  Surgeon: Rosa Michael MD;  [place] [facility] [place] [facility] [place]  Service: General;  Laterality: N/A;      General Information       Row Name 23 1047          OT Time and Intention    Document Type evaluation  -KG     Mode of Treatment individual therapy;occupational therapy  -KG       Row Name 23 1047          General Information    Patient Profile Reviewed yes  -KG     Prior Level of Function independent:;ADL's  -KG     Existing Precautions/Restrictions no known  precautions/restrictions  -KG     Barriers to Rehab none identified  -KG       Row Name 08/25/23 1047          Living Environment    People in Home --  Lives alone in a 1 story home.  -KG       Row Name 08/25/23 1047          Cognition    Orientation Status (Cognition) oriented x 4  -KG               User Key  (r) = Recorded By, (t) = Taken By, (c) = Cosigned By      Initials Name Provider Type    Parish Lynch OT Occupational Therapist                     Mobility/ADL's       Row Name 08/25/23 1048          Bed Mobility    Bed Mobility supine-sit  -KG     Supine-Sit [place] (Bed Mobility) standby assist  -KG       Row Name 08/25/23 1048          Transfers    Transfers sit-stand transfer;stand-sit transfer  -KG       Row Name 08/25/23 1048          Sit-Stand Transfer    Sit-Stand [place] (Transfers) contact guard  -KG       Row Name 08/25/23 1048          Stand-Sit Transfer    Stand-Sit [place] (Transfers) contact guard  -KG       Row Name 08/25/23 1048          Functional Mobility    Functional Mobility- Ind. Level --  Performed functional mobility from EOB to bathroom, and then to b/s chair w/ CGA. No AD used.  -KG       Row Name 08/25/23 1048          Activities of Daily Living    BADL Assessment/Intervention lower body dressing;grooming  -KG       Row Name 08/25/23 1048          Lower Body Dressing Assessment/Training    [place] Level (Lower Body Dressing) don;socks  -KG     Position (Lower Body Dressing) edge of bed sitting  -KG       Row Name 08/25/23 1048          Grooming Assessment/Training    [place] Level (Grooming) wash face, hands;contact guard assist  -KG     Position (Grooming) supported standing  -KG               User Key  (r) = Recorded By, (t) = Taken By, (c) = Cosigned By      Initials Name Provider Type    Parish Lynch OT Occupational Therapist                   Obj/Interventions       Row Name 08/25/23 1049          Sensory Assessment (Somatosensory)     Sensory Assessment (Somatosensory) sensation intact  -KG       Row Name 08/25/23 1049          Vision Assessment/Intervention    Visual Impairment/Limitations WFL  -KG       Row Name 08/25/23 1049          Range of Motion Comprehensive    General Range of Motion no range of motion deficits identified  -KG       Row Name 08/25/23 1049          Strength Comprehensive (MMT)    General Manual Muscle Testing (MMT) Assessment no strength deficits identified  BUE strength 4/5 grossly  -KG       Row Name 08/25/23 1049          Balance    Balance Assessment standing dynamic balance  -KG     Dynamic Standing Balance contact guard  -KG     Balance Interventions occupation based/functional task;standing;dynamic  -KG               User Key  (r) = Recorded By, (t) = Taken By, (c) = Cosigned By      Initials Name Provider Type    KG Parish Harris, OT Occupational Therapist                   Goals/Plan       Row Name 08/25/23 1058          Transfer Goal 1 (OT)    Activity/Assistive Device (Transfer Goal 1, OT) sit-to-stand/stand-to-sit;bed-to-chair/chair-to-bed;toilet  -KG     [place] Level/Cues Needed (Transfer Goal 1, OT) standby assist  -KG     Time Frame (Transfer Goal 1, OT) short term goal (STG);2 weeks  -KG     Progress/Outcome (Transfer Goal 1, OT) new goal  -KG       Row Name 08/25/23 1058          Dressing Goal 1 (OT)    Activity/Device (Dressing Goal 1, OT) upper body dressing;lower body dressing  -KG     [place]/Cues Needed (Dressing Goal 1, OT) standby assist  -KG     Time Frame (Dressing Goal 1, OT) short term goal (STG);2 weeks  -KG     Progress/Outcome (Dressing Goal 1, OT) new goal  -KG       Row Name 08/25/23 1058          Toileting Goal 1 (OT)    Activity/Device (Toileting Goal 1, OT) toileting skills, all  -KG     [place] Level/Cues Needed (Toileting Goal 1, OT) standby assist  -KG     Time Frame (Toileting Goal 1, OT) short term goal (STG);2 weeks  -KG     Progress/Outcome (Toileting Goal 1, OT)  new goal  -KG       Row Name 08/25/23 1058          Grooming Goal 1 (OT)    Activity/Device (Grooming Goal 1, OT) wash face, hands  -KG     [place] (Grooming Goal 1, OT) standby assist  -KG     Time Frame (Grooming Goal 1, OT) short term goal (STG);2 weeks  -KG     Progress/Outcome (Grooming Goal 1, OT) new goal  -KG       Row Name 08/25/23 1058          Self-Feeding Goal 1 (OT)    Activity/Device (Self-Feeding Goal 1, OT) self-feeding skills, all  -KG     [place] Level/Cues Needed (Self-Feeding Goal 1, OT) independent  -KG     Time Frame (Self-Feeding Goal 1, OT) short term goal (STG);2 weeks  -KG     Progress/Outcomes (Self-Feeding Goal 1, OT) new goal  -KG       Row Name 08/25/23 1058          Therapy Assessment/Plan (OT)    Planned Therapy Interventions (OT) activity tolerance training;occupation/activity based interventions;ROM/therapeutic exercise;strengthening exercise  -KG               User Key  (r) = Recorded By, (t) = Taken By, (c) = Cosigned By      Initials Name Provider Type    KG Parish Harris, OT Occupational Therapist                   Clinical Impression       Row Name 08/25/23 1051          Pain Assessment    Pretreatment Pain Rating 0/10 - no pain  -KG     Posttreatment Pain Rating 0/10 - no pain  -KG       Row Name 08/25/23 1051          Plan of Care Review    Plan of Care Reviewed With patient  -KG     Progress no change  -KG     Outcome Evaluation Pt admitted to [facility] s/p R mastectomy. Lives alone in a 1 story home and is (I) w/ BADLs at baseline. Participated in OT this morning. Performing bed mobility and LBD at EOB w/ SBA. STS transfers, functional mobility, and standing UB grooming/hygiene w/ CGA and no AD used. Mild strength deficits identified, however no balance/endurance deficits. Anticipate d/c home w/ PRN assist. OT will f/u to address strength to perform BADLs.  -KG       Row Name 08/25/23 1051          Therapy Assessment/Plan (OT)    Criteria for Skilled Therapeutic  Interventions Met (OT) skilled treatment is necessary  -KG     Therapy Frequency (OT) 3 times/wk  -KG       Row Name 08/25/23 1051          Therapy Plan Review/Discharge Plan (OT)    Anticipated Discharge Disposition (OT) home with assist  -KG       Row Name 08/25/23 1051          Vital Signs    Pre Patient Position Supine  -KG     Intra Patient Position Standing  -KG     Post Patient Position Sitting  -KG       Row Name 08/25/23 1051          Positioning and Restraints    Pre-Treatment Position in bed  -KG     Post Treatment Position chair  -KG     In Chair reclined;call light within reach;encouraged to call for assist;exit alarm on  -KG               User Key  (r) = Recorded By, (t) = Taken By, (c) = Cosigned By      Initials Name Provider Type    KG Parish Harris, OT Occupational Therapist                   Outcome Measures       Row Name 08/25/23 1059          How much help from another is currently needed...    Putting on and taking off regular lower body clothing? 4  -KG     Bathing (including washing, rinsing, and drying) 3  -KG     Toileting (which includes using toilet bed pan or urinal) 3  -KG     Putting on and taking off regular upper body clothing 4  -KG     Taking care of personal grooming (such as brushing teeth) 4  -KG     Eating meals 4  -KG     AM-PAC 6 Clicks Score (OT) 22  -KG       Row Name 08/25/23 0806          How much help from another person do you currently need...    Turning from your back to your side while in flat bed without using bedrails? 3  -MA     Moving from lying on back to sitting on the side of a flat bed without bedrails? 3  -MA     Moving to and from a bed to a chair (including a wheelchair)? 3  -MA     Standing up from a chair using your arms (e.g., wheelchair, bedside chair)? 3  -MA     Climbing 3-5 steps with a railing? 3  -MA     To walk in hospital room? 3  -MA     AM-PAC 6 Clicks Score (PT) 18  -MA     Highest level of mobility 6 --> Walked 10 steps or more  -MA        Row Name 08/25/23 1059          Modified [place] Scale    Modified [place] Scale 3 - Moderate disability.  Requiring some help, but able to walk without assistance.  -KG       Row Name 08/25/23 1059          Functional Assessment    Outcome Measure Options AM-PAC 6 Clicks Daily Activity (OT);Modified [place]  -KG               User Key  (r) = Recorded By, (t) = Taken By, (c) = Cosigned By      Initials Name Provider Type    Shirley Akers RN Registered Nurse    Parish Lynch OT Occupational Therapist                    Occupational Therapy Education       Title: PT OT SLP Therapies (In Progress)       Topic: Occupational Therapy (Not Started)       Point: ADL training (Not Started)       Description:   Instruct learner(s) on proper safety adaptation and remediation techniques during self care or transfers.   Instruct in proper use of assistive devices.                  Learner Progress:  Not documented in this visit.              Point: Home exercise program (Not Started)       Description:   Instruct learner(s) on appropriate technique for monitoring, assisting and/or progressing therapeutic exercises/activities.                  Learner Progress:  Not documented in this visit.              Point: Precautions (Not Started)       Description:   Instruct learner(s) on prescribed precautions during self-care and functional transfers.                  Learner Progress:  Not documented in this visit.              Point: Body mechanics (Not Started)       Description:   Instruct learner(s) on proper positioning and spine alignment during self-care, functional mobility activities and/or exercises.                  Learner Progress:  Not documented in this visit.                                  OT Recommendation and Plan  Planned Therapy Interventions (OT): activity tolerance training, occupation/activity based interventions, ROM/therapeutic exercise, strengthening exercise  Therapy Frequency (OT): 3  times/wk  Plan of Care Review  Plan of Care Reviewed With: patient  Progress: no change  Outcome Evaluation: Pt admitted to [facility] s/p R mastectomy. Lives alone in a 1 story home and is (I) w/ BADLs at baseline. Participated in OT this morning. Performing bed mobility and LBD at EOB w/ SBA. STS transfers, functional mobility, and standing UB grooming/hygiene w/ CGA and no AD used. Mild strength deficits identified, however no balance/endurance deficits. Anticipate d/c home w/ PRN assist. OT will f/u to address strength to perform BADLs.     Time Calculation:   Evaluation Complexity (OT)  Review Occupational Profile/Medical/Therapy History Complexity: brief/low complexity  Assessment, Occupational Performance/Identification of Deficit Complexity: 1-3 performance deficits  Clinical Decision Making Complexity (OT): problem focused assessment/low complexity  Overall Complexity of Evaluation (OT): low complexity     Time Calculation- OT       Row Name 08/25/23 1100             Time Calculation- OT    OT Start Time 0935  -KG      OT Stop Time 1005  -KG      OT Time Calculation (min) 30 min  -KG      Total Timed Code Minutes- OT 25 minute(s)  -KG      OT Non-Billable Time (min) 5 min  -KG      OT Received On 08/25/23  -KG      OT - Next Appointment 08/28/23  -KG      OT Goal Re-Cert Due Date 09/08/23  -KG         Timed Charges    76786 - OT Self Care/Mgmt Minutes 25  -KG         Untimed Charges    OT Eval/Re-eval Minutes 5  -KG         Total Minutes    Timed Charges Total Minutes 25  -KG      Untimed Charges Total Minutes 5  -KG       Total Minutes 30  -KG                User Key  (r) = Recorded By, (t) = Taken By, (c) = Cosigned By      Initials Name Provider Type    KG Parish Harris OT Occupational Therapist                  Therapy Charges for Today       Code Description Service Date Service Provider Modifiers Qty    16864261990 HC OT EVAL LOW COMPLEXITY 2 8/25/2023 Parish Harris OT GO 1    78196096502 HC OT SELF  CARE/MGMT/TRAIN EA 15 MIN 8/25/2023 Parish Harris OT GO 2                 Parish Harris OT  8/25/2023

## 2023-08-25 NOTE — PROGRESS NOTES
General surgery    Doing well since surgery.  Dressing clean and dry.  Pain controlled.    Sitting up in the chair comfortably  Incision clean and dry with no hematoma or seroma, drain serosanguineous    Plan:  Diet as tolerated  As needed pain and nausea meds  Home versus rehab being decided by therapy.  Okay for dispo once plans arranged.  Rosa Michael MD

## 2023-08-26 PROCEDURE — 99024 POSTOP FOLLOW-UP VISIT: CPT | Performed by: SURGERY

## 2023-08-26 PROCEDURE — G0378 HOSPITAL OBSERVATION PER HR: HCPCS

## 2023-08-26 PROCEDURE — 97110 THERAPEUTIC EXERCISES: CPT

## 2023-08-26 RX ORDER — IBUPROFEN 800 MG/1
800 TABLET ORAL EVERY 8 HOURS PRN
Qty: 60 TABLET | Refills: 1 | Status: SHIPPED | OUTPATIENT
Start: 2023-08-26

## 2023-08-26 RX ORDER — OXYCODONE HYDROCHLORIDE 5 MG/1
5 TABLET ORAL EVERY 4 HOURS PRN
Qty: 10 TABLET | Refills: 0 | Status: SHIPPED | OUTPATIENT
Start: 2023-08-26 | End: 2023-08-31

## 2023-08-26 RX ADMIN — SENNOSIDES AND DOCUSATE SODIUM 2 TABLET: 50; 8.6 TABLET ORAL at 09:27

## 2023-08-26 RX ADMIN — Medication 10 ML: at 21:43

## 2023-08-26 RX ADMIN — ACETAMINOPHEN 1000 MG: 500 TABLET, FILM COATED ORAL at 06:36

## 2023-08-26 RX ADMIN — Medication 10 ML: at 09:27

## 2023-08-26 RX ADMIN — ACETAMINOPHEN 1000 MG: 500 TABLET, FILM COATED ORAL at 14:48

## 2023-08-26 RX ADMIN — SENNOSIDES AND DOCUSATE SODIUM 2 TABLET: 50; 8.6 TABLET ORAL at 21:43

## 2023-08-26 RX ADMIN — IBUPROFEN 800 MG: 600 TABLET, FILM COATED ORAL at 14:49

## 2023-08-26 RX ADMIN — ACETAMINOPHEN 1000 MG: 500 TABLET, FILM COATED ORAL at 21:43

## 2023-08-26 RX ADMIN — IBUPROFEN 800 MG: 600 TABLET, FILM COATED ORAL at 06:36

## 2023-08-26 RX ADMIN — IBUPROFEN 800 MG: 600 TABLET, FILM COATED ORAL at 21:38

## 2023-08-26 NOTE — PROGRESS NOTES
Postop day 2 status post right breast modified radical mastectomy    She is doing well.  Pain is well controlled.  Dressing is clean and dry.  Drains are serosanguineous.  Awaiting rehab placement.  Okay for discharge once dispo is arranged.

## 2023-08-26 NOTE — THERAPY TREATMENT NOTE
Patient Name: Veronica Royal  : 1940    MRN: [id number]                              Today's Date: 2023       Admit Date: 2023    Visit Dx:     ICD-10-CM ICD-9-CM   1. Inflammatory carcinoma of breast, unspecified laterality  C50.919 174.9     Patient Active Problem List   Diagnosis    Malignant neoplasm of upper-outer quadrant of right breast in female, estrogen receptor negative    Encounter for fitting and adjustment of vascular catheter    Hypertension    At high risk for malnutrition    Bilateral hearing loss    Malignant neoplasm of female breast    Advanced care planning/counseling discussion    Cirrhosis of liver    Type 2 diabetes mellitus    Anemia due to chemotherapy    Left renal mass    Severe malnutrition    Breast cancer     Past Medical History:   Diagnosis Date    Anxiety     Arthritis     Basal cell carcinoma     Left thumb    Breast cancer     Right    Depression     Diabetes mellitus     Diarrhea     s/e chemo    High cholesterol     History of chemotherapy 2023    Hypertension     Rash 2023    s/e chemo    Sacral decubitus ulcer     cleaning with soap & water    Urinary incontinence     wears pads     Past Surgical History:   Procedure Laterality Date    CATARACT EXTRACTION EXTRACAPSULAR W/ INTRAOCULAR LENS IMPLANTATION Right     EYE SURGERY      Muscle repair age 21    TONSILLECTOMY      VENOUS ACCESS DEVICE (PORT) INSERTION N/A 2023    Procedure: INSERTION VENOUS ACCESS DEVICE;  Surgeon: Rosa Michael MD;  [place] [facility] [place] [facility] [place]  Service: General;  Laterality: N/A;      General Information       Row Name 23 1137          Physical Therapy Time and Intention    Document Type therapy note (daily note)  -AR     Mode of Treatment physical therapy  -AR       Row Name 23 1137          General Information    Patient Profile Reviewed yes  -AR     Existing Precautions/Restrictions fall  R mastectomy precautions  -AR     Barriers to Rehab none  identified  -AR       Row Name 08/26/23 1137          Living Environment    People in Home alone  -AR       Row Name 08/26/23 1137          Cognition    Orientation Status (Cognition) oriented x 3  -AR               User Key  (r) = Recorded By, (t) = Taken By, (c) = Cosigned By      Initials Name Provider Type    AR Padmini Garcia, PT Physical Therapist                   Mobility       Row Name 08/26/23 1138          Bed Mobility    Bed Mobility supine-sit  -AR     Supine-Sit [place] (Bed Mobility) minimum assist (75% patient effort)  -AR     Assistive Device (Bed Mobility) head of bed elevated;bed rails  -AR       Row Name 08/26/23 1138          Sit-Stand Transfer    Sit-Stand [place] (Transfers) contact guard  -AR       Row Name 08/26/23 1138          Gait/Stairs (Locomotion)    [place] Level (Gait) contact guard  -AR     Distance in Feet (Gait) 25' HHAx1 or reaching for UE support on furniture/mello rail.  slow shuffling steps  -AR     Deviations/Abnormal Patterns (Gait) gait speed decreased;gregory decreased  -AR     Bilateral Gait Deviations forward flexed posture  -AR               User Key  (r) = Recorded By, (t) = Taken By, (c) = Cosigned By      Initials Name Provider Type    AR Padmini Garcia, PT Physical Therapist                   Obj/Interventions       Row Name 08/26/23 1138          Balance    Dynamic Standing Balance contact guard  -AR               User Key  (r) = Recorded By, (t) = Taken By, (c) = Cosigned By      Initials Name Provider Type    Padmini Lim, PT Physical Therapist                   Goals/Plan    No documentation.                  Clinical Impression       Row Name 08/26/23 1139          Pain    Pretreatment Pain Rating 0/10 - no pain  -AR     Posttreatment Pain Rating 0/10 - no pain  -AR     Pain Intervention(s) Repositioned  -AR       Row Name 08/26/23 1139          Plan of Care Review    Outcome Evaluation Continued impaired activity tolerance and  independence w/ mobility from baseline but able to ambulate 25' w/ CGA and HHAx1 or reaching for UE support on furniture/mello rail. slow shuffling steps.  Pt normally independent, lives alone, reports recent fall at home.  Recommend DC ot SNU after mastectomy.  -AR       Row Name 08/26/23 1139          Therapy Assessment/Plan (PT)    Rehab Potential (PT) good, to achieve stated therapy goals  -AR     Criteria for Skilled Interventions Met (PT) yes  -AR     Therapy Frequency (PT) 5 times/wk  -AR       Row Name 08/26/23 1139          Vital Signs    O2 Delivery Pre Treatment room air  -AR       Row Name 08/26/23 1139          Positioning and Restraints    Pre-Treatment Position in bed  -AR     Post Treatment Position chair  -AR     In Chair notified nsg;reclined;sitting;call light within reach;encouraged to call for assist;exit alarm on  -AR               User Key  (r) = Recorded By, (t) = Taken By, (c) = Cosigned By      Initials Name Provider Type    Padmini Lim, PT Physical Therapist                   Outcome Measures       Row Name 08/26/23 1140          How much help from another person do you currently need...    Turning from your back to your side while in flat bed without using bedrails? 3  -AR     Moving from lying on back to sitting on the side of a flat bed without bedrails? 3  -AR     Moving to and from a bed to a chair (including a wheelchair)? 3  -AR     Standing up from a chair using your arms (e.g., wheelchair, bedside chair)? 3  -AR     Climbing 3-5 steps with a railing? 2  -AR     To walk in hospital room? 3  -AR     AM-PAC 6 Clicks Score (PT) 17  -AR     Highest level of mobility 5 --> Static standing  -AR       Row Name 08/26/23 1140          Functional Assessment    Outcome Measure Options AM-PAC 6 Clicks Basic Mobility (PT)  -AR               User Key  (r) = Recorded By, (t) = Taken By, (c) = Cosigned By      Initials Name Provider Type    Padmini Lim, G[place] Physical Therapist                                  Physical Therapy Education       Title: PT OT SLP Therapies (In Progress)       Topic: Physical Therapy (In Progress)       Point: Mobility training (In Progress)       Learning Progress Summary             Patient Acceptance, E, NR by AR at 8/26/2023 1140    Acceptance, E, VU by  at 8/25/2023 1436                         Point: Home exercise program (In Progress)       Learning Progress Summary             Patient Acceptance, E, NR by AR at 8/26/2023 1140                         Point: Body mechanics (In Progress)       Learning Progress Summary             Patient Acceptance, E, NR by AR at 8/26/2023 1140    Acceptance, E, VU by  at 8/25/2023 1436                         Point: Precautions (In Progress)       Learning Progress Summary             Patient Acceptance, E, NR by AR at 8/26/2023 1140    Acceptance, E, VU by  at 8/25/2023 1436                                         User Key       Initials Effective Dates Name Provider Type Discipline    AR 06/16/21 -  Padmini Garcia, PT Physical Therapist PT     05/02/22 -  Kelsie Cordero, GENO Physical Therapist PT                  PT Recommendation and Plan     Outcome Evaluation: Continued impaired activity tolerance and independence w/ mobility from baseline but able to ambulate 25' w/ CGA and HHAx1 or reaching for UE support on furniture/mello rail. slow shuffling steps.  Pt normally independent, lives alone, reports recent fall at home.  Recommend DC ot SNU after mastectomy.     Time Calculation:         PT Charges       Row Name 08/26/23 1137             Time Calculation    Start Time 0923  -AR      Stop Time 0943  -AR      Time Calculation (min) 20 min  -AR      PT Received On 08/26/23  -AR      PT - Next Appointment 08/28/23  -AR                User Key  (r) = Recorded By, (t) = Taken By, (c) = Cosigned By      Initials Name Provider Type    AR Padmini Garcia, PT Physical Therapist                  Therapy Charges for Today        Code Description Service Date Service Provider Modifiers Qty    40630380935 HC PT THER PROC EA 15 MIN 8/26/2023 Padmini Garcia, PT GP 1            PT G-Codes  Outcome Measure Options: AM-PAC 6 Clicks Basic Mobility (PT)  AM-PAC 6 Clicks Score (PT): 17  AM-PAC 6 Clicks Score (OT): 22  Modified [place] Scale: 3 - Moderate disability.  Requiring some help, but able to walk without assistance.  PT Discharge Summary  Anticipated Discharge Disposition (PT): skilled nursing facility    Padmini Garcia PT  8/26/2023

## 2023-08-26 NOTE — PLAN OF CARE
Goal Outcome Evaluation:  Plan of Care Reviewed With: patient        Progress: improving  Outcome Evaluation: vss, schedule tylenol and ibuprofen given, up with assist, bed alarm for safety, rt breast incision with dermabond dreaased with gauze and ace wrap, encourage to use incentive spirometer, on pre cert to emanuel rangel, continue to monitor the pt.

## 2023-08-26 NOTE — PLAN OF CARE
Goal Outcome Evaluation:              Outcome Evaluation: Continued impaired activity tolerance and independence w/ mobility from baseline but able to ambulate 25' w/ CGA and HHAx1 or reaching for UE support on furniture/mello rail. slow shuffling steps.  Pt normally independent, lives alone, reports recent fall at home.  Recommend DC ot SNU after mastectomy.      Anticipated Discharge Disposition (PT): skilled nursing facility

## 2023-08-26 NOTE — PLAN OF CARE
Goal Outcome Evaluation:  Plan of Care Reviewed With: patient           Outcome Evaluation: VSS, on room air, up in chair, R breast incison CDI and approximated w/ dermabond - gauze and ACE wrap in place, pain controlled w/ tylenol and ibuprofen, PARUL x2 w/ small amount of serosanguineous output, awaiting pre-cert and will be d/c to [facility]. Tolerating regular diet, no c/o n/v, patient updated on plan of care.

## 2023-08-27 PROCEDURE — 99024 POSTOP FOLLOW-UP VISIT: CPT | Performed by: SURGERY

## 2023-08-27 PROCEDURE — G0378 HOSPITAL OBSERVATION PER HR: HCPCS

## 2023-08-27 RX ADMIN — IBUPROFEN 800 MG: 600 TABLET, FILM COATED ORAL at 14:17

## 2023-08-27 RX ADMIN — Medication 10 ML: at 08:01

## 2023-08-27 RX ADMIN — ACETAMINOPHEN 1000 MG: 500 TABLET, FILM COATED ORAL at 14:17

## 2023-08-27 RX ADMIN — IBUPROFEN 800 MG: 600 TABLET, FILM COATED ORAL at 05:53

## 2023-08-27 RX ADMIN — ACETAMINOPHEN 1000 MG: 500 TABLET, FILM COATED ORAL at 05:55

## 2023-08-27 RX ADMIN — SENNOSIDES AND DOCUSATE SODIUM 2 TABLET: 50; 8.6 TABLET ORAL at 05:53

## 2023-08-27 RX ADMIN — Medication 10 ML: at 22:08

## 2023-08-27 RX ADMIN — IBUPROFEN 800 MG: 600 TABLET, FILM COATED ORAL at 22:07

## 2023-08-27 RX ADMIN — ACETAMINOPHEN 1000 MG: 500 TABLET, FILM COATED ORAL at 22:07

## 2023-08-27 NOTE — PLAN OF CARE
Goal Outcome Evaluation:  Plan of Care Reviewed With: patient        Progress: improving  Outcome Evaluation: Right chest dressing CD&I, JPx2 with small amount of serosanguinous drainage. No c/o pain, Ibuprofen & Tylenol given as scheduled. Up with assist x 1 to bathroom, fall precautions maintained. Waiting rehab placement.

## 2023-08-27 NOTE — PLAN OF CARE
Goal Outcome Evaluation:  Plan of Care Reviewed With: patient           Outcome Evaluation: VSS, dressisng remains intact to rt chest incision, gauze & ace wrap, PARUL x2 intact with karishma dooley, awaiting precert to Eli.

## 2023-08-27 NOTE — PROGRESS NOTES
Doing well.  No complaints.  Tolerating diet and eating very well.  Inpatient rehab not approved yet.    Exam:  Well-appearing, PARUL drains with serosanguineous output, dressing clean, dry and intact    Plan:  Pain control  Regular diet as tolerated  Awaiting placement

## 2023-08-28 VITALS
SYSTOLIC BLOOD PRESSURE: 142 MMHG | WEIGHT: 130 LBS | OXYGEN SATURATION: 99 % | DIASTOLIC BLOOD PRESSURE: 49 MMHG | HEIGHT: 61 IN | RESPIRATION RATE: 18 BRPM | HEART RATE: 83 BPM | TEMPERATURE: 96.9 F | BODY MASS INDEX: 24.55 KG/M2

## 2023-08-28 PROCEDURE — G0378 HOSPITAL OBSERVATION PER HR: HCPCS

## 2023-08-28 RX ADMIN — IBUPROFEN 800 MG: 600 TABLET, FILM COATED ORAL at 05:28

## 2023-08-28 RX ADMIN — ACETAMINOPHEN 1000 MG: 500 TABLET, FILM COATED ORAL at 15:08

## 2023-08-28 RX ADMIN — IBUPROFEN 800 MG: 600 TABLET, FILM COATED ORAL at 15:08

## 2023-08-28 RX ADMIN — ACETAMINOPHEN 1000 MG: 500 TABLET, FILM COATED ORAL at 05:28

## 2023-08-28 RX ADMIN — SENNOSIDES AND DOCUSATE SODIUM 2 TABLET: 50; 8.6 TABLET ORAL at 08:32

## 2023-08-28 RX ADMIN — Medication 10 ML: at 08:32

## 2023-08-28 NOTE — NURSING NOTE
Pt and family present during discharge teaching. Pt and family instructed to call and make a follow up with MD. Supplies for drains sent with pt. Pt being transported by family to [place]. All belongings sent with pt. Report given to Deepthi REBOLLAR at [place]. Pt ready for rehab.

## 2023-08-28 NOTE — CASE MANAGEMENT/SOCIAL WORK
Continued Stay Note  [facility]     Patient Name: Veronica Royal  MRN: [id number]  Today's Date: 8/28/2023    Admit Date: 8/24/2023    Plan: Eli [facility] Pre-cert approved   Discharge Plan                               Row Name 08/28/23 0945       Plan    Plan Eli Matthews - precert approved    Plan Comments Per the [facility] approved for 8/26/2023.  CCP and liaison notified.  Approval faxed to Kiah RICHMOND for Eli Matthews.  Diana HENNESSY                                           Discharge Codes    No documentation.                 Expected Discharge Date and Time       Expected Discharge Date Expected Discharge Time    Aug 28, 2023               Diana Recinos RN

## 2023-08-28 NOTE — PLAN OF CARE
Goal Outcome Evaluation:  Plan of Care Reviewed With: patient           Outcome Evaluation: VSS, right chest dressing CDI, PARUL drains x2 with serosang output, no c/o pain of scheduled tylenol and ibuprofen, voiding freely, fall precautions  maintained for Safety,  up with standby asst.

## 2023-08-28 NOTE — DISCHARGE SUMMARY
DATE OF ADMISSION:  8/24/2023  DATE OF DISCHARGE:  8/28/2023    ATTENDING:        Rosa Michael M.D.       GENERAL SURGERY    CONSULTS:    none    PRINCIPAL DIAGNOSIS:     Right inflammatory breast cancer    DISCHARGE DIAGNOSES:     same    HOSPITAL PROCEDURES:     8/24/2023 right modified radical mastectomy    HOSPITAL COURSE:   The patient underwent the above listed procedure. She was admitted postoperatively for pain control. She needs PT/OT and rehab services due to her debility from surgery and neoadjuvant chemotherapy.    ACTIVITY:  Okay to shower.  Ambulate and climb stairs as tolerated.  No lifting over 10 lbs for 2 weeks.  Okay to drive if not taking pain medications.    DIET:  Regular    FOLLOW UP:  With Dr Michael in 2 weeks. Instructed to call [phone] for an appointment.

## 2023-08-28 NOTE — PROGRESS NOTES
Continued Stay Note  [facility]     Patient Name: Veronica Royal  MRN: [id number]  Today's Date: 8/28/2023    Admit Date: 8/24/2023    Plan: Keysha Matthews PENDING Pre-cert   Discharge Plan       Row Name 08/28/23 0840       Plan    Plan Keysha Matthews PENDING Pre-cert    Plan Comments Msg sent regarding pre-cert to Eli Matthews.                   Discharge Codes    No documentation.                 Expected Discharge Date and Time       Expected Discharge Date Expected Discharge Time    Aug 28, 2023               Maki Alas RN

## 2023-08-28 NOTE — DISCHARGE INSTRUCTIONS
Okay to shower.  Ambulate and climb stairs as tolerated.  No lifting over 10 lbs for 2 weeks.  Okay to drive if not taking pain medications.    FOLLOW UP:  With Dr Michael in 2 weeks. Instructed to call [phone] for an appointment.

## 2023-08-28 NOTE — PROGRESS NOTES
Case Management Discharge Note      Final Note: Discharged to [facility] skilled rehab. Maki Alas RN         Selected Continued Care - Discharged on 8/28/2023 Admission date: 8/24/2023 - Discharge disposition: Rehab Facility or Unit (DC - External)      Destination Coordination complete.      Service Provider Selected Services Address Phone Fax Patient Preferred    [facility] [address] [place] [id number] [phone] --             Selected Continued Care - Prior Encounters Includes continued care and service providers with selected services from prior encounters from 5/26/2023 to 8/28/2023      Discharged on 7/20/2023 Admission date: 7/12/2023 - Discharge disposition: Skilled Nursing Facility (DC - External)      Destination       Service Provider Selected Services Address Phone Fax Patient Preferred    [facility] [address] [place] [id number] [phone] --                          Transportation Services  Private: Car    Final Discharge Disposition Code: 03 - skilled nursing facility (SNF)

## 2023-08-28 NOTE — PROGRESS NOTES
Continued Stay Note  [facility]     Patient Name: Veronica Royal  MRN: [id number]  Today's Date: 8/28/2023    Admit Date: 8/24/2023    Plan: Eli [facility]on   Discharge Plan       Row Name 08/28/23 1023       Plan    Plan [facility]    Plan Comments Spoke to patient to inform of pre-cert obtained and will be DC'd today. Patient confirmed her daughter will provide transportation..      Row Name 08/28/23 0954       Plan    Plan Eli [facility] Pre-cert approved    Plan Comments Per Diana with [facility], pre-cert obtained. Spoke to Kiah with [facility] to inform pre-cert obtained. Kiah stated all good for her to admit today. Msg sent to Dr Michael to DC.      Row Name 08/28/23 0945       Plan    Plan Eli Allenon - precert approved    Plan Comments Per the [facility], precert approved for 8/26/2023.  CCP and liaison notified.  Approval faxed to Kiah RICHMOND for Eli Matthews.  Diana HENNESSY      Row Name 08/28/23 0840       Plan    Plan Keysha Matthews PENDING Pre-cert    Plan Comments Msg sent regarding pre-cert to Eli Matthews.                   Discharge Codes    No documentation.                 Expected Discharge Date and Time       Expected Discharge Date Expected Discharge Time    Aug 28, 2023               Maki Alas RN

## 2023-08-28 NOTE — PROGRESS NOTES
Continued Stay Note  [facility]     Patient Name: Veronica Royal  MRN: [id number]  Today's Date: 8/28/2023    Admit Date: 8/24/2023    Plan: Eli Matthews Pre-cert approved   Discharge Plan       Row Name 08/28/23 0954       Plan    Plan Eli Matthews Pre-cert approved    Plan Comments Per Diana with [facility], pre-cert obtained. Spoke to Kiah with [facility] to inform pre-cert obtained. Kiah stated all good for her to admit today. Msg sent to Dr Michael to [place].      Row Name 08/28/23 0840       Plan    Plan Keysha Matthews PENDING Pre-cert    Plan Comments Msg sent regarding pre-cert to Eli Matthews.                   Discharge Codes    No documentation.                 Expected Discharge Date and Time       Expected Discharge Date Expected Discharge Time    Aug 28, 2023               Maki Alas RN

## 2023-08-28 NOTE — PROGRESS NOTES
Continued Stay Note  [facility]     Patient Name: Veronica Royal  MRN: [id number]  Today's Date: 8/28/2023    Admit Date: 8/24/2023    Plan: Eli Matthews   Discharge Plan       Row Name 08/28/23 1557       Plan    Plan Eli Matthews    Plan Comments Discharge summary noted and faxed. Packet given to RN. Family at bedside.                   Discharge Codes    No documentation.                 Expected Discharge Date and Time       Expected Discharge Date Expected Discharge Time    Aug 28, 2023               Maki Alas, RN

## 2023-08-30 LAB
LAB AP CASE REPORT: NORMAL
LAB AP CLINICAL INFORMATION: NORMAL
LAB AP SPECIAL STAINS: NORMAL
LAB AP SYNOPTIC CHECKLIST: NORMAL
PATH REPORT.ADDENDUM SPEC: NORMAL
PATH REPORT.FINAL DX SPEC: NORMAL
PATH REPORT.GROSS SPEC: NORMAL

## 2023-09-01 ENCOUNTER — TELEPHONE (OUTPATIENT)
Dept: SURGERY | Facility: CLINIC | Age: 83
End: 2023-09-01
Payer: MEDICARE

## 2023-09-01 NOTE — TELEPHONE ENCOUNTER
Called Veronica Royal regarding recent surgical pathology. Voicemail left.    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   [id number]    Follow up with me and oncology as scheduled.    Rosa Michael MD

## 2023-09-11 ENCOUNTER — OFFICE VISIT (OUTPATIENT)
Dept: OTHER | Facility: HOSPITAL | Age: 83
End: 2023-09-11
Payer: MEDICARE

## 2023-09-11 VITALS
DIASTOLIC BLOOD PRESSURE: 70 MMHG | OXYGEN SATURATION: 99 % | WEIGHT: 127.2 LBS | TEMPERATURE: 99.1 F | HEIGHT: 61 IN | SYSTOLIC BLOOD PRESSURE: 124 MMHG | HEART RATE: 110 BPM | BODY MASS INDEX: 24.02 KG/M2

## 2023-09-11 DIAGNOSIS — Z91.89 AT HIGH RISK FOR MALNUTRITION: ICD-10-CM

## 2023-09-11 DIAGNOSIS — H91.93 BILATERAL HEARING LOSS, UNSPECIFIED HEARING LOSS TYPE: ICD-10-CM

## 2023-09-11 DIAGNOSIS — Z91.89 DRIVING SAFETY ISSUE: ICD-10-CM

## 2023-09-11 DIAGNOSIS — C50.919 MALIGNANT NEOPLASM OF FEMALE BREAST, UNSPECIFIED ESTROGEN RECEPTOR STATUS, UNSPECIFIED LATERALITY, UNSPECIFIED SITE OF BREAST: Primary | ICD-10-CM

## 2023-09-11 PROCEDURE — G0463 HOSPITAL OUTPT CLINIC VISIT: HCPCS | Performed by: NURSE PRACTITIONER

## 2023-09-11 NOTE — PROGRESS NOTES
[facility] MULTIDISCIPLINARY CLINIC  SURVIVORSHIP VISIT: IN CLINIC  Older Adult Functional Assessment Follow Up        Veronica Royal is a pleasant 82 y.o. female reviewed today in Multidisciplinary Clinic, for follow up older adult assessment visit.     HPI  Presents today with her daughter Norman whom the patient has given permission to participate in the conversation today.    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  She did initiate pratuzumab, trastuzumab, paclitaxel on 5/24/2023.  On 7/12/2023 she presented to the office for cycle 3-day 8 infusion.  At that appointment she was reporting severe nausea and vomiting with inability to tolerate anything by mouth for the last 3 to 4 days.  She had reported a fall at home on 7/10/2023 that the patient feels she was sitting back on the bed and just missed it but her daughter thinks that she possibly passed out.  She was admitted to the hospital between 7/12/2023 and 7/20/2023 and was treated for electrolyte abnormalities, acute UTI and acute kidney injury and discharged to rehab.    She did receive 1 more infusion of Herceptin alone on 8/2/2023.  She proceeded with right breast mastectomy and right axillary dissection on 8/24/2023.  Surgical path showed invasive ductal adenocarcinoma ER/NV negative, HER2 amplified by FISH, 19 nodes removed.  2 nodes with metastatic disease; 1 with macrometastasis of 2.5 mm with no chemotherapy change and 1 with individual tumor cells seen with chemotherapy change.  There was also a node benign with chemotherapy change and a clip but no residual tumor.    She has been recovering well from surgery.  Her drains are still in place and she will see Dr. Michael on Wednesday.  She is having minimal pain and is not requiring any pain medications.  Her appetite has improved with taste changes resolved.  She is  denying any constipation or diarrhea.    She did meet with an audiologist for hearing exam and has been prescribed hearing aids.  They were told that chemotherapy can cause hearing changes and are asking if they should wait before getting the hearing aids as prescribed.  The patient is also noted some visual acuity changes.     She did have home PT coming out but has been discharged.  Home health has been coming out to assist her with drains.    Blood sugar management continues to be a concern for the patient.  The patient's daughter states some modifications were made at last PCP visit however they are looking forward to meeting with endocrinology on 10/17/2023 for further review of her diabetes medications, regimen and treatment plan    She is eager to resume driving and feels that she can however her her daughter is very concerned about this particularly with some evidence of patient's mild cognitive changes, which are apparently stable all throughout the surgical period    Symptom Assessment  Symptom Distress  Pain Score: no pain   ESAS Tiredness Score: No tiredness  ESAS Nausea Score: No nausea  ESAS Depression Score: No depression  ESAS Anxiety Score: No anxiety  ESAS Drowsiness Score: No drowsiness  ESAS Lack of Appetite Score: 8  ESAS Wellbeing Score: 8  ESAS Dyspnea Score: No shortness of breath  ESAS Source of Information: patient  Palliative Performance Scale Score: 80%  Last Bowel Movement: 09/11/23  Bowel movement in last 24-48 hrs?: yes    TREATMENT HISTORY:     Oncology/Hematology History   Malignant neoplasm of upper-outer quadrant of right breast in female, estrogen receptor negative   4/21/2023 Initial Diagnosis    Malignant neoplasm of upper-outer quadrant of right breast in female, estrogen receptor negative     4/21/2023 Biopsy    Final Diagnosis   1. Right Breast at 9:00 o'clock, Core Biopsy (OPUS [id number]):                A. Invasive ductal carcinoma with apocrine features:                             1. Overall Rogers grade III (tubular score=3, nuclear score=3, mitotic score=2).                            2. Invasive carcinoma measures at least 15 mm maximally.                            3. No lymphovascular space invasion identified.                              4. Additional findings (IHC biomarkers per outside report).                                         Estrogen receptor - Negative.  Progesterone receptor - Negative.  Her2/destiny:  Equivocal (score 2+). FISH pending  Ki67 = 38.45%     2. Right Axilla Biopsy:                 A. Soft tissue involved by invasive ductal carcinoma with apocrine features, 9 mm.  with                        associated microcalcification.               B. No definitive lymph node tissue identified.     HER2 amplified on FISH     5/1/2023 Biopsy    Final Diagnosis   1. Right Breast, Biopsies for Skin Thickening: INVASIVE MAMMARY CARCINOMA, NO SPECIAL TYPE (INVASIVE        DUCTAL CARCINOMA).               A. Invasive carcinoma diffusely involves the dermis of the skin with lymphovascular space invasion identified.               B. Carcinoma is Rogers histologic score III (tubules = 3, nuclei = 3, mitosis = 3).                       5/1/2023 Genetic Testing    Invitae 9 gene test: Negative     5/24/2023 - 7/5/2023 Chemotherapy    OP BREAST Pertuzumab / Trastuzumab / PACLitaxel  Stopped pertuzumab/paclitaxel after C3 D1 due to toxicity  Last herceptn given 8/2/23 8/24/2023 Surgery    Final Diagnosis   1. Breast, Right, Modified Radical Mastectomy:    A. Invasive ductal adenocarcinoma with                            1. The tumor is Rogers grade II (tubular grade 3, nuclear grade 3, mitotic grade 1).               2. The tumor measures up to 4 cm (four consecutive 1 cm slices).                            3. Microcalcifications are seen in the area of the tumor.                            4. The tumor is in the area of 9:00 o'clock, 6 cm from nipple.                             5. Cork-shaped clip is identified.                            6. Treatment changes are present.                            7. Dermal lymphatic invasion is present.                            8. Capillary lymphatic invasion is present.                            9. Tumor seen in skin but ulceration through the skin is not identified.                            10. Residual cancer burden equals 3.454.                            11. Residual cancer burden class RCB-III.                            12. Xiong-Lima Grade 3                             13. The invasive tumor comes within 11 mm of the posterior margin, 55 mm of the inferior margin, 75 mm         of the superior margin and 80 mm of the medial and lateral margins.               B. Lymph nodes:                            1. Eighteen lymph nodes are identified, two with metastatic disease   i. One with macrometastisis (size of greatest metastasis 2.5 mm)      with no chemotherapy change).                                         ii. One with Individual tumor cells seen with chemotherapy change.                                        iii. There was also one node benign with chemotherapy change and a clip but no residual tumor.               C. Additional findings:                            1. Focus of atypical ductal hyperplasia.        Comment: The pretreatment classification was cT4d (inflammatory carcinoma). Case [id number] was reviewed. The patient presented clinically with inflammatory carcinoma (cT4d). Based off microscopic findings (ie. Skin involvement without ulceration by tumor), this carcinoma is best classified as ypT2, N1a.     2. Lymph Node, Right Additional Axillary Tissue:  Single benign lymph node     Comment: There are no changes suggestive of treatment effect.     3. Breast, Right, Additional Lateral Skin Margin, Excision:  Benign skin and underlying connective tissue 15 mm deep     REGIONAL LYMPH NODES   Regional Lymph  Node Status  Tumor present in regional lymph node(s)   Number of Lymph Nodes with Macrometastases  1   Number of Lymph Nodes with Micrometastases  0   Number of Lymph Nodes with Isolated Tumor Cells  1   Size of Largest Jaime Metastatic Deposit  2.5 mm   Extranodal Extension  Not identified   Total Number of Lymph Nodes Examined (sentinel and non-sentinel)  19   Number of Rock Nodes Examined  0       Pathologic stage: ypT2 pN1a    Estrogen receptor: Negative at <1%  Progesterone receptor: Negative <1%  HER2 status: Equivocal 2+ IHC; amplification not detected by FISH  Ki 67: 50%             Past Medical History:   Diagnosis Date    Anxiety     Arthritis     Basal cell carcinoma     Left thumb    Breast cancer 2023    Right    Depression     Diabetes mellitus     Diarrhea     s/e chemo    High cholesterol     History of chemotherapy 08/2023    Hypertension     Rash 08/2023    s/e chemo    Sacral decubitus ulcer     cleaning with soap & water    Urinary incontinence     wears pads       Past Surgical History:   Procedure Laterality Date    CATARACT EXTRACTION EXTRACAPSULAR W/ INTRAOCULAR LENS IMPLANTATION Right     EYE SURGERY      Muscle repair age 21    MASTECTOMY W/ SENTINEL NODE BIOPSY Right 8/24/2023    Procedure: Right breast modified radical mastectomy;  Surgeon: Rosa Michael MD;  Location: [facility];  Service: General;  Laterality: Right;    TONSILLECTOMY      VENOUS ACCESS DEVICE (PORT) INSERTION N/A 05/19/2023    Procedure: INSERTION VENOUS ACCESS DEVICE;  Surgeon: Rosa Michael MD;  Location: [facility];  Service: General;  Laterality: N/A;       Social History     Socioeconomic History    Marital status:    Tobacco Use    Smoking status: Never    Smokeless tobacco: Never   Vaping Use    Vaping Use: Never used   Substance and Sexual Activity    Alcohol use: Never    Drug use: Never    Sexual activity: Never         No results found for: LDH, URICACID      Lab Results   Component  "Value Date    GLUCOSE 93 08/17/2023    BUN 24 (H) 08/17/2023    CREATININE 1.05 (H) 08/17/2023    BCR 22.9 08/17/2023    K 5.4 (H) 08/17/2023    CO2 21.0 (L) 08/17/2023    CALCIUM 10.1 08/17/2023    ALBUMIN 3.1 (L) 08/02/2023    LABIL2 1.2 03/01/2022    AST 30 08/02/2023    ALT 8 08/02/2023       CBC w/diff          7/20/2023    06:01 8/2/2023    11:03 8/17/2023    16:06   CBC w/Diff   WBC 7.23  14.32  11.95    RBC 3.23  3.61  3.87    Hemoglobin 8.7  10.0  10.8    Hematocrit 26.8  32.4  33.4    MCV 83.0  89.8  86.3    MCH 26.9  27.7  27.9    MCHC 32.5  30.9  32.3    RDW 16.1  17.5  16.2    Platelets 283  313  292    Neutrophil Rel % 70.9  57.9  63.3    Immature Granulocyte Rel % 1.4  0.3  0.3    Lymphocyte Rel % 16.7  33.4  29.4    Monocyte Rel % 7.9  5.9  5.3    Eosinophil Rel % 2.8  2.1  1.3    Basophil Rel % 0.3  0.4  0.4        Allergies as of 09/11/2023    (No Known Allergies)        MEDICATIONS:  Medication list reviewed today    Review of Systems   Constitutional:  Positive for activity change, appetite change and unexpected weight change. Negative for fatigue.   HENT:  Positive for hearing loss.    Eyes:  Positive for visual disturbance (vision changes/loss).   Gastrointestinal:  Negative for constipation and diarrhea.   Musculoskeletal:  Negative for arthralgias and myalgias.   Psychiatric/Behavioral:  Negative for dysphoric mood and sleep disturbance. The patient is not nervous/anxious.      /70   Pulse 110   Temp 99.1 °F (37.3 °C)   Ht 155 cm (61.02\")   Wt 57.7 kg (127 lb 3.2 oz)   SpO2 99%   BMI 24.02 kg/m²     Wt Readings from Last 3 Encounters:   09/11/23 57.7 kg (127 lb 3.2 oz)   08/24/23 59 kg (130 lb)   08/02/23 58.9 kg (129 lb 12.8 oz)        There were no vitals filed for this visit.        Physical Exam  Constitutional:       Appearance: Normal appearance. She is well-groomed.   HENT:      Head: Normocephalic and atraumatic.   Cardiovascular:      Rate and Rhythm: Normal rate and " regular rhythm.   Pulmonary:      Effort: No tachypnea or respiratory distress.   Abdominal:      General: Abdomen is flat. There is no distension.   Musculoskeletal:      Right ankle: No swelling. No tenderness. Normal pulse.      Left ankle: No swelling. No tenderness. Normal pulse.   Skin:     General: Skin is warm and dry.   Neurological:      Mental Status: She is alert and oriented to person, place, and time.   Psychiatric:         Attention and Perception: Attention and perception normal.         Mood and Affect: Mood and affect normal.         Speech: Speech normal.         Behavior: Behavior normal. Behavior is cooperative.         Thought Content: Thought content normal.         Advance Care Planning     Patient does have advance care planning complete, scanned into the medical record and dated June 8, 2023    Patient has designated a healthcare surrogate: her daughter Jennifer Root as primary and son Chang Royal as secondary          DISCUSSION HELD TODAY:   GOALS OF CARE:  Cure the cancer    Problems identified:  Lymphedema: Reviewed lymphedema causes, early signs and symptoms, prevention and management.  We discussed the importance of this in surveillance and follow-up in the setting of axillary dissection we will go ahead and place a referral to the lymphedema therapist today  She has lost just over 14 pounds since our first meeting on 6/8/2023.  Her weight loss has stabilized since her discharge back home.  Further complicating this she had broken her bottom teeth while hospitalized in July and is in the process of getting fitted with a lower denture.  She is not having any diarrhea or constipation currently.  She does continue to have home health come out to evaluate her drains.  She has been discharged from home physical therapy  Advised by audiology chemotherapy can cause hearing changes.  Advised patient and daughter I would expect any progressive changes to be stopped now that were about 2  weeks out from her last Taxol administration and okay to proceed with fitting for hearing aids.  We reviewed again today the negative impact hearing loss can have on memory, cognition.  Likewise with visual changes and as she is experiencing changes in visual acuity recommend eye exam  Reviewed today importance of frequent small meals, encouraged use of nutrition supplements.  Did try Enterade but did not tolerate and generally does not find other supplements palatable.  With regards to driving I do not recommend at this point.  We talked about options for a formal driving evaluation.  They are interested in pursuing.  Asking questions regarding necessity for radiation, discussed typically with mastectomy radiation not indicated unless there is proximity to other structures, narrow margins or other high risk features that may indicate further evaluation and encouraged her to share her questions with her treatment team.      Plan and recommendations:  Referral to lymphedema clinic  Continue small frequent meals, continue nutritional supplements daily as tolerated  Continue physical activity as tolerated  Discussed undergoing a formal driving assessment prior to resuming driving-[place] versus [employer], will discuss options further with arslan Austin  Patient will proceed with getting fitted for hearing aids  Patient to schedule visual acuity exam  9/13/2023 follow-up Dr. Michael  9/18/2023 follow-up Dr. Yee  10/17/2023 Scheduled with endocrinology   11/13/2023 follow-up with me, tentatively plan treatment summary visit at this time  Call my office as needed at [phone] for additional information, resources or support.      Diagnoses and all orders for this visit:    1. Malignant neoplasm of female breast, unspecified estrogen receptor status, unspecified laterality, unspecified site of breast (Primary)  -     Ambulatory Referral to Lymphedema Clinic    2. Bilateral hearing loss, unspecified hearing loss  type    3. At high risk for malnutrition    4. Driving safety issue            I spent 60 minutes caring for this patient on this date of service by face-to-face counseling. This time includes time spent by me in the following activities: preparing for the visit, reviewing tests, obtaining and/or reviewing a separately obtained history, performing a medically appropriate examination and/or evaluation, counseling and educating the patient/family/caregiver, referring and communicating with other health care professionals, documenting information in the medical record, and care coordination

## 2023-09-13 ENCOUNTER — OFFICE VISIT (OUTPATIENT)
Dept: SURGERY | Facility: CLINIC | Age: 83
End: 2023-09-13
Payer: MEDICARE

## 2023-09-13 VITALS — SYSTOLIC BLOOD PRESSURE: 122 MMHG | DIASTOLIC BLOOD PRESSURE: 66 MMHG

## 2023-09-13 DIAGNOSIS — C50.919 INFLAMMATORY CARCINOMA OF BREAST, UNSPECIFIED LATERALITY: Primary | ICD-10-CM

## 2023-09-13 PROCEDURE — 99024 POSTOP FOLLOW-UP VISIT: CPT | Performed by: STUDENT IN AN ORGANIZED HEALTH CARE EDUCATION/TRAINING PROGRAM

## 2023-09-13 PROCEDURE — 1160F RVW MEDS BY RX/DR IN RCRD: CPT | Performed by: STUDENT IN AN ORGANIZED HEALTH CARE EDUCATION/TRAINING PROGRAM

## 2023-09-13 PROCEDURE — 3078F DIAST BP <80 MM HG: CPT | Performed by: STUDENT IN AN ORGANIZED HEALTH CARE EDUCATION/TRAINING PROGRAM

## 2023-09-13 PROCEDURE — 3074F SYST BP LT 130 MM HG: CPT | Performed by: STUDENT IN AN ORGANIZED HEALTH CARE EDUCATION/TRAINING PROGRAM

## 2023-09-13 PROCEDURE — 1159F MED LIST DOCD IN RCRD: CPT | Performed by: STUDENT IN AN ORGANIZED HEALTH CARE EDUCATION/TRAINING PROGRAM

## 2023-09-13 NOTE — PROGRESS NOTES
General Surgery Breast Cancer History and Physical Exam     Summary:    Veronica Royal is a 82 y.o. 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      A multidisciplinary plan has been formulated for the patient:    (1) Breast Surgical Oncology:  -[employer] 9 panel genetic testing: negative.   -s/p right breast modified radical mastectomy 8/2024.   -Lymphedema clinic referral.   -Follow up in 6 months.     (2) Medical Oncology:  -Following with Dr. Yee. Underwent neoadjuvant chemotherapy. Early cessation due to toxicity.     (3) Radiation Oncology:  -Appointment scheduled with Dr. Tesfaye.    Referring Provider: No ref. provider found    Chief Complaint: breast mass    History of Present Illness: Ms. Veronica Royal is a 82 y.o. year old lady, seen at the request of No ref. provider found for a new diagnosis of right breast cancer.      This was initially detected as a palpable mass in her right breast. She first noticed it 2-3 months ago. Her last mammogram was 25 years ago. She denies any prior history of abnormal mammograms or breast biopsies. Her work-up is detailed in the oncologic history below.     She denies any breast lumps, pain, skin changes, or nipple discharge. She denies any family history of breast cancer. Her sister had ovarian cancer 25 years ago at age 58.      7/31/2023 She presents today for follow up. She has done well since leaving the hospital. She is slowly improving. Her strength is improving as well.     9/13/2023 She presents today for follow up. She is doing well since surgery. Her pain is controlled. She is getting back to her daily activities.     Workup of Current Diagnosis:    4/21/2023 bilateral diagnostic mammogram with ultrasound:  Finding 1: There is a high density irregular mass measuring 44 mm with spiculated margins and associated amorphous and fine  pleomorphic calcifications, skin retraction, and skin and trabecular thickening in the right breast at 9:00.  On ultrasound there is an irregular mass with indistinct and spiculated margins in the right breast at 9:00 3 cm from the nipple.  The mass extends into the skin there is visible external ulceration and scabbing.  Diffuse skin thickening and focal skin retraction are noted with skin thickening up to 11 mm.  The mass measures 38 x 28 x 42 mm.  Highly suggestive of malignancy.  Ultrasound-guided biopsy is recommended.  Finding 2: There are true intramammary lymph nodes measuring 5 mm and 6 mm seen in the posterior upper outer axillary tail that are slightly round.  There is visualization of one of the 2 right axillary lymph nodes that measure 5 mm with borderline cortical thickening.  Suspicious.  Appropriate action should be taken.  Finding 3: There are 2 prominent right axillary lymph nodes largest which measures 26 mm and contains extensive coarse heterogeneous calcifications.  Suspicious.  Ultrasound-guided biopsy is recommended.  Finding 4: On ultrasound there is an asymmetry in the superior region of the left breast that resolved with spot compression benign.  BI-RADS Category 5    4/21/2023 right breast ultrasound-guided biopsy:  The right breast at 9:00 was imaged and the mass was localized.  8 cores were obtained.  A mini cork tissue marker was placed.  Clip was in the expected position.  Pathology returned as invasive ductal carcinoma grade 3 which is malignant and concordant.  Surgical consult is recommended.    The patient's right breast at the axillary position was imaged and the abnormal lymph node was localized.  4 cores were obtained.  A spring shaped HydroMARK tissue marker was placed.  Clip was in the expected position.  Pathology returned as high-grade invasive mammary carcinoma with apron features.  The differential includes multifocal carcinoma versus a completely replaced lymph node.   Pathology is malignant and concordant.    4/21/2023 pathology:   1.  Breast, right, 9:00, biopsy:  Invasive mammary carcinoma, grade 3  2.  Axilla, right, biopsy:  High-grade invasive mammary carcinoma with apocrine features involving soft tissue and areas of necrosis    4/29/2023 Bilateral Breast MRI   IMPRESSION:  1. Biopsy-proven malignancy in the right breast in the posterior one third at the 9 o'clock position that measures on the order of 4.3 cm in greatest dimension and contains the internal mini cork-shaped metallic clip. The mass shows central hypoenhancement suggestive of central necrosis. Attachment to the overlying skin as described is noted and there is diffuse right breast skin edema and trabecular edema. Evidence of right axillary adenopathy is noted.  2. There are no findings suspicious for malignancy in the left breast.  BI-RADS category 6: Known biopsy-proven malignancy.    5/11/2023 CT Chest, Abdomen, Pelvis:   IMPRESSION:  1. Right breast cancer with right axillary lymphadenopathy  2. No convincing evidence of distant metastatic disease. Incidental findings as above.    5/11/2023 Bone Scan:   IMPRESSION:  Abnormal soft tissue activity in the right breast corresponding to known disease in that location. No evidence of osseous metastasis.    7/17/2023 Right Breast US:   IMPRESSION:  Biopsy-proven malignancy at 9:00 in the right breast is similar to slightly smaller when accounting for differences in technique. A 2.6 cm right axillary mass is not significantly changed, although an adjacent lymph node has decreased in size. Continued oncologic and surgical management are recommended.   BI-RADS Category 6: Known biopsy-proven malignancy    8/24/2023 Right breast modified radical mastectomy   Final Diagnosis   1. Breast, Right, Modified Radical Mastectomy:    A. Invasive ductal adenocarcinoma with                            1. The tumor is Franklin grade II (tubular grade 3, nuclear grade 3, mitotic  grade 1).               2. The tumor measures up to 4 cm (four consecutive 1 cm slices).                            3. Microcalcifications are seen in the area of the tumor.                            4. The tumor is in the area of 9:00 o'clock, 6 cm from nipple.                            5. Cork-shaped clip is identified.                            6. Treatment changes are present.                            7. Dermal lymphatic invasion is present.                            8. Capillary lymphatic invasion is present.                            9. Tumor seen in skin but ulceration through the skin is not identified.                            10. Residual cancer burden equals 3.454.                            11. Residual cancer burden class RCB-III.                            12. Xiong-Lima Grade 3                             13. The invasive tumor comes within 11 mm of the posterior margin, 55 mm of the inferior margin, 75 mm         of the superior margin and 80 mm of the medial and lateral margins.               B. Lymph nodes:                            1. Eighteen lymph nodes are identified, two with metastatic disease   i. One with macrometastisis (size of greatest metastasis 2.5 mm)      with no chemotherapy change).                                         ii. One with Individual tumor cells seen with chemotherapy change.                                        iii. There was also one node benign with chemotherapy change and a clip but no residual tumor.               C. Additional findings:                            1. Focus of atypical ductal hyperplasia.        Comment: The pretreatment classification was cT4d (inflammatory carcinoma). Case [id number] was reviewed. The patient presented clinically with inflammatory carcinoma (cT4d). Based off microscopic findings (ie. Skin involvement without ulceration by tumor), this carcinoma is best classified as ypT2, N1a.     2. Lymph Node, Right Additional  Axillary Tissue:  Single benign lymph node     Comment: There are no changes suggestive of treatment effect.     3. Breast, Right, Additional Lateral Skin Margin, Excision:  Benign skin and underlying connective tissue 15 mm deep     Gynecologic History:   . P:1 AB:1  Age at first childbirth: 35  Lactation/How long: none  Age at menarche: 11  Age at menopause: 50  Total years of oral contraceptive use: 3-4 years previously  Total years of hormone replacement therapy: none    Past Medical History:   DM  HTN  HLD    Past Surgical History:    None    Family History:    As above    Social History:  Denies tobacco use  Occasional alcohol use    Allergies:   No Known Allergies    Medications:     Current Outpatient Medications:     acetaminophen (TYLENOL) 325 MG tablet, Take 2 tablets by mouth Every 4 (Four) Hours As Needed for Mild Pain., Disp: 120 tablet, Rfl: 3    Cholecalciferol 50 MCG (2000) tablet, Take 1 tablet by mouth Daily (Monday-Friday)., Disp: , Rfl:     glipizide 2.5 MG tablet 2.5 mg, metFORMIN 500 MG tablet 500 mg, Take 1 dose by mouth Every Night., Disp: , Rfl:     glipiZIDE-metFORMIN (METAGLIP) 2.5-500 MG per tablet, Take 2 tablets by mouth 2 (Two) Times a Day Before Meals. (Patient taking differently: Take 2 tablets by mouth Every Morning.), Disp: 120 tablet, Rfl: 3    glucose blood test strip, Check BG once daily, E11.9, Disp: 30 each, Rfl: 4    glucose monitor monitoring kit, Dispense glucometer with brand based off insurance coverage, check BG daily, E11.9, Disp: 1 each, Rfl: 0    hydrocortisone 1 % cream, Apply 1 application topically to the appropriate area as directed 2 (Two) Times a Day., Disp: 453.6 g, Rfl: 0    ibuprofen (ADVIL,MOTRIN) 800 MG tablet, Take 1 tablet by mouth Every 8 (Eight) Hours As Needed for Moderate Pain., Disp: 60 tablet, Rfl: 1    Lancets misc, Check BG once daily, E11.9, Disp: 30 each, Rfl: 4    lidocaine-prilocaine (EMLA) 2.5-2.5 % cream, Apply nickel size amount to  port site 30 min before appt time do not rub in cover with plastic wrap, Disp: 30 g, Rfl: 5    ondansetron (ZOFRAN) 8 MG tablet, Take 1 tablet by mouth 3 (Three) Times a Day As Needed for Nausea or Vomiting., Disp: 30 tablet, Rfl: 5    pantoprazole (PROTONIX) 40 MG EC tablet, Take 1 tablet by mouth Daily. (Patient taking differently: Take 1 tablet by mouth Every Morning.), Disp: 30 tablet, Rfl: 3    simvastatin (ZOCOR) 10 MG tablet, Take 1 tablet by mouth Every Night., Disp: , Rfl:     Tradjenta 5 MG tablet tablet, Take 1 tablet by mouth Every Morning., Disp: , Rfl:     Laboratory Values:    Labs from 2023 reviewed    Review of Systems:   Influenza-like illness: no fever, no  cough, no  sore throat, no  body aches, no loss of sense of taste or smell, no known exposure to person with Covid-19.  Constitutional: Negative for fevers or chills  HENT: Negative for hearing loss or runny nose  Eyes: Negative for vision changes or scleral icterus  Respiratory: Negative for cough or shortness of breath  Cardiovascular: Negative for chest pain or heart palpitations  Gastrointestinal: Negative for abdominal pain, nausea, vomiting, constipation, melena, or hematochezia  Genitourinary: Negative for hematuria or dysuria  Musculoskeletal: Negative for joint swelling or gait instability  Neurologic: Negative for tremors or seizures  Psychiatric: Negative for suicidal ideations or depression  All other systems reviewed and negative    Physical Exam:   ECO - Asymptomatic  Constitutional: Well-developed well-nourished, no acute distress  Eyes: Conjunctiva normal, sclera nonicteric  ENMT: Hearing grossly normal, oral mucosa moist  Neck: Supple, no palpable mass, trachea midline  Respiratory: Clear to auscultation, normal inspiratory effort  Cardiovascular: Regular rate, no peripheral edema, no jugular venous distention  Breast: symmetric  Right: Right mastectomy incision clean and dry, no erythema or drainage, no hematoma or  seroma   Left: No visible abnormalities on inspection while seated, with arms raised or hands on hips. No masses, skin changes, or nipple abnormalities.  No clinical chest wall involvement.  Gastrointestinal: Soft, nontender  Lymphatics (palpable nodes): No left sided cervical, supraclavicular or axillary lymphadenopathy  Skin:  Warm, dry, no rash on visualized skin surfaces  Musculoskeletal: Symmetric strength, normal gait  Psychiatric: Alert and oriented ×3, normal affect     SHAR MCELROY M.D.  General and Endoscopic Surgery  [facility]    [address] [place] [address], [address]  [place], [place], [address]  P: [phone]  F: [phone]

## 2023-09-14 ENCOUNTER — HOSPITAL ENCOUNTER (OUTPATIENT)
Dept: PHYSICAL THERAPY | Facility: HOSPITAL | Age: 83
Setting detail: THERAPIES SERIES
Discharge: HOME | End: 2023-09-14
Payer: MEDICARE

## 2023-09-14 DIAGNOSIS — Z17.1 MALIGNANT NEOPLASM OF UPPER-OUTER QUADRANT OF RIGHT BREAST IN FEMALE, ESTROGEN RECEPTOR NEGATIVE: Primary | ICD-10-CM

## 2023-09-14 DIAGNOSIS — C50.411 MALIGNANT NEOPLASM OF UPPER-OUTER QUADRANT OF RIGHT BREAST IN FEMALE, ESTROGEN RECEPTOR NEGATIVE: Primary | ICD-10-CM

## 2023-09-14 DIAGNOSIS — Z91.89 AT RISK FOR LYMPHEDEMA: ICD-10-CM

## 2023-09-14 DIAGNOSIS — Z90.11 S/P RIGHT MASTECTOMY: ICD-10-CM

## 2023-09-14 PROCEDURE — 97161 PT EVAL LOW COMPLEX 20 MIN: CPT

## 2023-09-14 PROCEDURE — 97535 SELF CARE MNGMENT TRAINING: CPT

## 2023-09-14 PROCEDURE — 93702 BIS XTRACELL FLUID ANALYSIS: CPT

## 2023-09-14 NOTE — THERAPY EVALUATION
Physical Therapy Lymphedema Initial Evaluation  [facility]     Patient Name: Veronica Royal  : 1940  MRN: [id number]  Today's Date: 2023      Visit Date: 2023    Visit Dx:    ICD-10-CM ICD-9-CM   1. Malignant neoplasm of upper-outer quadrant of right breast in female, estrogen receptor negative  C50.411 174.4    Z17.1 V86.1   2. At risk for lymphedema  Z91.89 V49.89   3. S/P right mastectomy  Z90.11 V45.71       Patient Active Problem List   Diagnosis    Malignant neoplasm of upper-outer quadrant of right breast in female, estrogen receptor negative    Encounter for fitting and adjustment of vascular catheter    Hypertension    At high risk for malnutrition    Bilateral hearing loss    Malignant neoplasm of female breast    Advanced care planning/counseling discussion    Cirrhosis of liver    Type 2 diabetes mellitus    Anemia due to chemotherapy    Left renal mass    Severe malnutrition    Breast cancer    Driving safety issue        Past Medical History:   Diagnosis Date    Anxiety     Arthritis     Basal cell carcinoma     Left thumb    Breast cancer     Right    Depression     Diabetes mellitus     Diarrhea     s/e chemo    High cholesterol     History of chemotherapy 2023    Hypertension     Rash 2023    s/e chemo    Sacral decubitus ulcer     cleaning with soap & water    Urinary incontinence     wears pads        Past Surgical History:   Procedure Laterality Date    CATARACT EXTRACTION EXTRACAPSULAR W/ INTRAOCULAR LENS IMPLANTATION Right     EYE SURGERY      Muscle repair age 21    MASTECTOMY W/ SENTINEL NODE BIOPSY Right 2023    Procedure: Right breast modified radical mastectomy;  Surgeon: Rosa Michael MD;  [place] [facility] [place] [facility] [place]  Service: General;  Laterality: Right;    TONSILLECTOMY      VENOUS ACCESS DEVICE (PORT) INSERTION N/A 2023    Procedure: INSERTION VENOUS ACCESS DEVICE;  Surgeon: Rosa Michael MD;  [place] [facility] [place] [facility] [place]  Service:  General;  Laterality: N/A;       Visit Dx:    ICD-10-CM ICD-9-CM   1. Malignant neoplasm of upper-outer quadrant of right breast in female, estrogen receptor negative  C50.411 174.4    Z17.1 V86.1   2. At risk for lymphedema  Z91.89 V49.89   3. S/P right mastectomy  Z90.11 V45.71        Patient History       Row Name 09/14/23 1300             History    Chief Complaint Pain;Balance Problems  -LB      Date Current Problem(s) Began 08/24/23  -LB      Brief Description of Current Complaint Pt s/p R mastectomy, axillary dissection with 1/19 LNs removed. She had neoadjuvant chemotherapy but ended up hospitalized and did not complete. She does not require radiation. She had drain removed and stitches taken out yesterday. She did not have reconstruction. She is R handed and lives alone. She went to rehab following chemotherapy hospitalization as well as post operatively. She bowls in a league.  -LB      Patient/Caregiver Goals Return to prior level of function;Know what to do to help the symptoms  -LB      Hand Dominance right-handed  -LB      Occupation/sports/leisure activities bowling  -LB      Patient seeing anyone else for problem(s)? yes  -LB      How has patient tried to help current problem? slowly moving UE  -LB      History of Previous Related Injuries none  -LB         Pain     Pain Location Arm;Shoulder  -LB      Pain at Present 6  -LB      Pain at Best 4  -LB      Pain at Worst 6  -LB      Pain Frequency Constant/continuous  -LB      Pain Description Tightness;Sore  -LB      What Performance Factors Make the Current Problem(s) WORSE? RUE movement  -LB      What Performance Factors Make the Current Problem(s) BETTER? RUE rest  -LB      Is your sleep disturbed? No  -LB      What position do you sleep in? Supine  -LB      Difficulties at work? Pt does not work.  -LB      Difficulties with ADL's? Difficulty with RUE but able to perform.  -LB      Difficulties with recreational activities? Is not bowling for  awhile.  -LB         Fall Risk Assessment    Any falls in the past year: No  -LB         Services    Prior Rehab/Home Health Experiences No  -LB      Are you currently receiving Home Health services No  -LB      Do you plan to receive Home Health services in the near future No  -LB         Daily Activities    Primary Language English  -LB      Pt Participated in POC and Goals Yes  -LB         Safety    Are you being hurt, hit, or frightened by anyone at home or in your life? No  -LB      Are you being neglected by a caregiver No  -LB      Have you had any of the following issues with N/A  -LB                User Key  (r) = Recorded By, (t) = Taken By, (c) = Cosigned By      Initials Name Provider Type    LB Denia Tse, GENO Physical Therapist                     Lymphedema       Row Name 09/14/23 1300             Subjective Pain    Able to rate subjective pain? yes  -LB      Pre-Treatment Pain Level 4  -LB         Subjective    Subjective Comments I am sore. I just started moving my RUE.  -LB         Lymphedema Assessment    Lymphedema Classification RUE:;at risk/stage 0  -LB      Lymphedema Cancer Related Sx right;axillary dissection;modified radical mastectomy  -LB      Lymphedema Surgery Comments 8/24/23  -LB      Lymph Nodes Removed # 19  -LB      Positive Lymph Nodes # 1  -LB      Chemo Received yes  -LB      Chemo Treatments #/Timeframe neoadjuvant  -LB      Radiation Therapy Received no  -LB      Infections or Cellulitis? no  -LB         Posture/Observations    Posture/Observations Comments B ankle edema  -LB         General ROM    GENERAL ROM COMMENTS LUE grossly 130 deg flexion/abduction, RUE limited to 90 deg flexion/abduction  -LB         MMT (Manual Muscle Testing)    General MMT Comments RUE dec; LUE WFL  -LB         Lymphedema Edema Assessment    Edema Assessment Comment B ankle/feet edema that pt states is chronic, no obvious edema RUE  -LB         Skin Changes/Observations    Skin Observations  Comment well healing incision, continued scabbing, RUQ bandaging at drain site  -LB         Lymphedema Sensation    Lymphedema Sensation Comments dec RUQ at mastectomy site  -LB         Lymphedema Pulses/Capillary Refill    Lymph Pulses Capillary Refill Comments normal  -LB         LUE Quick Girth (cm)    Axilla 27 cm  -LB      Mid upper arm 24.5 cm  -LB      Elbow 23.1 cm  -LB      Mid forearm 18.8 cm  -LB      Wrist crease 14.8 cm  -LB      Web space 18.4 cm  -LB      Met-heads 18.2 cm  -LB      LUE Quick Girth Total 144.8  -LB         RUE Quick Girth (cm)    Axilla 27.4 cm  -LB      Mid upper arm 24.1 cm  -LB      Elbow 23 cm  -LB      Mid forearm 18.3 cm  -LB      Wrist crease 15.5 cm  -LB      Web space 18.5 cm  -LB      Met-heads 17.5 cm  -LB      RUE Quick Girth Total 144.3  -LB         Compression/Skin Care    Compression/Skin Care Comments discussed compression garment use and wear schedule  -LB         L-Dex Bioimpedence Screening    L-Dex Measurement Extremity RUE  -LB      L-Dex Patient Position Standing  -LB      L-Dex UE Dominate Side Right  -LB      L-Dex UE At Risk Side Right  -LB      L-Dex UE Pre Surgical Value No  -LB      L-Dex UE Score 17.6  -LB      L-Dex UE Baseline Score 17.6  -LB      L-Dex UE Value Change 0  -LB      L-Dex UE Comment outside of normal range  -LB      $ L-Dex Charge yes  -LB                User Key  (r) = Recorded By, (t) = Taken By, (c) = Cosigned By      Initials Name Provider Type    Denia Thompson PT Physical Therapist                                    Therapy Education  Education Details: issued [employer] HEP for ROM, discussed s/s of lymphedema and steps to prevention, discussed compression garment for bowling and to address elevated bioimpedance  Given: Symptoms/condition management  Program: Reinforced  How Provided: Verbal  Provided to: Patient  Level of Understanding: Verbalized  06070 - PT Self Care/Mgmt Minutes: 15       OP Exercises       Row Name 09/14/23  1300             Subjective    Subjective Comments I am sore. I just started moving my RUE.  -LB         Subjective Pain    Able to rate subjective pain? yes  -LB      Pre-Treatment Pain Level 4  -LB         Exercise 1    Exercise Name 1 issued HEP for mobility  -LB                User Key  (r) = Recorded By, (t) = Taken By, (c) = Cosigned By      Initials Name Provider Type    Denia Thompson, PT Physical Therapist                                 PT OP Goals       Row Name 09/14/23 1500          Long Term Goals    LTG Date to Achieve 10/14/23  -LB     LTG 1 Pt will demonstrate LDex bioimpedance WNL.  -LB     LTG 1 Progress New  -LB     LTG 2 Pt will acquire appropriately fitted compression garment and verbalize appropriate wear schedule.  -LB     LTG 2 Progress New  -LB     LTG 3 Pt will demonstrate equal BUE AROM.  -LB     LTG 3 Progress New  -LB        Time Calculation    PT Goal Re-Cert Due Date 12/13/23  -LB               User Key  (r) = Recorded By, (t) = Taken By, (c) = Cosigned By      Initials Name Provider Type    Denia Thompson, PT Physical Therapist                     PT Assessment/Plan       Row Name 09/14/23 1503          PT Assessment    Functional Limitations Limitations in functional capacity and performance;Performance in leisure activities;Limitation in home management;Performance in self-care ADL  -LB     Impairments Edema;Impaired flexibility;Impaired lymphatic circulation;Joint mobility;Muscle strength;Pain;Posture;Range of motion;Sensation  -LB     Assessment Comments Veronica Royal is a 82 y.o. female recently diagnosed with Right Breast Cancer, presents to therapy in evolving condition, s/p Right Modified Radical Mastectomywith Axillary Dissection without reconstruction performed on 8/24/23 by surgeons Dr. Michael. Veronica Royal is at increased risk of post Mastectomy lymphedema syndrome Right Upper Extremity due to Breast surgery Lymph Node Removal Lymph Node involvement . She  presents with post-operative decreased range of motion, flexibility, strength, function and increased pain. Currently, negative s/s of lymphedema, infection, seroma, or axillary cording. We did complete a baseline post operative bioimpedance assessment, with current L-Dex score of 17.6 , which is outside of normal range. At this time, functional limitations can be affected by but not limited to dec RUE AROM, inc risk for lymphedema. Veronica Royal will benefit from skilled formal Breast Care Physical Therapy at this time to address listed dysfunctions. Please refer to Plan.  -LB     Rehab Potential Good  -LB     Patient would benefit from skilled therapy intervention Yes  -LB        PT Plan    PT Frequency 1x/week  -LB     Predicted Duration of Therapy Intervention (PT) 6-8 visits  -LB     Planned CPT's? PT EVAL LOW COMPLEXITY: 89958;PT RE-EVAL: 67967;PT THER PROC EA 15 MIN: 60603;PT THER ACT EA 15 MIN: 30893;PT MANUAL THERAPY EA 15 MIN: 09478;PT NEUROMUSC RE-EDUCATION EA 15 MIN: 01419;PT SELF CARE/HOME MGMT/TRAIN EA 15: 80085;PT BIS XTRACELL FLUID ANALYSIS: 51087  -LB     PT Plan Comments repeat bioimpedance in 2 weeks with inc healing time and ROM, bioimpedance protocol, RUE compression for bowling or daily if bioimpedance remains high  -LB               User Key  (r) = Recorded By, (t) = Taken By, (c) = Cosigned By      Initials Name Provider Type    LB Denia Tse, PT Physical Therapist                       Outcome Measure Options: Quick DASH         Time Calculation:   Start Time: 1315  Stop Time: 1400  Time Calculation (min): 45 min  Total Timed Code Minutes- PT: 15 minute(s)  Timed Charges  48056 - PT Self Care/Mgmt Minutes: 15  Total Minutes  Timed Charges Total Minutes: 15   Total Minutes: 15   Therapy Charges for Today       Code Description Service Date Service Provider Modifiers Qty    78975639800 HC PT BIS XTRACELL FLUID ANALYSIS 9/14/2023 Denia Tse, PT  1    85468124919 HC PT SELF  CARE/MGMT/TRAIN EA 15 MIN 9/14/2023 Denia Tse, PT GP 1    80382836069 HC PT EVAL LOW COMPLEXITY 2 9/14/2023 Denia Tse, PT GP 1            PT G-Codes  Outcome Measure Options: Quick DASH         Denia Tse, GENO  9/14/2023

## 2023-09-16 ENCOUNTER — PATIENT OUTREACH (OUTPATIENT)
Dept: OTHER | Facility: HOSPITAL | Age: 83
End: 2023-09-16
Payer: MEDICARE

## 2023-09-16 NOTE — PROGRESS NOTES
Called Ms. Royal's daughter to see how she was doing. She stated she is doing well and has no questions or concerns at this time. She stated they are getting a driving assessment  scheduled to help determine Ms. Royal's ability to drive in the future. They are also following with Marie for older adult functional assessments and will plan to see her again in a few months. Otherwise, she stated they have no needs at this time. She was thankful for the call and will reach out if any questions or needs arise.

## 2023-09-18 ENCOUNTER — APPOINTMENT (OUTPATIENT)
Dept: LAB | Facility: HOSPITAL | Age: 83
End: 2023-09-18
Payer: MEDICARE

## 2023-09-18 ENCOUNTER — OFFICE VISIT (OUTPATIENT)
Dept: ONCOLOGY | Facility: CLINIC | Age: 83
End: 2023-09-18
Payer: MEDICARE

## 2023-09-18 ENCOUNTER — INFUSION (OUTPATIENT)
Dept: ONCOLOGY | Facility: HOSPITAL | Age: 83
End: 2023-09-18
Payer: MEDICARE

## 2023-09-18 VITALS
HEIGHT: 61 IN | RESPIRATION RATE: 16 BRPM | DIASTOLIC BLOOD PRESSURE: 62 MMHG | SYSTOLIC BLOOD PRESSURE: 138 MMHG | WEIGHT: 127.2 LBS | TEMPERATURE: 97.3 F | HEART RATE: 95 BPM | BODY MASS INDEX: 24.02 KG/M2 | OXYGEN SATURATION: 100 %

## 2023-09-18 DIAGNOSIS — Z51.11 ENCOUNTER FOR ANTINEOPLASTIC CHEMOTHERAPY: ICD-10-CM

## 2023-09-18 DIAGNOSIS — Z17.1 MALIGNANT NEOPLASM OF UPPER-OUTER QUADRANT OF RIGHT BREAST IN FEMALE, ESTROGEN RECEPTOR NEGATIVE: Primary | ICD-10-CM

## 2023-09-18 DIAGNOSIS — C50.411 MALIGNANT NEOPLASM OF UPPER-OUTER QUADRANT OF RIGHT BREAST IN FEMALE, ESTROGEN RECEPTOR NEGATIVE: Primary | ICD-10-CM

## 2023-09-18 LAB
ALBUMIN SERPL-MCNC: 3.3 G/DL (ref 3.5–5.2)
ALBUMIN/GLOB SERPL: 0.9 G/DL
ALP SERPL-CCNC: 74 U/L (ref 39–117)
ALT SERPL W P-5'-P-CCNC: 16 U/L (ref 1–33)
ANION GAP SERPL CALCULATED.3IONS-SCNC: 16.2 MMOL/L (ref 5–15)
AST SERPL-CCNC: 20 U/L (ref 1–32)
BASOPHILS # BLD AUTO: 0.05 10*3/MM3 (ref 0–0.2)
BASOPHILS NFR BLD AUTO: 0.5 % (ref 0–1.5)
BILIRUB SERPL-MCNC: 0.3 MG/DL (ref 0–1.2)
BUN SERPL-MCNC: 18 MG/DL (ref 8–23)
BUN/CREAT SERPL: 18.6 (ref 7–25)
CALCIUM SPEC-SCNC: 9.4 MG/DL (ref 8.6–10.5)
CHLORIDE SERPL-SCNC: 106 MMOL/L (ref 98–107)
CO2 SERPL-SCNC: 21.8 MMOL/L (ref 22–29)
CREAT SERPL-MCNC: 0.97 MG/DL (ref 0.6–1.1)
DEPRECATED RDW RBC AUTO: 47.1 FL (ref 37–54)
EGFRCR SERPLBLD CKD-EPI 2021: 58.5 ML/MIN/1.73
EOSINOPHIL # BLD AUTO: 0.13 10*3/MM3 (ref 0–0.4)
EOSINOPHIL NFR BLD AUTO: 1.3 % (ref 0.3–6.2)
ERYTHROCYTE [DISTWIDTH] IN BLOOD BY AUTOMATED COUNT: 14.1 % (ref 12.3–15.4)
GLOBULIN UR ELPH-MCNC: 3.5 GM/DL
GLUCOSE SERPL-MCNC: 119 MG/DL (ref 65–99)
HCT VFR BLD AUTO: 31.3 % (ref 34–46.6)
HGB BLD-MCNC: 9.7 G/DL (ref 12–15.9)
IMM GRANULOCYTES # BLD AUTO: 0.04 10*3/MM3 (ref 0–0.05)
IMM GRANULOCYTES NFR BLD AUTO: 0.4 % (ref 0–0.5)
LYMPHOCYTES # BLD AUTO: 2.58 10*3/MM3 (ref 0.7–3.1)
LYMPHOCYTES NFR BLD AUTO: 25.4 % (ref 19.6–45.3)
MCH RBC QN AUTO: 28.3 PG (ref 26.6–33)
MCHC RBC AUTO-ENTMCNC: 31 G/DL (ref 31.5–35.7)
MCV RBC AUTO: 91.3 FL (ref 79–97)
MONOCYTES # BLD AUTO: 0.53 10*3/MM3 (ref 0.1–0.9)
MONOCYTES NFR BLD AUTO: 5.2 % (ref 5–12)
NEUTROPHILS NFR BLD AUTO: 6.83 10*3/MM3 (ref 1.7–7)
NEUTROPHILS NFR BLD AUTO: 67.2 % (ref 42.7–76)
NRBC BLD AUTO-RTO: 0 /100 WBC (ref 0–0.2)
PLATELET # BLD AUTO: 351 10*3/MM3 (ref 140–450)
PMV BLD AUTO: 9.4 FL (ref 6–12)
POTASSIUM SERPL-SCNC: 4.4 MMOL/L (ref 3.5–5.2)
PROT SERPL-MCNC: 6.8 G/DL (ref 6–8.5)
RBC # BLD AUTO: 3.43 10*6/MM3 (ref 3.77–5.28)
SODIUM SERPL-SCNC: 144 MMOL/L (ref 136–145)
WBC NRBC COR # BLD: 10.16 10*3/MM3 (ref 3.4–10.8)

## 2023-09-18 PROCEDURE — 96360 HYDRATION IV INFUSION INIT: CPT

## 2023-09-18 PROCEDURE — 85025 COMPLETE CBC W/AUTO DIFF WBC: CPT

## 2023-09-18 PROCEDURE — 80053 COMPREHEN METABOLIC PANEL: CPT

## 2023-09-18 RX ORDER — SODIUM CHLORIDE 9 MG/ML
500 INJECTION, SOLUTION INTRAVENOUS ONCE
Status: COMPLETED | OUTPATIENT
Start: 2023-09-18 | End: 2023-09-18

## 2023-09-18 RX ADMIN — SODIUM CHLORIDE 500 ML: 9 INJECTION, SOLUTION INTRAVENOUS at 09:38

## 2023-09-18 NOTE — PROGRESS NOTES
Subjective   Veronica Royal is a 82 y.o. female.  Referred by Dr. Michael for right breast inflammatory breast cancer    History of Present Illness   Ms. Royal is a 82-year-old postmenopausal  lady with hypertension, diabetes, hyperlipidemia, chronic kidney disease, hyperkalemia-chronic presented with a palpable abnormality in the right breast.  Subsequent imaging was performed.  Patient has not had a mammogram prior to this in the past 25 years.    4/21/2023-bilateral diagnostic mammogram-high density irregular mass measuring 44 mm with spiculated margins and amorphus and fine pleomorphic calcifications with skin thickening in the right breast at 9:00.  2 intramammary lymph nodes in the upper outer axillary tail region are slightly rounded  2 prominent right axillary lymph nodes largest of which measures 26 mm.  Asymmetry noted in the left breast.    Right breast ultrasound at 9 o'clock position, 3 cm from the nipple there is a 38 x 28 x 42 mm mass.  Skin thickness measuring 11 mm near the mass.  One of the 2 rounded axillary tail lymph nodes noted.  Borderline cortical thickening.  2 abnormal axillary lymph nodes.  1 lymph node displaced loss of normal morphology with a round heterogeneous appearance and echogenic foci.  Most consistent with calcified lymph node measuring 26 mm.  Second lymph node measures 12 mm.  Displaced cortical thickening.  Ultrasound-guided biopsy of the mass in the axillary lymph nodes recommended.    4/21/2023  1.right breast 9:00 biopsy-invasive ductal carcinoma with apocrine features  Grade 3  ER negative  VA negative  HER2 2+ on immunohistochemistry  HER2 amplified on FISH with HER2 copy number of 7.58, OPAL seventeen 3.18, HER2/OPAL 17 ratio of 2.4.  Ki-67 38.45%    2.right axillary biopsy-soft tissue involved by invasive ductal carcinoma with apocrine features  Associated microcalcification  No definite lymph node tissue identified.    4/29/2023-MRI of the breast-biopsy-proven  malignancy in the right breast at 9:00 measuring 4.3 cm in greatest dimension.  Attachment overlying skin and diffuse right breast edema noted.  Right axillary lymphadenopathy.  No suspicious findings in the left breast.    5/11/2023-CT of the chest abdomen and pelvis-no evidence of metastatic disease.  Liver is cirrhotic in configuration.    5/11/2023-bone scan negative    Patient presents today to the clinic for discussing neoadjuvant chemotherapy.  She is accompanied by her daughter.  Patient denies any recent changes in weight, she reports some pain at the site of malignancy.  A punch biopsy was performed and was consistent with inflammatory breast cancer.  At the site of punch biopsy there is an ulcerated lesion.    She has poorly controlled diabetes currently on glipizide metformin and Tradjenta.  Hemoglobin A1c recently was noted to be 9.9.    Also has chronic hyperkalemia, explanation unclear.    Blood pressure poorly controlled.    She reports good functional status lives independently.  Able to manage all her bills and independent of ADLs.  She has good support system in terms of her daughter.    Family history significant for brother with pancreatic cancer at the age of 68, sister with ovarian cancer at the age of 58    She received 6 weeks of Taxol Perjeta and Herceptin and subsequently admitted to the hospital due to severe nausea vomiting diarrhea poor oral intake, KAMILA, severe electrolyte abnormalities.      admission to the hospital for KAMILA, severe diarrhea, nausea vomiting and poor oral intake.She was in the hospital between 7/12/2023 to 7/20/2023.  During the hospitalization she was treated for acute UTI, electrolyte abnormalities and KAMILA.  Subsequently she was  discharged to a rehab.   During the hospitalization she was evaluated by Dr. Michael and a right breast ultrasound was obtained on 7/17/2023.  There was very little response noted with the 9 o'clock position mass 6 cm from the nipple  measuring 3.7 x 2.4 x 3.7 cm previously 3.8 x 2.5 x 4.2 cm.  In the right axilla the lymph node measured 2.6 x 1.5 x 1.9 cm previously 1.9 x 1.5 x 2.5 cm.  There was decrease in size of the adjacent lymph node measuring 0.7 cm previously 1.2 cm.    8/24/2023-right mastectomy and axillary lymph node dissection  Invasive ductal carcinoma, grade 2  The tumor measures 4 cm  Microcalcifications are seen in the tumor  Dermal lymphatic invasion present  Lymphatic invasion present  Tumor seen in the skin but no ulceration.  Residual cancer burden 3.454  RCB-3  Xiong Lima grade 3  Margins negative  18 lymph nodes total evaluated  1 with micrometastasis with a tumor measuring 2.5 mm with no chemotherapy effect  1 with chemotherapy change and isolated tumor cells  There is 1 node with chemotherapy change and a clip but no residual tumor.    Receptors repeated  ER negative  DC negative  HER2 2+ on immunohistochemistry  FISH nonamplified        INTERVAL HISTORY:   The patient is a 82 y.o. female with above-mentioned history returns today .  She is status post right mastectomy.  Drains have been removed and she is recovering well from surgery.  Last dose of Herceptin was on 8/2/2023.  She refused to come in for Herceptin and Perjeta which was scheduled prior to surgery.  She reports that she had significant diarrhea after the last dose of Herceptin.  Denies any neuropathy.  She is somewhat reluctant to pursue therapy.  She is accompanied to the clinic today by her daughter.    The following portions of the patient's history were reviewed and updated as appropriate: allergies, current medications, past family history, past medical history, past social history, past surgical history and problem list.    Past Medical History:   Diagnosis Date    Anxiety     Arthritis     Basal cell carcinoma     Left thumb    Breast cancer 2023    Right    Depression     Diabetes mellitus     Diarrhea     s/e chemo    High cholesterol     History  "of chemotherapy 2023    Hypertension     Rash 2023    s/e chemo    Sacral decubitus ulcer     cleaning with soap & water    Urinary incontinence     wears pads        Past Surgical History:   Procedure Laterality Date    CATARACT EXTRACTION EXTRACAPSULAR W/ INTRAOCULAR LENS IMPLANTATION Right     EYE SURGERY      Muscle repair age 21    MASTECTOMY W/ SENTINEL NODE BIOPSY Right 2023    Procedure: Right breast modified radical mastectomy;  Surgeon: Rosa Michael MD;  Location: [facility] OR [facility] [place];  Service: General;  Laterality: Right;    TONSILLECTOMY      VENOUS ACCESS DEVICE (PORT) INSERTION N/A 2023    Procedure: INSERTION VENOUS ACCESS DEVICE;  Surgeon: Rosa Michael MD;  Location: [facility] OR [facility] [place];  Service: General;  Laterality: N/A;        Family History   Problem Relation Age of Onset    Alzheimer's disease Mother     Heart disease Father     Heart attack Father     Ovarian cancer Sister     Pancreatic cancer Brother     Malig Hyperthermia Neg Hx     Colon cancer Neg Hx     Colon polyps Neg Hx     Crohn's disease Neg Hx     Irritable bowel syndrome Neg Hx     Ulcerative colitis Neg Hx         Social History     Socioeconomic History    Marital status:    Tobacco Use    Smoking status: Never    Smokeless tobacco: Never   Vaping Use    Vaping Use: Never used   Substance and Sexual Activity    Alcohol use: Never    Drug use: Never    Sexual activity: Never        OB History    No obstetric history on file.      Age at menarche-11  Age at first live childbirth-35   2 para 1  1  Age at menopause-50  Oral conceptive pill use for 3 to 4 years    No Known Allergies     Review of Systems    Review of systems as mentioned in the HPI otherwise negative    Objective   Blood pressure 138/62, pulse 95, temperature 97.3 °F (36.3 °C), temperature source Infrared, resp. rate 16, height 155 cm (61.02\"), weight 57.7 kg (127 lb 3.2 oz), SpO2 100 %.     Physical Exam  Vitals reviewed. "   Constitutional:       General: She is not in acute distress.     Appearance: Normal appearance. She is well-developed.   HENT:      Head: Normocephalic and atraumatic.   Eyes:      Pupils: Pupils are equal, round, and reactive to light.   Cardiovascular:      Rate and Rhythm: Normal rate and regular rhythm.      Heart sounds: Normal heart sounds. No murmur heard.  Pulmonary:      Effort: Pulmonary effort is normal. No respiratory distress.      Breath sounds: Normal breath sounds. No wheezing, rhonchi or rales.   Abdominal:      General: Bowel sounds are normal. There is no distension.      Palpations: Abdomen is soft.   Musculoskeletal:         General: Swelling (2+ bilateral LE) present. Normal range of motion.      Cervical back: Normal range of motion.   Skin:     General: Skin is warm and dry.      Findings: No rash.   Neurological:      Mental Status: She is alert and oriented to person, place, and time.      I have reexamined the patient and the results are consistent with the previously documented exam. Sheila Yee MD  :                            No radiology results for the last 30 days.     Ultrasound of the abdomen images independently reviewed and interpreted by me in detail summarized in the HPI    Assessment & Plan       *Right breast inflammatory breast cancer  T4d N1 M0  Stage IIIb  ER negative, CT negative, HER2 2+ on immunohistochemistry but amplified on FISH.  Invasive ductal carcinoma with apocrine features, grade 3, Ki-67 38%  CT scans and bone scan without any obvious evidence of metastatic disease  Echocardiogram has been performed and ejection fraction normal.  Liver shows cirrhotic morphology.  LFTs otherwise normal and total bilirubin normal as well as protein normal.  It appears that the synthetic function of the liver is still within normal limits.  Mediport placed 5/19/2023  Taxol, Herceptin, Perjeta initiated 5/24/2023 5/31/2023 with C1D8 Taxol  6/28/2023-cycle 2-day 15 of  Taxol.  She is overall tolerating therapy well with expected side effects.  She will proceed with Taxol today.  Scheduled for Taxol Herceptin and Perjeta.  7/12/2023-cycle 3-day 8 of TPH.  Chemotherapy held and patient was admitted to the hospital for management of severe dehydration, KAMILA, nausea vomiting diarrhea and decreased oral intake.  8/2/2023-she feels relatively well today.  Labs reviewed and stable to proceed with Herceptin only.  We will not administer Taxol and Perjeta as she has had significant issues with chemotherapy.  Right mastectomy 8/24/2023 with 4 cm of residual disease, RCB-3, treatment effect present, axillary lymph node dissection with 1 lymph node with micrometastasis measuring 2.5 mm, 1 lymph node with isolated tumor cells and a third lymph node which showed treatment changes but no tumor cells.  Total of 18 lymph nodes removed  Presents today to discuss pathology and adjuvant therapy.  We discussed Kadcyla every 3 weekly to finish a complete year.  Patient is definitely hesitant to resume any sort of chemotherapy due to her previous experience with diarrhea.  Reassured her that hopefully the amount of diarrhea will not be as significant with Kadcyla.  This will be scheduled for Wednesday  Repeat receptors on the tumor are ER/UT negative, no role for endocrine therapy    *Diabetes  Poorly controlled  Evaluation by endocrinology 5/30/2023 with recommendations for dietary changes and home blood sugar monitoring.  The patient's daughter reports today she is struggling with compliance.  Not well controlled at this time    *KAMILA/CKD  8/17/2023 BUN 24 and creatinine 1.05  Patient's daughter reports that she has not been hydrating herself well.  1 L of normal saline will be administered today  Recheck labs today    *?  Cirrhotic appearance of the liver on the CT scan  Referred to gastroenterology  Synthetic function appears to be normal  We will start with Taxol to see if she would tolerate the  chemotherapy   Slight elevation of intervention studies today with ALT 43, AST 55.  Continue to monitor weekly  6/28/2023-mild elevation in the LFTs.  Stable compared to previous labs.  Okay to proceed with chemotherapy.  8/2/2023-LFTs normal    *Hypertension-currently on valsartan and hydrochlorothiazide.  Valsartan could be contributing to hyperkalemia.  Will refer to cardiology ASAP for management of medications and cardiac clearance for proceeding with chemotherapy  Recent echocardiogram normal  Stress test reviewed and negative.  She is due for repeat echocardiogram  This will be ordered today    *Genetic testing-[employer] 9 gene stat panel negative,     *Decreased oral intake secondary to chemotherapy  Still not adequate oral intake  Encouraged her to drink boost and Ensure    *Frequent belching  Continue Protonix 40 mg daily    *Cognitive impairment  It is unclear if this is the patient's baseline or mental status has been exacerbated by recent chemotherapy  The patient is struggling with compliance with her medications.  The patient's daughter expresses frustration today as the patient is struggling to care for herself at home with managing medications and symptom management.  Evaluated by CARL Antonio     *Chemotherapy-induced diarrhea  6/5/2023 patient given Enterade (Wild Berry flavor).  She has been drinking 1 a day.  She does not necessarily like the flavor (currently mixing with sugar-free Aramis-Aid which does help).  Encouraged her to increase to 2 a day.  6/21/2023 patient reports that she had stopped drinking her Enterade because she was waking up in the middle the night having diarrhea although she did not know if it was due to the Enterade her protein shakes.  I have encouraged the patient to continue Enterade, drinking it in the morning.  Try discontinuing the protein shakes and see how she does.  Patient states that she will try this  Reports 2-3 loose stools.  Continue Enterade and  Imodium.  7/5/2023 continuing to have issues with diarrhea up to 3-4 bowel movements a day, patient also recently prescribed Kayexalate for an apparently elevated potassium.  Potassium only 3.1 today.  She advised to discontinue the Kayexalate she was given IV hydration today.  We will also prescribe Lomotil to use if needed to help better control her diarrhea.  Patient is not drinking Enterade regularly.  8/2/2023-improved since discharge from the hospital and discontinuation of chemotherapy.  She received Herceptin on 8/2/2023 and reports a week of diarrhea.  No diarrhea at this time.  Monitor closely for diarrhea after starting Kadcyla    *Maculopapular rash  Resolved    *Severe nausea and vomiting  Resolved    PLAN:  Start kadcyla  IV fluids today  APRN in 1 week  MD in 3 weeks      Patient is on a high risk medication requiring close monitoring for toxicity.  54 minutes total spent on the encounter today.    Sheila Yee MD  09/18/2023

## 2023-09-20 ENCOUNTER — TELEPHONE (OUTPATIENT)
Dept: RADIATION ONCOLOGY | Facility: HOSPITAL | Age: 83
End: 2023-09-20
Payer: MEDICARE

## 2023-09-20 ENCOUNTER — INFUSION (OUTPATIENT)
Dept: ONCOLOGY | Facility: HOSPITAL | Age: 83
End: 2023-09-20
Payer: MEDICARE

## 2023-09-20 VITALS
WEIGHT: 129 LBS | RESPIRATION RATE: 16 BRPM | OXYGEN SATURATION: 98 % | TEMPERATURE: 97 F | HEART RATE: 98 BPM | DIASTOLIC BLOOD PRESSURE: 71 MMHG | SYSTOLIC BLOOD PRESSURE: 126 MMHG | BODY MASS INDEX: 24.36 KG/M2

## 2023-09-20 DIAGNOSIS — Z45.2 ENCOUNTER FOR FITTING AND ADJUSTMENT OF VASCULAR CATHETER: ICD-10-CM

## 2023-09-20 DIAGNOSIS — C50.411 MALIGNANT NEOPLASM OF UPPER-OUTER QUADRANT OF RIGHT BREAST IN FEMALE, ESTROGEN RECEPTOR NEGATIVE: Primary | ICD-10-CM

## 2023-09-20 DIAGNOSIS — Z17.1 MALIGNANT NEOPLASM OF UPPER-OUTER QUADRANT OF RIGHT BREAST IN FEMALE, ESTROGEN RECEPTOR NEGATIVE: Primary | ICD-10-CM

## 2023-09-20 LAB
ALBUMIN SERPL-MCNC: 3.3 G/DL (ref 3.5–5.2)
ALBUMIN/GLOB SERPL: 1 G/DL
ALP SERPL-CCNC: 68 U/L (ref 39–117)
ALT SERPL W P-5'-P-CCNC: 9 U/L (ref 1–33)
ANION GAP SERPL CALCULATED.3IONS-SCNC: 14 MMOL/L (ref 5–15)
AST SERPL-CCNC: 20 U/L (ref 1–32)
BASOPHILS # BLD AUTO: 0.04 10*3/MM3 (ref 0–0.2)
BASOPHILS NFR BLD AUTO: 0.4 % (ref 0–1.5)
BILIRUB SERPL-MCNC: 0.2 MG/DL (ref 0–1.2)
BUN SERPL-MCNC: 20 MG/DL (ref 8–23)
BUN/CREAT SERPL: 20.4 (ref 7–25)
CALCIUM SPEC-SCNC: 9.1 MG/DL (ref 8.6–10.5)
CHLORIDE SERPL-SCNC: 106 MMOL/L (ref 98–107)
CO2 SERPL-SCNC: 22 MMOL/L (ref 22–29)
CREAT SERPL-MCNC: 0.98 MG/DL (ref 0.6–1.1)
DEPRECATED RDW RBC AUTO: 46.8 FL (ref 37–54)
EGFRCR SERPLBLD CKD-EPI 2021: 57.7 ML/MIN/1.73
EOSINOPHIL # BLD AUTO: 0.23 10*3/MM3 (ref 0–0.4)
EOSINOPHIL NFR BLD AUTO: 2.1 % (ref 0.3–6.2)
ERYTHROCYTE [DISTWIDTH] IN BLOOD BY AUTOMATED COUNT: 14.1 % (ref 12.3–15.4)
GLOBULIN UR ELPH-MCNC: 3.2 GM/DL
GLUCOSE SERPL-MCNC: 66 MG/DL (ref 65–99)
HCT VFR BLD AUTO: 30.6 % (ref 34–46.6)
HGB BLD-MCNC: 9.5 G/DL (ref 12–15.9)
IMM GRANULOCYTES # BLD AUTO: 0.05 10*3/MM3 (ref 0–0.05)
IMM GRANULOCYTES NFR BLD AUTO: 0.5 % (ref 0–0.5)
LYMPHOCYTES # BLD AUTO: 3.5 10*3/MM3 (ref 0.7–3.1)
LYMPHOCYTES NFR BLD AUTO: 32.2 % (ref 19.6–45.3)
MCH RBC QN AUTO: 28.1 PG (ref 26.6–33)
MCHC RBC AUTO-ENTMCNC: 31 G/DL (ref 31.5–35.7)
MCV RBC AUTO: 90.5 FL (ref 79–97)
MONOCYTES # BLD AUTO: 0.59 10*3/MM3 (ref 0.1–0.9)
MONOCYTES NFR BLD AUTO: 5.4 % (ref 5–12)
NEUTROPHILS NFR BLD AUTO: 59.4 % (ref 42.7–76)
NEUTROPHILS NFR BLD AUTO: 6.46 10*3/MM3 (ref 1.7–7)
NRBC BLD AUTO-RTO: 0 /100 WBC (ref 0–0.2)
PLATELET # BLD AUTO: 355 10*3/MM3 (ref 140–450)
PMV BLD AUTO: 9.4 FL (ref 6–12)
POTASSIUM SERPL-SCNC: 4 MMOL/L (ref 3.5–5.2)
PROT SERPL-MCNC: 6.5 G/DL (ref 6–8.5)
RBC # BLD AUTO: 3.38 10*6/MM3 (ref 3.77–5.28)
SODIUM SERPL-SCNC: 142 MMOL/L (ref 136–145)
WBC NRBC COR # BLD: 10.87 10*3/MM3 (ref 3.4–10.8)

## 2023-09-20 PROCEDURE — 25010000002 DEXAMETHASONE SODIUM PHOSPHATE 100 MG/10ML SOLUTION: Performed by: INTERNAL MEDICINE

## 2023-09-20 PROCEDURE — 96415 CHEMO IV INFUSION ADDL HR: CPT

## 2023-09-20 PROCEDURE — 80053 COMPREHEN METABOLIC PANEL: CPT

## 2023-09-20 PROCEDURE — 85025 COMPLETE CBC W/AUTO DIFF WBC: CPT

## 2023-09-20 PROCEDURE — 96375 TX/PRO/DX INJ NEW DRUG ADDON: CPT

## 2023-09-20 PROCEDURE — 25010000002 HEPARIN LOCK FLUSH PER 10 UNITS: Performed by: INTERNAL MEDICINE

## 2023-09-20 PROCEDURE — 25010000002 ADO-TRASTUZUMAB 160 MG RECONSTITUTED SOLUTION 160 MG VIAL: Performed by: INTERNAL MEDICINE

## 2023-09-20 PROCEDURE — 96413 CHEMO IV INFUSION 1 HR: CPT

## 2023-09-20 PROCEDURE — 25010000002 ADO-TRASTUZUMAB 100 MG RECONSTITUTED SOLUTION 1 EACH VIAL: Performed by: INTERNAL MEDICINE

## 2023-09-20 RX ORDER — SODIUM CHLORIDE 0.9 % (FLUSH) 0.9 %
10 SYRINGE (ML) INJECTION AS NEEDED
Status: DISCONTINUED | OUTPATIENT
Start: 2023-09-20 | End: 2023-09-20 | Stop reason: HOSPADM

## 2023-09-20 RX ORDER — SODIUM CHLORIDE 0.9 % (FLUSH) 0.9 %
10 SYRINGE (ML) INJECTION AS NEEDED
OUTPATIENT
Start: 2023-09-20

## 2023-09-20 RX ORDER — SODIUM CHLORIDE 9 MG/ML
250 INJECTION, SOLUTION INTRAVENOUS ONCE
Status: COMPLETED | OUTPATIENT
Start: 2023-09-20 | End: 2023-09-20

## 2023-09-20 RX ORDER — HEPARIN SODIUM (PORCINE) LOCK FLUSH IV SOLN 100 UNIT/ML 100 UNIT/ML
500 SOLUTION INTRAVENOUS AS NEEDED
Status: DISCONTINUED | OUTPATIENT
Start: 2023-09-20 | End: 2023-09-20 | Stop reason: HOSPADM

## 2023-09-20 RX ORDER — SODIUM CHLORIDE 9 MG/ML
250 INJECTION, SOLUTION INTRAVENOUS ONCE
Status: CANCELLED | OUTPATIENT
Start: 2023-09-20

## 2023-09-20 RX ORDER — HEPARIN SODIUM (PORCINE) LOCK FLUSH IV SOLN 100 UNIT/ML 100 UNIT/ML
500 SOLUTION INTRAVENOUS AS NEEDED
OUTPATIENT
Start: 2023-09-20

## 2023-09-20 RX ADMIN — Medication 10 ML: at 16:18

## 2023-09-20 RX ADMIN — ADO-TRASTUZUMAB EMTANSINE 210 MG: 20 INJECTION, POWDER, LYOPHILIZED, FOR SOLUTION INTRAVENOUS at 14:49

## 2023-09-20 RX ADMIN — Medication 500 UNITS: at 16:18

## 2023-09-20 RX ADMIN — DEXAMETHASONE SODIUM PHOSPHATE 12 MG: 10 INJECTION, SOLUTION INTRAMUSCULAR; INTRAVENOUS at 14:34

## 2023-09-20 RX ADMIN — SODIUM CHLORIDE 250 ML: 9 INJECTION, SOLUTION INTRAVENOUS at 14:34

## 2023-09-21 ENCOUNTER — HOSPITAL ENCOUNTER (OUTPATIENT)
Dept: RADIATION ONCOLOGY | Facility: HOSPITAL | Age: 83
Setting detail: RADIATION/ONCOLOGY SERIES
End: 2023-09-21
Payer: MEDICARE

## 2023-09-21 ENCOUNTER — CONSULT (OUTPATIENT)
Dept: RADIATION ONCOLOGY | Facility: HOSPITAL | Age: 83
End: 2023-09-21
Payer: MEDICARE

## 2023-09-21 VITALS
SYSTOLIC BLOOD PRESSURE: 128 MMHG | WEIGHT: 131.4 LBS | DIASTOLIC BLOOD PRESSURE: 73 MMHG | BODY MASS INDEX: 24.81 KG/M2 | OXYGEN SATURATION: 99 % | HEART RATE: 95 BPM

## 2023-09-21 DIAGNOSIS — C50.411 MALIGNANT NEOPLASM OF UPPER-OUTER QUADRANT OF RIGHT BREAST IN FEMALE, ESTROGEN RECEPTOR NEGATIVE: Primary | ICD-10-CM

## 2023-09-21 DIAGNOSIS — Z17.1 MALIGNANT NEOPLASM OF UPPER-OUTER QUADRANT OF RIGHT BREAST IN FEMALE, ESTROGEN RECEPTOR NEGATIVE: Primary | ICD-10-CM

## 2023-09-21 PROCEDURE — G0463 HOSPITAL OUTPT CLINIC VISIT: HCPCS | Performed by: RADIOLOGY

## 2023-09-21 NOTE — PROGRESS NOTES
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   The patient is a 82 y.o. female with recently diagnosed right breast cancer.  She palpated a mass in her right breast in early Spring 2023.  She had not had a mammogram in 25 years.  She had a diagnostic bilateral mammogram on 4/21/23 which showed a high density irregular mass measuring 44 mm with spiculated margins and associated amorphous and fine pleomorphic calcifications, skin retraction, and skin and trabecular thickening in the right breast at 9:00.  On ultrasound there is an irregular mass with indistinct and spiculated margins in the right breast at 9:00 3 cm from the nipple.  The mass extends into the skin there is visible external ulceration and scabbing.  Diffuse skin thickening and focal skin retraction are noted with skin thickening up to 11 mm.  The mass measures 38 x 28 x 42 mm.  Highly suggestive of malignancy.  Finding 2: There are true intramammary lymph nodes measuring 5 mm and 6 mm seen in the posterior upper outer axillary tail that are slightly round.  There is visualization of one of the 2 right axillary lymph nodes that measure 5 mm with borderline cortical thickening.  Suspicious.  Appropriate action should be taken. There are 2 prominent right axillary lymph nodes largest which measures 26 mm and contains extensive coarse heterogeneous calcifications.  Suspicious.  Ultrasound-guided biopsy is recommended.Finding 4: On ultrasound there is an asymmetry in the superior region of the left breast that resolved with spot compression benign.BI-RADS Category 5.    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    She had a breast mri on 23 which showed the biopsy-proven malignancy in the right breast in the posterior one third at the 9 o'clock position that measures on the order of 4.3 cm in greatest dimension and contains the internal mini cork-shaped metallic clip. The mass shows central hypoenhancement suggestive of central necrosis. Attachment to the overlying skin as described is noted and there is diffuse right breast skin edema and trabecular edema. Evidence of right axillary adenopathy is noted.     She had a CT chest abdomen and pelvis on 23 and bone scan which showed no evidence of metastatic disease.      She was started on neoadjuvant chemotherapy consisting and she received 6 weeks of Taxol Perjeta and Herceptin and subsequently admitted to the hospital due to severe nausea vomiting diarrhea, KAMILA.   Chemotherapy was discontinued at this point.     She underwent a right breast mastectomy and sentinel node biopsy on 23 with pathology revealing grade II invasive ductal carcinoma measuring up to 4cm  at 9 oclock with dermal lymphatic invasion, extensive capillary lymphatic invasion, with invasive tumor located 11mm from the closest posterior margin.  One of 18 lymph nodes revealed macrometastases measuring 2.5mm, and one node revealed individual tumor cells.  A third node with clip showed no residual tumor.  Her pathologic stage was [id number]. RCB-III.    She saw Dr. Yee again on 23 and is starting Kadcyla every 3 weekly for one year. She is recovering well from her surgery and referred today for evaluation for adjuvant radiation.    She is  with menarche age 11 and first childbirth age 35.  She did not breast feed.  She went through menopause age 50, took oral contraceptives for 3-4 years, and has never taken hormone replacement  therapy.  She has a family hx of ovarian cancer in her sister.     Review of Systems   Constitutional: Negative.    HENT:  Positive for hearing loss.    Eyes: Negative.    Respiratory: Negative.     Cardiovascular: Negative.    Gastrointestinal: Negative.    Genitourinary: Negative.    Musculoskeletal:  Positive for back pain.   Neurological: Negative.    Hematological: Negative.    Psychiatric/Behavioral: Negative.         Past Medical History:   Diagnosis Date    Anxiety     Arthritis     Basal cell carcinoma     Left thumb    Breast cancer 2023    Right    Depression     Diabetes mellitus     Diarrhea     s/e chemo    High cholesterol     History of chemotherapy 08/2023    Hypertension     Rash 08/2023    s/e chemo    Sacral decubitus ulcer     cleaning with soap & water    Urinary incontinence     wears pads         Past Surgical History:   Procedure Laterality Date    CATARACT EXTRACTION EXTRACAPSULAR W/ INTRAOCULAR LENS IMPLANTATION Right     EYE SURGERY      Muscle repair age 21    MASTECTOMY W/ SENTINEL NODE BIOPSY Right 8/24/2023    Procedure: Right breast modified radical mastectomy;  Surgeon: Rosa Michael MD;  Location: [facility] [place];  Service: General;  Laterality: Right;    TONSILLECTOMY      VENOUS ACCESS DEVICE (PORT) INSERTION N/A 05/19/2023    Procedure: INSERTION VENOUS ACCESS DEVICE;  Surgeon: Rosa Michael MD;  Location: [facility] [place];  Service: General;  Laterality: N/A;         Social History     Socioeconomic History    Marital status:    Tobacco Use    Smoking status: Never    Smokeless tobacco: Never   Vaping Use    Vaping Use: Never used   Substance and Sexual Activity    Alcohol use: Never    Drug use: Never    Sexual activity: Never         Family History   Problem Relation Age of Onset    Alzheimer's disease Mother     Heart disease Father     Heart attack Father     Ovarian cancer Sister     Pancreatic cancer Brother     Malig Hyperthermia Neg Hx     Colon cancer Neg  Hx     Colon polyps Neg Hx     Crohn's disease Neg Hx     Irritable bowel syndrome Neg Hx     Ulcerative colitis Neg Hx           Objective    Physical Exam  Constitutional:       Appearance: Normal appearance.   HENT:      Head: Atraumatic.   Pulmonary:      Effort: Pulmonary effort is normal.   Chest:          Comments: Right breast surgically absent, mastectomy incision healing well, no suspicious palpable masses or adenopathy  Neurological:      General: No focal deficit present.      Mental Status: She is alert.   Psychiatric:         Mood and Affect: Mood normal.         Current Outpatient Medications on File Prior to Visit   Medication Sig Dispense Refill    acetaminophen (TYLENOL) 325 MG tablet Take 2 tablets by mouth Every 4 (Four) Hours As Needed for Mild Pain. 120 tablet 3    Cholecalciferol 50 MCG (2000 UT) tablet Take 1 tablet by mouth Daily (Monday-Friday).      glipizide 2.5 MG tablet 2.5 mg, metFORMIN 500 MG tablet 500 mg Take 1 dose by mouth Every Night.      glipiZIDE-metFORMIN (METAGLIP) 2.5-500 MG per tablet Take 2 tablets by mouth 2 (Two) Times a Day Before Meals. (Patient taking differently: Take 2 tablets by mouth Every Morning.) 120 tablet 3    glucose blood test strip Check BG once daily, E11.9 30 each 4    glucose monitor monitoring kit Dispense glucometer with brand based off insurance coverage, check BG daily, E11.9 1 each 0    hydrocortisone 1 % cream Apply 1 application topically to the appropriate area as directed 2 (Two) Times a Day. 453.6 g 0    ibuprofen (ADVIL,MOTRIN) 800 MG tablet Take 1 tablet by mouth Every 8 (Eight) Hours As Needed for Moderate Pain. 60 tablet 1    Lancets misc Check BG once daily, E11.9 30 each 4    lidocaine-prilocaine (EMLA) 2.5-2.5 % cream Apply nickel size amount to port site 30 min before appt time do not rub in cover with plastic wrap 30 g 5    ondansetron (ZOFRAN) 8 MG tablet Take 1 tablet by mouth 3 (Three) Times a Day As Needed for Nausea or Vomiting.  30 tablet 5    pantoprazole (PROTONIX) 40 MG EC tablet Take 1 tablet by mouth Daily. (Patient taking differently: Take 1 tablet by mouth Every Morning.) 30 tablet 3    simvastatin (ZOCOR) 10 MG tablet Take 1 tablet by mouth Every Night.      Tradjenta 5 MG tablet tablet Take 1 tablet by mouth Every Morning.       Current Facility-Administered Medications on File Prior to Visit   Medication Dose Route Frequency Provider Last Rate Last Admin    [COMPLETED] ado-trastuzumab (KADCYLA) 210 mg in sodium chloride 0.9 % 260.5 mL chemo IVPB  3.6 mg/kg (Treatment Plan Recorded) Intravenous Once Sheila Yee MD   Stopped at 09/20/23 1618    [COMPLETED] dexAMETHasone (DECADRON) IVPB 12 mg  12 mg Intravenous Once Sheila Yee MD   Stopped at 09/20/23 1448    [COMPLETED] sodium chloride 0.9 % infusion 250 mL  250 mL Intravenous Once Sheila Yee MD   Stopped at 09/20/23 1618    [DISCONTINUED] heparin injection 500 Units  500 Units Intravenous PRN Sheila Yee MD   500 Units at 09/20/23 1618    [DISCONTINUED] sodium chloride 0.9 % flush 10 mL  10 mL Intravenous PRN Sheila Yee MD   10 mL at 09/20/23 1618       ALLERGIES:  No Known Allergies    There were no vitals taken for this visit.     (1) Restricted in physically strenuous activity, ambulatory and able to do work of light nature      9/20/2023     1:18 PM   Current Status   ECOG score 1         Assessment & Plan     82 year old female with inflammatory carcinoma of right breast [id number] ER NH negative Her 2 positive s/p neoadjuvant chemotherapy discontinued early with [id number] with 1 of 18 nodes positive.  I discussed with her my recommendation for post-mastectomy radiation therapy to the right chest wall and regional nodes to decrease the risk of local regional recurrence.      I discussed with her in detail the risks, benefits and rationale of radiation therapy to the right chest wall and regional nodes to include but not limited to the  following:    Acute: skin erythema, breakdown/moist desquamation, swelling or discomfort of the tissue, chest wall, fatigue, pneumonitis resulting in shortness of breath, cough or pain, lymphedema of the right arm    Late: Permanent skin changes including hyperpigmentation, telangiectasias, fibrosis of the skin/tissue resulting in smaller size or poor cosmetic outcome, late edema or cellulitis, late rib fracture, late pulmonary fibrosis, late lymphedema of the right arm, and the remote risk of second malignancies or brachialplexopathy.     She and her daughter voiced understanding and were given an opportunity to ask questions which I believe were answered to her satisfaction.  I have scheduled her to return for CT simulation for treatment planning Wednesday September 27.  I plan to treat the right chest wall and regional nodes to a dose of 50Gy followed by a scar boost for 10Gy.     I personally spent greater than 60 minutes today assessing, managing, discussing and documenting my visit with the patient. That time includes review of records, imaging and pathology reports, obtaining my own history, performing a medically appropriate evaluation, counseling and educating the patient, discussing goals, logistics, alternatives and risks of my recommendations, surveillance options and potential outcomes. It also includes the time documenting the clinical information in the EMR and communicating my recommendations to the other involved physicians.              Thank you very much for allowing me to participate in the care of this very pleasant patient.    Sincerely,      Marie Tesfaye MD

## 2023-09-27 ENCOUNTER — HOSPITAL ENCOUNTER (OUTPATIENT)
Dept: CARDIOLOGY | Facility: HOSPITAL | Age: 83
Discharge: HOME | End: 2023-09-27
Admitting: INTERNAL MEDICINE
Payer: MEDICARE

## 2023-09-27 ENCOUNTER — TELEPHONE (OUTPATIENT)
Dept: CARDIOLOGY | Facility: CLINIC | Age: 83
End: 2023-09-27

## 2023-09-27 ENCOUNTER — INFUSION (OUTPATIENT)
Dept: ONCOLOGY | Facility: HOSPITAL | Age: 83
End: 2023-09-27
Payer: MEDICARE

## 2023-09-27 ENCOUNTER — OFFICE VISIT (OUTPATIENT)
Dept: ONCOLOGY | Facility: CLINIC | Age: 83
End: 2023-09-27
Payer: MEDICARE

## 2023-09-27 VITALS
WEIGHT: 131 LBS | HEART RATE: 95 BPM | BODY MASS INDEX: 24.73 KG/M2 | OXYGEN SATURATION: 97 % | SYSTOLIC BLOOD PRESSURE: 140 MMHG | HEIGHT: 61 IN | DIASTOLIC BLOOD PRESSURE: 76 MMHG

## 2023-09-27 VITALS
BODY MASS INDEX: 24.39 KG/M2 | WEIGHT: 129.2 LBS | OXYGEN SATURATION: 100 % | SYSTOLIC BLOOD PRESSURE: 149 MMHG | HEIGHT: 61 IN | DIASTOLIC BLOOD PRESSURE: 81 MMHG | HEART RATE: 84 BPM | TEMPERATURE: 98 F

## 2023-09-27 DIAGNOSIS — C50.411 MALIGNANT NEOPLASM OF UPPER-OUTER QUADRANT OF RIGHT BREAST IN FEMALE, ESTROGEN RECEPTOR NEGATIVE: Primary | ICD-10-CM

## 2023-09-27 DIAGNOSIS — Z17.1 MALIGNANT NEOPLASM OF UPPER-OUTER QUADRANT OF RIGHT BREAST IN FEMALE, ESTROGEN RECEPTOR NEGATIVE: Primary | ICD-10-CM

## 2023-09-27 DIAGNOSIS — Z79.899 ENCOUNTER FOR MONITORING CARDIOTOXIC DRUG THERAPY: Primary | ICD-10-CM

## 2023-09-27 DIAGNOSIS — C50.411 MALIGNANT NEOPLASM OF UPPER-OUTER QUADRANT OF RIGHT BREAST IN FEMALE, ESTROGEN RECEPTOR NEGATIVE: ICD-10-CM

## 2023-09-27 DIAGNOSIS — Z17.1 MALIGNANT NEOPLASM OF UPPER-OUTER QUADRANT OF RIGHT BREAST IN FEMALE, ESTROGEN RECEPTOR NEGATIVE: ICD-10-CM

## 2023-09-27 DIAGNOSIS — Z51.11 ENCOUNTER FOR ANTINEOPLASTIC CHEMOTHERAPY: ICD-10-CM

## 2023-09-27 DIAGNOSIS — K52.1 DIARRHEA DUE TO DRUG: ICD-10-CM

## 2023-09-27 DIAGNOSIS — Z51.81 ENCOUNTER FOR MONITORING CARDIOTOXIC DRUG THERAPY: Primary | ICD-10-CM

## 2023-09-27 DIAGNOSIS — Z79.899 HIGH RISK MEDICATION USE: ICD-10-CM

## 2023-09-27 LAB
ALBUMIN SERPL-MCNC: 3.3 G/DL (ref 3.5–5.2)
ALBUMIN/GLOB SERPL: 0.7 G/DL
ALP SERPL-CCNC: 82 U/L (ref 39–117)
ALT SERPL W P-5'-P-CCNC: 13 U/L (ref 1–33)
ANION GAP SERPL CALCULATED.3IONS-SCNC: 15 MMOL/L (ref 5–15)
AORTIC ARCH: 1.7 CM
ASCENDING AORTA: 2.5 CM
AST SERPL-CCNC: 27 U/L (ref 1–32)
BASOPHILS # BLD AUTO: 0.05 10*3/MM3 (ref 0–0.2)
BASOPHILS NFR BLD AUTO: 0.4 % (ref 0–1.5)
BH CV ECHO LEFT VENTRICLE GLOBAL LONGITUDINAL STRAIN: -17.9 %
BH CV ECHO MEAS - ACS: 1.43 CM
BH CV ECHO MEAS - AO MAX PG: 7.1 MMHG
BH CV ECHO MEAS - AO MEAN PG: 4 MMHG
BH CV ECHO MEAS - AO ROOT DIAM: 2.9 CM
BH CV ECHO MEAS - AO V2 MAX: 133 CM/SEC
BH CV ECHO MEAS - AO V2 VTI: 27.7 CM
BH CV ECHO MEAS - AVA(I,D): 1.48 CM2
BH CV ECHO MEAS - EDV(CUBED): 79.5 ML
BH CV ECHO MEAS - EDV(MOD-SP2): 97 ML
BH CV ECHO MEAS - EDV(MOD-SP4): 100 ML
BH CV ECHO MEAS - EF(MOD-BP): 60.2 %
BH CV ECHO MEAS - EF(MOD-SP2): 62.9 %
BH CV ECHO MEAS - EF(MOD-SP4): 55 %
BH CV ECHO MEAS - EF_3D-VOL: 59 %
BH CV ECHO MEAS - ESV(CUBED): 34.7 ML
BH CV ECHO MEAS - ESV(MOD-SP2): 36 ML
BH CV ECHO MEAS - ESV(MOD-SP4): 45 ML
BH CV ECHO MEAS - FS: 24.2 %
BH CV ECHO MEAS - IVS/LVPW: 1.13 CM
BH CV ECHO MEAS - IVSD: 0.9 CM
BH CV ECHO MEAS - LAT PEAK E' VEL: 7.1 CM/SEC
BH CV ECHO MEAS - LV DIASTOLIC VOL/BSA (35-75): 63.4 CM2
BH CV ECHO MEAS - LV MASS(C)D: 114.2 GRAMS
BH CV ECHO MEAS - LV MAX PG: 2.6 MMHG
BH CV ECHO MEAS - LV MEAN PG: 1.56 MMHG
BH CV ECHO MEAS - LV SYSTOLIC VOL/BSA (12-30): 28.5 CM2
BH CV ECHO MEAS - LV V1 MAX: 81 CM/SEC
BH CV ECHO MEAS - LV V1 VTI: 17.9 CM
BH CV ECHO MEAS - LVIDD: 4.3 CM
BH CV ECHO MEAS - LVIDS: 3.3 CM
BH CV ECHO MEAS - LVOT AREA: 2.29 CM2
BH CV ECHO MEAS - LVOT DIAM: 1.71 CM
BH CV ECHO MEAS - LVPWD: 0.8 CM
BH CV ECHO MEAS - MED PEAK E' VEL: 6.9 CM/SEC
BH CV ECHO MEAS - MV A DUR: 0.1 SEC
BH CV ECHO MEAS - MV A MAX VEL: 132 CM/SEC
BH CV ECHO MEAS - MV DEC SLOPE: 472.6 CM/SEC2
BH CV ECHO MEAS - MV DEC TIME: 0.19 SEC
BH CV ECHO MEAS - MV E MAX VEL: 97.3 CM/SEC
BH CV ECHO MEAS - MV E/A: 0.74
BH CV ECHO MEAS - MV MAX PG: 8.9 MMHG
BH CV ECHO MEAS - MV MEAN PG: 3.6 MMHG
BH CV ECHO MEAS - MV P1/2T: 60.6 MSEC
BH CV ECHO MEAS - MV V2 VTI: 31 CM
BH CV ECHO MEAS - MVA(P1/2T): 3.6 CM2
BH CV ECHO MEAS - MVA(VTI): 1.32 CM2
BH CV ECHO MEAS - PA ACC TIME: 0.1 SEC
BH CV ECHO MEAS - PA V2 MAX: 81.4 CM/SEC
BH CV ECHO MEAS - PULM A REVS DUR: 0.1 SEC
BH CV ECHO MEAS - PULM A REVS VEL: 26.6 CM/SEC
BH CV ECHO MEAS - PULM DIAS VEL: 41.6 CM/SEC
BH CV ECHO MEAS - PULM S/D: 1.39
BH CV ECHO MEAS - PULM SYS VEL: 57.8 CM/SEC
BH CV ECHO MEAS - QP/QS: 0.66
BH CV ECHO MEAS - RV MAX PG: 1.36 MMHG
BH CV ECHO MEAS - RV V1 MAX: 58.3 CM/SEC
BH CV ECHO MEAS - RV V1 VTI: 10.4 CM
BH CV ECHO MEAS - RVOT DIAM: 1.82 CM
BH CV ECHO MEAS - SI(MOD-SP2): 38.7 ML/M2
BH CV ECHO MEAS - SI(MOD-SP4): 34.8 ML/M2
BH CV ECHO MEAS - SUP REN AO DIAM: 1.7 CM
BH CV ECHO MEAS - SV(LVOT): 41 ML
BH CV ECHO MEAS - SV(MOD-SP2): 61 ML
BH CV ECHO MEAS - SV(MOD-SP4): 55 ML
BH CV ECHO MEAS - SV(RVOT): 27.1 ML
BH CV ECHO MEAS - TAPSE (>1.6): 2.04 CM
BH CV ECHO MEASUREMENTS AVERAGE E/E' RATIO: 13.9
BH CV XLRA - RV BASE: 2.6 CM
BH CV XLRA - RV LENGTH: 5.8 CM
BH CV XLRA - RV MID: 2.31 CM
BH CV XLRA - TDI S': 11.7 CM/SEC
BILIRUB SERPL-MCNC: 0.3 MG/DL (ref 0–1.2)
BUN SERPL-MCNC: 13 MG/DL (ref 8–23)
BUN/CREAT SERPL: 13.1 (ref 7–25)
CALCIUM SPEC-SCNC: 8.8 MG/DL (ref 8.6–10.5)
CHLORIDE SERPL-SCNC: 104 MMOL/L (ref 98–107)
CO2 SERPL-SCNC: 22 MMOL/L (ref 22–29)
CREAT SERPL-MCNC: 0.99 MG/DL (ref 0.6–1.1)
DEPRECATED RDW RBC AUTO: 45.5 FL (ref 37–54)
EGFRCR SERPLBLD CKD-EPI 2021: 57 ML/MIN/1.73
EOSINOPHIL # BLD AUTO: 0.16 10*3/MM3 (ref 0–0.4)
EOSINOPHIL NFR BLD AUTO: 1.1 % (ref 0.3–6.2)
ERYTHROCYTE [DISTWIDTH] IN BLOOD BY AUTOMATED COUNT: 14.3 % (ref 12.3–15.4)
GLOBULIN UR ELPH-MCNC: 4.5 GM/DL
GLUCOSE SERPL-MCNC: 64 MG/DL (ref 65–99)
HCT VFR BLD AUTO: 29.7 % (ref 34–46.6)
HGB BLD-MCNC: 9.5 G/DL (ref 12–15.9)
IMM GRANULOCYTES # BLD AUTO: 0.16 10*3/MM3 (ref 0–0.05)
IMM GRANULOCYTES NFR BLD AUTO: 1.1 % (ref 0–0.5)
LEFT ATRIUM VOLUME INDEX: 28.5 ML/M2
LYMPHOCYTES # BLD AUTO: 3.44 10*3/MM3 (ref 0.7–3.1)
LYMPHOCYTES NFR BLD AUTO: 24.2 % (ref 19.6–45.3)
MCH RBC QN AUTO: 27.9 PG (ref 26.6–33)
MCHC RBC AUTO-ENTMCNC: 32 G/DL (ref 31.5–35.7)
MCV RBC AUTO: 87.1 FL (ref 79–97)
MONOCYTES # BLD AUTO: 0.94 10*3/MM3 (ref 0.1–0.9)
MONOCYTES NFR BLD AUTO: 6.6 % (ref 5–12)
NEUTROPHILS NFR BLD AUTO: 66.6 % (ref 42.7–76)
NEUTROPHILS NFR BLD AUTO: 9.48 10*3/MM3 (ref 1.7–7)
NRBC BLD AUTO-RTO: 0 /100 WBC (ref 0–0.2)
PLATELET # BLD AUTO: 200 10*3/MM3 (ref 140–450)
PMV BLD AUTO: 10.5 FL (ref 6–12)
POTASSIUM SERPL-SCNC: 3.5 MMOL/L (ref 3.5–5.2)
PROT SERPL-MCNC: 7.8 G/DL (ref 6–8.5)
RBC # BLD AUTO: 3.41 10*6/MM3 (ref 3.77–5.28)
SINUS: 2.48 CM
SODIUM SERPL-SCNC: 141 MMOL/L (ref 136–145)
STJ: 2.07 CM
WBC NRBC COR # BLD: 14.23 10*3/MM3 (ref 3.4–10.8)

## 2023-09-27 PROCEDURE — 36593 DECLOT VASCULAR DEVICE: CPT

## 2023-09-27 PROCEDURE — 93356 MYOCRD STRAIN IMG SPCKL TRCK: CPT

## 2023-09-27 PROCEDURE — 85025 COMPLETE CBC W/AUTO DIFF WBC: CPT

## 2023-09-27 PROCEDURE — 93306 TTE W/DOPPLER COMPLETE: CPT

## 2023-09-27 PROCEDURE — 80053 COMPREHEN METABOLIC PANEL: CPT

## 2023-09-27 PROCEDURE — 77332 RADIATION TREATMENT AID(S): CPT | Performed by: RADIOLOGY

## 2023-09-27 PROCEDURE — 25510000001 PERFLUTREN (DEFINITY) 8.476 MG IN SODIUM CHLORIDE (PF) 0.9 % 10 ML INJECTION: Performed by: INTERNAL MEDICINE

## 2023-09-27 PROCEDURE — 25010000002 ALTEPLASE 2 MG RECONSTITUTED SOLUTION: Performed by: NURSE PRACTITIONER

## 2023-09-27 PROCEDURE — 96360 HYDRATION IV INFUSION INIT: CPT

## 2023-09-27 RX ADMIN — SODIUM CHLORIDE 500 ML: 900 INJECTION, SOLUTION INTRAVENOUS at 14:20

## 2023-09-27 RX ADMIN — ALTEPLASE: 2.2 INJECTION, POWDER, LYOPHILIZED, FOR SOLUTION INTRAVENOUS at 13:51

## 2023-09-27 RX ADMIN — PERFLUTREN 1.5 ML: 6.52 INJECTION, SUSPENSION INTRAVENOUS at 12:48

## 2023-09-27 NOTE — PROGRESS NOTES
Subjective   Veronica Royal is a 82 y.o. female.  Referred by Dr. Michael for right breast inflammatory breast cancer    History of Present Illness   Ms. Royal is a 82-year-old postmenopausal  lady with hypertension, diabetes, hyperlipidemia, chronic kidney disease, hyperkalemia-chronic presented with a palpable abnormality in the right breast.  Subsequent imaging was performed.  Patient has not had a mammogram prior to this in the past 25 years.    4/21/2023-bilateral diagnostic mammogram-high density irregular mass measuring 44 mm with spiculated margins and amorphus and fine pleomorphic calcifications with skin thickening in the right breast at 9:00.  2 intramammary lymph nodes in the upper outer axillary tail region are slightly rounded  2 prominent right axillary lymph nodes largest of which measures 26 mm.  Asymmetry noted in the left breast.    Right breast ultrasound at 9 o'clock position, 3 cm from the nipple there is a 38 x 28 x 42 mm mass.  Skin thickness measuring 11 mm near the mass.  One of the 2 rounded axillary tail lymph nodes noted.  Borderline cortical thickening.  2 abnormal axillary lymph nodes.  1 lymph node displaced loss of normal morphology with a round heterogeneous appearance and echogenic foci.  Most consistent with calcified lymph node measuring 26 mm.  Second lymph node measures 12 mm.  Displaced cortical thickening.  Ultrasound-guided biopsy of the mass in the axillary lymph nodes recommended.    4/21/2023  1.right breast 9:00 biopsy-invasive ductal carcinoma with apocrine features  Grade 3  ER negative  ND negative  HER2 2+ on immunohistochemistry  HER2 amplified on FISH with HER2 copy number of 7.58, OPAL seventeen 3.18, HER2/OPAL 17 ratio of 2.4.  Ki-67 38.45%    2.right axillary biopsy-soft tissue involved by invasive ductal carcinoma with apocrine features  Associated microcalcification  No definite lymph node tissue identified.    4/29/2023-MRI of the breast-biopsy-proven  malignancy in the right breast at 9:00 measuring 4.3 cm in greatest dimension.  Attachment overlying skin and diffuse right breast edema noted.  Right axillary lymphadenopathy.  No suspicious findings in the left breast.    5/11/2023-CT of the chest abdomen and pelvis-no evidence of metastatic disease.  Liver is cirrhotic in configuration.    5/11/2023-bone scan negative    Patient presents today to the clinic for discussing neoadjuvant chemotherapy.  She is accompanied by her daughter.  Patient denies any recent changes in weight, she reports some pain at the site of malignancy.  A punch biopsy was performed and was consistent with inflammatory breast cancer.  At the site of punch biopsy there is an ulcerated lesion.    She has poorly controlled diabetes currently on glipizide metformin and Tradjenta.  Hemoglobin A1c recently was noted to be 9.9.    Also has chronic hyperkalemia, explanation unclear.    Blood pressure poorly controlled.    She reports good functional status lives independently.  Able to manage all her bills and independent of ADLs.  She has good support system in terms of her daughter.    Family history significant for brother with pancreatic cancer at the age of 68, sister with ovarian cancer at the age of 58    She received 6 weeks of Taxol Perjeta and Herceptin and subsequently admitted to the hospital due to severe nausea vomiting diarrhea poor oral intake, KAMILA, severe electrolyte abnormalities.      admission to the hospital for KAMILA, severe diarrhea, nausea vomiting and poor oral intake.She was in the hospital between 7/12/2023 to 7/20/2023.  During the hospitalization she was treated for acute UTI, electrolyte abnormalities and KAMILA.  Subsequently she was  discharged to a rehab.   During the hospitalization she was evaluated by Dr. Michael and a right breast ultrasound was obtained on 7/17/2023.  There was very little response noted with the 9 o'clock position mass 6 cm from the nipple  measuring 3.7 x 2.4 x 3.7 cm previously 3.8 x 2.5 x 4.2 cm.  In the right axilla the lymph node measured 2.6 x 1.5 x 1.9 cm previously 1.9 x 1.5 x 2.5 cm.  There was decrease in size of the adjacent lymph node measuring 0.7 cm previously 1.2 cm.    8/24/2023-right mastectomy and axillary lymph node dissection  Invasive ductal carcinoma, grade 2  The tumor measures 4 cm  Microcalcifications are seen in the tumor  Dermal lymphatic invasion present  Lymphatic invasion present  Tumor seen in the skin but no ulceration.  Residual cancer burden 3.454  RCB-3  Xiong Lima grade 3  Margins negative  18 lymph nodes total evaluated  1 with micrometastasis with a tumor measuring 2.5 mm with no chemotherapy effect  1 with chemotherapy change and isolated tumor cells  There is 1 node with chemotherapy change and a clip but no residual tumor.    Receptors repeated  ER negative  RI negative  HER2 2+ on immunohistochemistry  FISH nonamplified        INTERVAL HISTORY:   The patient is a 82 y.o. female with above-mentioned history returns today .  She returns to the office today for 1 week follow-up and toxicity check.  9/20/2023 she initiated Kadcyla.  She previously struggled with chemotherapy.  She reports today she thankfully tolerated Kadcyla much better than previous chemo.  She has not experienced diarrhea.  She does note her appetite and intake is decreased though she is without nausea or vomiting.  She denies fevers or chills, signs or symptoms of infection.  She reports her pain in her right mastectomy site is nearly resolved.  She did initiate radiation therapy today.    The following portions of the patient's history were reviewed and updated as appropriate: allergies, current medications, past family history, past medical history, past social history, past surgical history and problem list.    Past Medical History:   Diagnosis Date    Anxiety     Arthritis     Basal cell carcinoma     Left thumb    Breast cancer 2023     "Right    Depression     Diabetes mellitus     Diarrhea     s/e chemo    High cholesterol     History of chemotherapy 2023    Hypertension     Rash 2023    s/e chemo    Sacral decubitus ulcer     cleaning with soap & water    Urinary incontinence     wears pads        Past Surgical History:   Procedure Laterality Date    CATARACT EXTRACTION EXTRACAPSULAR W/ INTRAOCULAR LENS IMPLANTATION Right     EYE SURGERY      Muscle repair age 21    MASTECTOMY W/ SENTINEL NODE BIOPSY Right 2023    Procedure: Right breast modified radical mastectomy;  Surgeon: Rosa Michael MD;  [place] [facility] [place] [facility] [place]  Service: General;  Laterality: Right;    TONSILLECTOMY      VENOUS ACCESS DEVICE (PORT) INSERTION N/A 2023    Procedure: INSERTION VENOUS ACCESS DEVICE;  Surgeon: Rosa Michael MD;  [place] [facility] [place] [facility] [place]  Service: General;  Laterality: N/A;        Family History   Problem Relation Age of Onset    Alzheimer's disease Mother     Heart disease Father     Heart attack Father     Ovarian cancer Sister     Pancreatic cancer Brother     Malig Hyperthermia Neg Hx     Colon cancer Neg Hx     Colon polyps Neg Hx     Crohn's disease Neg Hx     Irritable bowel syndrome Neg Hx     Ulcerative colitis Neg Hx         Social History     Socioeconomic History    Marital status:    Tobacco Use    Smoking status: Never    Smokeless tobacco: Never   Vaping Use    Vaping Use: Never used   Substance and Sexual Activity    Alcohol use: Never    Drug use: Never    Sexual activity: Never        OB History    No obstetric history on file.      Age at menarche-11  Age at first live childbirth-35   2 para 1  1  Age at menopause-50  Oral conceptive pill use for 3 to 4 years    No Known Allergies     Review of Systems    Review of systems as mentioned in the HPI otherwise negative    Objective   Blood pressure 149/81, pulse 84, temperature 98 °F (36.7 °C), temperature source Temporal, height 154.9 cm (60.98\"), " weight 58.6 kg (129 lb 3.2 oz), SpO2 100 %.     Physical Exam  Vitals reviewed.   Constitutional:       General: She is not in acute distress.     Appearance: Normal appearance. She is well-developed.   HENT:      Head: Normocephalic and atraumatic.   Eyes:      Pupils: Pupils are equal, round, and reactive to light.   Cardiovascular:      Rate and Rhythm: Normal rate and regular rhythm.      Heart sounds: Normal heart sounds. No murmur heard.  Pulmonary:      Effort: Pulmonary effort is normal. No respiratory distress.      Breath sounds: Normal breath sounds. No wheezing, rhonchi or rales.   Abdominal:      General: Bowel sounds are normal. There is no distension.      Palpations: Abdomen is soft.   Musculoskeletal:         General: Swelling (2+ bilateral LE) present. Normal range of motion.      Cervical back: Normal range of motion.   Skin:     General: Skin is warm and dry.      Findings: No rash.   Neurological:      Mental Status: She is alert and oriented to person, place, and time.      Right chest wall carefully examined, status post mastectomy with incision well-healed, no signs or symptoms of infection    I have reexamined the patient and the results are consistent with the previously documented exam. Aislinn Gonzalez, APRN  :        Results from last 7 days   Lab Units 09/27/23  1325   WBC 10*3/mm3 14.23*   NEUTROS ABS 10*3/mm3 9.48*   HEMOGLOBIN g/dL 9.5*   HEMATOCRIT % 29.7*   PLATELETS 10*3/mm3 200     Results from last 7 days   Lab Units 09/27/23  1325   SODIUM mmol/L 141   POTASSIUM mmol/L 3.5   CHLORIDE mmol/L 104   CO2 mmol/L 22.0   BUN mg/dL 13   CREATININE mg/dL 0.99   CALCIUM mg/dL 8.8   ALBUMIN g/dL 3.3*   BILIRUBIN mg/dL 0.3   ALK PHOS U/L 82   ALT (SGPT) U/L 13   AST (SGOT) U/L 27   GLUCOSE mg/dL 64*             No radiology results for the last 30 days.     Ultrasound of the abdomen images independently reviewed and interpreted by me in detail summarized in the HPI    Assessment &  Plan       *Right breast inflammatory breast cancer  T4d N1 M0  Stage IIIb  ER negative, RI negative, HER2 2+ on immunohistochemistry but amplified on FISH.  Invasive ductal carcinoma with apocrine features, grade 3, Ki-67 38%  CT scans and bone scan without any obvious evidence of metastatic disease  Echocardiogram has been performed and ejection fraction normal.  Liver shows cirrhotic morphology.  LFTs otherwise normal and total bilirubin normal as well as protein normal.  It appears that the synthetic function of the liver is still within normal limits.  Mediport placed 5/19/2023  Taxol, Herceptin, Perjeta initiated 5/24/2023 5/31/2023 with C1D8 Taxol  6/28/2023-cycle 2-day 15 of Taxol.  She is overall tolerating therapy well with expected side effects.  She will proceed with Taxol today.  Scheduled for Taxol Herceptin and Perjeta.  7/12/2023-cycle 3-day 8 of TPH.  Chemotherapy held and patient was admitted to the hospital for management of severe dehydration, KAMILA, nausea vomiting diarrhea and decreased oral intake.  8/2/2023-she feels relatively well today.  Labs reviewed and stable to proceed with Herceptin only.  We will not administer Taxol and Perjeta as she has had significant issues with chemotherapy.  Right mastectomy 8/24/2023 with 4 cm of residual disease, RCB-3, treatment effect present, axillary lymph node dissection with 1 lymph node with micrometastasis measuring 2.5 mm, 1 lymph node with isolated tumor cells and a third lymph node which showed treatment changes but no tumor cells.  Total of 18 lymph nodes removed  Presents 9/20/2023 to discuss pathology and adjuvant therapy.  We discussed Kadcyla every 3 weekly to finish a complete year.  Patient is definitely hesitant to resume any sort of chemotherapy due to her previous experience with diarrhea.  Reassured her that hopefully the amount of diarrhea will not be as significant with Kadcyla.  Repeat receptors on the tumor are ER/RI negative, no  role for endocrine therapy  Toxicity check 9/27/2023 with improved tolerance to Kadcyla.  Without diarrhea or nausea.    *Diabetes  Poorly controlled  Evaluation by endocrinology 5/30/2023 with recommendations for dietary changes and home blood sugar monitoring.  The patient's daughter reports today she is struggling with compliance.  Not well controlled at this time    *KAMILA/CKD  8/17/2023 BUN 24 and creatinine 1.05  Patient's daughter reports that she has not been hydrating herself well.  Creatinine stable today at 0.99    *?  Cirrhotic appearance of the liver on the CT scan  Referred to gastroenterology  Synthetic function appears to be normal  We will start with Taxol to see if she would tolerate the chemotherapy   Slight elevation of intervention studies today with ALT 43, AST 55.  Continue to monitor weekly  6/28/2023-mild elevation in the LFTs.  Stable compared to previous labs.  Okay to proceed with chemotherapy.  8/2/2023-LFTs normal    *Hypertension-currently on valsartan and hydrochlorothiazide.  Valsartan could be contributing to hyperkalemia.  Will refer to cardiology ASAP for management of medications and cardiac clearance for proceeding with chemotherapy  Recent echocardiogram normal  Stress test reviewed and negative.  Echocardiogram was performed this morning, these results are not yet available    *Genetic testing-Inv[employer]ae 9 gene stat panel negative,     *Decreased oral intake secondary to chemotherapy  Still not adequate oral intake  Encouraged her to drink boost and Ensure    *Frequent belching  Continue Protonix 40 mg daily    *Cognitive impairment  It is unclear if this is the patient's baseline or mental status has been exacerbated by recent chemotherapy  The patient is struggling with compliance with her medications.  The patient's daughter expresses frustration today as the patient is struggling to care for herself at home with managing medications and symptom management.  Evaluated by Marie  CARL Garcia     *Chemotherapy-induced diarrhea  6/5/2023 patient given Enterade (Wild Berry flavor).  She has been drinking 1 a day.  She does not necessarily like the flavor (currently mixing with sugar-free Aramis-Aid which does help).  Encouraged her to increase to 2 a day.  6/21/2023 patient reports that she had stopped drinking her Enterade because she was waking up in the middle the night having diarrhea although she did not know if it was due to the Enterade her protein shakes.  I have encouraged the patient to continue Enterade, drinking it in the morning.  Try discontinuing the protein shakes and see how she does.  Patient states that she will try this  Reports 2-3 loose stools.  Continue Enterade and Imodium.  7/5/2023 continuing to have issues with diarrhea up to 3-4 bowel movements a day, patient also recently prescribed Kayexalate for an apparently elevated potassium.  Potassium only 3.1 today.  She advised to discontinue the Kayexalate she was given IV hydration today.  We will also prescribe Lomotil to use if needed to help better control her diarrhea.  Patient is not drinking Enterade regularly.  8/2/2023-improved since discharge from the hospital and discontinuation of chemotherapy.  She received Herceptin on 8/2/2023 and reports a week of diarrhea.  Monitor closely for diarrhea after starting Kadcyla.  Thankfully, 1 week following initiation of Kadcyla diarrhea has not recurred    *Maculopapular rash  Resolved    *Severe nausea and vomiting  Resolved    *Leukocytosis  Without obvious source of infection, right mastectomy site is well-healed, no fevers or chills.  She is not receiving growth factor support    PLAN:  Proceed with 500 ml normal saline in the infusion area today  Continue radiation with direction of radiation oncology  Return in 2 weeks for CBC, CMP, MD follow-up and cycle 2 Kadcyla    Patient is on a high risk medication requiring close monitoring for toxicity.      Aislinn Estrella  CARL Gonzalez  09/27/2023

## 2023-09-27 NOTE — TELEPHONE ENCOUNTER
Called and spoke with patient's daughter, Yany. Went over results and recommendations. She verbalized understanding. Scheduled to see Aislinn in 2 weeks on a day that Dr. Maldonado is available if needed.    Thank you,    Kerline Laura, RN  Triage [facility]  09/27/23 16:14 EDT

## 2023-09-27 NOTE — TELEPHONE ENCOUNTER
I talked to Dr. Yee regarding echo.  Normal but overall lower than had been and may by 12%.  However ejection fraction actually improved.  Patient had had we had adjusted her antihypertensives at the last visit.  Okay to proceed with chemo today but will need close follow-up    Please let patient know there is some subtle changes on the echo and I would like her to come in to see me in the next 1 or 2 weeks.

## 2023-09-29 ENCOUNTER — HOSPITAL ENCOUNTER (OUTPATIENT)
Dept: PHYSICAL THERAPY | Facility: HOSPITAL | Age: 83
Setting detail: THERAPIES SERIES
Discharge: HOME | End: 2023-09-29
Payer: MEDICARE

## 2023-09-29 DIAGNOSIS — C50.411 MALIGNANT NEOPLASM OF UPPER-OUTER QUADRANT OF RIGHT BREAST IN FEMALE, ESTROGEN RECEPTOR NEGATIVE: Primary | ICD-10-CM

## 2023-09-29 DIAGNOSIS — Z90.11 S/P RIGHT MASTECTOMY: ICD-10-CM

## 2023-09-29 DIAGNOSIS — Z17.1 MALIGNANT NEOPLASM OF UPPER-OUTER QUADRANT OF RIGHT BREAST IN FEMALE, ESTROGEN RECEPTOR NEGATIVE: Primary | ICD-10-CM

## 2023-09-29 DIAGNOSIS — Z91.89 AT RISK FOR LYMPHEDEMA: ICD-10-CM

## 2023-09-29 PROCEDURE — 97535 SELF CARE MNGMENT TRAINING: CPT

## 2023-09-29 PROCEDURE — 93702 BIS XTRACELL FLUID ANALYSIS: CPT

## 2023-09-29 NOTE — THERAPY TREATMENT NOTE
Outpatient Physical Therapy Lymphedema Treatment Note  [facility]     Patient Name: Veronica Royal  : 1940  MRN: [id number]  Today's Date: 2023        Visit Date: 2023    Visit Dx:    ICD-10-CM ICD-9-CM   1. Malignant neoplasm of upper-outer quadrant of right breast in female, estrogen receptor negative  C50.411 174.4    Z17.1 V86.1   2. At risk for lymphedema  Z91.89 V49.89   3. S/P right mastectomy  Z90.11 V45.71       Patient Active Problem List   Diagnosis    Malignant neoplasm of upper-outer quadrant of right breast in female, estrogen receptor negative    Encounter for fitting and adjustment of vascular catheter    Hypertension    At high risk for malnutrition    Bilateral hearing loss    Malignant neoplasm of female breast    Advanced care planning/counseling discussion    Cirrhosis of liver    Type 2 diabetes mellitus    Anemia due to chemotherapy    Left renal mass    Severe malnutrition    Breast cancer    Driving safety issue         Lymphedema       Row Name 23 0900             Subjective Pain    Able to rate subjective pain? yes  -LB      Pre-Treatment Pain Level 0  -LB         Subjective    Subjective Comments I am doing ok. My ROM is getting better. I am not having any pain.  -LB         Lymphedema Assessment    Lymphedema Classification RUE:;at risk/stage 0  -LB      Lymphedema Cancer Related Sx right;axillary dissection;modified radical mastectomy  -LB      Lymphedema Surgery Comments 23  -LB      Lymph Nodes Removed # 19  -LB      Positive Lymph Nodes # 1  -LB      Chemo Received yes  -LB      Chemo Treatments #/Timeframe neoadjuvant  -LB      Radiation Therapy Received no  -LB      Infections or Cellulitis? no  -LB         Posture/Observations    Posture/Observations Comments B ankle edema  -LB         General ROM    GENERAL ROM COMMENTS functional ROM RUE  -LB         RUE Quick Girth (cm)    Axilla 28.2 cm  -LB      Mid upper arm 24.7 cm  -LB      Elbow 24 cm   -LB      Mid forearm 18.7 cm  -LB      Wrist crease 15.6 cm  -LB      Web space 18.5 cm  -LB      Met-heads 17.5 cm  -LB      RUE Quick Girth Total 147.2  -LB         Compression/Skin Care    Compression/Skin Care Comments discussed compression garment use during radiation  -LB         L-Dex Bioimpedence Screening    L-Dex Measurement Extremity RUE  -LB      L-Dex Patient Position Standing  -LB      L-Dex UE Dominate Side Right  -LB      L-Dex UE At Risk Side Right  -LB      L-Dex UE Pre Surgical Value No  -LB      L-Dex UE Score 14.1  -LB      L-Dex UE Baseline Score 17.6  -LB      L-Dex UE Value Change -3.5  -LB      L-Dex UE Comment remains outside of normal range but improving  -LB      $ L-Dex Charge yes  -LB                User Key  (r) = Recorded By, (t) = Taken By, (c) = Cosigned By      Initials Name Provider Type    Denia Thomspon, PT Physical Therapist                                   PT Assessment/Plan       Row Name 09/29/23 1026          PT Assessment    Assessment Comments Pt returns for repeat bioimpedance testing due to elevated score at eval without other symptoms. Continued elevated bioimpedance but improvement from eval to 14.1. Pt's circumferetial measurements are stable. No signs of noted edema. Pt will start radiation in 2 weeks. Recommended compression sleeve and glove to be worn during radiation due to elevated bioimpedance and hx of axillary dissection. Instructed pt and pt's daughter in ordering.  -LB        PT Plan    PT Plan Comments repeat bioimpedance post radiation, assess RUE, determine future compression needs  -LB               User Key  (r) = Recorded By, (t) = Taken By, (c) = Cosigned By      Initials Name Provider Type    Denia Thompson, PT Physical Therapist                        OP Exercises       Row Name 09/29/23 0900             Subjective    Subjective Comments I am doing ok. My ROM is getting better. I am not having any pain.  -LB         Subjective Pain    Able to  rate subjective pain? yes  -LB      Pre-Treatment Pain Level 0  -LB                User Key  (r) = Recorded By, (t) = Taken By, (c) = Cosigned By      Initials Name Provider Type    Denia Thompson PT Physical Therapist                                 PT OP Goals       Row Name 09/29/23 1000          Long Term Goals    LTG Date to Achieve 10/14/23  -LB     LTG 1 Pt will demonstrate LDex bioimpedance WNL.  -LB     LTG 1 Progress Ongoing  -LB     LTG 1 Progress Comments elevated; 14.1  -LB     LTG 2 Pt will acquire appropriately fitted compression garment and verbalize appropriate wear schedule.  -LB     LTG 2 Progress Ongoing  -LB     LTG 2 Progress Comments reviewed schedule and acquisition with pt and daughter  -LB     LTG 3 Pt will demonstrate equal BUE AROM.  -LB     LTG 3 Progress Ongoing  -LB     LTG 3 Progress Comments improving RUE AROM, still dec but functional  -LB               User Key  (r) = Recorded By, (t) = Taken By, (c) = Cosigned By      Initials Name Provider Type    Denia Thompson PT Physical Therapist                    Therapy Education  Education Details: discussed with pt and daughter compression garment recommendations, use of sleeve and glove during radiation, radiation skincare, bioimpedance results and interpretation  Given: Symptoms/condition management, Edema management  Program: Reinforced  How Provided: Verbal, Demonstration, Written  Provided to: Caregiver, Patient  Level of Understanding: Verbalized  03096 - PT Self Care/Mgmt Minutes: 15              Time Calculation:   Start Time: 0915  Stop Time: 0945  Time Calculation (min): 30 min  Total Timed Code Minutes- PT: 15 minute(s)  Timed Charges  52307 - PT Self Care/Mgmt Minutes: 15  Total Minutes  Timed Charges Total Minutes: 15   Total Minutes: 15   Therapy Charges for Today       Code Description Service Date Service Provider Modifiers Qty    [id number] HC PT BIS XTRACELL FLUID ANALYSIS 9/29/2023 Denia Tse, PT  1     [id number]  PT SELF CARE/MGMT/TRAIN EA 15 MIN 9/29/2023 Denia Tse, PT GP 1                      Denia Tse PT  9/29/2023

## 2023-10-05 ENCOUNTER — HOSPITAL ENCOUNTER (OUTPATIENT)
Dept: RADIATION ONCOLOGY | Facility: HOSPITAL | Age: 83
Setting detail: RADIATION/ONCOLOGY SERIES
End: 2023-10-05
Payer: MEDICARE

## 2023-10-05 PROCEDURE — 77301 RADIOTHERAPY DOSE PLAN IMRT: CPT | Performed by: RADIOLOGY

## 2023-10-05 PROCEDURE — 77300 RADIATION THERAPY DOSE PLAN: CPT | Performed by: RADIOLOGY

## 2023-10-05 PROCEDURE — 77334 RADIATION TREATMENT AID(S): CPT | Performed by: RADIOLOGY

## 2023-10-05 PROCEDURE — 77338 DESIGN MLC DEVICE FOR IMRT: CPT | Performed by: RADIOLOGY

## 2023-10-06 PROCEDURE — 77300 RADIATION THERAPY DOSE PLAN: CPT | Performed by: RADIOLOGY

## 2023-10-06 PROCEDURE — 77338 DESIGN MLC DEVICE FOR IMRT: CPT | Performed by: RADIOLOGY

## 2023-10-06 PROCEDURE — 77301 RADIOTHERAPY DOSE PLAN IMRT: CPT | Performed by: RADIOLOGY

## 2023-10-10 ENCOUNTER — HOSPITAL ENCOUNTER (OUTPATIENT)
Dept: RADIATION ONCOLOGY | Facility: HOSPITAL | Age: 83
Discharge: HOME | End: 2023-10-10
Payer: MEDICARE

## 2023-10-10 ENCOUNTER — RADIATION ONCOLOGY WEEKLY ASSESSMENT (OUTPATIENT)
Dept: RADIATION ONCOLOGY | Facility: HOSPITAL | Age: 83
End: 2023-10-10
Payer: MEDICARE

## 2023-10-10 VITALS
SYSTOLIC BLOOD PRESSURE: 151 MMHG | DIASTOLIC BLOOD PRESSURE: 84 MMHG | BODY MASS INDEX: 25.11 KG/M2 | HEART RATE: 108 BPM | OXYGEN SATURATION: 99 % | WEIGHT: 132.8 LBS

## 2023-10-10 DIAGNOSIS — C50.411 MALIGNANT NEOPLASM OF UPPER-OUTER QUADRANT OF RIGHT BREAST IN FEMALE, ESTROGEN RECEPTOR NEGATIVE: Primary | ICD-10-CM

## 2023-10-10 DIAGNOSIS — Z17.1 MALIGNANT NEOPLASM OF UPPER-OUTER QUADRANT OF RIGHT BREAST IN FEMALE, ESTROGEN RECEPTOR NEGATIVE: Primary | ICD-10-CM

## 2023-10-10 LAB
RAD ONC ARIA COURSE ID: NORMAL
RAD ONC ARIA COURSE INTENT: NORMAL
RAD ONC ARIA COURSE LAST TREATMENT DATE: NORMAL
RAD ONC ARIA COURSE START DATE: NORMAL
RAD ONC ARIA COURSE TREATMENT ELAPSED DAYS: 0
RAD ONC ARIA FIRST TREATMENT DATE: NORMAL
RAD ONC ARIA PLAN FRACTIONS TREATED TO DATE: 1
RAD ONC ARIA PLAN ID: NORMAL
RAD ONC ARIA PLAN PRESCRIBED DOSE PER FRACTION: 2 GY
RAD ONC ARIA PLAN PRIMARY REFERENCE POINT: NORMAL
RAD ONC ARIA PLAN TOTAL FRACTIONS PRESCRIBED: 10
RAD ONC ARIA PLAN TOTAL PRESCRIBED DOSE: 2000 CGY
RAD ONC ARIA REFERENCE POINT DOSAGE GIVEN TO DATE: 2 GY
RAD ONC ARIA REFERENCE POINT ID: NORMAL
RAD ONC ARIA REFERENCE POINT SESSION DOSAGE GIVEN: 2 GY

## 2023-10-10 PROCEDURE — 77386: CPT | Performed by: RADIOLOGY

## 2023-10-10 PROCEDURE — 77427 RADIATION TX MANAGEMENT X5: CPT | Performed by: RADIOLOGY

## 2023-10-10 PROCEDURE — 77385: CPT | Performed by: RADIOLOGY

## 2023-10-10 PROCEDURE — 77014 CHG CT GUIDANCE RADIATION THERAPY FLDS PLACEMENT: CPT | Performed by: RADIOLOGY

## 2023-10-10 NOTE — PROGRESS NOTES
Physician Weekly Management Note    Diagnosis:     Diagnosis Plan   1. Malignant neoplasm of upper-outer quadrant of right breast in female, estrogen receptor negative            RT Details:  fx 1 / 25 right chest wall and nodes plus 0/5 fdx scar boost today is 200/5000cGy    Notes on Treatment course, Films, Medical progress:  Kps 90% today is fx 1, no skin issues or fatigue, cont on , no pain     Weekly Management:  Medication reviewed?   Yes  New medications given?   No  Problemlist reviewed?   Yes  Problem added?   No  Issues raised requiring referral to support services - task assigned to:  mark    Technical aspects reviewed:  Weekly OBI approved?   Yes  Weekly port films approved?   Yes  Change requests noted on port film?   No  Patient setup and plan reviewed?   Yes    Chart Reviewed:  Continue current treatment plan?   Yes  Treatment plan change requested?   No  CBC reviewed?   Yes  Concurrent Chemo?   No    Objective     Toxicities:   none     Review of Systems   Constitutional: Negative.    Skin: Negative.         There were no vitals filed for this visit.        9/27/2023     2:02 PM   Current Status   ECOG score 0       Physical Exam  Constitutional:       Appearance: Normal appearance.   Neurological:      Mental Status: She is alert.           Problem Summary List    Diagnosis:     Diagnosis Plan   1. Malignant neoplasm of upper-outer quadrant of right breast in female, estrogen receptor negative          Pathology:   breast, inflammatory    Past Medical History:   Diagnosis Date    Anxiety     Arthritis     Basal cell carcinoma     Left thumb    Breast cancer 2023    Right    Depression     Diabetes mellitus     Diarrhea     s/e chemo    High cholesterol     History of chemotherapy 08/2023    Hypertension     Rash 08/2023    s/e chemo    Sacral decubitus ulcer     cleaning with soap & water    Urinary incontinence     wears pads         Past Surgical History:   Procedure Laterality Date    CATARACT  EXTRACTION EXTRACAPSULAR W/ INTRAOCULAR LENS IMPLANTATION Right     EYE SURGERY      Muscle repair age 21    MASTECTOMY W/ SENTINEL NODE BIOPSY Right 8/24/2023    Procedure: Right breast modified radical mastectomy;  Surgeon: Rosa Michael MD;  Location: [facility] OR [facility] [place];  Service: General;  Laterality: Right;    TONSILLECTOMY      VENOUS ACCESS DEVICE (PORT) INSERTION N/A 05/19/2023    Procedure: INSERTION VENOUS ACCESS DEVICE;  Surgeon: Rosa Michael MD;  Location: [facility] OR [facility] [place];  Service: General;  Laterality: N/A;         Current Outpatient Medications on File Prior to Visit   Medication Sig Dispense Refill    acetaminophen (TYLENOL) 325 MG tablet Take 2 tablets by mouth Every 4 (Four) Hours As Needed for Mild Pain. 120 tablet 3    Cholecalciferol 50 MCG (2000 UT) tablet Take 1 tablet by mouth Daily (Monday-Friday).      glipizide 2.5 MG tablet 2.5 mg, metFORMIN 500 MG tablet 500 mg Take 1 dose by mouth Every Night.      glipiZIDE-metFORMIN (METAGLIP) 2.5-500 MG per tablet Take 2 tablets by mouth 2 (Two) Times a Day Before Meals. (Patient taking differently: Take 2 tablets by mouth Every Morning.) 120 tablet 3    glucose blood test strip Check BG once daily, E11.9 30 each 4    glucose monitor monitoring kit Dispense glucometer with brand based off insurance coverage, check BG daily, E11.9 1 each 0    Lancets misc Check BG once daily, E11.9 30 each 4    lidocaine-prilocaine (EMLA) 2.5-2.5 % cream Apply nickel size amount to port site 30 min before appt time do not rub in cover with plastic wrap 30 g 5    ondansetron (ZOFRAN) 8 MG tablet Take 1 tablet by mouth 3 (Three) Times a Day As Needed for Nausea or Vomiting. 30 tablet 5    simvastatin (ZOCOR) 10 MG tablet Take 1 tablet by mouth Every Night.      Tradjenta 5 MG tablet tablet Take 1 tablet by mouth Every Morning.       No current facility-administered medications on file prior to visit.       No Known Allergies      Referring Provider:    Kishore  Marie COCHRAN Md  [address] [id number]    Oncologist:  No primary care provider on file.      Seen and approved by:  Marie Tesfaye MD  10/10/2023

## 2023-10-11 ENCOUNTER — INFUSION (OUTPATIENT)
Dept: ONCOLOGY | Facility: HOSPITAL | Age: 83
End: 2023-10-11
Payer: MEDICARE

## 2023-10-11 ENCOUNTER — OFFICE VISIT (OUTPATIENT)
Dept: ONCOLOGY | Facility: CLINIC | Age: 83
End: 2023-10-11
Payer: MEDICARE

## 2023-10-11 ENCOUNTER — HOSPITAL ENCOUNTER (OUTPATIENT)
Dept: RADIATION ONCOLOGY | Facility: HOSPITAL | Age: 83
Discharge: HOME | End: 2023-10-11
Payer: MEDICARE

## 2023-10-11 VITALS
HEIGHT: 61 IN | RESPIRATION RATE: 16 BRPM | OXYGEN SATURATION: 99 % | TEMPERATURE: 97 F | WEIGHT: 131.9 LBS | HEART RATE: 102 BPM | DIASTOLIC BLOOD PRESSURE: 84 MMHG | SYSTOLIC BLOOD PRESSURE: 163 MMHG | BODY MASS INDEX: 24.9 KG/M2

## 2023-10-11 DIAGNOSIS — Z17.1 MALIGNANT NEOPLASM OF UPPER-OUTER QUADRANT OF RIGHT BREAST IN FEMALE, ESTROGEN RECEPTOR NEGATIVE: ICD-10-CM

## 2023-10-11 DIAGNOSIS — Z17.1 MALIGNANT NEOPLASM OF UPPER-OUTER QUADRANT OF RIGHT BREAST IN FEMALE, ESTROGEN RECEPTOR NEGATIVE: Primary | ICD-10-CM

## 2023-10-11 DIAGNOSIS — Z45.2 ENCOUNTER FOR FITTING AND ADJUSTMENT OF VASCULAR CATHETER: Primary | ICD-10-CM

## 2023-10-11 DIAGNOSIS — C50.411 MALIGNANT NEOPLASM OF UPPER-OUTER QUADRANT OF RIGHT BREAST IN FEMALE, ESTROGEN RECEPTOR NEGATIVE: ICD-10-CM

## 2023-10-11 DIAGNOSIS — C50.411 MALIGNANT NEOPLASM OF UPPER-OUTER QUADRANT OF RIGHT BREAST IN FEMALE, ESTROGEN RECEPTOR NEGATIVE: Primary | ICD-10-CM

## 2023-10-11 LAB
ALBUMIN SERPL-MCNC: 3.2 G/DL (ref 3.5–5.2)
ALBUMIN/GLOB SERPL: 1 G/DL
ALP SERPL-CCNC: 81 U/L (ref 39–117)
ALT SERPL W P-5'-P-CCNC: 10 U/L (ref 1–33)
ANION GAP SERPL CALCULATED.3IONS-SCNC: 12.4 MMOL/L (ref 5–15)
AST SERPL-CCNC: 23 U/L (ref 1–32)
BASOPHILS # BLD AUTO: 0.03 10*3/MM3 (ref 0–0.2)
BASOPHILS NFR BLD AUTO: 0.4 % (ref 0–1.5)
BILIRUB SERPL-MCNC: 0.2 MG/DL (ref 0–1.2)
BUN SERPL-MCNC: 22 MG/DL (ref 8–23)
BUN/CREAT SERPL: 22 (ref 7–25)
CALCIUM SPEC-SCNC: 9 MG/DL (ref 8.6–10.5)
CHLORIDE SERPL-SCNC: 106 MMOL/L (ref 98–107)
CO2 SERPL-SCNC: 23.6 MMOL/L (ref 22–29)
CREAT SERPL-MCNC: 1 MG/DL (ref 0.6–1.1)
DEPRECATED RDW RBC AUTO: 46.4 FL (ref 37–54)
EGFRCR SERPLBLD CKD-EPI 2021: 56.4 ML/MIN/1.73
EOSINOPHIL # BLD AUTO: 0.3 10*3/MM3 (ref 0–0.4)
EOSINOPHIL NFR BLD AUTO: 3.5 % (ref 0.3–6.2)
ERYTHROCYTE [DISTWIDTH] IN BLOOD BY AUTOMATED COUNT: 14.5 % (ref 12.3–15.4)
GLOBULIN UR ELPH-MCNC: 3.1 GM/DL
GLUCOSE SERPL-MCNC: 188 MG/DL (ref 65–99)
HCT VFR BLD AUTO: 31.4 % (ref 34–46.6)
HGB BLD-MCNC: 9.6 G/DL (ref 12–15.9)
IMM GRANULOCYTES # BLD AUTO: 0.03 10*3/MM3 (ref 0–0.05)
IMM GRANULOCYTES NFR BLD AUTO: 0.4 % (ref 0–0.5)
LYMPHOCYTES # BLD AUTO: 2.62 10*3/MM3 (ref 0.7–3.1)
LYMPHOCYTES NFR BLD AUTO: 31 % (ref 19.6–45.3)
MCH RBC QN AUTO: 27.2 PG (ref 26.6–33)
MCHC RBC AUTO-ENTMCNC: 30.6 G/DL (ref 31.5–35.7)
MCV RBC AUTO: 89 FL (ref 79–97)
MONOCYTES # BLD AUTO: 0.55 10*3/MM3 (ref 0.1–0.9)
MONOCYTES NFR BLD AUTO: 6.5 % (ref 5–12)
NEUTROPHILS NFR BLD AUTO: 4.93 10*3/MM3 (ref 1.7–7)
NEUTROPHILS NFR BLD AUTO: 58.2 % (ref 42.7–76)
NRBC BLD AUTO-RTO: 0 /100 WBC (ref 0–0.2)
PLATELET # BLD AUTO: 274 10*3/MM3 (ref 140–450)
PMV BLD AUTO: 9.8 FL (ref 6–12)
POTASSIUM SERPL-SCNC: 3.9 MMOL/L (ref 3.5–5.2)
PROT SERPL-MCNC: 6.3 G/DL (ref 6–8.5)
RAD ONC ARIA COURSE ID: NORMAL
RAD ONC ARIA COURSE INTENT: NORMAL
RAD ONC ARIA COURSE LAST TREATMENT DATE: NORMAL
RAD ONC ARIA COURSE START DATE: NORMAL
RAD ONC ARIA COURSE TREATMENT ELAPSED DAYS: 1
RAD ONC ARIA FIRST TREATMENT DATE: NORMAL
RAD ONC ARIA PLAN FRACTIONS TREATED TO DATE: 1
RAD ONC ARIA PLAN ID: NORMAL
RAD ONC ARIA PLAN PRESCRIBED DOSE PER FRACTION: 2 GY
RAD ONC ARIA PLAN PRIMARY REFERENCE POINT: NORMAL
RAD ONC ARIA PLAN TOTAL FRACTIONS PRESCRIBED: 15
RAD ONC ARIA PLAN TOTAL PRESCRIBED DOSE: 3000 CGY
RAD ONC ARIA REFERENCE POINT DOSAGE GIVEN TO DATE: 4 GY
RAD ONC ARIA REFERENCE POINT ID: NORMAL
RAD ONC ARIA REFERENCE POINT SESSION DOSAGE GIVEN: 2 GY
RBC # BLD AUTO: 3.53 10*6/MM3 (ref 3.77–5.28)
SODIUM SERPL-SCNC: 142 MMOL/L (ref 136–145)
WBC NRBC COR # BLD: 8.46 10*3/MM3 (ref 3.4–10.8)

## 2023-10-11 PROCEDURE — 25010000002 DEXAMETHASONE SODIUM PHOSPHATE 100 MG/10ML SOLUTION: Performed by: INTERNAL MEDICINE

## 2023-10-11 PROCEDURE — 77331 SPECIAL RADIATION DOSIMETRY: CPT | Performed by: RADIOLOGY

## 2023-10-11 PROCEDURE — 77332 RADIATION TREATMENT AID(S): CPT | Performed by: RADIOLOGY

## 2023-10-11 PROCEDURE — 25010000002 ADO-TRASTUZUMAB 100 MG RECONSTITUTED SOLUTION 1 EACH VIAL: Performed by: INTERNAL MEDICINE

## 2023-10-11 PROCEDURE — 25010000002 ADO-TRASTUZUMAB 160 MG RECONSTITUTED SOLUTION 160 MG VIAL: Performed by: INTERNAL MEDICINE

## 2023-10-11 PROCEDURE — 96375 TX/PRO/DX INJ NEW DRUG ADDON: CPT

## 2023-10-11 PROCEDURE — 80053 COMPREHEN METABOLIC PANEL: CPT

## 2023-10-11 PROCEDURE — 77014 CHG CT GUIDANCE RADIATION THERAPY FLDS PLACEMENT: CPT | Performed by: RADIOLOGY

## 2023-10-11 PROCEDURE — 77385: CPT | Performed by: RADIOLOGY

## 2023-10-11 PROCEDURE — 25810000003 SODIUM CHLORIDE 0.9 % SOLUTION: Performed by: INTERNAL MEDICINE

## 2023-10-11 PROCEDURE — 25810000003 SODIUM CHLORIDE 0.9 % SOLUTION 250 ML FLEX CONT: Performed by: INTERNAL MEDICINE

## 2023-10-11 PROCEDURE — 25010000002 HEPARIN LOCK FLUSH PER 10 UNITS: Performed by: INTERNAL MEDICINE

## 2023-10-11 PROCEDURE — 96413 CHEMO IV INFUSION 1 HR: CPT

## 2023-10-11 PROCEDURE — 85025 COMPLETE CBC W/AUTO DIFF WBC: CPT

## 2023-10-11 RX ORDER — SODIUM CHLORIDE 0.9 % (FLUSH) 0.9 %
10 SYRINGE (ML) INJECTION AS NEEDED
Status: DISCONTINUED | OUTPATIENT
Start: 2023-10-11 | End: 2023-10-11 | Stop reason: HOSPADM

## 2023-10-11 RX ORDER — SODIUM CHLORIDE 9 MG/ML
250 INJECTION, SOLUTION INTRAVENOUS ONCE
Status: COMPLETED | OUTPATIENT
Start: 2023-10-11 | End: 2023-10-11

## 2023-10-11 RX ORDER — SODIUM CHLORIDE 9 MG/ML
250 INJECTION, SOLUTION INTRAVENOUS ONCE
Status: CANCELLED | OUTPATIENT
Start: 2023-10-11

## 2023-10-11 RX ORDER — HEPARIN SODIUM (PORCINE) LOCK FLUSH IV SOLN 100 UNIT/ML 100 UNIT/ML
500 SOLUTION INTRAVENOUS AS NEEDED
OUTPATIENT
Start: 2023-10-11

## 2023-10-11 RX ORDER — SODIUM CHLORIDE 0.9 % (FLUSH) 0.9 %
10 SYRINGE (ML) INJECTION AS NEEDED
OUTPATIENT
Start: 2023-10-11

## 2023-10-11 RX ORDER — HEPARIN SODIUM (PORCINE) LOCK FLUSH IV SOLN 100 UNIT/ML 100 UNIT/ML
500 SOLUTION INTRAVENOUS AS NEEDED
Status: DISCONTINUED | OUTPATIENT
Start: 2023-10-11 | End: 2023-10-11 | Stop reason: HOSPADM

## 2023-10-11 RX ADMIN — Medication 10 ML: at 10:32

## 2023-10-11 RX ADMIN — SODIUM CHLORIDE 250 ML: 9 INJECTION, SOLUTION INTRAVENOUS at 08:45

## 2023-10-11 RX ADMIN — Medication 500 UNITS: at 10:32

## 2023-10-11 RX ADMIN — DEXAMETHASONE SODIUM PHOSPHATE 12 MG: 10 INJECTION, SOLUTION INTRAMUSCULAR; INTRAVENOUS at 08:45

## 2023-10-11 RX ADMIN — ADO-TRASTUZUMAB EMTANSINE 210 MG: 20 INJECTION, POWDER, LYOPHILIZED, FOR SOLUTION INTRAVENOUS at 09:24

## 2023-10-11 NOTE — PROGRESS NOTES
Date of Office Visit: 10/12/2023  Encounter Provider: CARL Andrews  Place of Service: [facility] CARDIOLOGY  Patient Name: Veronica Royla  :1940    Chief complaint  Follow-up for cardio oncology care    History of Present Illness  Patient is an 82 year old female patient of Dr. Maldonado. Past medical history includes diabetes, hypertension, hyperlipidemia, chronic renal insufficiency, hyperkalemia and cirrhosis..  She was diagnosed with right-sided inflammatory breast cancer 2023.  She is considered for adjuvant chemotherapy with Taxol, Herceptin, Perjeta and if she tolerates this to proceed with AC therapy.  She started THP 2023.  2023 she had perfusion stress test that was negative for ischemia.  This was considered low risk.  She was hospitalized 2023-2023 for severe malnutrition, symptomatic anemia, and dehydration related to chemotherapy.  She also had KAMILA at that time, which resolved after administration of IV fluids.  Nifedipine was discontinued during admission, and valsartan-hydrochlorothiazide had been discontinued prior to admission.     Baseline echo with EF 56 to 60% with GLS of -20.3% with grade 1A diastolic dysfunction, aortic valve calcification without stenosis, trivial mitral and tricuspid valve regurgitation with normal right-sided pressures. Subsequent echocardiogram on 2023 showed LVEF 59%, GLS -17.9% which was a change of 12% from prior study.  Normal LV cavity size and wall thickness, grade 1 diastolic dysfunction, trace mitral regurgitation, and trace tricuspid regurgitation were also present.    Interval history  Patient presents today for routine follow-up after changes noted on recent echocardiogram.  I will visit with her for the first time today and have reviewed her medical record.  Her daughter is with her at appointment and assists in providing history.  Patient denies palpitations, shortness of breath, edema,  dizziness, chest pain or chest pressure, fatigue, syncope or presyncope.  She is active and walking daily and denies exertional symptoms.  Blood pressure today is significantly elevated and in review of records has been persistently elevated for at least the last month.  She is not currently checking blood pressure at home.  She had her chemo treatment yesterday, and will be due again for chemo on 11/1/2023.    Past Medical History:   Diagnosis Date    Anxiety     Arthritis     Basal cell carcinoma     Left thumb    Breast cancer 2023    Right    Depression     Diabetes mellitus     Diarrhea     s/e chemo    High cholesterol     History of chemotherapy 08/2023    Hypertension     Rash 08/2023    s/e chemo    Sacral decubitus ulcer     cleaning with soap & water    Urinary incontinence     wears pads     Past Surgical History:   Procedure Laterality Date    CATARACT EXTRACTION EXTRACAPSULAR W/ INTRAOCULAR LENS IMPLANTATION Right     EYE SURGERY      Muscle repair age 21    MASTECTOMY W/ SENTINEL NODE BIOPSY Right 8/24/2023    Procedure: Right breast modified radical mastectomy;  Surgeon: Rosa Michael MD;  Location: [facility] OR [facility] [place];  Service: General;  Laterality: Right;    TONSILLECTOMY      VENOUS ACCESS DEVICE (PORT) INSERTION N/A 05/19/2023    Procedure: INSERTION VENOUS ACCESS DEVICE;  Surgeon: Rosa Michael MD;  Location: [facility] OR [facility] [place];  Service: General;  Laterality: N/A;     Outpatient Medications Prior to Visit   Medication Sig Dispense Refill    acetaminophen (TYLENOL) 325 MG tablet Take 2 tablets by mouth Every 4 (Four) Hours As Needed for Mild Pain. 120 tablet 3    Cholecalciferol 50 MCG (2000 UT) tablet Take 1 tablet by mouth Daily (Monday-Friday).      glipiZIDE-metFORMIN (METAGLIP) 2.5-500 MG per tablet Take 2 tablets by mouth 2 (Two) Times a Day Before Meals. (Patient taking differently: Take 2 tablets by mouth Every Morning.) 120 tablet 3    Lancets misc Check BG once daily, E11.9 30 each 4  "   lidocaine-prilocaine (EMLA) 2.5-2.5 % cream Apply nickel size amount to port site 30 min before appt time do not rub in cover with plastic wrap 30 g 5    ondansetron (ZOFRAN) 8 MG tablet Take 1 tablet by mouth 3 (Three) Times a Day As Needed for Nausea or Vomiting. 30 tablet 5    simvastatin (ZOCOR) 10 MG tablet Take 1 tablet by mouth Every Night.      Tradjenta 5 MG tablet tablet Take 1 tablet by mouth Every Morning.      glipizide 2.5 MG tablet 2.5 mg, metFORMIN 500 MG tablet 500 mg Take 1 dose by mouth Every Night.       No facility-administered medications prior to visit.       Allergies as of 10/12/2023    (No Known Allergies)     Social History     Socioeconomic History    Marital status:    Tobacco Use    Smoking status: Never    Smokeless tobacco: Never   Vaping Use    Vaping Use: Never used   Substance and Sexual Activity    Alcohol use: Never    Drug use: Never    Sexual activity: Never     Family History   Problem Relation Age of Onset    Alzheimer's disease Mother     Heart disease Father     Heart attack Father     Ovarian cancer Sister     Pancreatic cancer Brother     Malig Hyperthermia Neg Hx     Colon cancer Neg Hx     Colon polyps Neg Hx     Crohn's disease Neg Hx     Irritable bowel syndrome Neg Hx     Ulcerative colitis Neg Hx      Review of Systems   Constitutional: Negative for malaise/fatigue.   Cardiovascular:  Negative for chest pain, claudication, dyspnea on exertion, leg swelling, near-syncope, orthopnea, palpitations, paroxysmal nocturnal dyspnea and syncope.   Respiratory:  Negative for shortness of breath.    Neurological:  Negative for brief paralysis, dizziness, headaches and light-headedness.   All other systems reviewed and are negative.       Objective:     Vitals:    10/12/23 1129   BP: 140/62   BP Location: Left arm   Patient Position: Sitting   Cuff Size: Adult   Pulse: 86   Weight: 60.2 kg (132 lb 12.8 oz)   Height: 154.9 cm (60.98\")     Body mass index is 25.11 " "kg/mý.    Vitals reviewed.   Constitutional:       General: Not in acute distress.     Appearance: Well-developed and not in distress. Not diaphoretic.   HENT:      Head: Normocephalic.   Pulmonary:      Effort: Pulmonary effort is normal. No respiratory distress.      Breath sounds: Normal breath sounds. No wheezing. No rhonchi. No rales.   Cardiovascular:      Normal rate. Regular rhythm.      Murmurs: There is no murmur.   Pulses:     Radial: 2+ bilaterally.  Edema:     Peripheral edema absent.   Skin:     General: Skin is warm and dry. There is no cyanosis.      Findings: No rash.   Neurological:      Mental Status: Alert and oriented to person, place, and time.   Psychiatric:         Behavior: Behavior normal. Behavior is cooperative.         Thought Content: Thought content normal.         Judgment: Judgment normal.       Lab Review:     Lab Results   Component Value Date     10/11/2023     09/27/2023    K 3.9 10/11/2023    K 3.5 09/27/2023     10/11/2023     09/27/2023    CO2 23.6 10/11/2023    CO2 22.0 09/27/2023    BUN 22 10/11/2023    BUN 13 09/27/2023    CREATININE 1.00 10/11/2023    CREATININE 0.99 09/27/2023    GLUCOSE 188 (H) 10/11/2023    GLUCOSE 64 (L) 09/27/2023    CALCIUM 9.0 10/11/2023    CALCIUM 8.8 09/27/2023    ALBUMIN 3.2 (L) 10/11/2023    ALBUMIN 3.3 (L) 09/27/2023    BILITOT 0.2 10/11/2023    BILITOT 0.3 09/27/2023    AST 23 10/11/2023    AST 27 09/27/2023    ALT 10 10/11/2023    ALT 13 09/27/2023     Lab Results   Component Value Date    WBC 8.46 10/11/2023    WBC 14.23 (H) 09/27/2023    HGB 9.6 (L) 10/11/2023    HGB 9.5 (L) 09/27/2023    HCT 31.4 (L) 10/11/2023    HCT 29.7 (L) 09/27/2023    MCV 89.0 10/11/2023    MCV 87.1 09/27/2023     10/11/2023     09/27/2023     Lab Results   Component Value Date    PROBNP 40.2 06/02/2023     Lab Results   Component Value Date    TROPONINT 12 (H) 06/02/2023     No results found for: \"TSH\"          ECG 12 " Lead    Date/Time: 10/12/2023 11:57 AM  Performed by: Aislinn Joseph APRN    Authorized by: Aislinn Joseph APRN  Comparison: compared with previous ECG   Similar to previous ECG  Rhythm: sinus rhythm  Rate: normal  BPM: 86  QRS axis: normal  Other findings: low voltage  Comments: Similar to prior.  No new ischemic changes        Assessment:       Diagnosis Plan   1. Encounter for monitoring cardiotoxic drug therapy  ECG 12 Lead    Adult Transthoracic Echo Limited W/ Cont if Necessary Per Protocol    Basic Metabolic Panel    proBNP    Troponin I    Adult Transthoracic Echo Limited W/ Cont if Necessary Per Protocol      2. Malignant neoplasm of upper-outer quadrant of right breast in female, estrogen receptor negative  ECG 12 Lead    Adult Transthoracic Echo Limited W/ Cont if Necessary Per Protocol    Basic Metabolic Panel    proBNP    Troponin I    Adult Transthoracic Echo Limited W/ Cont if Necessary Per Protocol      3. Elevated troponin  ECG 12 Lead    Adult Transthoracic Echo Limited W/ Cont if Necessary Per Protocol    Basic Metabolic Panel    proBNP    Troponin I    Adult Transthoracic Echo Limited W/ Cont if Necessary Per Protocol      4. Primary hypertension  ECG 12 Lead    Adult Transthoracic Echo Limited W/ Cont if Necessary Per Protocol    Basic Metabolic Panel    proBNP    Troponin I    Adult Transthoracic Echo Limited W/ Cont if Necessary Per Protocol      5. Renal insufficiency  ECG 12 Lead    Adult Transthoracic Echo Limited W/ Cont if Necessary Per Protocol    Basic Metabolic Panel    proBNP    Troponin I    Adult Transthoracic Echo Limited W/ Cont if Necessary Per Protocol      6. Shortness of breath  proBNP        Plan:       1.  Left-sided inflammatory breast cancer.  Per ESC guidelines she is considered high risk plans are for THP and then to follow with AC therapy if she tolerates THP.  2.  Cardio oncology care.  As above she is a high risk patient and to potentially receive multiple  cardiotoxic agents.  Perfusion stress test was negative for ischemia in June 2023.  Echocardiogram 9/27/2023 showed a 12% decrease in GLS.  EF was stable to better on this study.  Blood pressure is significantly elevated and has been for several weeks.  Renal function back to normal after KAMILA during recent admission.  Will start losartan 25 mg daily for better blood pressure control.  Will get repeat echocardiogram and labs to include BMP, proBNP, troponin in 1 week.  Her next chemotherapy is due on 11/1/2023.  The following week we will have her come back to office for echocardiogram and visit with Dr. Maldonado.    3.  Coronary artery disease.  Coronary calcification noted on CT scan of the chest on 5/2023.  As above   4.  Hypertension, medication adjustments as above.  She will start monitoring blood pressure at home and call if consistently greater than 130/80 so that medications may be adjusted.    5.  Hyperlipidemia.  Lipids at [facility] showed elevated triglycerides of 268, HDL slightly low and LDL of 106.  On statin per PCP.  At last visit it was discussed that she would speak to PCP about increasing simvastatin to 20 mg nightly.  They have upcoming appointment with PCP in the next few weeks.  6.  Diabetes.  On metformin, glipizide, and Tradjenta. Managed by PCP  7   Chronic renal insufficiency baseline creatinine 1.3. was 1.0 on labs yesterday with normal electrolytes.  8.  Chronic mild anemia that was stable at around 9.5 on labs done yesterday.      Time Spent: I spent 40 minutes caring for Veronica on this date of service. This time includes time spent by me in the following activities: preparing for the visit, reviewing tests, obtaining and/or reviewing a separately obtained history, performing a medically appropriate examination and/or evaluation, counseling and educating the patient/family/caregiver, ordering medications, tests, or procedures, referring and communicating with other health care  professionals, documenting information in the medical record, and independently interpreting results and communicating that information with the patient/family/caregiver.   I spent 1 minutes on the separately reported service of ECG. This time is not included in the time used to support the E/M service also reported today.        Your medication list            Accurate as of October 12, 2023 12:35 PM. If you have any questions, ask your nurse or doctor.                START taking these medications        Instructions Last Dose Given Next Dose Due   losartan 25 MG tablet  Commonly known as: Cozaar  Started by: CARL Andrews      Take 1 tablet by mouth Daily.              CHANGE how you take these medications        Instructions Last Dose Given Next Dose Due   glipiZIDE-metFORMIN 2.5-500 MG per tablet  Commonly known as: METAGLIP  What changed: when to take this      Take 2 tablets by mouth 2 (Two) Times a Day Before Meals.              CONTINUE taking these medications        Instructions Last Dose Given Next Dose Due   acetaminophen 325 MG tablet  Commonly known as: TYLENOL      Take 2 tablets by mouth Every 4 (Four) Hours As Needed for Mild Pain.       Cholecalciferol 50 MCG (2000 UT) tablet      Take 1 tablet by mouth Daily (Monday-Friday).       Lancets misc      Check BG once daily, E11.9       lidocaine-prilocaine 2.5-2.5 % cream  Commonly known as: EMLA      Apply nickel size amount to port site 30 min before appt time do not rub in cover with plastic wrap       ondansetron 8 MG tablet  Commonly known as: ZOFRAN      Take 1 tablet by mouth 3 (Three) Times a Day As Needed for Nausea or Vomiting.       simvastatin 10 MG tablet  Commonly known as: ZOCOR      Take 1 tablet by mouth Every Night.       Tradjenta 5 MG tablet tablet  Generic drug: linagliptin      Take 1 tablet by mouth Every Morning.                 Where to Get Your Medications        These medications were sent to [email or web address]  #[id number] - [place], [place] - [address] AT [facility] & [address] - [phone]  - [phone] FX  [address], [facility] [address]      Phone: [phone]   losartan 25 MG tablet         Patient is no longer taking -.  I corrected the med list to reflect this.  I did not stop these medications.      Dictated utilizing Dragon dictation

## 2023-10-11 NOTE — PATIENT INSTRUCTIONS
Call this office at [phone] for any fever of 100.4,chills, or other signs of infection. Call for any other problems or concerns.

## 2023-10-11 NOTE — PROGRESS NOTES
Subjective   Veronica Royal is a 82 y.o. female.  Referred by Dr. Michael for right breast inflammatory breast cancer    History of Present Illness   Ms. Royal is a 82-year-old postmenopausal  lady with hypertension, diabetes, hyperlipidemia, chronic kidney disease, hyperkalemia-chronic presented with a palpable abnormality in the right breast.  Subsequent imaging was performed.  Patient has not had a mammogram prior to this in the past 25 years.    4/21/2023-bilateral diagnostic mammogram-high density irregular mass measuring 44 mm with spiculated margins and amorphus and fine pleomorphic calcifications with skin thickening in the right breast at 9:00.  2 intramammary lymph nodes in the upper outer axillary tail region are slightly rounded  2 prominent right axillary lymph nodes largest of which measures 26 mm.  Asymmetry noted in the left breast.    Right breast ultrasound at 9 o'clock position, 3 cm from the nipple there is a 38 x 28 x 42 mm mass.  Skin thickness measuring 11 mm near the mass.  One of the 2 rounded axillary tail lymph nodes noted.  Borderline cortical thickening.  2 abnormal axillary lymph nodes.  1 lymph node displaced loss of normal morphology with a round heterogeneous appearance and echogenic foci.  Most consistent with calcified lymph node measuring 26 mm.  Second lymph node measures 12 mm.  Displaced cortical thickening.  Ultrasound-guided biopsy of the mass in the axillary lymph nodes recommended.    4/21/2023  1.right breast 9:00 biopsy-invasive ductal carcinoma with apocrine features  Grade 3  ER negative  VT negative  HER2 2+ on immunohistochemistry  HER2 amplified on FISH with HER2 copy number of 7.58, OPAL seventeen 3.18, HER2/OPAL 17 ratio of 2.4.  Ki-67 38.45%    2.right axillary biopsy-soft tissue involved by invasive ductal carcinoma with apocrine features  Associated microcalcification  No definite lymph node tissue identified.    4/29/2023-MRI of the breast-biopsy-proven  malignancy in the right breast at 9:00 measuring 4.3 cm in greatest dimension.  Attachment overlying skin and diffuse right breast edema noted.  Right axillary lymphadenopathy.  No suspicious findings in the left breast.    5/11/2023-CT of the chest abdomen and pelvis-no evidence of metastatic disease.  Liver is cirrhotic in configuration.    5/11/2023-bone scan negative    Patient presents today to the clinic for discussing neoadjuvant chemotherapy.  She is accompanied by her daughter.  Patient denies any recent changes in weight, she reports some pain at the site of malignancy.  A punch biopsy was performed and was consistent with inflammatory breast cancer.  At the site of punch biopsy there is an ulcerated lesion.    She has poorly controlled diabetes currently on glipizide metformin and Tradjenta.  Hemoglobin A1c recently was noted to be 9.9.    Also has chronic hyperkalemia, explanation unclear.    Blood pressure poorly controlled.    She reports good functional status lives independently.  Able to manage all her bills and independent of ADLs.  She has good support system in terms of her daughter.    Family history significant for brother with pancreatic cancer at the age of 68, sister with ovarian cancer at the age of 58    She received 6 weeks of Taxol Perjeta and Herceptin and subsequently admitted to the hospital due to severe nausea vomiting diarrhea poor oral intake, KAMILA, severe electrolyte abnormalities.      admission to the hospital for KAMILA, severe diarrhea, nausea vomiting and poor oral intake.She was in the hospital between 7/12/2023 to 7/20/2023.  During the hospitalization she was treated for acute UTI, electrolyte abnormalities and KAMILA.  Subsequently she was  discharged to a rehab.   During the hospitalization she was evaluated by Dr. Michael and a right breast ultrasound was obtained on 7/17/2023.  There was very little response noted with the 9 o'clock position mass 6 cm from the nipple  measuring 3.7 x 2.4 x 3.7 cm previously 3.8 x 2.5 x 4.2 cm.  In the right axilla the lymph node measured 2.6 x 1.5 x 1.9 cm previously 1.9 x 1.5 x 2.5 cm.  There was decrease in size of the adjacent lymph node measuring 0.7 cm previously 1.2 cm.    8/24/2023-right mastectomy and axillary lymph node dissection  Invasive ductal carcinoma, grade 2  The tumor measures 4 cm  Microcalcifications are seen in the tumor  Dermal lymphatic invasion present  Lymphatic invasion present  Tumor seen in the skin but no ulceration.  Residual cancer burden 3.454  RCB-3  Xiong Lima grade 3  Margins negative  18 lymph nodes total evaluated  1 with micrometastasis with a tumor measuring 2.5 mm with no chemotherapy effect  1 with chemotherapy change and isolated tumor cells  There is 1 node with chemotherapy change and a clip but no residual tumor.    Receptors repeated  ER negative  NE negative  HER2 2+ on immunohistochemistry  FISH nonamplified    INTERVAL HISTORY:   The patient is a 82 y.o. female with above-mentioned history returns today .    She is scheduled for [facility] today.  She is tolerating that well.  Denies any diarrhea in fact has some constipation.  No nausea or vomiting  Gained 2 pounds  No neuropathy  Started radiation 10/10/2023.      The following portions of the patient's history were reviewed and updated as appropriate: allergies, current medications, past family history, past medical history, past social history, past surgical history and problem list.    Past Medical History:   Diagnosis Date    Anxiety     Arthritis     Basal cell carcinoma     Left thumb    Breast cancer 2023    Right    Depression     Diabetes mellitus     Diarrhea     s/e chemo    High cholesterol     History of chemotherapy 08/2023    Hypertension     Rash 08/2023    s/e chemo    Sacral decubitus ulcer     cleaning with soap & water    Urinary incontinence     wears pads        Past Surgical History:   Procedure Laterality Date    CATARACT  "EXTRACTION EXTRACAPSULAR W/ INTRAOCULAR LENS IMPLANTATION Right     EYE SURGERY      Muscle repair age 21    MASTECTOMY W/ SENTINEL NODE BIOPSY Right 2023    Procedure: Right breast modified radical mastectomy;  Surgeon: Rosa Michael MD;  Location: [facility];  Service: General;  Laterality: Right;    TONSILLECTOMY      VENOUS ACCESS DEVICE (PORT) INSERTION N/A 2023    Procedure: INSERTION VENOUS ACCESS DEVICE;  Surgeon: Rosa Michael MD;  Location: [facility];  Service: General;  Laterality: N/A;        Family History   Problem Relation Age of Onset    Alzheimer's disease Mother     Heart disease Father     Heart attack Father     Ovarian cancer Sister     Pancreatic cancer Brother     Malig Hyperthermia Neg Hx     Colon cancer Neg Hx     Colon polyps Neg Hx     Crohn's disease Neg Hx     Irritable bowel syndrome Neg Hx     Ulcerative colitis Neg Hx         Social History     Socioeconomic History    Marital status:    Tobacco Use    Smoking status: Never    Smokeless tobacco: Never   Vaping Use    Vaping Use: Never used   Substance and Sexual Activity    Alcohol use: Never    Drug use: Never    Sexual activity: Never        OB History    No obstetric history on file.      Age at menarche-11  Age at first live childbirth-35   2 para 1  1  Age at menopause-50  Oral conceptive pill use for 3 to 4 years    No Known Allergies     Review of Systems    Review of systems as mentioned HPI otherwise negative    Objective   Blood pressure 163/84, pulse 102, temperature 97 øF (36.1 øC), temperature source Temporal, resp. rate 16, height 154.9 cm (60.98\"), weight 59.8 kg (131 lb 14.4 oz), SpO2 99%.     Physical Exam  Vitals reviewed.   Constitutional:       General: She is not in acute distress.     Appearance: Normal appearance. She is well-developed.   HENT:      Head: Normocephalic and atraumatic.   Eyes:      Pupils: Pupils are equal, round, and reactive to light. "   Cardiovascular:      Rate and Rhythm: Normal rate and regular rhythm.      Heart sounds: Normal heart sounds. No murmur heard.  Pulmonary:      Effort: Pulmonary effort is normal. No respiratory distress.      Breath sounds: Normal breath sounds. No wheezing, rhonchi or rales.   Abdominal:      General: Bowel sounds are normal. There is no distension.      Palpations: Abdomen is soft.   Musculoskeletal:         General: Swelling (2+ bilateral LE) present. Normal range of motion.      Cervical back: Normal range of motion.   Skin:     General: Skin is warm and dry.      Findings: No rash.   Neurological:      Mental Status: She is alert and oriented to person, place, and time.        Right chest wall carefully examined, status post mastectomy with incision well-healed, no signs or symptoms of infection      I have reexamined the patient and the results are consistent with the previously documented exam. Sheila Yee MD   :        Results from last 7 days   Lab Units 10/11/23  0753   WBC 10*3/mm3 8.46   NEUTROS ABS 10*3/mm3 4.93   HEMOGLOBIN g/dL 9.6*   HEMATOCRIT % 31.4*   PLATELETS 10*3/mm3 274                   No radiology results for the last 30 days.     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  Invasive ductal carcinoma with apocrine features, grade 3, Ki-67 38%  CT scans and bone scan without any obvious evidence of metastatic disease  Echocardiogram has been performed and ejection fraction normal.  Liver shows cirrhotic morphology.  LFTs otherwise normal and total bilirubin normal as well as protein normal.  It appears that the synthetic function of the liver is still within normal limits.  Mediport placed 5/19/2023  Taxol, Herceptin, Perjeta initiated  5/24/2023 5/31/2023 with C1D8 Taxol  6/28/2023-cycle 2-day 15 of Taxol.  She is overall tolerating therapy well with expected side effects.  She will proceed with Taxol today.  Scheduled for Taxol Herceptin and Perjeta.  7/12/2023-cycle 3-day 8 of TPH.  Chemotherapy held and patient was admitted to the hospital for management of severe dehydration, KAMILA, nausea vomiting diarrhea and decreased oral intake.  8/2/2023-she feels relatively well today.  Labs reviewed and stable to proceed with Herceptin only.  We will not administer Taxol and Perjeta as she has had significant issues with chemotherapy.  Right mastectomy 8/24/2023 with 4 cm of residual disease, RCB-3, treatment effect present, axillary lymph node dissection with 1 lymph node with micrometastasis measuring 2.5 mm, 1 lymph node with isolated tumor cells and a third lymph node which showed treatment changes but no tumor cells.  Total of 18 lymph nodes removed   9/20/2023 to discuss pathology and adjuvant therapy.  We discussed Kadcyla every 3 weekly to finish a complete year.  Patient is definitely hesitant to resume any sort of chemotherapy due to her previous experience with diarrhea.  Started Kadcyla 9/20/2023, denies any diarrhea.  Tolerating treatment well so far  She has gained a couple of pounds.  No evidence of recurrent disease    *Diabetes  Poorly controlled  Evaluation by endocrinology 5/30/2023 with recommendations for dietary changes and home blood sugar monitoring.  The patient's daughter reports today she is struggling with compliance.  Poorly controlled at this time    *KAMILA/CKD  8/17/2023 BUN 24 and creatinine 1.05  Patient's daughter reports that she has not been hydrating herself well.  Creatinine normal at 1.0    *?  Cirrhotic appearance of the liver on the CT scan  Referred to gastroenterology  Synthetic function appears to be normal  We will start with Taxol to see if she would tolerate the chemotherapy   Slight elevation of intervention  studies today with ALT 43, AST 55.  Continue to monitor weekly  6/28/2023-mild elevation in the LFTs.  Stable compared to previous labs.  Okay to proceed with chemotherapy.  10/11/2023-LFTs normal    *Hypertension-currently on valsartan and hydrochlorothiazide.  Valsartan could be contributing to hyperkalemia.  Will refer to cardiology ASAP for management of medications and cardiac clearance for proceeding with chemotherapy  Recent echocardiogram normal  Stress test reviewed and negative.  9/27/2023-echocardiogram shows a normal ejection fraction of 59%.  She has a follow-up appointment with cardiology on 10/12/2023    *Genetic testing-Invitae 9 gene stat panel negative,     *Decreased oral intake secondary to chemotherapy  Still not adequate oral intake  Encouraged her to drink boost and Ensure    *Frequent belching  Continue Protonix 40 mg daily  This is improved    *Cognitive impairment  It is unclear if this is the patient's baseline or mental status has been exacerbated by recent chemotherapy  The patient is struggling with compliance with her medications.  The patient's daughter expresses frustration today as the patient is struggling to care for herself at home with managing medications and symptom management.  Evaluated by CARL Antonio     *Chemotherapy-induced diarrhea  6/5/2023 patient given Enterade (Wild Berry flavor).  She has been drinking 1 a day.  She does not necessarily like the flavor (currently mixing with sugar-free Aramis-Aid which does help).  Encouraged her to increase to 2 a day.  6/21/2023 patient reports that she had stopped drinking her Enterade because she was waking up in the middle the night having diarrhea although she did not know if it was due to the Enterade her protein shakes.  I have encouraged the patient to continue Enterade, drinking it in the morning.  Try discontinuing the protein shakes and see how she does.  Patient states that she will try this  Reports 2-3 loose  stools.  Continue Enterade and Imodium.  7/5/2023 continuing to have issues with diarrhea up to 3-4 bowel movements a day, patient also recently prescribed Kayexalate for an apparently elevated potassium.  Potassium only 3.1 today.  She advised to discontinue the Kayexalate she was given IV hydration today.  We will also prescribe Lomotil to use if needed to help better control her diarrhea.  Patient is not drinking Enterade regularly.  8/2/2023-improved since discharge from the hospital and discontinuation of chemotherapy.  She received Herceptin on 8/2/2023 and reports a week of diarrhea.  Patient has not had any diarrhea since initiating Kadcyla.  Continue to monitor    PLAN:  Continue Kadcyla  Follow-up with cardiology  CARL in 6 weeks  MD in 3 weeks    Patient is on a high risk medication requiring close monitoring for toxicity.      Sheila Yee MD  10/11/2023

## 2023-10-12 ENCOUNTER — HOSPITAL ENCOUNTER (OUTPATIENT)
Dept: RADIATION ONCOLOGY | Facility: HOSPITAL | Age: 83
Discharge: HOME | End: 2023-10-12
Payer: MEDICARE

## 2023-10-12 ENCOUNTER — OFFICE VISIT (OUTPATIENT)
Dept: CARDIOLOGY | Facility: CLINIC | Age: 83
End: 2023-10-12
Payer: MEDICARE

## 2023-10-12 VITALS
SYSTOLIC BLOOD PRESSURE: 140 MMHG | BODY MASS INDEX: 25.07 KG/M2 | HEIGHT: 61 IN | HEART RATE: 86 BPM | DIASTOLIC BLOOD PRESSURE: 62 MMHG | WEIGHT: 132.8 LBS

## 2023-10-12 DIAGNOSIS — C50.411 MALIGNANT NEOPLASM OF UPPER-OUTER QUADRANT OF RIGHT BREAST IN FEMALE, ESTROGEN RECEPTOR NEGATIVE: ICD-10-CM

## 2023-10-12 DIAGNOSIS — N28.9 RENAL INSUFFICIENCY: ICD-10-CM

## 2023-10-12 DIAGNOSIS — I10 PRIMARY HYPERTENSION: ICD-10-CM

## 2023-10-12 DIAGNOSIS — Z51.81 ENCOUNTER FOR MONITORING CARDIOTOXIC DRUG THERAPY: Primary | ICD-10-CM

## 2023-10-12 DIAGNOSIS — Z79.899 ENCOUNTER FOR MONITORING CARDIOTOXIC DRUG THERAPY: Primary | ICD-10-CM

## 2023-10-12 DIAGNOSIS — Z17.1 MALIGNANT NEOPLASM OF UPPER-OUTER QUADRANT OF RIGHT BREAST IN FEMALE, ESTROGEN RECEPTOR NEGATIVE: ICD-10-CM

## 2023-10-12 DIAGNOSIS — R06.02 SHORTNESS OF BREATH: ICD-10-CM

## 2023-10-12 DIAGNOSIS — R79.89 ELEVATED TROPONIN: ICD-10-CM

## 2023-10-12 LAB
RAD ONC ARIA COURSE ID: NORMAL
RAD ONC ARIA COURSE INTENT: NORMAL
RAD ONC ARIA COURSE LAST TREATMENT DATE: NORMAL
RAD ONC ARIA COURSE START DATE: NORMAL
RAD ONC ARIA COURSE TREATMENT ELAPSED DAYS: 2
RAD ONC ARIA FIRST TREATMENT DATE: NORMAL
RAD ONC ARIA PLAN FRACTIONS TREATED TO DATE: 2
RAD ONC ARIA PLAN ID: NORMAL
RAD ONC ARIA PLAN PRESCRIBED DOSE PER FRACTION: 2 GY
RAD ONC ARIA PLAN PRIMARY REFERENCE POINT: NORMAL
RAD ONC ARIA PLAN TOTAL FRACTIONS PRESCRIBED: 10
RAD ONC ARIA PLAN TOTAL PRESCRIBED DOSE: 2000 CGY
RAD ONC ARIA REFERENCE POINT DOSAGE GIVEN TO DATE: 6 GY
RAD ONC ARIA REFERENCE POINT ID: NORMAL
RAD ONC ARIA REFERENCE POINT SESSION DOSAGE GIVEN: 2 GY

## 2023-10-12 PROCEDURE — 77385: CPT | Performed by: RADIOLOGY

## 2023-10-12 PROCEDURE — 77336 RADIATION PHYSICS CONSULT: CPT | Performed by: RADIOLOGY

## 2023-10-12 PROCEDURE — 77014 CHG CT GUIDANCE RADIATION THERAPY FLDS PLACEMENT: CPT | Performed by: RADIOLOGY

## 2023-10-12 RX ORDER — LOSARTAN POTASSIUM 25 MG/1
25 TABLET ORAL DAILY
Qty: 90 TABLET | Refills: 0 | Status: SHIPPED | OUTPATIENT
Start: 2023-10-12

## 2023-10-13 ENCOUNTER — HOSPITAL ENCOUNTER (OUTPATIENT)
Dept: RADIATION ONCOLOGY | Facility: HOSPITAL | Age: 83
Discharge: HOME | End: 2023-10-13
Payer: MEDICARE

## 2023-10-13 LAB
RAD ONC ARIA COURSE ID: NORMAL
RAD ONC ARIA COURSE INTENT: NORMAL
RAD ONC ARIA COURSE LAST TREATMENT DATE: NORMAL
RAD ONC ARIA COURSE START DATE: NORMAL
RAD ONC ARIA COURSE TREATMENT ELAPSED DAYS: 3
RAD ONC ARIA FIRST TREATMENT DATE: NORMAL
RAD ONC ARIA PLAN FRACTIONS TREATED TO DATE: 2
RAD ONC ARIA PLAN ID: NORMAL
RAD ONC ARIA PLAN PRESCRIBED DOSE PER FRACTION: 2 GY
RAD ONC ARIA PLAN PRIMARY REFERENCE POINT: NORMAL
RAD ONC ARIA PLAN TOTAL FRACTIONS PRESCRIBED: 15
RAD ONC ARIA PLAN TOTAL PRESCRIBED DOSE: 3000 CGY
RAD ONC ARIA REFERENCE POINT DOSAGE GIVEN TO DATE: 8 GY
RAD ONC ARIA REFERENCE POINT ID: NORMAL
RAD ONC ARIA REFERENCE POINT SESSION DOSAGE GIVEN: 2 GY

## 2023-10-13 PROCEDURE — 77385: CPT | Performed by: RADIOLOGY

## 2023-10-13 PROCEDURE — 77014 CHG CT GUIDANCE RADIATION THERAPY FLDS PLACEMENT: CPT | Performed by: RADIOLOGY

## 2023-10-16 ENCOUNTER — TELEPHONE (OUTPATIENT)
Dept: CARDIOLOGY | Facility: CLINIC | Age: 83
End: 2023-10-16
Payer: MEDICARE

## 2023-10-16 ENCOUNTER — HOSPITAL ENCOUNTER (OUTPATIENT)
Dept: RADIATION ONCOLOGY | Facility: HOSPITAL | Age: 83
Discharge: HOME | End: 2023-10-16
Payer: MEDICARE

## 2023-10-16 LAB
RAD ONC ARIA COURSE ID: NORMAL
RAD ONC ARIA COURSE INTENT: NORMAL
RAD ONC ARIA COURSE LAST TREATMENT DATE: NORMAL
RAD ONC ARIA COURSE START DATE: NORMAL
RAD ONC ARIA COURSE TREATMENT ELAPSED DAYS: 6
RAD ONC ARIA FIRST TREATMENT DATE: NORMAL
RAD ONC ARIA PLAN FRACTIONS TREATED TO DATE: 3
RAD ONC ARIA PLAN ID: NORMAL
RAD ONC ARIA PLAN PRESCRIBED DOSE PER FRACTION: 2 GY
RAD ONC ARIA PLAN PRIMARY REFERENCE POINT: NORMAL
RAD ONC ARIA PLAN TOTAL FRACTIONS PRESCRIBED: 10
RAD ONC ARIA PLAN TOTAL PRESCRIBED DOSE: 2000 CGY
RAD ONC ARIA REFERENCE POINT DOSAGE GIVEN TO DATE: 10 GY
RAD ONC ARIA REFERENCE POINT ID: NORMAL
RAD ONC ARIA REFERENCE POINT SESSION DOSAGE GIVEN: 2 GY

## 2023-10-16 PROCEDURE — 77385: CPT | Performed by: RADIOLOGY

## 2023-10-16 PROCEDURE — 77014 CHG CT GUIDANCE RADIATION THERAPY FLDS PLACEMENT: CPT | Performed by: RADIOLOGY

## 2023-10-17 ENCOUNTER — HOSPITAL ENCOUNTER (OUTPATIENT)
Dept: RADIATION ONCOLOGY | Facility: HOSPITAL | Age: 83
Discharge: HOME | End: 2023-10-17
Payer: MEDICARE

## 2023-10-17 ENCOUNTER — RADIATION ONCOLOGY WEEKLY ASSESSMENT (OUTPATIENT)
Dept: RADIATION ONCOLOGY | Facility: HOSPITAL | Age: 83
End: 2023-10-17
Payer: MEDICARE

## 2023-10-17 ENCOUNTER — OFFICE VISIT (OUTPATIENT)
Dept: ENDOCRINOLOGY | Age: 83
End: 2023-10-17
Payer: MEDICARE

## 2023-10-17 VITALS
HEART RATE: 100 BPM | BODY MASS INDEX: 25.18 KG/M2 | OXYGEN SATURATION: 100 % | DIASTOLIC BLOOD PRESSURE: 72 MMHG | SYSTOLIC BLOOD PRESSURE: 144 MMHG | WEIGHT: 133.2 LBS

## 2023-10-17 VITALS
BODY MASS INDEX: 25.15 KG/M2 | SYSTOLIC BLOOD PRESSURE: 128 MMHG | DIASTOLIC BLOOD PRESSURE: 70 MMHG | HEIGHT: 61 IN | TEMPERATURE: 96.9 F | WEIGHT: 133.2 LBS | OXYGEN SATURATION: 97 % | HEART RATE: 111 BPM

## 2023-10-17 DIAGNOSIS — E78.5 HYPERLIPIDEMIA, UNSPECIFIED HYPERLIPIDEMIA TYPE: ICD-10-CM

## 2023-10-17 DIAGNOSIS — Z17.1 MALIGNANT NEOPLASM OF UPPER-OUTER QUADRANT OF RIGHT BREAST IN FEMALE, ESTROGEN RECEPTOR NEGATIVE: Primary | ICD-10-CM

## 2023-10-17 DIAGNOSIS — C50.411 MALIGNANT NEOPLASM OF UPPER-OUTER QUADRANT OF RIGHT BREAST IN FEMALE, ESTROGEN RECEPTOR NEGATIVE: Primary | ICD-10-CM

## 2023-10-17 DIAGNOSIS — E11.65 TYPE 2 DIABETES MELLITUS WITH HYPERGLYCEMIA, WITHOUT LONG-TERM CURRENT USE OF INSULIN: Primary | ICD-10-CM

## 2023-10-17 LAB
RAD ONC ARIA COURSE ID: NORMAL
RAD ONC ARIA COURSE INTENT: NORMAL
RAD ONC ARIA COURSE LAST TREATMENT DATE: NORMAL
RAD ONC ARIA COURSE START DATE: NORMAL
RAD ONC ARIA COURSE TREATMENT ELAPSED DAYS: 7
RAD ONC ARIA FIRST TREATMENT DATE: NORMAL
RAD ONC ARIA PLAN FRACTIONS TREATED TO DATE: 3
RAD ONC ARIA PLAN ID: NORMAL
RAD ONC ARIA PLAN PRESCRIBED DOSE PER FRACTION: 2 GY
RAD ONC ARIA PLAN PRIMARY REFERENCE POINT: NORMAL
RAD ONC ARIA PLAN TOTAL FRACTIONS PRESCRIBED: 15
RAD ONC ARIA PLAN TOTAL PRESCRIBED DOSE: 3000 CGY
RAD ONC ARIA REFERENCE POINT DOSAGE GIVEN TO DATE: 12 GY
RAD ONC ARIA REFERENCE POINT ID: NORMAL
RAD ONC ARIA REFERENCE POINT SESSION DOSAGE GIVEN: 2 GY

## 2023-10-17 PROCEDURE — 99214 OFFICE O/P EST MOD 30 MIN: CPT | Performed by: INTERNAL MEDICINE

## 2023-10-17 PROCEDURE — 3078F DIAST BP <80 MM HG: CPT | Performed by: INTERNAL MEDICINE

## 2023-10-17 PROCEDURE — 77014 CHG CT GUIDANCE RADIATION THERAPY FLDS PLACEMENT: CPT | Performed by: RADIOLOGY

## 2023-10-17 PROCEDURE — 77385: CPT | Performed by: RADIOLOGY

## 2023-10-17 PROCEDURE — 77427 RADIATION TX MANAGEMENT X5: CPT | Performed by: RADIOLOGY

## 2023-10-17 PROCEDURE — 3074F SYST BP LT 130 MM HG: CPT | Performed by: INTERNAL MEDICINE

## 2023-10-17 RX ORDER — VALSARTAN AND HYDROCHLOROTHIAZIDE 160; 12.5 MG/1; MG/1
1 TABLET, FILM COATED ORAL DAILY
COMMUNITY
Start: 2023-10-10

## 2023-10-17 RX ORDER — BLOOD-GLUCOSE METER
1 KIT MISCELLANEOUS DAILY
Qty: 30 EACH | Refills: 5 | Status: SHIPPED | OUTPATIENT
Start: 2023-10-17

## 2023-10-17 RX ORDER — SODIUM BICARBONATE 650 MG/1
TABLET ORAL
COMMUNITY
Start: 2023-10-12

## 2023-10-17 RX ORDER — PANTOPRAZOLE SODIUM 40 MG/1
1 TABLET, DELAYED RELEASE ORAL DAILY
COMMUNITY
Start: 2023-10-10

## 2023-10-17 RX ORDER — LINAGLIPTIN 5 MG/1
5 TABLET, FILM COATED ORAL
Qty: 90 TABLET | Refills: 5 | Status: SHIPPED | OUTPATIENT
Start: 2023-10-17

## 2023-10-17 NOTE — TELEPHONE ENCOUNTER
Spoke with pt's daughter.  They will have lab after radiation and before ECHO on Thursday (10/19/2023)    Aislinn: JUST FYI.

## 2023-10-17 NOTE — PROGRESS NOTES
Chief complaint:  T2DM    HPI:   - 83 year old female here for management of diabetes mellitus type 2  - Last seen in 5/2023  - Since her last visit she states she was hospitalized for 10 days due to weight loss from chemotherapy.  She also underwent a mastectomy.  She states she is feeling better now but is undergoing chemotherapy and radiation therapy.  - She states she is eating normally now  - Has had diabetes for over 10 years  - Complications include CKD 3  - Is currently taking Tradjenta and metformin 500 mg daily/glipizide 2.5 mg, 2 tablets in the am and 1 tablet in the pm  - Denies hypoglycemia  - She does not monitor her BG and does not want to start monitoring her BG  - She is on simvastatin 10 mg daily    The following portions of the patient's history were reviewed and updated as appropriate: allergies, current medications, past family history, past medical history, past social history, past surgical history, and problem list.    Objective     Vitals:    10/17/23 0924   BP: 128/70   Pulse: 111   Temp: 96.9 °F (36.1 °C)   SpO2: 97%        Physical Exam  Vitals reviewed.   Constitutional:       Appearance: Normal appearance.   HENT:      Head: Normocephalic and atraumatic.   Eyes:      General: No scleral icterus.  Pulmonary:      Effort: Pulmonary effort is normal. No respiratory distress.   Neurological:      Mental Status: She is alert.      Gait: Gait normal.   Psychiatric:         Mood and Affect: Mood normal.         Behavior: Behavior normal.         Thought Content: Thought content normal.         Judgment: Judgment normal.       Assessment & Plan   1. T2DM, uncontrolled due to hyperglycemia  - Is currently taking Tradjenta and metformin 500 mg daily/glipizide 2.5 mg, 2 tablets in the am and 1 tablet in the pm  - Patient is very hesitant to check her BG or take insulin  - I would like to check a hemoglobin A1c to get a better idea what her glycemic control is before making any changes     2.  Hyperlipidemia  - She is on simvastatin 10 mg daily     - Return to clinic in 6 months

## 2023-10-17 NOTE — PROGRESS NOTES
Physician Weekly Management Note    Diagnosis:     Diagnosis Plan   1. Malignant neoplasm of upper-outer quadrant of right breast in female, estrogen receptor negative            RT Details:  fx 6/25 right chest wall and nodes 12/50Gy plsu boost x 5     Notes on Treatment course, Films, Medical progress:  Kps 90% doing well, no erythema, no pain, cont on     Weekly Management:  Medication reviewed?   Yes  New medications given?   No  Problemlist reviewed?   Yes  Problem added?   No  Issues raised requiring referral to support services - task assigned to:  mark    Technical aspects reviewed:  Weekly OBI approved?   Yes  Weekly port films approved?   Yes  Change requests noted on port film?   No  Patient setup and plan reviewed?   Yes    Chart Reviewed:  Continue current treatment plan?   Yes  Treatment plan change requested?   No  CBC reviewed?   Yes  Concurrent Chemo?   No    Objective     Toxicities:   none      Review of Systems   Constitutional: Negative.    Skin: Negative.         Vitals:    10/17/23 1159   BP: 144/72   Pulse: 100   SpO2: 100%           10/11/2023     8:07 AM   Current Status   ECOG score 0       Physical Exam  Constitutional:       Appearance: Normal appearance.   Chest:          Comments: No erythema  Neurological:      Mental Status: She is alert.           Problem Summary List    Diagnosis:     Diagnosis Plan   1. Malignant neoplasm of upper-outer quadrant of right breast in female, estrogen receptor negative          Pathology:   breast cancer     Past Medical History:   Diagnosis Date   • Anxiety    • Arthritis    • Basal cell carcinoma     Left thumb   • Breast cancer 2023    Right   • Depression    • Diabetes mellitus    • Diarrhea     s/e chemo   • High cholesterol    • History of chemotherapy 08/2023   • Hypertension    • Rash 08/2023    s/e chemo   • Sacral decubitus ulcer     cleaning with soap & water   • Urinary incontinence     wears pads         Past Surgical History:   Procedure  Laterality Date   • CATARACT EXTRACTION EXTRACAPSULAR W/ INTRAOCULAR LENS IMPLANTATION Right    • EYE SURGERY      Muscle repair age 21   • MASTECTOMY W/ SENTINEL NODE BIOPSY Right 8/24/2023    Procedure: Right breast modified radical mastectomy;  Surgeon: Rosa Michael MD;  Location: [facility] [place];  Service: General;  Laterality: Right;   • TONSILLECTOMY     • VENOUS ACCESS DEVICE (PORT) INSERTION N/A 05/19/2023    Procedure: INSERTION VENOUS ACCESS DEVICE;  Surgeon: Rosa Michael MD;  Location: [facility] [place];  Service: General;  Laterality: N/A;         Current Outpatient Medications on File Prior to Visit   Medication Sig Dispense Refill   • acetaminophen (TYLENOL) 325 MG tablet Take 2 tablets by mouth Every 4 (Four) Hours As Needed for Mild Pain. 120 tablet 3   • Cholecalciferol 50 MCG (2000 UT) tablet Take 1 tablet by mouth Daily (Monday-Friday).     • glipiZIDE-metFORMIN (METAGLIP) 2.5-500 MG per tablet Take 2 tablets by mouth 2 (Two) Times a Day Before Meals. (Patient taking differently: Take 2 tablets by mouth Every Morning.) 120 tablet 3   • glucose blood test strip Check once per day, E11.65 30 each 5   • glucose monitor monitoring kit Use 1 each Daily. Dispense glucometer with brand based off insurance coverage, E11.65, check once per day 30 each 5   • Lancets misc Check BG once daily, E11.9 30 each 4   • lidocaine-prilocaine (EMLA) 2.5-2.5 % cream Apply nickel size amount to port site 30 min before appt time do not rub in cover with plastic wrap 30 g 5   • losartan (Cozaar) 25 MG tablet Take 1 tablet by mouth Daily. 90 tablet 0   • ondansetron (ZOFRAN) 8 MG tablet Take 1 tablet by mouth 3 (Three) Times a Day As Needed for Nausea or Vomiting. 30 tablet 5   • pantoprazole (PROTONIX) 40 MG EC tablet Take 1 tablet by mouth Daily.     • simvastatin (ZOCOR) 10 MG tablet Take 1 tablet by mouth Every Night.     • sodium bicarbonate 650 MG tablet  (Patient not taking: Reported on 10/17/2023)     •  Tradjenta 5 MG tablet tablet Take 1 tablet by mouth Every Morning. 90 tablet 5   • valsartan-hydrochlorothiazide (DIOVAN-HCT) 160-12.5 MG per tablet Take 1 tablet by mouth Daily.     • [DISCONTINUED] Tradjenta 5 MG tablet tablet Take 1 tablet by mouth Every Morning.       No current facility-administered medications on file prior to visit.       No Known Allergies      Referring Provider:    Sheila Yee Md  [address] [place] [address]    Oncologist:  No primary care provider on file.      Seen and approved by:  Marie Tesfaye MD  10/17/2023

## 2023-10-18 ENCOUNTER — HOSPITAL ENCOUNTER (OUTPATIENT)
Dept: RADIATION ONCOLOGY | Facility: HOSPITAL | Age: 83
Discharge: HOME | End: 2023-10-18

## 2023-10-18 LAB
RAD ONC ARIA COURSE ID: NORMAL
RAD ONC ARIA COURSE INTENT: NORMAL
RAD ONC ARIA COURSE LAST TREATMENT DATE: NORMAL
RAD ONC ARIA COURSE START DATE: NORMAL
RAD ONC ARIA COURSE TREATMENT ELAPSED DAYS: 8
RAD ONC ARIA FIRST TREATMENT DATE: NORMAL
RAD ONC ARIA PLAN FRACTIONS TREATED TO DATE: 4
RAD ONC ARIA PLAN ID: NORMAL
RAD ONC ARIA PLAN PRESCRIBED DOSE PER FRACTION: 2 GY
RAD ONC ARIA PLAN PRIMARY REFERENCE POINT: NORMAL
RAD ONC ARIA PLAN TOTAL FRACTIONS PRESCRIBED: 10
RAD ONC ARIA PLAN TOTAL PRESCRIBED DOSE: 2000 CGY
RAD ONC ARIA REFERENCE POINT DOSAGE GIVEN TO DATE: 14 GY
RAD ONC ARIA REFERENCE POINT ID: NORMAL
RAD ONC ARIA REFERENCE POINT SESSION DOSAGE GIVEN: 2 GY

## 2023-10-18 PROCEDURE — 77014 CHG CT GUIDANCE RADIATION THERAPY FLDS PLACEMENT: CPT | Performed by: RADIOLOGY

## 2023-10-18 PROCEDURE — 77385: CPT | Performed by: RADIOLOGY

## 2023-10-19 ENCOUNTER — HOSPITAL ENCOUNTER (OUTPATIENT)
Dept: RADIATION ONCOLOGY | Facility: HOSPITAL | Age: 83
Discharge: HOME | End: 2023-10-19

## 2023-10-19 ENCOUNTER — LAB (OUTPATIENT)
Dept: LAB | Facility: HOSPITAL | Age: 83
End: 2023-10-19
Payer: MEDICARE

## 2023-10-19 ENCOUNTER — HOSPITAL ENCOUNTER (OUTPATIENT)
Dept: CARDIOLOGY | Facility: HOSPITAL | Age: 83
Discharge: HOME | End: 2023-10-19
Payer: MEDICARE

## 2023-10-19 VITALS
HEART RATE: 112 BPM | DIASTOLIC BLOOD PRESSURE: 70 MMHG | WEIGHT: 133 LBS | SYSTOLIC BLOOD PRESSURE: 138 MMHG | HEIGHT: 60 IN | BODY MASS INDEX: 26.11 KG/M2 | OXYGEN SATURATION: 98 %

## 2023-10-19 DIAGNOSIS — C50.411 MALIGNANT NEOPLASM OF UPPER-OUTER QUADRANT OF RIGHT BREAST IN FEMALE, ESTROGEN RECEPTOR NEGATIVE: ICD-10-CM

## 2023-10-19 DIAGNOSIS — R79.89 ELEVATED TROPONIN: ICD-10-CM

## 2023-10-19 DIAGNOSIS — Z17.1 MALIGNANT NEOPLASM OF UPPER-OUTER QUADRANT OF RIGHT BREAST IN FEMALE, ESTROGEN RECEPTOR NEGATIVE: ICD-10-CM

## 2023-10-19 DIAGNOSIS — N28.9 RENAL INSUFFICIENCY: ICD-10-CM

## 2023-10-19 DIAGNOSIS — Z79.899 ENCOUNTER FOR MONITORING CARDIOTOXIC DRUG THERAPY: ICD-10-CM

## 2023-10-19 DIAGNOSIS — I10 PRIMARY HYPERTENSION: ICD-10-CM

## 2023-10-19 DIAGNOSIS — Z51.81 ENCOUNTER FOR MONITORING CARDIOTOXIC DRUG THERAPY: ICD-10-CM

## 2023-10-19 LAB
ANION GAP SERPL CALCULATED.3IONS-SCNC: 13.2 MMOL/L (ref 5–15)
BUN SERPL-MCNC: 14 MG/DL (ref 8–23)
BUN/CREAT SERPL: 13.3 (ref 7–25)
CALCIUM SPEC-SCNC: 10 MG/DL (ref 8.6–10.5)
CHLORIDE SERPL-SCNC: 105 MMOL/L (ref 98–107)
CO2 SERPL-SCNC: 25.8 MMOL/L (ref 22–29)
CREAT SERPL-MCNC: 1.05 MG/DL (ref 0.57–1)
EGFRCR SERPLBLD CKD-EPI 2021: 52.8 ML/MIN/1.73
GLUCOSE SERPL-MCNC: 165 MG/DL (ref 65–99)
NT-PROBNP SERPL-MCNC: 272 PG/ML (ref 0–1800)
POTASSIUM SERPL-SCNC: 5.1 MMOL/L (ref 3.5–5.2)
RAD ONC ARIA COURSE ID: NORMAL
RAD ONC ARIA COURSE INTENT: NORMAL
RAD ONC ARIA COURSE LAST TREATMENT DATE: NORMAL
RAD ONC ARIA COURSE START DATE: NORMAL
RAD ONC ARIA COURSE TREATMENT ELAPSED DAYS: 9
RAD ONC ARIA FIRST TREATMENT DATE: NORMAL
RAD ONC ARIA PLAN FRACTIONS TREATED TO DATE: 4
RAD ONC ARIA PLAN ID: NORMAL
RAD ONC ARIA PLAN PRESCRIBED DOSE PER FRACTION: 2 GY
RAD ONC ARIA PLAN PRIMARY REFERENCE POINT: NORMAL
RAD ONC ARIA PLAN TOTAL FRACTIONS PRESCRIBED: 15
RAD ONC ARIA PLAN TOTAL PRESCRIBED DOSE: 3000 CGY
RAD ONC ARIA REFERENCE POINT DOSAGE GIVEN TO DATE: 16 GY
RAD ONC ARIA REFERENCE POINT ID: NORMAL
RAD ONC ARIA REFERENCE POINT SESSION DOSAGE GIVEN: 2 GY
SODIUM SERPL-SCNC: 144 MMOL/L (ref 136–145)
TROPONIN T SERPL HS-MCNC: 16 NG/L

## 2023-10-19 PROCEDURE — 77014 CHG CT GUIDANCE RADIATION THERAPY FLDS PLACEMENT: CPT | Performed by: RADIOLOGY

## 2023-10-19 PROCEDURE — 83880 ASSAY OF NATRIURETIC PEPTIDE: CPT | Performed by: NURSE PRACTITIONER

## 2023-10-19 PROCEDURE — 84484 ASSAY OF TROPONIN QUANT: CPT

## 2023-10-19 PROCEDURE — 77386: CPT | Performed by: RADIOLOGY

## 2023-10-19 PROCEDURE — 36415 COLL VENOUS BLD VENIPUNCTURE: CPT

## 2023-10-19 PROCEDURE — 77385: CPT | Performed by: RADIOLOGY

## 2023-10-19 PROCEDURE — 83036 HEMOGLOBIN GLYCOSYLATED A1C: CPT | Performed by: INTERNAL MEDICINE

## 2023-10-19 PROCEDURE — 93308 TTE F-UP OR LMTD: CPT

## 2023-10-19 PROCEDURE — 93325 DOPPLER ECHO COLOR FLOW MAPG: CPT

## 2023-10-19 PROCEDURE — 25510000001 PERFLUTREN (DEFINITY) 8.476 MG IN SODIUM CHLORIDE (PF) 0.9 % 10 ML INJECTION: Performed by: NURSE PRACTITIONER

## 2023-10-19 PROCEDURE — 80048 BASIC METABOLIC PNL TOTAL CA: CPT

## 2023-10-19 PROCEDURE — 93356 MYOCRD STRAIN IMG SPCKL TRCK: CPT

## 2023-10-19 PROCEDURE — 93321 DOPPLER ECHO F-UP/LMTD STD: CPT

## 2023-10-19 PROCEDURE — 77336 RADIATION PHYSICS CONSULT: CPT | Performed by: RADIOLOGY

## 2023-10-19 RX ADMIN — PERFLUTREN 1.5 ML: 6.52 INJECTION, SUSPENSION INTRAVENOUS at 11:16

## 2023-10-20 ENCOUNTER — HOSPITAL ENCOUNTER (OUTPATIENT)
Dept: RADIATION ONCOLOGY | Facility: HOSPITAL | Age: 83
Discharge: HOME | End: 2023-10-20

## 2023-10-20 LAB
BH CV ECHO LEFT VENTRICLE GLOBAL LONGITUDINAL STRAIN: -18.6 %
BH CV ECHO MEAS - EDV(CUBED): 74.1 ML
BH CV ECHO MEAS - EDV(MOD-SP2): 72 ML
BH CV ECHO MEAS - EDV(MOD-SP4): 91 ML
BH CV ECHO MEAS - EF(MOD-BP): 54.1 %
BH CV ECHO MEAS - EF(MOD-SP2): 52.8 %
BH CV ECHO MEAS - EF(MOD-SP4): 56 %
BH CV ECHO MEAS - ESV(CUBED): 34.9 ML
BH CV ECHO MEAS - ESV(MOD-SP2): 34 ML
BH CV ECHO MEAS - ESV(MOD-SP4): 40 ML
BH CV ECHO MEAS - FS: 22.2 %
BH CV ECHO MEAS - IVS/LVPW: 0.89 CM
BH CV ECHO MEAS - IVSD: 0.8 CM
BH CV ECHO MEAS - LAT PEAK E' VEL: 7.3 CM/SEC
BH CV ECHO MEAS - LV DIASTOLIC VOL/BSA (35-75): 58 CM2
BH CV ECHO MEAS - LV MASS(C)D: 109.8 GRAMS
BH CV ECHO MEAS - LV SYSTOLIC VOL/BSA (12-30): 25.5 CM2
BH CV ECHO MEAS - LVIDD: 4.2 CM
BH CV ECHO MEAS - LVIDS: 3.3 CM
BH CV ECHO MEAS - LVPWD: 0.9 CM
BH CV ECHO MEAS - MED PEAK E' VEL: 5.1 CM/SEC
BH CV ECHO MEAS - MR MAX PG: 103.3 MMHG
BH CV ECHO MEAS - MR MAX VEL: 508.2 CM/SEC
BH CV ECHO MEAS - MV A DUR: 0.11 SEC
BH CV ECHO MEAS - MV A MAX VEL: 118 CM/SEC
BH CV ECHO MEAS - MV DEC SLOPE: 852.9 CM/SEC2
BH CV ECHO MEAS - MV DEC TIME: 0.12 SEC
BH CV ECHO MEAS - MV E MAX VEL: 85.1 CM/SEC
BH CV ECHO MEAS - MV E/A: 0.72
BH CV ECHO MEAS - MV MAX PG: 11.5 MMHG
BH CV ECHO MEAS - MV MEAN PG: 4.4 MMHG
BH CV ECHO MEAS - MV P1/2T: 42.5 MSEC
BH CV ECHO MEAS - MV V2 VTI: 36.8 CM
BH CV ECHO MEAS - MVA(P1/2T): 5.2 CM2
BH CV ECHO MEAS - PULM A REVS DUR: 0.1 SEC
BH CV ECHO MEAS - PULM A REVS VEL: 27.3 CM/SEC
BH CV ECHO MEAS - PULM DIAS VEL: 35.6 CM/SEC
BH CV ECHO MEAS - PULM S/D: 1.23
BH CV ECHO MEAS - PULM SYS VEL: 43.8 CM/SEC
BH CV ECHO MEAS - RAP SYSTOLE: 3 MMHG
BH CV ECHO MEAS - SI(MOD-SP2): 24.2 ML/M2
BH CV ECHO MEAS - SI(MOD-SP4): 32.5 ML/M2
BH CV ECHO MEAS - SV(MOD-SP2): 38 ML
BH CV ECHO MEAS - SV(MOD-SP4): 51 ML
BH CV ECHO MEAS - TAPSE (>1.6): 1.91 CM
BH CV ECHO MEASUREMENTS AVERAGE E/E' RATIO: 13.73
BH CV XLRA - RV BASE: 2.27 CM
BH CV XLRA - RV LENGTH: 6.2 CM
BH CV XLRA - RV MID: 2.19 CM
BH CV XLRA - TDI S': 14.8 CM/SEC
LEFT ATRIUM VOLUME INDEX: 22.7 ML/M2
RAD ONC ARIA COURSE ID: NORMAL
RAD ONC ARIA COURSE INTENT: NORMAL
RAD ONC ARIA COURSE LAST TREATMENT DATE: NORMAL
RAD ONC ARIA COURSE START DATE: NORMAL
RAD ONC ARIA COURSE TREATMENT ELAPSED DAYS: 10
RAD ONC ARIA FIRST TREATMENT DATE: NORMAL
RAD ONC ARIA PLAN FRACTIONS TREATED TO DATE: 5
RAD ONC ARIA PLAN ID: NORMAL
RAD ONC ARIA PLAN PRESCRIBED DOSE PER FRACTION: 2 GY
RAD ONC ARIA PLAN PRIMARY REFERENCE POINT: NORMAL
RAD ONC ARIA PLAN TOTAL FRACTIONS PRESCRIBED: 10
RAD ONC ARIA PLAN TOTAL PRESCRIBED DOSE: 2000 CGY
RAD ONC ARIA REFERENCE POINT DOSAGE GIVEN TO DATE: 18 GY
RAD ONC ARIA REFERENCE POINT ID: NORMAL
RAD ONC ARIA REFERENCE POINT SESSION DOSAGE GIVEN: 2 GY

## 2023-10-20 PROCEDURE — 77386: CPT | Performed by: RADIOLOGY

## 2023-10-20 PROCEDURE — 77385: CPT | Performed by: RADIOLOGY

## 2023-10-20 PROCEDURE — 77014 CHG CT GUIDANCE RADIATION THERAPY FLDS PLACEMENT: CPT | Performed by: RADIOLOGY

## 2023-10-23 ENCOUNTER — HOSPITAL ENCOUNTER (OUTPATIENT)
Dept: RADIATION ONCOLOGY | Facility: HOSPITAL | Age: 83
Discharge: HOME | End: 2023-10-23
Payer: MEDICARE

## 2023-10-23 LAB
RAD ONC ARIA COURSE ID: NORMAL
RAD ONC ARIA COURSE INTENT: NORMAL
RAD ONC ARIA COURSE LAST TREATMENT DATE: NORMAL
RAD ONC ARIA COURSE START DATE: NORMAL
RAD ONC ARIA COURSE TREATMENT ELAPSED DAYS: 13
RAD ONC ARIA FIRST TREATMENT DATE: NORMAL
RAD ONC ARIA PLAN FRACTIONS TREATED TO DATE: 5
RAD ONC ARIA PLAN ID: NORMAL
RAD ONC ARIA PLAN PRESCRIBED DOSE PER FRACTION: 2 GY
RAD ONC ARIA PLAN PRIMARY REFERENCE POINT: NORMAL
RAD ONC ARIA PLAN TOTAL FRACTIONS PRESCRIBED: 15
RAD ONC ARIA PLAN TOTAL PRESCRIBED DOSE: 3000 CGY
RAD ONC ARIA REFERENCE POINT DOSAGE GIVEN TO DATE: 20 GY
RAD ONC ARIA REFERENCE POINT ID: NORMAL
RAD ONC ARIA REFERENCE POINT SESSION DOSAGE GIVEN: 2 GY

## 2023-10-23 PROCEDURE — 77385: CPT | Performed by: RADIOLOGY

## 2023-10-23 PROCEDURE — 77014 CHG CT GUIDANCE RADIATION THERAPY FLDS PLACEMENT: CPT | Performed by: RADIOLOGY

## 2023-10-24 ENCOUNTER — HOSPITAL ENCOUNTER (OUTPATIENT)
Dept: RADIATION ONCOLOGY | Facility: HOSPITAL | Age: 83
Discharge: HOME | End: 2023-10-24

## 2023-10-24 ENCOUNTER — RADIATION ONCOLOGY WEEKLY ASSESSMENT (OUTPATIENT)
Dept: RADIATION ONCOLOGY | Facility: HOSPITAL | Age: 83
End: 2023-10-24
Payer: MEDICARE

## 2023-10-24 DIAGNOSIS — Z17.1 MALIGNANT NEOPLASM OF UPPER-OUTER QUADRANT OF RIGHT BREAST IN FEMALE, ESTROGEN RECEPTOR NEGATIVE: Primary | ICD-10-CM

## 2023-10-24 DIAGNOSIS — C50.411 MALIGNANT NEOPLASM OF UPPER-OUTER QUADRANT OF RIGHT BREAST IN FEMALE, ESTROGEN RECEPTOR NEGATIVE: Primary | ICD-10-CM

## 2023-10-24 LAB
RAD ONC ARIA COURSE ID: NORMAL
RAD ONC ARIA COURSE INTENT: NORMAL
RAD ONC ARIA COURSE LAST TREATMENT DATE: NORMAL
RAD ONC ARIA COURSE START DATE: NORMAL
RAD ONC ARIA COURSE TREATMENT ELAPSED DAYS: 14
RAD ONC ARIA FIRST TREATMENT DATE: NORMAL
RAD ONC ARIA PLAN FRACTIONS TREATED TO DATE: 6
RAD ONC ARIA PLAN ID: NORMAL
RAD ONC ARIA PLAN PRESCRIBED DOSE PER FRACTION: 2 GY
RAD ONC ARIA PLAN PRIMARY REFERENCE POINT: NORMAL
RAD ONC ARIA PLAN TOTAL FRACTIONS PRESCRIBED: 10
RAD ONC ARIA PLAN TOTAL PRESCRIBED DOSE: 2000 CGY
RAD ONC ARIA REFERENCE POINT DOSAGE GIVEN TO DATE: 22 GY
RAD ONC ARIA REFERENCE POINT ID: NORMAL
RAD ONC ARIA REFERENCE POINT SESSION DOSAGE GIVEN: 2 GY

## 2023-10-24 PROCEDURE — 77014 CHG CT GUIDANCE RADIATION THERAPY FLDS PLACEMENT: CPT | Performed by: RADIOLOGY

## 2023-10-24 PROCEDURE — 77427 RADIATION TX MANAGEMENT X5: CPT | Performed by: RADIOLOGY

## 2023-10-24 PROCEDURE — 77385: CPT | Performed by: RADIOLOGY

## 2023-10-24 NOTE — PROGRESS NOTES
Physician Weekly Management Note    Diagnosis:     Diagnosis Plan   1. Malignant neoplasm of upper-outer quadrant of right breast in female, estrogen receptor negative            RT Details:  fx 11/30 right chest wall and nodes 22/50gy plus scar boost for 10 gy      Notes on Treatment course, Films, Medical progress:  Kps 90% doing well, no pain, mild erythema, cont on     Weekly Management:  Medication reviewed?   Yes  New medications given?   No  Problemlist reviewed?   Yes  Problem added?   No  Issues raised requiring referral to support services - task assigned to:  mark    Technical aspects reviewed:  Weekly OBI approved?   Yes  Weekly port films approved?   Yes  Change requests noted on port film?   No  Patient setup and plan reviewed?   Yes    Chart Reviewed:  Continue current treatment plan?   Yes  Treatment plan change requested?   No  CBC reviewed?   Yes  Concurrent Chemo?   No    Objective     Toxicities:   grade 1 erythema      Review of Systems   Constitutional: Negative.    Skin: Negative.         There were no vitals filed for this visit.        10/11/2023     8:07 AM   Current Status   ECOG score 0       Physical Exam  Constitutional:       Appearance: Normal appearance.   Chest:          Comments: Minimal erythema  Neurological:      Mental Status: She is alert.           Problem Summary List    Diagnosis:     Diagnosis Plan   1. Malignant neoplasm of upper-outer quadrant of right breast in female, estrogen receptor negative          Pathology:   kps 90%     Past Medical History:   Diagnosis Date   • Anxiety    • Arthritis    • Basal cell carcinoma     Left thumb   • Breast cancer 2023    Right   • Depression    • Diabetes mellitus    • Diarrhea     s/e chemo   • High cholesterol    • History of chemotherapy 08/2023   • Hypertension    • Rash 08/2023    s/e chemo   • Sacral decubitus ulcer     cleaning with soap & water   • Urinary incontinence     wears pads         Past Surgical History:   Procedure  Laterality Date   • CATARACT EXTRACTION EXTRACAPSULAR W/ INTRAOCULAR LENS IMPLANTATION Right    • EYE SURGERY      Muscle repair age 21   • MASTECTOMY W/ SENTINEL NODE BIOPSY Right 8/24/2023    Procedure: Right breast modified radical mastectomy;  Surgeon: Rosa Michael MD;  Location: [facility] OR [facility] [place];  Service: General;  Laterality: Right;   • TONSILLECTOMY     • VENOUS ACCESS DEVICE (PORT) INSERTION N/A 05/19/2023    Procedure: INSERTION VENOUS ACCESS DEVICE;  Surgeon: Rosa Michael MD;  Location: [facility] OR [facility] [place];  Service: General;  Laterality: N/A;         Current Outpatient Medications on File Prior to Visit   Medication Sig Dispense Refill   • acetaminophen (TYLENOL) 325 MG tablet Take 2 tablets by mouth Every 4 (Four) Hours As Needed for Mild Pain. 120 tablet 3   • Cholecalciferol 50 MCG (2000 UT) tablet Take 1 tablet by mouth Daily (Monday-Friday).     • glipiZIDE-metFORMIN (METAGLIP) 2.5-500 MG per tablet Take 2 tablets by mouth 2 (Two) Times a Day Before Meals. (Patient taking differently: Take 2 tablets by mouth Every Morning.) 120 tablet 3   • glucose blood test strip Check once per day, E11.65 30 each 5   • glucose monitor monitoring kit Use 1 each Daily. Dispense glucometer with brand based off insurance coverage, E11.65, check once per day 30 each 5   • Lancets misc Check BG once daily, E11.9 30 each 4   • lidocaine-prilocaine (EMLA) 2.5-2.5 % cream Apply nickel size amount to port site 30 min before appt time do not rub in cover with plastic wrap 30 g 5   • losartan (Cozaar) 25 MG tablet Take 1 tablet by mouth Daily. 90 tablet 0   • ondansetron (ZOFRAN) 8 MG tablet Take 1 tablet by mouth 3 (Three) Times a Day As Needed for Nausea or Vomiting. 30 tablet 5   • pantoprazole (PROTONIX) 40 MG EC tablet Take 1 tablet by mouth Daily.     • simvastatin (ZOCOR) 10 MG tablet Take 1 tablet by mouth Every Night.     • sodium bicarbonate 650 MG tablet  (Patient not taking: Reported on 10/17/2023)     •  Tradjenta 5 MG tablet tablet Take 1 tablet by mouth Every Morning. 90 tablet 5   • valsartan-hydrochlorothiazide (DIOVAN-HCT) 160-12.5 MG per tablet Take 1 tablet by mouth Daily.       No current facility-administered medications on file prior to visit.       No Known Allergies      Referring Provider:    No referring provider defined for this encounter.    Oncologist:  No primary care provider on file.      Seen and approved by:  Marie Tesfaye MD  10/24/2023

## 2023-10-25 ENCOUNTER — HOSPITAL ENCOUNTER (OUTPATIENT)
Dept: RADIATION ONCOLOGY | Facility: HOSPITAL | Age: 83
Discharge: HOME | End: 2023-10-25

## 2023-10-25 LAB
RAD ONC ARIA COURSE ID: NORMAL
RAD ONC ARIA COURSE INTENT: NORMAL
RAD ONC ARIA COURSE LAST TREATMENT DATE: NORMAL
RAD ONC ARIA COURSE START DATE: NORMAL
RAD ONC ARIA COURSE TREATMENT ELAPSED DAYS: 15
RAD ONC ARIA FIRST TREATMENT DATE: NORMAL
RAD ONC ARIA PLAN FRACTIONS TREATED TO DATE: 6
RAD ONC ARIA PLAN ID: NORMAL
RAD ONC ARIA PLAN PRESCRIBED DOSE PER FRACTION: 2 GY
RAD ONC ARIA PLAN PRIMARY REFERENCE POINT: NORMAL
RAD ONC ARIA PLAN TOTAL FRACTIONS PRESCRIBED: 15
RAD ONC ARIA PLAN TOTAL PRESCRIBED DOSE: 3000 CGY
RAD ONC ARIA REFERENCE POINT DOSAGE GIVEN TO DATE: 24 GY
RAD ONC ARIA REFERENCE POINT ID: NORMAL
RAD ONC ARIA REFERENCE POINT SESSION DOSAGE GIVEN: 2 GY

## 2023-10-25 PROCEDURE — 77385: CPT | Performed by: RADIOLOGY

## 2023-10-25 PROCEDURE — 77014 CHG CT GUIDANCE RADIATION THERAPY FLDS PLACEMENT: CPT | Performed by: RADIOLOGY

## 2023-10-26 ENCOUNTER — HOSPITAL ENCOUNTER (OUTPATIENT)
Dept: RADIATION ONCOLOGY | Facility: HOSPITAL | Age: 83
Discharge: HOME | End: 2023-10-26

## 2023-10-26 LAB
RAD ONC ARIA COURSE ID: NORMAL
RAD ONC ARIA COURSE INTENT: NORMAL
RAD ONC ARIA COURSE LAST TREATMENT DATE: NORMAL
RAD ONC ARIA COURSE START DATE: NORMAL
RAD ONC ARIA COURSE TREATMENT ELAPSED DAYS: 16
RAD ONC ARIA FIRST TREATMENT DATE: NORMAL
RAD ONC ARIA PLAN FRACTIONS TREATED TO DATE: 7
RAD ONC ARIA PLAN ID: NORMAL
RAD ONC ARIA PLAN PRESCRIBED DOSE PER FRACTION: 2 GY
RAD ONC ARIA PLAN PRIMARY REFERENCE POINT: NORMAL
RAD ONC ARIA PLAN TOTAL FRACTIONS PRESCRIBED: 10
RAD ONC ARIA PLAN TOTAL PRESCRIBED DOSE: 2000 CGY
RAD ONC ARIA REFERENCE POINT DOSAGE GIVEN TO DATE: 26 GY
RAD ONC ARIA REFERENCE POINT ID: NORMAL
RAD ONC ARIA REFERENCE POINT SESSION DOSAGE GIVEN: 2 GY

## 2023-10-26 PROCEDURE — 77014 CHG CT GUIDANCE RADIATION THERAPY FLDS PLACEMENT: CPT | Performed by: RADIOLOGY

## 2023-10-26 PROCEDURE — 77336 RADIATION PHYSICS CONSULT: CPT | Performed by: RADIOLOGY

## 2023-10-26 PROCEDURE — 77385: CPT | Performed by: RADIOLOGY

## 2023-10-27 ENCOUNTER — HOSPITAL ENCOUNTER (OUTPATIENT)
Dept: RADIATION ONCOLOGY | Facility: HOSPITAL | Age: 83
Discharge: HOME | End: 2023-10-27

## 2023-10-27 LAB
RAD ONC ARIA COURSE ID: NORMAL
RAD ONC ARIA COURSE INTENT: NORMAL
RAD ONC ARIA COURSE LAST TREATMENT DATE: NORMAL
RAD ONC ARIA COURSE START DATE: NORMAL
RAD ONC ARIA COURSE TREATMENT ELAPSED DAYS: 17
RAD ONC ARIA FIRST TREATMENT DATE: NORMAL
RAD ONC ARIA PLAN FRACTIONS TREATED TO DATE: 7
RAD ONC ARIA PLAN ID: NORMAL
RAD ONC ARIA PLAN PRESCRIBED DOSE PER FRACTION: 2 GY
RAD ONC ARIA PLAN PRIMARY REFERENCE POINT: NORMAL
RAD ONC ARIA PLAN TOTAL FRACTIONS PRESCRIBED: 15
RAD ONC ARIA PLAN TOTAL PRESCRIBED DOSE: 3000 CGY
RAD ONC ARIA REFERENCE POINT DOSAGE GIVEN TO DATE: 28 GY
RAD ONC ARIA REFERENCE POINT ID: NORMAL
RAD ONC ARIA REFERENCE POINT SESSION DOSAGE GIVEN: 2 GY

## 2023-10-27 PROCEDURE — 77385: CPT | Performed by: RADIOLOGY

## 2023-10-27 PROCEDURE — 77014 CHG CT GUIDANCE RADIATION THERAPY FLDS PLACEMENT: CPT | Performed by: RADIOLOGY

## 2023-10-30 ENCOUNTER — HOSPITAL ENCOUNTER (OUTPATIENT)
Dept: RADIATION ONCOLOGY | Facility: HOSPITAL | Age: 83
Discharge: HOME | End: 2023-10-30
Payer: MEDICARE

## 2023-10-30 LAB
RAD ONC ARIA COURSE ID: NORMAL
RAD ONC ARIA COURSE INTENT: NORMAL
RAD ONC ARIA COURSE LAST TREATMENT DATE: NORMAL
RAD ONC ARIA COURSE START DATE: NORMAL
RAD ONC ARIA COURSE TREATMENT ELAPSED DAYS: 20
RAD ONC ARIA FIRST TREATMENT DATE: NORMAL
RAD ONC ARIA PLAN FRACTIONS TREATED TO DATE: 8
RAD ONC ARIA PLAN ID: NORMAL
RAD ONC ARIA PLAN PRESCRIBED DOSE PER FRACTION: 2 GY
RAD ONC ARIA PLAN PRIMARY REFERENCE POINT: NORMAL
RAD ONC ARIA PLAN TOTAL FRACTIONS PRESCRIBED: 10
RAD ONC ARIA PLAN TOTAL PRESCRIBED DOSE: 2000 CGY
RAD ONC ARIA REFERENCE POINT DOSAGE GIVEN TO DATE: 30 GY
RAD ONC ARIA REFERENCE POINT ID: NORMAL
RAD ONC ARIA REFERENCE POINT SESSION DOSAGE GIVEN: 2 GY

## 2023-10-30 PROCEDURE — 77014 CHG CT GUIDANCE RADIATION THERAPY FLDS PLACEMENT: CPT | Performed by: RADIOLOGY

## 2023-10-30 PROCEDURE — 77385: CPT | Performed by: RADIOLOGY

## 2023-10-31 ENCOUNTER — RADIATION ONCOLOGY WEEKLY ASSESSMENT (OUTPATIENT)
Dept: RADIATION ONCOLOGY | Facility: HOSPITAL | Age: 83
End: 2023-10-31
Payer: MEDICARE

## 2023-10-31 ENCOUNTER — HOSPITAL ENCOUNTER (OUTPATIENT)
Dept: RADIATION ONCOLOGY | Facility: HOSPITAL | Age: 83
Discharge: HOME | End: 2023-10-31

## 2023-10-31 VITALS
DIASTOLIC BLOOD PRESSURE: 66 MMHG | HEART RATE: 100 BPM | BODY MASS INDEX: 25.08 KG/M2 | WEIGHT: 128.4 LBS | OXYGEN SATURATION: 99 % | SYSTOLIC BLOOD PRESSURE: 162 MMHG

## 2023-10-31 DIAGNOSIS — Z17.1 MALIGNANT NEOPLASM OF UPPER-OUTER QUADRANT OF RIGHT BREAST IN FEMALE, ESTROGEN RECEPTOR NEGATIVE: Primary | ICD-10-CM

## 2023-10-31 DIAGNOSIS — C50.411 MALIGNANT NEOPLASM OF UPPER-OUTER QUADRANT OF RIGHT BREAST IN FEMALE, ESTROGEN RECEPTOR NEGATIVE: Primary | ICD-10-CM

## 2023-10-31 LAB
RAD ONC ARIA COURSE ID: NORMAL
RAD ONC ARIA COURSE INTENT: NORMAL
RAD ONC ARIA COURSE LAST TREATMENT DATE: NORMAL
RAD ONC ARIA COURSE START DATE: NORMAL
RAD ONC ARIA COURSE TREATMENT ELAPSED DAYS: 21
RAD ONC ARIA FIRST TREATMENT DATE: NORMAL
RAD ONC ARIA PLAN FRACTIONS TREATED TO DATE: 8
RAD ONC ARIA PLAN ID: NORMAL
RAD ONC ARIA PLAN PRESCRIBED DOSE PER FRACTION: 2 GY
RAD ONC ARIA PLAN PRIMARY REFERENCE POINT: NORMAL
RAD ONC ARIA PLAN TOTAL FRACTIONS PRESCRIBED: 15
RAD ONC ARIA PLAN TOTAL PRESCRIBED DOSE: 3000 CGY
RAD ONC ARIA REFERENCE POINT DOSAGE GIVEN TO DATE: 32 GY
RAD ONC ARIA REFERENCE POINT ID: NORMAL
RAD ONC ARIA REFERENCE POINT SESSION DOSAGE GIVEN: 2 GY

## 2023-10-31 PROCEDURE — 77014 CHG CT GUIDANCE RADIATION THERAPY FLDS PLACEMENT: CPT | Performed by: RADIOLOGY

## 2023-10-31 PROCEDURE — 77385: CPT | Performed by: RADIOLOGY

## 2023-10-31 NOTE — PROGRESS NOTES
Physician Weekly Management Note    Diagnosis:     Diagnosis Plan   1. Malignant neoplasm of upper-outer quadrant of right breast in female, estrogen receptor negative            RT Details:  fx 16/25 right chest wall and nodes plus scar boost 0/5 today is 32/50Gy     Notes on Treatment course, Films, Medical progress:  Kp s80% doing well, mild erythema, no pain 0/10 cont on     Weekly Management:  Medication reviewed?   Yes  New medications given?   No  Problemlist reviewed?   Yes  Problem added?   No  Issues raised requiring referral to support services - task assigned to:  mark    Technical aspects reviewed:  Weekly OBI approved?   Yes  Weekly port films approved?   Yes  Change requests noted on port film?   No  Patient setup and plan reviewed?   Yes    Chart Reviewed:  Continue current treatment plan?   Yes  Treatment plan change requested?   No  CBC reviewed?   Yes  Concurrent Chemo?   No    Objective     Toxicities:   none      Review of Systems   Constitutional: Negative.    Skin:  Positive for color change.        There were no vitals filed for this visit.        10/11/2023     8:07 AM   Current Status   ECOG score 0       Physical Exam  Constitutional:       Appearance: Normal appearance.   Chest:          Comments: Mild erythema  Neurological:      Mental Status: She is alert.           Problem Summary List    Diagnosis:     Diagnosis Plan   1. Malignant neoplasm of upper-outer quadrant of right breast in female, estrogen receptor negative          Pathology:   breast     Past Medical History:   Diagnosis Date   • Anxiety    • Arthritis    • Basal cell carcinoma     Left thumb   • Breast cancer 2023    Right   • Depression    • Diabetes mellitus    • Diarrhea     s/e chemo   • High cholesterol    • History of chemotherapy 08/2023   • Hypertension    • Rash 08/2023    s/e chemo   • Sacral decubitus ulcer     cleaning with soap & water   • Urinary incontinence     wears pads         Past Surgical History:    Procedure Laterality Date   • CATARACT EXTRACTION EXTRACAPSULAR W/ INTRAOCULAR LENS IMPLANTATION Right    • EYE SURGERY      Muscle repair age 21   • MASTECTOMY W/ SENTINEL NODE BIOPSY Right 8/24/2023    Procedure: Right breast modified radical mastectomy;  Surgeon: Rosa Michael MD;  Location: [facility] OR [facility] [place];  Service: General;  Laterality: Right;   • TONSILLECTOMY     • VENOUS ACCESS DEVICE (PORT) INSERTION N/A 05/19/2023    Procedure: INSERTION VENOUS ACCESS DEVICE;  Surgeon: Rosa Michael MD;  Location: [facility] OR [facility] [place];  Service: General;  Laterality: N/A;         Current Outpatient Medications on File Prior to Visit   Medication Sig Dispense Refill   • acetaminophen (TYLENOL) 325 MG tablet Take 2 tablets by mouth Every 4 (Four) Hours As Needed for Mild Pain. 120 tablet 3   • Cholecalciferol 50 MCG (2000 UT) tablet Take 1 tablet by mouth Daily (Monday-Friday).     • glipiZIDE-metFORMIN (METAGLIP) 2.5-500 MG per tablet Take 2 tablets by mouth 2 (Two) Times a Day Before Meals. (Patient taking differently: Take 2 tablets by mouth Every Morning.) 120 tablet 3   • glucose blood test strip Check once per day, E11.65 30 each 5   • glucose monitor monitoring kit Use 1 each Daily. Dispense glucometer with brand based off insurance coverage, E11.65, check once per day 30 each 5   • Lancets misc Check BG once daily, E11.9 30 each 4   • lidocaine-prilocaine (EMLA) 2.5-2.5 % cream Apply nickel size amount to port site 30 min before appt time do not rub in cover with plastic wrap 30 g 5   • losartan (Cozaar) 25 MG tablet Take 1 tablet by mouth Daily. 90 tablet 0   • ondansetron (ZOFRAN) 8 MG tablet Take 1 tablet by mouth 3 (Three) Times a Day As Needed for Nausea or Vomiting. 30 tablet 5   • pantoprazole (PROTONIX) 40 MG EC tablet Take 1 tablet by mouth Daily.     • simvastatin (ZOCOR) 10 MG tablet Take 1 tablet by mouth Every Night.     • sodium bicarbonate 650 MG tablet  (Patient not taking: Reported on 10/17/2023)      • Tradjenta 5 MG tablet tablet Take 1 tablet by mouth Every Morning. 90 tablet 5   • valsartan-hydrochlorothiazide (DIOVAN-HCT) 160-12.5 MG per tablet Take 1 tablet by mouth Daily.       No current facility-administered medications on file prior to visit.       No Known Allergies      Referring Provider:    No referring provider defined for this encounter.    Oncologist:  No primary care provider on file.      Seen and approved by:  Marie Tesfaye MD  10/31/2023  Subjective

## 2023-11-01 ENCOUNTER — INFUSION (OUTPATIENT)
Dept: ONCOLOGY | Facility: HOSPITAL | Age: 83
End: 2023-11-01
Payer: MEDICARE

## 2023-11-01 ENCOUNTER — OFFICE VISIT (OUTPATIENT)
Dept: ONCOLOGY | Facility: CLINIC | Age: 83
End: 2023-11-01
Payer: MEDICARE

## 2023-11-01 ENCOUNTER — HOSPITAL ENCOUNTER (OUTPATIENT)
Dept: RADIATION ONCOLOGY | Facility: HOSPITAL | Age: 83
Discharge: HOME | End: 2023-11-01

## 2023-11-01 ENCOUNTER — HOSPITAL ENCOUNTER (OUTPATIENT)
Dept: RADIATION ONCOLOGY | Facility: HOSPITAL | Age: 83
Setting detail: RADIATION/ONCOLOGY SERIES
End: 2023-11-01
Payer: MEDICARE

## 2023-11-01 VITALS
BODY MASS INDEX: 25.64 KG/M2 | RESPIRATION RATE: 16 BRPM | HEART RATE: 102 BPM | WEIGHT: 130.6 LBS | TEMPERATURE: 96.8 F | OXYGEN SATURATION: 98 % | DIASTOLIC BLOOD PRESSURE: 88 MMHG | HEIGHT: 60 IN | SYSTOLIC BLOOD PRESSURE: 155 MMHG

## 2023-11-01 DIAGNOSIS — C50.411 MALIGNANT NEOPLASM OF UPPER-OUTER QUADRANT OF RIGHT BREAST IN FEMALE, ESTROGEN RECEPTOR NEGATIVE: Primary | ICD-10-CM

## 2023-11-01 DIAGNOSIS — Z79.899 HIGH RISK MEDICATION USE: ICD-10-CM

## 2023-11-01 DIAGNOSIS — Z17.1 MALIGNANT NEOPLASM OF UPPER-OUTER QUADRANT OF RIGHT BREAST IN FEMALE, ESTROGEN RECEPTOR NEGATIVE: ICD-10-CM

## 2023-11-01 DIAGNOSIS — Z17.1 MALIGNANT NEOPLASM OF UPPER-OUTER QUADRANT OF RIGHT BREAST IN FEMALE, ESTROGEN RECEPTOR NEGATIVE: Primary | ICD-10-CM

## 2023-11-01 DIAGNOSIS — C50.411 MALIGNANT NEOPLASM OF UPPER-OUTER QUADRANT OF RIGHT BREAST IN FEMALE, ESTROGEN RECEPTOR NEGATIVE: ICD-10-CM

## 2023-11-01 LAB
ALBUMIN SERPL-MCNC: 3.4 G/DL (ref 3.5–5.2)
ALBUMIN/GLOB SERPL: 1 G/DL
ALP SERPL-CCNC: 51 U/L (ref 39–117)
ALT SERPL W P-5'-P-CCNC: 6 U/L (ref 1–33)
ANION GAP SERPL CALCULATED.3IONS-SCNC: 13.8 MMOL/L (ref 5–15)
AST SERPL-CCNC: 25 U/L (ref 1–32)
BASOPHILS # BLD AUTO: 0.02 10*3/MM3 (ref 0–0.2)
BASOPHILS NFR BLD AUTO: 0.3 % (ref 0–1.5)
BILIRUB SERPL-MCNC: 0.2 MG/DL (ref 0–1.2)
BUN SERPL-MCNC: 22 MG/DL (ref 8–23)
BUN/CREAT SERPL: 24.2 (ref 7–25)
CALCIUM SPEC-SCNC: 9.1 MG/DL (ref 8.6–10.5)
CHLORIDE SERPL-SCNC: 104 MMOL/L (ref 98–107)
CO2 SERPL-SCNC: 22.2 MMOL/L (ref 22–29)
CREAT SERPL-MCNC: 0.91 MG/DL (ref 0.6–1.1)
DEPRECATED RDW RBC AUTO: 46.1 FL (ref 37–54)
EGFRCR SERPLBLD CKD-EPI 2021: 62.7 ML/MIN/1.73
EOSINOPHIL # BLD AUTO: 0.14 10*3/MM3 (ref 0–0.4)
EOSINOPHIL NFR BLD AUTO: 2.3 % (ref 0.3–6.2)
ERYTHROCYTE [DISTWIDTH] IN BLOOD BY AUTOMATED COUNT: 14.6 % (ref 12.3–15.4)
GLOBULIN UR ELPH-MCNC: 3.4 GM/DL
GLUCOSE SERPL-MCNC: 239 MG/DL (ref 65–99)
HCT VFR BLD AUTO: 31.9 % (ref 34–46.6)
HGB BLD-MCNC: 10 G/DL (ref 12–15.9)
IMM GRANULOCYTES # BLD AUTO: 0.02 10*3/MM3 (ref 0–0.05)
IMM GRANULOCYTES NFR BLD AUTO: 0.3 % (ref 0–0.5)
LYMPHOCYTES # BLD AUTO: 0.52 10*3/MM3 (ref 0.7–3.1)
LYMPHOCYTES NFR BLD AUTO: 8.7 % (ref 19.6–45.3)
MCH RBC QN AUTO: 27 PG (ref 26.6–33)
MCHC RBC AUTO-ENTMCNC: 31.3 G/DL (ref 31.5–35.7)
MCV RBC AUTO: 86.2 FL (ref 79–97)
MONOCYTES # BLD AUTO: 0.62 10*3/MM3 (ref 0.1–0.9)
MONOCYTES NFR BLD AUTO: 10.3 % (ref 5–12)
NEUTROPHILS NFR BLD AUTO: 4.68 10*3/MM3 (ref 1.7–7)
NEUTROPHILS NFR BLD AUTO: 78.1 % (ref 42.7–76)
NRBC BLD AUTO-RTO: 0 /100 WBC (ref 0–0.2)
PLATELET # BLD AUTO: 151 10*3/MM3 (ref 140–450)
PMV BLD AUTO: 10 FL (ref 6–12)
POTASSIUM SERPL-SCNC: 3.9 MMOL/L (ref 3.5–5.2)
PROT SERPL-MCNC: 6.8 G/DL (ref 6–8.5)
RAD ONC ARIA COURSE ID: NORMAL
RAD ONC ARIA COURSE INTENT: NORMAL
RAD ONC ARIA COURSE LAST TREATMENT DATE: NORMAL
RAD ONC ARIA COURSE START DATE: NORMAL
RAD ONC ARIA COURSE TREATMENT ELAPSED DAYS: 22
RAD ONC ARIA FIRST TREATMENT DATE: NORMAL
RAD ONC ARIA PLAN FRACTIONS TREATED TO DATE: 9
RAD ONC ARIA PLAN ID: NORMAL
RAD ONC ARIA PLAN PRESCRIBED DOSE PER FRACTION: 2 GY
RAD ONC ARIA PLAN PRIMARY REFERENCE POINT: NORMAL
RAD ONC ARIA PLAN TOTAL FRACTIONS PRESCRIBED: 10
RAD ONC ARIA PLAN TOTAL PRESCRIBED DOSE: 2000 CGY
RAD ONC ARIA REFERENCE POINT DOSAGE GIVEN TO DATE: 34 GY
RAD ONC ARIA REFERENCE POINT ID: NORMAL
RAD ONC ARIA REFERENCE POINT SESSION DOSAGE GIVEN: 2 GY
RBC # BLD AUTO: 3.7 10*6/MM3 (ref 3.77–5.28)
SODIUM SERPL-SCNC: 140 MMOL/L (ref 136–145)
WBC NRBC COR # BLD: 6 10*3/MM3 (ref 3.4–10.8)

## 2023-11-01 PROCEDURE — 25010000002 ADO-TRASTUZUMAB 100 MG RECONSTITUTED SOLUTION 1 EACH VIAL: Performed by: NURSE PRACTITIONER

## 2023-11-01 PROCEDURE — 80053 COMPREHEN METABOLIC PANEL: CPT

## 2023-11-01 PROCEDURE — 96375 TX/PRO/DX INJ NEW DRUG ADDON: CPT

## 2023-11-01 PROCEDURE — 85025 COMPLETE CBC W/AUTO DIFF WBC: CPT

## 2023-11-01 PROCEDURE — 25010000002 DEXAMETHASONE SODIUM PHOSPHATE 100 MG/10ML SOLUTION: Performed by: NURSE PRACTITIONER

## 2023-11-01 PROCEDURE — 77385: CPT | Performed by: RADIOLOGY

## 2023-11-01 PROCEDURE — 96413 CHEMO IV INFUSION 1 HR: CPT

## 2023-11-01 PROCEDURE — 25810000003 SODIUM CHLORIDE 0.9 % SOLUTION 250 ML FLEX CONT: Performed by: NURSE PRACTITIONER

## 2023-11-01 PROCEDURE — 25010000002 ADO-TRASTUZUMAB 160 MG RECONSTITUTED SOLUTION 160 MG VIAL: Performed by: NURSE PRACTITIONER

## 2023-11-01 PROCEDURE — 25810000003 SODIUM CHLORIDE 0.9 % SOLUTION: Performed by: NURSE PRACTITIONER

## 2023-11-01 PROCEDURE — 77014 CHG CT GUIDANCE RADIATION THERAPY FLDS PLACEMENT: CPT | Performed by: RADIOLOGY

## 2023-11-01 RX ORDER — SODIUM CHLORIDE 9 MG/ML
250 INJECTION, SOLUTION INTRAVENOUS ONCE
Status: CANCELLED | OUTPATIENT
Start: 2023-11-01

## 2023-11-01 RX ORDER — SODIUM CHLORIDE 9 MG/ML
250 INJECTION, SOLUTION INTRAVENOUS ONCE
Status: COMPLETED | OUTPATIENT
Start: 2023-11-01 | End: 2023-11-01

## 2023-11-01 RX ADMIN — ADO-TRASTUZUMAB EMTANSINE 210 MG: 20 INJECTION, POWDER, LYOPHILIZED, FOR SOLUTION INTRAVENOUS at 12:07

## 2023-11-01 RX ADMIN — SODIUM CHLORIDE 250 ML: 9 INJECTION, SOLUTION INTRAVENOUS at 11:52

## 2023-11-01 RX ADMIN — DEXAMETHASONE SODIUM PHOSPHATE 12 MG: 10 INJECTION, SOLUTION INTRAMUSCULAR; INTRAVENOUS at 11:52

## 2023-11-01 NOTE — PROGRESS NOTES
Subjective   Veronica Royal is a 83 y.o. female.  Referred by Dr. Michael for right breast inflammatory breast cancer    History of Present Illness   Ms. Royal is a 82-year-old postmenopausal  lady with hypertension, diabetes, hyperlipidemia, chronic kidney disease, hyperkalemia-chronic presented with a palpable abnormality in the right breast.  Subsequent imaging was performed.  Patient has not had a mammogram prior to this in the past 25 years.    4/21/2023-bilateral diagnostic mammogram-high density irregular mass measuring 44 mm with spiculated margins and amorphus and fine pleomorphic calcifications with skin thickening in the right breast at 9:00.  2 intramammary lymph nodes in the upper outer axillary tail region are slightly rounded  2 prominent right axillary lymph nodes largest of which measures 26 mm.  Asymmetry noted in the left breast.    Right breast ultrasound at 9 o'clock position, 3 cm from the nipple there is a 38 x 28 x 42 mm mass.  Skin thickness measuring 11 mm near the mass.  One of the 2 rounded axillary tail lymph nodes noted.  Borderline cortical thickening.  2 abnormal axillary lymph nodes.  1 lymph node displaced loss of normal morphology with a round heterogeneous appearance and echogenic foci.  Most consistent with calcified lymph node measuring 26 mm.  Second lymph node measures 12 mm.  Displaced cortical thickening.  Ultrasound-guided biopsy of the mass in the axillary lymph nodes recommended.    4/21/2023  1.right breast 9:00 biopsy-invasive ductal carcinoma with apocrine features  Grade 3  ER negative  MD negative  HER2 2+ on immunohistochemistry  HER2 amplified on FISH with HER2 copy number of 7.58, OPAL seventeen 3.18, HER2/OPAL 17 ratio of 2.4.  Ki-67 38.45%    2.right axillary biopsy-soft tissue involved by invasive ductal carcinoma with apocrine features  Associated microcalcification  No definite lymph node tissue identified.    4/29/2023-MRI of the breast-biopsy-proven  malignancy in the right breast at 9:00 measuring 4.3 cm in greatest dimension.  Attachment overlying skin and diffuse right breast edema noted.  Right axillary lymphadenopathy.  No suspicious findings in the left breast.    5/11/2023-CT of the chest abdomen and pelvis-no evidence of metastatic disease.  Liver is cirrhotic in configuration.    5/11/2023-bone scan negative    Patient presents today to the clinic for discussing neoadjuvant chemotherapy.  She is accompanied by her daughter.  Patient denies any recent changes in weight, she reports some pain at the site of malignancy.  A punch biopsy was performed and was consistent with inflammatory breast cancer.  At the site of punch biopsy there is an ulcerated lesion.    She has poorly controlled diabetes currently on glipizide metformin and Tradjenta.  Hemoglobin A1c recently was noted to be 9.9.    Also has chronic hyperkalemia, explanation unclear.    Blood pressure poorly controlled.    She reports good functional status lives independently.  Able to manage all her bills and independent of ADLs.  She has good support system in terms of her daughter.    Family history significant for brother with pancreatic cancer at the age of 68, sister with ovarian cancer at the age of 58    She received 6 weeks of Taxol Perjeta and Herceptin and subsequently admitted to the hospital due to severe nausea vomiting diarrhea poor oral intake, KAMILA, severe electrolyte abnormalities.      admission to the hospital for KAMILA, severe diarrhea, nausea vomiting and poor oral intake.She was in the hospital between 7/12/2023 to 7/20/2023.  During the hospitalization she was treated for acute UTI, electrolyte abnormalities and KAMILA.  Subsequently she was  discharged to a rehab.   During the hospitalization she was evaluated by Dr. Michael and a right breast ultrasound was obtained on 7/17/2023.  There was very little response noted with the 9 o'clock position mass 6 cm from the nipple  measuring 3.7 x 2.4 x 3.7 cm previously 3.8 x 2.5 x 4.2 cm.  In the right axilla the lymph node measured 2.6 x 1.5 x 1.9 cm previously 1.9 x 1.5 x 2.5 cm.  There was decrease in size of the adjacent lymph node measuring 0.7 cm previously 1.2 cm.    8/24/2023-right mastectomy and axillary lymph node dissection  Invasive ductal carcinoma, grade 2  The tumor measures 4 cm  Microcalcifications are seen in the tumor  Dermal lymphatic invasion present  Lymphatic invasion present  Tumor seen in the skin but no ulceration.  Residual cancer burden 3.454  RCB-3  Xiong Lima grade 3  Margins negative  18 lymph nodes total evaluated  1 with micrometastasis with a tumor measuring 2.5 mm with no chemotherapy effect  1 with chemotherapy change and isolated tumor cells  There is 1 node with chemotherapy change and a clip but no residual tumor.    Receptors repeated  ER negative  OK negative  HER2 2+ on immunohistochemistry  FISH nonamplified    INTERVAL HISTORY:   The patient is a 83 y.o. female with above-mentioned history returns today. She is scheduled for Kadcyla today.  Started radiation 10/10/2023.  She continues to tolerate Kadcyla well.  She denies nausea or vomiting.  She has no diarrhea.  She reports she is eating and drinking adequately.  She is seen today with her friend who did provide transportation.  She did have a repeat echocardiogram 10/20/2023 with ejection fraction at 51%.  This was noted to be an 8% decline in echocardiogram 9/27/2023.  Though echocardiogram May 2023 ejection fraction was 54.1%.  She has no lower extremity swelling, shortness of breath or chest pain.  She reports her energy is adequate.  She is undergoing radiation scheduled to complete the third week of November.      The following portions of the patient's history were reviewed and updated as appropriate: allergies, current medications, past family history, past medical history, past social history, past surgical history and problem  list.    Past Medical History:   Diagnosis Date    Anxiety     Arthritis     Basal cell carcinoma     Left thumb    Breast cancer     Right    Depression     Diabetes mellitus     Diarrhea     s/e chemo    High cholesterol     History of chemotherapy 2023    Hypertension     Rash 2023    s/e chemo    Sacral decubitus ulcer     cleaning with soap & water    Urinary incontinence     wears pads        Past Surgical History:   Procedure Laterality Date    CATARACT EXTRACTION EXTRACAPSULAR W/ INTRAOCULAR LENS IMPLANTATION Right     EYE SURGERY      Muscle repair age 21    MASTECTOMY W/ SENTINEL NODE BIOPSY Right 2023    Procedure: Right breast modified radical mastectomy;  Surgeon: Rosa Michael MD;  [place] [facility] [place];  Service: General;  Laterality: Right;    TONSILLECTOMY      VENOUS ACCESS DEVICE (PORT) INSERTION N/A 2023    Procedure: INSERTION VENOUS ACCESS DEVICE;  Surgeon: Rosa Michael MD;  Location: [facility] [place];  Service: General;  Laterality: N/A;        Family History   Problem Relation Age of Onset    Alzheimer's disease Mother     Heart disease Father     Heart attack Father     Ovarian cancer Sister     Pancreatic cancer Brother     Malig Hyperthermia Neg Hx     Colon cancer Neg Hx     Colon polyps Neg Hx     Crohn's disease Neg Hx     Irritable bowel syndrome Neg Hx     Ulcerative colitis Neg Hx         Social History     Socioeconomic History    Marital status:    Tobacco Use    Smoking status: Never    Smokeless tobacco: Never   Vaping Use    Vaping Use: Never used   Substance and Sexual Activity    Alcohol use: Never    Drug use: Never    Sexual activity: Never        OB History    No obstetric history on file.      Age at menarche-11  Age at first live childbirth-35   2 para 1  1  Age at menopause-50  Oral conceptive pill use for 3 to 4 years    No Known Allergies     Review of Systems    Review of systems as mentioned HPI otherwise  "negative    Objective   Blood pressure 155/88, pulse 102, temperature 96.8 °F (36 °C), temperature source Temporal, resp. rate 16, height 152.4 cm (60\"), weight 59.2 kg (130 lb 9.6 oz), SpO2 98%.     Physical Exam  Vitals reviewed.   Constitutional:       General: She is not in acute distress.     Appearance: Normal appearance. She is well-developed.   HENT:      Head: Normocephalic and atraumatic.   Eyes:      Pupils: Pupils are equal, round, and reactive to light.   Cardiovascular:      Rate and Rhythm: Normal rate and regular rhythm.      Heart sounds: Normal heart sounds. No murmur heard.  Pulmonary:      Effort: Pulmonary effort is normal. No respiratory distress.      Breath sounds: Normal breath sounds. No wheezing, rhonchi or rales.   Abdominal:      General: Bowel sounds are normal. There is no distension.      Palpations: Abdomen is soft.   Musculoskeletal:         General: Swelling (trace) present. Normal range of motion.      Cervical back: Normal range of motion.   Skin:     General: Skin is warm and dry.      Findings: No rash.   Neurological:      Mental Status: She is alert and oriented to person, place, and time.        Right chest wall carefully examined, status post mastectomy with incision well-healed, no signs or symptoms of infection      I have reexamined the patient and the results are consistent with the previously documented exam. Aislinn Gonzalez, APRN   :        Results from last 7 days   Lab Units 11/01/23  0920   WBC 10*3/mm3 6.00   NEUTROS ABS 10*3/mm3 4.68   HEMOGLOBIN g/dL 10.0*   HEMATOCRIT % 31.9*   PLATELETS 10*3/mm3 151     Results from last 7 days   Lab Units 11/01/23  0920   SODIUM mmol/L 140   POTASSIUM mmol/L 3.9   CHLORIDE mmol/L 104   CO2 mmol/L 22.2   BUN mg/dL 22   CREATININE mg/dL 0.91   CALCIUM mg/dL 9.1   ALBUMIN g/dL 3.4*   BILIRUBIN mg/dL 0.2   ALK PHOS U/L 51   ALT (SGPT) U/L 6   AST (SGOT) U/L 25   GLUCOSE mg/dL 239*               No radiology results for the " last 30 days.     Adult Transthoracic Echo Limited W/ Cont if Necessary Per Protocol (10/19/2023 11:23)     Assessment & Plan       *Right breast inflammatory breast cancer  T4d N1 M0  Stage IIIb  ER negative, OR negative, HER2 2+ on immunohistochemistry but amplified on FISH.  Invasive ductal carcinoma with apocrine features, grade 3, Ki-67 38%  CT scans and bone scan without any obvious evidence of metastatic disease  Echocardiogram has been performed and ejection fraction normal.  Liver shows cirrhotic morphology.  LFTs otherwise normal and total bilirubin normal as well as protein normal.  It appears that the synthetic function of the liver is still within normal limits.  Mediport placed 5/19/2023  Taxol, Herceptin, Perjeta initiated 5/24/2023 5/31/2023 with C1D8 Taxol  6/28/2023-cycle 2-day 15 of Taxol.  She is overall tolerating therapy well with expected side effects.  She will proceed with Taxol today.  Scheduled for Taxol Herceptin and Perjeta.  7/12/2023-cycle 3-day 8 of TPH.  Chemotherapy held and patient was admitted to the hospital for management of severe dehydration, KAMILA, nausea vomiting diarrhea and decreased oral intake.  8/2/2023-she feels relatively well today.  Labs reviewed and stable to proceed with Herceptin only.  We will not administer Taxol and Perjeta as she has had significant issues with chemotherapy.  Right mastectomy 8/24/2023 with 4 cm of residual disease, RCB-3, treatment effect present, axillary lymph node dissection with 1 lymph node with micrometastasis measuring 2.5 mm, 1 lymph node with isolated tumor cells and a third lymph node which showed treatment changes but no tumor cells.  Total of 18 lymph nodes removed   9/20/2023 to discuss pathology and adjuvant therapy.  We discussed Kadcyla every 3 weekly to finish a complete year.  Patient is definitely hesitant to resume any sort of chemotherapy due to her previous experience with diarrhea.  Started Kadcyla 9/20/2023, denies any  diarrhea.  Tolerating treatment well so far  No evidence of recurrent disease  Proceed today, 11/1/2023 with cycle 3 Kadcyla with ongoing excellent tolerance.    *Diabetes  Poorly controlled  Evaluation by endocrinology 5/30/2023 with recommendations for dietary changes and home blood sugar monitoring.  The patient's daughter reports today she is struggling with compliance.  Poorly controlled at this time    *KAMILA/CKD  8/17/2023 BUN 24 and creatinine 1.05  Patient's daughter reports that she has not been hydrating herself well.  Creatinine normal at 0.91    *?  Cirrhotic appearance of the liver on the CT scan  Referred to gastroenterology  Synthetic function appears to be normal  We will start with Taxol to see if she would tolerate the chemotherapy   Slight elevation of intervention studies today with ALT 43, AST 55.  Continue to monitor weekly  6/28/2023-mild elevation in the LFTs.  Stable compared to previous labs.  Okay to proceed with chemotherapy.  11/1/2023-LFTs normal    *Hypertension-currently on valsartan and hydrochlorothiazide.  Valsartan could be contributing to hyperkalemia.  Will refer to cardiology ASAP for management of medications and cardiac clearance for proceeding with chemotherapy  Recent echocardiogram normal  Stress test reviewed and negative.  9/27/2023-echocardiogram shows a normal ejection fraction of 59%.  Follow-up echocardiogram 10/20/2023 with ejection fraction of 54%.  When compared to baseline study 5/9/2023 ejection fraction is stable.  Left ventricular GLS is changed by 8%.  Discussed today with cardiology, given the stability of ejection fraction when compared to baseline from May, we will proceed with Kadcyla and repeat echocardiogram in 2 weeks    *Genetic testing-[employer]ae 9 gene stat panel negative,     *Decreased oral intake secondary to chemotherapy  Still not adequate oral intake  Encouraged her to drink boost and Ensure    *Frequent belching  Continue Protonix 40 mg  daily  This is improved    *Cognitive impairment  It is unclear if this is the patient's baseline or mental status has been exacerbated by recent chemotherapy  The patient is struggling with compliance with her medications.  The patient's daughter expresses frustration today as the patient is struggling to care for herself at home with managing medications and symptom management.  Evaluated by CARL Antonio     *Chemotherapy-induced diarrhea  6/5/2023 patient given Enterade (Wild Berry flavor).  She has been drinking 1 a day.  She does not necessarily like the flavor (currently mixing with sugar-free Aramis-Aid which does help).  Encouraged her to increase to 2 a day.  6/21/2023 patient reports that she had stopped drinking her Enterade because she was waking up in the middle the night having diarrhea although she did not know if it was due to the Enterade her protein shakes.  I have encouraged the patient to continue Enterade, drinking it in the morning.  Try discontinuing the protein shakes and see how she does.  Patient states that she will try this  Reports 2-3 loose stools.  Continue Enterade and Imodium.  7/5/2023 continuing to have issues with diarrhea up to 3-4 bowel movements a day, patient also recently prescribed Kayexalate for an apparently elevated potassium.  Potassium only 3.1 today.  She advised to discontinue the Kayexalate she was given IV hydration today.  We will also prescribe Lomotil to use if needed to help better control her diarrhea.  Patient is not drinking Enterade regularly.  8/2/2023-improved since discharge from the hospital and discontinuation of chemotherapy.  She received Herceptin on 8/2/2023 and reports a week of diarrhea.  Patient has not had any diarrhea since initiating Kadcyla.  Continue to monitor    PLAN:  Proceed today with cycle 3 maintenance Kadcyla which is continued every 3 weeks  Continue radiation with direction of radiation oncology  The patient is asymptomatic  of volume overload.  We will therefore proceed with treatment today with repeat echocardiogram as scheduled by cardiology  Follow-up echocardiogram 11/10/2023 with follow-up with Dr. Joel Oquendo in 3 weeks for CBC, CMP, nurse practitioner follow-up and continued Kadcyla    Patient is on a high risk medication requiring close monitoring for toxicity.  Today's plan of care was discussed reviewed with Dr. Yee who is in agreement    I spent 45 minutes caring for Veronica on this date of service. This time includes time spent by me in the following activities: preparing for the visit, reviewing tests, obtaining and/or reviewing a separately obtained history, performing a medically appropriate examination and/or evaluation, counseling and educating the patient/family/caregiver, ordering medications, tests, or procedures, referring and communicating with other health care professionals, documenting information in the medical record, and care coordination.     Aislinn Gonzalez, APRN  11/01/2023

## 2023-11-02 ENCOUNTER — HOSPITAL ENCOUNTER (OUTPATIENT)
Dept: RADIATION ONCOLOGY | Facility: HOSPITAL | Age: 83
Discharge: HOME | End: 2023-11-02

## 2023-11-02 LAB
RAD ONC ARIA COURSE ID: NORMAL
RAD ONC ARIA COURSE INTENT: NORMAL
RAD ONC ARIA COURSE LAST TREATMENT DATE: NORMAL
RAD ONC ARIA COURSE START DATE: NORMAL
RAD ONC ARIA COURSE TREATMENT ELAPSED DAYS: 23
RAD ONC ARIA FIRST TREATMENT DATE: NORMAL
RAD ONC ARIA PLAN FRACTIONS TREATED TO DATE: 9
RAD ONC ARIA PLAN ID: NORMAL
RAD ONC ARIA PLAN PRESCRIBED DOSE PER FRACTION: 2 GY
RAD ONC ARIA PLAN PRIMARY REFERENCE POINT: NORMAL
RAD ONC ARIA PLAN TOTAL FRACTIONS PRESCRIBED: 15
RAD ONC ARIA PLAN TOTAL PRESCRIBED DOSE: 3000 CGY
RAD ONC ARIA REFERENCE POINT DOSAGE GIVEN TO DATE: 36 GY
RAD ONC ARIA REFERENCE POINT ID: NORMAL
RAD ONC ARIA REFERENCE POINT SESSION DOSAGE GIVEN: 2 GY

## 2023-11-02 PROCEDURE — 77014 CHG CT GUIDANCE RADIATION THERAPY FLDS PLACEMENT: CPT | Performed by: RADIOLOGY

## 2023-11-02 PROCEDURE — 77385: CPT | Performed by: RADIOLOGY

## 2023-11-02 PROCEDURE — 77336 RADIATION PHYSICS CONSULT: CPT | Performed by: RADIOLOGY

## 2023-11-03 ENCOUNTER — HOSPITAL ENCOUNTER (OUTPATIENT)
Dept: RADIATION ONCOLOGY | Facility: HOSPITAL | Age: 83
Discharge: HOME | End: 2023-11-03

## 2023-11-03 LAB
RAD ONC ARIA COURSE ID: NORMAL
RAD ONC ARIA COURSE INTENT: NORMAL
RAD ONC ARIA COURSE LAST TREATMENT DATE: NORMAL
RAD ONC ARIA COURSE START DATE: NORMAL
RAD ONC ARIA COURSE TREATMENT ELAPSED DAYS: 24
RAD ONC ARIA FIRST TREATMENT DATE: NORMAL
RAD ONC ARIA PLAN FRACTIONS TREATED TO DATE: 10
RAD ONC ARIA PLAN ID: NORMAL
RAD ONC ARIA PLAN PRESCRIBED DOSE PER FRACTION: 2 GY
RAD ONC ARIA PLAN PRIMARY REFERENCE POINT: NORMAL
RAD ONC ARIA PLAN TOTAL FRACTIONS PRESCRIBED: 10
RAD ONC ARIA PLAN TOTAL PRESCRIBED DOSE: 2000 CGY
RAD ONC ARIA REFERENCE POINT DOSAGE GIVEN TO DATE: 38 GY
RAD ONC ARIA REFERENCE POINT ID: NORMAL
RAD ONC ARIA REFERENCE POINT SESSION DOSAGE GIVEN: 2 GY

## 2023-11-03 PROCEDURE — 77014 CHG CT GUIDANCE RADIATION THERAPY FLDS PLACEMENT: CPT | Performed by: RADIOLOGY

## 2023-11-03 PROCEDURE — 77385: CPT | Performed by: RADIOLOGY

## 2023-11-06 ENCOUNTER — HOSPITAL ENCOUNTER (OUTPATIENT)
Dept: RADIATION ONCOLOGY | Facility: HOSPITAL | Age: 83
Discharge: HOME | End: 2023-11-06
Payer: MEDICARE

## 2023-11-06 LAB
RAD ONC ARIA COURSE ID: NORMAL
RAD ONC ARIA COURSE INTENT: NORMAL
RAD ONC ARIA COURSE LAST TREATMENT DATE: NORMAL
RAD ONC ARIA COURSE START DATE: NORMAL
RAD ONC ARIA COURSE TREATMENT ELAPSED DAYS: 27
RAD ONC ARIA FIRST TREATMENT DATE: NORMAL
RAD ONC ARIA PLAN FRACTIONS TREATED TO DATE: 10
RAD ONC ARIA PLAN ID: NORMAL
RAD ONC ARIA PLAN PRESCRIBED DOSE PER FRACTION: 2 GY
RAD ONC ARIA PLAN PRIMARY REFERENCE POINT: NORMAL
RAD ONC ARIA PLAN TOTAL FRACTIONS PRESCRIBED: 15
RAD ONC ARIA PLAN TOTAL PRESCRIBED DOSE: 3000 CGY
RAD ONC ARIA REFERENCE POINT DOSAGE GIVEN TO DATE: 40 GY
RAD ONC ARIA REFERENCE POINT ID: NORMAL
RAD ONC ARIA REFERENCE POINT SESSION DOSAGE GIVEN: 2 GY

## 2023-11-06 PROCEDURE — 77014 CHG CT GUIDANCE RADIATION THERAPY FLDS PLACEMENT: CPT | Performed by: RADIOLOGY

## 2023-11-06 PROCEDURE — 77385: CPT | Performed by: RADIOLOGY

## 2023-11-07 ENCOUNTER — HOSPITAL ENCOUNTER (OUTPATIENT)
Dept: RADIATION ONCOLOGY | Facility: HOSPITAL | Age: 83
Discharge: HOME | End: 2023-11-07

## 2023-11-07 ENCOUNTER — RADIATION ONCOLOGY WEEKLY ASSESSMENT (OUTPATIENT)
Dept: RADIATION ONCOLOGY | Facility: HOSPITAL | Age: 83
End: 2023-11-07
Payer: MEDICARE

## 2023-11-07 VITALS
WEIGHT: 128.5 LBS | HEART RATE: 103 BPM | DIASTOLIC BLOOD PRESSURE: 76 MMHG | OXYGEN SATURATION: 99 % | SYSTOLIC BLOOD PRESSURE: 128 MMHG | BODY MASS INDEX: 25.1 KG/M2

## 2023-11-07 DIAGNOSIS — Z17.1 MALIGNANT NEOPLASM OF UPPER-OUTER QUADRANT OF RIGHT BREAST IN FEMALE, ESTROGEN RECEPTOR NEGATIVE: Primary | ICD-10-CM

## 2023-11-07 DIAGNOSIS — C50.411 MALIGNANT NEOPLASM OF UPPER-OUTER QUADRANT OF RIGHT BREAST IN FEMALE, ESTROGEN RECEPTOR NEGATIVE: Primary | ICD-10-CM

## 2023-11-07 LAB
RAD ONC ARIA COURSE ID: NORMAL
RAD ONC ARIA COURSE INTENT: NORMAL
RAD ONC ARIA COURSE LAST TREATMENT DATE: NORMAL
RAD ONC ARIA COURSE START DATE: NORMAL
RAD ONC ARIA COURSE TREATMENT ELAPSED DAYS: 28
RAD ONC ARIA FIRST TREATMENT DATE: NORMAL
RAD ONC ARIA PLAN FRACTIONS TREATED TO DATE: 11
RAD ONC ARIA PLAN ID: NORMAL
RAD ONC ARIA PLAN PRESCRIBED DOSE PER FRACTION: 2 GY
RAD ONC ARIA PLAN PRIMARY REFERENCE POINT: NORMAL
RAD ONC ARIA PLAN TOTAL FRACTIONS PRESCRIBED: 15
RAD ONC ARIA PLAN TOTAL PRESCRIBED DOSE: 3000 CGY
RAD ONC ARIA REFERENCE POINT DOSAGE GIVEN TO DATE: 42 GY
RAD ONC ARIA REFERENCE POINT ID: NORMAL
RAD ONC ARIA REFERENCE POINT SESSION DOSAGE GIVEN: 2 GY

## 2023-11-07 PROCEDURE — 77427 RADIATION TX MANAGEMENT X5: CPT | Performed by: RADIOLOGY

## 2023-11-07 PROCEDURE — 77385: CPT | Performed by: RADIOLOGY

## 2023-11-07 PROCEDURE — 77014 CHG CT GUIDANCE RADIATION THERAPY FLDS PLACEMENT: CPT | Performed by: RADIOLOGY

## 2023-11-07 RX ORDER — PANTOPRAZOLE SODIUM 40 MG/1
TABLET, DELAYED RELEASE ORAL DAILY
Qty: 30 TABLET | Refills: 0 | Status: SHIPPED | OUTPATIENT
Start: 2023-11-07

## 2023-11-07 NOTE — PROGRESS NOTES
Physician Weekly Management Note    Diagnosis:     Diagnosis Plan   1. Malignant neoplasm of upper-outer quadrant of right breast in female, estrogen receptor negative            RT Details:  fx 21/25 right chest wall and supraclav 42/50Gy plus scar boost x 5 fx     Notes on Treatment course, Films, Medical progress:  Kps 90% doing well, mild erythema over right chest wall with papular rash uiq, cont on , no pain,m    Weekly Management:  Medication reviewed?   Yes  New medications given?   No  Problemlist reviewed?   Yes  Problem added?   No  Issues raised requiring referral to support services - task assigned to:  mark    Technical aspects reviewed:  Weekly OBI approved?   Yes  Weekly port films approved?   Yes  Change requests noted on port film?   No  Patient setup and plan reviewed?   Yes    Chart Reviewed:  Continue current treatment plan?   Yes  Treatment plan change requested?   No  CBC reviewed?   Yes  Concurrent Chemo?   No    Objective     Toxicities:   grade 2 erythema      Review of Systems   Constitutional: Negative.    Skin:  Positive for color change and rash.        Vitals:    11/07/23 1007   BP: 128/76   Pulse: 103   SpO2: 99%           11/1/2023     9:18 AM   Current Status   ECOG score 0       Physical Exam  Constitutional:       Appearance: Normal appearance.   Chest:          Comments: Mild erythema papular rash uiq   Neurological:      Mental Status: She is alert.           Problem Summary List    Diagnosis:     Diagnosis Plan   1. Malignant neoplasm of upper-outer quadrant of right breast in female, estrogen receptor negative          Pathology:   breast     Past Medical History:   Diagnosis Date   • Anxiety    • Arthritis    • Basal cell carcinoma     Left thumb   • Breast cancer 2023    Right   • Depression    • Diabetes mellitus    • Diarrhea     s/e chemo   • High cholesterol    • History of chemotherapy 08/2023   • Hypertension    • Rash 08/2023    s/e chemo   • Sacral decubitus ulcer      cleaning with soap & water   • Urinary incontinence     wears pads         Past Surgical History:   Procedure Laterality Date   • CATARACT EXTRACTION EXTRACAPSULAR W/ INTRAOCULAR LENS IMPLANTATION Right    • EYE SURGERY      Muscle repair age 21   • MASTECTOMY W/ SENTINEL NODE BIOPSY Right 8/24/2023    Procedure: Right breast modified radical mastectomy;  Surgeon: Rosa Michael MD;  Location: [facility] [place] [facility] [place];  Service: General;  Laterality: Right;   • TONSILLECTOMY     • VENOUS ACCESS DEVICE (PORT) INSERTION N/A 05/19/2023    Procedure: INSERTION VENOUS ACCESS DEVICE;  Surgeon: Rosa Michael MD;  Location: [facility] OR [facility] [place];  Service: General;  Laterality: N/A;         Current Outpatient Medications on File Prior to Visit   Medication Sig Dispense Refill   • acetaminophen (TYLENOL) 325 MG tablet Take 2 tablets by mouth Every 4 (Four) Hours As Needed for Mild Pain. 120 tablet 3   • Cholecalciferol 50 MCG (2000 UT) tablet Take 1 tablet by mouth Daily (Monday-Friday).     • glipiZIDE-metFORMIN (METAGLIP) 2.5-500 MG per tablet Take 2 tablets by mouth 2 (Two) Times a Day Before Meals. (Patient taking differently: Take 2 tablets by mouth Every Morning.) 120 tablet 3   • glucose blood test strip Check once per day, E11.65 30 each 5   • glucose monitor monitoring kit Use 1 each Daily. Dispense glucometer with brand based off insurance coverage, E11.65, check once per day 30 each 5   • Lancets misc Check BG once daily, E11.9 30 each 4   • lidocaine-prilocaine (EMLA) 2.5-2.5 % cream Apply nickel size amount to port site 30 min before appt time do not rub in cover with plastic wrap 30 g 5   • losartan (Cozaar) 25 MG tablet Take 1 tablet by mouth Daily. 90 tablet 0   • ondansetron (ZOFRAN) 8 MG tablet Take 1 tablet by mouth 3 (Three) Times a Day As Needed for Nausea or Vomiting. 30 tablet 5   • simvastatin (ZOCOR) 10 MG tablet Take 1 tablet by mouth Every Night.     • sodium bicarbonate 650 MG tablet      • Tradjenta 5 MG  tablet tablet Take 1 tablet by mouth Every Morning. 90 tablet 5   • valsartan-hydrochlorothiazide (DIOVAN-HCT) 160-12.5 MG per tablet Take 1 tablet by mouth Daily.     • [DISCONTINUED] pantoprazole (PROTONIX) 40 MG EC tablet Take 1 tablet by mouth Daily.       No current facility-administered medications on file prior to visit.       No Known Allergies      Referring Provider:    Sheila Yee Md  [address] [place] [address]    Oncologist:  No primary care provider on file.      Seen and approved by:  Marie Tesfaye MD  11/07/2023

## 2023-11-08 ENCOUNTER — HOSPITAL ENCOUNTER (OUTPATIENT)
Dept: RADIATION ONCOLOGY | Facility: HOSPITAL | Age: 83
Discharge: HOME | End: 2023-11-08

## 2023-11-08 LAB
RAD ONC ARIA COURSE ID: NORMAL
RAD ONC ARIA COURSE INTENT: NORMAL
RAD ONC ARIA COURSE LAST TREATMENT DATE: NORMAL
RAD ONC ARIA COURSE START DATE: NORMAL
RAD ONC ARIA COURSE TREATMENT ELAPSED DAYS: 29
RAD ONC ARIA FIRST TREATMENT DATE: NORMAL
RAD ONC ARIA PLAN FRACTIONS TREATED TO DATE: 12
RAD ONC ARIA PLAN ID: NORMAL
RAD ONC ARIA PLAN PRESCRIBED DOSE PER FRACTION: 2 GY
RAD ONC ARIA PLAN PRIMARY REFERENCE POINT: NORMAL
RAD ONC ARIA PLAN TOTAL FRACTIONS PRESCRIBED: 15
RAD ONC ARIA PLAN TOTAL PRESCRIBED DOSE: 3000 CGY
RAD ONC ARIA REFERENCE POINT DOSAGE GIVEN TO DATE: 44 GY
RAD ONC ARIA REFERENCE POINT ID: NORMAL
RAD ONC ARIA REFERENCE POINT SESSION DOSAGE GIVEN: 2 GY

## 2023-11-08 PROCEDURE — 77014 CHG CT GUIDANCE RADIATION THERAPY FLDS PLACEMENT: CPT | Performed by: RADIOLOGY

## 2023-11-08 PROCEDURE — 77385: CPT | Performed by: RADIOLOGY

## 2023-11-09 ENCOUNTER — PATIENT OUTREACH (OUTPATIENT)
Dept: OTHER | Facility: HOSPITAL | Age: 83
End: 2023-11-09
Payer: MEDICARE

## 2023-11-09 ENCOUNTER — HOSPITAL ENCOUNTER (OUTPATIENT)
Dept: RADIATION ONCOLOGY | Facility: HOSPITAL | Age: 83
Discharge: HOME | End: 2023-11-09

## 2023-11-09 LAB
RAD ONC ARIA COURSE ID: NORMAL
RAD ONC ARIA COURSE INTENT: NORMAL
RAD ONC ARIA COURSE LAST TREATMENT DATE: NORMAL
RAD ONC ARIA COURSE START DATE: NORMAL
RAD ONC ARIA COURSE TREATMENT ELAPSED DAYS: 30
RAD ONC ARIA FIRST TREATMENT DATE: NORMAL
RAD ONC ARIA PLAN FRACTIONS TREATED TO DATE: 13
RAD ONC ARIA PLAN ID: NORMAL
RAD ONC ARIA PLAN PRESCRIBED DOSE PER FRACTION: 2 GY
RAD ONC ARIA PLAN PRIMARY REFERENCE POINT: NORMAL
RAD ONC ARIA PLAN TOTAL FRACTIONS PRESCRIBED: 15
RAD ONC ARIA PLAN TOTAL PRESCRIBED DOSE: 3000 CGY
RAD ONC ARIA REFERENCE POINT DOSAGE GIVEN TO DATE: 46 GY
RAD ONC ARIA REFERENCE POINT ID: NORMAL
RAD ONC ARIA REFERENCE POINT SESSION DOSAGE GIVEN: 2 GY

## 2023-11-09 PROCEDURE — 77385: CPT | Performed by: RADIOLOGY

## 2023-11-09 PROCEDURE — 77336 RADIATION PHYSICS CONSULT: CPT | Performed by: RADIOLOGY

## 2023-11-09 PROCEDURE — 77014 CHG CT GUIDANCE RADIATION THERAPY FLDS PLACEMENT: CPT | Performed by: RADIOLOGY

## 2023-11-09 NOTE — PROGRESS NOTES
Chart reviewed. Patient will see survivorship APRN Nov 13th. No further follow up needed from navigational services.

## 2023-11-10 ENCOUNTER — HOSPITAL ENCOUNTER (OUTPATIENT)
Dept: RADIATION ONCOLOGY | Facility: HOSPITAL | Age: 83
Discharge: HOME | End: 2023-11-10

## 2023-11-10 ENCOUNTER — OFFICE VISIT (OUTPATIENT)
Dept: CARDIOLOGY | Facility: CLINIC | Age: 83
End: 2023-11-10
Payer: MEDICARE

## 2023-11-10 ENCOUNTER — HOSPITAL ENCOUNTER (OUTPATIENT)
Dept: CARDIOLOGY | Facility: HOSPITAL | Age: 83
Discharge: HOME | End: 2023-11-10
Payer: MEDICARE

## 2023-11-10 VITALS
WEIGHT: 128 LBS | HEIGHT: 60 IN | BODY MASS INDEX: 25.13 KG/M2 | HEART RATE: 100 BPM | OXYGEN SATURATION: 99 % | DIASTOLIC BLOOD PRESSURE: 72 MMHG | SYSTOLIC BLOOD PRESSURE: 138 MMHG

## 2023-11-10 VITALS
DIASTOLIC BLOOD PRESSURE: 74 MMHG | SYSTOLIC BLOOD PRESSURE: 132 MMHG | BODY MASS INDEX: 25.13 KG/M2 | HEART RATE: 89 BPM | HEIGHT: 60 IN | WEIGHT: 128 LBS

## 2023-11-10 DIAGNOSIS — Z17.1 MALIGNANT NEOPLASM OF UPPER-OUTER QUADRANT OF RIGHT BREAST IN FEMALE, ESTROGEN RECEPTOR NEGATIVE: ICD-10-CM

## 2023-11-10 DIAGNOSIS — Z79.899 ENCOUNTER FOR MONITORING CARDIOTOXIC DRUG THERAPY: ICD-10-CM

## 2023-11-10 DIAGNOSIS — C50.411 MALIGNANT NEOPLASM OF UPPER-OUTER QUADRANT OF RIGHT BREAST IN FEMALE, ESTROGEN RECEPTOR NEGATIVE: ICD-10-CM

## 2023-11-10 DIAGNOSIS — Z51.81 ENCOUNTER FOR MONITORING CARDIOTOXIC DRUG THERAPY: Primary | ICD-10-CM

## 2023-11-10 DIAGNOSIS — Z51.81 ENCOUNTER FOR MONITORING CARDIOTOXIC DRUG THERAPY: ICD-10-CM

## 2023-11-10 DIAGNOSIS — N28.9 RENAL INSUFFICIENCY: ICD-10-CM

## 2023-11-10 DIAGNOSIS — I10 PRIMARY HYPERTENSION: ICD-10-CM

## 2023-11-10 DIAGNOSIS — Z79.899 ENCOUNTER FOR MONITORING CARDIOTOXIC DRUG THERAPY: Primary | ICD-10-CM

## 2023-11-10 DIAGNOSIS — R79.89 ELEVATED TROPONIN: ICD-10-CM

## 2023-11-10 LAB
BH CV ECHO LEFT VENTRICLE GLOBAL LONGITUDINAL STRAIN: -20.2 %
BH CV ECHO MEAS - EDV(CUBED): 74.1 ML
BH CV ECHO MEAS - EDV(MOD-SP2): 93 ML
BH CV ECHO MEAS - EDV(MOD-SP4): 82 ML
BH CV ECHO MEAS - EF(MOD-BP): 54.2 %
BH CV ECHO MEAS - EF(MOD-SP2): 51.6 %
BH CV ECHO MEAS - EF(MOD-SP4): 54.9 %
BH CV ECHO MEAS - ESV(CUBED): 26.6 ML
BH CV ECHO MEAS - ESV(MOD-SP2): 45 ML
BH CV ECHO MEAS - ESV(MOD-SP4): 37 ML
BH CV ECHO MEAS - FS: 28.9 %
BH CV ECHO MEAS - IVS/LVPW: 1 CM
BH CV ECHO MEAS - IVSD: 0.8 CM
BH CV ECHO MEAS - LAT PEAK E' VEL: 6.2 CM/SEC
BH CV ECHO MEAS - LV DIASTOLIC VOL/BSA (35-75): 53.1 CM2
BH CV ECHO MEAS - LV MASS(C)D: 101.3 GRAMS
BH CV ECHO MEAS - LV SYSTOLIC VOL/BSA (12-30): 24 CM2
BH CV ECHO MEAS - LVIDD: 4.2 CM
BH CV ECHO MEAS - LVIDS: 3 CM
BH CV ECHO MEAS - LVPWD: 0.8 CM
BH CV ECHO MEAS - MED PEAK E' VEL: 5.3 CM/SEC
BH CV ECHO MEAS - MR MAX PG: 101.8 MMHG
BH CV ECHO MEAS - MR MAX VEL: 504.5 CM/SEC
BH CV ECHO MEAS - MV A DUR: 0.09 SEC
BH CV ECHO MEAS - MV A MAX VEL: 108 CM/SEC
BH CV ECHO MEAS - MV DEC SLOPE: 1117 CM/SEC2
BH CV ECHO MEAS - MV DEC TIME: 0.16 SEC
BH CV ECHO MEAS - MV E MAX VEL: 93 CM/SEC
BH CV ECHO MEAS - MV E/A: 0.86
BH CV ECHO MEAS - MV MAX PG: 9.2 MMHG
BH CV ECHO MEAS - MV MEAN PG: 4.8 MMHG
BH CV ECHO MEAS - MV P1/2T: 35.5 MSEC
BH CV ECHO MEAS - MV V2 VTI: 32.8 CM
BH CV ECHO MEAS - MVA(P1/2T): 6.2 CM2
BH CV ECHO MEAS - PULM A REVS DUR: 0.11 SEC
BH CV ECHO MEAS - PULM A REVS VEL: 21 CM/SEC
BH CV ECHO MEAS - PULM DIAS VEL: 30 CM/SEC
BH CV ECHO MEAS - PULM S/D: 1.57
BH CV ECHO MEAS - PULM SYS VEL: 47.1 CM/SEC
BH CV ECHO MEAS - RAP SYSTOLE: 3 MMHG
BH CV ECHO MEAS - RVSP: 17.5 MMHG
BH CV ECHO MEAS - SI(MOD-SP2): 31.1 ML/M2
BH CV ECHO MEAS - SI(MOD-SP4): 29.1 ML/M2
BH CV ECHO MEAS - SV(MOD-SP2): 48 ML
BH CV ECHO MEAS - SV(MOD-SP4): 45 ML
BH CV ECHO MEAS - TAPSE (>1.6): 2 CM
BH CV ECHO MEAS - TR MAX PG: 14.5 MMHG
BH CV ECHO MEAS - TR MAX VEL: 190.5 CM/SEC
BH CV ECHO MEASUREMENTS AVERAGE E/E' RATIO: 16.17
BH CV XLRA - RV BASE: 2.18 CM
BH CV XLRA - RV LENGTH: 6.9 CM
BH CV XLRA - RV MID: 2.09 CM
BH CV XLRA - TDI S': 12.9 CM/SEC
LEFT ATRIUM VOLUME INDEX: 27.3 ML/M2
RAD ONC ARIA COURSE ID: NORMAL
RAD ONC ARIA COURSE INTENT: NORMAL
RAD ONC ARIA COURSE LAST TREATMENT DATE: NORMAL
RAD ONC ARIA COURSE START DATE: NORMAL
RAD ONC ARIA COURSE TREATMENT ELAPSED DAYS: 31
RAD ONC ARIA FIRST TREATMENT DATE: NORMAL
RAD ONC ARIA PLAN FRACTIONS TREATED TO DATE: 14
RAD ONC ARIA PLAN ID: NORMAL
RAD ONC ARIA PLAN PRESCRIBED DOSE PER FRACTION: 2 GY
RAD ONC ARIA PLAN PRIMARY REFERENCE POINT: NORMAL
RAD ONC ARIA PLAN TOTAL FRACTIONS PRESCRIBED: 15
RAD ONC ARIA PLAN TOTAL PRESCRIBED DOSE: 3000 CGY
RAD ONC ARIA REFERENCE POINT DOSAGE GIVEN TO DATE: 48 GY
RAD ONC ARIA REFERENCE POINT ID: NORMAL
RAD ONC ARIA REFERENCE POINT SESSION DOSAGE GIVEN: 2 GY

## 2023-11-10 PROCEDURE — 93321 DOPPLER ECHO F-UP/LMTD STD: CPT

## 2023-11-10 PROCEDURE — 93325 DOPPLER ECHO COLOR FLOW MAPG: CPT

## 2023-11-10 PROCEDURE — 93000 ELECTROCARDIOGRAM COMPLETE: CPT | Performed by: INTERNAL MEDICINE

## 2023-11-10 PROCEDURE — 3078F DIAST BP <80 MM HG: CPT | Performed by: INTERNAL MEDICINE

## 2023-11-10 PROCEDURE — 99214 OFFICE O/P EST MOD 30 MIN: CPT | Performed by: INTERNAL MEDICINE

## 2023-11-10 PROCEDURE — 77385: CPT | Performed by: RADIOLOGY

## 2023-11-10 PROCEDURE — 77014 CHG CT GUIDANCE RADIATION THERAPY FLDS PLACEMENT: CPT | Performed by: RADIOLOGY

## 2023-11-10 PROCEDURE — 93308 TTE F-UP OR LMTD: CPT

## 2023-11-10 PROCEDURE — 3075F SYST BP GE 130 - 139MM HG: CPT | Performed by: INTERNAL MEDICINE

## 2023-11-10 PROCEDURE — 93356 MYOCRD STRAIN IMG SPCKL TRCK: CPT

## 2023-11-10 NOTE — PROGRESS NOTES
Date of Office Visit: 11/10/2023  Encounter Provider: Cassidy Maldonado MD  Place of Service: [facility] CARDIOLOGY  Patient Name: Veronica Royal  :1940    Chief complaint  Coronary artery disease, hypertension, cardiac collagen care    History of Present Illness  Patient is a 83-year-old female with diabetes, hypertension, hyperlipidemia, chronic renal insufficiency, hyperkalemia and cirrhosis..  She was diagnosed with right-sided inflammatory breast cancer 2023.  She started chemotherapy with THP on 2023, she has been switched to Kadcyla.  She is also complaining right-sided radiation therapy.      Baseline echo with EF 56 to 60% with GLS of -20.3% with grade 1A diastolic dysfunction, aortic valve calcification without stenosis, trivial mitral and tricuspid valve regurgitation with normal right-sided pressures.  She was noted to have coronary artery calcification and on 2023 stress perfusion study was negative for ischemia.  Echo 2023 showed an ejection fraction 59%, GLS -17.9%.  (12.% drop from 2023 ).  Echo on 11/10/2023 (today) shows an ejection fraction of 54% with GLS -20.2% trivial valve regurgitation, mild mitral regurgitation..    She feels well.  She has had no palpitation shortness of breath dizziness or chest pain.  There is mild dependent edema.  This is chronic.    Past Medical History:   Diagnosis Date    Anxiety     Arthritis     Basal cell carcinoma     Left thumb    Breast cancer     Right    Depression     Diabetes mellitus     Diarrhea     s/e chemo    High cholesterol     History of chemotherapy 2023    Hypertension     Rash 2023    s/e chemo    Sacral decubitus ulcer     cleaning with soap & water    Urinary incontinence     wears pads     Past Surgical History:   Procedure Laterality Date    CATARACT EXTRACTION EXTRACAPSULAR W/ INTRAOCULAR LENS IMPLANTATION Right     EYE SURGERY      Muscle repair age 21    MASTECTOMY W/ SENTINEL NODE  BIOPSY Right 8/24/2023    Procedure: Right breast modified radical mastectomy;  Surgeon: Rosa Michael MD;  [place] [facility] [place] [facility] [place]  Service: General;  Laterality: Right;    TONSILLECTOMY      VENOUS ACCESS DEVICE (PORT) INSERTION N/A 05/19/2023    Procedure: INSERTION VENOUS ACCESS DEVICE;  Surgeon: Rosa Michael MD;  [place]  [facility] [place] [facility] [place];  Service: General;  Laterality: N/A;     Outpatient Medications Prior to Visit   Medication Sig Dispense Refill    acetaminophen (TYLENOL) 325 MG tablet Take 2 tablets by mouth Every 4 (Four) Hours As Needed for Mild Pain. 120 tablet 3    Cholecalciferol 50 MCG (2000 UT) tablet Take 1 tablet by mouth Daily (Monday-Friday).      glipiZIDE-metFORMIN (METAGLIP) 2.5-500 MG per tablet Take 2 tablets by mouth 2 (Two) Times a Day Before Meals. (Patient taking differently: Take 2 tablets by mouth Every Morning.) 120 tablet 3    glucose blood test strip Check once per day, E11.65 30 each 5    glucose monitor monitoring kit Use 1 each Daily. Dispense glucometer with brand based off insurance coverage, E11.65, check once per day 30 each 5    Lancets misc Check BG once daily, E11.9 30 each 4    lidocaine-prilocaine (EMLA) 2.5-2.5 % cream Apply nickel size amount to port site 30 min before appt time do not rub in cover with plastic wrap 30 g 5    losartan (Cozaar) 25 MG tablet Take 1 tablet by mouth Daily. 90 tablet 0    ondansetron (ZOFRAN) 8 MG tablet Take 1 tablet by mouth 3 (Three) Times a Day As Needed for Nausea or Vomiting. 30 tablet 5    pantoprazole (PROTONIX) 40 MG EC tablet TAKE 1 TABLET BY MOUTH DAILY 30 tablet 0    simvastatin (ZOCOR) 10 MG tablet Take 1 tablet by mouth Every Night.      sodium bicarbonate 650 MG tablet       Tradjenta 5 MG tablet tablet Take 1 tablet by mouth Every Morning. 90 tablet 5    valsartan-hydrochlorothiazide (DIOVAN-HCT) 160-12.5 MG per tablet Take 1 tablet by mouth Daily.       No facility-administered medications prior to visit.  "      Allergies as of 11/10/2023    (No Known Allergies)     Social History     Socioeconomic History    Marital status:    Tobacco Use    Smoking status: Never    Smokeless tobacco: Never   Vaping Use    Vaping Use: Never used   Substance and Sexual Activity    Alcohol use: Never    Drug use: Never    Sexual activity: Never     Family History   Problem Relation Age of Onset    Alzheimer's disease Mother     Heart disease Father     Heart attack Father     Ovarian cancer Sister     Pancreatic cancer Brother     Malaxel Hyperthermia Neg Hx     Colon cancer Neg Hx     Colon polyps Neg Hx     Crohn's disease Neg Hx     Irritable bowel syndrome Neg Hx     Ulcerative colitis Neg Hx      Review of Systems   Constitutional: Negative for chills, fever, weight gain and weight loss.   Cardiovascular:  Negative for leg swelling.   Respiratory:  Negative for cough, snoring and wheezing.    Hematologic/Lymphatic: Negative for bleeding problem. Does not bruise/bleed easily.   Skin:  Negative for color change.   Musculoskeletal:  Negative for falls, joint pain and myalgias.   Gastrointestinal:  Negative for melena.   Genitourinary:  Negative for hematuria.   Neurological:  Negative for excessive daytime sleepiness.   Psychiatric/Behavioral:  Negative for depression. The patient is not nervous/anxious.         Objective:     Vitals:    11/10/23 1119   BP: 132/74   Pulse: 89   Weight: 58.1 kg (128 lb)   Height: 152.4 cm (60\")     Body mass index is 25 kg/m².    Vitals reviewed.   Constitutional:       Appearance: Well-developed.   Eyes:      General: No scleral icterus.        Right eye: No discharge.      Conjunctiva/sclera: Conjunctivae normal.      Pupils: Pupils are equal, round, and reactive to light.   HENT:      Head: Normocephalic.      Nose: Nose normal.   Neck:      Thyroid: No thyromegaly.      Vascular: No JVD.   Pulmonary:      Effort: Pulmonary effort is normal. No respiratory distress.      Breath sounds: Normal " breath sounds. No wheezing. No rales.   Cardiovascular:      Normal rate. Regular rhythm. Normal S1. Normal S2.       Murmurs: There is no murmur.      No gallop.    Pulses:     Intact distal pulses.      Carotid: 2+ bilaterally.     Radial: 2+ bilaterally.     Femoral: 2+ bilaterally.     Popliteal: 2+ bilaterally.     Dorsalis pedis: 2+ bilaterally.     Posterior tibial: 2+ bilaterally.  Edema:     Peripheral edema absent.   Abdominal:      General: Bowel sounds are normal. There is no distension.      Palpations: Abdomen is soft.      Tenderness: There is no abdominal tenderness. There is no rebound.   Musculoskeletal: Normal range of motion.         General: No tenderness.      Cervical back: Normal range of motion and neck supple. Skin:     General: Skin is warm and dry.      Findings: No erythema or rash.   Neurological:      Mental Status: Alert and oriented to person, place, and time.   Psychiatric:         Behavior: Behavior normal.         Thought Content: Thought content normal.         Judgment: Judgment normal.       Lab Review:   Lab Results - Last 18 Months   Lab Units 11/01/23  0920 10/11/23  0753   WBC 10*3/mm3 6.00 8.46   RBC 10*6/mm3 3.70* 3.53*   HEMOGLOBIN g/dL 10.0* 9.6*   HEMATOCRIT % 31.9* 31.4*   MCV fL 86.2 89.0   MCH pg 27.0 27.2   MCHC g/dL 31.3* 30.6*   RDW % 14.6 14.5   PLATELETS 10*3/mm3 151 274   NEUTROPHIL % % 78.1* 58.2   LYMPHOCYTE % % 8.7* 31.0   MONOCYTES % % 10.3 6.5   EOSINOPHIL % % 2.3 3.5   BASOPHIL % % 0.3 0.4   NEUTROS ABS 10*3/mm3 4.68 4.93   LYMPHS ABS 10*3/mm3 0.52* 2.62   MONOS ABS 10*3/mm3 0.62 0.55   EOS ABS 10*3/mm3 0.14 0.30   BASOS ABS 10*3/mm3 0.02 0.03   RDW-SD fl 46.1 46.4   MPV fL 10.0 9.8       Lab Results - Last 18 Months   Lab Units 11/01/23  0920 10/19/23  1030 10/11/23  0753   GLUCOSE mg/dL 239* 165* 188*   BUN mg/dL 22 14 22   CREATININE mg/dL 0.91 1.05* 1.00   SODIUM mmol/L 140 144 142   POTASSIUM mmol/L 3.9 5.1 3.9   CHLORIDE mmol/L 104 105 106   CO2  "mmol/L 22.2 25.8 23.6   CALCIUM mg/dL 9.1 10.0 9.0   TOTAL PROTEIN g/dL 6.8  --  6.3   ALBUMIN g/dL 3.4*  --  3.2*   ALT (SGPT) U/L 6  --  10   AST (SGOT) U/L 25  --  23   ALK PHOS U/L 51  --  81   BILIRUBIN mg/dL 0.2  --  0.2   GLOBULIN gm/dL 3.4  --  3.1   A/G RATIO g/dL 1.0  --  1.0   BUN / CREAT RATIO  24.2 13.3 22.0   ANION GAP mmol/L 13.8 13.2 12.4   EGFR mL/min/1.73 62.7 52.8* 56.4*     Lab Results - Last 18 Months   Lab Units 10/19/23  1030 06/02/23  1331   PROBNP pg/mL 272.0 40.2     Lab Results - Last 18 Months   Lab Units 10/19/23  1030 06/02/23  1331   HSTROP T ng/L 16* 12*     No results for input(s): \"TSH\" in the last 26246 hours.  Lab Results - Last 18 Months   Lab Units 05/25/23  1105   PROTIME Seconds 13.9           ECG 12 Lead    Date/Time: 11/10/2023 11:58 AM  Performed by: Cassidy Maldonado MD    Authorized by: Cassidy Maldonado MD  Comparison: compared with previous ECG   Similar to previous ECG  Rhythm: sinus rhythm  Rhythm comments: PACs present    Clinical impression: normal ECG            Diagnosis Plan   1. Encounter for monitoring cardiotoxic drug therapy  ECG 12 Lead    Adult Transthoracic Echo Limited W/ Cont if Necessary Per Protocol      2. Primary hypertension        3. Malignant neoplasm of upper-outer quadrant of right breast in female, estrogen receptor negative          Plan:         1.  Right-sided inflammatory breast cancer.  Per ESC guidelines she is considered high risk.  She has been treated with THP starting 5/2023 and is now on Kadcycla.  Strain imaging had altered 12% in September however it is back to baseline today's echo.  Okay to continue with current regimen.  2.  Cardio oncology care.  Given dyspnea with her high CV risk and evidence of coronary artery calcification, stress perfusion study was performed on 6/2022 was negative for ischemia  3.  Coronary artery disease.  Coronary calcification noted on CT scan of the chest on 5/2023.  As above   4.  Hypertension, fairly well " controlled  5.  Hyperlipidemia.  Lipids in May had been suboptimal with LDL of 101 in the setting of coronary calcification and diabetes.  Had considered increasing simvastatin.  But may need to wait till after chemotherapy.  We will contact Dr. Yee regarding this  6.  Diabetes  7   Chronic renal insufficiency baseline creatinine 1.3.   8.  Chronic mild anemia as well    Time Spent: I spent 35 minutes caring for Veronica on this date of service. This time includes time spent by me in the following activities: preparing for the visit, reviewing tests, obtaining and/or reviewing a separately obtained history, performing a medically appropriate examination and/or evaluation, counseling and educating the patient/family/caregiver, ordering medications, tests, or procedures, documenting information in the medical record, and independently interpreting results and communicating that information with the patient/family/caregiver.   I spent 1 minutes on the separately reported service of ECG. This time is not included in the time used to support the E/M service also reported today.        Your medication list            Accurate as of November 10, 2023 11:59 PM. If you have any questions, ask your nurse or doctor.                CHANGE how you take these medications        Instructions Last Dose Given Next Dose Due   glipiZIDE-metFORMIN 2.5-500 MG per tablet  Commonly known as: METAGLIP  What changed: when to take this      Take 2 tablets by mouth 2 (Two) Times a Day Before Meals.              CONTINUE taking these medications        Instructions Last Dose Given Next Dose Due   acetaminophen 325 MG tablet  Commonly known as: TYLENOL      Take 2 tablets by mouth Every 4 (Four) Hours As Needed for Mild Pain.       Cholecalciferol 50 MCG (2000 UT) tablet      Take 1 tablet by mouth Daily (Monday-Friday).       glucose blood test strip      Check once per day, E11.65       glucose monitor monitoring kit      Use 1 each Daily.  Dispense glucometer with brand based off insurance coverage, E11.65, check once per day       Lancets misc      Check BG once daily, E11.9       lidocaine-prilocaine 2.5-2.5 % cream  Commonly known as: EMLA      Apply nickel size amount to port site 30 min before appt time do not rub in cover with plastic wrap       losartan 25 MG tablet  Commonly known as: Cozaar      Take 1 tablet by mouth Daily.       ondansetron 8 MG tablet  Commonly known as: ZOFRAN      Take 1 tablet by mouth 3 (Three) Times a Day As Needed for Nausea or Vomiting.       pantoprazole 40 MG EC tablet  Commonly known as: PROTONIX      TAKE 1 TABLET BY MOUTH DAILY       simvastatin 10 MG tablet  Commonly known as: ZOCOR      Take 1 tablet by mouth Every Night.       sodium bicarbonate 650 MG tablet           Tradjenta 5 MG tablet tablet  Generic drug: linagliptin      Take 1 tablet by mouth Every Morning.       valsartan-hydrochlorothiazide 160-12.5 MG per tablet  Commonly known as: DIOVAN-HCT      Take 1 tablet by mouth Daily.                Patient is no longer taking -.  I corrected the med list to reflect this.  I did not stop these medications.      Dictated utilizing Dragon dictation

## 2023-11-11 ENCOUNTER — TELEPHONE (OUTPATIENT)
Dept: CARDIOLOGY | Facility: CLINIC | Age: 83
End: 2023-11-11
Payer: MEDICARE

## 2023-11-11 DIAGNOSIS — I25.810 CORONARY ARTERY DISEASE INVOLVING CORONARY BYPASS GRAFT OF NATIVE HEART, UNSPECIFIED WHETHER ANGINA PRESENT: Primary | ICD-10-CM

## 2023-11-11 NOTE — TELEPHONE ENCOUNTER
Veronica Sosa's  echo on Friday was stable in terms of EF and strain imaging had improved.  Should be okay to continue with treatment.  I am just wondering how you feel about increasing her statin therapy from 20-40 nightly?  Thanks

## 2023-11-13 ENCOUNTER — OFFICE VISIT (OUTPATIENT)
Dept: OTHER | Facility: HOSPITAL | Age: 83
End: 2023-11-13
Payer: MEDICARE

## 2023-11-13 ENCOUNTER — HOSPITAL ENCOUNTER (OUTPATIENT)
Dept: RADIATION ONCOLOGY | Facility: HOSPITAL | Age: 83
Discharge: HOME | End: 2023-11-13

## 2023-11-13 VITALS
HEIGHT: 60 IN | BODY MASS INDEX: 24.54 KG/M2 | HEART RATE: 105 BPM | DIASTOLIC BLOOD PRESSURE: 68 MMHG | SYSTOLIC BLOOD PRESSURE: 115 MMHG | WEIGHT: 125 LBS | OXYGEN SATURATION: 98 % | TEMPERATURE: 97.7 F

## 2023-11-13 DIAGNOSIS — H91.93 BILATERAL HEARING LOSS, UNSPECIFIED HEARING LOSS TYPE: ICD-10-CM

## 2023-11-13 DIAGNOSIS — Z91.89 DRIVING SAFETY ISSUE: ICD-10-CM

## 2023-11-13 DIAGNOSIS — C50.919 MALIGNANT NEOPLASM OF FEMALE BREAST, UNSPECIFIED ESTROGEN RECEPTOR STATUS, UNSPECIFIED LATERALITY, UNSPECIFIED SITE OF BREAST: Primary | ICD-10-CM

## 2023-11-13 DIAGNOSIS — K59.01 SLOW TRANSIT CONSTIPATION: ICD-10-CM

## 2023-11-13 DIAGNOSIS — Z91.89 AT HIGH RISK FOR MALNUTRITION: ICD-10-CM

## 2023-11-13 LAB
RAD ONC ARIA COURSE ID: NORMAL
RAD ONC ARIA COURSE INTENT: NORMAL
RAD ONC ARIA COURSE LAST TREATMENT DATE: NORMAL
RAD ONC ARIA COURSE START DATE: NORMAL
RAD ONC ARIA COURSE TREATMENT ELAPSED DAYS: 34
RAD ONC ARIA FIRST TREATMENT DATE: NORMAL
RAD ONC ARIA PLAN FRACTIONS TREATED TO DATE: 15
RAD ONC ARIA PLAN ID: NORMAL
RAD ONC ARIA PLAN PRESCRIBED DOSE PER FRACTION: 2 GY
RAD ONC ARIA PLAN PRIMARY REFERENCE POINT: NORMAL
RAD ONC ARIA PLAN TOTAL FRACTIONS PRESCRIBED: 15
RAD ONC ARIA PLAN TOTAL PRESCRIBED DOSE: 3000 CGY
RAD ONC ARIA REFERENCE POINT DOSAGE GIVEN TO DATE: 50 GY
RAD ONC ARIA REFERENCE POINT ID: NORMAL
RAD ONC ARIA REFERENCE POINT SESSION DOSAGE GIVEN: 2 GY

## 2023-11-13 PROCEDURE — G0463 HOSPITAL OUTPT CLINIC VISIT: HCPCS | Performed by: NURSE PRACTITIONER

## 2023-11-13 PROCEDURE — 77385: CPT | Performed by: RADIOLOGY

## 2023-11-13 PROCEDURE — 77014 CHG CT GUIDANCE RADIATION THERAPY FLDS PLACEMENT: CPT | Performed by: RADIOLOGY

## 2023-11-13 RX ORDER — AMOXICILLIN 250 MG
1 CAPSULE ORAL DAILY PRN
Qty: 30 TABLET | Refills: 3 | Status: SHIPPED | OUTPATIENT
Start: 2023-11-13

## 2023-11-13 NOTE — PROGRESS NOTES
[facility] MULTIDISCIPLINARY CLINIC  SURVIVORSHIP VISIT: IN CLINIC  Survivorship Treatment Summary and Older Adult Functional Assessment Follow-Up Visit        Veronica Royal is a pleasant 83 y.o. female reviewed today in Multidisciplinary Clinic, for follow-up survivorship treatment summary and older adult functional assessment visit.     HPI  Presents today with daughter Norman whom the patient has given permission to participate in the conversation today.    In the interim patient has proceeded with right breast radiation and she will complete this next week.  She continues Kadcyla every 3 weeks.  She has been having some constipation with last BM about 2 days ago.  She is not taking anything.  She reports diminished appetite with 3 pound weight loss in the last week.     She has met with the lymphedema therapist prior to initiation of radiation and is scheduled for follow-up afterward.  She has full range of motion of her upper extremities and denies any pain discomfort or swelling.    She continues to be anxious to return to driving.  She will have an appointment to have hearing aids fitted on 11/21/2023.  She has completed an updated eye exam.  She wants to review the options for driving assessment available to her.      TREATMENT HISTORY:     Oncology/Hematology History   Malignant neoplasm of upper-outer quadrant of right breast in female, estrogen receptor negative   4/21/2023 Initial Diagnosis    Malignant neoplasm of upper-outer quadrant of right breast in female, estrogen receptor negative     4/21/2023 Biopsy    Final Diagnosis   1. Right Breast at 9:00 o'clock, Core Biopsy (OPUS [id number]):                A. Invasive ductal carcinoma with apocrine features:                            1. Overall Rogers grade III (tubular score=3, nuclear score=3, mitotic score=2).                            2. Invasive carcinoma measures at least 15 mm maximally.                            3.  No lymphovascular space invasion identified.                              4. Additional findings (IHC biomarkers per outside report).                                         Estrogen receptor - Negative.  Progesterone receptor - Negative.  Her2/destiny:  Equivocal (score 2+). FISH pending  Ki67 = 38.45%     2. Right Axilla Biopsy:                 A. Soft tissue involved by invasive ductal carcinoma with apocrine features, 9 mm.  with                        associated microcalcification.               B. No definitive lymph node tissue identified.     HER2 amplified on FISH     5/1/2023 Biopsy    Final Diagnosis   1. Right Breast, Biopsies for Skin Thickening: INVASIVE MAMMARY CARCINOMA, NO SPECIAL TYPE (INVASIVE        DUCTAL CARCINOMA).               A. Invasive carcinoma diffusely involves the dermis of the skin with lymphovascular space invasion identified.               B. Carcinoma is Patriot histologic score III (tubules = 3, nuclei = 3, mitosis = 3).                       5/1/2023 Genetic Testing    Invitae 9 gene test: Negative     5/9/2023 -  Other Event    ECHOCARDIOGRAM    Left ventricular systolic function is normal. Calculated left ventricular EF = 51.4%    Left ventricular diastolic function is consistent with (grade Ia w/high LAP) impaired relaxation.    There is calcification of the aortic valve.    Estimated right ventricular systolic pressure from tricuspid regurgitation is normal (<35 mmHg). Calculated right ventricular systolic pressure from tricuspid regurgitation is 17 mmHg.     5/24/2023 - 7/5/2023 Chemotherapy    OP BREAST Pertuzumab / Trastuzumab / PACLitaxel  Stopped pertuzumab/paclitaxel after C3 D1 due to toxicity  Last herceptn given 8/2/23 8/24/2023 Surgery    Final Diagnosis   1. Breast, Right, Modified Radical Mastectomy:    A. Invasive ductal adenocarcinoma with                            1. The tumor is Rogers grade II (tubular grade 3, nuclear grade 3, mitotic grade 1).                2. The tumor measures up to 4 cm (four consecutive 1 cm slices).                            3. Microcalcifications are seen in the area of the tumor.                            4. The tumor is in the area of 9:00 o'clock, 6 cm from nipple.                            5. Cork-shaped clip is identified.                            6. Treatment changes are present.                            7. Dermal lymphatic invasion is present.                            8. Capillary lymphatic invasion is present.                            9. Tumor seen in skin but ulceration through the skin is not identified.                            10. Residual cancer burden equals 3.454.                            11. Residual cancer burden class RCB-III.                            12. Xiong-Lima Grade 3                             13. The invasive tumor comes within 11 mm of the posterior margin, 55 mm of the inferior margin, 75 mm         of the superior margin and 80 mm of the medial and lateral margins.               B. Lymph nodes:                            1. Eighteen lymph nodes are identified, two with metastatic disease   i. One with macrometastisis (size of greatest metastasis 2.5 mm)      with no chemotherapy change).                                         ii. One with Individual tumor cells seen with chemotherapy change.                                        iii. There was also one node benign with chemotherapy change and a clip but no residual tumor.               C. Additional findings:                            1. Focus of atypical ductal hyperplasia.        Comment: The pretreatment classification was cT4d (inflammatory carcinoma). Case [id number] was reviewed. The patient presented clinically with inflammatory carcinoma (cT4d). Based off microscopic findings (ie. Skin involvement without ulceration by tumor), this carcinoma is best classified as ypT2, N1a.     2. Lymph Node, Right Additional Axillary Tissue:   Single benign lymph node     Comment: There are no changes suggestive of treatment effect.     3. Breast, Right, Additional Lateral Skin Margin, Excision:  Benign skin and underlying connective tissue 15 mm deep     REGIONAL LYMPH NODES   Regional Lymph Node Status  Tumor present in regional lymph node(s)   Number of Lymph Nodes with Macrometastases  1   Number of Lymph Nodes with Micrometastases  0   Number of Lymph Nodes with Isolated Tumor Cells  1   Size of Largest Jaime Metastatic Deposit  2.5 mm   Extranodal Extension  Not identified   Total Number of Lymph Nodes Examined (sentinel and non-sentinel)  19   Number of [place] Nodes Examined  0       Pathologic stage: ypT2 pN1a    Estrogen receptor: Negative at <1%  Progesterone receptor: Negative <1%  HER2 status: Equivocal 2+ IHC; amplification not detected by FISH  Ki 67: 50%         9/20/2023 -  Chemotherapy    OP BREAST Ado-Trastuzumab Emtansine     9/27/2023 -  Other Event    ECHOCARDIOGRAM    Left ventricular systolic function is normal. Calculated left ventricular 3D EF = 59% Normal global longitudinal LV strain (GLS) = -17.9%. Left ventricle strain data was reviewed by the physician and found to be accurate. The global longitudinal strain has changed 12% from prior study in 5/9/2023. Ejection fraction however has improved. Normal left ventricular cavity size and wall thickness noted. All left ventricular wall segments contract normally. Left ventricular diastolic function is consistent with (grade Ia w/high LAP) impaired relaxation.    Trace mitral valve regurgitation is present    Trace tricuspid valve regurgitation is present. Insufficient TR velocity profile to estimate the right ventricular systolic pressure.     10/10/2023 - 11/13/2023 Radiation    Radiation OncologyTreatment Course:  Veronica Royal received 5000 cGy in right chest wall fractions to 25.     10/20/2023 -  Other Event    ECHOCARDIOGRAM    Left ventricular systolic function is normal.  "Calculated left ventricular EF = 54.1% Global longitudinal LV strain (GLS) = -18.6%. Normal left ventricular cavity size and wall thickness noted. All left ventricular wall segments contract normally. Left ventricular diastolic function is consistent with (grade Ia w/high LAP) impaired relaxation.    Mild mitral valve regurgitation is present.    Trace tricuspid valve regurgitation is present. Insufficient TR velocity profile to estimate the right ventricular systolic pressure.    Compared to baseline study dated 5/9/2023 ejection fraction is stable.  Left ventricular GLS is slightly changed by 8% (not significant)         Past Medical History:   Diagnosis Date    Anxiety     Arthritis     Basal cell carcinoma     Left thumb    Breast cancer 2023    Right    Depression     Diabetes mellitus     Diarrhea     s/e chemo    High cholesterol     History of chemotherapy 08/2023    Hypertension     Rash 08/2023    s/e chemo    Sacral decubitus ulcer     cleaning with soap & water    Urinary incontinence     wears pads       Past Surgical History:   Procedure Laterality Date    CATARACT EXTRACTION EXTRACAPSULAR W/ INTRAOCULAR LENS IMPLANTATION Right     EYE SURGERY      Muscle repair age 21    MASTECTOMY W/ SENTINEL NODE BIOPSY Right 8/24/2023    Procedure: Right breast modified radical mastectomy;  Surgeon: Rosa Michael MD;  Location: [facility];  Service: General;  Laterality: Right;    TONSILLECTOMY      VENOUS ACCESS DEVICE (PORT) INSERTION N/A 05/19/2023    Procedure: INSERTION VENOUS ACCESS DEVICE;  Surgeon: Rosa Michael MD;  Location: [facility];  Service: General;  Laterality: N/A;       Social History     Socioeconomic History    Marital status:    Tobacco Use    Smoking status: Never    Smokeless tobacco: Never   Vaping Use    Vaping Use: Never used   Substance and Sexual Activity    Alcohol use: Never    Drug use: Never    Sexual activity: Never         No results found for: \"LDH\", " "\"URICACID\"      Lab Results   Component Value Date    GLUCOSE 239 (H) 11/01/2023    BUN 22 11/01/2023    CREATININE 0.91 11/01/2023    BCR 24.2 11/01/2023    K 3.9 11/01/2023    CO2 22.2 11/01/2023    CALCIUM 9.1 11/01/2023    ALBUMIN 3.4 (L) 11/01/2023    LABIL2 1.2 03/01/2022    AST 25 11/01/2023    ALT 6 11/01/2023       CBC w/diff          9/27/2023    13:25 10/11/2023    07:53 11/1/2023    09:20   CBC w/Diff   WBC 14.23  8.46  6.00    RBC 3.41  3.53  3.70    Hemoglobin 9.5  9.6  10.0    Hematocrit 29.7  31.4  31.9    MCV 87.1  89.0  86.2    MCH 27.9  27.2  27.0    MCHC 32.0  30.6  31.3    RDW 14.3  14.5  14.6    Platelets 200  274  151    Neutrophil Rel % 66.6  58.2  78.1    Immature Granulocyte Rel % 1.1  0.4  0.3    Lymphocyte Rel % 24.2  31.0  8.7    Monocyte Rel % 6.6  6.5  10.3    Eosinophil Rel % 1.1  3.5  2.3    Basophil Rel % 0.4  0.4  0.3        Allergies as of 11/13/2023    (No Known Allergies)        MEDICATIONS:  Medication list reviewed today    Review of Systems   Constitutional:  Positive for appetite change and fatigue. Negative for activity change and unexpected weight change.   Respiratory:  Positive for shortness of breath. Negative for chest tightness.    Cardiovascular:  Negative for chest pain and leg swelling.   Gastrointestinal:  Positive for abdominal pain and constipation. Negative for blood in stool and diarrhea.   Genitourinary:  Negative for dysuria.   Musculoskeletal:  Negative for arthralgias and myalgias.   Skin:  Positive for rash.   Neurological:  Negative for weakness.   Psychiatric/Behavioral:  Negative for dysphoric mood and sleep disturbance. The patient is not nervous/anxious.        /68   Pulse 105   Temp 97.7 °F (36.5 °C) (Temporal)   Ht 152.4 cm (60\")   Wt 56.7 kg (125 lb)   SpO2 98%   BMI 24.41 kg/m²     Wt Readings from Last 3 Encounters:   11/13/23 56.7 kg (125 lb)   11/10/23 58.1 kg (128 lb)   11/10/23 58.1 kg (128 lb)        There were no vitals filed " for this visit.    PHQ-9 Total Score: 3   GAD7 Total Score: 0   Distress Score: 3         Physical Exam  Constitutional:       Appearance: She is well-groomed.   HENT:      Head: Normocephalic and atraumatic.   Cardiovascular:      Rate and Rhythm: Normal rate and regular rhythm.   Pulmonary:      Effort: No tachypnea or respiratory distress.   Abdominal:      General: Abdomen is flat. There is no distension.   Musculoskeletal:      Right ankle: No swelling. No tenderness. Normal pulse.      Left ankle: No swelling. No tenderness. Normal pulse.   Skin:     General: Skin is warm and dry.   Neurological:      Mental Status: She is alert and oriented to person, place, and time.   Psychiatric:         Attention and Perception: Attention and perception normal.         Mood and Affect: Mood and affect normal.         Speech: Speech normal.         Behavior: Behavior normal. Behavior is cooperative.         Thought Content: Thought content normal.           DISCUSSION HELD TODAY:   Discussed NCCN recommendations for all cancer survivors of 150 minutes/week moderate intensity exercise, achieve and maintain a healthy weight, plants-based whole-foods diet, avoid tobacco and second hand smoke, avoid alcohol or minimize alcohol intake - no more than 1 drink in a day for adults.     A copy of the Survivorship Treatment Summary & Care Plan for Ms. Royal was provided to and forwarded to the providers identified on the care team.    Plan and recommendations:  Continue to recommend against driving however we did discuss formal driving evaluation at [facility] and I have provided them with some additional written information.  Patient declines a referral today but will consider.  She questions whether formal driving evaluation is necessary, I have asked her to review with her primary care provider.  Follow-up with lymphedema clinic at completion of radiation  She should be finishing radiation at the beginning of next  week  Continue cardio oncology follow-up as directed  Liver ultrasound scheduled 11/21/2023  Provided written information for [facility] driving assessment.  She will complete her hearing aid fitting prior to pursuing driving assessment scheduled 11/21  Discussed referral to social work for financial toxicity related to driving evaluation which will be 300$ out of pocket for the patient  Senna-S for constipation.  Continue to monitor and log BP to share with Dr Maldonado at follow up  No further follow up for now- she will call as needed  Call my office as needed at [phone] for additional information, resources or support.      Diagnoses and all orders for this visit:    1. Malignant neoplasm of female breast, unspecified estrogen receptor status, unspecified laterality, unspecified site of breast (Primary)  -     sennosides-docusate (senna-docusate sodium) 8.6-50 MG per tablet; Take 1 tablet by mouth Daily As Needed for Constipation.  Dispense: 30 tablet; Refill: 3  -     Ambulatory Referral to [facility]    2. At high risk for malnutrition  -     Ambulatory Referral to [facility]    3. Driving safety issue    4. Bilateral hearing loss, unspecified hearing loss type    5. Slow transit constipation  -     Ambulatory Referral to [facility] Nutrition Services            I spent 40 minutes caring for this patient on this date of service by face-to-face counseling. This time includes time spent by me in the following activities: preparing for the visit, reviewing tests, obtaining and/or reviewing a separately obtained history, performing a medically appropriate examination and/or evaluation, counseling and educating the patient/family/caregiver, ordering medications, tests, or procedures, documenting information in the medical record, and care coordination

## 2023-11-14 ENCOUNTER — RADIATION ONCOLOGY WEEKLY ASSESSMENT (OUTPATIENT)
Dept: RADIATION ONCOLOGY | Facility: HOSPITAL | Age: 83
End: 2023-11-14
Payer: MEDICARE

## 2023-11-14 VITALS
DIASTOLIC BLOOD PRESSURE: 78 MMHG | HEART RATE: 102 BPM | WEIGHT: 122.4 LBS | SYSTOLIC BLOOD PRESSURE: 155 MMHG | BODY MASS INDEX: 23.9 KG/M2 | OXYGEN SATURATION: 99 %

## 2023-11-14 DIAGNOSIS — C50.411 MALIGNANT NEOPLASM OF UPPER-OUTER QUADRANT OF RIGHT BREAST IN FEMALE, ESTROGEN RECEPTOR NEGATIVE: Primary | ICD-10-CM

## 2023-11-14 DIAGNOSIS — Z17.1 MALIGNANT NEOPLASM OF UPPER-OUTER QUADRANT OF RIGHT BREAST IN FEMALE, ESTROGEN RECEPTOR NEGATIVE: Primary | ICD-10-CM

## 2023-11-14 LAB
RAD ONC ARIA COURSE ID: NORMAL
RAD ONC ARIA COURSE INTENT: NORMAL
RAD ONC ARIA COURSE LAST TREATMENT DATE: NORMAL
RAD ONC ARIA COURSE START DATE: NORMAL
RAD ONC ARIA COURSE TREATMENT ELAPSED DAYS: 35
RAD ONC ARIA FIRST TREATMENT DATE: NORMAL
RAD ONC ARIA PLAN FRACTIONS TREATED TO DATE: 1
RAD ONC ARIA PLAN ID: NORMAL
RAD ONC ARIA PLAN NAME: NORMAL
RAD ONC ARIA PLAN PRESCRIBED DOSE PER FRACTION: 2 GY
RAD ONC ARIA PLAN PRIMARY REFERENCE POINT: NORMAL
RAD ONC ARIA PLAN TOTAL FRACTIONS PRESCRIBED: 5
RAD ONC ARIA PLAN TOTAL PRESCRIBED DOSE: 1000 CGY
RAD ONC ARIA REFERENCE POINT DOSAGE GIVEN TO DATE: 2 GY
RAD ONC ARIA REFERENCE POINT ID: NORMAL
RAD ONC ARIA REFERENCE POINT SESSION DOSAGE GIVEN: 2 GY

## 2023-11-14 PROCEDURE — 77280 THER RAD SIMULAJ FIELD SMPL: CPT | Performed by: RADIOLOGY

## 2023-11-14 PROCEDURE — 77332 RADIATION TREATMENT AID(S): CPT | Performed by: RADIOLOGY

## 2023-11-14 PROCEDURE — 77427 RADIATION TX MANAGEMENT X5: CPT | Performed by: RADIOLOGY

## 2023-11-14 PROCEDURE — 77412 RADIATION TX DELIVERY LVL 3: CPT | Performed by: RADIOLOGY

## 2023-11-14 NOTE — PROGRESS NOTES
Physician Weekly Management Note    Diagnosis:     Diagnosis Plan   1. Malignant neoplasm of upper-outer quadrant of right breast in female, estrogen receptor negative            RT Details:  fx 26/30 right chest wall and nodes, started scar boost today 1/5    Notes on Treatment course, Films, Medical progress:  Kps 80% doing ok, moderate erythema over right chest wall and supraclav, decreased appetite secondary to constipation, completes Monday  fu 4-6 weeks    Weekly Management:  Medication reviewed?   Yes  New medications given?   No  Problemlist reviewed?   Yes  Problem added?   No  Issues raised requiring referral to support services - task assigned to:  mark    Technical aspects reviewed:  Weekly OBI approved?   Yes  Weekly port films approved?   Yes  Change requests noted on port film?   No  Patient setup and plan reviewed?   Yes    Chart Reviewed:  Continue current treatment plan?   Yes  Treatment plan change requested?   No  CBC reviewed?   Yes  Concurrent Chemo?   No    Objective     Toxicities:   grade 2/3 erythema right chest wall and supraclav     Review of Systems   Constitutional:  Positive for appetite change, fatigue and unexpected weight change.   Skin:  Positive for color change.        There were no vitals filed for this visit.        11/13/2023     9:12 AM   Current Status   ECOG score 0       Physical Exam  Constitutional:       Appearance: Normal appearance.   Chest:          Comments: Moderate erythema right chest wall and supraclav  Neurological:      Mental Status: She is alert.           Problem Summary List    Diagnosis:     Diagnosis Plan   1. Malignant neoplasm of upper-outer quadrant of right breast in female, estrogen receptor negative          Pathology:   breast    Past Medical History:   Diagnosis Date    Anxiety     Arthritis     Basal cell carcinoma     Left thumb    Breast cancer 2023    Right    Depression     Diabetes mellitus     Diarrhea     s/e chemo    High cholesterol      History of chemotherapy 08/2023    Hypertension     Rash 08/2023    s/e chemo    Sacral decubitus ulcer     cleaning with soap & water    Urinary incontinence     wears pads         Past Surgical History:   Procedure Laterality Date    CATARACT EXTRACTION EXTRACAPSULAR W/ INTRAOCULAR LENS IMPLANTATION Right     EYE SURGERY      Muscle repair age 21    MASTECTOMY W/ SENTINEL NODE BIOPSY Right 8/24/2023    Procedure: Right breast modified radical mastectomy;  Surgeon: Rosa Michael MD;  [place] [facility] [place]  Service: General;  Laterality: Right;    TONSILLECTOMY      VENOUS ACCESS DEVICE (PORT) INSERTION N/A 05/19/2023    Procedure: INSERTION VENOUS ACCESS DEVICE;  Surgeon: Rosa Michael MD;  [place] [facility] [place] [facility] [place]  Service: General;  Laterality: N/A;         Current Outpatient Medications on File Prior to Visit   Medication Sig Dispense Refill    acetaminophen (TYLENOL) 325 MG tablet Take 2 tablets by mouth Every 4 (Four) Hours As Needed for Mild Pain. 120 tablet 3    Cholecalciferol 50 MCG (2000 UT) tablet Take 1 tablet by mouth Daily (Monday-Friday).      glipiZIDE-metFORMIN (METAGLIP) 2.5-500 MG per tablet Take 2 tablets by mouth 2 (Two) Times a Day Before Meals. (Patient taking differently: Take 2 tablets by mouth Every Morning.) 120 tablet 3    glucose blood test strip Check once per day, E11.65 30 each 5    glucose monitor monitoring kit Use 1 each Daily. Dispense glucometer with brand based off insurance coverage, E11.65, check once per day 30 each 5    Lancets misc Check BG once daily, E11.9 30 each 4    lidocaine-prilocaine (EMLA) 2.5-2.5 % cream Apply nickel size amount to port site 30 min before appt time do not rub in cover with plastic wrap 30 g 5    losartan (Cozaar) 25 MG tablet Take 1 tablet by mouth Daily. 90 tablet 0    ondansetron (ZOFRAN) 8 MG tablet Take 1 tablet by mouth 3 (Three) Times a Day As Needed for Nausea or Vomiting. 30 tablet 5    pantoprazole (PROTONIX) 40 MG EC tablet  TAKE 1 TABLET BY MOUTH DAILY 30 tablet 0    sennosides-docusate (senna-docusate sodium) 8.6-50 MG per tablet Take 1 tablet by mouth Daily As Needed for Constipation. 30 tablet 3    simvastatin (ZOCOR) 10 MG tablet Take 1 tablet by mouth Every Night.      sodium bicarbonate 650 MG tablet       Tradjenta 5 MG tablet tablet Take 1 tablet by mouth Every Morning. 90 tablet 5    valsartan-hydrochlorothiazide (DIOVAN-HCT) 160-12.5 MG per tablet Take 1 tablet by mouth Daily.       No current facility-administered medications on file prior to visit.       No Known Allergies      Referring Provider:    No referring provider defined for this encounter.    Oncologist:  No primary care provider on file.      Seen and approved by:  Marie Tesfaye MD  11/14/2023

## 2023-11-15 ENCOUNTER — HOSPITAL ENCOUNTER (OUTPATIENT)
Dept: RADIATION ONCOLOGY | Facility: HOSPITAL | Age: 83
Discharge: HOME | End: 2023-11-15

## 2023-11-15 LAB
RAD ONC ARIA COURSE ID: NORMAL
RAD ONC ARIA COURSE INTENT: NORMAL
RAD ONC ARIA COURSE LAST TREATMENT DATE: NORMAL
RAD ONC ARIA COURSE START DATE: NORMAL
RAD ONC ARIA COURSE TREATMENT ELAPSED DAYS: 36
RAD ONC ARIA FIRST TREATMENT DATE: NORMAL
RAD ONC ARIA PLAN FRACTIONS TREATED TO DATE: 2
RAD ONC ARIA PLAN ID: NORMAL
RAD ONC ARIA PLAN NAME: NORMAL
RAD ONC ARIA PLAN PRESCRIBED DOSE PER FRACTION: 2 GY
RAD ONC ARIA PLAN PRIMARY REFERENCE POINT: NORMAL
RAD ONC ARIA PLAN TOTAL FRACTIONS PRESCRIBED: 5
RAD ONC ARIA PLAN TOTAL PRESCRIBED DOSE: 1000 CGY
RAD ONC ARIA REFERENCE POINT DOSAGE GIVEN TO DATE: 4 GY
RAD ONC ARIA REFERENCE POINT ID: NORMAL
RAD ONC ARIA REFERENCE POINT SESSION DOSAGE GIVEN: 2 GY

## 2023-11-15 PROCEDURE — 77412 RADIATION TX DELIVERY LVL 3: CPT | Performed by: RADIOLOGY

## 2023-11-16 ENCOUNTER — HOSPITAL ENCOUNTER (OUTPATIENT)
Dept: RADIATION ONCOLOGY | Facility: HOSPITAL | Age: 83
Discharge: HOME | End: 2023-11-16

## 2023-11-16 LAB
RAD ONC ARIA COURSE ID: NORMAL
RAD ONC ARIA COURSE INTENT: NORMAL
RAD ONC ARIA COURSE LAST TREATMENT DATE: NORMAL
RAD ONC ARIA COURSE START DATE: NORMAL
RAD ONC ARIA COURSE TREATMENT ELAPSED DAYS: 37
RAD ONC ARIA FIRST TREATMENT DATE: NORMAL
RAD ONC ARIA PLAN FRACTIONS TREATED TO DATE: 3
RAD ONC ARIA PLAN ID: NORMAL
RAD ONC ARIA PLAN NAME: NORMAL
RAD ONC ARIA PLAN PRESCRIBED DOSE PER FRACTION: 2 GY
RAD ONC ARIA PLAN PRIMARY REFERENCE POINT: NORMAL
RAD ONC ARIA PLAN TOTAL FRACTIONS PRESCRIBED: 5
RAD ONC ARIA PLAN TOTAL PRESCRIBED DOSE: 1000 CGY
RAD ONC ARIA REFERENCE POINT DOSAGE GIVEN TO DATE: 6 GY
RAD ONC ARIA REFERENCE POINT ID: NORMAL
RAD ONC ARIA REFERENCE POINT SESSION DOSAGE GIVEN: 2 GY

## 2023-11-16 PROCEDURE — 77412 RADIATION TX DELIVERY LVL 3: CPT | Performed by: RADIOLOGY

## 2023-11-16 PROCEDURE — 77336 RADIATION PHYSICS CONSULT: CPT | Performed by: RADIOLOGY

## 2023-11-17 ENCOUNTER — HOSPITAL ENCOUNTER (OUTPATIENT)
Dept: RADIATION ONCOLOGY | Facility: HOSPITAL | Age: 83
Discharge: HOME | End: 2023-11-17

## 2023-11-17 LAB
RAD ONC ARIA COURSE ID: NORMAL
RAD ONC ARIA COURSE INTENT: NORMAL
RAD ONC ARIA COURSE LAST TREATMENT DATE: NORMAL
RAD ONC ARIA COURSE START DATE: NORMAL
RAD ONC ARIA COURSE TREATMENT ELAPSED DAYS: 38
RAD ONC ARIA FIRST TREATMENT DATE: NORMAL
RAD ONC ARIA PLAN FRACTIONS TREATED TO DATE: 4
RAD ONC ARIA PLAN ID: NORMAL
RAD ONC ARIA PLAN NAME: NORMAL
RAD ONC ARIA PLAN PRESCRIBED DOSE PER FRACTION: 2 GY
RAD ONC ARIA PLAN PRIMARY REFERENCE POINT: NORMAL
RAD ONC ARIA PLAN TOTAL FRACTIONS PRESCRIBED: 5
RAD ONC ARIA PLAN TOTAL PRESCRIBED DOSE: 1000 CGY
RAD ONC ARIA REFERENCE POINT DOSAGE GIVEN TO DATE: 8 GY
RAD ONC ARIA REFERENCE POINT ID: NORMAL
RAD ONC ARIA REFERENCE POINT SESSION DOSAGE GIVEN: 2 GY

## 2023-11-17 PROCEDURE — 77412 RADIATION TX DELIVERY LVL 3: CPT | Performed by: RADIOLOGY

## 2023-11-17 NOTE — TELEPHONE ENCOUNTER
Please let patient know Dr. Yee was okay with increasing statin therapy but as the last blood test was in May,'s please see if she can come in for repeat fasting lipid panel.  If it has improved significantly may not need to increase statin.

## 2023-11-17 NOTE — TELEPHONE ENCOUNTER
Notified patient's daughter of results/recommendations, allowed per MADELYN. She verbalized understanding.    Edwina Everett RN  Triage MG

## 2023-11-20 ENCOUNTER — HOSPITAL ENCOUNTER (OUTPATIENT)
Dept: RADIATION ONCOLOGY | Facility: HOSPITAL | Age: 83
Discharge: HOME | End: 2023-11-20
Payer: MEDICARE

## 2023-11-20 DIAGNOSIS — C50.411 MALIGNANT NEOPLASM OF UPPER-OUTER QUADRANT OF RIGHT BREAST IN FEMALE, ESTROGEN RECEPTOR NEGATIVE: Primary | ICD-10-CM

## 2023-11-20 DIAGNOSIS — Z17.1 MALIGNANT NEOPLASM OF UPPER-OUTER QUADRANT OF RIGHT BREAST IN FEMALE, ESTROGEN RECEPTOR NEGATIVE: Primary | ICD-10-CM

## 2023-11-20 LAB
RAD ONC ARIA COURSE END DATE: NORMAL
RAD ONC ARIA COURSE ID: NORMAL
RAD ONC ARIA COURSE ID: NORMAL
RAD ONC ARIA COURSE INTENT: NORMAL
RAD ONC ARIA COURSE INTENT: NORMAL
RAD ONC ARIA COURSE LAST TREATMENT DATE: NORMAL
RAD ONC ARIA COURSE LAST TREATMENT DATE: NORMAL
RAD ONC ARIA COURSE START DATE: NORMAL
RAD ONC ARIA COURSE START DATE: NORMAL
RAD ONC ARIA COURSE TREATMENT ELAPSED DAYS: 41
RAD ONC ARIA COURSE TREATMENT ELAPSED DAYS: 41
RAD ONC ARIA FIRST TREATMENT DATE: NORMAL
RAD ONC ARIA FIRST TREATMENT DATE: NORMAL
RAD ONC ARIA PLAN FRACTIONS TREATED TO DATE: 10
RAD ONC ARIA PLAN FRACTIONS TREATED TO DATE: 15
RAD ONC ARIA PLAN FRACTIONS TREATED TO DATE: 5
RAD ONC ARIA PLAN FRACTIONS TREATED TO DATE: 5
RAD ONC ARIA PLAN ID: NORMAL
RAD ONC ARIA PLAN NAME: NORMAL
RAD ONC ARIA PLAN PRESCRIBED DOSE PER FRACTION: 2 GY
RAD ONC ARIA PLAN PRIMARY REFERENCE POINT: NORMAL
RAD ONC ARIA PLAN TOTAL FRACTIONS PRESCRIBED: 10
RAD ONC ARIA PLAN TOTAL FRACTIONS PRESCRIBED: 15
RAD ONC ARIA PLAN TOTAL FRACTIONS PRESCRIBED: 5
RAD ONC ARIA PLAN TOTAL FRACTIONS PRESCRIBED: 5
RAD ONC ARIA PLAN TOTAL PRESCRIBED DOSE: 1000 CGY
RAD ONC ARIA PLAN TOTAL PRESCRIBED DOSE: 1000 CGY
RAD ONC ARIA PLAN TOTAL PRESCRIBED DOSE: 2000 CGY
RAD ONC ARIA PLAN TOTAL PRESCRIBED DOSE: 3000 CGY
RAD ONC ARIA REFERENCE POINT DOSAGE GIVEN TO DATE: 10 GY
RAD ONC ARIA REFERENCE POINT DOSAGE GIVEN TO DATE: 10 GY
RAD ONC ARIA REFERENCE POINT DOSAGE GIVEN TO DATE: 50 GY
RAD ONC ARIA REFERENCE POINT ID: NORMAL
RAD ONC ARIA REFERENCE POINT SESSION DOSAGE GIVEN: 2 GY

## 2023-11-20 PROCEDURE — 77412 RADIATION TX DELIVERY LVL 3: CPT | Performed by: RADIOLOGY

## 2023-11-21 ENCOUNTER — LAB (OUTPATIENT)
Facility: HOSPITAL | Age: 83
End: 2023-11-21
Payer: MEDICARE

## 2023-11-21 ENCOUNTER — HOSPITAL ENCOUNTER (OUTPATIENT)
Facility: HOSPITAL | Age: 83
Discharge: HOME | End: 2023-11-21
Admitting: NURSE PRACTITIONER
Payer: MEDICARE

## 2023-11-21 DIAGNOSIS — K74.60 CIRRHOSIS OF LIVER WITHOUT ASCITES, UNSPECIFIED HEPATIC CIRRHOSIS TYPE: ICD-10-CM

## 2023-11-21 DIAGNOSIS — Z45.2 ENCOUNTER FOR FITTING AND ADJUSTMENT OF VASCULAR CATHETER: Primary | ICD-10-CM

## 2023-11-21 DIAGNOSIS — C50.411 MALIGNANT NEOPLASM OF UPPER-OUTER QUADRANT OF RIGHT BREAST IN FEMALE, ESTROGEN RECEPTOR NEGATIVE: ICD-10-CM

## 2023-11-21 DIAGNOSIS — I25.810 CORONARY ARTERY DISEASE INVOLVING CORONARY BYPASS GRAFT OF NATIVE HEART, UNSPECIFIED WHETHER ANGINA PRESENT: ICD-10-CM

## 2023-11-21 DIAGNOSIS — Z17.1 MALIGNANT NEOPLASM OF UPPER-OUTER QUADRANT OF RIGHT BREAST IN FEMALE, ESTROGEN RECEPTOR NEGATIVE: ICD-10-CM

## 2023-11-21 LAB
ALBUMIN SERPL-MCNC: 3.9 G/DL (ref 3.5–5.2)
ALBUMIN/GLOB SERPL: 1.3 G/DL
ALP SERPL-CCNC: 56 U/L (ref 39–117)
ALT SERPL W P-5'-P-CCNC: 14 U/L (ref 1–33)
ANION GAP SERPL CALCULATED.3IONS-SCNC: 10.4 MMOL/L (ref 5–15)
AST SERPL-CCNC: 28 U/L (ref 1–32)
BASOPHILS # BLD AUTO: 0.02 10*3/MM3 (ref 0–0.2)
BASOPHILS NFR BLD AUTO: 0.5 % (ref 0–1.5)
BILIRUB SERPL-MCNC: 0.3 MG/DL (ref 0–1.2)
BUN SERPL-MCNC: 20 MG/DL (ref 8–23)
BUN/CREAT SERPL: 20.4 (ref 7–25)
CALCIUM SPEC-SCNC: 9.3 MG/DL (ref 8.6–10.5)
CHLORIDE SERPL-SCNC: 104 MMOL/L (ref 98–107)
CHOLEST SERPL-MCNC: 147 MG/DL (ref 0–200)
CO2 SERPL-SCNC: 24.6 MMOL/L (ref 22–29)
CREAT SERPL-MCNC: 0.98 MG/DL (ref 0.57–1)
DEPRECATED RDW RBC AUTO: 41.5 FL (ref 37–54)
EGFRCR SERPLBLD CKD-EPI 2021: 57.4 ML/MIN/1.73
EOSINOPHIL # BLD AUTO: 0.12 10*3/MM3 (ref 0–0.4)
EOSINOPHIL NFR BLD AUTO: 2.8 % (ref 0.3–6.2)
ERYTHROCYTE [DISTWIDTH] IN BLOOD BY AUTOMATED COUNT: 14.2 % (ref 12.3–15.4)
GLOBULIN UR ELPH-MCNC: 3 GM/DL
GLUCOSE SERPL-MCNC: 147 MG/DL (ref 65–99)
HCT VFR BLD AUTO: 32.9 % (ref 34–46.6)
HDLC SERPL-MCNC: 36 MG/DL (ref 40–60)
HGB BLD-MCNC: 10.4 G/DL (ref 12–15.9)
IMM GRANULOCYTES # BLD AUTO: 0.01 10*3/MM3 (ref 0–0.05)
IMM GRANULOCYTES NFR BLD AUTO: 0.2 % (ref 0–0.5)
LDLC SERPL CALC-MCNC: 86 MG/DL (ref 0–100)
LDLC/HDLC SERPL: 2.29 {RATIO}
LYMPHOCYTES # BLD AUTO: 0.34 10*3/MM3 (ref 0.7–3.1)
LYMPHOCYTES NFR BLD AUTO: 7.8 % (ref 19.6–45.3)
MCH RBC QN AUTO: 25.9 PG (ref 26.6–33)
MCHC RBC AUTO-ENTMCNC: 31.6 G/DL (ref 31.5–35.7)
MCV RBC AUTO: 82 FL (ref 79–97)
MONOCYTES # BLD AUTO: 0.51 10*3/MM3 (ref 0.1–0.9)
MONOCYTES NFR BLD AUTO: 11.8 % (ref 5–12)
NEUTROPHILS NFR BLD AUTO: 3.34 10*3/MM3 (ref 1.7–7)
NEUTROPHILS NFR BLD AUTO: 76.9 % (ref 42.7–76)
NRBC BLD AUTO-RTO: 0 /100 WBC (ref 0–0.2)
PLATELET # BLD AUTO: 139 10*3/MM3 (ref 140–450)
PMV BLD AUTO: 10.3 FL (ref 6–12)
POTASSIUM SERPL-SCNC: 4.1 MMOL/L (ref 3.5–5.2)
PROT SERPL-MCNC: 6.9 G/DL (ref 6–8.5)
RBC # BLD AUTO: 4.01 10*6/MM3 (ref 3.77–5.28)
SODIUM SERPL-SCNC: 139 MMOL/L (ref 136–145)
TRIGL SERPL-MCNC: 143 MG/DL (ref 0–150)
VLDLC SERPL-MCNC: 25 MG/DL (ref 5–40)
WBC NRBC COR # BLD AUTO: 4.34 10*3/MM3 (ref 3.4–10.8)

## 2023-11-21 PROCEDURE — 80053 COMPREHEN METABOLIC PANEL: CPT

## 2023-11-21 PROCEDURE — 76705 ECHO EXAM OF ABDOMEN: CPT

## 2023-11-21 PROCEDURE — 25010000002 HEPARIN LOCK FLUSH PER 10 UNITS: Performed by: INTERNAL MEDICINE

## 2023-11-21 PROCEDURE — 80061 LIPID PANEL: CPT

## 2023-11-21 PROCEDURE — 85025 COMPLETE CBC W/AUTO DIFF WBC: CPT

## 2023-11-21 RX ORDER — HEPARIN SODIUM (PORCINE) LOCK FLUSH IV SOLN 100 UNIT/ML 100 UNIT/ML
500 SOLUTION INTRAVENOUS AS NEEDED
OUTPATIENT
Start: 2023-11-21

## 2023-11-21 RX ORDER — SODIUM CHLORIDE 0.9 % (FLUSH) 0.9 %
10 SYRINGE (ML) INJECTION AS NEEDED
OUTPATIENT
Start: 2023-11-21

## 2023-11-21 RX ORDER — SODIUM CHLORIDE 0.9 % (FLUSH) 0.9 %
10 SYRINGE (ML) INJECTION AS NEEDED
Status: DISCONTINUED | OUTPATIENT
Start: 2023-11-21 | End: 2023-11-22 | Stop reason: HOSPADM

## 2023-11-21 RX ORDER — HEPARIN SODIUM (PORCINE) LOCK FLUSH IV SOLN 100 UNIT/ML 100 UNIT/ML
500 SOLUTION INTRAVENOUS AS NEEDED
Status: DISCONTINUED | OUTPATIENT
Start: 2023-11-21 | End: 2023-11-22 | Stop reason: HOSPADM

## 2023-11-21 RX ADMIN — HEPARIN 500 UNITS: 100 SYRINGE at 09:54

## 2023-11-21 RX ADMIN — Medication 10 ML: at 09:52

## 2023-11-22 ENCOUNTER — INFUSION (OUTPATIENT)
Dept: ONCOLOGY | Facility: HOSPITAL | Age: 83
End: 2023-11-22
Payer: MEDICARE

## 2023-11-22 ENCOUNTER — NUTRITION (OUTPATIENT)
Dept: OTHER | Facility: HOSPITAL | Age: 83
End: 2023-11-22
Payer: MEDICARE

## 2023-11-22 ENCOUNTER — OFFICE VISIT (OUTPATIENT)
Dept: ONCOLOGY | Facility: CLINIC | Age: 83
End: 2023-11-22
Payer: MEDICARE

## 2023-11-22 ENCOUNTER — TELEPHONE (OUTPATIENT)
Age: 83
End: 2023-11-22
Payer: MEDICARE

## 2023-11-22 VITALS
SYSTOLIC BLOOD PRESSURE: 128 MMHG | HEIGHT: 60 IN | RESPIRATION RATE: 16 BRPM | DIASTOLIC BLOOD PRESSURE: 78 MMHG | HEART RATE: 107 BPM | BODY MASS INDEX: 24.91 KG/M2 | TEMPERATURE: 97.7 F | WEIGHT: 126.9 LBS | OXYGEN SATURATION: 98 %

## 2023-11-22 DIAGNOSIS — T45.1X5A CHEMOTHERAPY INDUCED DIARRHEA: ICD-10-CM

## 2023-11-22 DIAGNOSIS — K74.60 CIRRHOSIS OF LIVER WITHOUT ASCITES, UNSPECIFIED HEPATIC CIRRHOSIS TYPE: Primary | ICD-10-CM

## 2023-11-22 DIAGNOSIS — K52.1 CHEMOTHERAPY INDUCED DIARRHEA: ICD-10-CM

## 2023-11-22 DIAGNOSIS — Z79.899 ENCOUNTER FOR MONITORING CARDIOTOXIC DRUG THERAPY: ICD-10-CM

## 2023-11-22 DIAGNOSIS — C50.411 MALIGNANT NEOPLASM OF UPPER-OUTER QUADRANT OF RIGHT BREAST IN FEMALE, ESTROGEN RECEPTOR NEGATIVE: Primary | ICD-10-CM

## 2023-11-22 DIAGNOSIS — I25.810 CORONARY ARTERY DISEASE INVOLVING CORONARY BYPASS GRAFT OF NATIVE HEART, UNSPECIFIED WHETHER ANGINA PRESENT: Primary | ICD-10-CM

## 2023-11-22 DIAGNOSIS — Z17.1 MALIGNANT NEOPLASM OF UPPER-OUTER QUADRANT OF RIGHT BREAST IN FEMALE, ESTROGEN RECEPTOR NEGATIVE: Primary | ICD-10-CM

## 2023-11-22 DIAGNOSIS — Z79.899 HIGH RISK MEDICATION USE: ICD-10-CM

## 2023-11-22 DIAGNOSIS — Z51.81 ENCOUNTER FOR MONITORING CARDIOTOXIC DRUG THERAPY: ICD-10-CM

## 2023-11-22 PROCEDURE — 96375 TX/PRO/DX INJ NEW DRUG ADDON: CPT

## 2023-11-22 PROCEDURE — 25810000003 SODIUM CHLORIDE 0.9 % SOLUTION: Performed by: NURSE PRACTITIONER

## 2023-11-22 PROCEDURE — 25810000003 SODIUM CHLORIDE 0.9 % SOLUTION 250 ML FLEX CONT: Performed by: NURSE PRACTITIONER

## 2023-11-22 PROCEDURE — 25010000002 ADO-TRASTUZUMAB 160 MG RECONSTITUTED SOLUTION 160 MG VIAL: Performed by: NURSE PRACTITIONER

## 2023-11-22 PROCEDURE — 25010000002 ADO-TRASTUZUMAB 100 MG RECONSTITUTED SOLUTION 1 EACH VIAL: Performed by: NURSE PRACTITIONER

## 2023-11-22 PROCEDURE — 25010000002 DEXAMETHASONE SODIUM PHOSPHATE 100 MG/10ML SOLUTION: Performed by: NURSE PRACTITIONER

## 2023-11-22 PROCEDURE — 96413 CHEMO IV INFUSION 1 HR: CPT

## 2023-11-22 RX ORDER — SODIUM CHLORIDE 9 MG/ML
250 INJECTION, SOLUTION INTRAVENOUS ONCE
Status: COMPLETED | OUTPATIENT
Start: 2023-11-22 | End: 2023-11-22

## 2023-11-22 RX ORDER — SODIUM CHLORIDE 9 MG/ML
250 INJECTION, SOLUTION INTRAVENOUS ONCE
Status: CANCELLED | OUTPATIENT
Start: 2023-11-22

## 2023-11-22 RX ORDER — SIMVASTATIN 20 MG
20 TABLET ORAL NIGHTLY
Qty: 90 TABLET | Refills: 3 | Status: SHIPPED | OUTPATIENT
Start: 2023-11-22

## 2023-11-22 RX ADMIN — ADO-TRASTUZUMAB EMTANSINE 210 MG: 20 INJECTION, POWDER, LYOPHILIZED, FOR SOLUTION INTRAVENOUS at 09:10

## 2023-11-22 RX ADMIN — SODIUM CHLORIDE 250 ML: 9 INJECTION, SOLUTION INTRAVENOUS at 08:30

## 2023-11-22 RX ADMIN — DEXAMETHASONE SODIUM PHOSPHATE 12 MG: 10 INJECTION, SOLUTION INTRAMUSCULAR; INTRAVENOUS at 08:30

## 2023-11-22 NOTE — PROGRESS NOTES
Subjective   Veronica Royal is a 83 y.o. female.  Referred by Dr. Michael for right breast inflammatory breast cancer    History of Present Illness   Ms. Royal is a 82-year-old postmenopausal  lady with hypertension, diabetes, hyperlipidemia, chronic kidney disease, hyperkalemia-chronic presented with a palpable abnormality in the right breast.  Subsequent imaging was performed.  Patient has not had a mammogram prior to this in the past 25 years.    4/21/2023-bilateral diagnostic mammogram-high density irregular mass measuring 44 mm with spiculated margins and amorphus and fine pleomorphic calcifications with skin thickening in the right breast at 9:00.  2 intramammary lymph nodes in the upper outer axillary tail region are slightly rounded  2 prominent right axillary lymph nodes largest of which measures 26 mm.  Asymmetry noted in the left breast.    Right breast ultrasound at 9 o'clock position, 3 cm from the nipple there is a 38 x 28 x 42 mm mass.  Skin thickness measuring 11 mm near the mass.  One of the 2 rounded axillary tail lymph nodes noted.  Borderline cortical thickening.  2 abnormal axillary lymph nodes.  1 lymph node displaced loss of normal morphology with a round heterogeneous appearance and echogenic foci.  Most consistent with calcified lymph node measuring 26 mm.  Second lymph node measures 12 mm.  Displaced cortical thickening.  Ultrasound-guided biopsy of the mass in the axillary lymph nodes recommended.    4/21/2023  1.right breast 9:00 biopsy-invasive ductal carcinoma with apocrine features  Grade 3  ER negative  SC negative  HER2 2+ on immunohistochemistry  HER2 amplified on FISH with HER2 copy number of 7.58, OPAL seventeen 3.18, HER2/OPAL 17 ratio of 2.4.  Ki-67 38.45%    2.right axillary biopsy-soft tissue involved by invasive ductal carcinoma with apocrine features  Associated microcalcification  No definite lymph node tissue identified.    4/29/2023-MRI of the breast-biopsy-proven  malignancy in the right breast at 9:00 measuring 4.3 cm in greatest dimension.  Attachment overlying skin and diffuse right breast edema noted.  Right axillary lymphadenopathy.  No suspicious findings in the left breast.    5/11/2023-CT of the chest abdomen and pelvis-no evidence of metastatic disease.  Liver is cirrhotic in configuration.    5/11/2023-bone scan negative    Patient presents today to the clinic for discussing neoadjuvant chemotherapy.  She is accompanied by her daughter.  Patient denies any recent changes in weight, she reports some pain at the site of malignancy.  A punch biopsy was performed and was consistent with inflammatory breast cancer.  At the site of punch biopsy there is an ulcerated lesion.    She has poorly controlled diabetes currently on glipizide metformin and Tradjenta.  Hemoglobin A1c recently was noted to be 9.9.    Also has chronic hyperkalemia, explanation unclear.    Blood pressure poorly controlled.    She reports good functional status lives independently.  Able to manage all her bills and independent of ADLs.  She has good support system in terms of her daughter.    Family history significant for brother with pancreatic cancer at the age of 68, sister with ovarian cancer at the age of 58    She received 6 weeks of Taxol Perjeta and Herceptin and subsequently admitted to the hospital due to severe nausea vomiting diarrhea poor oral intake, KAMILA, severe electrolyte abnormalities.      admission to the hospital for KAMILA, severe diarrhea, nausea vomiting and poor oral intake.She was in the hospital between 7/12/2023 to 7/20/2023.  During the hospitalization she was treated for acute UTI, electrolyte abnormalities and KAMILA.  Subsequently she was  discharged to a rehab.   During the hospitalization she was evaluated by Dr. Michael and a right breast ultrasound was obtained on 7/17/2023.  There was very little response noted with the 9 o'clock position mass 6 cm from the nipple  measuring 3.7 x 2.4 x 3.7 cm previously 3.8 x 2.5 x 4.2 cm.  In the right axilla the lymph node measured 2.6 x 1.5 x 1.9 cm previously 1.9 x 1.5 x 2.5 cm.  There was decrease in size of the adjacent lymph node measuring 0.7 cm previously 1.2 cm.    8/24/2023-right mastectomy and axillary lymph node dissection  Invasive ductal carcinoma, grade 2  The tumor measures 4 cm  Microcalcifications are seen in the tumor  Dermal lymphatic invasion present  Lymphatic invasion present  Tumor seen in the skin but no ulceration.  Residual cancer burden 3.454  RCB-3  Xiong Lmia grade 3  Margins negative  18 lymph nodes total evaluated  1 with micrometastasis with a tumor measuring 2.5 mm with no chemotherapy effect  1 with chemotherapy change and isolated tumor cells  There is 1 node with chemotherapy change and a clip but no residual tumor.    Receptors repeated  ER negative  FL negative  HER2 2+ on immunohistochemistry  FISH nonamplified    INTERVAL HISTORY:   The patient is a 83 y.o. female with above-mentioned history returns to the office today in anticipation of Kadcyla which she continues to receive every 3 weeks in the adjuvant setting.  She has recently completed radiation to the right chest wall.  She has some skin irritation though overall tolerated this well.  She tolerates Kadcyla without nausea or diarrhea.  She feels her tolerance is much improved compared to previous chemotherapy.  She has no new pain.  She did recently follow-up with cardiology with recommendations for 3-month echocardiogram.  She also underwent recent ultrasound of the liver which was negative.    The following portions of the patient's history were reviewed and updated as appropriate: allergies, current medications, past family history, past medical history, past social history, past surgical history and problem list.    Past Medical History:   Diagnosis Date    Anxiety     Arthritis     Basal cell carcinoma     Left thumb    Breast cancer      Right    Depression     Diabetes mellitus     Diarrhea     s/e chemo    High cholesterol     History of chemotherapy 2023    Hypertension     Rash 2023    s/e chemo    Sacral decubitus ulcer     cleaning with soap & water    Urinary incontinence     wears pads        Past Surgical History:   Procedure Laterality Date    CATARACT EXTRACTION EXTRACAPSULAR W/ INTRAOCULAR LENS IMPLANTATION Right     EYE SURGERY      Muscle repair age 21    MASTECTOMY W/ SENTINEL NODE BIOPSY Right 2023    Procedure: Right breast modified radical mastectomy;  Surgeon: Rosa Michael MD;  Location: [facility] [place] [facility] [place];  Service: General;  Laterality: Right;    TONSILLECTOMY      VENOUS ACCESS DEVICE (PORT) INSERTION N/A 2023    Procedure: INSERTION VENOUS ACCESS DEVICE;  Surgeon: Rosa Michael MD;  Location: [facility] [place];  Service: General;  Laterality: N/A;        Family History   Problem Relation Age of Onset    Alzheimer's disease Mother     Heart disease Father     Heart attack Father     Ovarian cancer Sister     Pancreatic cancer Brother     Malig Hyperthermia Neg Hx     Colon cancer Neg Hx     Colon polyps Neg Hx     Crohn's disease Neg Hx     Irritable bowel syndrome Neg Hx     Ulcerative colitis Neg Hx         Social History     Socioeconomic History    Marital status:    Tobacco Use    Smoking status: Never    Smokeless tobacco: Never   Vaping Use    Vaping Use: Never used   Substance and Sexual Activity    Alcohol use: Never    Drug use: Never    Sexual activity: Never        OB History    No obstetric history on file.      Age at menarche-11  Age at first live childbirth-35   2 para 1  1  Age at menopause-50  Oral conceptive pill use for 3 to 4 years    No Known Allergies     Review of Systems    Review of systems as mentioned HPI otherwise negative    Objective   Blood pressure 128/78, pulse 107, temperature 97.7 °F (36.5 °C), temperature source Temporal, resp. rate 16, height  "152.4 cm (60\"), weight 57.6 kg (126 lb 14.4 oz), SpO2 98%.     Physical Exam  Vitals reviewed.   Constitutional:       General: She is not in acute distress.     Appearance: Normal appearance. She is well-developed.   HENT:      Head: Normocephalic and atraumatic.   Eyes:      Pupils: Pupils are equal, round, and reactive to light.   Cardiovascular:      Rate and Rhythm: Normal rate and regular rhythm.      Heart sounds: Normal heart sounds. No murmur heard.  Pulmonary:      Effort: Pulmonary effort is normal. No respiratory distress.      Breath sounds: Normal breath sounds. No wheezing, rhonchi or rales.   Abdominal:      General: Bowel sounds are normal. There is no distension.      Palpations: Abdomen is soft.   Musculoskeletal:         General: Swelling (trace) present. Normal range of motion.      Cervical back: Normal range of motion.   Skin:     General: Skin is warm and dry.      Findings: No rash.   Neurological:      Mental Status: She is alert and oriented to person, place, and time.        Right chest wall carefully examined, status post mastectomy with incision well-healed, no signs or symptoms of infection.  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 Aislinn Gonzalez, CARL          Results from last 7 days   Lab Units 11/21/23  0957   WBC 10*3/mm3 4.34   NEUTROS ABS 10*3/mm3 3.34   HEMOGLOBIN g/dL 10.4*   HEMATOCRIT % 32.9*   PLATELETS 10*3/mm3 139*     Results from last 7 days   Lab Units 11/21/23  0957   SODIUM mmol/L 139   POTASSIUM mmol/L 4.1   CHLORIDE mmol/L 104   CO2 mmol/L 24.6   BUN mg/dL 20   CREATININE mg/dL 0.98   CALCIUM mg/dL 9.3   ALBUMIN g/dL 3.9   BILIRUBIN mg/dL 0.3   ALK PHOS U/L 56   ALT (SGPT) U/L 14   AST (SGOT) U/L 28   GLUCOSE mg/dL 147*               US " Liver    Result Date: 11/21/2023  1. Cholelithiasis with gallbladder polyps, below the size requiring follow-up and likely secondary to cholesterolosis  2. Hepatic steatosis and cirrhosis, without mass seen in visualized portions of the liver.  This report was finalized on 11/21/2023 3:23 PM by Dr. Jasvir Jha M.D on Workstation: [id number]        Adult Transthoracic Echo Limited W/ Cont if Necessary Per Protocol (10/19/2023 11:23)     Assessment & Plan       *Right breast inflammatory breast cancer  T4d N1 M0  Stage IIIb  ER negative, OH negative, HER2 2+ on immunohistochemistry but amplified on FISH.  Invasive ductal carcinoma with apocrine features, grade 3, Ki-67 38%  CT scans and bone scan without any obvious evidence of metastatic disease  Echocardiogram has been performed and ejection fraction normal.  Liver shows cirrhotic morphology.  LFTs otherwise normal and total bilirubin normal as well as protein normal.  It appears that the synthetic function of the liver is still within normal limits.  Mediport placed 5/19/2023  Taxol, Herceptin, Perjeta initiated 5/24/2023 5/31/2023 with C1D8 Taxol  6/28/2023-cycle 2-day 15 of Taxol.  She is overall tolerating therapy well with expected side effects.  She will proceed with Taxol today.  Scheduled for Taxol Herceptin and Perjeta.  7/12/2023-cycle 3-day 8 of TPH.  Chemotherapy held and patient was admitted to the hospital for management of severe dehydration, KAMILA, nausea vomiting diarrhea and decreased oral intake.  8/2/2023-she feels relatively well today.  Labs reviewed and stable to proceed with Herceptin only.  We will not administer Taxol and Perjeta as she has had significant issues with chemotherapy.  Right mastectomy 8/24/2023 with 4 cm of residual disease, RCB-3, treatment effect present, axillary lymph node dissection with 1 lymph node with micrometastasis measuring 2.5 mm, 1 lymph node with isolated tumor cells and a third lymph node which showed  treatment changes but no tumor cells.  Total of 18 lymph nodes removed   9/20/2023 to discuss pathology and adjuvant therapy.  We discussed Kadcyla every 3 weekly to finish a complete year.  Patient is definitely hesitant to resume any sort of chemotherapy due to her previous experience with diarrhea.  Started Kadcyla 9/20/2023, denies any diarrhea.  Tolerating treatment well so far  No evidence of recurrent disease  Adjuvant radiation completed 11/20/2023  Proceed today, 11/22/2023 with cycle 4 adjuvant Kadcyla    *Diabetes  Poorly controlled  Evaluation by endocrinology 5/30/2023 with recommendations for dietary changes and home blood sugar monitoring.  The patient's daughter reports today she is struggling with compliance.  Poorly controlled at this time    *KAMILA/CKD  8/17/2023 BUN 24 and creatinine 1.05  Patient's daughter reports that she has not been hydrating herself well.  Creatinine normal at 0.98    *?  Cirrhotic appearance of the liver on the CT scan  Referred to gastroenterology  Synthetic function appears to be normal  We will start with Taxol to see if she would tolerate the chemotherapy   Slight elevation of intervention studies today with ALT 43, AST 55.  Continue to monitor weekly  6/28/2023-mild elevation in the LFTs.  Stable compared to previous labs.  Okay to proceed with chemotherapy.  11/21/2023-LFTs normal    *Hypertension-currently on valsartan and hydrochlorothiazide.  Valsartan could be contributing to hyperkalemia.  Will refer to cardiology ASAP for management of medications and cardiac clearance for proceeding with chemotherapy  Recent echocardiogram normal  Stress test reviewed and negative.  9/27/2023-echocardiogram shows a normal ejection fraction of 59%.  Follow-up echocardiogram 10/20/2023 with ejection fraction of 54%.  When compared to baseline study 5/9/2023 ejection fraction is stable.  Left ventricular GLS is changed by 8%.  Discussed with cardiology, given the stability of  ejection fraction when compared to baseline from May, we will proceed with Kadcyla   Cardiology evaluation 11/10/2023 with stable EF and strain.  Recommendations to continue Kadcyla with 3-month follow-up echocardiogram    *Genetic testing-[employer]ae 9 gene stat panel negative,     *Decreased oral intake secondary to chemotherapy  Still not adequate oral intake  Encouraged her to drink boost and Ensure    *Frequent belching  Continue Protonix 40 mg daily  This is improved    *Cognitive impairment  It is unclear if this is the patient's baseline or mental status has been exacerbated by recent chemotherapy  The patient is struggling with compliance with her medications.  The patient's daughter expresses frustration today as the patient is struggling to care for herself at home with managing medications and symptom management.  Evaluated by CARL Antonio     *Chemotherapy-induced diarrhea  6/5/2023 patient given Enterade (Wild Berry flavor).  She has been drinking 1 a day.  She does not necessarily like the flavor (currently mixing with sugar-free Aramis-Aid which does help).  Encouraged her to increase to 2 a day.  6/21/2023 patient reports that she had stopped drinking her Enterade because she was waking up in the middle the night having diarrhea although she did not know if it was due to the Enterade her protein shakes.  I have encouraged the patient to continue Enterade, drinking it in the morning.  Try discontinuing the protein shakes and see how she does.  Patient states that she will try this  Reports 2-3 loose stools.  Continue Enterade and Imodium.  7/5/2023 continuing to have issues with diarrhea up to 3-4 bowel movements a day, patient also recently prescribed Kayexalate for an apparently elevated potassium.  Potassium only 3.1 today.  She advised to discontinue the Kayexalate she was given IV hydration today.  We will also prescribe Lomotil to use if needed to help better control her diarrhea.  Patient  is not drinking Enterade regularly.  8/2/2023-improved since discharge from the hospital and discontinuation of chemotherapy.  She received Herceptin on 8/2/2023 and reports a week of diarrhea.  Patient has not had any diarrhea since initiating Kadcyla.  Continue to monitor, she continues to note this is improved on Kadcyla    PLAN:  Proceed today with cycle 4 adjuvant Kadcyla which is continued every 3 weeks  Radiation completed 11/20/2023  Continue to follow-up with Dr. Maldonado, cardiology as scheduled with recommendations for 3-month follow-up echocardiogram in February 2024  Return in 3 weeks in 6 weeks for CBC, CMP, MD or NP follow-up and continued Kadcyla    Patient is on a high risk medication requiring close monitoring for toxicity.      Aislinn Gonzalez, APRN  11/22/2023

## 2023-11-22 NOTE — TELEPHONE ENCOUNTER
Spoke with pt's daughter.  Verbalized understanding.  They will increase statin and have labs with [facility].  I set a reminder to follow.

## 2023-11-22 NOTE — TELEPHONE ENCOUNTER
Please let her know that lipids are still elevated.  Would recommend increasing simvastatin to 20 mg daily.  I will send in new prescription for this dose.  Will get repeat CMP and lipid panel in about 6 weeks.  This can be drawn at visit with oncology.  I have placed orders for the labs

## 2023-11-22 NOTE — PROGRESS NOTES
OUTPATIENT ONCOLOGY NUTRITION ASSESSMENT    Patient Name: Veronica Royal  YOB: 1940  MRN: [id number]  Assessment Date: 11/22/2023    COMMENTS: F/U with pt and pts daughter in infusion area, pt receiving kadcyla today. Pt reports her appetite and intake have been much improved over the past 2 weeks. Pt was struggling with poor appetite and some nausea ~2 weeks ago, but it has resolved and her appetite and intake have been good. Pt mentions going to [employer] the other day and being able to eat very well. Pt denies any side effects from this treatment, tolerating it better than treatment prior to surgery.         Reason for Assessment Follow up     Diagnosis/Problem   Breast ca   Treatment Plan Chemotherapy, Radiation, Surgery completed [facility], 8/24: R mastectomy then XRT   Currently receiving Kadcyla    Frequency Every 3 weeks    Goal of cancer treatment Adjuvant   Comments:      Encounter Information        Nutrition/Diet History:  Pt has a good appetite, eating and drinking well    Oral Nutrition Supplements:    Factors/Symptoms Affecting Intake: No factors at this time   Comments:      Medical/Surgical History Past Medical History:   Diagnosis Date    Anxiety     Arthritis     Basal cell carcinoma     Left thumb    Breast cancer 2023    Right    Depression     Diabetes mellitus     Diarrhea     s/e chemo    High cholesterol     History of chemotherapy 08/2023    Hypertension     Rash 08/2023    s/e chemo    Sacral decubitus ulcer     cleaning with soap & water    Urinary incontinence     wears pads     Past Surgical History:   Procedure Laterality Date    CATARACT EXTRACTION EXTRACAPSULAR W/ INTRAOCULAR LENS IMPLANTATION Right     EYE SURGERY      Muscle repair age 21    MASTECTOMY W/ SENTINEL NODE BIOPSY Right 8/24/2023    Procedure: Right breast modified radical mastectomy;  Surgeon: Rosa Michael MD;  Location: [facility] [place];  Service: General;  Laterality: Right;    TONSILLECTOMY       "VENOUS ACCESS DEVICE (PORT) INSERTION N/A 05/19/2023    Procedure: INSERTION VENOUS ACCESS DEVICE;  Surgeon: Rosa Michael MD;  Location: [facility] [place];  Service: General;  Laterality: N/A;        Anthropometrics        Current Height Ht Readings from Last 1 Encounters:   11/22/23 152.4 cm (60\")      Current Weight Wt Readings from Last 1 Encounters:   11/22/23 57.6 kg (126 lb 14.4 oz)      BMI  24.6   Ideal Body Weight (IBW) 100#   Usual Body Weight (UBW) ~130#   Weight Change/Trend Stable   Weight History Wt Readings from Last 30 Encounters:   11/22/23 0802 57.6 kg (126 lb 14.4 oz)   11/14/23 1004 55.5 kg (122 lb 6.4 oz)   11/13/23 0911 56.7 kg (125 lb)   11/10/23 1117 58.1 kg (128 lb)   11/10/23 1119 58.1 kg (128 lb)   11/07/23 1007 58.3 kg (128 lb 8 oz)   11/01/23 0936 59.2 kg (130 lb 9.6 oz)   10/31/23 1002 58.2 kg (128 lb 6.4 oz)   10/19/23 1059 60.3 kg (133 lb)   10/17/23 1159 60.4 kg (133 lb 3.2 oz)   10/17/23 0924 60.4 kg (133 lb 3.2 oz)   10/12/23 1129 60.2 kg (132 lb 12.8 oz)   10/11/23 0809 59.8 kg (131 lb 14.4 oz)   10/10/23 0957 60.2 kg (132 lb 12.8 oz)   09/27/23 1247 59.4 kg (131 lb)   09/27/23 1404 58.6 kg (129 lb 3.2 oz)   09/21/23 1008 59.6 kg (131 lb 6.4 oz)   09/20/23 1318 58.5 kg (129 lb)   09/18/23 0829 57.7 kg (127 lb 3.2 oz)   09/11/23 0852 57.7 kg (127 lb 3.2 oz)   08/24/23 1500 59 kg (130 lb)   08/02/23 1153 58.9 kg (129 lb 12.8 oz)   07/31/23 0954 58.3 kg (128 lb 9.6 oz)   07/12/23 1630 58.7 kg (129 lb 6.4 oz)   07/12/23 0935 58.7 kg (129 lb 6.4 oz)   07/07/23 1419 62 kg (136 lb 9.6 oz)   07/05/23 0832 61.5 kg (135 lb 9.6 oz)   06/29/23 1139 65.8 kg (145 lb)   06/28/23 0904 63.6 kg (140 lb 4.8 oz)   06/21/23 0917 65.9 kg (145 lb 3.2 oz)          Medications           Current medications: Cholecalciferol, Lancets, acetaminophen, glipiZIDE-metFORMIN, glucose blood, glucose monitor, lidocaine-prilocaine, linagliptin, losartan, ondansetron, pantoprazole, sennosides-docusate, simvastatin, " "sodium bicarbonate, and valsartan-hydrochlorothiazide     Tests/Procedures        Tests/Procedures Chemotherapy     Labs       Pertinent Labs    Results from last 7 days   Lab Units 11/21/23  0957   SODIUM mmol/L 139   POTASSIUM mmol/L 4.1   CHLORIDE mmol/L 104   CO2 mmol/L 24.6   BUN mg/dL 20   CREATININE mg/dL 0.98   CALCIUM mg/dL 9.3   BILIRUBIN mg/dL 0.3   ALK PHOS U/L 56   ALT (SGPT) U/L 14   AST (SGOT) U/L 28   GLUCOSE mg/dL 147*     Results from last 7 days   Lab Units 11/21/23  0957   HEMOGLOBIN g/dL 10.4*   HEMATOCRIT % 32.9*   WBC 10*3/mm3 4.34   TRIGLYCERIDES mg/dL 143   ALBUMIN g/dL 3.9     Results from last 7 days   Lab Units 11/21/23  0957   PLATELETS 10*3/mm3 139*     No results found for: \"COVID19\"  Lab Results   Component Value Date    HGBA1C 6.40 (H) 10/19/2023          Physical Findings        Physical Appearance alert, oriented     Edema  no edema   Gastrointestinal None   Tubes/Drains none   Oral/Mouth Cavity dentures     PES STATEMENT / NUTRITION DIAGNOSIS      Nutrition Dx Problem Problem:    NutritionDiagnosisProblem: No Nutrition Diagnosis at this Time and Nutrition Appropriate for Condition at this Time    Etiology:  Medical diagnosis: Breast cancer     NUTRITION INTERVENTION / PLAN OF CARE      Intervention Goal(s) Maintain nutrition status, Provide information, Meet estimated needs, Disease management/therapy, Tolerate PO , Maintain intake, Continue positive trend, Maintain weight, and No significant weight loss         RD Intervention/Action Encouraged intake, Follow Tx progress         Recommendations:       PO Diet Continue current       Supplements       Snacks       Other          Monitor/Evaluation PO intake, Weight, Symptoms   Education Will provide eduction as needed   --    RD to follow     Electronically signed by:  Janina Moya RD  11/22/23 11:39 EST    "

## 2023-11-30 ENCOUNTER — TELEPHONE (OUTPATIENT)
Dept: ONCOLOGY | Facility: CLINIC | Age: 83
End: 2023-11-30
Payer: MEDICARE

## 2023-11-30 NOTE — TELEPHONE ENCOUNTER
Patients daughter is calling in and she states the patient has been experiencing bad leg cramps that are not causing her insomnia for the past week. She does state she does not drink adequate water and they are trying to get her to increase her water intake. Patients electrolytes looked okay on 11/21 with her last set of labs. She wanted to know if Dr. Yee had any recommendations and I let her know I would discuss this with her and get back to her. She v/u.

## 2023-11-30 NOTE — TELEPHONE ENCOUNTER
Caller: MAK THAKKAR    Relationship: Emergency Contact    Best call back number: [phone]    What is the best time to reach you: ANYTIME    Who are you requesting to speak with (clinical staff, provider,  specific staff member): CLINICAL    What was the call regarding: PT IS EXPERIENCING LEG CRAMPS AND NOT SLEEPING DUE TO PAIN FROM THE LEG CRAMPS   PLEASE CALL TO ADVISE   CONTACT MAK (DAUGHTER) [phone]    Is it okay if the provider responds through MyChart: N/A

## 2023-11-30 NOTE — TELEPHONE ENCOUNTER
Yes hydration for sure.  Pls have her start Vitamin B complex and also Vitamin E 800 IU QHS     Called the daughter to read the above to the daughter and she v/u.

## 2023-12-07 RX ORDER — PANTOPRAZOLE SODIUM 40 MG/1
TABLET, DELAYED RELEASE ORAL DAILY
Qty: 90 TABLET | Refills: 0 | Status: SHIPPED | OUTPATIENT
Start: 2023-12-07

## 2023-12-13 ENCOUNTER — INFUSION (OUTPATIENT)
Dept: ONCOLOGY | Facility: HOSPITAL | Age: 83
End: 2023-12-13
Payer: MEDICARE

## 2023-12-13 ENCOUNTER — HOME HEALTH ADMISSION (OUTPATIENT)
Dept: HOME HEALTH SERVICES | Facility: HOME HEALTHCARE | Age: 83
End: 2023-12-13
Payer: MEDICARE

## 2023-12-13 ENCOUNTER — OFFICE VISIT (OUTPATIENT)
Dept: ONCOLOGY | Facility: CLINIC | Age: 83
End: 2023-12-13
Payer: MEDICARE

## 2023-12-13 VITALS
WEIGHT: 119.4 LBS | TEMPERATURE: 98.4 F | DIASTOLIC BLOOD PRESSURE: 55 MMHG | HEART RATE: 84 BPM | BODY MASS INDEX: 20.38 KG/M2 | OXYGEN SATURATION: 93 % | RESPIRATION RATE: 16 BRPM | SYSTOLIC BLOOD PRESSURE: 147 MMHG | HEIGHT: 64 IN

## 2023-12-13 DIAGNOSIS — C50.411 MALIGNANT NEOPLASM OF UPPER-OUTER QUADRANT OF RIGHT BREAST IN FEMALE, ESTROGEN RECEPTOR NEGATIVE: Primary | ICD-10-CM

## 2023-12-13 DIAGNOSIS — Z79.899 HIGH RISK MEDICATION USE: ICD-10-CM

## 2023-12-13 DIAGNOSIS — Z45.2 ENCOUNTER FOR FITTING AND ADJUSTMENT OF VASCULAR CATHETER: ICD-10-CM

## 2023-12-13 DIAGNOSIS — Z17.1 MALIGNANT NEOPLASM OF UPPER-OUTER QUADRANT OF RIGHT BREAST IN FEMALE, ESTROGEN RECEPTOR NEGATIVE: Primary | ICD-10-CM

## 2023-12-13 DIAGNOSIS — R29.898 WEAKNESS OF BOTH LEGS: Primary | ICD-10-CM

## 2023-12-13 DIAGNOSIS — Z17.1 MALIGNANT NEOPLASM OF UPPER-OUTER QUADRANT OF RIGHT BREAST IN FEMALE, ESTROGEN RECEPTOR NEGATIVE: ICD-10-CM

## 2023-12-13 DIAGNOSIS — C50.411 MALIGNANT NEOPLASM OF UPPER-OUTER QUADRANT OF RIGHT BREAST IN FEMALE, ESTROGEN RECEPTOR NEGATIVE: ICD-10-CM

## 2023-12-13 LAB
ALBUMIN SERPL-MCNC: 3.7 G/DL (ref 3.5–5.2)
ALBUMIN/GLOB SERPL: 1.2 G/DL
ALP SERPL-CCNC: 73 U/L (ref 39–117)
ALT SERPL W P-5'-P-CCNC: 19 U/L (ref 1–33)
ANION GAP SERPL CALCULATED.3IONS-SCNC: 16.9 MMOL/L (ref 5–15)
AST SERPL-CCNC: 28 U/L (ref 1–32)
BASOPHILS # BLD AUTO: 0.03 10*3/MM3 (ref 0–0.2)
BASOPHILS NFR BLD AUTO: 0.4 % (ref 0–1.5)
BILIRUB SERPL-MCNC: 0.4 MG/DL (ref 0–1.2)
BUN SERPL-MCNC: 29 MG/DL (ref 8–23)
BUN/CREAT SERPL: 29 (ref 7–25)
CALCIUM SPEC-SCNC: 9.8 MG/DL (ref 8.6–10.5)
CHLORIDE SERPL-SCNC: 98 MMOL/L (ref 98–107)
CO2 SERPL-SCNC: 25.1 MMOL/L (ref 22–29)
CREAT SERPL-MCNC: 1 MG/DL (ref 0.57–1)
DEPRECATED RDW RBC AUTO: 50.7 FL (ref 37–54)
EGFRCR SERPLBLD CKD-EPI 2021: 56 ML/MIN/1.73
EOSINOPHIL # BLD AUTO: 0.07 10*3/MM3 (ref 0–0.4)
EOSINOPHIL NFR BLD AUTO: 1 % (ref 0.3–6.2)
ERYTHROCYTE [DISTWIDTH] IN BLOOD BY AUTOMATED COUNT: 16.3 % (ref 12.3–15.4)
GLOBULIN UR ELPH-MCNC: 3.1 GM/DL
GLUCOSE SERPL-MCNC: 244 MG/DL (ref 65–99)
HCT VFR BLD AUTO: 33.4 % (ref 34–46.6)
HGB BLD-MCNC: 10.2 G/DL (ref 12–15.9)
IMM GRANULOCYTES # BLD AUTO: 0.02 10*3/MM3 (ref 0–0.05)
IMM GRANULOCYTES NFR BLD AUTO: 0.3 % (ref 0–0.5)
LYMPHOCYTES # BLD AUTO: 0.85 10*3/MM3 (ref 0.7–3.1)
LYMPHOCYTES NFR BLD AUTO: 12.5 % (ref 19.6–45.3)
MAGNESIUM SERPL-MCNC: 1.7 MG/DL (ref 1.6–2.4)
MCH RBC QN AUTO: 25.7 PG (ref 26.6–33)
MCHC RBC AUTO-ENTMCNC: 30.5 G/DL (ref 31.5–35.7)
MCV RBC AUTO: 84.1 FL (ref 79–97)
MONOCYTES # BLD AUTO: 0.51 10*3/MM3 (ref 0.1–0.9)
MONOCYTES NFR BLD AUTO: 7.5 % (ref 5–12)
NEUTROPHILS NFR BLD AUTO: 5.34 10*3/MM3 (ref 1.7–7)
NEUTROPHILS NFR BLD AUTO: 78.3 % (ref 42.7–76)
NRBC BLD AUTO-RTO: 0 /100 WBC (ref 0–0.2)
PLATELET # BLD AUTO: 150 10*3/MM3 (ref 140–450)
PMV BLD AUTO: 10.2 FL (ref 6–12)
POTASSIUM SERPL-SCNC: 3.7 MMOL/L (ref 3.5–5.2)
PROT SERPL-MCNC: 6.8 G/DL (ref 6–8.5)
RBC # BLD AUTO: 3.97 10*6/MM3 (ref 3.77–5.28)
SODIUM SERPL-SCNC: 140 MMOL/L (ref 136–145)
WBC NRBC COR # BLD AUTO: 6.82 10*3/MM3 (ref 3.4–10.8)

## 2023-12-13 PROCEDURE — 25010000002 DEXAMETHASONE PER 1 MG: Performed by: NURSE PRACTITIONER

## 2023-12-13 PROCEDURE — 83735 ASSAY OF MAGNESIUM: CPT | Performed by: NURSE PRACTITIONER

## 2023-12-13 PROCEDURE — 25810000003 SODIUM CHLORIDE 0.9 % SOLUTION 250 ML FLEX CONT: Performed by: NURSE PRACTITIONER

## 2023-12-13 PROCEDURE — 25010000002 HEPARIN LOCK FLUSH PER 10 UNITS: Performed by: INTERNAL MEDICINE

## 2023-12-13 PROCEDURE — 25010000002 ADO-TRASTUZUMAB 100 MG RECONSTITUTED SOLUTION 1 EACH VIAL: Performed by: NURSE PRACTITIONER

## 2023-12-13 PROCEDURE — 96375 TX/PRO/DX INJ NEW DRUG ADDON: CPT

## 2023-12-13 PROCEDURE — 85025 COMPLETE CBC W/AUTO DIFF WBC: CPT | Performed by: NURSE PRACTITIONER

## 2023-12-13 PROCEDURE — 80053 COMPREHEN METABOLIC PANEL: CPT | Performed by: NURSE PRACTITIONER

## 2023-12-13 PROCEDURE — 25810000003 SODIUM CHLORIDE 0.9 % SOLUTION: Performed by: NURSE PRACTITIONER

## 2023-12-13 PROCEDURE — 96413 CHEMO IV INFUSION 1 HR: CPT

## 2023-12-13 RX ORDER — HEPARIN SODIUM (PORCINE) LOCK FLUSH IV SOLN 100 UNIT/ML 100 UNIT/ML
500 SOLUTION INTRAVENOUS AS NEEDED
OUTPATIENT
Start: 2023-12-13

## 2023-12-13 RX ORDER — SODIUM CHLORIDE 0.9 % (FLUSH) 0.9 %
10 SYRINGE (ML) INJECTION AS NEEDED
OUTPATIENT
Start: 2023-12-13

## 2023-12-13 RX ORDER — SODIUM CHLORIDE 9 MG/ML
500 INJECTION, SOLUTION INTRAVENOUS ONCE
Status: COMPLETED | OUTPATIENT
Start: 2023-12-13 | End: 2023-12-13

## 2023-12-13 RX ORDER — HEPARIN SODIUM (PORCINE) LOCK FLUSH IV SOLN 100 UNIT/ML 100 UNIT/ML
500 SOLUTION INTRAVENOUS AS NEEDED
Status: DISCONTINUED | OUTPATIENT
Start: 2023-12-13 | End: 2023-12-13 | Stop reason: HOSPADM

## 2023-12-13 RX ORDER — SODIUM CHLORIDE 9 MG/ML
250 INJECTION, SOLUTION INTRAVENOUS ONCE
Status: DISCONTINUED | OUTPATIENT
Start: 2023-12-13 | End: 2023-12-13 | Stop reason: HOSPADM

## 2023-12-13 RX ORDER — SODIUM CHLORIDE 0.9 % (FLUSH) 0.9 %
10 SYRINGE (ML) INJECTION AS NEEDED
Status: DISCONTINUED | OUTPATIENT
Start: 2023-12-13 | End: 2023-12-13 | Stop reason: HOSPADM

## 2023-12-13 RX ADMIN — Medication 10 ML: at 15:22

## 2023-12-13 RX ADMIN — Medication 500 UNITS: at 15:22

## 2023-12-13 RX ADMIN — DEXAMETHASONE SODIUM PHOSPHATE 12 MG: 10 INJECTION INTRAMUSCULAR; INTRAVENOUS at 12:46

## 2023-12-13 RX ADMIN — SODIUM CHLORIDE 500 ML/HR: 9 INJECTION, SOLUTION INTRAVENOUS at 12:48

## 2023-12-13 RX ADMIN — ADO-TRASTUZUMAB EMTANSINE 200 MG: 20 INJECTION, POWDER, LYOPHILIZED, FOR SOLUTION INTRAVENOUS at 13:22

## 2023-12-13 NOTE — LETTER
December 18, 2023     Princess Cruz MD  [id number]  [facility] [address]    Patient: Veronica Royal   YOB: 1940   Date of Visit: 12/13/2023     Dear Princess Cruz MD:       Thank you for referring Veronica Royal to me for evaluation. Below are the relevant portions of my assessment and plan of care.    If you have questions, please do not hesitate to call me. I look forward to following Veronica along with you.         Sincerely,        CARL Fisher        CC: No Recipients    Corine Anne APRN  12/18/23 1949  Signed  Subjective  Veronica Royal is a 83 y.o. female.  Referred by Dr. Michael for right breast inflammatory breast cancer    History of Present Illness   Ms. Royal is a 82-year-old postmenopausal  lady with hypertension, diabetes, hyperlipidemia, chronic kidney disease, hyperkalemia-chronic presented with a palpable abnormality in the right breast.  Subsequent imaging was performed.  Patient has not had a mammogram prior to this in the past 25 years.    4/21/2023-bilateral diagnostic mammogram-high density irregular mass measuring 44 mm with spiculated margins and amorphus and fine pleomorphic calcifications with skin thickening in the right breast at 9:00.  2 intramammary lymph nodes in the upper outer axillary tail region are slightly rounded  2 prominent right axillary lymph nodes largest of which measures 26 mm.  Asymmetry noted in the left breast.    Right breast ultrasound at 9 o'clock position, 3 cm from the nipple there is a 38 x 28 x 42 mm mass.  Skin thickness measuring 11 mm near the mass.  One of the 2 rounded axillary tail lymph nodes noted.  Borderline cortical thickening.  2 abnormal axillary lymph nodes.  1 lymph node displaced loss of normal morphology with a round heterogeneous appearance and echogenic foci.  Most consistent with calcified lymph node measuring 26 mm.  Second lymph node measures 12 mm.  Displaced cortical  thickening.  Ultrasound-guided biopsy of the mass in the axillary lymph nodes recommended.    4/21/2023  1.right breast 9:00 biopsy-invasive ductal carcinoma with apocrine features  Grade 3  ER negative  HI negative  HER2 2+ on immunohistochemistry  HER2 amplified on FISH with HER2 copy number of 7.58, OPAL seventeen 3.18, HER2/OPAL 17 ratio of 2.4.  Ki-67 38.45%    2.right axillary biopsy-soft tissue involved by invasive ductal carcinoma with apocrine features  Associated microcalcification  No definite lymph node tissue identified.    4/29/2023-MRI of the breast-biopsy-proven malignancy in the right breast at 9:00 measuring 4.3 cm in greatest dimension.  Attachment overlying skin and diffuse right breast edema noted.  Right axillary lymphadenopathy.  No suspicious findings in the left breast.    5/11/2023-CT of the chest abdomen and pelvis-no evidence of metastatic disease.  Liver is cirrhotic in configuration.    5/11/2023-bone scan negative    Patient presents today to the clinic for discussing neoadjuvant chemotherapy.  She is accompanied by her daughter.  Patient denies any recent changes in weight, she reports some pain at the site of malignancy.  A punch biopsy was performed and was consistent with inflammatory breast cancer.  At the site of punch biopsy there is an ulcerated lesion.    She has poorly controlled diabetes currently on glipizide metformin and Tradjenta.  Hemoglobin A1c recently was noted to be 9.9.    Also has chronic hyperkalemia, explanation unclear.    Blood pressure poorly controlled.    She reports good functional status lives independently.  Able to manage all her bills and independent of ADLs.  She has good support system in terms of her daughter.    Family history significant for brother with pancreatic cancer at the age of 68, sister with ovarian cancer at the age of 58    She received 6 weeks of Taxol Perjeta and Herceptin and subsequently admitted to the hospital due to severe nausea  vomiting diarrhea poor oral intake, KAMILA, severe electrolyte abnormalities.      admission to the hospital for KAMILA, severe diarrhea, nausea vomiting and poor oral intake.She was in the hospital between 7/12/2023 to 7/20/2023.  During the hospitalization she was treated for acute UTI, electrolyte abnormalities and KAMILA.  Subsequently she was  discharged to a rehab.   During the hospitalization she was evaluated by Dr. Michael and a right breast ultrasound was obtained on 7/17/2023.  There was very little response noted with the 9 o'clock position mass 6 cm from the nipple measuring 3.7 x 2.4 x 3.7 cm previously 3.8 x 2.5 x 4.2 cm.  In the right axilla the lymph node measured 2.6 x 1.5 x 1.9 cm previously 1.9 x 1.5 x 2.5 cm.  There was decrease in size of the adjacent lymph node measuring 0.7 cm previously 1.2 cm.    8/24/2023-right mastectomy and axillary lymph node dissection  Invasive ductal carcinoma, grade 2  The tumor measures 4 cm  Microcalcifications are seen in the tumor  Dermal lymphatic invasion present  Lymphatic invasion present  Tumor seen in the skin but no ulceration.  Residual cancer burden 3.454  RCB-3  Xiong Lima grade 3  Margins negative  18 lymph nodes total evaluated  1 with micrometastasis with a tumor measuring 2.5 mm with no chemotherapy effect  1 with chemotherapy change and isolated tumor cells  There is 1 node with chemotherapy change and a clip but no residual tumor.    Receptors repeated  ER negative  NC negative  HER2 2+ on immunohistochemistry  FISH nonamplified    INTERVAL HISTORY:   The patient is a 83 y.o. female with above-mentioned history returns to the office today in anticipation of Kadcyla which she continues to receive every 3 weeks in the adjuvant setting.  Patient was originally scheduled today to just receive treatment however her daughter had many concerns and requested to be seen by a provider.      Patient did fall fall yesterday evening around 7 PM when she was walking  up her 5 steps at the front of her home.  She was at the top step when she felt weak and fell and rolled backwards.  Patient denies hitting her head or having any localized pain or discomfort.  She has been able to freely ambulate and move all of her upper and lower extremities.  She did not go to the emergency room or urgent care for any evaluation.  She reports she just felt a lot of weakness in her lower extremities.  She is currently not utilizing any supportive aids such as a cane or a walker.  The patient and her daughter are interested in physical therapy to help build up her strength.    She denies any numbness or tingling.  She does report that her lower extremities have been very achy for quite some time.  Patient reports that typically moving around her home does make her lower extremities feel better.  She denies any intermittent claudication.  She does have lower extremity edema at baseline that remains constant and unchanged.    Patient continues to try to eat and drink adequately.  Her daughter does not feel that if she is drinking enough and is easily becoming dehydrated.  She denies any nausea or vomiting.  He does still feel that she has a good appetite.     Patient continues to be followed closely by cardio oncology cardiology with every 3-month echocardiograms. She also underwent recent ultrasound of the liver which was negative.  No other concerns noted at this time.    The following portions of the patient's history were reviewed and updated as appropriate: allergies, current medications, past family history, past medical history, past social history, past surgical history and problem list.    Past Medical History:   Diagnosis Date   • Anxiety    • Arthritis    • Basal cell carcinoma     Left thumb   • Breast cancer 2023    Right   • Depression    • Diabetes mellitus    • Diarrhea     s/e chemo   • High cholesterol    • History of chemotherapy 08/2023   • Hypertension    • Rash 08/2023    s/e  "chemo   • Sacral decubitus ulcer     cleaning with soap & water   • Urinary incontinence     wears pads        Past Surgical History:   Procedure Laterality Date   • CATARACT EXTRACTION EXTRACAPSULAR W/ INTRAOCULAR LENS IMPLANTATION Right    • EYE SURGERY      Muscle repair age 21   • MASTECTOMY W/ SENTINEL NODE BIOPSY Right 2023    Procedure: Right breast modified radical mastectomy;  Surgeon: Rosa Michael MD;  Location: [facility] OR [facility];  Service: General;  Laterality: Right;   • TONSILLECTOMY     • VENOUS ACCESS DEVICE (PORT) INSERTION N/A 2023    Procedure: INSERTION VENOUS ACCESS DEVICE;  Surgeon: Rosa Michael MD;  Location: [facility] OR [facility];  Service: General;  Laterality: N/A;        Family History   Problem Relation Age of Onset   • Alzheimer's disease Mother    • Heart disease Father    • Heart attack Father    • Ovarian cancer Sister    • Pancreatic cancer Brother    • Malig Hyperthermia Neg Hx    • Colon cancer Neg Hx    • Colon polyps Neg Hx    • Crohn's disease Neg Hx    • Irritable bowel syndrome Neg Hx    • Ulcerative colitis Neg Hx         Social History     Socioeconomic History   • Marital status:    Tobacco Use   • Smoking status: Never   • Smokeless tobacco: Never   Vaping Use   • Vaping Use: Never used   Substance and Sexual Activity   • Alcohol use: Never   • Drug use: Never   • Sexual activity: Never        OB History    No obstetric history on file.      Age at menarche-11  Age at first live childbirth-35   2 para 1  1  Age at menopause-50  Oral conceptive pill use for 3 to 4 years    No Known Allergies     Review of Systems    Review of systems as mentioned HPI otherwise negative    Objective  Blood pressure 147/55, pulse 84, temperature 98.4 °F (36.9 °C), temperature source Infrared, resp. rate 16, height 162.6 cm (64.02\"), weight 54.2 kg (119 lb 7.8 oz), SpO2 93%.     Physical Exam  Vitals reviewed.   Constitutional:       General: She is not in acute " distress.     Appearance: Normal appearance. She is well-developed.   HENT:      Head: Normocephalic and atraumatic.      Right Ear: Decreased hearing noted.      Left Ear: Decreased hearing noted.   Eyes:      Pupils: Pupils are equal, round, and reactive to light.   Cardiovascular:      Rate and Rhythm: Normal rate and regular rhythm.      Heart sounds: Normal heart sounds. No murmur heard.  Pulmonary:      Effort: Pulmonary effort is normal. No respiratory distress.      Breath sounds: Normal breath sounds. No wheezing, rhonchi or rales.   Abdominal:      General: Bowel sounds are normal. There is no distension.      Palpations: Abdomen is soft.   Musculoskeletal:         General: Swelling (trace) present. Normal range of motion.      Cervical back: Normal range of motion.   Skin:     General: Skin is warm and dry.      Findings: No rash.   Neurological:      Mental Status: She is alert and oriented to person, place, and time.        Right chest wall carefully examined, status post mastectomy with incision well-healed, no signs or symptoms of infection.  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 Corine Anne, APRN          Results from last 7 days   Lab Units 12/13/23  1135   WBC 10*3/mm3 6.82   NEUTROS ABS 10*3/mm3 5.34   HEMOGLOBIN g/dL 10.2*   HEMATOCRIT % 33.4*   PLATELETS 10*3/mm3 150     Results from last 7 days   Lab Units 12/13/23  1135   SODIUM mmol/L 140   POTASSIUM mmol/L 3.7   CHLORIDE mmol/L 98   CO2 mmol/L 25.1   BUN mg/dL 29*   CREATININE mg/dL 1.00   CALCIUM mg/dL 9.8   ALBUMIN g/dL 3.7   BILIRUBIN mg/dL 0.4   ALK PHOS U/L 73   ALT (SGPT) U/L 19   AST (SGOT) U/L 28   GLUCOSE mg/dL 244*   MAGNESIUM mg/dL 1.7               US Liver    Result Date: 11/21/2023  1. Cholelithiasis with  gallbladder polyps, below the size requiring follow-up and likely secondary to cholesterolosis  2. Hepatic steatosis and cirrhosis, without mass seen in visualized portions of the liver.  This report was finalized on 11/21/2023 3:23 PM by Dr. 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  Invasive ductal carcinoma with apocrine features, grade 3, Ki-67 38%  CT scans and bone scan without any obvious evidence of metastatic disease  Echocardiogram has been performed and ejection fraction normal.  Liver shows cirrhotic morphology.  LFTs otherwise normal and total bilirubin normal as well as protein normal.  It appears that the synthetic function of the liver is still within normal limits.  Mediport placed 5/19/2023  Taxol, Herceptin, Perjeta initiated 5/24/2023 5/31/2023 with C1D8 Taxol  6/28/2023-cycle 2-day 15 of Taxol.  She is overall tolerating therapy well with expected side effects.  She will proceed with Taxol today.  Scheduled for Taxol Herceptin and Perjeta.  7/12/2023-cycle 3-day 8 of TPH.  Chemotherapy held and patient was admitted to the hospital for management of severe dehydration, KAMILA, nausea vomiting diarrhea and decreased oral intake.  8/2/2023-she feels relatively well today.  Labs reviewed and stable to proceed with Herceptin only.  We will not administer Taxol and Perjeta as she has had significant issues with chemotherapy.  Right mastectomy 8/24/2023 with 4 cm of residual disease, RCB-3, treatment effect present, axillary lymph node dissection with 1 lymph node with micrometastasis measuring 2.5 mm, 1 lymph node with isolated tumor cells and a third lymph node which showed treatment changes but no tumor cells.  Total of 18 lymph nodes  removed   9/20/2023 to discuss pathology and adjuvant therapy.  We discussed Kadcyla every 3 weekly to finish a complete year.  Patient is definitely hesitant to resume any sort of chemotherapy due to her previous experience with diarrhea.  Started Kadcyla 9/20/2023, denies any diarrhea.  Tolerating treatment well so far  No evidence of recurrent disease  Adjuvant radiation completed 11/20/2023  Proceed today, 11/22/2023 with cycle 4 adjuvant Kadcyla  12/13/2023: Proceed with Kadcyla with an additional 500 ml of normal saline. She is to return in one week for 500 ml of normal saline and we will closely monitor for signs of fluid overload.     *Diabetes  Poorly controlled  Evaluation by endocrinology 5/30/2023 with recommendations for dietary changes and home blood sugar monitoring.  The patient's daughter reports today she is struggling with compliance.  Poorly controlled at this time  12/13/2023: Encouraged to follow up with PCP for further management.     *KAMILA/CKD  8/17/2023 BUN 24 and creatinine 1.05  Patient's daughter reports that she has not been hydrating herself well.  12/13/2023: Creatinine normal at 1.00. BUN elevated at 29. 500 ml of normal saline given today in addtion to scheduled treatment. Patient encouraged in to increase her oral hydration as well.     *?  Cirrhotic appearance of the liver on the CT scan  Referred to gastroenterology  Synthetic function appears to be normal  We will start with Taxol to see if she would tolerate the chemotherapy   Slight elevation of intervention studies today with ALT 43, AST 55.  Continue to monitor weekly  6/28/2023-mild elevation in the LFTs.  Stable compared to previous labs.  Okay to proceed with chemotherapy.  11/21/2023-LFTs normal  12/13/2023: Liver enzymes are normals. Alkaline phosphatase is slightly more elevated at 244.     *Hypertension-currently on valsartan and hydrochlorothiazide.  Valsartan could be contributing to hyperkalemia.  Will refer to  cardiology ASAP for management of medications and cardiac clearance for proceeding with chemotherapy  Recent echocardiogram normal  Stress test reviewed and negative.  9/27/2023-echocardiogram shows a normal ejection fraction of 59%.  Follow-up echocardiogram 10/20/2023 with ejection fraction of 54%.  When compared to baseline study 5/9/2023 ejection fraction is stable.  Left ventricular GLS is changed by 8%.  Discussed with cardiology, given the stability of ejection fraction when compared to baseline from May, we will proceed with Kadcyla   Cardiology evaluation 11/10/2023 with stable EF and strain.  Recommendations to continue Kadcyla with 3-month follow-up echocardiogram  12/13/2023: Being followed by cardio oncology. Has an ECHO scheduled soon that is to be rescheduled per her daughter.     *Genetic testing-Invitae 9 gene stat panel negative,     *Decreased oral intake secondary to chemotherapy  Still not adequate oral intake  Encouraged her to drink boost and Ensure  12/13/2023: Increase protein shake intake and oral fluid intake. Has already spoken to dieticians. We will scheduled her for fluids again next week and continue to monitor.     *Frequent belching  Continue Protonix 40 mg daily  This is improved    *Cognitive impairment  It is unclear if this is the patient's baseline or mental status has been exacerbated by recent chemotherapy  The patient is struggling with compliance with her medications.  The patient's daughter expresses frustration today as the patient is struggling to care for herself at home with managing medications and symptom management.  Evaluated by CARL Antonio     *Chemotherapy-induced diarrhea  6/5/2023 patient given Enterade (Wild Berry flavor).  She has been drinking 1 a day.  She does not necessarily like the flavor (currently mixing with sugar-free Aramis-Aid which does help).  Encouraged her to increase to 2 a day.  6/21/2023 patient reports that she had stopped drinking  her Enterade because she was waking up in the middle the night having diarrhea although she did not know if it was due to the Enterade her protein shakes.  I have encouraged the patient to continue Enterade, drinking it in the morning.  Try discontinuing the protein shakes and see how she does.  Patient states that she will try this  Reports 2-3 loose stools.  Continue Enterade and Imodium.  7/5/2023 continuing to have issues with diarrhea up to 3-4 bowel movements a day, patient also recently prescribed Kayexalate for an apparently elevated potassium.  Potassium only 3.1 today.  She advised to discontinue the Kayexalate she was given IV hydration today.  We will also prescribe Lomotil to use if needed to help better control her diarrhea.  Patient is not drinking Enterade regularly.  8/2/2023-improved since discharge from the hospital and discontinuation of chemotherapy.  She received Herceptin on 8/2/2023 and reports a week of diarrhea.  Patient has not had any diarrhea since initiating Kadcyla.  Continue to monitor, she continues to note this is improved on Kadcyla  12/13/2023: Has improved. Currently has had some constipation. Encouraged senokot S every other night based on her current symptoms until bowel movements are more consistent.     Weakness and leg aching  12/13/2023: Patient denies any numbness or tingling in her legs and there is not localized edema, warmth, cording or tenderness. Continues to report weakness and aching that sounds like a combination of deconditioning and possible restless leg syndrome. Magnesium level was checked and is normal. Home health to be consulted for physical therapy and a prescription for a walker provided.     PLAN:  Proceed today with cycle 5 adjuvant Kadcyla which is continued every 3 weeks  500 ml of normal saline with treatment today  Return in 1 week for additional fluids. 500 ml normal saline.   Increase protein intake and oral fluid intake.   PT home health consult    Utilize a walker for stability  Magnesium level checked and normal.   Radiation completed 11/20/2023  Continue to follow-up with Dr. Maldonado, cardiology as scheduled with recommendations for 3-month follow-up echocardiogram in February 2024  Return in 3 weeks for CBC, CMP, MD or NP follow-up and continued Kadcyla    Patient is on a high risk medication requiring close monitoring for toxicity.      Corine Anne, APRN  12/13/2023    I spent 42 minutes caring for Veronica on this date of service. This time includes time spent by me in the following activities: preparing for the visit, reviewing tests, obtaining and/or reviewing a separately obtained history, performing a medically appropriate examination and/or evaluation, counseling and educating the patient/family/caregiver, ordering medications, tests, or procedures, referring and communicating with other health care professionals, documenting information in the medical record, and care coordination     Patient reviewed with Dr. Yee who is in agreement with plan

## 2023-12-13 NOTE — NURSING NOTE
Pt arrived for Kadcyla treatment today.  Notified CARL Driscoll that pt fell yesterday, c/o generalized discomfort, pain in bilat legs not improved, not consuming fluids that could be related to constipation, glucose 244 and BUN 29.  Corine states ok to proceed with today's treatment as planned and she came to speak with pt and pt's daughter in infusion area per request regarding the issues as noted above. Pt tolerated today's infusion without any complaints or complications and discharged in stable condition.

## 2023-12-14 ENCOUNTER — DOCUMENTATION (OUTPATIENT)
Dept: RADIATION ONCOLOGY | Facility: HOSPITAL | Age: 83
End: 2023-12-14
Payer: MEDICARE

## 2023-12-14 ENCOUNTER — HOSPITAL ENCOUNTER (OUTPATIENT)
Dept: PHYSICAL THERAPY | Facility: HOSPITAL | Age: 83
Setting detail: THERAPIES SERIES
Discharge: HOME | End: 2023-12-14
Payer: MEDICARE

## 2023-12-14 DIAGNOSIS — C50.411 MALIGNANT NEOPLASM OF UPPER-OUTER QUADRANT OF RIGHT BREAST IN FEMALE, ESTROGEN RECEPTOR NEGATIVE: Primary | ICD-10-CM

## 2023-12-14 DIAGNOSIS — Z17.1 MALIGNANT NEOPLASM OF UPPER-OUTER QUADRANT OF RIGHT BREAST IN FEMALE, ESTROGEN RECEPTOR NEGATIVE: Primary | ICD-10-CM

## 2023-12-14 DIAGNOSIS — Z91.89 AT RISK FOR LYMPHEDEMA: ICD-10-CM

## 2023-12-14 DIAGNOSIS — Z90.11 S/P RIGHT MASTECTOMY: ICD-10-CM

## 2023-12-14 PROCEDURE — 93702 BIS XTRACELL FLUID ANALYSIS: CPT

## 2023-12-14 PROCEDURE — 97535 SELF CARE MNGMENT TRAINING: CPT

## 2023-12-14 NOTE — PROGRESS NOTES
Radiation Treatment Summary Note      Patient Name: Veronica Royal  : 1940    Attending Provider: Marie Tesfaye MD      Diagnosis:     ICD-10-CM ICD-9-CM   1. Malignant neoplasm of upper-outer quadrant of right breast in female, estrogen receptor negative  C50.411 174.4    Z17.1 V86.1     Site treated: right chest wall and regional nodes    Radiation Start Date: 10/10/23    Radiation Completion Date: 23      Prescription:     She received a dose of 50Gy n 25 fractions to the right chest wall and regional nodes delivered with IMRT using 6Mv photons.  A 5mm bolus was placed over right chest wall for 15 of the 25 fractions.  She then received a boost to the right chest wall scar for an additional 10Gy in 5 fractions using 6mev electrons with a 5 mm bolus daily.     Final Delivered Dose Deviated From Initially Prescribed Dose: No    Concurrent Chemotherapy: No    Patient Tolerated Treatment Without Unexpected Side Effects/Complications: Yes    ECOG: Fully active, able to carry on all pre-disease performance without restriction = 0    Pain Management Plan: None Indicated/PRN OTC    Follow-Up Plan: 3 months    Imaging Ordered for Follow-Up: None/NA        Marie Tesfaye MD

## 2023-12-14 NOTE — THERAPY RE-EVALUATION
Physical Therapy Lymphedema Re-Evaluation  [facility]     Patient Name: Veronica Royal  : 1940  MRN: [id number]  Today's Date: 2023      Visit Date: 2023    Visit Dx:    ICD-10-CM ICD-9-CM   1. Malignant neoplasm of upper-outer quadrant of right breast in female, estrogen receptor negative  C50.411 174.4    Z17.1 V86.1   2. At risk for lymphedema  Z91.89 V49.89   3. S/P right mastectomy  Z90.11 V45.71       Patient Active Problem List   Diagnosis    Malignant neoplasm of upper-outer quadrant of right breast in female, estrogen receptor negative    Encounter for fitting and adjustment of vascular catheter    Hypertension    At high risk for malnutrition    Bilateral hearing loss    Malignant neoplasm of female breast    Advanced care planning/counseling discussion    Cirrhosis of liver    Type 2 diabetes mellitus    Anemia due to chemotherapy    Left renal mass    Severe malnutrition    Breast cancer    Driving safety issue    Slow transit constipation        Past Medical History:   Diagnosis Date    Anxiety     Arthritis     Basal cell carcinoma     Left thumb    Breast cancer     Right    Depression     Diabetes mellitus     Diarrhea     s/e chemo    High cholesterol     History of chemotherapy 2023    Hypertension     Rash 2023    s/e chemo    Sacral decubitus ulcer     cleaning with soap & water    Urinary incontinence     wears pads        Past Surgical History:   Procedure Laterality Date    CATARACT EXTRACTION EXTRACAPSULAR W/ INTRAOCULAR LENS IMPLANTATION Right     EYE SURGERY      Muscle repair age 21    MASTECTOMY W/ SENTINEL NODE BIOPSY Right 2023    Procedure: Right breast modified radical mastectomy;  Surgeon: Rosa Michael MD;  Location: [facility] OR [facility] [place];  Service: General;  Laterality: Right;    TONSILLECTOMY      VENOUS ACCESS DEVICE (PORT) INSERTION N/A 2023    Procedure: INSERTION VENOUS ACCESS DEVICE;  Surgeon: Rosa Michael MD;  Location:   [facility];  Service: General;  Laterality: N/A;       Visit Dx:    ICD-10-CM ICD-9-CM   1. Malignant neoplasm of upper-outer quadrant of right breast in female, estrogen receptor negative  C50.411 174.4    Z17.1 V86.1   2. At risk for lymphedema  Z91.89 V49.89   3. S/P right mastectomy  Z90.11 V45.71            Lymphedema       Row Name 12/14/23 1000             Subjective Pain    Able to rate subjective pain? yes  -LB      Pre-Treatment Pain Level 0  -LB         Subjective    Subjective Comments I think I am doing ok. I did have a fall down my steps  but I know nothing is broken, I am just bruised up.  -LB         Lymphedema Assessment    Lymphedema Classification RUE:;at risk/stage 0  -LB      Lymphedema Cancer Related Sx right;axillary dissection;modified radical mastectomy  -LB      Lymphedema Surgery Comments 8/24/23  -LB      Lymph Nodes Removed # 19  -LB      Positive Lymph Nodes # 1  -LB      Chemo Received yes  -LB      Chemo Treatments #/Timeframe neoadjuvant  -LB      Radiation Therapy Received no  -LB      Infections or Cellulitis? no  -LB         Posture/Observations    Posture/Observations Comments B ankle edema  -LB         RUE Quick Girth (cm)    Axilla 27.7 cm  -LB      Mid upper arm 23.5 cm  -LB      Elbow 22.4 cm  -LB      Mid forearm 20.1 cm  -LB      Wrist crease 15.5 cm  -LB      Web space 18.8 cm  -LB      Met-heads 18.1 cm  -LB      RUE Quick Girth Total 146.1  -LB         L-Dex Bioimpedence Screening    L-Dex Measurement Extremity RUE  -LB      L-Dex Patient Position Standing  -LB      L-Dex UE Dominate Side Right  -LB      L-Dex UE At Risk Side Right  -LB      L-Dex UE Pre Surgical Value No  -LB      L-Dex UE Score 20  -LB      L-Dex UE Baseline Score 17.6  -LB      L-Dex UE Value Change 2.4  -LB      L-Dex UE Comment WNL  -LB      $ L-Dex Charge yes  -LB                User Key  (r) = Recorded By, (t) = Taken By, (c) = Cosigned By      Initials Name Provider Type    Denia Thompson PT  Physical Therapist                                    Therapy Education  Education Details: reviewed s/s of lymphedema, steps to prevention, bioimpedance results and interpretation, use of compression sleeve and glove, balance/strength training recommendations, radiation skincare  Given: Symptoms/condition management, Edema management  Program: Reinforced  How Provided: Verbal  Provided to: Patient  Level of Understanding: Verbalized  52089 - PT Self Care/Mgmt Minutes: 30       OP Exercises       Row Name 12/14/23 1000             Subjective    Subjective Comments I think I am doing ok. I did have a fall down my steps  but I know nothing is broken, I am just bruised up.  -LB         Subjective Pain    Able to rate subjective pain? yes  -LB      Pre-Treatment Pain Level 0  -LB                User Key  (r) = Recorded By, (t) = Taken By, (c) = Cosigned By      Initials Name Provider Type    Denia Thompson, PT Physical Therapist                                 PT OP Goals       Row Name 12/14/23 1200          Long Term Goals    LTG Date to Achieve 10/14/23  -LB     LTG 1 Pt will demonstrate LDex bioimpedance WNL.  -LB     LTG 1 Progress Ongoing  -LB     LTG 1 Progress Comments LDex elevated outside of normal limits at 20  -LB     LTG 2 Pt will acquire appropriately fitted compression garment and verbalize appropriate wear schedule.  -LB     LTG 2 Progress Partially Met  -LB     LTG 2 Progress Comments Pt with appropriately fitted compression sleeve donned today; glove at home. She states it fits.  -LB     LTG 3 Pt will demonstrate equal BUE AROM.  -LB     LTG 3 Progress Met  -LB               User Key  (r) = Recorded By, (t) = Taken By, (c) = Cosigned By      Initials Name Provider Type    Denia Thompson PT Physical Therapist                     PT Assessment/Plan       Row Name 12/14/23 1248          PT Assessment    Functional Limitations Limitations in functional capacity and performance;Performance in leisure  activities;Limitation in home management;Performance in self-care ADL  -LB     Impairments Edema;Impaired flexibility;Impaired lymphatic circulation;Joint mobility;Muscle strength;Pain;Posture;Range of motion;Sensation  -LB     Assessment Comments Pt returns for post radiation follow up. She states she wore sleeve during radiation but only wore glove intermittently. She also had recent fall resulting in multiple bruises and abrasions on BUE and RLE. She states she fell backwards down her outdoor stairs due to feeling weakness in BLE. She states she was not hurt, just banged up. R arm without obvious edema but R hand with mild edema noted thenar webspace and between metatarsals. Circumferential measurements stable but pt reports she has been losing weight. Bioimpedance score has inc slightly and remains elevated at 20.0. She has met 1/3 goals. We discused need for daily compression wear. Pt remains appropriate for continued skilled PT services at lymphedema clinic to address RUE edema and continue to address impairments as needed. Pt to consider  PT for strength training and balance training has community ambulation has become increasingly difficult and recent fall.  -LB     Rehab Potential Good  -LB     Patient/caregiver participated in establishment of treatment plan and goals Yes  -LB     Patient would benefit from skilled therapy intervention Yes  -LB        PT Plan    PT Frequency Other (comment);1x/week  -LB     Predicted Duration of Therapy Intervention (PT) 4-6 visits  -LB     Planned CPT's? PT RE-EVAL: 57043;PT THER PROC EA 15 MIN: 90222;PT THER ACT EA 15 MIN: 15569;PT MANUAL THERAPY EA 15 MIN: 36791;PT SELF CARE/HOME MGMT/TRAIN EA 15: 57715;PT BIS XTRACELL FLUID ANALYSIS: 60419;PT EVAL LOW COMPLEXITY: 92585  -LB     PT Plan Comments repeat bioimpedance post home health, repeat circumferential measurements, fall/balance training  -LB               User Key  (r) = Recorded By, (t) = Taken By, (c) = Cosigned  By      Initials Name Provider Type    LB Denia Tse, PT Physical Therapist                                 Time Calculation:   Start Time: 1000  Stop Time: 1045  Time Calculation (min): 45 min  Total Timed Code Minutes- PT: 30 minute(s)  Timed Charges  79778 - PT Self Care/Mgmt Minutes: 30  Total Minutes  Timed Charges Total Minutes: 30   Total Minutes: 30   Therapy Charges for Today       Code Description Service Date Service Provider Modifiers Qty    21744789420 HC PT BIS XTRACELL FLUID ANALYSIS 12/14/2023 Denia Tse, PT  1    89150339307 HC PT SELF CARE/MGMT/TRAIN EA 15 MIN 12/14/2023 Denia Tse, PT GP 2                      Denia Tse, PT  12/14/2023

## 2023-12-15 ENCOUNTER — TELEPHONE (OUTPATIENT)
Dept: RADIATION ONCOLOGY | Facility: HOSPITAL | Age: 83
End: 2023-12-15
Payer: MEDICARE

## 2023-12-15 VITALS
HEIGHT: 64 IN | SYSTOLIC BLOOD PRESSURE: 147 MMHG | HEART RATE: 84 BPM | BODY MASS INDEX: 20.4 KG/M2 | OXYGEN SATURATION: 93 % | TEMPERATURE: 98.4 F | RESPIRATION RATE: 16 BRPM | DIASTOLIC BLOOD PRESSURE: 55 MMHG | WEIGHT: 119.49 LBS

## 2023-12-15 NOTE — PROGRESS NOTES
Subjective   Veronica Royal is a 83 y.o. female.  Referred by Dr. Michael for right breast inflammatory breast cancer    History of Present Illness   Ms. Royal is a 82-year-old postmenopausal  lady with hypertension, diabetes, hyperlipidemia, chronic kidney disease, hyperkalemia-chronic presented with a palpable abnormality in the right breast.  Subsequent imaging was performed.  Patient has not had a mammogram prior to this in the past 25 years.    4/21/2023-bilateral diagnostic mammogram-high density irregular mass measuring 44 mm with spiculated margins and amorphus and fine pleomorphic calcifications with skin thickening in the right breast at 9:00.  2 intramammary lymph nodes in the upper outer axillary tail region are slightly rounded  2 prominent right axillary lymph nodes largest of which measures 26 mm.  Asymmetry noted in the left breast.    Right breast ultrasound at 9 o'clock position, 3 cm from the nipple there is a 38 x 28 x 42 mm mass.  Skin thickness measuring 11 mm near the mass.  One of the 2 rounded axillary tail lymph nodes noted.  Borderline cortical thickening.  2 abnormal axillary lymph nodes.  1 lymph node displaced loss of normal morphology with a round heterogeneous appearance and echogenic foci.  Most consistent with calcified lymph node measuring 26 mm.  Second lymph node measures 12 mm.  Displaced cortical thickening.  Ultrasound-guided biopsy of the mass in the axillary lymph nodes recommended.    4/21/2023  1.right breast 9:00 biopsy-invasive ductal carcinoma with apocrine features  Grade 3  ER negative  WA negative  HER2 2+ on immunohistochemistry  HER2 amplified on FISH with HER2 copy number of 7.58, OPAL seventeen 3.18, HER2/OPAL 17 ratio of 2.4.  Ki-67 38.45%    2.right axillary biopsy-soft tissue involved by invasive ductal carcinoma with apocrine features  Associated microcalcification  No definite lymph node tissue identified.    4/29/2023-MRI of the breast-biopsy-proven  malignancy in the right breast at 9:00 measuring 4.3 cm in greatest dimension.  Attachment overlying skin and diffuse right breast edema noted.  Right axillary lymphadenopathy.  No suspicious findings in the left breast.    5/11/2023-CT of the chest abdomen and pelvis-no evidence of metastatic disease.  Liver is cirrhotic in configuration.    5/11/2023-bone scan negative    Patient presents today to the clinic for discussing neoadjuvant chemotherapy.  She is accompanied by her daughter.  Patient denies any recent changes in weight, she reports some pain at the site of malignancy.  A punch biopsy was performed and was consistent with inflammatory breast cancer.  At the site of punch biopsy there is an ulcerated lesion.    She has poorly controlled diabetes currently on glipizide metformin and Tradjenta.  Hemoglobin A1c recently was noted to be 9.9.    Also has chronic hyperkalemia, explanation unclear.    Blood pressure poorly controlled.    She reports good functional status lives independently.  Able to manage all her bills and independent of ADLs.  She has good support system in terms of her daughter.    Family history significant for brother with pancreatic cancer at the age of 68, sister with ovarian cancer at the age of 58    She received 6 weeks of Taxol Perjeta and Herceptin and subsequently admitted to the hospital due to severe nausea vomiting diarrhea poor oral intake, KAMILA, severe electrolyte abnormalities.      admission to the hospital for KAMILA, severe diarrhea, nausea vomiting and poor oral intake.She was in the hospital between 7/12/2023 to 7/20/2023.  During the hospitalization she was treated for acute UTI, electrolyte abnormalities and KAMILA.  Subsequently she was  discharged to a rehab.   During the hospitalization she was evaluated by Dr. Michael and a right breast ultrasound was obtained on 7/17/2023.  There was very little response noted with the 9 o'clock position mass 6 cm from the nipple  measuring 3.7 x 2.4 x 3.7 cm previously 3.8 x 2.5 x 4.2 cm.  In the right axilla the lymph node measured 2.6 x 1.5 x 1.9 cm previously 1.9 x 1.5 x 2.5 cm.  There was decrease in size of the adjacent lymph node measuring 0.7 cm previously 1.2 cm.    8/24/2023-right mastectomy and axillary lymph node dissection  Invasive ductal carcinoma, grade 2  The tumor measures 4 cm  Microcalcifications are seen in the tumor  Dermal lymphatic invasion present  Lymphatic invasion present  Tumor seen in the skin but no ulceration.  Residual cancer burden 3.454  RCB-3  Xiong Lima grade 3  Margins negative  18 lymph nodes total evaluated  1 with micrometastasis with a tumor measuring 2.5 mm with no chemotherapy effect  1 with chemotherapy change and isolated tumor cells  There is 1 node with chemotherapy change and a clip but no residual tumor.    Receptors repeated  ER negative  DC negative  HER2 2+ on immunohistochemistry  FISH nonamplified    INTERVAL HISTORY:   The patient is a 83 y.o. female with above-mentioned history returns to the office today in anticipation of Kadcyla which she continues to receive every 3 weeks in the adjuvant setting.  Patient was originally scheduled today to just receive treatment however her daughter had many concerns and requested to be seen by a provider.      Patient did fall fall yesterday evening around 7 PM when she was walking up her 5 steps at the front of her home.  She was at the top step when she felt weak and fell and rolled backwards.  Patient denies hitting her head or having any localized pain or discomfort.  She has been able to freely ambulate and move all of her upper and lower extremities.  She did not go to the emergency room or urgent care for any evaluation.  She reports she just felt a lot of weakness in her lower extremities.  She is currently not utilizing any supportive aids such as a cane or a walker.  The patient and her daughter are interested in physical therapy to help  build up her strength.    She denies any numbness or tingling.  She does report that her lower extremities have been very achy for quite some time.  Patient reports that typically moving around her home does make her lower extremities feel better.  She denies any intermittent claudication.  She does have lower extremity edema at baseline that remains constant and unchanged.    Patient continues to try to eat and drink adequately.  Her daughter does not feel that if she is drinking enough and is easily becoming dehydrated.  She denies any nausea or vomiting.  He does still feel that she has a good appetite.     Patient continues to be followed closely by cardio oncology cardiology with every 3-month echocardiograms. She also underwent recent ultrasound of the liver which was negative.  No other concerns noted at this time.    The following portions of the patient's history were reviewed and updated as appropriate: allergies, current medications, past family history, past medical history, past social history, past surgical history and problem list.    Past Medical History:   Diagnosis Date    Anxiety     Arthritis     Basal cell carcinoma     Left thumb    Breast cancer 2023    Right    Depression     Diabetes mellitus     Diarrhea     s/e chemo    High cholesterol     History of chemotherapy 08/2023    Hypertension     Rash 08/2023    s/e chemo    Sacral decubitus ulcer     cleaning with soap & water    Urinary incontinence     wears pads        Past Surgical History:   Procedure Laterality Date    CATARACT EXTRACTION EXTRACAPSULAR W/ INTRAOCULAR LENS IMPLANTATION Right     EYE SURGERY      Muscle repair age 21    MASTECTOMY W/ SENTINEL NODE BIOPSY Right 8/24/2023    Procedure: Right breast modified radical mastectomy;  Surgeon: Rosa Michael MD;  [facility]: [facility] OR [facility] [place];  Service: General;  Laterality: Right;    TONSILLECTOMY      VENOUS ACCESS DEVICE (PORT) INSERTION N/A 05/19/2023    Procedure: INSERTION  "VENOUS ACCESS DEVICE;  Surgeon: Rosa Michael MD;  [place] [facility] [place] [facility] [place];  Service: General;  Laterality: N/A;        Family History   Problem Relation Age of Onset    Alzheimer's disease Mother     Heart disease Father     Heart attack Father     Ovarian cancer Sister     Pancreatic cancer Brother     Malaxel Hyperthermia Neg Hx     Colon cancer Neg Hx     Colon polyps Neg Hx     Crohn's disease Neg Hx     Irritable bowel syndrome Neg Hx     Ulcerative colitis Neg Hx         Social History     Socioeconomic History    Marital status:    Tobacco Use    Smoking status: Never    Smokeless tobacco: Never   Vaping Use    Vaping Use: Never used   Substance and Sexual Activity    Alcohol use: Never    Drug use: Never    Sexual activity: Never        OB History    No obstetric history on file.      Age at menarche-11  Age at first live childbirth-35   2 para 1  1  Age at menopause-50  Oral conceptive pill use for 3 to 4 years    No Known Allergies     Review of Systems    Review of systems as mentioned HPI otherwise negative    Objective   Blood pressure 147/55, pulse 84, temperature 98.4 °F (36.9 °C), temperature source Infrared, resp. rate 16, height 162.6 cm (64.02\"), weight 54.2 kg (119 lb 7.8 oz), SpO2 93%.     Physical Exam  Vitals reviewed.   Constitutional:       General: She is not in acute distress.     Appearance: Normal appearance. She is well-developed.   HENT:      Head: Normocephalic and atraumatic.      Right Ear: Decreased hearing noted.      Left Ear: Decreased hearing noted.   Eyes:      Pupils: Pupils are equal, round, and reactive to light.   Cardiovascular:      Rate and Rhythm: Normal rate and regular rhythm.      Heart sounds: Normal heart sounds. No murmur heard.  Pulmonary:      Effort: Pulmonary effort is normal. No respiratory distress.      Breath sounds: Normal breath sounds. No wheezing, rhonchi or rales.   Abdominal:      General: Bowel sounds are normal. There is " no distension.      Palpations: Abdomen is soft.   Musculoskeletal:         General: Swelling (trace) present. Normal range of motion.      Cervical back: Normal range of motion.   Skin:     General: Skin is warm and dry.      Findings: No rash.   Neurological:      Mental Status: She is alert and oriented to person, place, and time.        Right chest wall carefully examined, status post mastectomy with incision well-healed, no signs or symptoms of infection.  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 CARL Fisher          Results from last 7 days   Lab Units 12/13/23  1135   WBC 10*3/mm3 6.82   NEUTROS ABS 10*3/mm3 5.34   HEMOGLOBIN g/dL 10.2*   HEMATOCRIT % 33.4*   PLATELETS 10*3/mm3 150     Results from last 7 days   Lab Units 12/13/23  1135   SODIUM mmol/L 140   POTASSIUM mmol/L 3.7   CHLORIDE mmol/L 98   CO2 mmol/L 25.1   BUN mg/dL 29*   CREATININE mg/dL 1.00   CALCIUM mg/dL 9.8   ALBUMIN g/dL 3.7   BILIRUBIN mg/dL 0.4   ALK PHOS U/L 73   ALT (SGPT) U/L 19   AST (SGOT) U/L 28   GLUCOSE mg/dL 244*   MAGNESIUM mg/dL 1.7               US Liver    Result Date: 11/21/2023  1. Cholelithiasis with gallbladder polyps, below the size requiring follow-up and likely secondary to cholesterolosis  2. Hepatic steatosis and cirrhosis, without mass seen in visualized portions of the liver.  This report was finalized on 11/21/2023 3:23 PM by Dr. Jasvir Jha M.D on Workstation: [id number]        Adult Transthoracic Echo Limited W/ Cont if Necessary Per Protocol (10/19/2023 11:23)     Assessment & Plan       *Right breast inflammatory breast cancer  T4d N1 M0  Stage IIIb  ER negative, VA negative, HER2 2+ on immunohistochemistry but amplified on FISH.  Invasive ductal carcinoma with apocrine features,  grade 3, Ki-67 38%  CT scans and bone scan without any obvious evidence of metastatic disease  Echocardiogram has been performed and ejection fraction normal.  Liver shows cirrhotic morphology.  LFTs otherwise normal and total bilirubin normal as well as protein normal.  It appears that the synthetic function of the liver is still within normal limits.  Mediport placed 5/19/2023  Taxol, Herceptin, Perjeta initiated 5/24/2023 5/31/2023 with C1D8 Taxol  6/28/2023-cycle 2-day 15 of Taxol.  She is overall tolerating therapy well with expected side effects.  She will proceed with Taxol today.  Scheduled for Taxol Herceptin and Perjeta.  7/12/2023-cycle 3-day 8 of TPH.  Chemotherapy held and patient was admitted to the hospital for management of severe dehydration, KAMILA, nausea vomiting diarrhea and decreased oral intake.  8/2/2023-she feels relatively well today.  Labs reviewed and stable to proceed with Herceptin only.  We will not administer Taxol and Perjeta as she has had significant issues with chemotherapy.  Right mastectomy 8/24/2023 with 4 cm of residual disease, RCB-3, treatment effect present, axillary lymph node dissection with 1 lymph node with micrometastasis measuring 2.5 mm, 1 lymph node with isolated tumor cells and a third lymph node which showed treatment changes but no tumor cells.  Total of 18 lymph nodes removed   9/20/2023 to discuss pathology and adjuvant therapy.  We discussed Kadcyla every 3 weekly to finish a complete year.  Patient is definitely hesitant to resume any sort of chemotherapy due to her previous experience with diarrhea.  Started Kadcyla 9/20/2023, denies any diarrhea.  Tolerating treatment well so far  No evidence of recurrent disease  Adjuvant radiation completed 11/20/2023  Proceed today, 11/22/2023 with cycle 4 adjuvant Kadcyla  12/13/2023: Proceed with Kadcyla with an additional 500 ml of normal saline. She is to return in one week for 500 ml of normal saline and we will  closely monitor for signs of fluid overload.     *Diabetes  Poorly controlled  Evaluation by endocrinology 5/30/2023 with recommendations for dietary changes and home blood sugar monitoring.  The patient's daughter reports today she is struggling with compliance.  Poorly controlled at this time  12/13/2023: Encouraged to follow up with PCP for further management.     *KAMILA/CKD  8/17/2023 BUN 24 and creatinine 1.05  Patient's daughter reports that she has not been hydrating herself well.  12/13/2023: Creatinine normal at 1.00. BUN elevated at 29. 500 ml of normal saline given today in addtion to scheduled treatment. Patient encouraged in to increase her oral hydration as well.     *?  Cirrhotic appearance of the liver on the CT scan  Referred to gastroenterology  Synthetic function appears to be normal  We will start with Taxol to see if she would tolerate the chemotherapy   Slight elevation of intervention studies today with ALT 43, AST 55.  Continue to monitor weekly  6/28/2023-mild elevation in the LFTs.  Stable compared to previous labs.  Okay to proceed with chemotherapy.  11/21/2023-LFTs normal  12/13/2023: Liver enzymes are normals. Alkaline phosphatase is slightly more elevated at 244.     *Hypertension-currently on valsartan and hydrochlorothiazide.  Valsartan could be contributing to hyperkalemia.  Will refer to cardiology ASAP for management of medications and cardiac clearance for proceeding with chemotherapy  Recent echocardiogram normal  Stress test reviewed and negative.  9/27/2023-echocardiogram shows a normal ejection fraction of 59%.  Follow-up echocardiogram 10/20/2023 with ejection fraction of 54%.  When compared to baseline study 5/9/2023 ejection fraction is stable.  Left ventricular GLS is changed by 8%.  Discussed with cardiology, given the stability of ejection fraction when compared to baseline from May, we will proceed with [facility]   Cardiology evaluation 11/10/2023 with stable EF and  strain.  Recommendations to continue Kadcyla with 3-month follow-up echocardiogram  12/13/2023: Being followed by cardio oncology. Has an ECHO scheduled soon that is to be rescheduled per her daughter.     *Genetic testing-Invitae 9 gene stat panel negative,     *Decreased oral intake secondary to chemotherapy  Still not adequate oral intake  Encouraged her to drink boost and Ensure  12/13/2023: Increase protein shake intake and oral fluid intake. Has already spoken to dieticians. We will scheduled her for fluids again next week and continue to monitor.     *Frequent belching  Continue Protonix 40 mg daily  This is improved    *Cognitive impairment  It is unclear if this is the patient's baseline or mental status has been exacerbated by recent chemotherapy  The patient is struggling with compliance with her medications.  The patient's daughter expresses frustration today as the patient is struggling to care for herself at home with managing medications and symptom management.  Evaluated by CARL Antonio     *Chemotherapy-induced diarrhea  6/5/2023 patient given Enterade (Wild Berry flavor).  She has been drinking 1 a day.  She does not necessarily like the flavor (currently mixing with sugar-free Aramis-Aid which does help).  Encouraged her to increase to 2 a day.  6/21/2023 patient reports that she had stopped drinking her Enterade because she was waking up in the middle the night having diarrhea although she did not know if it was due to the Enterade her protein shakes.  I have encouraged the patient to continue Enterade, drinking it in the morning.  Try discontinuing the protein shakes and see how she does.  Patient states that she will try this  Reports 2-3 loose stools.  Continue Enterade and Imodium.  7/5/2023 continuing to have issues with diarrhea up to 3-4 bowel movements a day, patient also recently prescribed Kayexalate for an apparently elevated potassium.  Potassium only 3.1 today.  She advised to  discontinue the Kayexalate she was given IV hydration today.  We will also prescribe Lomotil to use if needed to help better control her diarrhea.  Patient is not drinking Enterade regularly.  8/2/2023-improved since discharge from the hospital and discontinuation of chemotherapy.  She received Herceptin on 8/2/2023 and reports a week of diarrhea.  Patient has not had any diarrhea since initiating Kadcyla.  Continue to monitor, she continues to note this is improved on Kadcyla  12/13/2023: Has improved. Currently has had some constipation. Encouraged senokot S every other night based on her current symptoms until bowel movements are more consistent.     Weakness and leg aching  12/13/2023: Patient denies any numbness or tingling in her legs and there is not localized edema, warmth, cording or tenderness. Continues to report weakness and aching that sounds like a combination of deconditioning and possible restless leg syndrome. Magnesium level was checked and is normal. Home health to be consulted for physical therapy and a prescription for a walker provided.     PLAN:  Proceed today with cycle 5 adjuvant Kadcyla which is continued every 3 weeks  500 ml of normal saline with treatment today  Return in 1 week for additional fluids. 500 ml normal saline.   Increase protein intake and oral fluid intake.   PT home health consult   Utilize a walker for stability  Magnesium level checked and normal.   Radiation completed 11/20/2023  Continue to follow-up with Dr. Maldonado, cardiology as scheduled with recommendations for 3-month follow-up echocardiogram in February 2024  Return in 3 weeks for CBC, CMP, MD or NP follow-up and continued Kadcyla    Patient is on a high risk medication requiring close monitoring for toxicity.      Corine Anne, CARL  12/13/2023    I spent 42 minutes caring for Veronica on this date of service. This time includes time spent by me in the following activities: preparing for the visit, reviewing  tests, obtaining and/or reviewing a separately obtained history, performing a medically appropriate examination and/or evaluation, counseling and educating the patient/family/caregiver, ordering medications, tests, or procedures, referring and communicating with other health care professionals, documenting information in the medical record, and care coordination     Patient reviewed with Dr. Yee who is in agreement with plan

## 2023-12-20 ENCOUNTER — INFUSION (OUTPATIENT)
Dept: ONCOLOGY | Facility: HOSPITAL | Age: 83
End: 2023-12-20
Payer: MEDICARE

## 2023-12-20 VITALS
BODY MASS INDEX: 20.72 KG/M2 | OXYGEN SATURATION: 97 % | SYSTOLIC BLOOD PRESSURE: 116 MMHG | WEIGHT: 120.8 LBS | RESPIRATION RATE: 16 BRPM | TEMPERATURE: 97 F | HEART RATE: 118 BPM | DIASTOLIC BLOOD PRESSURE: 70 MMHG

## 2023-12-20 DIAGNOSIS — C50.411 MALIGNANT NEOPLASM OF UPPER-OUTER QUADRANT OF RIGHT BREAST IN FEMALE, ESTROGEN RECEPTOR NEGATIVE: Primary | ICD-10-CM

## 2023-12-20 DIAGNOSIS — Z17.1 MALIGNANT NEOPLASM OF UPPER-OUTER QUADRANT OF RIGHT BREAST IN FEMALE, ESTROGEN RECEPTOR NEGATIVE: Primary | ICD-10-CM

## 2023-12-20 PROCEDURE — 25810000003 SODIUM CHLORIDE 0.9 % SOLUTION: Performed by: INTERNAL MEDICINE

## 2023-12-20 PROCEDURE — 96360 HYDRATION IV INFUSION INIT: CPT

## 2023-12-20 RX ADMIN — SODIUM CHLORIDE 500 ML: 9 INJECTION, SOLUTION INTRAVENOUS at 12:25

## 2024-01-03 ENCOUNTER — OFFICE VISIT (OUTPATIENT)
Dept: ONCOLOGY | Facility: CLINIC | Age: 84
End: 2024-01-03
Payer: MEDICARE

## 2024-01-03 ENCOUNTER — INFUSION (OUTPATIENT)
Dept: ONCOLOGY | Facility: HOSPITAL | Age: 84
End: 2024-01-03
Payer: MEDICARE

## 2024-01-03 VITALS
BODY MASS INDEX: 20.51 KG/M2 | HEIGHT: 64 IN | HEART RATE: 82 BPM | OXYGEN SATURATION: 99 % | TEMPERATURE: 96.8 F | RESPIRATION RATE: 16 BRPM | WEIGHT: 120.1 LBS | SYSTOLIC BLOOD PRESSURE: 140 MMHG | DIASTOLIC BLOOD PRESSURE: 63 MMHG

## 2024-01-03 DIAGNOSIS — Z17.1 MALIGNANT NEOPLASM OF UPPER-OUTER QUADRANT OF RIGHT BREAST IN FEMALE, ESTROGEN RECEPTOR NEGATIVE: ICD-10-CM

## 2024-01-03 DIAGNOSIS — C50.411 MALIGNANT NEOPLASM OF UPPER-OUTER QUADRANT OF RIGHT BREAST IN FEMALE, ESTROGEN RECEPTOR NEGATIVE: Primary | ICD-10-CM

## 2024-01-03 DIAGNOSIS — Z17.1 MALIGNANT NEOPLASM OF UPPER-OUTER QUADRANT OF RIGHT BREAST IN FEMALE, ESTROGEN RECEPTOR NEGATIVE: Primary | ICD-10-CM

## 2024-01-03 DIAGNOSIS — C50.411 MALIGNANT NEOPLASM OF UPPER-OUTER QUADRANT OF RIGHT BREAST IN FEMALE, ESTROGEN RECEPTOR NEGATIVE: ICD-10-CM

## 2024-01-03 DIAGNOSIS — Z45.2 ENCOUNTER FOR FITTING AND ADJUSTMENT OF VASCULAR CATHETER: Primary | ICD-10-CM

## 2024-01-03 LAB
ALBUMIN SERPL-MCNC: 3.5 G/DL (ref 3.5–5.2)
ALBUMIN/GLOB SERPL: 1.1 G/DL
ALP SERPL-CCNC: 74 U/L (ref 39–117)
ALT SERPL W P-5'-P-CCNC: 16 U/L (ref 1–33)
ANION GAP SERPL CALCULATED.3IONS-SCNC: 12 MMOL/L (ref 5–15)
AST SERPL-CCNC: 33 U/L (ref 1–32)
BASOPHILS # BLD AUTO: 0.03 10*3/MM3 (ref 0–0.2)
BASOPHILS NFR BLD AUTO: 0.4 % (ref 0–1.5)
BILIRUB SERPL-MCNC: 0.3 MG/DL (ref 0–1.2)
BUN SERPL-MCNC: 21 MG/DL (ref 8–23)
BUN/CREAT SERPL: 22.1 (ref 7–25)
CALCIUM SPEC-SCNC: 9.3 MG/DL (ref 8.6–10.5)
CHLORIDE SERPL-SCNC: 104 MMOL/L (ref 98–107)
CO2 SERPL-SCNC: 25 MMOL/L (ref 22–29)
CREAT SERPL-MCNC: 0.95 MG/DL (ref 0.57–1)
DEPRECATED RDW RBC AUTO: 52 FL (ref 37–54)
EGFRCR SERPLBLD CKD-EPI 2021: 59.6 ML/MIN/1.73
EOSINOPHIL # BLD AUTO: 0.14 10*3/MM3 (ref 0–0.4)
EOSINOPHIL NFR BLD AUTO: 2.1 % (ref 0.3–6.2)
ERYTHROCYTE [DISTWIDTH] IN BLOOD BY AUTOMATED COUNT: 17 % (ref 12.3–15.4)
GLOBULIN UR ELPH-MCNC: 3.3 GM/DL
GLUCOSE SERPL-MCNC: 207 MG/DL (ref 65–99)
HCT VFR BLD AUTO: 33.9 % (ref 34–46.6)
HGB BLD-MCNC: 10.4 G/DL (ref 12–15.9)
IMM GRANULOCYTES # BLD AUTO: 0.04 10*3/MM3 (ref 0–0.05)
IMM GRANULOCYTES NFR BLD AUTO: 0.6 % (ref 0–0.5)
LYMPHOCYTES # BLD AUTO: 0.67 10*3/MM3 (ref 0.7–3.1)
LYMPHOCYTES NFR BLD AUTO: 9.8 % (ref 19.6–45.3)
MCH RBC QN AUTO: 25.8 PG (ref 26.6–33)
MCHC RBC AUTO-ENTMCNC: 30.7 G/DL (ref 31.5–35.7)
MCV RBC AUTO: 84.1 FL (ref 79–97)
MONOCYTES # BLD AUTO: 0.45 10*3/MM3 (ref 0.1–0.9)
MONOCYTES NFR BLD AUTO: 6.6 % (ref 5–12)
NEUTROPHILS NFR BLD AUTO: 5.48 10*3/MM3 (ref 1.7–7)
NEUTROPHILS NFR BLD AUTO: 80.5 % (ref 42.7–76)
NRBC BLD AUTO-RTO: 0 /100 WBC (ref 0–0.2)
PLATELET # BLD AUTO: 153 10*3/MM3 (ref 140–450)
PMV BLD AUTO: 9.3 FL (ref 6–12)
POTASSIUM SERPL-SCNC: 3.7 MMOL/L (ref 3.5–5.2)
PROT SERPL-MCNC: 6.8 G/DL (ref 6–8.5)
RBC # BLD AUTO: 4.03 10*6/MM3 (ref 3.77–5.28)
SODIUM SERPL-SCNC: 141 MMOL/L (ref 136–145)
WBC NRBC COR # BLD AUTO: 6.81 10*3/MM3 (ref 3.4–10.8)

## 2024-01-03 PROCEDURE — 85025 COMPLETE CBC W/AUTO DIFF WBC: CPT

## 2024-01-03 PROCEDURE — 25010000002 HEPARIN LOCK FLUSH PER 10 UNITS: Performed by: INTERNAL MEDICINE

## 2024-01-03 PROCEDURE — 80053 COMPREHEN METABOLIC PANEL: CPT

## 2024-01-03 PROCEDURE — 36591 DRAW BLOOD OFF VENOUS DEVICE: CPT

## 2024-01-03 RX ORDER — HEPARIN SODIUM (PORCINE) LOCK FLUSH IV SOLN 100 UNIT/ML 100 UNIT/ML
500 SOLUTION INTRAVENOUS AS NEEDED
Status: DISCONTINUED | OUTPATIENT
Start: 2024-01-03 | End: 2024-01-03 | Stop reason: HOSPADM

## 2024-01-03 RX ORDER — CEPHALEXIN 500 MG/1
CAPSULE ORAL
COMMUNITY
Start: 2024-01-02

## 2024-01-03 RX ORDER — SODIUM CHLORIDE 0.9 % (FLUSH) 0.9 %
10 SYRINGE (ML) INJECTION AS NEEDED
Status: DISCONTINUED | OUTPATIENT
Start: 2024-01-03 | End: 2024-01-03 | Stop reason: HOSPADM

## 2024-01-03 RX ORDER — HEPARIN SODIUM (PORCINE) LOCK FLUSH IV SOLN 100 UNIT/ML 100 UNIT/ML
500 SOLUTION INTRAVENOUS AS NEEDED
OUTPATIENT
Start: 2024-01-03

## 2024-01-03 RX ORDER — SODIUM CHLORIDE 0.9 % (FLUSH) 0.9 %
10 SYRINGE (ML) INJECTION AS NEEDED
OUTPATIENT
Start: 2024-01-03

## 2024-01-03 RX ADMIN — Medication 10 ML: at 10:03

## 2024-01-03 RX ADMIN — Medication 500 UNITS: at 10:03

## 2024-01-03 NOTE — PROGRESS NOTES
Subjective   Veronica Royal is a 83 y.o. female.  Referred by Dr. Michael for right breast inflammatory breast cancer    History of Present Illness   Ms. Royal is a 82-year-old postmenopausal  lady with hypertension, diabetes, hyperlipidemia, chronic kidney disease, hyperkalemia-chronic presented with a palpable abnormality in the right breast.  Subsequent imaging was performed.  Patient has not had a mammogram prior to this in the past 25 years.    4/21/2023-bilateral diagnostic mammogram-high density irregular mass measuring 44 mm with spiculated margins and amorphus and fine pleomorphic calcifications with skin thickening in the right breast at 9:00.  2 intramammary lymph nodes in the upper outer axillary tail region are slightly rounded  2 prominent right axillary lymph nodes largest of which measures 26 mm.  Asymmetry noted in the left breast.    Right breast ultrasound at 9 o'clock position, 3 cm from the nipple there is a 38 x 28 x 42 mm mass.  Skin thickness measuring 11 mm near the mass.  One of the 2 rounded axillary tail lymph nodes noted.  Borderline cortical thickening.  2 abnormal axillary lymph nodes.  1 lymph node displaced loss of normal morphology with a round heterogeneous appearance and echogenic foci.  Most consistent with calcified lymph node measuring 26 mm.  Second lymph node measures 12 mm.  Displaced cortical thickening.  Ultrasound-guided biopsy of the mass in the axillary lymph nodes recommended.    4/21/2023  1.right breast 9:00 biopsy-invasive ductal carcinoma with apocrine features  Grade 3  ER negative  VA negative  HER2 2+ on immunohistochemistry  HER2 amplified on FISH with HER2 copy number of 7.58, OPAL seventeen 3.18, HER2/OPAL 17 ratio of 2.4.  Ki-67 38.45%    2.right axillary biopsy-soft tissue involved by invasive ductal carcinoma with apocrine features  Associated microcalcification  No definite lymph node tissue identified.    4/29/2023-MRI of the breast-biopsy-proven  malignancy in the right breast at 9:00 measuring 4.3 cm in greatest dimension.  Attachment overlying skin and diffuse right breast edema noted.  Right axillary lymphadenopathy.  No suspicious findings in the left breast.    5/11/2023-CT of the chest abdomen and pelvis-no evidence of metastatic disease.  Liver is cirrhotic in configuration.    5/11/2023-bone scan negative    Patient presents today to the clinic for discussing neoadjuvant chemotherapy.  She is accompanied by her daughter.  Patient denies any recent changes in weight, she reports some pain at the site of malignancy.  A punch biopsy was performed and was consistent with inflammatory breast cancer.  At the site of punch biopsy there is an ulcerated lesion.    She has poorly controlled diabetes currently on glipizide metformin and Tradjenta.  Hemoglobin A1c recently was noted to be 9.9.    Also has chronic hyperkalemia, explanation unclear.    Blood pressure poorly controlled.    She reports good functional status lives independently.  Able to manage all her bills and independent of ADLs.  She has good support system in terms of her daughter.    Family history significant for brother with pancreatic cancer at the age of 68, sister with ovarian cancer at the age of 58    She received 6 weeks of Taxol Perjeta and Herceptin and subsequently admitted to the hospital due to severe nausea vomiting diarrhea poor oral intake, KAMILA, severe electrolyte abnormalities.      admission to the hospital for KAMILA, severe diarrhea, nausea vomiting and poor oral intake.She was in the hospital between 7/12/2023 to 7/20/2023.  During the hospitalization she was treated for acute UTI, electrolyte abnormalities and KAMILA.  Subsequently she was  discharged to a rehab.   During the hospitalization she was evaluated by Dr. Michael and a right breast ultrasound was obtained on 7/17/2023.  There was very little response noted with the 9 o'clock position mass 6 cm from the nipple  measuring 3.7 x 2.4 x 3.7 cm previously 3.8 x 2.5 x 4.2 cm.  In the right axilla the lymph node measured 2.6 x 1.5 x 1.9 cm previously 1.9 x 1.5 x 2.5 cm.  There was decrease in size of the adjacent lymph node measuring 0.7 cm previously 1.2 cm.    8/24/2023-right mastectomy and axillary lymph node dissection  Invasive ductal carcinoma, grade 2  The tumor measures 4 cm  Microcalcifications are seen in the tumor  Dermal lymphatic invasion present  Lymphatic invasion present  Tumor seen in the skin but no ulceration.  Residual cancer burden 3.454  RCB-3  Xiong Lima grade 3  Margins negative  18 lymph nodes total evaluated  1 with micrometastasis with a tumor measuring 2.5 mm with no chemotherapy effect  1 with chemotherapy change and isolated tumor cells  There is 1 node with chemotherapy change and a clip but no residual tumor.    Receptors repeated  ER negative  VT negative  HER2 2+ on immunohistochemistry  FISH nonamplified    INTERVAL HISTORY:  Patient presents today for follow-up accompanied by her daughter.  She has been diagnosed with skin cancer of the left thumb and may require an amputation.  She had a recent fall as she was trying to open the door.  Her daughter reports that her lower extremities are getting increasingly weaker and she is having trouble pulling herself up from a chair.  Patient reports cramping of her lower extremities and also tells me that her fingers feel cold all the time.  She has had issues with constipation for which she took senna S which ultimately resulted in diarrhea.  She is having trouble regulating the bowel movements.  Denies any tingling or numbness of extremities.       The following portions of the patient's history were reviewed and updated as appropriate: allergies, current medications, past family history, past medical history, past social history, past surgical history and problem list.    Past Medical History:   Diagnosis Date    Anxiety     Arthritis     Basal cell  carcinoma     Left thumb    Breast cancer     Right    Depression     Diabetes mellitus     Diarrhea     s/e chemo    High cholesterol     History of chemotherapy 2023    Hypertension     Rash 2023    s/e chemo    Sacral decubitus ulcer     cleaning with soap & water    Urinary incontinence     wears pads        Past Surgical History:   Procedure Laterality Date    CATARACT EXTRACTION EXTRACAPSULAR W/ INTRAOCULAR LENS IMPLANTATION Right     EYE SURGERY      Muscle repair age 21    MASTECTOMY W/ SENTINEL NODE BIOPSY Right 2023    Procedure: Right breast modified radical mastectomy;  Surgeon: Rosa Michael MD;  [facility] [place] [facility];  Service: General;  Laterality: Right;    TONSILLECTOMY      VENOUS ACCESS DEVICE (PORT) INSERTION N/A 2023    Procedure: INSERTION VENOUS ACCESS DEVICE;  Surgeon: Rosa Michael MD;  [facility] [place] [facility];  Service: General;  Laterality: N/A;        Family History   Problem Relation Age of Onset    Alzheimer's disease Mother     Heart disease Father     Heart attack Father     Ovarian cancer Sister     Pancreatic cancer Brother     Malig Hyperthermia Neg Hx     Colon cancer Neg Hx     Colon polyps Neg Hx     Crohn's disease Neg Hx     Irritable bowel syndrome Neg Hx     Ulcerative colitis Neg Hx         Social History     Socioeconomic History    Marital status:    Tobacco Use    Smoking status: Never    Smokeless tobacco: Never   Vaping Use    Vaping Use: Never used   Substance and Sexual Activity    Alcohol use: Never    Drug use: Never    Sexual activity: Never        OB History    No obstetric history on file.      Age at menarche-11  Age at first live childbirth-35   2 para 1  1  Age at menopause-50  Oral conceptive pill use for 3 to 4 years    No Known Allergies     Review of Systems    Review of systems as mentioned in the HPI otherwise negative    Objective   Temperature 96.8 °F (36 °C), temperature source Temporal, resp.  "rate 16, height 162.6 cm (64.02\"), weight 54.5 kg (120 lb 1.6 oz).     Physical Exam  Vitals reviewed.   Constitutional:       General: She is not in acute distress.     Appearance: Normal appearance. She is well-developed.   HENT:      Head: Normocephalic and atraumatic.      Right Ear: Decreased hearing noted.      Left Ear: Decreased hearing noted.   Eyes:      Pupils: Pupils are equal, round, and reactive to light.   Cardiovascular:      Rate and Rhythm: Normal rate and regular rhythm.      Heart sounds: Normal heart sounds. No murmur heard.  Pulmonary:      Effort: Pulmonary effort is normal. No respiratory distress.      Breath sounds: Normal breath sounds. No wheezing, rhonchi or rales.   Abdominal:      General: Bowel sounds are normal. There is no distension.      Palpations: Abdomen is soft.   Musculoskeletal:         General: Swelling (trace) present. Normal range of motion.      Cervical back: Normal range of motion.   Skin:     General: Skin is warm and dry.      Findings: No rash.   Neurological:      Mental Status: She is alert and oriented to person, place, and time.        Right chest wall carefully examined, status post mastectomy with incision well-healed, no signs or symptoms of infection.  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 Sheila Yee MD         Results from last 7 days   Lab Units 01/03/24  0914   WBC 10*3/mm3 6.81   NEUTROS ABS 10*3/mm3 5.48   HEMOGLOBIN g/dL 10.4*   HEMATOCRIT % 33.9*   PLATELETS 10*3/mm3 153                     No radiology results for the last 30 days.     Adult Transthoracic Echo Limited W/ Cont if Necessary Per Protocol (10/19/2023 11:23)     Assessment & Plan       *Right breast inflammatory breast cancer  T4d N1 M0  Stage IIIb  ER negative, ID " negative, HER2 2+ on immunohistochemistry but amplified on FISH.  Invasive ductal carcinoma with apocrine features, grade 3, Ki-67 38%  CT scans and bone scan without any obvious evidence of metastatic disease  Echocardiogram has been performed and ejection fraction normal.  Liver shows cirrhotic morphology.  LFTs otherwise normal and total bilirubin normal as well as protein normal.  It appears that the synthetic function of the liver is still within normal limits.  Mediport placed 5/19/2023  Taxol, Herceptin, Perjeta initiated 5/24/2023 5/31/2023 with C1D8 Taxol  6/28/2023-cycle 2-day 15 of Taxol.  She is overall tolerating therapy well with expected side effects.  She will proceed with Taxol today.  Scheduled for Taxol Herceptin and Perjeta.  7/12/2023-cycle 3-day 8 of TPH.  Chemotherapy held and patient was admitted to the hospital for management of severe dehydration, KAMILA, nausea vomiting diarrhea and decreased oral intake.  8/2/2023-she feels relatively well today.  Labs reviewed and stable to proceed with Herceptin only.  We will not administer Taxol and Perjeta as she has had significant issues with chemotherapy.  Right mastectomy 8/24/2023 with 4 cm of residual disease, RCB-3, treatment effect present, axillary lymph node dissection with 1 lymph node with micrometastasis measuring 2.5 mm, 1 lymph node with isolated tumor cells and a third lymph node which showed treatment changes but no tumor cells.  Total of 18 lymph nodes removed   9/20/2023 to discuss pathology and adjuvant therapy.  We discussed Kadcyla every 3 weekly to finish a complete year.  Patient is definitely hesitant to resume any sort of chemotherapy due to her previous experience with diarrhea.  Started Kadcyla 9/20/2023, denies any diarrhea.  Tolerating treatment well so far  No evidence of recurrent disease  Adjuvant radiation completed 11/20/2023  She has received 5 doses of adjuvant Kadcyla.  Patient reports progressively worsening  weakness and recent fall and decree strength in her lower extremities.  She is complaining of cramping and pain in her thighs.  She spastically denies tingling or numbness.  She however tells me that her hands feel cold all the time.  I am certainly concerned that if some of the side effects are from Kadcyla.  I plan to hold Kadcyla and reassess again in 3 weeks.  If there is a significant improvement in strength then we might consider decreasing the dose of Kadcyla and retrying.    She is also working with physical therapy and the plan is to increase the frequency of visits starting next week.      *Diabetes  Poorly controlled  Evaluation by endocrinology 5/30/2023 with recommendations for dietary changes and home blood sugar monitoring.  The patient's daughter reports today she is struggling with compliance.  Poorly controlled at this time  Continue follow-up with primary care physician    *KAMILA/CKD  8/17/2023 BUN 24 and creatinine 1.05  Patient's daughter reports that she has not been hydrating herself well.  Creatinine stable at 0.95    *?  Cirrhotic appearance of the liver on the CT scan  Referred to gastroenterology  Synthetic function appears to be normal  We will start with Taxol to see if she would tolerate the chemotherapy   Slight elevation of intervention studies today with ALT 43, AST 55.  Continue to monitor weekly  6/28/2023-mild elevation in the LFTs.  Stable compared to previous labs.  Okay to proceed with chemotherapy.  11/21/2023-LFTs normal  12/13/2023: Liver enzymes are normals. Alkaline phosphatase is slightly more elevated at 244.     *Hypertension-currently on valsartan and hydrochlorothiazide.  Valsartan could be contributing to hyperkalemia.  Will refer to cardiology ASAP for management of medications and cardiac clearance for proceeding with chemotherapy  Recent echocardiogram normal  Stress test reviewed and negative.  9/27/2023-echocardiogram shows a normal ejection fraction of  59%.  Follow-up echocardiogram 10/20/2023 with ejection fraction of 54%.  When compared to baseline study 5/9/2023 ejection fraction is stable.  Left ventricular GLS is changed by 8%.  Discussed with cardiology, given the stability of ejection fraction when compared to baseline from May, we will proceed with Kadcyla   Cardiology evaluation 11/10/2023 with stable EF and strain.  Recommendations to continue Kadcyla with 3-month follow-up echocardiogram  12/13/2023: Being followed by cardio oncology. Has an ECHO scheduled soon that is to be rescheduled per her daughter.   Blood pressure 140/63    *Genetic testing-Invitae 9 gene stat panel negative,     *Decreased oral intake secondary to chemotherapy  Still not adequate oral intake  Encouraged her to drink boost and Ensure  Patient has met with dietitians.  Continue protein shakes.    *Frequent belching  Continue Protonix 40 mg daily  Improved    *Cognitive impairment  It is unclear if this is the patient's baseline or mental status has been exacerbated by recent chemotherapy  The patient is struggling with compliance with her medications.  The patient's daughter expresses frustration today as the patient is struggling to care for herself at home with managing medications and symptom management.  Evaluated by CARL Antonio     *Chemotherapy-induced diarrhea  6/5/2023 patient given Enterade (Wild Berry flavor).  She has been drinking 1 a day.  She does not necessarily like the flavor (currently mixing with sugar-free Aramis-Aid which does help).  Encouraged her to increase to 2 a day.  6/21/2023 patient reports that she had stopped drinking her Enterade because she was waking up in the middle the night having diarrhea although she did not know if it was due to the Enterade her protein shakes.  I have encouraged the patient to continue Enterade, drinking it in the morning.  Try discontinuing the protein shakes and see how she does.  Patient states that she will try  this  Reports 2-3 loose stools.  Continue Enterade and Imodium.  7/5/2023 continuing to have issues with diarrhea up to 3-4 bowel movements a day, patient also recently prescribed Kayexalate for an apparently elevated potassium.  Potassium only 3.1 today.  She advised to discontinue the Kayexalate she was given IV hydration today.  We will also prescribe Lomotil to use if needed to help better control her diarrhea.  Patient is not drinking Enterade regularly.  8/2/2023-improved since discharge from the hospital and discontinuation of chemotherapy.  She received Herceptin on 8/2/2023 and reports a week of diarrhea.  Patient has not had any diarrhea since initiating Kadcyla.  Continue to monitor, she continues to note this is improved on Kadcyla  12/13/2023: Has improved. Currently has had some constipation. Encouraged senokot S every other night based on her current symptoms until bowel movements are more consistent.     Weakness and leg aching  12/13/2023: Patient denies any numbness or tingling in her legs and there is not localized edema, warmth, cording or tenderness. Continues to report weakness and aching that sounds like a combination of deconditioning and possible restless leg syndrome. Magnesium level was checked and is normal. Home health to be consulted for physical therapy and a prescription for a walker provided.   1/3/2024-patient reports persistent weakness over the past 1 month.  She is also complaining of leg cramping and had a recent fall.  Although not entirely suggestive of neuropathy I do wonder if Kadcyla is contributing to the weakness.  I will delay treatment by 3 weeks to let her continue with physical therapy to see if that would help with improvement in the strength.    PLAN:  Delay cycle 6 of Kadcyla by 3 weeks  Continue physical therapy return in 1 week for additional fluids. 500 ml normal saline.   Increase protein intake and oral fluid intake.   Utilize a walker for stability  Radiation  completed 11/20/2023  Continue to follow-up with Dr. Maldonado, cardiology as scheduled with recommendations for 3-month follow-up echocardiogram in February 2024  Return in 3 weeks for CBC, CMP, MD or NP follow-up and continued Raea    Patient is on a high risk medication requiring close monitoring for toxicity.      Sheila Yee MD  01/03/2024    I spent 41 minutes caring for Veronica on this date of service. This time includes time spent by me in the following activities: preparing for the visit, reviewing tests, obtaining and/or reviewing a separately obtained history, performing a medically appropriate examination and/or evaluation, counseling and educating the patient/family/caregiver, ordering medications, tests, or procedures, referring and communicating with other health care professionals, documenting information in the medical record, and care coordination     Patient reviewed with Dr. Yee who is in agreement with plan

## 2024-01-09 RX ORDER — LOSARTAN POTASSIUM 25 MG/1
25 TABLET ORAL DAILY
Qty: 90 TABLET | Refills: 1 | Status: SHIPPED | OUTPATIENT
Start: 2024-01-09

## 2024-01-24 ENCOUNTER — INFUSION (OUTPATIENT)
Dept: ONCOLOGY | Facility: HOSPITAL | Age: 84
End: 2024-01-24
Payer: MEDICARE

## 2024-01-24 ENCOUNTER — OFFICE VISIT (OUTPATIENT)
Dept: ONCOLOGY | Facility: CLINIC | Age: 84
End: 2024-01-24
Payer: MEDICARE

## 2024-01-24 VITALS
DIASTOLIC BLOOD PRESSURE: 83 MMHG | OXYGEN SATURATION: 99 % | TEMPERATURE: 97.5 F | SYSTOLIC BLOOD PRESSURE: 158 MMHG | BODY MASS INDEX: 20.84 KG/M2 | HEIGHT: 64 IN | HEART RATE: 85 BPM | WEIGHT: 122.1 LBS | RESPIRATION RATE: 16 BRPM

## 2024-01-24 DIAGNOSIS — C50.411 MALIGNANT NEOPLASM OF UPPER-OUTER QUADRANT OF RIGHT BREAST IN FEMALE, ESTROGEN RECEPTOR NEGATIVE: Primary | ICD-10-CM

## 2024-01-24 DIAGNOSIS — C50.411 MALIGNANT NEOPLASM OF UPPER-OUTER QUADRANT OF RIGHT BREAST IN FEMALE, ESTROGEN RECEPTOR NEGATIVE: ICD-10-CM

## 2024-01-24 DIAGNOSIS — Z17.1 MALIGNANT NEOPLASM OF UPPER-OUTER QUADRANT OF RIGHT BREAST IN FEMALE, ESTROGEN RECEPTOR NEGATIVE: ICD-10-CM

## 2024-01-24 DIAGNOSIS — Z17.1 MALIGNANT NEOPLASM OF UPPER-OUTER QUADRANT OF RIGHT BREAST IN FEMALE, ESTROGEN RECEPTOR NEGATIVE: Primary | ICD-10-CM

## 2024-01-24 DIAGNOSIS — Z45.2 ENCOUNTER FOR FITTING AND ADJUSTMENT OF VASCULAR CATHETER: ICD-10-CM

## 2024-01-24 LAB
ALBUMIN SERPL-MCNC: 3.5 G/DL (ref 3.5–5.2)
ALBUMIN/GLOB SERPL: 1.1 G/DL
ALP SERPL-CCNC: 76 U/L (ref 39–117)
ALT SERPL W P-5'-P-CCNC: 13 U/L (ref 1–33)
ANION GAP SERPL CALCULATED.3IONS-SCNC: 12.6 MMOL/L (ref 5–15)
AST SERPL-CCNC: 27 U/L (ref 1–32)
BASOPHILS # BLD AUTO: 0.02 10*3/MM3 (ref 0–0.2)
BASOPHILS NFR BLD AUTO: 0.4 % (ref 0–1.5)
BILIRUB SERPL-MCNC: 0.3 MG/DL (ref 0–1.2)
BUN SERPL-MCNC: 22 MG/DL (ref 8–23)
BUN/CREAT SERPL: 26.8 (ref 7–25)
CALCIUM SPEC-SCNC: 9.5 MG/DL (ref 8.6–10.5)
CHLORIDE SERPL-SCNC: 106 MMOL/L (ref 98–107)
CO2 SERPL-SCNC: 23.4 MMOL/L (ref 22–29)
CREAT SERPL-MCNC: 0.82 MG/DL (ref 0.57–1)
DEPRECATED RDW RBC AUTO: 53.8 FL (ref 37–54)
EGFRCR SERPLBLD CKD-EPI 2021: 71.1 ML/MIN/1.73
EOSINOPHIL # BLD AUTO: 0.29 10*3/MM3 (ref 0–0.4)
EOSINOPHIL NFR BLD AUTO: 5.3 % (ref 0.3–6.2)
ERYTHROCYTE [DISTWIDTH] IN BLOOD BY AUTOMATED COUNT: 17.4 % (ref 12.3–15.4)
GLOBULIN UR ELPH-MCNC: 3.2 GM/DL
GLUCOSE SERPL-MCNC: 176 MG/DL (ref 65–99)
HCT VFR BLD AUTO: 32.1 % (ref 34–46.6)
HGB BLD-MCNC: 9.9 G/DL (ref 12–15.9)
IMM GRANULOCYTES # BLD AUTO: 0.02 10*3/MM3 (ref 0–0.05)
IMM GRANULOCYTES NFR BLD AUTO: 0.4 % (ref 0–0.5)
LYMPHOCYTES # BLD AUTO: 0.8 10*3/MM3 (ref 0.7–3.1)
LYMPHOCYTES NFR BLD AUTO: 14.7 % (ref 19.6–45.3)
MCH RBC QN AUTO: 26.2 PG (ref 26.6–33)
MCHC RBC AUTO-ENTMCNC: 30.8 G/DL (ref 31.5–35.7)
MCV RBC AUTO: 84.9 FL (ref 79–97)
MONOCYTES # BLD AUTO: 0.42 10*3/MM3 (ref 0.1–0.9)
MONOCYTES NFR BLD AUTO: 7.7 % (ref 5–12)
NEUTROPHILS NFR BLD AUTO: 3.88 10*3/MM3 (ref 1.7–7)
NEUTROPHILS NFR BLD AUTO: 71.5 % (ref 42.7–76)
NRBC BLD AUTO-RTO: 0 /100 WBC (ref 0–0.2)
PLATELET # BLD AUTO: 153 10*3/MM3 (ref 140–450)
PMV BLD AUTO: 9.4 FL (ref 6–12)
POTASSIUM SERPL-SCNC: 4 MMOL/L (ref 3.5–5.2)
PROT SERPL-MCNC: 6.7 G/DL (ref 6–8.5)
RBC # BLD AUTO: 3.78 10*6/MM3 (ref 3.77–5.28)
SODIUM SERPL-SCNC: 142 MMOL/L (ref 136–145)
WBC NRBC COR # BLD AUTO: 5.43 10*3/MM3 (ref 3.4–10.8)

## 2024-01-24 PROCEDURE — 25010000002 ADO-TRASTUZUMAB 100 MG RECONSTITUTED SOLUTION 1 EACH VIAL: Performed by: INTERNAL MEDICINE

## 2024-01-24 PROCEDURE — 96375 TX/PRO/DX INJ NEW DRUG ADDON: CPT

## 2024-01-24 PROCEDURE — 25810000003 SODIUM CHLORIDE 0.9 % SOLUTION 250 ML FLEX CONT: Performed by: INTERNAL MEDICINE

## 2024-01-24 PROCEDURE — 25010000002 DEXAMETHASONE SODIUM PHOSPHATE 100 MG/10ML SOLUTION: Performed by: INTERNAL MEDICINE

## 2024-01-24 PROCEDURE — 96413 CHEMO IV INFUSION 1 HR: CPT

## 2024-01-24 PROCEDURE — 80053 COMPREHEN METABOLIC PANEL: CPT

## 2024-01-24 PROCEDURE — 25010000002 HEPARIN LOCK FLUSH PER 10 UNITS: Performed by: INTERNAL MEDICINE

## 2024-01-24 PROCEDURE — 25810000003 SODIUM CHLORIDE 0.9 % SOLUTION: Performed by: INTERNAL MEDICINE

## 2024-01-24 PROCEDURE — 85025 COMPLETE CBC W/AUTO DIFF WBC: CPT

## 2024-01-24 RX ORDER — HEPARIN SODIUM (PORCINE) LOCK FLUSH IV SOLN 100 UNIT/ML 100 UNIT/ML
500 SOLUTION INTRAVENOUS AS NEEDED
OUTPATIENT
Start: 2024-01-24

## 2024-01-24 RX ORDER — SODIUM CHLORIDE 0.9 % (FLUSH) 0.9 %
10 SYRINGE (ML) INJECTION AS NEEDED
Status: DISCONTINUED | OUTPATIENT
Start: 2024-01-24 | End: 2024-01-24 | Stop reason: HOSPADM

## 2024-01-24 RX ORDER — HEPARIN SODIUM (PORCINE) LOCK FLUSH IV SOLN 100 UNIT/ML 100 UNIT/ML
500 SOLUTION INTRAVENOUS AS NEEDED
Status: DISCONTINUED | OUTPATIENT
Start: 2024-01-24 | End: 2024-01-24 | Stop reason: HOSPADM

## 2024-01-24 RX ORDER — SODIUM CHLORIDE 0.9 % (FLUSH) 0.9 %
10 SYRINGE (ML) INJECTION AS NEEDED
OUTPATIENT
Start: 2024-01-24

## 2024-01-24 RX ORDER — SODIUM CHLORIDE 9 MG/ML
250 INJECTION, SOLUTION INTRAVENOUS ONCE
Status: COMPLETED | OUTPATIENT
Start: 2024-01-24 | End: 2024-01-24

## 2024-01-24 RX ADMIN — DEXAMETHASONE SODIUM PHOSPHATE 12 MG: 10 INJECTION, SOLUTION INTRAMUSCULAR; INTRAVENOUS at 13:08

## 2024-01-24 RX ADMIN — Medication 500 UNITS: at 14:23

## 2024-01-24 RX ADMIN — ADO-TRASTUZUMAB EMTANSINE 175 MG: 20 INJECTION, POWDER, LYOPHILIZED, FOR SOLUTION INTRAVENOUS at 13:50

## 2024-01-24 RX ADMIN — Medication 10 ML: at 14:23

## 2024-01-24 RX ADMIN — SODIUM CHLORIDE 250 ML: 9 INJECTION, SOLUTION INTRAVENOUS at 13:08

## 2024-01-24 NOTE — PROGRESS NOTES
Subjective   Veronica Royal is a 83 y.o. female.  Referred by Dr. Michael for right breast inflammatory breast cancer    History of Present Illness   Ms. Royal is a 82-year-old postmenopausal  lady with hypertension, diabetes, hyperlipidemia, chronic kidney disease, hyperkalemia-chronic presented with a palpable abnormality in the right breast.  Subsequent imaging was performed.  Patient has not had a mammogram prior to this in the past 25 years.    4/21/2023-bilateral diagnostic mammogram-high density irregular mass measuring 44 mm with spiculated margins and amorphus and fine pleomorphic calcifications with skin thickening in the right breast at 9:00.  2 intramammary lymph nodes in the upper outer axillary tail region are slightly rounded  2 prominent right axillary lymph nodes largest of which measures 26 mm.  Asymmetry noted in the left breast.    Right breast ultrasound at 9 o'clock position, 3 cm from the nipple there is a 38 x 28 x 42 mm mass.  Skin thickness measuring 11 mm near the mass.  One of the 2 rounded axillary tail lymph nodes noted.  Borderline cortical thickening.  2 abnormal axillary lymph nodes.  1 lymph node displaced loss of normal morphology with a round heterogeneous appearance and echogenic foci.  Most consistent with calcified lymph node measuring 26 mm.  Second lymph node measures 12 mm.  Displaced cortical thickening.  Ultrasound-guided biopsy of the mass in the axillary lymph nodes recommended.    4/21/2023  1.right breast 9:00 biopsy-invasive ductal carcinoma with apocrine features  Grade 3  ER negative  IN negative  HER2 2+ on immunohistochemistry  HER2 amplified on FISH with HER2 copy number of 7.58, OPAL seventeen 3.18, HER2/OPAL 17 ratio of 2.4.  Ki-67 38.45%    2.right axillary biopsy-soft tissue involved by invasive ductal carcinoma with apocrine features  Associated microcalcification  No definite lymph node tissue identified.    4/29/2023-MRI of the breast-biopsy-proven  malignancy in the right breast at 9:00 measuring 4.3 cm in greatest dimension.  Attachment overlying skin and diffuse right breast edema noted.  Right axillary lymphadenopathy.  No suspicious findings in the left breast.    5/11/2023-CT of the chest abdomen and pelvis-no evidence of metastatic disease.  Liver is cirrhotic in configuration.    5/11/2023-bone scan negative    Patient presents today to the clinic for discussing neoadjuvant chemotherapy.  She is accompanied by her daughter.  Patient denies any recent changes in weight, she reports some pain at the site of malignancy.  A punch biopsy was performed and was consistent with inflammatory breast cancer.  At the site of punch biopsy there is an ulcerated lesion.    She has poorly controlled diabetes currently on glipizide metformin and Tradjenta.  Hemoglobin A1c recently was noted to be 9.9.    Also has chronic hyperkalemia, explanation unclear.    Blood pressure poorly controlled.    She reports good functional status lives independently.  Able to manage all her bills and independent of ADLs.  She has good support system in terms of her daughter.    Family history significant for brother with pancreatic cancer at the age of 68, sister with ovarian cancer at the age of 58    She received 6 weeks of Taxol Perjeta and Herceptin and subsequently admitted to the hospital due to severe nausea vomiting diarrhea poor oral intake, KAMILA, severe electrolyte abnormalities.      admission to the hospital for KAMILA, severe diarrhea, nausea vomiting and poor oral intake.She was in the hospital between 7/12/2023 to 7/20/2023.  During the hospitalization she was treated for acute UTI, electrolyte abnormalities and KAMILA.  Subsequently she was  discharged to a rehab.   During the hospitalization she was evaluated by Dr. Michael and a right breast ultrasound was obtained on 7/17/2023.  There was very little response noted with the 9 o'clock position mass 6 cm from the nipple  measuring 3.7 x 2.4 x 3.7 cm previously 3.8 x 2.5 x 4.2 cm.  In the right axilla the lymph node measured 2.6 x 1.5 x 1.9 cm previously 1.9 x 1.5 x 2.5 cm.  There was decrease in size of the adjacent lymph node measuring 0.7 cm previously 1.2 cm.    8/24/2023-right mastectomy and axillary lymph node dissection  Invasive ductal carcinoma, grade 2  The tumor measures 4 cm  Microcalcifications are seen in the tumor  Dermal lymphatic invasion present  Lymphatic invasion present  Tumor seen in the skin but no ulceration.  Residual cancer burden 3.454  RCB-3  Xiong Lima grade 3  Margins negative  18 lymph nodes total evaluated  1 with micrometastasis with a tumor measuring 2.5 mm with no chemotherapy effect  1 with chemotherapy change and isolated tumor cells  There is 1 node with chemotherapy change and a clip but no residual tumor.    Receptors repeated  ER negative  NC negative  HER2 2+ on immunohistochemistry  FISH nonamplified    INTERVAL HISTORY:  Patient presents today for follow-up accompanied by her daughter.  Her last treatment with Kadcyla was 6 weeks ago.  Since we held the last treatment patient has made remarkable improvement in terms of her strength.  She worked with physical therapy and she feels much stronger.  She also reports that the muscle pain has improved.  She is now using an oral chemotherapy drug prescribed by her dermatologist for the left finger basal cell carcinoma.   Her appetite had improved and she had gained some weight however over the past week she feels like her taste is altered and not eating much.  She denies any diarrhea.       The following portions of the patient's history were reviewed and updated as appropriate: allergies, current medications, past family history, past medical history, past social history, past surgical history and problem list.    Past Medical History:   Diagnosis Date    Anxiety     Arthritis     Basal cell carcinoma     Left thumb    Breast cancer 2023     Right    Depression     Diabetes mellitus     Diarrhea     s/e chemo    High cholesterol     History of chemotherapy 2023    Hypertension     Rash 2023    s/e chemo    Sacral decubitus ulcer     cleaning with soap & water    Urinary incontinence     wears pads        Past Surgical History:   Procedure Laterality Date    CATARACT EXTRACTION EXTRACAPSULAR W/ INTRAOCULAR LENS IMPLANTATION Right     EYE SURGERY      Muscle repair age 21    MASTECTOMY W/ SENTINEL NODE BIOPSY Right 2023    Procedure: Right breast modified radical mastectomy;  Surgeon: Rosa Mihcael MD;  Location: [facility] OR [facility];  Service: General;  Laterality: Right;    TONSILLECTOMY      VENOUS ACCESS DEVICE (PORT) INSERTION N/A 2023    Procedure: INSERTION VENOUS ACCESS DEVICE;  Surgeon: Rosa Michael MD;  Location: [facility] OR [facility];  Service: General;  Laterality: N/A;        Family History   Problem Relation Age of Onset    Alzheimer's disease Mother     Heart disease Father     Heart attack Father     Ovarian cancer Sister     Pancreatic cancer Brother     Malig Hyperthermia Neg Hx     Colon cancer Neg Hx     Colon polyps Neg Hx     Crohn's disease Neg Hx     Irritable bowel syndrome Neg Hx     Ulcerative colitis Neg Hx         Social History     Socioeconomic History    Marital status:    Tobacco Use    Smoking status: Never    Smokeless tobacco: Never   Vaping Use    Vaping Use: Never used   Substance and Sexual Activity    Alcohol use: Never    Drug use: Never    Sexual activity: Never        OB History    No obstetric history on file.      Age at menarche-11  Age at first live childbirth-35   2 para 1  1  Age at menopause-50  Oral conceptive pill use for 3 to 4 years    No Known Allergies     Review of Systems    Review of systems as mentioned in the HPI otherwise negative    Objective   Blood pressure 158/83, pulse 85, temperature 97.5 °F (36.4 °C), temperature source Temporal, resp. rate 16, height  "162.6 cm (64.02\"), weight 55.4 kg (122 lb 1.6 oz), SpO2 99%.     Physical Exam  Vitals reviewed.   Constitutional:       General: She is not in acute distress.     Appearance: Normal appearance. She is well-developed.   HENT:      Head: Normocephalic and atraumatic.      Right Ear: Decreased hearing noted.      Left Ear: Decreased hearing noted.   Eyes:      Pupils: Pupils are equal, round, and reactive to light.   Cardiovascular:      Rate and Rhythm: Normal rate and regular rhythm.      Heart sounds: Normal heart sounds. No murmur heard.  Pulmonary:      Effort: Pulmonary effort is normal. No respiratory distress.      Breath sounds: Normal breath sounds. No wheezing, rhonchi or rales.   Abdominal:      General: Bowel sounds are normal. There is no distension.      Palpations: Abdomen is soft.   Musculoskeletal:         General: Swelling (trace) present. Normal range of motion.      Cervical back: Normal range of motion.   Skin:     General: Skin is warm and dry.      Findings: No rash.   Neurological:      Mental Status: She is alert and oriented to person, place, and time.        Right chest wall carefully examined, status post mastectomy with incision well-healed, no signs or symptoms of infection.  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 Sheila Yee MD         Results from last 7 days   Lab Units 01/24/24  1208   WBC 10*3/mm3 5.43   NEUTROS ABS 10*3/mm3 3.88   HEMOGLOBIN g/dL 9.9*   HEMATOCRIT % 32.1*   PLATELETS 10*3/mm3 153         CBC reviewed and hemoglobin lower at 9.9, WBC 5.43 and platelets 153,000  CMP reviewed and BUN 22, creatinine 0.82, LFTs within normal limits            No radiology results for the last 30 days.     Adult Transthoracic Echo Limited W/ Cont if Necessary Per Protocol " (10/19/2023 11:23)     Assessment & Plan       *Right breast inflammatory breast cancer  T4d N1 M0  Stage IIIb  ER negative, MO negative, HER2 2+ on immunohistochemistry but amplified on FISH.  Invasive ductal carcinoma with apocrine features, grade 3, Ki-67 38%  CT scans and bone scan without any obvious evidence of metastatic disease  Echocardiogram has been performed and ejection fraction normal.  Liver shows cirrhotic morphology.  LFTs otherwise normal and total bilirubin normal as well as protein normal.  It appears that the synthetic function of the liver is still within normal limits.  Mediport placed 5/19/2023  Taxol, Herceptin, Perjeta initiated 5/24/2023 5/31/2023 with C1D8 Taxol  6/28/2023-cycle 2-day 15 of Taxol.  She is overall tolerating therapy well with expected side effects.  She will proceed with Taxol today.  Scheduled for Taxol Herceptin and Perjeta.  7/12/2023-cycle 3-day 8 of TPH.  Chemotherapy held and patient was admitted to the hospital for management of severe dehydration, KAMILA, nausea vomiting diarrhea and decreased oral intake.  8/2/2023-she feels relatively well today.  Labs reviewed and stable to proceed with Herceptin only.  We will not administer Taxol and Perjeta as she has had significant issues with chemotherapy.  Right mastectomy 8/24/2023 with 4 cm of residual disease, RCB-3, treatment effect present, axillary lymph node dissection with 1 lymph node with micrometastasis measuring 2.5 mm, 1 lymph node with isolated tumor cells and a third lymph node which showed treatment changes but no tumor cells.  Total of 18 lymph nodes removed   9/20/2023 to discuss pathology and adjuvant therapy.  We discussed Kadcyla every 3 weekly to finish a complete year.  Patient is definitely hesitant to resume any sort of chemotherapy due to her previous experience with diarrhea.  Started Kadcyla 9/20/2023, denies any diarrhea.  Tolerating treatment well so far  No evidence of recurrent  disease  Adjuvant radiation completed 11/20/2023  She has received 5 doses of adjuvant Kadcyla.  Sixth dose has been delayed by 3 weeks due to worsening arthralgias, decreased strength in her lower extremities  Improvement in strength as well as arthralgias with holding chemotherapy.  We discussed about proceeding with the dose reduction of Kadcyla versus not doing any further Kadcyla.  Patient is wanting to proceed with Kadcyla with a dose reduction.  We will decrease Kadcyla dose to 3 mg/kg.    *Diabetes  Poorly controlled  Evaluation by endocrinology 5/30/2023 with recommendations for dietary changes and home blood sugar monitoring.  The patient's daughter reports today she is struggling with compliance.  Continue follow-up with PCP    *KAMILA/CKD  8/17/2023 BUN 24 and creatinine 1.05  Patient's daughter reports that she has not been hydrating herself well.  Creatinine remains normal    *?  Cirrhotic appearance of the liver on the CT scan  Referred to gastroenterology  Synthetic function appears to be normal  We will start with Taxol to see if she would tolerate the chemotherapy   Slight elevation of intervention studies today with ALT 43, AST 55.  Continue to monitor weekly  6/28/2023-mild elevation in the LFTs.  Stable compared to previous labs.  Okay to proceed with chemotherapy.  11/21/2023-LFTs normal  12/13/2023: Liver enzymes are normals. Alkaline phosphatase is slightly more elevated at 244.   1/24/2024-LFTs within normal limits    *Hypertension-currently on valsartan and hydrochlorothiazide.  Valsartan could be contributing to hyperkalemia.  Will refer to cardiology ASAP for management of medications and cardiac clearance for proceeding with chemotherapy  Recent echocardiogram normal  Stress test reviewed and negative.  9/27/2023-echocardiogram shows a normal ejection fraction of 59%.  Follow-up echocardiogram 10/20/2023 with ejection fraction of 54%.  When compared to baseline study 5/9/2023 ejection  fraction is stable.  Left ventricular GLS is changed by 8%.  Discussed with cardiology, given the stability of ejection fraction when compared to baseline from May, we will proceed with Kadcyla   Cardiology evaluation 11/10/2023 with stable EF and strain.  Recommendations to continue Kadcyla with 3-month follow-up echocardiogram  12/13/2023: Being followed by cardio oncology. Has an ECHO scheduled soon that is to be rescheduled per her daughter.   Blood pressure 158/83    *Genetic testing-Invitae 9 gene stat panel negative,     *Decreased oral intake secondary to chemotherapy  Still not adequate oral intake  Encouraged her to drink boost and Ensure  Patient has met with dietitians.  Continue protein shakes.    *Frequent belching  Continue Protonix 40 mg daily  Improved    *Cognitive impairment  It is unclear if this is the patient's baseline or mental status has been exacerbated by recent chemotherapy  The patient is struggling with compliance with her medications.  The patient's daughter expresses frustration today as the patient is struggling to care for herself at home with managing medications and symptom management.  Evaluated by CARL Antonio     *Chemotherapy-induced diarrhea  6/5/2023 patient given Enterade (Wild Berry flavor).  She has been drinking 1 a day.  She does not necessarily like the flavor (currently mixing with sugar-free Aramis-Aid which does help).  Encouraged her to increase to 2 a day.  6/21/2023 patient reports that she had stopped drinking her Enterade because she was waking up in the middle the night having diarrhea although she did not know if it was due to the Enterade her protein shakes.  I have encouraged the patient to continue Enterade, drinking it in the morning.  Try discontinuing the protein shakes and see how she does.  Patient states that she will try this  Reports 2-3 loose stools.  Continue Enterade and Imodium.  7/5/2023 continuing to have issues with diarrhea up to 3-4  bowel movements a day, patient also recently prescribed Kayexalate for an apparently elevated potassium.  Potassium only 3.1 today.  She advised to discontinue the Kayexalate she was given IV hydration today.  We will also prescribe Lomotil to use if needed to help better control her diarrhea.  Patient is not drinking Enterade regularly.  8/2/2023-improved since discharge from the hospital and discontinuation of chemotherapy.  She received Herceptin on 8/2/2023 and reports a week of diarrhea.  Patient has not had any diarrhea since initiating Kadcyla.  Continue to monitor, she continues to note this is improved on Kadcyla  12/13/2023: Has improved. Currently has had some constipation. Encouraged senokot S every other night based on her current symptoms until bowel movements are more consistent.     Weakness and leg aching  12/13/2023: Patient denies any numbness or tingling in her legs and there is not localized edema, warmth, cording or tenderness. Continues to report weakness and aching that sounds like a combination of deconditioning and possible restless leg syndrome. Magnesium level was checked and is normal. Home health to be consulted for physical therapy and a prescription for a walker provided.   1/3/2024-patient reports persistent weakness over the past 1 month.  She is also complaining of leg cramping and had a recent fall.  Although not entirely suggestive of neuropathy I do wonder if Kadcyla is contributing to the weakness.  I will delay treatment by 3 weeks to let her continue with physical therapy to see if that would help with improvement in the strength.  Arthralgias and lower extremity strength have improved significantly since dealing chemotherapy and increased work with physical therapy.  Recommend that she continue physical therapy  We will decrease the chemotherapy dose     PLAN:  Resume cycle 6 Kadcyla, decreased dose to 3 mg/kg  Continue aggressive physical therapy.   Increase protein intake  and oral fluid intake.   Utilize a walker for stability  Radiation completed 11/20/2023  Continue to follow-up with Dr. Maldonado, cardiology as scheduled with recommendations for 3-month follow-up echocardiogram in February 2024  MD follow-up in 3 weeks    Patient is on a high risk medication requiring close monitoring for toxicity.  She is experiencing severe toxicity from Kadcyla in terms of deconditioning, decreased lower extremity weakness and severe arthralgias.  Therefore dose has been reduced by 20%.    Sheila Yee MD  01/24/2024

## 2024-01-26 ENCOUNTER — HOSPITAL ENCOUNTER (OUTPATIENT)
Dept: PHYSICAL THERAPY | Facility: HOSPITAL | Age: 84
Setting detail: THERAPIES SERIES
Discharge: HOME | End: 2024-01-26
Payer: MEDICARE

## 2024-01-26 DIAGNOSIS — C50.411 MALIGNANT NEOPLASM OF UPPER-OUTER QUADRANT OF RIGHT BREAST IN FEMALE, ESTROGEN RECEPTOR NEGATIVE: Primary | ICD-10-CM

## 2024-01-26 DIAGNOSIS — Z17.1 MALIGNANT NEOPLASM OF UPPER-OUTER QUADRANT OF RIGHT BREAST IN FEMALE, ESTROGEN RECEPTOR NEGATIVE: Primary | ICD-10-CM

## 2024-01-26 DIAGNOSIS — Z90.11 S/P RIGHT MASTECTOMY: ICD-10-CM

## 2024-01-26 DIAGNOSIS — Z91.89 AT RISK FOR LYMPHEDEMA: ICD-10-CM

## 2024-01-26 PROCEDURE — 93702 BIS XTRACELL FLUID ANALYSIS: CPT

## 2024-01-26 PROCEDURE — 97535 SELF CARE MNGMENT TRAINING: CPT

## 2024-01-26 NOTE — THERAPY RE-EVALUATION
Physical Therapy Lymphedema Re-Evaluation  [facility]     Patient Name: Veronica Royal  : 1940  MRN: [id number]  Today's Date: 2024      Visit Date: 2024    Visit Dx:    ICD-10-CM ICD-9-CM   1. Malignant neoplasm of upper-outer quadrant of right breast in female, estrogen receptor negative  C50.411 174.4    Z17.1 V86.1   2. S/P right mastectomy  Z90.11 V45.71   3. At risk for lymphedema  Z91.89 V49.89       Patient Active Problem List   Diagnosis    Malignant neoplasm of upper-outer quadrant of right breast in female, estrogen receptor negative    Encounter for fitting and adjustment of vascular catheter    Hypertension    At high risk for malnutrition    Bilateral hearing loss    Malignant neoplasm of female breast    Advanced care planning/counseling discussion    Cirrhosis of liver    Type 2 diabetes mellitus    Anemia due to chemotherapy    Left renal mass    Severe malnutrition    Breast cancer    Driving safety issue    Slow transit constipation        Past Medical History:   Diagnosis Date    Anxiety     Arthritis     Basal cell carcinoma     Left thumb    Breast cancer     Right    Depression     Diabetes mellitus     Diarrhea     s/e chemo    High cholesterol     History of chemotherapy 2023    Hypertension     Rash 2023    s/e chemo    Sacral decubitus ulcer     cleaning with soap & water    Urinary incontinence     wears pads        Past Surgical History:   Procedure Laterality Date    CATARACT EXTRACTION EXTRACAPSULAR W/ INTRAOCULAR LENS IMPLANTATION Right     EYE SURGERY      Muscle repair age 21    MASTECTOMY W/ SENTINEL NODE BIOPSY Right 2023    Procedure: Right breast modified radical mastectomy;  Surgeon: Rosa Michael MD;  Location: [facility] [place] [facility] [place];  Service: General;  Laterality: Right;    TONSILLECTOMY      VENOUS ACCESS DEVICE (PORT) INSERTION N/A 2023    Procedure: INSERTION VENOUS ACCESS DEVICE;  Surgeon: Rosa Michael MD;  Location:   [facility];  Service: General;  Laterality: N/A;       Visit Dx:    ICD-10-CM ICD-9-CM   1. Malignant neoplasm of upper-outer quadrant of right breast in female, estrogen receptor negative  C50.411 174.4    Z17.1 V86.1   2. S/P right mastectomy  Z90.11 V45.71   3. At risk for lymphedema  Z91.89 V49.89            Lymphedema       Row Name 01/26/24 1000             Subjective Pain    Able to rate subjective pain? yes  -LB      Pre-Treatment Pain Level 0  -LB         Subjective    Subjective Comments I am doing ok. I have worn my sleeve for the last 3 days. I skipped a dose of chemo and did well with PT so I was feeling stronger.  -LB         Lymphedema Assessment    Lymphedema Classification RUE:;at risk/stage 0  -LB      Lymphedema Cancer Related Sx right;axillary dissection;modified radical mastectomy  -LB      Lymphedema Surgery Comments 8/24/23  -LB      Lymph Nodes Removed # 19  -LB      Positive Lymph Nodes # 1  -LB      Chemo Received yes  -LB      Chemo Treatments #/Timeframe neoadjuvant  -LB      Radiation Therapy Received no  -LB      Infections or Cellulitis? no  -LB         Posture/Observations    Posture/Observations Comments B ankle edema  -LB         General ROM    GENERAL ROM COMMENTS BUE WFL  -LB         Lymphedema Edema Assessment    Edema Assessment Comment R wrist edema and bruising and B ankle/foot edema  -LB         RUE Quick Girth (cm)    Axilla 26.8 cm  -LB      Mid upper arm 24.1 cm  -LB      Elbow 23.2 cm  -LB      Mid forearm 18.7 cm  -LB      Wrist crease 15.5 cm  -LB      Web space 18.6 cm  -LB      Met-heads 17.8 cm  -LB      RUE Quick Girth Total 144.7  -LB         Compression/Skin Care    Compression/Skin Care Comments strongly encouraged daily use of compression sleeve  -LB         L-Dex Bioimpedence Screening    L-Dex Measurement Extremity RUE  -LB      L-Dex Patient Position Standing  -LB      L-Dex UE Dominate Side Right  -LB      L-Dex UE At Risk Side Right  -LB      L-Dex UE Pre  Surgical Value No  -LB      L-Dex UE Score 37.9  -LB      L-Dex UE Baseline Score 17.6  -LB      L-Dex UE Value Change 20.3  -LB      L-Dex UE Comment outside of normal range, elevated  -LB      $ L-Dex Charge yes  -LB                User Key  (r) = Recorded By, (t) = Taken By, (c) = Cosigned By      Initials Name Provider Type    Denia Thompson, PT Physical Therapist                                    Therapy Education  Education Details: reviewed s/s of lymphedema, steps to prevention, bioimpedance results and interpretation, use of compression sleeve  Given: Symptoms/condition management, Edema management  Program: Reinforced  How Provided: Verbal  Provided to: Patient  Level of Understanding: Verbalized  97204 - PT Self Care/Mgmt Minutes: 30       OP Exercises       Row Name 01/26/24 1000             Subjective    Subjective Comments I am doing ok. I have worn my sleeve for the last 3 days. I skipped a dose of chemo and did well with PT so I was feeling stronger.  -LB         Subjective Pain    Able to rate subjective pain? yes  -LB      Pre-Treatment Pain Level 0  -LB                User Key  (r) = Recorded By, (t) = Taken By, (c) = Cosigned By      Initials Name Provider Type    Denia Thompson, PT Physical Therapist                                 PT OP Goals       Row Name 01/26/24 1200          Long Term Goals    LTG Date to Achieve 10/14/23  -LB     LTG 1 Pt will demonstrate LDex bioimpedance WNL.  -LB     LTG 1 Progress Ongoing  -LB     LTG 1 Progress Comments elevated and outside of normal limits  -LB     LTG 2 Pt will acquire appropriately fitted compression garment and verbalize appropriate wear schedule.  -LB     LTG 2 Progress Partially Met  -LB     LTG 3 Pt will demonstrate equal BUE AROM.  -LB     LTG 3 Progress Met  -LB               User Key  (r) = Recorded By, (t) = Taken By, (c) = Cosigned By      Initials Name Provider Type    Denia Thompson PT Physical Therapist                     PT  Assessment/Plan       Row Name 01/26/24 1250          PT Assessment    Functional Limitations Limitations in functional capacity and performance;Performance in leisure activities;Limitation in home management;Performance in self-care ADL  -LB     Impairments Edema;Impaired flexibility;Impaired lymphatic circulation;Joint mobility;Muscle strength;Pain;Posture;Sensation  -LB     Assessment Comments Pt returns for 3 month follow up with R wrist edema and bruising from recent fall. LDex bioimpedance is elevated at 37.9 but unable to determine lymphedema vs. edema secondary to fall. Pt has been wearing compression sleeve for last 3 days. Lengthy discussion encouraging daily use of compression sleeve as well as considerations for gait including emphasis on heel strike and slow gait speed to improve safety. Reviewed s/s of lymphedema and steps to prevention. Pt has met 1/3 LTGs. She remains appropriate for continued skilled PT services to address RUE edema and other deficits as needed.  -LB     Rehab Potential Good  -LB     Patient/caregiver participated in establishment of treatment plan and goals Yes  -LB        PT Plan    PT Frequency Other (comment)  -LB     Predicted Duration of Therapy Intervention (PT) repeat bioimpedance in one month  -LB     Planned CPT's? PT EVAL LOW COMPLEXITY: 94007;PT RE-EVAL: 41126;PT THER PROC EA 15 MIN: 61780;PT THER ACT EA 15 MIN: 98677;PT MANUAL THERAPY EA 15 MIN: 95714;PT NEUROMUSC RE-EDUCATION EA 15 MIN: 93115;PT SELF CARE/HOME MGMT/TRAIN EA 15: 20614;PT BIS XTRACELL FLUID ANALYSIS: 35231  -LB     PT Plan Comments repeat bioimpedance, assess balance, compression garment use  -LB               User Key  (r) = Recorded By, (t) = Taken By, (c) = Cosigned By      Initials Name Provider Type    Denia Thompson, PT Physical Therapist                                 Time Calculation:   Start Time: 1000  Stop Time: 1045  Time Calculation (min): 45 min  Total Timed Code Minutes- PT: 30  minute(s)  Timed Charges  60796 - PT Self Care/Mgmt Minutes: 30  Total Minutes  Timed Charges Total Minutes: 30   Total Minutes: 30   Therapy Charges for Today       Code Description Service Date Service Provider Modifiers Qty    05585287578 HC PT BIS XTRACELL FLUID ANALYSIS 1/26/2024 Denia Tse, PT  1    84922009917 HC PT SELF CARE/MGMT/TRAIN EA 15 MIN 1/26/2024 Denia Tse, PT GP 2                      Denia Tse PT  1/26/2024

## 2024-02-05 ENCOUNTER — LAB (OUTPATIENT)
Facility: HOSPITAL | Age: 84
End: 2024-02-05
Payer: MEDICARE

## 2024-02-05 DIAGNOSIS — C50.411 MALIGNANT NEOPLASM OF UPPER-OUTER QUADRANT OF RIGHT BREAST IN FEMALE, ESTROGEN RECEPTOR NEGATIVE: ICD-10-CM

## 2024-02-05 DIAGNOSIS — Z17.1 MALIGNANT NEOPLASM OF UPPER-OUTER QUADRANT OF RIGHT BREAST IN FEMALE, ESTROGEN RECEPTOR NEGATIVE: ICD-10-CM

## 2024-02-05 LAB
ALBUMIN SERPL-MCNC: 3.8 G/DL (ref 3.5–5.2)
ALBUMIN/GLOB SERPL: 1.2 G/DL
ALP SERPL-CCNC: 88 U/L (ref 39–117)
ALT SERPL W P-5'-P-CCNC: 11 U/L (ref 1–33)
ANION GAP SERPL CALCULATED.3IONS-SCNC: 9 MMOL/L (ref 5–15)
AST SERPL-CCNC: 31 U/L (ref 1–32)
BASOPHILS # BLD AUTO: 0.03 10*3/MM3 (ref 0–0.2)
BASOPHILS NFR BLD AUTO: 0.5 % (ref 0–1.5)
BILIRUB SERPL-MCNC: 0.2 MG/DL (ref 0–1.2)
BUN SERPL-MCNC: 18 MG/DL (ref 8–23)
BUN/CREAT SERPL: 20 (ref 7–25)
CALCIUM SPEC-SCNC: 10 MG/DL (ref 8.6–10.5)
CHLORIDE SERPL-SCNC: 108 MMOL/L (ref 98–107)
CO2 SERPL-SCNC: 26 MMOL/L (ref 22–29)
CREAT SERPL-MCNC: 0.9 MG/DL (ref 0.57–1)
DEPRECATED RDW RBC AUTO: 47.5 FL (ref 37–54)
EGFRCR SERPLBLD CKD-EPI 2021: 63.6 ML/MIN/1.73
EOSINOPHIL # BLD AUTO: 0.32 10*3/MM3 (ref 0–0.4)
EOSINOPHIL NFR BLD AUTO: 4.9 % (ref 0.3–6.2)
ERYTHROCYTE [DISTWIDTH] IN BLOOD BY AUTOMATED COUNT: 15.7 % (ref 12.3–15.4)
GLOBULIN UR ELPH-MCNC: 3.2 GM/DL
GLUCOSE SERPL-MCNC: 169 MG/DL (ref 65–99)
HCT VFR BLD AUTO: 34.7 % (ref 34–46.6)
HGB BLD-MCNC: 10.7 G/DL (ref 12–15.9)
HOLD SPECIMEN: NORMAL
IMM GRANULOCYTES # BLD AUTO: 0.02 10*3/MM3 (ref 0–0.05)
IMM GRANULOCYTES NFR BLD AUTO: 0.3 % (ref 0–0.5)
LYMPHOCYTES # BLD AUTO: 0.92 10*3/MM3 (ref 0.7–3.1)
LYMPHOCYTES NFR BLD AUTO: 14.2 % (ref 19.6–45.3)
MCH RBC QN AUTO: 25.6 PG (ref 26.6–33)
MCHC RBC AUTO-ENTMCNC: 30.8 G/DL (ref 31.5–35.7)
MCV RBC AUTO: 83 FL (ref 79–97)
MONOCYTES # BLD AUTO: 0.55 10*3/MM3 (ref 0.1–0.9)
MONOCYTES NFR BLD AUTO: 8.5 % (ref 5–12)
NEUTROPHILS NFR BLD AUTO: 4.66 10*3/MM3 (ref 1.7–7)
NEUTROPHILS NFR BLD AUTO: 71.6 % (ref 42.7–76)
NRBC BLD AUTO-RTO: 0 /100 WBC (ref 0–0.2)
PLATELET # BLD AUTO: 170 10*3/MM3 (ref 140–450)
PMV BLD AUTO: 10.2 FL (ref 6–12)
POTASSIUM SERPL-SCNC: 5.5 MMOL/L (ref 3.5–5.2)
PROT SERPL-MCNC: 7 G/DL (ref 6–8.5)
RBC # BLD AUTO: 4.18 10*6/MM3 (ref 3.77–5.28)
SODIUM SERPL-SCNC: 143 MMOL/L (ref 136–145)
WBC NRBC COR # BLD AUTO: 6.5 10*3/MM3 (ref 3.4–10.8)
WHOLE BLOOD HOLD COAG: NORMAL

## 2024-02-05 PROCEDURE — 85025 COMPLETE CBC W/AUTO DIFF WBC: CPT

## 2024-02-05 PROCEDURE — 80053 COMPREHEN METABOLIC PANEL: CPT

## 2024-02-05 PROCEDURE — 36415 COLL VENOUS BLD VENIPUNCTURE: CPT

## 2024-02-14 ENCOUNTER — OFFICE VISIT (OUTPATIENT)
Dept: ONCOLOGY | Facility: CLINIC | Age: 84
End: 2024-02-14
Payer: MEDICARE

## 2024-02-14 ENCOUNTER — INFUSION (OUTPATIENT)
Dept: ONCOLOGY | Facility: HOSPITAL | Age: 84
End: 2024-02-14
Payer: MEDICARE

## 2024-02-14 VITALS
WEIGHT: 127.8 LBS | SYSTOLIC BLOOD PRESSURE: 178 MMHG | OXYGEN SATURATION: 99 % | HEART RATE: 90 BPM | BODY MASS INDEX: 21.82 KG/M2 | DIASTOLIC BLOOD PRESSURE: 92 MMHG | HEIGHT: 64 IN | TEMPERATURE: 96.9 F | RESPIRATION RATE: 16 BRPM

## 2024-02-14 DIAGNOSIS — Z17.1 MALIGNANT NEOPLASM OF UPPER-OUTER QUADRANT OF RIGHT BREAST IN FEMALE, ESTROGEN RECEPTOR NEGATIVE: ICD-10-CM

## 2024-02-14 DIAGNOSIS — C50.411 MALIGNANT NEOPLASM OF UPPER-OUTER QUADRANT OF RIGHT BREAST IN FEMALE, ESTROGEN RECEPTOR NEGATIVE: Primary | ICD-10-CM

## 2024-02-14 DIAGNOSIS — Z17.1 MALIGNANT NEOPLASM OF UPPER-OUTER QUADRANT OF RIGHT BREAST IN FEMALE, ESTROGEN RECEPTOR NEGATIVE: Primary | ICD-10-CM

## 2024-02-14 DIAGNOSIS — C50.411 MALIGNANT NEOPLASM OF UPPER-OUTER QUADRANT OF RIGHT BREAST IN FEMALE, ESTROGEN RECEPTOR NEGATIVE: ICD-10-CM

## 2024-02-14 DIAGNOSIS — Z45.2 ENCOUNTER FOR FITTING AND ADJUSTMENT OF VASCULAR CATHETER: ICD-10-CM

## 2024-02-14 LAB
ALBUMIN SERPL-MCNC: 3.4 G/DL (ref 3.5–5.2)
ALBUMIN/GLOB SERPL: 1.1 G/DL
ALP SERPL-CCNC: 80 U/L (ref 39–117)
ALT SERPL W P-5'-P-CCNC: 7 U/L (ref 1–33)
ANION GAP SERPL CALCULATED.3IONS-SCNC: 10.9 MMOL/L (ref 5–15)
AST SERPL-CCNC: 26 U/L (ref 1–32)
BASOPHILS # BLD AUTO: 0.03 10*3/MM3 (ref 0–0.2)
BASOPHILS NFR BLD AUTO: 0.5 % (ref 0–1.5)
BILIRUB SERPL-MCNC: 0.3 MG/DL (ref 0–1.2)
BUN SERPL-MCNC: 22 MG/DL (ref 8–23)
BUN/CREAT SERPL: 25.3 (ref 7–25)
CALCIUM SPEC-SCNC: 9.4 MG/DL (ref 8.6–10.5)
CHLORIDE SERPL-SCNC: 106 MMOL/L (ref 98–107)
CO2 SERPL-SCNC: 23.1 MMOL/L (ref 22–29)
CREAT SERPL-MCNC: 0.87 MG/DL (ref 0.57–1)
DEPRECATED RDW RBC AUTO: 52 FL (ref 37–54)
EGFRCR SERPLBLD CKD-EPI 2021: 66.2 ML/MIN/1.73
EOSINOPHIL # BLD AUTO: 0.26 10*3/MM3 (ref 0–0.4)
EOSINOPHIL NFR BLD AUTO: 4.1 % (ref 0.3–6.2)
ERYTHROCYTE [DISTWIDTH] IN BLOOD BY AUTOMATED COUNT: 16.9 % (ref 12.3–15.4)
GLOBULIN UR ELPH-MCNC: 3.2 GM/DL
GLUCOSE SERPL-MCNC: 216 MG/DL (ref 65–99)
HCT VFR BLD AUTO: 31.8 % (ref 34–46.6)
HGB BLD-MCNC: 9.9 G/DL (ref 12–15.9)
IMM GRANULOCYTES # BLD AUTO: 0.03 10*3/MM3 (ref 0–0.05)
IMM GRANULOCYTES NFR BLD AUTO: 0.5 % (ref 0–0.5)
LYMPHOCYTES # BLD AUTO: 0.8 10*3/MM3 (ref 0.7–3.1)
LYMPHOCYTES NFR BLD AUTO: 12.5 % (ref 19.6–45.3)
MCH RBC QN AUTO: 26.7 PG (ref 26.6–33)
MCHC RBC AUTO-ENTMCNC: 31.1 G/DL (ref 31.5–35.7)
MCV RBC AUTO: 85.7 FL (ref 79–97)
MONOCYTES # BLD AUTO: 0.48 10*3/MM3 (ref 0.1–0.9)
MONOCYTES NFR BLD AUTO: 7.5 % (ref 5–12)
NEUTROPHILS NFR BLD AUTO: 4.8 10*3/MM3 (ref 1.7–7)
NEUTROPHILS NFR BLD AUTO: 74.9 % (ref 42.7–76)
NRBC BLD AUTO-RTO: 0 /100 WBC (ref 0–0.2)
PLATELET # BLD AUTO: 170 10*3/MM3 (ref 140–450)
PMV BLD AUTO: 9 FL (ref 6–12)
POTASSIUM SERPL-SCNC: 3.7 MMOL/L (ref 3.5–5.2)
PROT SERPL-MCNC: 6.6 G/DL (ref 6–8.5)
RBC # BLD AUTO: 3.71 10*6/MM3 (ref 3.77–5.28)
SODIUM SERPL-SCNC: 140 MMOL/L (ref 136–145)
WBC NRBC COR # BLD AUTO: 6.4 10*3/MM3 (ref 3.4–10.8)

## 2024-02-14 PROCEDURE — 36415 COLL VENOUS BLD VENIPUNCTURE: CPT | Performed by: INTERNAL MEDICINE

## 2024-02-14 PROCEDURE — 96375 TX/PRO/DX INJ NEW DRUG ADDON: CPT

## 2024-02-14 PROCEDURE — 96413 CHEMO IV INFUSION 1 HR: CPT

## 2024-02-14 PROCEDURE — 25810000003 SODIUM CHLORIDE 0.9 % SOLUTION: Performed by: INTERNAL MEDICINE

## 2024-02-14 PROCEDURE — 84484 ASSAY OF TROPONIN QUANT: CPT | Performed by: NURSE PRACTITIONER

## 2024-02-14 PROCEDURE — 25010000002 HEPARIN LOCK FLUSH PER 10 UNITS: Performed by: INTERNAL MEDICINE

## 2024-02-14 PROCEDURE — 25010000002 DEXAMETHASONE SODIUM PHOSPHATE 100 MG/10ML SOLUTION: Performed by: INTERNAL MEDICINE

## 2024-02-14 PROCEDURE — 25010000002 ADO-TRASTUZUMAB 100 MG RECONSTITUTED SOLUTION 1 EACH VIAL: Performed by: INTERNAL MEDICINE

## 2024-02-14 PROCEDURE — 85025 COMPLETE CBC W/AUTO DIFF WBC: CPT

## 2024-02-14 PROCEDURE — 83880 ASSAY OF NATRIURETIC PEPTIDE: CPT | Performed by: NURSE PRACTITIONER

## 2024-02-14 PROCEDURE — 80053 COMPREHEN METABOLIC PANEL: CPT

## 2024-02-14 PROCEDURE — 25810000003 SODIUM CHLORIDE 0.9 % SOLUTION 250 ML FLEX CONT: Performed by: INTERNAL MEDICINE

## 2024-02-14 RX ORDER — SODIUM CHLORIDE 0.9 % (FLUSH) 0.9 %
10 SYRINGE (ML) INJECTION AS NEEDED
OUTPATIENT
Start: 2024-02-14

## 2024-02-14 RX ORDER — SODIUM CHLORIDE 9 MG/ML
250 INJECTION, SOLUTION INTRAVENOUS ONCE
Status: CANCELLED | OUTPATIENT
Start: 2024-02-14

## 2024-02-14 RX ORDER — HEPARIN SODIUM (PORCINE) LOCK FLUSH IV SOLN 100 UNIT/ML 100 UNIT/ML
500 SOLUTION INTRAVENOUS AS NEEDED
OUTPATIENT
Start: 2024-02-14

## 2024-02-14 RX ORDER — HEPARIN SODIUM (PORCINE) LOCK FLUSH IV SOLN 100 UNIT/ML 100 UNIT/ML
500 SOLUTION INTRAVENOUS AS NEEDED
Status: DISCONTINUED | OUTPATIENT
Start: 2024-02-14 | End: 2024-02-14 | Stop reason: HOSPADM

## 2024-02-14 RX ORDER — SODIUM CHLORIDE 9 MG/ML
250 INJECTION, SOLUTION INTRAVENOUS ONCE
Status: COMPLETED | OUTPATIENT
Start: 2024-02-14 | End: 2024-02-14

## 2024-02-14 RX ORDER — SODIUM CHLORIDE 0.9 % (FLUSH) 0.9 %
10 SYRINGE (ML) INJECTION AS NEEDED
Status: DISCONTINUED | OUTPATIENT
Start: 2024-02-14 | End: 2024-02-14 | Stop reason: HOSPADM

## 2024-02-14 RX ADMIN — SODIUM CHLORIDE 250 ML: 9 INJECTION, SOLUTION INTRAVENOUS at 11:13

## 2024-02-14 RX ADMIN — ADO-TRASTUZUMAB EMTANSINE 175 MG: 20 INJECTION, POWDER, LYOPHILIZED, FOR SOLUTION INTRAVENOUS at 11:32

## 2024-02-14 RX ADMIN — Medication 500 UNITS: at 12:12

## 2024-02-14 RX ADMIN — Medication 10 ML: at 12:12

## 2024-02-14 RX ADMIN — DEXAMETHASONE SODIUM PHOSPHATE 12 MG: 10 INJECTION, SOLUTION INTRAMUSCULAR; INTRAVENOUS at 11:14

## 2024-02-15 ENCOUNTER — TELEPHONE (OUTPATIENT)
Dept: CARDIOLOGY | Facility: CLINIC | Age: 84
End: 2024-02-15

## 2024-02-15 ENCOUNTER — HOSPITAL ENCOUNTER (OUTPATIENT)
Dept: CARDIOLOGY | Facility: HOSPITAL | Age: 84
Discharge: HOME | End: 2024-02-15
Admitting: INTERNAL MEDICINE
Payer: MEDICARE

## 2024-02-15 VITALS
OXYGEN SATURATION: 98 % | WEIGHT: 127 LBS | HEART RATE: 108 BPM | HEIGHT: 64 IN | DIASTOLIC BLOOD PRESSURE: 64 MMHG | SYSTOLIC BLOOD PRESSURE: 128 MMHG | BODY MASS INDEX: 21.68 KG/M2

## 2024-02-15 DIAGNOSIS — Z45.2 ENCOUNTER FOR FITTING AND ADJUSTMENT OF VASCULAR CATHETER: ICD-10-CM

## 2024-02-15 DIAGNOSIS — Z79.899 ENCOUNTER FOR MONITORING CARDIOTOXIC DRUG THERAPY: ICD-10-CM

## 2024-02-15 DIAGNOSIS — Z51.81 ENCOUNTER FOR MONITORING CARDIOTOXIC DRUG THERAPY: ICD-10-CM

## 2024-02-15 DIAGNOSIS — Z51.81 ENCOUNTER FOR MONITORING CARDIOTOXIC DRUG THERAPY: Primary | ICD-10-CM

## 2024-02-15 DIAGNOSIS — Z79.899 ENCOUNTER FOR MONITORING CARDIOTOXIC DRUG THERAPY: Primary | ICD-10-CM

## 2024-02-15 DIAGNOSIS — R06.09 DYSPNEA ON EXERTION: ICD-10-CM

## 2024-02-15 LAB
BH CV ECHO LEFT VENTRICLE GLOBAL LONGITUDINAL STRAIN: -19 %
BH CV ECHO MEAS - EDV(CUBED): 85.2 ML
BH CV ECHO MEAS - EDV(MOD-SP2): 68 ML
BH CV ECHO MEAS - EDV(MOD-SP4): 59 ML
BH CV ECHO MEAS - EF(MOD-BP): 54.5 %
BH CV ECHO MEAS - EF(MOD-SP2): 52.9 %
BH CV ECHO MEAS - EF(MOD-SP4): 52.5 %
BH CV ECHO MEAS - EF_3D-VOL: 54 %
BH CV ECHO MEAS - ESV(CUBED): 30.7 ML
BH CV ECHO MEAS - ESV(MOD-SP2): 32 ML
BH CV ECHO MEAS - ESV(MOD-SP4): 28 ML
BH CV ECHO MEAS - FS: 28.9 %
BH CV ECHO MEAS - IVS/LVPW: 1 CM
BH CV ECHO MEAS - IVSD: 0.9 CM
BH CV ECHO MEAS - LAT PEAK E' VEL: 7.3 CM/SEC
BH CV ECHO MEAS - LV DIASTOLIC VOL/BSA (35-75): 36.6 CM2
BH CV ECHO MEAS - LV MASS(C)D: 128 GRAMS
BH CV ECHO MEAS - LV SYSTOLIC VOL/BSA (12-30): 17.4 CM2
BH CV ECHO MEAS - LVIDD: 4.4 CM
BH CV ECHO MEAS - LVIDS: 3.1 CM
BH CV ECHO MEAS - LVPWD: 0.9 CM
BH CV ECHO MEAS - MED PEAK E' VEL: 7.6 CM/SEC
BH CV ECHO MEAS - MV A DUR: 0.1 SEC
BH CV ECHO MEAS - MV A MAX VEL: 107 CM/SEC
BH CV ECHO MEAS - MV DEC TIME: 0.14 SEC
BH CV ECHO MEAS - MV E MAX VEL: 116 CM/SEC
BH CV ECHO MEAS - MV E/A: 1.08
BH CV ECHO MEAS - PULM A REVS DUR: 0.09 SEC
BH CV ECHO MEAS - PULM A REVS VEL: 26.1 CM/SEC
BH CV ECHO MEAS - PULM DIAS VEL: 63 CM/SEC
BH CV ECHO MEAS - PULM S/D: 1.06
BH CV ECHO MEAS - PULM SYS VEL: 66.8 CM/SEC
BH CV ECHO MEAS - SI(MOD-SP2): 22.3 ML/M2
BH CV ECHO MEAS - SI(MOD-SP4): 19.2 ML/M2
BH CV ECHO MEAS - SV(MOD-SP2): 36 ML
BH CV ECHO MEAS - SV(MOD-SP4): 31 ML
BH CV ECHO MEASUREMENTS AVERAGE E/E' RATIO: 15.57
BH CV XLRA - TDI S': 10.8 CM/SEC
LEFT ATRIUM VOLUME INDEX: 22.3 ML/M2
NT-PROBNP SERPL-MCNC: 116 PG/ML (ref 0–1800)
TROPONIN T SERPL HS-MCNC: 14 NG/L

## 2024-02-15 PROCEDURE — 93321 DOPPLER ECHO F-UP/LMTD STD: CPT

## 2024-02-15 PROCEDURE — 93325 DOPPLER ECHO COLOR FLOW MAPG: CPT

## 2024-02-15 PROCEDURE — 93308 TTE F-UP OR LMTD: CPT

## 2024-02-15 PROCEDURE — 93356 MYOCRD STRAIN IMG SPCKL TRCK: CPT

## 2024-02-16 ENCOUNTER — OFFICE VISIT (OUTPATIENT)
Dept: CARDIOLOGY | Facility: CLINIC | Age: 84
End: 2024-02-16
Payer: MEDICARE

## 2024-02-16 VITALS
SYSTOLIC BLOOD PRESSURE: 124 MMHG | HEIGHT: 64 IN | BODY MASS INDEX: 21.37 KG/M2 | DIASTOLIC BLOOD PRESSURE: 72 MMHG | HEART RATE: 98 BPM | WEIGHT: 125.2 LBS

## 2024-02-16 DIAGNOSIS — I51.89 OTHER ILL-DEFINED HEART DISEASES: ICD-10-CM

## 2024-02-16 DIAGNOSIS — R93.1 ABNORMAL ECHOCARDIOGRAM: ICD-10-CM

## 2024-02-16 DIAGNOSIS — Z17.1 MALIGNANT NEOPLASM OF UPPER-OUTER QUADRANT OF RIGHT BREAST IN FEMALE, ESTROGEN RECEPTOR NEGATIVE: ICD-10-CM

## 2024-02-16 DIAGNOSIS — E11.59 TYPE 2 DIABETES MELLITUS WITH OTHER CIRCULATORY COMPLICATION, WITHOUT LONG-TERM CURRENT USE OF INSULIN: ICD-10-CM

## 2024-02-16 DIAGNOSIS — I25.810 CORONARY ARTERY DISEASE INVOLVING CORONARY BYPASS GRAFT OF NATIVE HEART, UNSPECIFIED WHETHER ANGINA PRESENT: ICD-10-CM

## 2024-02-16 DIAGNOSIS — I10 PRIMARY HYPERTENSION: ICD-10-CM

## 2024-02-16 DIAGNOSIS — C50.411 MALIGNANT NEOPLASM OF UPPER-OUTER QUADRANT OF RIGHT BREAST IN FEMALE, ESTROGEN RECEPTOR NEGATIVE: ICD-10-CM

## 2024-02-16 DIAGNOSIS — R06.09 DYSPNEA ON EXERTION: Primary | ICD-10-CM

## 2024-02-16 RX ORDER — SIMVASTATIN 40 MG
20 TABLET ORAL NIGHTLY
Qty: 90 TABLET | Refills: 2 | Status: SHIPPED | OUTPATIENT
Start: 2024-02-16

## 2024-02-16 NOTE — PROGRESS NOTES
Date of Office Visit: 2024  Encounter Provider: Cassidy Maldonado MD  Place of Service: [facility] CARDIOLOGY  Patient Name: Veronica Royal  :1940    Chief complaint  Cardio oncology    History of Present Illness  Patient is a 83-year-old female with diabetes, hypertension, hyperlipidemia, chronic renal insufficiency, hyperkalemia and cirrhosis..  She was diagnosed with right-sided inflammatory breast cancer 2023.  She started chemotherapy with THP on 2023, she has been switched to Kadcyla.  She is also completed right-sided radiation therapy.      Baseline echo with EF 56 to 60% with GLS of -20.3% with grade 1A diastolic dysfunction, aortic valve calcification without stenosis, trivial mitral and tricuspid valve regurgitation with normal right-sided pressures.  She was noted to have coronary artery calcification and on 2023 stress perfusion study was negative for ischemia.  Echo 2023 showed an ejection fraction 59%, GLS -17.9%.  (12.% drop from 2023 ).  Echo on 11/10/2023 shows an ejection fraction of 54% with GLS -20.2% trivial valve regurgitation, mild mitral regurgitation..  2/15/2024 (today) showed an ejection fraction of 54.5%, GLS -19.0% with inferior and inferoseptal wall hypokinesis    Since the last visit she has not had chest pain, shortness of breath, palpitations, syncope near syncope.  She is modestly active around her home.  Blood pressure checked last week was elevated in the 170s and has been checked only sporadically.  They did increase statin therapy as previously recommended.      Past Medical History:   Diagnosis Date    Anxiety     Arthritis     Basal cell carcinoma     Left thumb    Breast cancer     Right    Depression     Diabetes mellitus     Diarrhea     s/e chemo    High cholesterol     History of chemotherapy 2023    Hypertension     Rash 2023    s/e chemo    Sacral decubitus ulcer     cleaning with soap & water    Urinary  incontinence     wears pads     Past Surgical History:   Procedure Laterality Date    CATARACT EXTRACTION EXTRACAPSULAR W/ INTRAOCULAR LENS IMPLANTATION Right     EYE SURGERY      Muscle repair age 21    MASTECTOMY W/ SENTINEL NODE BIOPSY Right 8/24/2023    Procedure: Right breast modified radical mastectomy;  Surgeon: Rosa Michael MD;  Location: [facility] OR [facility] [place];  Service: General;  Laterality: Right;    TONSILLECTOMY      VENOUS ACCESS DEVICE (PORT) INSERTION N/A 05/19/2023    Procedure: INSERTION VENOUS ACCESS DEVICE;  Surgeon: Rosa Michael MD;  Location: [facility] OR [facility] [place];  Service: General;  Laterality: N/A;     No facility-administered medications prior to visit.     Outpatient Medications Prior to Visit   Medication Sig Dispense Refill    acetaminophen (TYLENOL) 325 MG tablet Take 2 tablets by mouth Every 4 (Four) Hours As Needed for Mild Pain. 120 tablet 3    cephalexin (KEFLEX) 500 MG capsule       Cholecalciferol 50 MCG (2000 UT) tablet Take 1 tablet by mouth Daily (Monday-Friday).      glipiZIDE-metFORMIN (METAGLIP) 2.5-500 MG per tablet Take 2 tablets by mouth 2 (Two) Times a Day Before Meals. (Patient taking differently: Take 2 tablets by mouth Every Morning.) 120 tablet 3    glucose blood test strip Check once per day, E11.65 30 each 5    glucose monitor monitoring kit Use 1 each Daily. Dispense glucometer with brand based off insurance coverage, E11.65, check once per day 30 each 5    Lancets misc Check BG once daily, E11.9 30 each 4    lidocaine-prilocaine (EMLA) 2.5-2.5 % cream Apply nickel size amount to port site 30 min before appt time do not rub in cover with plastic wrap 30 g 5    losartan (COZAAR) 25 MG tablet TAKE 1 TABLET BY MOUTH DAILY 90 tablet 1    mupirocin (BACTROBAN) 2 % ointment       ondansetron (ZOFRAN) 8 MG tablet Take 1 tablet by mouth 3 (Three) Times a Day As Needed for Nausea or Vomiting. 30 tablet 5    pantoprazole (PROTONIX) 40 MG EC tablet TAKE 1 TABLET BY MOUTH DAILY 90  "tablet 0    sennosides-docusate (senna-docusate sodium) 8.6-50 MG per tablet Take 1 tablet by mouth Daily As Needed for Constipation. 30 tablet 3    sodium bicarbonate 650 MG tablet       Tradjenta 5 MG tablet tablet Take 1 tablet by mouth Every Morning. 90 tablet 5    valsartan-hydrochlorothiazide (DIOVAN-HCT) 160-12.5 MG per tablet Take 1 tablet by mouth Daily.      simvastatin (ZOCOR) 20 MG tablet Take 1 tablet by mouth Every Night. 90 tablet 3       Allergies as of 02/16/2024    (No Known Allergies)     Social History     Socioeconomic History    Marital status:    Tobacco Use    Smoking status: Never    Smokeless tobacco: Never   Vaping Use    Vaping Use: Never used   Substance and Sexual Activity    Alcohol use: Never    Drug use: Never    Sexual activity: Never     Family History   Problem Relation Age of Onset    Alzheimer's disease Mother     Heart disease Father     Heart attack Father     Ovarian cancer Sister     Pancreatic cancer Brother     Malig Hyperthermia Neg Hx     Colon cancer Neg Hx     Colon polyps Neg Hx     Crohn's disease Neg Hx     Irritable bowel syndrome Neg Hx     Ulcerative colitis Neg Hx      Review of Systems   Constitutional: Positive for weight gain. Negative for chills, fever and weight loss.   Cardiovascular:  Negative for leg swelling.   Respiratory:  Negative for cough, snoring and wheezing.    Hematologic/Lymphatic: Negative for bleeding problem. Bruises/bleeds easily.   Skin:  Negative for color change.   Musculoskeletal:  Negative for falls, joint pain and myalgias.   Gastrointestinal:  Negative for melena.   Genitourinary:  Negative for hematuria.   Neurological:  Negative for excessive daytime sleepiness.   Psychiatric/Behavioral:  Negative for depression. The patient is not nervous/anxious.         Objective:     Vitals:    02/16/24 0811   BP: 124/72   Pulse: 98   Weight: 56.8 kg (125 lb 3.2 oz)   Height: 162.6 cm (64\")     Body mass index is 21.49 kg/m².    Vitals " reviewed.   Constitutional:       Appearance: Well-developed.   Eyes:      General: No scleral icterus.        Right eye: No discharge.      Conjunctiva/sclera: Conjunctivae normal.      Pupils: Pupils are equal, round, and reactive to light.   HENT:      Head: Normocephalic.      Nose: Nose normal.   Neck:      Thyroid: No thyromegaly.      Vascular: No JVD.   Pulmonary:      Effort: Pulmonary effort is normal. No respiratory distress.      Breath sounds: Normal breath sounds. No wheezing. No rales.   Cardiovascular:      Normal rate. Regular rhythm. Normal S1. Normal S2.       Murmurs: There is no murmur.      No gallop.    Pulses:     Intact distal pulses.      Carotid: 2+ bilaterally.     Radial: 2+ bilaterally.     Femoral: 2+ bilaterally.     Popliteal: 2+ bilaterally.     Dorsalis pedis: 2+ bilaterally.     Posterior tibial: 2+ bilaterally.  Edema:     Peripheral edema absent.   Abdominal:      General: Bowel sounds are normal. There is no distension.      Palpations: Abdomen is soft.      Tenderness: There is no abdominal tenderness. There is no rebound.   Musculoskeletal: Normal range of motion.         General: No tenderness.      Cervical back: Normal range of motion and neck supple. Skin:     General: Skin is warm and dry.      Findings: No erythema or rash.   Neurological:      Mental Status: Alert and oriented to person, place, and time.   Psychiatric:         Behavior: Behavior normal.         Thought Content: Thought content normal.         Judgment: Judgment normal.       Lab Review:   Lab Results - Last 18 Months   Lab Units 02/14/24  0954 02/05/24  1039   WBC 10*3/mm3 6.40 6.50   RBC 10*6/mm3 3.71* 4.18   HEMOGLOBIN g/dL 9.9* 10.7*   HEMATOCRIT % 31.8* 34.7   MCV fL 85.7 83.0   MCH pg 26.7 25.6*   MCHC g/dL 31.1* 30.8*   RDW % 16.9* 15.7*   PLATELETS 10*3/mm3 170 170   NEUTROPHIL % % 74.9 71.6   LYMPHOCYTE % % 12.5* 14.2*   MONOCYTES % % 7.5 8.5   EOSINOPHIL % % 4.1 4.9   BASOPHIL % % 0.5 0.5  "  NEUTROS ABS 10*3/mm3 4.80 4.66   LYMPHS ABS 10*3/mm3 0.80 0.92   MONOS ABS 10*3/mm3 0.48 0.55   EOS ABS 10*3/mm3 0.26 0.32   BASOS ABS 10*3/mm3 0.03 0.03   RDW-SD fl 52.0 47.5   MPV fL 9.0 10.2       Lab Results - Last 18 Months   Lab Units 02/14/24  0954 02/05/24  1039   GLUCOSE mg/dL 216* 169*   BUN mg/dL 22 18   CREATININE mg/dL 0.87 0.90   SODIUM mmol/L 140 143   POTASSIUM mmol/L 3.7 5.5*   CHLORIDE mmol/L 106 108*   CO2 mmol/L 23.1 26.0   CALCIUM mg/dL 9.4 10.0   TOTAL PROTEIN g/dL 6.6 7.0   ALBUMIN g/dL 3.4* 3.8   ALT (SGPT) U/L 7 11   AST (SGOT) U/L 26 31   ALK PHOS U/L 80 88   BILIRUBIN mg/dL 0.3 0.2   GLOBULIN gm/dL 3.2 3.2   A/G RATIO g/dL 1.1 1.2   BUN / CREAT RATIO  25.3* 20.0   ANION GAP mmol/L 10.9 9.0   EGFR mL/min/1.73 66.2 63.6     Lab Results - Last 18 Months   Lab Units 11/21/23  0957   CHOLESTEROL mg/dL 147   TRIGLYCERIDES mg/dL 143   HDL CHOL mg/dL 36*   LDL CHOL mg/dL 86   VLDL CHOL mg/dL 25   LDL/HDL RATIO  2.29     Lab Results - Last 18 Months   Lab Units 02/14/24  0954 10/19/23  1030   PROBNP pg/mL 116.0 272.0     Lab Results - Last 18 Months   Lab Units 02/14/24  0954 10/19/23  1030   HSTROP T ng/L 14* 16*     No results for input(s): \"TSH\" in the last 41008 hours.  Lab Results - Last 18 Months   Lab Units 05/25/23  1105   PROTIME Seconds 13.9       ECG 12 Lead    Date/Time: 2/16/2024 8:46 AM  Performed by: Cassidy Maldonado MD    Authorized by: Cassidy Maldonado MD  Comparison: compared with previous ECG   Similar to previous ECG  Rhythm: sinus rhythm    Clinical impression: normal ECG        Assessment:       Diagnosis Plan   1. Dyspnea on exertion  ECG 12 Lead      2. Abnormal echocardiogram  ECG 12 Lead    Stress Test With Pet Myocardial Perfusion      3. Coronary artery disease involving coronary bypass graft of native heart, unspecified whether angina present  Stress Test With Pet Myocardial Perfusion      4. Other ill-defined heart diseases  Stress Test With Pet Myocardial Perfusion      5. " Type 2 diabetes mellitus with other circulatory complication, without long-term current use of insulin  Ambulatory Referral to Endocrinology      6. Primary hypertension        7. Malignant neoplasm of upper-outer quadrant of right breast in female, estrogen receptor negative          Plan:       1.  Right-sided inflammatory breast cancer.  Per ESC guidelines she is considered high risk.  She has been treated with THP starting 5/2023 and is now on maintenance Kadcycla.  Strain imaging had altered 12% in September and it is back to baseline.  However, the current echo with wall motion abnormalities and will assess as below for ischemia.  2.  Cardio oncology care.  Given dyspnea with her high CV risk and evidence of coronary artery calcification, stress perfusion study was performed on 6/2022 was negative for ischemia.  Now with new wall motion abnormalities and poorly controlled risk factors including diabetes.  Questioned patient in detail with daughter and she clearly denies any symptoms of chest pain.  She has dyspnea with more strenuous activities.  This is chronic.  She had a Lexiscan prior stress test in 6/2023 which was normal but now with wall motion change.  We discussed options for further evaluation with CT imaging which I do not think will be helpful given known coronary calcification, Lexiscan cardiac stress test which was done last year for cardiac cath.  As she is asymptomatic we will plan on Lexiscan Cardiolite stress test.  3.  Coronary artery disease.  Coronary calcification noted on CT scan of the chest on 5/2023.  As above   4.  Hypertension, home readings are typically lower.  Will have her keep a log and submit this  5.  Hyperlipidemia.  She states she will contact PCP to obtain lipids.  6.  Diabetes.  This is poorly controlled patient and daughter are frustrated and wish to see an endocrinologist.  I have placed an order.  7   Chronic renal insufficiency baseline creatinine 1.3.   8.  Chronic  mild anemia as well      Time Spent: I spent 50 minutes caring for Veronica on this date of service. This time includes time spent by me in the following activities: preparing for the visit, reviewing tests, obtaining and/or reviewing a separately obtained history, performing a medically appropriate examination and/or evaluation, counseling and educating the patient/family/caregiver, ordering medications, tests, or procedures, referring and communicating with other health care professionals, documenting information in the medical record, and independently interpreting results and communicating that information with the patient/family/caregiver.   I spent 5 minutes on the separately reported service of ECG and Echo. This time is not included in the time used to support the E/M service also reported today.        Your medication list            Accurate as of February 16, 2024 11:59 PM. If you have any questions, ask your nurse or doctor.                CHANGE how you take these medications        Instructions Last Dose Given Next Dose Due   glipiZIDE-metFORMIN 2.5-500 MG per tablet  Commonly known as: METAGLIP  What changed: when to take this      Take 2 tablets by mouth 2 (Two) Times a Day Before Meals.       simvastatin 40 MG tablet  Commonly known as: ZOCOR  What changed: medication strength  Changed by: Cassidy Maldonado MD      Take 0.5 tablets by mouth Every Night.              CONTINUE taking these medications        Instructions Last Dose Given Next Dose Due   acetaminophen 325 MG tablet  Commonly known as: TYLENOL      Take 2 tablets by mouth Every 4 (Four) Hours As Needed for Mild Pain.       cephalexin 500 MG capsule  Commonly known as: KEFLEX           Cholecalciferol 50 MCG (2000 UT) tablet      Take 1 tablet by mouth Daily (Monday-Friday).       glucose blood test strip      Check once per day, E11.65       glucose monitor monitoring kit      Use 1 each Daily. Dispense glucometer with brand based off insurance  coverage, E11.65, check once per day       Lancets misc      Check BG once daily, E11.9       lidocaine-prilocaine 2.5-2.5 % cream  Commonly known as: EMLA      Apply nickel size amount to port site 30 min before appt time do not rub in cover with plastic wrap       losartan 25 MG tablet  Commonly known as: COZAAR      TAKE 1 TABLET BY MOUTH DAILY       mupirocin 2 % ointment  Commonly known as: BACTROBAN           ondansetron 8 MG tablet  Commonly known as: ZOFRAN      Take 1 tablet by mouth 3 (Three) Times a Day As Needed for Nausea or Vomiting.       pantoprazole 40 MG EC tablet  Commonly known as: PROTONIX      TAKE 1 TABLET BY MOUTH DAILY       sennosides-docusate 8.6-50 MG per tablet  Commonly known as: PERICOLACE      Take 1 tablet by mouth Daily As Needed for Constipation.       sodium bicarbonate 650 MG tablet           Tradjenta 5 MG tablet tablet  Generic drug: linagliptin      Take 1 tablet by mouth Every Morning.       valsartan-hydrochlorothiazide 160-12.5 MG per tablet  Commonly known as: DIOVAN-HCT      Take 1 tablet by mouth Daily.                 Where to Get Your Medications        These medications were sent to [id number] - [place], [place] - [facility] [facility] AT [facility] - [phone]  - [phone] [address] [address]      Phone: [phone]   simvastatin 40 MG tablet         Patient is no longer taking -.  I corrected the med list to reflect this.  I did not stop these medications.      Dictated utilizing Dragon dictation

## 2024-02-21 ENCOUNTER — APPOINTMENT (OUTPATIENT)
Dept: ULTRASOUND IMAGING | Facility: HOSPITAL | Age: 84
DRG: 186 | End: 2024-02-21
Payer: MEDICARE

## 2024-02-21 ENCOUNTER — HOSPITAL ENCOUNTER (INPATIENT)
Facility: HOSPITAL | Age: 84
LOS: 2 days | Discharge: HOME | DRG: 186 | End: 2024-02-24
Attending: STUDENT IN AN ORGANIZED HEALTH CARE EDUCATION/TRAINING PROGRAM | Admitting: INTERNAL MEDICINE
Payer: MEDICARE

## 2024-02-21 ENCOUNTER — APPOINTMENT (OUTPATIENT)
Dept: CT IMAGING | Facility: HOSPITAL | Age: 84
DRG: 186 | End: 2024-02-21
Payer: MEDICARE

## 2024-02-21 ENCOUNTER — APPOINTMENT (OUTPATIENT)
Dept: GENERAL RADIOLOGY | Facility: HOSPITAL | Age: 84
DRG: 186 | End: 2024-02-21
Payer: MEDICARE

## 2024-02-21 DIAGNOSIS — J90 PLEURAL EFFUSION ON RIGHT: ICD-10-CM

## 2024-02-21 DIAGNOSIS — R07.9 CHEST PAIN, UNSPECIFIED TYPE: Primary | ICD-10-CM

## 2024-02-21 LAB
ALBUMIN SERPL-MCNC: 3.6 G/DL (ref 3.5–5.2)
ALBUMIN/GLOB SERPL: 1.2 G/DL
ALP SERPL-CCNC: 83 U/L (ref 39–117)
ALT SERPL W P-5'-P-CCNC: 15 U/L (ref 1–33)
ANION GAP SERPL CALCULATED.3IONS-SCNC: 15 MMOL/L (ref 5–15)
APTT PPP: 30.3 SECONDS (ref 22.7–35.4)
AST SERPL-CCNC: 26 U/L (ref 1–32)
BASOPHILS # BLD AUTO: 0.01 10*3/MM3 (ref 0–0.2)
BASOPHILS NFR BLD AUTO: 0.1 % (ref 0–1.5)
BILIRUB SERPL-MCNC: 0.3 MG/DL (ref 0–1.2)
BUN SERPL-MCNC: 24 MG/DL (ref 8–23)
BUN/CREAT SERPL: 25 (ref 7–25)
CALCIUM SPEC-SCNC: 9.4 MG/DL (ref 8.6–10.5)
CHLORIDE SERPL-SCNC: 107 MMOL/L (ref 98–107)
CO2 SERPL-SCNC: 23 MMOL/L (ref 22–29)
CREAT SERPL-MCNC: 0.96 MG/DL (ref 0.57–1)
D DIMER PPP FEU-MCNC: 1.55 MCGFEU/ML (ref 0–0.83)
D-LACTATE SERPL-SCNC: 2.3 MMOL/L (ref 0.5–2)
DEPRECATED RDW RBC AUTO: 44 FL (ref 37–54)
EGFRCR SERPLBLD CKD-EPI 2021: 58.8 ML/MIN/1.73
EOSINOPHIL # BLD AUTO: 0.13 10*3/MM3 (ref 0–0.4)
EOSINOPHIL NFR BLD AUTO: 1.8 % (ref 0.3–6.2)
ERYTHROCYTE [DISTWIDTH] IN BLOOD BY AUTOMATED COUNT: 15.4 % (ref 12.3–15.4)
GEN 5 2HR TROPONIN T REFLEX: 19 NG/L
GLOBULIN UR ELPH-MCNC: 3.1 GM/DL
GLUCOSE SERPL-MCNC: 207 MG/DL (ref 65–99)
HCT VFR BLD AUTO: 27.8 % (ref 34–46.6)
HGB BLD-MCNC: 8.8 G/DL (ref 12–15.9)
HOLD SPECIMEN: NORMAL
HOLD SPECIMEN: NORMAL
IMM GRANULOCYTES # BLD AUTO: 0.02 10*3/MM3 (ref 0–0.05)
IMM GRANULOCYTES NFR BLD AUTO: 0.3 % (ref 0–0.5)
INR PPP: 1.27 (ref 0.9–1.1)
LYMPHOCYTES # BLD AUTO: 0.76 10*3/MM3 (ref 0.7–3.1)
LYMPHOCYTES NFR BLD AUTO: 10.8 % (ref 19.6–45.3)
MCH RBC QN AUTO: 25.4 PG (ref 26.6–33)
MCHC RBC AUTO-ENTMCNC: 31.7 G/DL (ref 31.5–35.7)
MCV RBC AUTO: 80.1 FL (ref 79–97)
MONOCYTES # BLD AUTO: 0.66 10*3/MM3 (ref 0.1–0.9)
MONOCYTES NFR BLD AUTO: 9.4 % (ref 5–12)
NEUTROPHILS NFR BLD AUTO: 5.46 10*3/MM3 (ref 1.7–7)
NEUTROPHILS NFR BLD AUTO: 77.6 % (ref 42.7–76)
NRBC BLD AUTO-RTO: 0 /100 WBC (ref 0–0.2)
NT-PROBNP SERPL-MCNC: 136 PG/ML (ref 0–1800)
PLATELET # BLD AUTO: 130 10*3/MM3 (ref 140–450)
PMV BLD AUTO: 9.3 FL (ref 6–12)
POTASSIUM SERPL-SCNC: 4.8 MMOL/L (ref 3.5–5.2)
PROCALCITONIN SERPL-MCNC: 0.1 NG/ML (ref 0–0.25)
PROT SERPL-MCNC: 6.7 G/DL (ref 6–8.5)
PROTHROMBIN TIME: 16.1 SECONDS (ref 11.7–14.2)
RBC # BLD AUTO: 3.47 10*6/MM3 (ref 3.77–5.28)
SODIUM SERPL-SCNC: 145 MMOL/L (ref 136–145)
TROPONIN T DELTA: 1 NG/L
TROPONIN T SERPL HS-MCNC: 18 NG/L
WBC NRBC COR # BLD AUTO: 7.04 10*3/MM3 (ref 3.4–10.8)
WHOLE BLOOD HOLD COAG: NORMAL
WHOLE BLOOD HOLD SPECIMEN: NORMAL

## 2024-02-21 PROCEDURE — 85730 THROMBOPLASTIN TIME PARTIAL: CPT | Performed by: STUDENT IN AN ORGANIZED HEALTH CARE EDUCATION/TRAINING PROGRAM

## 2024-02-21 PROCEDURE — 25010000002 PIPERACILLIN SOD-TAZOBACTAM PER 1 G: Performed by: STUDENT IN AN ORGANIZED HEALTH CARE EDUCATION/TRAINING PROGRAM

## 2024-02-21 PROCEDURE — 93010 ELECTROCARDIOGRAM REPORT: CPT | Performed by: INTERNAL MEDICINE

## 2024-02-21 PROCEDURE — 71275 CT ANGIOGRAPHY CHEST: CPT

## 2024-02-21 PROCEDURE — 36415 COLL VENOUS BLD VENIPUNCTURE: CPT

## 2024-02-21 PROCEDURE — 93005 ELECTROCARDIOGRAM TRACING: CPT

## 2024-02-21 PROCEDURE — 25510000001 IOPAMIDOL PER 1 ML: Performed by: STUDENT IN AN ORGANIZED HEALTH CARE EDUCATION/TRAINING PROGRAM

## 2024-02-21 PROCEDURE — 76705 ECHO EXAM OF ABDOMEN: CPT

## 2024-02-21 PROCEDURE — 85025 COMPLETE CBC W/AUTO DIFF WBC: CPT | Performed by: STUDENT IN AN ORGANIZED HEALTH CARE EDUCATION/TRAINING PROGRAM

## 2024-02-21 PROCEDURE — 87040 BLOOD CULTURE FOR BACTERIA: CPT | Performed by: STUDENT IN AN ORGANIZED HEALTH CARE EDUCATION/TRAINING PROGRAM

## 2024-02-21 PROCEDURE — 83880 ASSAY OF NATRIURETIC PEPTIDE: CPT | Performed by: STUDENT IN AN ORGANIZED HEALTH CARE EDUCATION/TRAINING PROGRAM

## 2024-02-21 PROCEDURE — 93005 ELECTROCARDIOGRAM TRACING: CPT | Performed by: STUDENT IN AN ORGANIZED HEALTH CARE EDUCATION/TRAINING PROGRAM

## 2024-02-21 PROCEDURE — 85379 FIBRIN DEGRADATION QUANT: CPT | Performed by: STUDENT IN AN ORGANIZED HEALTH CARE EDUCATION/TRAINING PROGRAM

## 2024-02-21 PROCEDURE — 80053 COMPREHEN METABOLIC PANEL: CPT | Performed by: STUDENT IN AN ORGANIZED HEALTH CARE EDUCATION/TRAINING PROGRAM

## 2024-02-21 PROCEDURE — 25810000003 SODIUM CHLORIDE 0.9 % SOLUTION: Performed by: STUDENT IN AN ORGANIZED HEALTH CARE EDUCATION/TRAINING PROGRAM

## 2024-02-21 PROCEDURE — 83605 ASSAY OF LACTIC ACID: CPT | Performed by: STUDENT IN AN ORGANIZED HEALTH CARE EDUCATION/TRAINING PROGRAM

## 2024-02-21 PROCEDURE — 84145 PROCALCITONIN (PCT): CPT | Performed by: STUDENT IN AN ORGANIZED HEALTH CARE EDUCATION/TRAINING PROGRAM

## 2024-02-21 PROCEDURE — 99285 EMERGENCY DEPT VISIT HI MDM: CPT

## 2024-02-21 PROCEDURE — 25010000002 VANCOMYCIN 10 G RECONSTITUTED SOLUTION: Performed by: STUDENT IN AN ORGANIZED HEALTH CARE EDUCATION/TRAINING PROGRAM

## 2024-02-21 PROCEDURE — 71045 X-RAY EXAM CHEST 1 VIEW: CPT

## 2024-02-21 PROCEDURE — 85610 PROTHROMBIN TIME: CPT | Performed by: STUDENT IN AN ORGANIZED HEALTH CARE EDUCATION/TRAINING PROGRAM

## 2024-02-21 PROCEDURE — 84484 ASSAY OF TROPONIN QUANT: CPT | Performed by: STUDENT IN AN ORGANIZED HEALTH CARE EDUCATION/TRAINING PROGRAM

## 2024-02-21 RX ORDER — POLYETHYLENE GLYCOL 3350 17 G/17G
17 POWDER, FOR SOLUTION ORAL DAILY PRN
Status: DISCONTINUED | OUTPATIENT
Start: 2024-02-21 | End: 2024-02-24 | Stop reason: HOSPADM

## 2024-02-21 RX ORDER — SODIUM CHLORIDE 0.9 % (FLUSH) 0.9 %
10 SYRINGE (ML) INJECTION AS NEEDED
Status: DISCONTINUED | OUTPATIENT
Start: 2024-02-21 | End: 2024-02-24 | Stop reason: HOSPADM

## 2024-02-21 RX ORDER — BISACODYL 10 MG
10 SUPPOSITORY, RECTAL RECTAL DAILY PRN
Status: DISCONTINUED | OUTPATIENT
Start: 2024-02-21 | End: 2024-02-24 | Stop reason: HOSPADM

## 2024-02-21 RX ORDER — AMOXICILLIN 250 MG
2 CAPSULE ORAL 2 TIMES DAILY PRN
Status: DISCONTINUED | OUTPATIENT
Start: 2024-02-21 | End: 2024-02-24 | Stop reason: HOSPADM

## 2024-02-21 RX ORDER — NITROGLYCERIN 0.4 MG/1
0.4 TABLET SUBLINGUAL
Status: DISCONTINUED | OUTPATIENT
Start: 2024-02-21 | End: 2024-02-24 | Stop reason: HOSPADM

## 2024-02-21 RX ORDER — SODIUM CHLORIDE 9 MG/ML
40 INJECTION, SOLUTION INTRAVENOUS AS NEEDED
Status: DISCONTINUED | OUTPATIENT
Start: 2024-02-21 | End: 2024-02-24 | Stop reason: HOSPADM

## 2024-02-21 RX ORDER — SODIUM CHLORIDE 0.9 % (FLUSH) 0.9 %
10 SYRINGE (ML) INJECTION EVERY 12 HOURS SCHEDULED
Status: DISCONTINUED | OUTPATIENT
Start: 2024-02-22 | End: 2024-02-24 | Stop reason: HOSPADM

## 2024-02-21 RX ORDER — ASPIRIN 325 MG
325 TABLET ORAL ONCE
Status: DISCONTINUED | OUTPATIENT
Start: 2024-02-21 | End: 2024-02-21

## 2024-02-21 RX ORDER — BISACODYL 5 MG/1
5 TABLET, DELAYED RELEASE ORAL DAILY PRN
Status: DISCONTINUED | OUTPATIENT
Start: 2024-02-21 | End: 2024-02-24 | Stop reason: HOSPADM

## 2024-02-21 RX ADMIN — IOPAMIDOL 85 ML: 755 INJECTION, SOLUTION INTRAVENOUS at 22:10

## 2024-02-21 RX ADMIN — PIPERACILLIN SODIUM AND TAZOBACTAM SODIUM 3.38 G: 3; .375 INJECTION, SOLUTION INTRAVENOUS at 22:53

## 2024-02-21 RX ADMIN — VANCOMYCIN HYDROCHLORIDE 1250 MG: 10 INJECTION, POWDER, LYOPHILIZED, FOR SOLUTION INTRAVENOUS at 23:36

## 2024-02-21 NOTE — Clinical Note
Level of Care: Telemetry [5]   Diagnosis: Right-sided chest pain [[id number]]   Admitting Physician: QUINTIN WAGONER [[id number]]   Attending Physician: QUINTIN WAGONER [[id number]]

## 2024-02-22 ENCOUNTER — APPOINTMENT (OUTPATIENT)
Dept: ULTRASOUND IMAGING | Facility: HOSPITAL | Age: 84
DRG: 186 | End: 2024-02-22
Payer: MEDICARE

## 2024-02-22 PROBLEM — R07.9 CHEST PAIN: Status: ACTIVE | Noted: 2024-02-22

## 2024-02-22 LAB
ALBUMIN FLD-MCNC: 2.3 G/DL
AMYLASE FLD-CCNC: 49 U/L
APPEARANCE FLD: ABNORMAL
B PARAPERT DNA SPEC QL NAA+PROBE: NOT DETECTED
B PERT DNA SPEC QL NAA+PROBE: NOT DETECTED
C PNEUM DNA NPH QL NAA+NON-PROBE: NOT DETECTED
CHOLESTEROL FLUID: 53 MG/DL
COLOR FLD: YELLOW
D-LACTATE SERPL-SCNC: 1.4 MMOL/L (ref 0.5–2)
DEPRECATED RDW RBC AUTO: 45.4 FL (ref 37–54)
EOSINOPHIL NFR FLD MANUAL: 1 %
ERYTHROCYTE [DISTWIDTH] IN BLOOD BY AUTOMATED COUNT: 15.4 % (ref 12.3–15.4)
FLUAV SUBTYP SPEC NAA+PROBE: NOT DETECTED
FLUBV RNA ISLT QL NAA+PROBE: NOT DETECTED
GIE STN SPEC: NORMAL
GLUCOSE BLDC GLUCOMTR-MCNC: 142 MG/DL (ref 70–130)
GLUCOSE FLD-MCNC: 215 MG/DL
HADV DNA SPEC NAA+PROBE: NOT DETECTED
HCOV 229E RNA SPEC QL NAA+PROBE: NOT DETECTED
HCOV HKU1 RNA SPEC QL NAA+PROBE: NOT DETECTED
HCOV NL63 RNA SPEC QL NAA+PROBE: NOT DETECTED
HCOV OC43 RNA SPEC QL NAA+PROBE: NOT DETECTED
HCT VFR BLD AUTO: 29.1 % (ref 34–46.6)
HGB BLD-MCNC: 9.2 G/DL (ref 12–15.9)
HMPV RNA NPH QL NAA+NON-PROBE: NOT DETECTED
HPIV1 RNA ISLT QL NAA+PROBE: NOT DETECTED
HPIV2 RNA SPEC QL NAA+PROBE: NOT DETECTED
HPIV3 RNA NPH QL NAA+PROBE: NOT DETECTED
HPIV4 P GENE NPH QL NAA+PROBE: NOT DETECTED
KOH PREP NAIL: NORMAL
LDH FLD-CCNC: 113 U/L
LYMPHOCYTES NFR FLD MANUAL: 37 %
M PNEUMO IGG SER IA-ACNC: NOT DETECTED
MCH RBC QN AUTO: 25.8 PG (ref 26.6–33)
MCHC RBC AUTO-ENTMCNC: 31.6 G/DL (ref 31.5–35.7)
MCV RBC AUTO: 81.5 FL (ref 79–97)
MONOCYTES NFR FLD: 8 %
MONOS+MACROS NFR FLD: 45 %
NEUTROPHILS NFR FLD MANUAL: 9 %
NUC CELL # FLD: 853 /MM3
PH FLD: 7.63 [PH]
PLATELET # BLD AUTO: 132 10*3/MM3 (ref 140–450)
PMV BLD AUTO: 10.3 FL (ref 6–12)
PROT FLD-MCNC: 3.2 G/DL
RBC # BLD AUTO: 3.57 10*6/MM3 (ref 3.77–5.28)
RBC # FLD AUTO: 2174 /MM3
RHINOVIRUS RNA SPEC NAA+PROBE: NOT DETECTED
RSV RNA NPH QL NAA+NON-PROBE: NOT DETECTED
SARS-COV-2 RNA NPH QL NAA+NON-PROBE: NOT DETECTED
TRIGL FLD-MCNC: 16 MG/DL
WBC NRBC COR # BLD AUTO: 8.43 10*3/MM3 (ref 3.4–10.8)

## 2024-02-22 PROCEDURE — 88112 CYTOPATH CELL ENHANCE TECH: CPT | Performed by: INTERNAL MEDICINE

## 2024-02-22 PROCEDURE — 84157 ASSAY OF PROTEIN OTHER: CPT | Performed by: INTERNAL MEDICINE

## 2024-02-22 PROCEDURE — 88360 TUMOR IMMUNOHISTOCHEM/MANUAL: CPT | Performed by: INTERNAL MEDICINE

## 2024-02-22 PROCEDURE — 0202U NFCT DS 22 TRGT SARS-COV-2: CPT | Performed by: INTERNAL MEDICINE

## 2024-02-22 PROCEDURE — 82945 GLUCOSE OTHER FLUID: CPT | Performed by: INTERNAL MEDICINE

## 2024-02-22 PROCEDURE — 36415 COLL VENOUS BLD VENIPUNCTURE: CPT | Performed by: NURSE PRACTITIONER

## 2024-02-22 PROCEDURE — 84311 SPECTROPHOTOMETRY: CPT | Performed by: INTERNAL MEDICINE

## 2024-02-22 PROCEDURE — 88305 TISSUE EXAM BY PATHOLOGIST: CPT | Performed by: INTERNAL MEDICINE

## 2024-02-22 PROCEDURE — 83605 ASSAY OF LACTIC ACID: CPT | Performed by: STUDENT IN AN ORGANIZED HEALTH CARE EDUCATION/TRAINING PROGRAM

## 2024-02-22 PROCEDURE — 88342 IMHCHEM/IMCYTCHM 1ST ANTB: CPT | Performed by: INTERNAL MEDICINE

## 2024-02-22 PROCEDURE — 82948 REAGENT STRIP/BLOOD GLUCOSE: CPT

## 2024-02-22 PROCEDURE — 87075 CULTR BACTERIA EXCEPT BLOOD: CPT | Performed by: INTERNAL MEDICINE

## 2024-02-22 PROCEDURE — 82042 OTHER SOURCE ALBUMIN QUAN EA: CPT | Performed by: INTERNAL MEDICINE

## 2024-02-22 PROCEDURE — 84478 ASSAY OF TRIGLYCERIDES: CPT | Performed by: INTERNAL MEDICINE

## 2024-02-22 PROCEDURE — 87015 SPECIMEN INFECT AGNT CONCNTJ: CPT | Performed by: INTERNAL MEDICINE

## 2024-02-22 PROCEDURE — 82150 ASSAY OF AMYLASE: CPT | Performed by: INTERNAL MEDICINE

## 2024-02-22 PROCEDURE — 76942 ECHO GUIDE FOR BIOPSY: CPT

## 2024-02-22 PROCEDURE — 87206 SMEAR FLUORESCENT/ACID STAI: CPT | Performed by: INTERNAL MEDICINE

## 2024-02-22 PROCEDURE — 87102 FUNGUS ISOLATION CULTURE: CPT | Performed by: INTERNAL MEDICINE

## 2024-02-22 PROCEDURE — 87070 CULTURE OTHR SPECIMN AEROBIC: CPT | Performed by: INTERNAL MEDICINE

## 2024-02-22 PROCEDURE — 0W993ZX DRAINAGE OF RIGHT PLEURAL CAVITY, PERCUTANEOUS APPROACH, DIAGNOSTIC: ICD-10-PCS | Performed by: RADIOLOGY

## 2024-02-22 PROCEDURE — 87205 SMEAR GRAM STAIN: CPT | Performed by: INTERNAL MEDICINE

## 2024-02-22 PROCEDURE — 25010000002 LIDOCAINE 1 % SOLUTION: Performed by: NURSE PRACTITIONER

## 2024-02-22 PROCEDURE — 83615 LACTATE (LD) (LDH) ENZYME: CPT | Performed by: INTERNAL MEDICINE

## 2024-02-22 PROCEDURE — 87220 TISSUE EXAM FOR FUNGI: CPT | Performed by: INTERNAL MEDICINE

## 2024-02-22 PROCEDURE — 85027 COMPLETE CBC AUTOMATED: CPT | Performed by: NURSE PRACTITIONER

## 2024-02-22 PROCEDURE — 89051 BODY FLUID CELL COUNT: CPT | Performed by: INTERNAL MEDICINE

## 2024-02-22 PROCEDURE — 88341 IMHCHEM/IMCYTCHM EA ADD ANTB: CPT | Performed by: INTERNAL MEDICINE

## 2024-02-22 PROCEDURE — 83986 ASSAY PH BODY FLUID NOS: CPT | Performed by: INTERNAL MEDICINE

## 2024-02-22 RX ORDER — LOSARTAN POTASSIUM 25 MG/1
25 TABLET ORAL DAILY
Status: DISCONTINUED | OUTPATIENT
Start: 2024-02-22 | End: 2024-02-24 | Stop reason: HOSPADM

## 2024-02-22 RX ORDER — LIDOCAINE HYDROCHLORIDE 10 MG/ML
10 INJECTION, SOLUTION INFILTRATION; PERINEURAL ONCE
Status: COMPLETED | OUTPATIENT
Start: 2024-02-22 | End: 2024-02-22

## 2024-02-22 RX ADMIN — Medication 10 ML: at 21:23

## 2024-02-22 RX ADMIN — Medication 10 ML: at 02:02

## 2024-02-22 RX ADMIN — LIDOCAINE HYDROCHLORIDE 1 ML: 10 INJECTION, SOLUTION INFILTRATION; PERINEURAL at 15:37

## 2024-02-22 RX ADMIN — Medication 10 ML: at 09:50

## 2024-02-22 RX ADMIN — LOSARTAN POTASSIUM 25 MG: 25 TABLET, FILM COATED ORAL at 17:08

## 2024-02-22 NOTE — PLAN OF CARE
Goal Outcome Evaluation:  Plan of Care Reviewed With: patient        Progress: improving  Outcome Evaluation: VSS; no complaints of pain or discomfort; pt had a thoracentesis today and had 550ml off the right side; respiratory panel negative; safety maintained; continue to monitor

## 2024-02-22 NOTE — ED TRIAGE NOTES
Patient ambulatory reporting middle/right sided chest pain that began this morning. Patient states the pain worsens with deep breaths.

## 2024-02-22 NOTE — CASE MANAGEMENT/SOCIAL WORK
Discharge Planning Assessment  [facility]     Patient Name: Veronica Royal  MRN: [id number]  Today's Date: 2/22/2024    Admit Date: 2/21/2024    Plan: Home, family to transport   Discharge Needs Assessment       Row Name 02/22/24 0954       Living Environment    People in Home alone    Current Living Arrangements home    Potentially Unsafe Housing Conditions none    In the past 12 months has the electric, gas, oil, or water company threatened to shut off services in your home? No    Primary Care Provided by self    Provides Primary Care For no one    Family Caregiver if Needed child(arvind), adult    Family Caregiver Names DaughterNorman [phone] and son, Chang [phone]    Quality of Family Relationships helpful;involved    Able to Return to Prior Arrangements yes       Resource/Environmental Concerns    Resource/Environmental Concerns none    Transportation Concerns no car       Transportation Needs    In the past 12 months, has lack of transportation kept you from medical appointments or from getting medications? no    In the past 12 months, has lack of transportation kept you from meetings, work, or from getting things needed for daily living? No       Food Insecurity    Within the past 12 months, you worried that your food would run out before you got the money to buy more. Never true    Within the past 12 months, the food you bought just didn't last and you didn't have money to get more. Never true       Transition Planning    Patient/Family Anticipates Transition to home    Patient/Family Anticipated Services at Transition none    Transportation Anticipated family or friend will provide       Discharge Needs Assessment    Equipment Currently Used at Home none    Concerns to be Addressed no discharge needs identified;denies needs/concerns at this time    Anticipated Changes Related to Illness none    Equipment Needed After Discharge none                   Discharge Plan       Row Name 02/22/24 0956        Plan    Plan Home, family to transport    Plan Comments Met with pt at bedside. Introduced self and explained role of . Face sheet verified, PCP is Princess Cruz. Pt denies any difficulty paying for medications, and she obtains her medications from [facility]. Pt lives alone, but stated that her children are involved in her care, and could assist with any care needs that may arise. Pt is currently independent in ADL's, and she does not use, nor require, any assistive devices at this time. Pt has had home health in the past, but she cannot remember which agency, she has been to [facility] in the past. Pt does not anticipate requiring those services upon discharge. Upon discharge, pt stated one of her children will transport home. Explained that CCP would follow to assess for discharge needs.                  Continued Care and Services - Admitted Since 2/21/2024    Coordination has not been started for this encounter.       Expected Discharge Date and Time       Expected Discharge Date Expected Discharge Time    Feb 24, 2024            Demographic Summary    No documentation.                  Functional Status    No documentation.                  Psychosocial    No documentation.                  Abuse/Neglect    No documentation.                  Legal    No documentation.                  Substance Abuse    No documentation.                  Patient Forms    No documentation.                     Maki Craft, RN

## 2024-02-22 NOTE — H&P
Patient Name:  Veronica Royal  YOB: 1940  MRN:  [id number]  Admit Date:  2/21/2024  Patient Care Team:  Princess Cruz MD as PCP - General (Internal Medicine)  Sheila Yee MD as Consulting Physician (Hematology and Oncology)  Rosa Michael MD as Referring Physician (General Surgery)  Cesar Estrella MD as Consulting Physician (Nephrology)  Shaylee Spencer RD, DAVID as Dietitian (Nutrition)  Marie Tesfaye MD as Consulting Physician (Radiation Oncology)      Subjective   History Present Illness     Chief Complaint   Patient presents with    Chest Pain         History of Present Illness  This is a 83-year-old female, with history of anxiety, diabetes, diarrhea, presents to the hospital with main complaints of moderate right-sided chest pain as well as pleural effusion upon imaging.  Patient was admitted to hospitalist service for further workup of symptoms.  Patient appears comfortable at the time of my examination and she is on room air.        Review of Systems   Review of Systems   Constitutional: Negative for activity change and appetite change.   HENT: Negative for congestion and dental problem.    Eyes: Negative for discharge and itching.   Respiratory: Negative for apnea and chest tightness.    Cardiovascular: Negative for chest pain and palpitations.   Gastrointestinal: Negative for abdominal distention and abdominal pain.   Endocrine: Negative for cold intolerance and heat intolerance.   Genitourinary: Negative for difficulty urinating and frequency.   Musculoskeletal: Negative for arthralgias and back pain.   Skin: Negative for color change and pallor.   Allergic/Immunologic: Negative for environmental allergies and food allergies.   Neurological: Negative for dizziness and facial asymmetry.   Hematological: Negative for adenopathy. Does not bruise/bleed easily.   Psychiatric/Behavioral: Negative for agitation and suicidal ideas.       Personal History     Past  Medical History:   Diagnosis Date    Anxiety     Arthritis     Basal cell carcinoma     Left thumb    Breast cancer 2023    Right    Depression     Diabetes mellitus     Diarrhea     s/e chemo    High cholesterol     History of chemotherapy 08/2023    Hypertension     Rash 08/2023    s/e chemo    Sacral decubitus ulcer     cleaning with soap & water    Urinary incontinence     wears pads     Past Surgical History:   Procedure Laterality Date    CATARACT EXTRACTION EXTRACAPSULAR W/ INTRAOCULAR LENS IMPLANTATION Right     EYE SURGERY      Muscle repair age 21    MASTECTOMY W/ SENTINEL NODE BIOPSY Right 8/24/2023    Procedure: Right breast modified radical mastectomy;  Surgeon: Rosa Michael MD;  [place] [facility] [place] [facility] [place]  Service: General;  Laterality: Right;    TONSILLECTOMY      VENOUS ACCESS DEVICE (PORT) INSERTION N/A 05/19/2023    Procedure: INSERTION VENOUS ACCESS DEVICE;  Surgeon: Rosa Michael MD;  [place] [facility] [place] [facility] [place]  Service: General;  Laterality: N/A;     Family History   Problem Relation Age of Onset    Alzheimer's disease Mother     Heart disease Father     Heart attack Father     Ovarian cancer Sister     Pancreatic cancer Brother     Malig Hyperthermia Neg Hx     Colon cancer Neg Hx     Colon polyps Neg Hx     Crohn's disease Neg Hx     Irritable bowel syndrome Neg Hx     Ulcerative colitis Neg Hx      Social History     Tobacco Use    Smoking status: Never    Smokeless tobacco: Never   Vaping Use    Vaping Use: Never used   Substance Use Topics    Alcohol use: Never    Drug use: Never     No current facility-administered medications on file prior to encounter.     Current Outpatient Medications on File Prior to Encounter   Medication Sig Dispense Refill    Cholecalciferol 50 MCG (2000 UT) tablet Take 1 tablet by mouth Daily (Monday-Friday).      glipiZIDE-metFORMIN (METAGLIP) 2.5-500 MG per tablet Take 2 tablets by mouth 2 (Two) Times a Day Before Meals. (Patient taking differently: Take  2 tablets by mouth Every Morning.) 120 tablet 3    glucose blood test strip Check once per day, E11.65 30 each 5    glucose monitor monitoring kit Use 1 each Daily. Dispense glucometer with brand based off insurance coverage, E11.65, check once per day 30 each 5    Lancets misc Check BG once daily, E11.9 30 each 4    lidocaine-prilocaine (EMLA) 2.5-2.5 % cream Apply nickel size amount to port site 30 min before appt time do not rub in cover with plastic wrap 30 g 5    losartan (COZAAR) 25 MG tablet TAKE 1 TABLET BY MOUTH DAILY 90 tablet 1    ondansetron (ZOFRAN) 8 MG tablet Take 1 tablet by mouth 3 (Three) Times a Day As Needed for Nausea or Vomiting. 30 tablet 5    simvastatin (ZOCOR) 40 MG tablet Take 0.5 tablets by mouth Every Night. (Patient taking differently: Take 1 tablet by mouth Every Night.) 90 tablet 2    Tradjenta 5 MG tablet tablet Take 1 tablet by mouth Every Morning. 90 tablet 5    valsartan-hydrochlorothiazide (DIOVAN-HCT) 160-12.5 MG per tablet Take 1 tablet by mouth Daily.      [DISCONTINUED] acetaminophen (TYLENOL) 325 MG tablet Take 2 tablets by mouth Every 4 (Four) Hours As Needed for Mild Pain. 120 tablet 3    [DISCONTINUED] cephalexin (KEFLEX) 500 MG capsule       [DISCONTINUED] mupirocin (BACTROBAN) 2 % ointment       [DISCONTINUED] pantoprazole (PROTONIX) 40 MG EC tablet TAKE 1 TABLET BY MOUTH DAILY 90 tablet 0    [DISCONTINUED] sennosides-docusate (senna-docusate sodium) 8.6-50 MG per tablet Take 1 tablet by mouth Daily As Needed for Constipation. 30 tablet 3    [DISCONTINUED] sodium bicarbonate 650 MG tablet        No Known Allergies    Objective    Objective     Vital Signs  Temp:  [98.1 °F (36.7 °C)-98.8 °F (37.1 °C)] 98.1 °F (36.7 °C)  Heart Rate:  [] 96  Resp:  [17-18] 17  BP: (104-173)/(35-87) 107/51  SpO2:  [91 %-96 %] 94 %  on   ;   Device (Oxygen Therapy): room air  Body mass index is 22.9 kg/m².    Physical Exam  General Appearance:    Alert, cooperative, no distress,  appears stated age.  Head:    Normocephalic, without obvious abnormality, atraumatic  Eyes:    PERRL, conjunctiva/corneas clear, EOM's intact, both eyes  Ears:    Normal external ear canals, both ears  Nose:   Nares normal, septum midline, mucosa normal, no drainage    or sinus tenderness  Throat:   Lips, tongue, gums normal; oral mucosa pink and moist  Neck:   Supple, symmetrical, trachea midline, no adenopathy;     thyroid:  no enlargement/tenderness/nodules; no carotid    bruit or JVD  Back:     Symmetric, no curvature, ROM normal, no CVA tenderness  Lungs:     Clear to auscultation bilaterally, respirations unlabored  Chest Wall:    No tenderness or deformity   Heart:    Regular rate and rhythm, S1 and S2 normal, no murmur, rub   or gallop  Abdomen:    Soft nontender no organomegaly detected  Extremities:   Extremities normal, atraumatic, no cyanosis or edema  Pulses:   Pulses palpable in all extremities; symmetric all extremities  Skin:   Skin color normal, Skin is warm and dry,  no rashes or palpable lesions  Neurologic:   CNII-XII intact, motor strength grossly intact, sensation grossly intact to light touch, no focal deficits noted         Results Review:  I reviewed the patient's new clinical results.  I reviewed the patient's new imaging results and agree with the interpretation.  I reviewed the patient's other test results and agree with the interpretation  I personally viewed and interpreted the patient's EKG/Telemetry data  Discussed with ED provider.    Lab Results (last 24 hours)       Procedure Component Value Units Date/Time    Comprehensive Metabolic Panel [[id number]]  (Abnormal) Collected: 02/21/24 2111    Specimen: Blood Updated: 02/21/24 2143     Glucose 207 mg/dL      BUN 24 mg/dL      Creatinine 0.96 mg/dL      Sodium 145 mmol/L      Potassium 4.8 mmol/L      Comment: Slight hemolysis detected by analyzer. Result may be falsely elevated.        Chloride 107 mmol/L      CO2 23.0 mmol/L       Calcium 9.4 mg/dL      Total Protein 6.7 g/dL      Albumin 3.6 g/dL      ALT (SGPT) 15 U/L      AST (SGOT) 26 U/L      Comment: Slight hemolysis detected by analyzer. Result may be falsely elevated.        Alkaline Phosphatase 83 U/L      Total Bilirubin 0.3 mg/dL      Globulin 3.1 gm/dL      A/G Ratio 1.2 g/dL      BUN/Creatinine Ratio 25.0     Anion Gap 15.0 mmol/L      eGFR 58.8 mL/min/1.73     Narrative:      GFR Normal >60  Chronic Kidney Disease <60  Kidney Failure <15    The GFR formula is only valid for adults with stable renal function between ages 18 and 70.    High Sensitivity Troponin T [[id number]]  (Abnormal) Collected: 02/21/24 2111    Specimen: Blood Updated: 02/21/24 2150     HS Troponin T 18 ng/L     Narrative:      High Sensitive Troponin T Reference Range:  <14.0 ng/L- Negative Female for AMI  <22.0 ng/L- Negative Male for AMI  >=14 - Abnormal Female indicating possible myocardial injury.  >=22 - Abnormal Male indicating possible myocardial injury.   Clinicians would have to utilize clinical acumen, EKG, Troponin, and serial changes to determine if it is an Acute Myocardial Infarction or myocardial injury due to an underlying chronic condition.         BNP [[id number]]  (Normal) Collected: 02/21/24 2111    Specimen: Blood Updated: 02/21/24 2150     proBNP 136.0 pg/mL     Narrative:      This assay is used as an aid in the diagnosis of individuals suspected of having heart failure. It can be used as an aid in the diagnosis of acute decompensated heart failure (ADHF) in patients presenting with signs and symptoms of ADHF to the emergency department (ED). In addition, NT-proBNP of <300 pg/mL indicates ADHF is not likely.    Age Range Result Interpretation  NT-proBNP Concentration (pg/mL:      <50             Positive            >450                   Gray                 300-450                    Negative             <300    50-75           Positive            >900                  Harris                " 300-900                  Negative            <300      >75             Positive            >1800                  Gray                300-1800                  Negative            <300    Procalcitonin [[id number]]  (Normal) Collected: 02/21/24 2111    Specimen: Blood Updated: 02/21/24 2150     Procalcitonin 0.10 ng/mL     Narrative:      As a Marker for Sepsis (Non-Neonates):    1. <0.5 ng/mL represents a low risk of severe sepsis and/or septic shock.  2. >2 ng/mL represents a high risk of severe sepsis and/or septic shock.    As a Marker for Lower Respiratory Tract Infections that require antibiotic therapy:    PCT on Admission    Antibiotic Therapy       6-12 Hrs later    >0.5                Strongly Recommended  >0.25 - <0.5        Recommended   0.1 - 0.25          Discouraged              Remeasure/reassess PCT  <0.1                Strongly Discouraged     Remeasure/reassess PCT    As 28 day mortality risk marker: \"Change in Procalcitonin Result\" (>80% or <=80%) if Day 0 (or Day 1) and Day 4 values are available. Refer to http://www.[employer]-pct-calculator.com    Change in PCT <=80%  A decrease of PCT levels below or equal to 80% defines a positive change in PCT test result representing a higher risk for 28-day all-cause mortality of patients diagnosed with severe sepsis for septic shock.    Change in PCT >80%  A decrease of PCT levels of more than 80% defines a negative change in PCT result representing a lower risk for 28-day all-cause mortality of patients diagnosed with severe sepsis or septic shock.       Lactic Acid, Plasma [[id number]]  (Abnormal) Collected: 02/21/24 2125    Specimen: Blood Updated: 02/21/24 2157     Lactate 2.3 mmol/L     CBC & Differential [[id number]]  (Abnormal) Collected: 02/21/24 2149    Specimen: Blood Updated: 02/21/24 2157    Narrative:      The following orders were created for panel order CBC & Differential.  Procedure                               Abnormality         Status       "               ---------                               -----------         ------                     CBC Auto Differential[[id number]]        Abnormal            Final result                 Please view results for these tests on the individual orders.    CBC Auto Differential [[id number]]  (Abnormal) Collected: 02/21/24 2149    Specimen: Blood Updated: 02/21/24 2157     WBC 7.04 10*3/mm3      RBC 3.47 10*6/mm3      Hemoglobin 8.8 g/dL      Hematocrit 27.8 %      MCV 80.1 fL      MCH 25.4 pg      MCHC 31.7 g/dL      RDW 15.4 %      RDW-SD 44.0 fl      MPV 9.3 fL      Platelets 130 10*3/mm3      Neutrophil % 77.6 %      Lymphocyte % 10.8 %      Monocyte % 9.4 %      Eosinophil % 1.8 %      Basophil % 0.1 %      Immature Grans % 0.3 %      Neutrophils, Absolute 5.46 10*3/mm3      Lymphocytes, Absolute 0.76 10*3/mm3      Monocytes, Absolute 0.66 10*3/mm3      Eosinophils, Absolute 0.13 10*3/mm3      Basophils, Absolute 0.01 10*3/mm3      Immature Grans, Absolute 0.02 10*3/mm3      nRBC 0.0 /100 WBC     D-dimer, Quantitative [[id number]]  (Abnormal) Collected: 02/21/24 2219    Specimen: Blood Updated: 02/21/24 2239     D-Dimer, Quantitative 1.55 MCGFEU/mL     Narrative:      According to the assay 's published package insert, a normal (<0.50 MCGFEU/mL) D-dimer result in conjunction with a non-high clinical probability assessment, excludes deep vein thrombosis (DVT) and pulmonary embolism (PE) with high sensitivity.    D-dimer values increase with age and this can make VTE exclusion of an older population difficult. To address this, the [employer], based on best available evidence and recent guidelines, recommends that clinicians use age-adjusted D-dimer thresholds in patients greater than 50 years of age with: a) a low probability of PE who do not meet all Pulmonary Embolism Rule Out Criteria, or b) in those with intermediate probability of PE.   The formula for an age-adjusted D-dimer  "cut-off is \"age/100\".  For example, a 60 year old patient would have an age-adjusted cut-off of 0.60 MCGFEU/mL and an 80 year old 0.80 MCGFEU/mL.    Protime-INR [[id number]]  (Abnormal) Collected: 02/21/24 2219    Specimen: Blood Updated: 02/21/24 2239     Protime 16.1 Seconds      INR 1.27    aPTT [[id number]]  (Normal) Collected: 02/21/24 2219    Specimen: Blood Updated: 02/21/24 2239     PTT 30.3 seconds     High Sensitivity Troponin T 2Hr [[id number]]  (Abnormal) Collected: 02/21/24 2245    Specimen: Blood Updated: 02/21/24 2315     HS Troponin T 19 ng/L      Troponin T Delta 1 ng/L     Narrative:      High Sensitive Troponin T Reference Range:  <14.0 ng/L- Negative Female for AMI  <22.0 ng/L- Negative Male for AMI  >=14 - Abnormal Female indicating possible myocardial injury.  >=22 - Abnormal Male indicating possible myocardial injury.   Clinicians would have to utilize clinical acumen, EKG, Troponin, and serial changes to determine if it is an Acute Myocardial Infarction or myocardial injury due to an underlying chronic condition.         Blood Culture - Blood, Arm, Left [[id number]] Collected: 02/21/24 2245    Specimen: Blood from Arm, Left Updated: 02/21/24 2252    Blood Culture - Blood, Arm, Right [[id number]] Collected: 02/21/24 2245    Specimen: Blood from Arm, Right Updated: 02/21/24 2252    STAT Lactic Acid, Reflex [[id number]]  (Normal) Collected: 02/22/24 0037    Specimen: Blood Updated: 02/22/24 0154     Lactate 1.4 mmol/L     POC Glucose Once [[id number]]  (Abnormal) Collected: 02/22/24 0153    Specimen: Blood Updated: 02/22/24 0154     Glucose 142 mg/dL     CBC (No Diff) [[id number]]  (Abnormal) Collected: 02/22/24 0242    Specimen: Blood Updated: 02/22/24 0359     WBC 8.43 10*3/mm3      RBC 3.57 10*6/mm3      Hemoglobin 9.2 g/dL      Hematocrit 29.1 %      MCV 81.5 fL      MCH 25.8 pg      MCHC 31.6 g/dL      RDW 15.4 %      RDW-SD 45.4 fl      MPV 10.3 fL      Platelets 132 10*3/mm3     Respiratory Panel " PCR w/COVID-19(SARS-CoV-2) [facility] In-House, NP Swab in UTM/VTM, 2 HR TAT - Swab, Nasopharynx [[id number]]  (Normal) Collected: 02/22/24 1229    Specimen: Swab from Nasopharynx Updated: 02/22/24 1344     ADENOVIRUS, PCR Not Detected     Coronavirus 229E Not Detected     Coronavirus HKU1 Not Detected     Coronavirus NL63 Not Detected     Coronavirus OC43 Not Detected     COVID19 Not Detected     Human Metapneumovirus Not Detected     Human Rhinovirus/Enterovirus Not Detected     Influenza A PCR Not Detected     Influenza B PCR Not Detected     Parainfluenza Virus 1 Not Detected     Parainfluenza Virus 2 Not Detected     Parainfluenza Virus 3 Not Detected     Parainfluenza Virus 4 Not Detected     RSV, PCR Not Detected     Bordetella pertussis pcr Not Detected     Bordetella parapertussis PCR Not Detected     Chlamydophila pneumoniae PCR Not Detected     Mycoplasma pneumo by PCR Not Detected    Narrative:      In the setting of a positive respiratory panel with a viral infection PLUS a negative procalcitonin without other underlying concern for bacterial infection, consider observing off antibiotics or discontinuation of antibiotics and continue supportive care. If the respiratory panel is positive for atypical bacterial infection (Bordetella pertussis, Chlamydophila pneumoniae, or Mycoplasma pneumoniae), consider antibiotic de-escalation to target atypical bacterial infection.            Imaging Results (Last 24 Hours)       Procedure Component Value Units Date/Time    US Thoracentesis [[id number]] Resulted: 02/22/24 1522    Specimen: Body Fluid Updated: 02/22/24 1522    US Gallbladder [[id number]] Collected: 02/21/24 2321     Updated: 02/21/24 2327    Narrative:      GALLBLADDER ULTRASOUND     HISTORY: Right upper quadrant pain     COMPARISON: February 21, 2024     TECHNIQUE: Grayscale and color Doppler sonographic images were obtained  through the right upper quadrant.     FINDINGS:  Visualized  portions of the pancreas are normal. Main portal vein is  patent with hepatopetal flow. There is no intra or extrahepatic biliary  dilatation. Common bile duct measures 5 mm. The right kidney measures  8.2 x 4.8 x 4.0 cm. Inferior pole is incompletely visualized. The  visualized right kidney appears unremarkable. The patient is noted to  have cholelithiasis. There is nonspecific gallbladder wall edema and  thickening. Gallbladder wall measures up to 6 mm. No sonographic Pepper  sign was elicited. Right pleural effusion is partially visualized.       Impression:         1. Cholelithiasis, as well as some gallbladder wall edema and  thickening. Gallbladder wall edema and thickening is a nonspecific  finding, which can be associated with etiologies such as right-sided  heart failure and hepatitis, in addition to acute cholecystitis. No  sonographic Pepper sign was elicited. If clinical concern for acute  cholecystitis persists, consideration for HIDA scan is recommended.     This report was finalized on 2/21/2024 11:24 PM by Dr. Delores Espinosa M.D on Workstation: [id number]       CT Angiogram Chest Pulmonary Embolism [[id number]] Collected: 02/21/24 2219     Updated: 02/21/24 2233    Narrative:      CT ANGIOGRAM OF THE CHEST     HISTORY: Shortness of air     COMPARISON: May 11, 2023     TECHNIQUE: Axial CT images obtained through the thorax. IV contrast was  administered. Three-D reformatted images were obtained.     FINDINGS:  No acute pulmonary thromboembolus is seen. Thoracic aorta is normal in  caliber. There is no evidence of dissection. The patient has fairly  extensive ulcerated plaque of the descending thoracic aorta, similar to  the prior study. The thyroid gland and trachea are within normal limits.  There is a small hiatal hernia. There are coronary artery  calcifications. Mediastinal lymph nodes do not appear pathologically  enlarged. The patient has subpleural pulmonary opacities within the  right  lung. These are located anteriorly. Potentially, it may be related  to radiation. However, correlation with any evidence of pneumonia is  recommended. Follow-up exam to document resolution or stability is  recommended. The patient does have a moderate right pleural effusion.  There is some left lower lobe scarring. Patient has undergone right  mastectomy and axillary dissection. There is some edema noted within the  right anterolateral chest wall, likely related to radiation therapy.  Images through the upper abdomen demonstrate cholelithiasis. There is  some haziness in the gallbladder fossa, of uncertain clinical  significance. It is incompletely assessed on the CT angiogram of the  chest. There is potentially also some gallbladder wall edema.  Correlation with gallbladder ultrasound is recommended. A single  sclerotic lesion is seen within the T3 vertebral body, stable when  compared to the exam from May 2023.       Impression:         1. No acute pulmonary thromboembolus.  2. Moderate right pleural effusion.  3. Subpleural parenchymal opacities identified within the right lung in  an anterior distribution, favored to be related to radiation therapy.  However, correlation with any evidence of pneumonia, as well as  follow-up exam is recommended. The patient is noted to have edema within  the right anterolateral chest wall, again likely related to radiation  change.  4. There is cholelithiasis. Patient does appear to have inflammatory  stranding within the gallbladder fossa. Correlation with gallbladder  ultrasound is recommended.     Radiation dose reduction techniques were utilized, including automated  exposure control and exposure modulation based on body size.        This report was finalized on 2/21/2024 10:30 PM by Dr. Delores Espinosa M.D on Workstation: [id number]       XR Chest 1 View [[id number]] Collected: 02/21/24 2123     Updated: 02/21/24 2127    Narrative:      SINGLE VIEW OF THE CHEST      HISTORY: Chest pain     COMPARISON: May 19, 2023     FINDINGS:  There is cardiomegaly. There is no vascular congestion. Left internal  jugular vein Mediport appears to terminate within Speir vena cava. No  pneumothorax is seen. Left lung is clear. The patient does have blunted  right costophrenic angle, which may reflect small effusion. Patient is  status post right mastectomy.       Impression:      Blunting of the right costophrenic angle may reflect small effusion.     This report was finalized on 2/21/2024 9:24 PM by Dr. Delores Espinosa M.D on Workstation: [id number]               Results for orders placed during the hospital encounter of 02/15/24    Adult Transthoracic Echo Limited W/ Cont if Necessary Per Protocol    Interpretation Summary    Left ventricular systolic function is normal. Calculated left ventricular EF = 54.5% Normal global longitudinal LV strain (GLS) = -19.0%. Left ventricle strain data was reviewed by the physician. Normal left ventricular cavity size and wall thickness noted. There are wall motion abnormalities noted below Left ventricular diastolic function is consistent with (grade II w/high LAP) pseudonormalization    The following segments are hypokinetic: basal inferoseptal and basal inferior.All other segments are normal.    Trace mitral valve regurgitation is present.    Compared to prior study dated 11/10/2023 wall motion abnormalities are evident.      ECG 12 Lead ED Triage Standing Order; Chest Pain   Preliminary Result   HEART RATE= 119  bpm   RR Interval= 504  ms   PA Interval= 140  ms   P Horizontal Axis= 14  deg   P Front Axis= 60  deg   QRSD Interval= 60  ms   QT Interval= 328  ms   QTcB= 462  ms   QRS Axis= -22  deg   T Wave Axis= 48  deg   - ABNORMAL ECG -   Sinus tachycardia   Borderline left axis deviation   Low voltage, precordial leads   Probable anteroseptal infarct, old   Baseline wander in lead(s) II,III,aVF   Electronically Signed By:    Date and Time of  Study: 2024-02-21 20:45:51      SCANNED - TELEMETRY     Final Result      SCANNED - TELEMETRY     Final Result      SCANNED - TELEMETRY     Final Result      SCANNED - TELEMETRY     Final Result           Assessment/Plan     Active Hospital Problems    Diagnosis  POA    **Right-sided chest pain [R07.9]  Yes    Chest pain [R07.9]  Yes    Severe malnutrition [E43]  Yes    Type 2 diabetes mellitus [E11.9]  Yes    Hypertension [I10]  Yes      Resolved Hospital Problems   No resolved problems to display.       Assessment and plan  1.  Right-sided chest pain, pleural effusion, management will be deferred to pulmonary evaluation.  Request consultation today.    2.  Malnutrition, chronic condition.    3.  Hypertension, resume home medications.    4.  History of breast cancer, chronic condition.    5.  CODE STATUS is full code.  Further plans based on hospital course.       Cristhian Najera MD  [facility]  02/22/24  15:45 EST

## 2024-02-22 NOTE — CONSULTS
Referring Provider: Dr. Najera  Reason for Consultation: Pleural effusion    Patient Care Team:  Princess Cruz MD as PCP - General (Internal Medicine)  Sheila Yee MD as Consulting Physician (Hematology and Oncology)  Rosa Michael MD as Referring Physician (General Surgery)  Cesar Estrella MD as Consulting Physician (Nephrology)  Shaylee Spencer RD, DAVID as Dietitian (Nutrition)  Marie Tesfaye MD as Consulting Physician (Radiation Oncology)    Chief complaint:   Chest pain    History of present illness:    Subjective   This is an 83-year-old female patient, lifelong non-smoker with no prior history of lung disease.  Patient has a history of right breast cancer and currently on chemotherapy.  She underwent radiation therapy as well and right mastectomy.    She presented to the hospital today for chest pain, right-sided, worse with deep breathing, described as sharp and moderate in intensity, started yesterday in the morning.  It is associated with dyspnea on activities.  No alleviating factors.  Currently she is pain-free.  She denies preceding URI symptoms, fever, cough or sputum production.    Data:  Echo 2/15/2024: Normal EF.  Grade 2 diastolic dysfunction.  Trace MR    Review of Systems  Constitutional: No fever or chills.   ENMT: No sinus congestion  Cardiovascular: See above.  Chronic leg swelling  Respiratory: See above  Gastrointestinal: No constipation, diarrhea or abdominal pain   Neurology: No headache, weakness, numbness or dizziness.   Musculoskeletal: No joints pain, stiffness or swelling.   Psychiatry: No depression.  Genitourinary: No dysuria or frequent urination  Endo: No weight changes. No cold or warm intolerance.  Lymphatic: No swollen glands.  Integumentary: No rash.    History  Past Medical History:   Diagnosis Date    Anxiety     Arthritis     Basal cell carcinoma     Left thumb    Breast cancer 2023    Right    Depression     Diabetes mellitus      Diarrhea     s/e chemo    High cholesterol     History of chemotherapy 08/2023    Hypertension     Rash 08/2023    s/e chemo    Sacral decubitus ulcer     cleaning with soap & water    Urinary incontinence     wears pads   ,   Past Surgical History:   Procedure Laterality Date    CATARACT EXTRACTION EXTRACAPSULAR W/ INTRAOCULAR LENS IMPLANTATION Right     EYE SURGERY      Muscle repair age 21    MASTECTOMY W/ SENTINEL NODE BIOPSY Right 8/24/2023    Procedure: Right breast modified radical mastectomy;  Surgeon: Rosa Michael MD;  Location: [facility] [place];  Service: General;  Laterality: Right;    TONSILLECTOMY      VENOUS ACCESS DEVICE (PORT) INSERTION N/A 05/19/2023    Procedure: INSERTION VENOUS ACCESS DEVICE;  Surgeon: Rosa Michael MD;  Location: [facility] [place];  Service: General;  Laterality: N/A;   ,   Family History   Problem Relation Age of Onset    Alzheimer's disease Mother     Heart disease Father     Heart attack Father     Ovarian cancer Sister     Pancreatic cancer Brother     Malig Hyperthermia Neg Hx     Colon cancer Neg Hx     Colon polyps Neg Hx     Crohn's disease Neg Hx     Irritable bowel syndrome Neg Hx     Ulcerative colitis Neg Hx    ,   Social History     Socioeconomic History    Marital status:    Tobacco Use    Smoking status: Never    Smokeless tobacco: Never   Vaping Use    Vaping Use: Never used   Substance and Sexual Activity    Alcohol use: Never    Drug use: Never    Sexual activity: Never     E-cigarette/Vaping    E-cigarette/Vaping Use Never User     Passive Exposure No     Counseling Given No      E-cigarette/Vaping Substances    Nicotine No     THC No     CBD No     Flavoring No      E-cigarette/Vaping Devices    Disposable No     Pre-filled or Refillable Cartridge No     Refillable Tank No     Pre-filled Pod No          ,   Medications Prior to Admission   Medication Sig Dispense Refill Last Dose    Cholecalciferol 50 MCG (2000 UT) tablet Take 1 tablet by mouth  Daily (Monday-Friday).       glipiZIDE-metFORMIN (METAGLIP) 2.5-500 MG per tablet Take 2 tablets by mouth 2 (Two) Times a Day Before Meals. (Patient taking differently: Take 2 tablets by mouth Every Morning.) 120 tablet 3     glucose blood test strip Check once per day, E11.65 30 each 5     glucose monitor monitoring kit Use 1 each Daily. Dispense glucometer with brand based off insurance coverage, E11.65, check once per day 30 each 5     Lancets misc Check BG once daily, E11.9 30 each 4     lidocaine-prilocaine (EMLA) 2.5-2.5 % cream Apply nickel size amount to port site 30 min before appt time do not rub in cover with plastic wrap 30 g 5     losartan (COZAAR) 25 MG tablet TAKE 1 TABLET BY MOUTH DAILY 90 tablet 1     ondansetron (ZOFRAN) 8 MG tablet Take 1 tablet by mouth 3 (Three) Times a Day As Needed for Nausea or Vomiting. 30 tablet 5     simvastatin (ZOCOR) 40 MG tablet Take 0.5 tablets by mouth Every Night. (Patient taking differently: Take 1 tablet by mouth Every Night.) 90 tablet 2     Tradjenta 5 MG tablet tablet Take 1 tablet by mouth Every Morning. 90 tablet 5     valsartan-hydrochlorothiazide (DIOVAN-HCT) 160-12.5 MG per tablet Take 1 tablet by mouth Daily.      , Scheduled Meds:  sodium chloride, 10 mL, Intravenous, Q12H   , Continuous Infusions:   , PRN Meds:    senna-docusate sodium **AND** polyethylene glycol **AND** bisacodyl **AND** bisacodyl    nitroglycerin    sodium chloride    sodium chloride    sodium chloride, and Allergies:  Patient has no known allergies.    Objective     Vital Signs   Temp:  [98.4 °F (36.9 °C)-98.8 °F (37.1 °C)] 98.8 °F (37.1 °C)  Heart Rate:  [100-138] 100  Resp:  [18] 18  BP: (108-173)/(51-87) 108/51    PPE used per hospital policy    Physical Exam:  Constitutional: Not in acute distress.  Eyes: Injected conjunctivae, EOMI. pupils equal reactive to light.  ENMT: Ball 3. No oral thrush.  Dry tongue  Neck: Trachea midline. No thyromegaly  Heart: RRR, no murmur  Lungs:  Equal but diminished air entry at the bases.  No audible wheezing or crackles  Abdomen: Soft. No tenderness or dullness. No HSM.  Extremities: No cyanosis, clubbing but +3 pitting edema in legs.  Warm extremities and well-perfused.  Neuro: Conscious, alert, oriented x3.  Strength 5/5 in arms.  Psych: Appropriate mood and affect.    Integumentary: No rash.  Normal skin turgor  Lymphatic: No palpable cervical or supraclavicular lymph nodes.      Diagnostic imaging:  I personally and independently reviewed the following images:   CT chest 2/21/2024: Right pleural effusion.  Right lower lobe consolidation, likely atelectasis.  Right peripheral pulmonary opacities.    Laboratory workup:    Results from last 7 days   Lab Units 02/21/24  2111   SODIUM mmol/L 145   POTASSIUM mmol/L 4.8   CHLORIDE mmol/L 107   CO2 mmol/L 23.0   BUN mg/dL 24*   CREATININE mg/dL 0.96   GLUCOSE mg/dL 207*   CALCIUM mg/dL 9.4     Results from last 7 days   Lab Units 02/21/24  2245 02/21/24  2111   HSTROP T ng/L 19* 18*     Results from last 7 days   Lab Units 02/22/24  0242 02/21/24  2149   WBC 10*3/mm3 8.43 7.04   HEMOGLOBIN g/dL 9.2* 8.8*   HEMATOCRIT % 29.1* 27.8*   PLATELETS 10*3/mm3 132* 130*     Results from last 7 days   Lab Units 02/21/24  2219   INR  1.27*   APTT seconds 30.3     Results from last 7 days   Lab Units 02/21/24  2111   PROBNP pg/mL 136.0       Assessment   Right pleural effusion, moderate-etiology unclear yet.  Could be related to malignancy, CHF or cirrhosis.  Right lower lobe Compressive atelectasis  Pleuritic chest pain, secondary to the above  Right pulmonary infiltrates, located peripherally and appears to be consolidations which are probably related to radiation pneumonitis/scarring  Breast cancer s/p right mastectomy 8/24/2023, completed adjuvant radiation on 11/20/2023.  On chemotherapy with Kadcyla    Anemia and thrombocytopenia, likely secondary to chemotherapy  Cirrhotic morphology of the liver on  imaging      Recommendations:    Proceed with right thoracentesis.  Labs ordered.  I explained the procedure to the patient, risks and benefits and she is agreeable.  Incentive spirometry  Check ESR and CRP      Barb Correia MD  02/22/24  13:19 EST

## 2024-02-22 NOTE — PLAN OF CARE
Goal Outcome Evaluation:  Plan of Care Reviewed With: patient        Progress: no change  Outcome Evaluation: BP on the high side, elder po well then NPO, denies pain, denies SOA, IV Vancomycin and Zosyn given in ER, safety maintained

## 2024-02-22 NOTE — ED PROVIDER NOTES
EMERGENCY DEPARTMENT ENCOUNTER    Room Number:  22/22  PCP: Princess Cruz MD  History obtained from: Patient      HPI:  Chief Complaint: Chest pain  A complete HPI/ROS/PMH/PSH/SH/FH are unobtainable due to: N/A  Context: Veronica Royal is a 83 y.o. female who presents to the ED c/o chest pain.  Right-sided, started earlier today.  Worse with deep breath and hiccuping.  Also associated dyspnea on exertion.  No other recent illness, fever, chills.  Currently on chemotherapy for history of breast cancer.  Status post right mastectomy, completed radiation several months ago.            PAST MEDICAL HISTORY  Active Ambulatory Problems     Diagnosis Date Noted    Malignant neoplasm of upper-outer quadrant of right breast in female, estrogen receptor negative 05/16/2023    Encounter for fitting and adjustment of vascular catheter 05/16/2023    Hypertension 06/05/2023    At high risk for malnutrition 06/08/2023    Bilateral hearing loss 06/08/2023    Malignant neoplasm of female breast 06/08/2023    Advanced care planning/counseling discussion 06/08/2023    Cirrhosis of liver 07/12/2023    Type 2 diabetes mellitus 07/12/2023    Anemia due to chemotherapy 07/13/2023    Left renal mass 07/13/2023    Severe malnutrition 07/15/2023    Breast cancer 08/24/2023    Driving safety issue 09/11/2023    Slow transit constipation 11/13/2023     Resolved Ambulatory Problems     Diagnosis Date Noted    KAMILA (acute kidney injury) 07/12/2023    Hypokalemia 07/13/2023    Acute UTI (urinary tract infection) 07/14/2023    Hypomagnesemia 07/14/2023     Past Medical History:   Diagnosis Date    Anxiety     Arthritis     Basal cell carcinoma     Depression     Diabetes mellitus     Diarrhea     High cholesterol     History of chemotherapy 08/2023    Rash 08/2023    Sacral decubitus ulcer     Urinary incontinence          PAST SURGICAL HISTORY  Past Surgical History:   Procedure Laterality Date    CATARACT EXTRACTION EXTRACAPSULAR W/  INTRAOCULAR LENS IMPLANTATION Right     EYE SURGERY      Muscle repair age 21    MASTECTOMY W/ SENTINEL NODE BIOPSY Right 8/24/2023    Procedure: Right breast modified radical mastectomy;  Surgeon: Rosa Michael MD;  [place] [facility] [place] [facility] [place]  Service: General;  Laterality: Right;    TONSILLECTOMY      VENOUS ACCESS DEVICE (PORT) INSERTION N/A 05/19/2023    Procedure: INSERTION VENOUS ACCESS DEVICE;  Surgeon: Rosa Michael MD;  [place] [facility] [place] [facility] [place]  Service: General;  Laterality: N/A;         FAMILY HISTORY  Family History   Problem Relation Age of Onset    Alzheimer's disease Mother     Heart disease Father     Heart attack Father     Ovarian cancer Sister     Pancreatic cancer Brother     Malig Hyperthermia Neg Hx     Colon cancer Neg Hx     Colon polyps Neg Hx     Crohn's disease Neg Hx     Irritable bowel syndrome Neg Hx     Ulcerative colitis Neg Hx          SOCIAL HISTORY  Social History     Socioeconomic History    Marital status:    Tobacco Use    Smoking status: Never    Smokeless tobacco: Never   Vaping Use    Vaping Use: Never used   Substance and Sexual Activity    Alcohol use: Never    Drug use: Never    Sexual activity: Never         ALLERGIES  Patient has no known allergies.        REVIEW OF SYSTEMS    As per HPI      PHYSICAL EXAM  ED Triage Vitals   Temp Heart Rate Resp BP SpO2   02/21/24 2039 02/21/24 2039 02/21/24 2039 02/21/24 2048 02/21/24 2039   98.4 °F (36.9 °C) (!) 138 18 173/86 94 %      Temp src Heart Rate Source Patient Position BP Location FiO2 (%)   02/21/24 2039 02/21/24 2039 02/21/24 2048 02/21/24 2048 --   Tympanic Monitor Sitting Right arm        Physical Exam  Constitutional:       General: She is not in acute distress.  HENT:      Head: Normocephalic and atraumatic.   Cardiovascular:      Rate and Rhythm: Regular rhythm. Tachycardia present.   Pulmonary:      Effort: Pulmonary effort is normal. No respiratory distress.      Comments: Right-sided mastectomy with  no erythema or drainage, well-healed scars  Abdominal:      General: There is no distension.      Palpations: Abdomen is soft.      Tenderness: There is no abdominal tenderness.   Musculoskeletal:         General: No swelling or deformity.   Skin:     General: Skin is warm and dry.   Neurological:      Mental Status: She is alert. Mental status is at baseline.           Vital signs and nursing notes reviewed.          LAB RESULTS  Recent Results (from the past 24 hour(s))   ECG 12 Lead ED Triage Standing Order; Chest Pain    Collection Time: 02/21/24  8:45 PM   Result Value Ref Range    QT Interval 328 ms    QTC Interval 462 ms   Comprehensive Metabolic Panel    Collection Time: 02/21/24  9:11 PM    Specimen: Blood   Result Value Ref Range    Glucose 207 (H) 65 - 99 mg/dL    BUN 24 (H) 8 - 23 mg/dL    Creatinine 0.96 0.57 - 1.00 mg/dL    Sodium 145 136 - 145 mmol/L    Potassium 4.8 3.5 - 5.2 mmol/L    Chloride 107 98 - 107 mmol/L    CO2 23.0 22.0 - 29.0 mmol/L    Calcium 9.4 8.6 - 10.5 mg/dL    Total Protein 6.7 6.0 - 8.5 g/dL    Albumin 3.6 3.5 - 5.2 g/dL    ALT (SGPT) 15 1 - 33 U/L    AST (SGOT) 26 1 - 32 U/L    Alkaline Phosphatase 83 39 - 117 U/L    Total Bilirubin 0.3 0.0 - 1.2 mg/dL    Globulin 3.1 gm/dL    A/G Ratio 1.2 g/dL    BUN/Creatinine Ratio 25.0 7.0 - 25.0    Anion Gap 15.0 5.0 - 15.0 mmol/L    eGFR 58.8 (L) >60.0 mL/min/1.73   High Sensitivity Troponin T    Collection Time: 02/21/24  9:11 PM    Specimen: Blood   Result Value Ref Range    HS Troponin T 18 (H) <14 ng/L   Green Top (Gel)    Collection Time: 02/21/24  9:11 PM   Result Value Ref Range    Extra Tube Hold for add-ons.    Gold Top - SST    Collection Time: 02/21/24  9:11 PM   Result Value Ref Range    Extra Tube Hold for add-ons.    BNP    Collection Time: 02/21/24  9:11 PM    Specimen: Blood   Result Value Ref Range    proBNP 136.0 0.0 - 1,800.0 pg/mL   Procalcitonin    Collection Time: 02/21/24  9:11 PM    Specimen: Blood   Result Value  Ref Range    Procalcitonin 0.10 0.00 - 0.25 ng/mL   Lactic Acid, Plasma    Collection Time: 02/21/24  9:25 PM    Specimen: Blood   Result Value Ref Range    Lactate 2.3 (C) 0.5 - 2.0 mmol/L   Lavender Top    Collection Time: 02/21/24  9:49 PM   Result Value Ref Range    Extra Tube hold for add-on    CBC Auto Differential    Collection Time: 02/21/24  9:49 PM    Specimen: Blood   Result Value Ref Range    WBC 7.04 3.40 - 10.80 10*3/mm3    RBC 3.47 (L) 3.77 - 5.28 10*6/mm3    Hemoglobin 8.8 (L) 12.0 - 15.9 g/dL    Hematocrit 27.8 (L) 34.0 - 46.6 %    MCV 80.1 79.0 - 97.0 fL    MCH 25.4 (L) 26.6 - 33.0 pg    MCHC 31.7 31.5 - 35.7 g/dL    RDW 15.4 12.3 - 15.4 %    RDW-SD 44.0 37.0 - 54.0 fl    MPV 9.3 6.0 - 12.0 fL    Platelets 130 (L) 140 - 450 10*3/mm3    Neutrophil % 77.6 (H) 42.7 - 76.0 %    Lymphocyte % 10.8 (L) 19.6 - 45.3 %    Monocyte % 9.4 5.0 - 12.0 %    Eosinophil % 1.8 0.3 - 6.2 %    Basophil % 0.1 0.0 - 1.5 %    Immature Grans % 0.3 0.0 - 0.5 %    Neutrophils, Absolute 5.46 1.70 - 7.00 10*3/mm3    Lymphocytes, Absolute 0.76 0.70 - 3.10 10*3/mm3    Monocytes, Absolute 0.66 0.10 - 0.90 10*3/mm3    Eosinophils, Absolute 0.13 0.00 - 0.40 10*3/mm3    Basophils, Absolute 0.01 0.00 - 0.20 10*3/mm3    Immature Grans, Absolute 0.02 0.00 - 0.05 10*3/mm3    nRBC 0.0 0.0 - 0.2 /100 WBC   Light Blue Top    Collection Time: 02/21/24 10:19 PM   Result Value Ref Range    Extra Tube Hold for add-ons.    D-dimer, Quantitative    Collection Time: 02/21/24 10:19 PM    Specimen: Blood   Result Value Ref Range    D-Dimer, Quantitative 1.55 (H) 0.00 - 0.83 MCGFEU/mL   Protime-INR    Collection Time: 02/21/24 10:19 PM    Specimen: Blood   Result Value Ref Range    Protime 16.1 (H) 11.7 - 14.2 Seconds    INR 1.27 (H) 0.90 - 1.10   aPTT    Collection Time: 02/21/24 10:19 PM    Specimen: Blood   Result Value Ref Range    PTT 30.3 22.7 - 35.4 seconds   High Sensitivity Troponin T 2Hr    Collection Time: 02/21/24 10:45 PM    Specimen:  Blood   Result Value Ref Range    HS Troponin T 19 (H) <14 ng/L    Troponin T Delta 1 >=-4 - <+4 ng/L       Ordered the above labs and reviewed the results.        RADIOLOGY  US Gallbladder    Result Date: 2/21/2024  GALLBLADDER ULTRASOUND  HISTORY: Right upper quadrant pain  COMPARISON: February 21, 2024  TECHNIQUE: Grayscale and color Doppler sonographic images were obtained through the right upper quadrant.  FINDINGS: Visualized portions of the pancreas are normal. Main portal vein is patent with hepatopetal flow. There is no intra or extrahepatic biliary dilatation. Common bile duct measures 5 mm. The right kidney measures 8.2 x 4.8 x 4.0 cm. Inferior pole is incompletely visualized. The visualized right kidney appears unremarkable. The patient is noted to have cholelithiasis. There is nonspecific gallbladder wall edema and thickening. Gallbladder wall measures up to 6 mm. No sonographic Pepper sign was elicited. Right pleural effusion is partially visualized.       1. Cholelithiasis, as well as some gallbladder wall edema and thickening. Gallbladder wall edema and thickening is a nonspecific finding, which can be associated with etiologies such as right-sided heart failure and hepatitis, in addition to acute cholecystitis. No sonographic Pepper sign was elicited. If clinical concern for acute cholecystitis persists, consideration for HIDA scan is recommended.  This report was finalized on 2/21/2024 11:24 PM by Dr. Delores Espinosa M.D on Workstation: [id number]      CT Angiogram Chest Pulmonary Embolism    Result Date: 2/21/2024  CT ANGIOGRAM OF THE CHEST  HISTORY: Shortness of air  COMPARISON: May 11, 2023  TECHNIQUE: Axial CT images obtained through the thorax. IV contrast was administered. Three-D reformatted images were obtained.  FINDINGS: No acute pulmonary thromboembolus is seen. Thoracic aorta is normal in caliber. There is no evidence of dissection. The patient has fairly extensive ulcerated plaque  of the descending thoracic aorta, similar to the prior study. The thyroid gland and trachea are within normal limits. There is a small hiatal hernia. There are coronary artery calcifications. Mediastinal lymph nodes do not appear pathologically enlarged. The patient has subpleural pulmonary opacities within the right lung. These are located anteriorly. Potentially, it may be related to radiation. However, correlation with any evidence of pneumonia is recommended. Follow-up exam to document resolution or stability is recommended. The patient does have a moderate right pleural effusion. There is some left lower lobe scarring. Patient has undergone right mastectomy and axillary dissection. There is some edema noted within the right anterolateral chest wall, likely related to radiation therapy. Images through the upper abdomen demonstrate cholelithiasis. There is some haziness in the gallbladder fossa, of uncertain clinical significance. It is incompletely assessed on the CT angiogram of the chest. There is potentially also some gallbladder wall edema. Correlation with gallbladder ultrasound is recommended. A single sclerotic lesion is seen within the T3 vertebral body, stable when compared to the exam from May 2023.       1. No acute pulmonary thromboembolus. 2. Moderate right pleural effusion. 3. Subpleural parenchymal opacities identified within the right lung in an anterior distribution, favored to be related to radiation therapy. However, correlation with any evidence of pneumonia, as well as follow-up exam is recommended. The patient is noted to have edema within the right anterolateral chest wall, again likely related to radiation change. 4. There is cholelithiasis. Patient does appear to have inflammatory stranding within the gallbladder fossa. Correlation with gallbladder ultrasound is recommended.  Radiation dose reduction techniques were utilized, including automated exposure control and exposure modulation  based on body size.   This report was finalized on 2/21/2024 10:30 PM by Dr. Delores Espinosa M.D on Workstation: [id number]      XR Chest 1 View    Result Date: 2/21/2024  SINGLE VIEW OF THE CHEST  HISTORY: Chest pain  COMPARISON: May 19, 2023  FINDINGS: There is cardiomegaly. There is no vascular congestion. Left internal jugular vein Mediport appears to terminate within Speir vena cava. No pneumothorax is seen. Left lung is clear. The patient does have blunted right costophrenic angle, which may reflect small effusion. Patient is status post right mastectomy.      Blunting of the right costophrenic angle may reflect small effusion.  This report was finalized on 2/21/2024 9:24 PM by Dr. Delores Espinosa M.D on Workstation: [id number]       Ordered the above noted radiological studies. Reviewed by me in PACS.                MEDICATIONS GIVEN IN ER  Medications   sodium chloride 0.9 % flush 10 mL (has no administration in time range)   vancomycin 1250 mg/250 mL 0.9% NS IVPB ([facility]) (1,250 mg Intravenous New Bag 2/21/24 0964)   sodium chloride 0.9 % flush 10 mL (has no administration in time range)   sodium chloride 0.9 % flush 10 mL (has no administration in time range)   sodium chloride 0.9 % infusion 40 mL (has no administration in time range)   nitroglycerin (NITROSTAT) SL tablet 0.4 mg (has no administration in time range)   sennosides-docusate (PERICOLACE) 8.6-50 MG per tablet 2 tablet (has no administration in time range)     And   polyethylene glycol (MIRALAX) packet 17 g (has no administration in time range)     And   bisacodyl (DULCOLAX) EC tablet 5 mg (has no administration in time range)     And   bisacodyl (DULCOLAX) suppository 10 mg (has no administration in time range)   iopamidol (ISOVUE-370) 76 % injection 100 mL (85 mL Intravenous Given by Other 2/21/24 2210)   piperacillin-tazobactam (ZOSYN) 3.375 g in iso-osmotic dextrose 50 ml (premix) (0 g Intravenous Stopped 2/22/24 0001)                MEDICAL DECISION MAKING, PROGRESS, and CONSULTS    MDM: Patient presented emergency department with right-sided chest pain, tachycardia, active cancer.  Other vital stable.  Labs significant for elevated D-dimer, mild lactic acidosis.  Concern for pulmonary embolism.  CT demonstrating no pulmonary embolism however does demonstrate right-sided pleural effusion with associated peripheral inflammatory change of the lung.  Unclear whether this represents sequela of radiation versus pneumonia, covered empirically with antibiotics.  Also appears to have evidence of gallbladder wall edema, unclear clinical significance.  Right upper quadrant ultrasound also equivocal.  Discussed with inpatient team, will admit for further workup and management of her symptoms.    All labs have been independently reviewed by me.  All radiology studies have been reviewed by me and I have also reviewed the radiology report.   EKG's independently viewed and interpreted by me.  Discussion below represents my analysis of pertinent findings related to patient's condition, differential diagnosis, treatment plan and final disposition.      Additional sources:  - Discussed/ obtained information from independent historians: Son    - External (non-ED) record review:     - Chronic or social conditions impacting care: Breast cancer    - Shared decision making: Discussed plan for admission, patient and family agree      Orders placed during this visit:  Orders Placed This Encounter   Procedures    Blood Culture - Blood,    Blood Culture - Blood,    XR Chest 1 View    CT Angiogram Chest Pulmonary Embolism    US Gallbladder    [place] Draw    Comprehensive Metabolic Panel    High Sensitivity Troponin T    CBC Auto Differential    BNP    D-dimer, Quantitative    Lactic Acid, Plasma    Procalcitonin    Protime-INR    aPTT    High Sensitivity Troponin T 2Hr    STAT Lactic Acid, Reflex    Basic Metabolic Panel    CBC (No Diff)    NPO Diet NPO  Type: Strict NPO    Diet: Cardiac Diets, Diabetic Diets; Healthy Heart (2-3 Na+); Consistent Carbohydrate; Texture: Regular Texture (IDDSI 7); Fluid Consistency: Thin (IDDSI 0)    Undress & Gown    Vital Signs    Intake & Output    Weigh Patient    Oral Care    Place Sequential Compression Device    Maintain Sequential Compression Device    Telemetry - Maintain IV Access    Telemetry - Place Orders & Notify Provider of Results When Patient Experiences Acute Chest Pain, Dysrhythmia or Respiratory Distress    May Be Off Telemetry for Tests    Up With Assistance    Pulse Oximetry, Continuous    Turn Patient    Daily Weights    Neuro Checks    Neurovascular Checks    Code Status and Medical Interventions:    LHA (on-call MD unless specified) Details    Oxygen Therapy- Nasal Cannula; Titrate 1-6 LPM Per SpO2; 90 - 95%    Incentive Spirometry    Oxygen Therapy- Nasal Cannula; Titrate 1-6 LPM Per SpO2; 90 - 95%    ECG 12 Lead ED Triage Standing Order; Chest Pain    ECG 12 Lead ED Triage Standing Order; Chest Pain    SCANNED - TELEMETRY      Insert Peripheral IV    Insert Peripheral IV    Initiate Observation Status    Inpatient Admission    CBC & Differential    Green Top (Gel)    Lavender Top    Gold Top - SST    Light Blue Top         Additional orders considered but not ordered:  Considered HIDA scan however will defer to inpatient service as indicated        Differential diagnosis includes but is not limited to:    Pleural effusion, pulmonary embolism, pneumonia, pneumothorax, cholecystitis, choledocholithiasis, liver abscess      Independent interpretation of labs, radiology studies, and discussions with consultants:  ED Course as of 02/22/24 0020 Wed Feb 21, 2024 2059 EKG interpreted myself:  2045, sinus tachycardia rate of 119, no acute ST segment changes or T wave inversions. [FS]   2139 Heart Rate(!): 138 [FS]   2151 HS Troponin T(!): 18 [FS]   2202 Lactate(!!): 2.3 [FS]   2220 CTA chest interpreted  myself:  Multifocal peripheral infiltrate in the right lung with associated pleural effusion [FS]   2256 D-Dimer, Quant(!): 1.55 [FS]      ED Course User Index  [FS] Dayne Solorzano MD           DIAGNOSIS  Final diagnoses:   Chest pain, unspecified type   Pleural effusion on right         DISPOSITION  Admitted to telemetry        Latest Documented Vital Signs:  As of 00:20 EST  BP- 153/70 HR- 104 Temp- 98.4 °F (36.9 °C) (Tympanic) O2 sat- 93%              --    Please note that portions of this were completed with a voice recognition program.       Note Disclaimer: At [facility], we believe that sharing information builds trust and better relationships. You are receiving this note because you are receiving care at [facility] or recently visited. It is possible you will see health information before a provider has talked with you about it. This kind of information can be easy to misunderstand. To help you fully understand what it means for your health, we urge you to discuss this note with your provider.             Dayne Solorzano MD  02/22/24 0022

## 2024-02-22 NOTE — ED NOTES
Nursing report ED to floor  Veronica Royal  83 y.o.  female    HPI :   Chief Complaint   Patient presents with    Chest Pain       Admitting doctor:   Cristhian Najera MD    Admitting diagnosis:   The primary encounter diagnosis was Chest pain, unspecified type. A diagnosis of Pleural effusion on right was also pertinent to this visit.    Code status:   Current Code Status       Date Active Code Status Order ID Comments User Context       2/21/2024 2358 CPR (Attempt to Resuscitate) [id number]  Patricia Harris APRN ED        Question Answer    Code Status (Patient has no pulse and is not breathing) CPR (Attempt to Resuscitate)    Medical Interventions (Patient has pulse or is breathing) Full Support                    Allergies:   Patient has no known allergies.    Isolation:   No active isolations    Intake and Output    Intake/Output Summary (Last 24 hours) at 2/22/2024 0027  Last data filed at 2/22/2024 0001  Gross per 24 hour   Intake 50 ml   Output --   Net 50 ml       Weight:       02/21/24 2039   Weight: 56.7 kg (125 lb)       Most recent vitals:   Vitals:    02/21/24 2239 02/21/24 2331 02/21/24 2346 02/22/24 0001   BP:  155/75 150/78 153/70   BP Location:       Patient Position:       Pulse: 115 106 108 104   Resp:       Temp:       TempSrc:       SpO2: 92% 95% 94% 93%   Weight:       Height:           Active LDAs/IV Access:   Lines, Drains & Airways       Active LDAs       Name Placement date Placement time Site Days    Peripheral IV 02/21/24 2123 Anterior;Left;Upper Arm 02/21/24 2123  Arm  less than 1    Closed/Suction Drain Right Breast Bulb 19 Fr. 08/24/23  0851  Breast  181    Closed/Suction Drain Right Breast Bulb 19 Fr. 08/24/23  0852  Breast  181    Single Lumen Implantable Port 05/19/23 Left Subclavian 05/19/23  0800  Subclavian  278                    Labs (abnormal labs have a star):   Labs Reviewed   COMPREHENSIVE METABOLIC PANEL - Abnormal; Notable for the following components:        Result Value    Glucose 207 (*)     BUN 24 (*)     eGFR 58.8 (*)     All other components within normal limits    Narrative:     GFR Normal >60  Chronic Kidney Disease <60  Kidney Failure <15    The GFR formula is only valid for adults with stable renal function between ages 18 and 70.   TROPONIN - Abnormal; Notable for the following components:    HS Troponin T 18 (*)     All other components within normal limits    Narrative:     High Sensitive Troponin T Reference Range:  <14.0 ng/L- Negative Female for AMI  <22.0 ng/L- Negative Male for AMI  >=14 - Abnormal Female indicating possible myocardial injury.  >=22 - Abnormal Male indicating possible myocardial injury.   Clinicians would have to utilize clinical acumen, EKG, Troponin, and serial changes to determine if it is an Acute Myocardial Infarction or myocardial injury due to an underlying chronic condition.        CBC WITH AUTO DIFFERENTIAL - Abnormal; Notable for the following components:    RBC 3.47 (*)     Hemoglobin 8.8 (*)     Hematocrit 27.8 (*)     MCH 25.4 (*)     Platelets 130 (*)     Neutrophil % 77.6 (*)     Lymphocyte % 10.8 (*)     All other components within normal limits   D-DIMER, QUANTITATIVE - Abnormal; Notable for the following components:    D-Dimer, Quantitative 1.55 (*)     All other components within normal limits    Narrative:     According to the assay 's published package insert, a normal (<0.50 MCGFEU/mL) D-dimer result in conjunction with a non-high clinical probability assessment, excludes deep vein thrombosis (DVT) and pulmonary embolism (PE) with high sensitivity.    D-dimer values increase with age and this can make VTE exclusion of an older population difficult. To address this, the [employer], based on best available evidence and recent guidelines, recommends that clinicians use age-adjusted D-dimer thresholds in patients greater than 50 years of age with: a) a low probability of PE who do not  "meet all Pulmonary Embolism Rule Out Criteria, or b) in those with intermediate probability of PE.   The formula for an age-adjusted D-dimer cut-off is \"age/100\".  For example, a 60 year old patient would have an age-adjusted cut-off of 0.60 MCGFEU/mL and an 80 year old 0.80 MCGFEU/mL.   LACTIC ACID, PLASMA - Abnormal; Notable for the following components:    Lactate 2.3 (*)     All other components within normal limits   PROTIME-INR - Abnormal; Notable for the following components:    Protime 16.1 (*)     INR 1.27 (*)     All other components within normal limits   HIGH SENSITIVITIY TROPONIN T 2HR - Abnormal; Notable for the following components:    HS Troponin T 19 (*)     All other components within normal limits    Narrative:     High Sensitive Troponin T Reference Range:  <14.0 ng/L- Negative Female for AMI  <22.0 ng/L- Negative Male for AMI  >=14 - Abnormal Female indicating possible myocardial injury.  >=22 - Abnormal Male indicating possible myocardial injury.   Clinicians would have to utilize clinical acumen, EKG, Troponin, and serial changes to determine if it is an Acute Myocardial Infarction or myocardial injury due to an underlying chronic condition.        BNP (IN-HOUSE) - Normal    Narrative:     This assay is used as an aid in the diagnosis of individuals suspected of having heart failure. It can be used as an aid in the diagnosis of acute decompensated heart failure (ADHF) in patients presenting with signs and symptoms of ADHF to the emergency department (ED). In addition, NT-proBNP of <300 pg/mL indicates ADHF is not likely.    Age Range Result Interpretation  NT-proBNP Concentration (pg/mL:      <50             Positive            >450                   Gray                 300-450                    Negative             <300    50-75           Positive            >900                  Gray                300-900                  Negative            <300      >75             Positive            " ">1800                  Harris                300-1800                  Negative            <300   PROCALCITONIN - Normal    Narrative:     As a Marker for Sepsis (Non-Neonates):    1. <0.5 ng/mL represents a low risk of severe sepsis and/or septic shock.  2. >2 ng/mL represents a high risk of severe sepsis and/or septic shock.    As a Marker for Lower Respiratory Tract Infections that require antibiotic therapy:    PCT on Admission    Antibiotic Therapy       6-12 Hrs later    >0.5                Strongly Recommended  >0.25 - <0.5        Recommended   0.1 - 0.25          Discouraged              Remeasure/reassess PCT  <0.1                Strongly Discouraged     Remeasure/reassess PCT    As 28 day mortality risk marker: \"Change in Procalcitonin Result\" (>80% or <=80%) if Day 0 (or Day 1) and Day 4 values are available. Refer to http://www.[facility]-pct-calculator.com    Change in PCT <=80%  A decrease of PCT levels below or equal to 80% defines a positive change in PCT test result representing a higher risk for 28-day all-cause mortality of patients diagnosed with severe sepsis for septic shock.    Change in PCT >80%  A decrease of PCT levels of more than 80% defines a negative change in PCT result representing a lower risk for 28-day all-cause mortality of patients diagnosed with severe sepsis or septic shock.      APTT - Normal   BLOOD CULTURE   BLOOD CULTURE   RAINBOW DRAW    Narrative:     The following orders were created for panel order Anna Draw.  Procedure                               Abnormality         Status                     ---------                               -----------         ------                     Green Top (Gel)[[id number]]                                  Final result               Lavender Top[[id number]]                                     Final result               Gold Top - SST[[id number]]                                   Final result               Light Blue Top[[id number]]            "                        Final result                 Please view results for these tests on the individual orders.   LACTIC ACID, REFLEX   BASIC METABOLIC PANEL   CBC (NO DIFF)   CBC AND DIFFERENTIAL    Narrative:     The following orders were created for panel order CBC & Differential.  Procedure                               Abnormality         Status                     ---------                               -----------         ------                     CBC Auto Differential[[id number]]        Abnormal            Final result                 Please view results for these tests on the individual orders.   GREEN TOP   LAVENDER TOP   GOLD TOP - SST   LIGHT BLUE TOP       EKG:   ECG 12 Lead ED Triage Standing Order; Chest Pain   Preliminary Result   HEART RATE= 119  bpm   RR Interval= 504  ms   IL Interval= 140  ms   P Horizontal Axis= 14  deg   P Front Axis= 60  deg   QRSD Interval= 60  ms   QT Interval= 328  ms   QTcB= 462  ms   QRS Axis= -22  deg   T Wave Axis= 48  deg   - ABNORMAL ECG -   Sinus tachycardia   Borderline left axis deviation   Low voltage, precordial leads   Probable anteroseptal infarct, old   Baseline wander in lead(s) II,III,aVF   Electronically Signed By:    Date and Time of Study: 2024-02-21 20:45:51      SCANNED - TELEMETRY     Final Result          Meds given in ED:   Medications   sodium chloride 0.9 % flush 10 mL (has no administration in time range)   vancomycin 1250 mg/250 mL 0.9% NS IVPB ([facility]) (1,250 mg Intravenous New Bag 2/21/24 3582)   sodium chloride 0.9 % flush 10 mL (has no administration in time range)   sodium chloride 0.9 % flush 10 mL (has no administration in time range)   sodium chloride 0.9 % infusion 40 mL (has no administration in time range)   nitroglycerin (NITROSTAT) SL tablet 0.4 mg (has no administration in time range)   sennosides-docusate (PERICOLACE) 8.6-50 MG per tablet 2 tablet (has no administration in time range)     And   polyethylene glycol (MIRALAX) packet  17 g (has no administration in time range)     And   bisacodyl (DULCOLAX) EC tablet 5 mg (has no administration in time range)     And   bisacodyl (DULCOLAX) suppository 10 mg (has no administration in time range)   iopamidol (ISOVUE-370) 76 % injection 100 mL (85 mL Intravenous Given by Other 2/21/24 2210)   piperacillin-tazobactam (ZOSYN) 3.375 g in iso-osmotic dextrose 50 ml (premix) (0 g Intravenous Stopped 2/22/24 0001)       Imaging results:  US Gallbladder    Result Date: 2/21/2024   1. Cholelithiasis, as well as some gallbladder wall edema and thickening. Gallbladder wall edema and thickening is a nonspecific finding, which can be associated with etiologies such as right-sided heart failure and hepatitis, in addition to acute cholecystitis. No sonographic Pepper sign was elicited. If clinical concern for acute cholecystitis persists, consideration for HIDA scan is recommended.  This report was finalized on 2/21/2024 11:24 PM by Dr. Delores Espinosa M.D on Workstation: [email or web address]      CT Angiogram Chest Pulmonary Embolism    Result Date: 2/21/2024   1. No acute pulmonary thromboembolus. 2. Moderate right pleural effusion. 3. Subpleural parenchymal opacities identified within the right lung in an anterior distribution, favored to be related to radiation therapy. However, correlation with any evidence of pneumonia, as well as follow-up exam is recommended. The patient is noted to have edema within the right anterolateral chest wall, again likely related to radiation change. 4. There is cholelithiasis. Patient does appear to have inflammatory stranding within the gallbladder fossa. Correlation with gallbladder ultrasound is recommended.  Radiation dose reduction techniques were utilized, including automated exposure control and exposure modulation based on body size.   This report was finalized on 2/21/2024 10:30 PM by Dr. Delores Espinosa M.D on Workstation: [employer]      XR Chest 1 View    Result Date:  2/21/2024  Blunting of the right costophrenic angle may reflect small effusion.  This report was finalized on 2/21/2024 9:24 PM by Dr. Delores Espinosa M.D on Workstation: [id number]       Ambulatory status:   - assist x1    Social issues:   Social History     Socioeconomic History    Marital status:    Tobacco Use    Smoking status: Never    Smokeless tobacco: Never   Vaping Use    Vaping Use: Never used   Substance and Sexual Activity    Alcohol use: Never    Drug use: Never    Sexual activity: Never       NIH Stroke Scale:         Natalie Huitron RN  02/22/24 00:27 EST

## 2024-02-23 ENCOUNTER — APPOINTMENT (OUTPATIENT)
Dept: CARDIOLOGY | Facility: HOSPITAL | Age: 84
DRG: 186 | End: 2024-02-23
Payer: MEDICARE

## 2024-02-23 LAB
ALBUMIN SERPL-MCNC: 2.9 G/DL (ref 3.5–5.2)
ALBUMIN/GLOB SERPL: 1 G/DL
ALP SERPL-CCNC: 69 U/L (ref 39–117)
ALT SERPL W P-5'-P-CCNC: 7 U/L (ref 1–33)
ANION GAP SERPL CALCULATED.3IONS-SCNC: 12.7 MMOL/L (ref 5–15)
AST SERPL-CCNC: 20 U/L (ref 1–32)
BASOPHILS # BLD AUTO: 0.02 10*3/MM3 (ref 0–0.2)
BASOPHILS NFR BLD AUTO: 0.3 % (ref 0–1.5)
BH CV LOWER VASCULAR LEFT COMMON FEMORAL AUGMENT: NORMAL
BH CV LOWER VASCULAR LEFT COMMON FEMORAL COMPETENT: NORMAL
BH CV LOWER VASCULAR LEFT COMMON FEMORAL COMPRESS: NORMAL
BH CV LOWER VASCULAR LEFT COMMON FEMORAL PHASIC: NORMAL
BH CV LOWER VASCULAR LEFT COMMON FEMORAL SPONT: NORMAL
BH CV LOWER VASCULAR LEFT DISTAL FEMORAL COMPRESS: NORMAL
BH CV LOWER VASCULAR LEFT GASTRONEMIUS COMPRESS: NORMAL
BH CV LOWER VASCULAR LEFT GREATER SAPH AK COMPRESS: NORMAL
BH CV LOWER VASCULAR LEFT GREATER SAPH BK COMPRESS: NORMAL
BH CV LOWER VASCULAR LEFT LESSER SAPH COMPRESS: NORMAL
BH CV LOWER VASCULAR LEFT MID FEMORAL AUGMENT: NORMAL
BH CV LOWER VASCULAR LEFT MID FEMORAL COMPETENT: NORMAL
BH CV LOWER VASCULAR LEFT MID FEMORAL COMPRESS: NORMAL
BH CV LOWER VASCULAR LEFT MID FEMORAL PHASIC: NORMAL
BH CV LOWER VASCULAR LEFT MID FEMORAL SPONT: NORMAL
BH CV LOWER VASCULAR LEFT PERONEAL COMPRESS: NORMAL
BH CV LOWER VASCULAR LEFT POPLITEAL AUGMENT: NORMAL
BH CV LOWER VASCULAR LEFT POPLITEAL COMPETENT: NORMAL
BH CV LOWER VASCULAR LEFT POPLITEAL COMPRESS: NORMAL
BH CV LOWER VASCULAR LEFT POPLITEAL PHASIC: NORMAL
BH CV LOWER VASCULAR LEFT POPLITEAL SPONT: NORMAL
BH CV LOWER VASCULAR LEFT POSTERIOR TIBIAL COMPRESS: NORMAL
BH CV LOWER VASCULAR LEFT PROFUNDA FEMORAL COMPRESS: NORMAL
BH CV LOWER VASCULAR LEFT PROXIMAL FEMORAL COMPRESS: NORMAL
BH CV LOWER VASCULAR LEFT SAPHENOFEMORAL JUNCTION COMPRESS: NORMAL
BH CV LOWER VASCULAR RIGHT COMMON FEMORAL AUGMENT: NORMAL
BH CV LOWER VASCULAR RIGHT COMMON FEMORAL COMPETENT: NORMAL
BH CV LOWER VASCULAR RIGHT COMMON FEMORAL COMPRESS: NORMAL
BH CV LOWER VASCULAR RIGHT COMMON FEMORAL PHASIC: NORMAL
BH CV LOWER VASCULAR RIGHT COMMON FEMORAL SPONT: NORMAL
BH CV LOWER VASCULAR RIGHT DISTAL FEMORAL COMPRESS: NORMAL
BH CV LOWER VASCULAR RIGHT GASTRONEMIUS COMPRESS: NORMAL
BH CV LOWER VASCULAR RIGHT GREATER SAPH AK COMPRESS: NORMAL
BH CV LOWER VASCULAR RIGHT GREATER SAPH BK COMPRESS: NORMAL
BH CV LOWER VASCULAR RIGHT LESSER SAPH COMPRESS: NORMAL
BH CV LOWER VASCULAR RIGHT MID FEMORAL AUGMENT: NORMAL
BH CV LOWER VASCULAR RIGHT MID FEMORAL COMPETENT: NORMAL
BH CV LOWER VASCULAR RIGHT MID FEMORAL COMPRESS: NORMAL
BH CV LOWER VASCULAR RIGHT MID FEMORAL PHASIC: NORMAL
BH CV LOWER VASCULAR RIGHT MID FEMORAL SPONT: NORMAL
BH CV LOWER VASCULAR RIGHT PERONEAL COMPRESS: NORMAL
BH CV LOWER VASCULAR RIGHT POPLITEAL AUGMENT: NORMAL
BH CV LOWER VASCULAR RIGHT POPLITEAL COMPETENT: NORMAL
BH CV LOWER VASCULAR RIGHT POPLITEAL COMPRESS: NORMAL
BH CV LOWER VASCULAR RIGHT POPLITEAL PHASIC: NORMAL
BH CV LOWER VASCULAR RIGHT POPLITEAL SPONT: NORMAL
BH CV LOWER VASCULAR RIGHT POSTERIOR TIBIAL COMPRESS: NORMAL
BH CV LOWER VASCULAR RIGHT PROFUNDA FEMORAL COMPRESS: NORMAL
BH CV LOWER VASCULAR RIGHT PROXIMAL FEMORAL COMPRESS: NORMAL
BH CV LOWER VASCULAR RIGHT SAPHENOFEMORAL JUNCTION COMPRESS: NORMAL
BILIRUB SERPL-MCNC: 0.2 MG/DL (ref 0–1.2)
BUN SERPL-MCNC: 22 MG/DL (ref 8–23)
BUN/CREAT SERPL: 22 (ref 7–25)
CALCIUM SPEC-SCNC: 8.5 MG/DL (ref 8.6–10.5)
CHLORIDE SERPL-SCNC: 105 MMOL/L (ref 98–107)
CO2 SERPL-SCNC: 23.3 MMOL/L (ref 22–29)
CREAT SERPL-MCNC: 1 MG/DL (ref 0.57–1)
DEPRECATED RDW RBC AUTO: 46.1 FL (ref 37–54)
EGFRCR SERPLBLD CKD-EPI 2021: 56 ML/MIN/1.73
EOSINOPHIL # BLD AUTO: 0.23 10*3/MM3 (ref 0–0.4)
EOSINOPHIL NFR BLD AUTO: 3.9 % (ref 0.3–6.2)
ERYTHROCYTE [DISTWIDTH] IN BLOOD BY AUTOMATED COUNT: 15.5 % (ref 12.3–15.4)
GLOBULIN UR ELPH-MCNC: 3 GM/DL
GLUCOSE BLDC GLUCOMTR-MCNC: 119 MG/DL (ref 70–130)
GLUCOSE BLDC GLUCOMTR-MCNC: 152 MG/DL (ref 70–130)
GLUCOSE BLDC GLUCOMTR-MCNC: 155 MG/DL (ref 70–130)
GLUCOSE BLDC GLUCOMTR-MCNC: 256 MG/DL (ref 70–130)
GLUCOSE SERPL-MCNC: 80 MG/DL (ref 65–99)
HCT VFR BLD AUTO: 26.5 % (ref 34–46.6)
HGB BLD-MCNC: 8.4 G/DL (ref 12–15.9)
IMM GRANULOCYTES # BLD AUTO: 0.02 10*3/MM3 (ref 0–0.05)
IMM GRANULOCYTES NFR BLD AUTO: 0.3 % (ref 0–0.5)
LDH SERPL-CCNC: 168 U/L (ref 135–214)
LYMPHOCYTES # BLD AUTO: 0.69 10*3/MM3 (ref 0.7–3.1)
LYMPHOCYTES NFR BLD AUTO: 11.8 % (ref 19.6–45.3)
MCH RBC QN AUTO: 25.8 PG (ref 26.6–33)
MCHC RBC AUTO-ENTMCNC: 31.7 G/DL (ref 31.5–35.7)
MCV RBC AUTO: 81.5 FL (ref 79–97)
MONOCYTES # BLD AUTO: 0.63 10*3/MM3 (ref 0.1–0.9)
MONOCYTES NFR BLD AUTO: 10.7 % (ref 5–12)
NEUTROPHILS NFR BLD AUTO: 4.28 10*3/MM3 (ref 1.7–7)
NEUTROPHILS NFR BLD AUTO: 73 % (ref 42.7–76)
NRBC BLD AUTO-RTO: 0 /100 WBC (ref 0–0.2)
PLATELET # BLD AUTO: 124 10*3/MM3 (ref 140–450)
PMV BLD AUTO: 10.2 FL (ref 6–12)
POTASSIUM SERPL-SCNC: 3.4 MMOL/L (ref 3.5–5.2)
POTASSIUM SERPL-SCNC: 4.5 MMOL/L (ref 3.5–5.2)
PROT SERPL-MCNC: 5.9 G/DL (ref 6–8.5)
QT INTERVAL: 328 MS
QTC INTERVAL: 462 MS
RBC # BLD AUTO: 3.25 10*6/MM3 (ref 3.77–5.28)
SODIUM SERPL-SCNC: 141 MMOL/L (ref 136–145)
WBC NRBC COR # BLD AUTO: 5.87 10*3/MM3 (ref 3.4–10.8)

## 2024-02-23 PROCEDURE — 63710000001 INSULIN LISPRO (HUMAN) PER 5 UNITS: Performed by: NURSE PRACTITIONER

## 2024-02-23 PROCEDURE — 84132 ASSAY OF SERUM POTASSIUM: CPT | Performed by: NURSE PRACTITIONER

## 2024-02-23 PROCEDURE — 97162 PT EVAL MOD COMPLEX 30 MIN: CPT

## 2024-02-23 PROCEDURE — 80053 COMPREHEN METABOLIC PANEL: CPT | Performed by: INTERNAL MEDICINE

## 2024-02-23 PROCEDURE — 85025 COMPLETE CBC W/AUTO DIFF WBC: CPT | Performed by: INTERNAL MEDICINE

## 2024-02-23 PROCEDURE — 82948 REAGENT STRIP/BLOOD GLUCOSE: CPT

## 2024-02-23 PROCEDURE — 93970 EXTREMITY STUDY: CPT

## 2024-02-23 PROCEDURE — 83615 LACTATE (LD) (LDH) ENZYME: CPT | Performed by: INTERNAL MEDICINE

## 2024-02-23 RX ORDER — POTASSIUM CHLORIDE 750 MG/1
40 TABLET, FILM COATED, EXTENDED RELEASE ORAL EVERY 4 HOURS
Status: COMPLETED | OUTPATIENT
Start: 2024-02-23 | End: 2024-02-23

## 2024-02-23 RX ORDER — LIDOCAINE 40 MG/G
1 CREAM TOPICAL EVERY 4 HOURS PRN
Status: DISCONTINUED | OUTPATIENT
Start: 2024-02-23 | End: 2024-02-24 | Stop reason: HOSPADM

## 2024-02-23 RX ORDER — NICOTINE POLACRILEX 4 MG
15 LOZENGE BUCCAL
Status: DISCONTINUED | OUTPATIENT
Start: 2024-02-23 | End: 2024-02-24 | Stop reason: HOSPADM

## 2024-02-23 RX ORDER — DEXTROSE MONOHYDRATE 25 G/50ML
25 INJECTION, SOLUTION INTRAVENOUS
Status: DISCONTINUED | OUTPATIENT
Start: 2024-02-23 | End: 2024-02-24 | Stop reason: HOSPADM

## 2024-02-23 RX ORDER — INSULIN LISPRO 100 [IU]/ML
2-7 INJECTION, SOLUTION INTRAVENOUS; SUBCUTANEOUS
Status: DISCONTINUED | OUTPATIENT
Start: 2024-02-23 | End: 2024-02-24 | Stop reason: HOSPADM

## 2024-02-23 RX ORDER — IBUPROFEN 600 MG/1
1 TABLET ORAL
Status: DISCONTINUED | OUTPATIENT
Start: 2024-02-23 | End: 2024-02-24 | Stop reason: HOSPADM

## 2024-02-23 RX ADMIN — Medication 10 ML: at 08:23

## 2024-02-23 RX ADMIN — POTASSIUM CHLORIDE 40 MEQ: 750 TABLET, EXTENDED RELEASE ORAL at 08:23

## 2024-02-23 RX ADMIN — POTASSIUM CHLORIDE 40 MEQ: 750 TABLET, EXTENDED RELEASE ORAL at 12:42

## 2024-02-23 RX ADMIN — INSULIN LISPRO 2 UNITS: 100 INJECTION, SOLUTION INTRAVENOUS; SUBCUTANEOUS at 17:15

## 2024-02-23 RX ADMIN — INSULIN LISPRO 2 UNITS: 100 INJECTION, SOLUTION INTRAVENOUS; SUBCUTANEOUS at 12:42

## 2024-02-23 RX ADMIN — LOSARTAN POTASSIUM 25 MG: 25 TABLET, FILM COATED ORAL at 08:23

## 2024-02-23 RX ADMIN — Medication 10 ML: at 20:20

## 2024-02-23 RX ADMIN — INSULIN LISPRO 4 UNITS: 100 INJECTION, SOLUTION INTRAVENOUS; SUBCUTANEOUS at 21:18

## 2024-02-23 NOTE — PLAN OF CARE
Goal Outcome Evaluation:              Outcome Evaluation: Pt admitted from home w ith some chest pain, +pleural effusion s/p thoracentesis.  H/o breast CA s/p mastectomy.  Pt reports living at home alone, no use of AD, denies recent falls, dtr assists w/ groceries/errands.  Today demonstates generalized weakness from baseline but able to ambulate 300' w/ stand by assist, some increased work of breathing but vitals stable on room air.  No loss of balance or safety concerns.  Recommend sitting in chair few hours/day and ambulating in mello 2-3x/day.      Anticipated Discharge Disposition (PT): home with assist

## 2024-02-23 NOTE — PLAN OF CARE
Goal Outcome Evaluation:           Progress: improving  Outcome Evaluation: pt alert and oriented. this am pt had some c/o right sided pain with inspiration that was not relieved by thorocentesis yesterday, in the afternoon pt states she is no longer having any type of pain. cardio consulted. K+ replaced. pt ambulated around room and up in chair for several hrs this shift.

## 2024-02-23 NOTE — THERAPY EVALUATION
Patient Name: Veronica Royal  : 1940    MRN: [id number]                              Today's Date: 2024       Admit Date: 2024    Visit Dx:     ICD-10-CM ICD-9-CM   1. Chest pain, unspecified type  R07.9 786.50   2. Pleural effusion on right  J90 511.9     Patient Active Problem List   Diagnosis    Malignant neoplasm of upper-outer quadrant of right breast in female, estrogen receptor negative    Encounter for fitting and adjustment of vascular catheter    Hypertension    At high risk for malnutrition    Bilateral hearing loss    Malignant neoplasm of female breast    Advanced care planning/counseling discussion    Cirrhosis of liver    Type 2 diabetes mellitus    Anemia due to chemotherapy    Left renal mass    Severe malnutrition    Breast cancer    Driving safety issue    Slow transit constipation    Right-sided chest pain    Chest pain     Past Medical History:   Diagnosis Date    Anxiety     Arthritis     Basal cell carcinoma     Left thumb    Breast cancer     Right    Depression     Diabetes mellitus     Diarrhea     s/e chemo    High cholesterol     History of chemotherapy 2023    Hypertension     Rash 2023    s/e chemo    Sacral decubitus ulcer     cleaning with soap & water    Urinary incontinence     wears pads     Past Surgical History:   Procedure Laterality Date    CATARACT EXTRACTION EXTRACAPSULAR W/ INTRAOCULAR LENS IMPLANTATION Right     EYE SURGERY      Muscle repair age 21    MASTECTOMY W/ SENTINEL NODE BIOPSY Right 2023    Procedure: Right breast modified radical mastectomy;  Surgeon: Rosa Michael MD;  [place] [facility] [place] [facility] [place]  Service: General;  Laterality: Right;    TONSILLECTOMY      VENOUS ACCESS DEVICE (PORT) INSERTION N/A 2023    Procedure: INSERTION VENOUS ACCESS DEVICE;  Surgeon: Rosa Michael MD;  [place] [facility] [place] [facility] [place]  Service: General;  Laterality: N/A;      General Information       Row Name 24 1323          Physical Therapy  Time and Intention    Document Type evaluation  -AR     Mode of Treatment physical therapy  -AR       Row Name 02/23/24 1323          General Information    Patient Profile Reviewed yes  -AR     Prior Level of Function independent:  lives al one, no AD, denies falls, dtr assists w/ groceries, pt sometimes goes w/ dtr  -AR     Existing Precautions/Restrictions no known precautions/restrictions  no bed alarm on arrival  -AR     Barriers to Rehab none identified  -AR       Row Name 02/23/24 1323          Living Environment    People in Home alone  -AR       Row Name 02/23/24 1323          Home Main Entrance    Number of Stairs, Main Entrance three  -AR     Stair Railings, Main Entrance railings safe and in good condition  -AR       Row Name 02/23/24 1323          Cognition    Orientation Status (Cognition) oriented x 3  -AR       Row Name 02/23/24 1323          Safety Issues, Functional Mobility    Impairments Affecting Function (Mobility) endurance/activity tolerance  -AR               User Key  (r) = Recorded By, (t) = Taken By, (c) = Cosigned By      Initials Name Provider Type    AR Padmini Garcia, PT Physical Therapist                   Mobility       Row Name 02/23/24 1324          Bed Mobility    Bed Mobility supine-sit;sit-supine  -AR     Supine-Sit [place] (Bed Mobility) standby assist  -AR     Sit-Supine [place] (Bed Mobility) not tested  -AR     Assistive Device (Bed Mobility) head of bed elevated  -AR       Row Name 02/23/24 1324          Sit-Stand Transfer    Sit-Stand [place] (Transfers) standby assist  -AR       Row Name 02/23/24 1324          Gait/Stairs (Locomotion)    [place] Level (Gait) standby assist  -AR     Distance in Feet (Gait) 300  -AR     Deviations/Abnormal Patterns (Gait) gait speed decreased  -AR               User Key  (r) = Recorded By, (t) = Taken By, (c) = Cosigned By      Initials Name Provider Type    AR Padmini Garcia, PT Physical Therapist                    Obj/Interventions       Row Name 02/23/24 1326          Range of Motion Comprehensive    Comment, General Range of Motion B LE WFL  -AR       Row Name 02/23/24 1326          Strength Comprehensive (MMT)    Comment, General Manual Muscle Testing (MMT) Assessment B LE 4+/5  -AR       Row Name 02/23/24 1326          Balance    Balance Assessment standing dynamic balance  -AR     Dynamic Standing Balance standby assist  -AR               User Key  (r) = Recorded By, (t) = Taken By, (c) = Cosigned By      Initials Name Provider Type    Padmini Lim, PT Physical Therapist                   Goals/Plan    No documentation.                  Clinical Impression       Row Name 02/23/24 1326          Pain    Pretreatment Pain Rating 0/10 - no pain  -AR     Pain Intervention(s) Repositioned  -AR       Row Name 02/23/24 1326          Plan of Care Review    Outcome Evaluation Pt admitted from home w ith some chest pain, +pleural effusion s/p thoracentesis.  H/o breast CA s/p mastectomy.  Pt reports living at home alone, no use of AD, denies recent falls, dtr assists w/ groceries/errands.  Today demonstates generalized weakness from baseline but able to ambulate 300' w/ stand by assist, some increased work of breathing but vitals stable on room air.  No loss of balance or safety concerns.  Recommend sitting in chair few hours/day and ambulating in mello 2-3x/day.  -AR       Row Name 02/23/24 1326          Therapy Assessment/Plan (PT)    Criteria for Skilled Interventions Met (PT) no  -AR     Therapy Frequency (PT) evaluation only  -AR       Row Name 02/23/24 1326          Vital Signs    O2 Delivery Pre Treatment room air  -AR       Row Name 02/23/24 1326          Positioning and Restraints    Pre-Treatment Position in bed  no alarm  -AR     Post Treatment Position chair  RN okay w/ no alarm pt has been up ad malcolm  -AR     In Chair notified nsg;reclined;sitting;call light within reach  -AR               User Key  (r) = Recorded  By, (t) = Taken By, (c) = Cosigned By      Initials Name Provider Type    AR Padmini Garcia, GENO Physical Therapist                   Outcome Measures       Row Name 02/23/24 1332          How much help from another person do you currently need...    Turning from your back to your side while in flat bed without using bedrails? 4  -AR     Moving from lying on back to sitting on the side of a flat bed without bedrails? 4  -AR     Moving to and from a bed to a chair (including a wheelchair)? 4  -AR     Standing up from a chair using your arms (e.g., wheelchair, bedside chair)? 3  -AR     Climbing 3-5 steps with a railing? 3  -AR     To walk in hospital room? 3  -AR     AM-PAC 6 Clicks Score (PT) 21  -AR     Highest Level of Mobility Goal 6 --> Walk 10 steps or more  -AR       Row Name 02/23/24 1332          Functional Assessment    Outcome Measure Options AM-PAC 6 Clicks Basic Mobility (PT)  -AR               User Key  (r) = Recorded By, (t) = Taken By, (c) = Cosigned By      Initials Name Provider Type    AR Padmini Garcia PT Physical Therapist                                 Physical Therapy Education       Title: PT OT SLP Therapies (Done)       Topic: Physical Therapy (Done)       Point: Mobility training (Done)       Learning Progress Summary             Patient Acceptance, E, VU by AR at 2/23/2024 1332                         Point: Home exercise program (Done)       Learning Progress Summary             Patient Acceptance, E, VU by AR at 2/23/2024 1332                         Point: Body mechanics (Done)       Learning Progress Summary             Patient Acceptance, E, VU by AR at 2/23/2024 1332                         Point: Precautions (Done)       Learning Progress Summary             Patient Acceptance, E, VU by AR at 2/23/2024 1332                                         User Key       Initials Effective Dates Name Provider Type Discipline    AR 06/16/21 -  Padmini Garcia PT Physical Therapist PT                   PT Recommendation and Plan     Outcome Evaluation: Pt admitted from home w ith some chest pain, +pleural effusion s/p thoracentesis.  H/o breast CA s/p mastectomy.  Pt reports living at home alone, no use of AD, denies recent falls, dtr assists w/ groceries/errands.  Today demonstates generalized weakness from baseline but able to ambulate 300' w/ stand by assist, some increased work of breathing but vitals stable on room air.  No loss of balance or safety concerns.  Recommend sitting in chair few hours/day and ambulating in mello 2-3x/day.     Time Calculation:         PT Charges       Row Name 02/23/24 1321             Time Calculation    Start Time 0925  -AR      Stop Time 0945  -AR      Time Calculation (min) 20 min  -AR      PT Received On 02/23/24  -AR                User Key  (r) = Recorded By, (t) = Taken By, (c) = Cosigned By      Initials Name Provider Type    Padmini Lim, PT Physical Therapist                  Therapy Charges for Today       Code Description Service Date Service Provider Modifiers Qty    [id number]  PT EVAL MOD COMPLEXITY 3 2/23/2024 Padmini Garcia, PT GP 1            PT G-Codes  Outcome Measure Options: AM-PAC 6 Clicks Basic Mobility (PT)  AM-PAC 6 Clicks Score (PT): 21  PT Discharge Summary  Anticipated Discharge Disposition (PT): home with assist    Padmini Garcia PT  2/23/2024

## 2024-02-23 NOTE — PROGRESS NOTES
Name: Veronica Royal ADMIT: 2024   : 1940  PCP: Princess Cruz MD    MRN: [id number] LOS: 1 days   AGE/SEX: 83 y.o. female  ROOM: [facility]     Subjective   Subjective   Chief Complaint   Patient presents with    Chest Pain     Feeling much improved after thoracentesis. No CP SOA NVD. No RUQ pain. No post prandial pain. Having pain in right chest at site of prior surgery. Wanting to discharge.     Objective   Objective   Vital Signs  Temp:  [98.1 °F (36.7 °C)-99.5 °F (37.5 °C)] 99.5 °F (37.5 °C)  Heart Rate:  [] 100  Resp:  [18] 18  BP: (127-137)/(61-74) 134/74  SpO2:  [94 %-97 %] 96 %  on   ;   Device (Oxygen Therapy): room air  Body mass index is 22.72 kg/m².    Physical Exam  Vitals and nursing note reviewed.   Constitutional:       General: She is not in acute distress.     Appearance: She is not diaphoretic.   Cardiovascular:      Rate and Rhythm: Normal rate and regular rhythm.      Pulses: Normal pulses.   Pulmonary:      Effort: Pulmonary effort is normal.      Breath sounds: No wheezing.   Abdominal:      General: There is no distension.      Palpations: Abdomen is soft.      Tenderness: There is no abdominal tenderness. There is no guarding or rebound. Negative signs include Pepper's sign.   Musculoskeletal:         General: No tenderness.   Skin:     General: Skin is warm and dry.   Neurological:      Mental Status: She is alert. Mental status is at baseline.   Psychiatric:         Mood and Affect: Mood normal.         Behavior: Behavior normal.       Results Review  I reviewed the patient's new clinical results.    Results from last 7 days   Lab Units 24  0335 24  0242 24  2149   WBC 10*3/mm3 5.87 8.43 7.04   HEMOGLOBIN g/dL 8.4* 9.2* 8.8*   PLATELETS 10*3/mm3 124* 132* 130*     Results from last 7 days   Lab Units 24  0335 24  2111   SODIUM mmol/L 141 145   POTASSIUM mmol/L 3.4* 4.8   CHLORIDE mmol/L 105 107   CO2 mmol/L 23.3 23.0   BUN mg/dL 22 24*    CREATININE mg/dL 1.00 0.96   GLUCOSE mg/dL 80 207*   EGFR mL/min/1.73 56.0* 58.8*     Results from last 7 days   Lab Units 02/23/24  0335 02/21/24 2111   ALBUMIN g/dL 2.9* 3.6   BILIRUBIN mg/dL 0.2 0.3   ALK PHOS U/L 69 83   AST (SGOT) U/L 20 26   ALT (SGPT) U/L 7 15     Results from last 7 days   Lab Units 02/23/24  0335 02/21/24 2111   CALCIUM mg/dL 8.5* 9.4   ALBUMIN g/dL 2.9* 3.6     Results from last 7 days   Lab Units 02/22/24  0037 02/21/24 2125 02/21/24 2111   PROCALCITONIN ng/mL  --   --  0.10   LACTATE mmol/L 1.4 2.3*  --      Glucose   Date/Time Value Ref Range Status   02/23/2024 1131 155 (H) 70 - 130 mg/dL Final   02/23/2024 0744 119 70 - 130 mg/dL Final   02/22/2024 0153 142 (H) 70 - 130 mg/dL Final       US Thoracentesis    Result Date: 2/22/2024  Technically successful diagnostic and therapeutic ultrasound-guided right thoracentesis with removal of 550 mL of josef-colored effusion fluid.   By electronically signing this report, I, the supervising radiologist, attest that I was not present for the procedure(s) but agree with the final edited report.    This report was finalized on 2/22/2024 4:01 PM by Dr. Jack Ramos M.D on Workstation: [id number]      US Gallbladder    Result Date: 2/21/2024   1. Cholelithiasis, as well as some gallbladder wall edema and thickening. Gallbladder wall edema and thickening is a nonspecific finding, which can be associated with etiologies such as right-sided heart failure and hepatitis, in addition to acute cholecystitis. No sonographic Pepper sign was elicited. If clinical concern for acute cholecystitis persists, consideration for HIDA scan is recommended.  This report was finalized on 2/21/2024 11:24 PM by Dr. Delores Espinosa M.D on Workstation: [id number]      CT Angiogram Chest Pulmonary Embolism    Result Date: 2/21/2024   1. No acute pulmonary thromboembolus. 2. Moderate right pleural effusion. 3. Subpleural parenchymal opacities identified within the  right lung in an anterior distribution, favored to be related to radiation therapy. However, correlation with any evidence of pneumonia, as well as follow-up exam is recommended. The patient is noted to have edema within the right anterolateral chest wall, again likely related to radiation change. 4. There is cholelithiasis. Patient does appear to have inflammatory stranding within the gallbladder fossa. Correlation with gallbladder ultrasound is recommended.  Radiation dose reduction techniques were utilized, including automated exposure control and exposure modulation based on body size.   This report was finalized on 2/21/2024 10:30 PM by Dr. Delores Espinosa M.D on Workstation: [id number]      XR Chest 1 View    Result Date: 2/21/2024  Blunting of the right costophrenic angle may reflect small effusion.  This report was finalized on 2/21/2024 9:24 PM by Dr. Delores Espinosa M.D on Workstation: [id number]       I have personally reviewed all medications:  Scheduled Medications  insulin lispro, 2-7 Units, Subcutaneous, 4x Daily AC & at Bedtime  losartan, 25 mg, Oral, Daily  sodium chloride, 10 mL, Intravenous, Q12H      Infusions  hold, 1 each      Diet  Diet: Cardiac Diets, Diabetic Diets; Healthy Heart (2-3 Na+); Consistent Carbohydrate; Texture: Regular Texture (IDDSI 7); Fluid Consistency: Thin (IDDSI 0)    I have personally reviewed:  [x]  Laboratory   [x]  Microbiology   [x]  Radiology   [x]  EKG/Telemetry  []  Cardiology/Vascular   []  Pathology    [x]  Records       Assessment/Plan     Active Hospital Problems    Diagnosis  POA    **Right-sided chest pain [R07.9]  Yes    Chest pain [R07.9]  Yes    Severe malnutrition [E43]  Yes    Type 2 diabetes mellitus [E11.9]  Yes    Hypertension [I10]  Yes      Resolved Hospital Problems   No resolved problems to display.       83 y.o. female admitted with Right-sided chest pain.    Pleural Effusion: Cytology pending. Culture ngtd. Improved symptoms. Pulmonology  following.  Breast Cancer: Hx radiation treatment, right mastectomy. On Kadcyla outpatient. Follows with [facility].  Chest Pain: Right sided and with inspiration. Follows with [facility] and they were considering stress testing outpatient. Will place consult.  Cirrhotic Liver on prior CT: LFT ok. GB thickening noted but no RUQ pain with inspiration and palpation. No postprandial pain reported.  HTN: Acceptable acutely. Continue to monitor.  DM2: Check A1c. SSI  Elevated DDimer: No clot on CTA. Check duplex  PPX: SCD, chemical if no additional procedure needed  Disposition: Home/TBD    Expected Discharge Date: 2/23/2024; Expected Discharge Time:      Kenny Jeter MD  [facility]  02/23/24  15:28 EST    Dictated portions of note using Dragon dictation software.  Copied text in this note has been reviewed by me and remains accurate as of 02/23/24   [FreeTextEntry1] : 2 year old female seen today for audiological evaluation due to concerns regarding possible speech delays. She is followed by Emily Boyle M.D. and Asiya Seth M.D. Mom denies family history of hearing loss. She reports Danae passed  hearing screening. There is a recent history of ear infections and mom notes that Danae is currently being treated for an ear infection in the right ear.

## 2024-02-23 NOTE — PROGRESS NOTES
"                                              LOS: 1 day   Patient Care Team:  Princess Cruz MD as PCP - General (Internal Medicine)  Sheila Yee MD as Consulting Physician (Hematology and Oncology)  Rosa Michael MD as Referring Physician (General Surgery)  Cesar Estrella MD as Consulting Physician (Nephrology)  Shaylee Spencer RD, LD as Dietitian (Nutrition)  Marie Tesfaye MD as Consulting Physician (Radiation Oncology)    Chief Complaint:  F/up pleural effusion, abnormal lung imaging, atelectasis    Subjective   Interval History    On RA.      REVIEW OF SYSTEMS:   CARDIOVASCULAR: No chest pain, chest pressure or chest discomfort. No palpitations or edema.   RESPIRATORY: No shortness of breath, cough or sputum.   GASTROINTESTINAL: No anorexia, nausea, vomiting or diarrhea. No abdominal pain.  CONSTITUTIONAL: No fever or chills.     Ventilator/Non-Invasive Ventilation Settings (From admission, onward)      None                  Physical Exam:     Vital Signs  Temp:  [98.1 °F (36.7 °C)-99.5 °F (37.5 °C)] 99.5 °F (37.5 °C)  Heart Rate:  [] 100  Resp:  [17-18] 18  BP: (104-137)/(35-74) 134/74    Intake/Output Summary (Last 24 hours) at 2/23/2024 1252  Last data filed at 2/23/2024 0032  Gross per 24 hour   Intake 60 ml   Output 550 ml   Net -490 ml     Flowsheet Rows      Flowsheet Row First Filed Value   Admission Height 157.5 cm (62\") Documented at 02/21/2024 2039   Admission Weight 56.7 kg (125 lb) Documented at 02/21/2024 2039            PPE used per hospital policy    General Appearance:   Alert, cooperative, in no acute distress   ENMT:  Mallampati score 3, moist mucous membrane   Eyes:  Pupils equal and reactive to light. EOMI   Neck:   Trachea midline. No thyromegaly.   Lungs:    Clear to auscultation,respirations regular, even and nonlabored    Heart:   Regular rhythm and normal rate, normal S1 and S2, no         murmur   Skin:   No rash or ecchymosis   Abdomen:    Obese. Soft. " No tenderness. No HSM.   Neuro/psych:  Conscious, alert, oriented x3. Strength 5/5 in upper and lower  ext.  Appropriate mood and affect   Extremities:  No cyanosis, clubbing or edema.  Warm extremities and well-perfused          Results Review:        Results from last 7 days   Lab Units 02/23/24 0335 02/21/24 2111   SODIUM mmol/L 141 145   POTASSIUM mmol/L 3.4* 4.8   CHLORIDE mmol/L 105 107   CO2 mmol/L 23.3 23.0   BUN mg/dL 22 24*   CREATININE mg/dL 1.00 0.96   GLUCOSE mg/dL 80 207*   CALCIUM mg/dL 8.5* 9.4     Results from last 7 days   Lab Units 02/21/24  2245 02/21/24 2111   HSTROP T ng/L 19* 18*     Results from last 7 days   Lab Units 02/23/24  0335 02/22/24  0242 02/21/24  2149   WBC 10*3/mm3 5.87 8.43 7.04   HEMOGLOBIN g/dL 8.4* 9.2* 8.8*   HEMATOCRIT % 26.5* 29.1* 27.8*   PLATELETS 10*3/mm3 124* 132* 130*     Results from last 7 days   Lab Units 02/21/24  2219   INR  1.27*   APTT seconds 30.3     Results from last 7 days   Lab Units 02/21/24 2111   PROBNP pg/mL 136.0       Results from last 7 days   Lab Units 02/21/24 2219   D DIMER QUANT MCGFEU/mL 1.55*                     I reviewed the patient's new clinical results.        Medication Review:   insulin lispro, 2-7 Units, Subcutaneous, 4x Daily AC & at Bedtime  losartan, 25 mg, Oral, Daily  sodium chloride, 10 mL, Intravenous, Q12H        hold, 1 each        Diagnostic imaging:  I personally and independently reviewed the following images:  Ultrasound chest 2/22/2024: Right pleural effusion.  No loculation.    Assessment     Right pleural effusion, moderate-appears to be transudate  Right lower lobe Compressive atelectasis  Pleuritic chest pain, secondary to the above  Right pulmonary infiltrates, located peripherally and appears to be consolidations which are probably related to radiation pneumonitis/scarring  Breast cancer s/p right mastectomy 8/24/2023, completed adjuvant radiation on 11/20/2023.  On chemotherapy with Kadcyla     Anemia and  thrombocytopenia, likely secondary to chemotherapy  Cirrhotic morphology of the liver on imaging    All problems new to me    Plan     Check serum LDH to compare to the pleural fluid LDH.  Preliminary studies are consistent with transudative fluid unless LDH shows otherwise.  Pleural fluid cytology is pending.  Need GI evaluation for concerns about cirrhosis on imaging which could cause hepatic hydrothorax  Incentive spirometry      Barb Correia MD  02/23/24  12:52 EST        This note was dictated utilizing Dragon dictation

## 2024-02-24 ENCOUNTER — READMISSION MANAGEMENT (OUTPATIENT)
Dept: CALL CENTER | Facility: HOSPITAL | Age: 84
End: 2024-02-24
Payer: MEDICARE

## 2024-02-24 VITALS
SYSTOLIC BLOOD PRESSURE: 105 MMHG | TEMPERATURE: 98.4 F | DIASTOLIC BLOOD PRESSURE: 48 MMHG | OXYGEN SATURATION: 98 % | WEIGHT: 122.5 LBS | HEART RATE: 81 BPM | BODY MASS INDEX: 22.54 KG/M2 | RESPIRATION RATE: 20 BRPM | HEIGHT: 62 IN

## 2024-02-24 PROBLEM — J90 PLEURAL EFFUSION: Status: ACTIVE | Noted: 2024-02-24

## 2024-02-24 LAB
BASOPHILS # BLD AUTO: 0.02 10*3/MM3 (ref 0–0.2)
BASOPHILS NFR BLD AUTO: 0.4 % (ref 0–1.5)
DEPRECATED RDW RBC AUTO: 44 FL (ref 37–54)
EOSINOPHIL # BLD AUTO: 0.26 10*3/MM3 (ref 0–0.4)
EOSINOPHIL NFR BLD AUTO: 4.7 % (ref 0.3–6.2)
ERYTHROCYTE [DISTWIDTH] IN BLOOD BY AUTOMATED COUNT: 15.3 % (ref 12.3–15.4)
GLUCOSE BLDC GLUCOMTR-MCNC: 156 MG/DL (ref 70–130)
GLUCOSE BLDC GLUCOMTR-MCNC: 244 MG/DL (ref 70–130)
HBA1C MFR BLD: 6.8 % (ref 4.8–5.6)
HCT VFR BLD AUTO: 27.2 % (ref 34–46.6)
HGB BLD-MCNC: 8.4 G/DL (ref 12–15.9)
IMM GRANULOCYTES # BLD AUTO: 0.01 10*3/MM3 (ref 0–0.05)
IMM GRANULOCYTES NFR BLD AUTO: 0.2 % (ref 0–0.5)
LYMPHOCYTES # BLD AUTO: 0.73 10*3/MM3 (ref 0.7–3.1)
LYMPHOCYTES NFR BLD AUTO: 13.1 % (ref 19.6–45.3)
MAGNESIUM SERPL-MCNC: 1.9 MG/DL (ref 1.6–2.4)
MCH RBC QN AUTO: 24.6 PG (ref 26.6–33)
MCHC RBC AUTO-ENTMCNC: 30.9 G/DL (ref 31.5–35.7)
MCV RBC AUTO: 79.8 FL (ref 79–97)
MONOCYTES # BLD AUTO: 0.66 10*3/MM3 (ref 0.1–0.9)
MONOCYTES NFR BLD AUTO: 11.8 % (ref 5–12)
NEUTROPHILS NFR BLD AUTO: 3.89 10*3/MM3 (ref 1.7–7)
NEUTROPHILS NFR BLD AUTO: 69.8 % (ref 42.7–76)
NRBC BLD AUTO-RTO: 0 /100 WBC (ref 0–0.2)
PLATELET # BLD AUTO: 168 10*3/MM3 (ref 140–450)
PMV BLD AUTO: 9.8 FL (ref 6–12)
RBC # BLD AUTO: 3.41 10*6/MM3 (ref 3.77–5.28)
WBC NRBC COR # BLD AUTO: 5.57 10*3/MM3 (ref 3.4–10.8)

## 2024-02-24 PROCEDURE — 99222 1ST HOSP IP/OBS MODERATE 55: CPT | Performed by: INTERNAL MEDICINE

## 2024-02-24 PROCEDURE — 83735 ASSAY OF MAGNESIUM: CPT | Performed by: INTERNAL MEDICINE

## 2024-02-24 PROCEDURE — 63710000001 INSULIN LISPRO (HUMAN) PER 5 UNITS: Performed by: NURSE PRACTITIONER

## 2024-02-24 PROCEDURE — 83036 HEMOGLOBIN GLYCOSYLATED A1C: CPT | Performed by: INTERNAL MEDICINE

## 2024-02-24 PROCEDURE — 82948 REAGENT STRIP/BLOOD GLUCOSE: CPT

## 2024-02-24 PROCEDURE — 85025 COMPLETE CBC W/AUTO DIFF WBC: CPT | Performed by: INTERNAL MEDICINE

## 2024-02-24 RX ADMIN — INSULIN LISPRO 2 UNITS: 100 INJECTION, SOLUTION INTRAVENOUS; SUBCUTANEOUS at 08:35

## 2024-02-24 RX ADMIN — INSULIN LISPRO 3 UNITS: 100 INJECTION, SOLUTION INTRAVENOUS; SUBCUTANEOUS at 12:17

## 2024-02-24 RX ADMIN — Medication 10 ML: at 08:36

## 2024-02-24 NOTE — DISCHARGE SUMMARY
Date of Admission: 2/21/2024  Date of Discharge:  2/24/2024  Primary Care Physician: Princess Cruz MD     Discharge Diagnosis:  Active Hospital Problems    Diagnosis  POA    **Pleural effusion [J90]  Yes    Chest pain [R07.9]  Yes    Right-sided chest pain [R07.9]  Yes    Severe malnutrition [E43]  Yes    Type 2 diabetes mellitus [E11.9]  Yes    Hypertension [I10]  Yes      Resolved Hospital Problems   No resolved problems to display.       Presenting Problem/History of Present Illness from H&P:  Chest pain [R07.9]  Pleural effusion on right [J90]  Right-sided chest pain [R07.9]  Chest pain, unspecified type [R07.9]     This is a 83-year-old female, with history of anxiety, diabetes, diarrhea, presents to the hospital with main complaints of moderate right-sided chest pain as well as pleural effusion upon imaging. Patient was admitted to hospitalist service for further workup of symptoms. Patient appears comfortable at the time of my examination and she is on room air.     Hospital Course:  The patient is a 83 y.o. female who presented with pleural effusion on the right and right-sided chest pain.  This is in the setting of breast cancer with history of radiation treatment and right mastectomy.  She was admitted and pulmonology was consulted.  She underwent thoracentesis.  Cultures are negative.  Cytology is pending.  She improved greatly symptomatically after the fluid was removed and remained stable overnight.  She is not having any shortness of breath or chest pain now.  She has been cleared for discharge by pulmonology.  She will need to follow-up with them in clinic.  She should also follow-up with her oncologist and primary care physician.  There was the mention of cirrhotic liver on her previous CT.  Gallbladder thickening was present but she did not have any symptoms of cholecystitis or biliary colic.  Would consider outpatient GI referral if this has not been worked up previously.    Diabetes with  A1c 6.8.  D-dimer was elevated but no clot on CTA chest or BLE duplex.    Exam Today:  Constitutional:       General: She is not in acute distress.     Appearance: She is not diaphoretic.   Cardiovascular:      Rate and Rhythm: Normal rate and regular rhythm.      Pulses: Normal pulses.   Pulmonary:      Effort: Pulmonary effort is normal.      Breath sounds: No wheezing.   Abdominal:      General: There is no distension.      Palpations: Abdomen is soft.      Tenderness: There is no abdominal tenderness. There is no guarding or rebound. Negative signs include Pepper's sign.   Musculoskeletal:         General: No tenderness.   Skin:     General: Skin is warm and dry.   Neurological:      Mental Status: She is alert. Mental status is at baseline.   Psychiatric:         Mood and Affect: Mood normal.         Behavior: Behavior normal.     Results:  CXR  Blunting of the right costophrenic angle may reflect small effusion.     CTA Chest  1. No acute pulmonary thromboembolus.  2. Moderate right pleural effusion.  3. Subpleural parenchymal opacities identified within the right lung in  an anterior distribution, favored to be related to radiation therapy.  However, correlation with any evidence of pneumonia, as well as  follow-up exam is recommended. The patient is noted to have edema within  the right anterolateral chest wall, again likely related to radiation  change.  4. There is cholelithiasis. Patient does appear to have inflammatory  stranding within the gallbladder fossa. Correlation with gallbladder  ultrasound is recommended.    US Gallbladder  1. Cholelithiasis, as well as some gallbladder wall edema and  thickening. Gallbladder wall edema and thickening is a nonspecific  finding, which can be associated with etiologies such as right-sided  heart failure and hepatitis, in addition to acute cholecystitis. No  sonographic Pepper sign was elicited. If clinical concern for acute  cholecystitis persists, consideration  for HIDA scan is recommended.    BLE Duplex   Normal bilateral lower extremity venous duplex scan.     Procedures Performed:         Consults:   Consults       Date and Time Order Name Status Description    2/22/2024  9:49 AM Inpatient Pulmonology Consult Completed     2/21/2024 11:38 PM KALIE (on-call MD unless specified) Details               Discharge Disposition:  Home or Self Care    Discharge Medications:     Discharge Medications        Continue These Medications        Instructions Start Date   Cholecalciferol 50 MCG (2000 UT) tablet   1 tablet, Oral, Daily (Monday-Friday)      glipiZIDE-metFORMIN 2.5-500 MG per tablet  Commonly known as: METAGLIP   2 tablets, Oral, 2 Times Daily Before Meals      glucose blood test strip   Check once per day, E11.65      glucose monitor monitoring kit   1 each, Does not apply, Daily, Dispense glucometer with brand based off insurance coverage, E11.65, check once per day      Lancets misc   Check BG once daily, E11.9      lidocaine-prilocaine 2.5-2.5 % cream  Commonly known as: EMLA   Apply nickel size amount to port site 30 min before appt time do not rub in cover with plastic wrap      losartan 25 MG tablet  Commonly known as: COZAAR   25 mg, Oral, Daily      ondansetron 8 MG tablet  Commonly known as: ZOFRAN   8 mg, Oral, 3 Times Daily PRN      simvastatin 40 MG tablet  Commonly known as: ZOCOR   20 mg, Oral, Nightly      Tradjenta 5 MG tablet tablet  Generic drug: linagliptin   5 mg, Oral, Every Early Morning      valsartan-hydrochlorothiazide 160-12.5 MG per tablet  Commonly known as: DIOVAN-HCT   1 tablet, Oral, Daily               Discharge Diet:   Diet Instructions       Diet: Cardiac Diets, Diabetic Diets; Healthy Heart (2-3 Na+); Thin (IDDSI 0); Consistent Carbohydrate      Discharge Diet:  Cardiac Diets  Diabetic Diets       Cardiac Diet: Healthy Heart (2-3 Na+)    Fluid Consistency: Thin (IDDSI 0)    Diabetic Diet: Consistent Carbohydrate      Regular diet            Activity at Discharge:   Activity Instructions       Activity as Tolerated     Additional Activity Instructions:    Activity as tolerated           Follow-up Appointments:   Follow-up Information       Princess Cruz MD .    Specialty: Internal Medicine  Contact information:  [address]  [place] [address]  [phone]               Cassidy Maldonado MD Follow up.    Specialty: Cardiology  Contact information:  [address]  [place] [address]  [phone]               Barb Correia MD Follow up.    Specialties: Pulmonary Disease, Sleep Medicine, Intensive Care  Contact information:  [address]  [place] [address]  [phone]               Sheila Yee MD Follow up.    Specialties: Hematology and Oncology, Internal Medicine, Oncology  Contact information:  [address]  [place] [address]  [phone]                             Test Results Pending at Discharge:  Pending Labs       Order Current Status    Anaerobic Culture - Pleural Fluid, Pleural Cavity In process    Fungus Culture - Body Fluid, Pleural Cavity In process    Non-gynecologic Cytology In process    Blood Culture - Blood, Arm, Left Preliminary result    Blood Culture - Blood, Arm, Right Preliminary result    Body Fluid Culture - Body Fluid, Pleural Cavity Preliminary result             Kenny Jeter MD  02/24/24  11:08 EST    Time Spent on Discharge Activities: >30 minutes    Dictated portions using Dragon dictation software.

## 2024-02-24 NOTE — OUTREACH NOTE
Prep Survey      Flowsheet Row Responses   [facility]ondenominational facility patient discharged from? [facility]   Is LACE score < 7 ? No   Eligibility Readm Mgmt   Discharge diagnosis Pleural effusion   Does the patient have one of the following disease processes/diagnoses(primary or secondary)? Other   Does the patient have Home health ordered? No   Is there a DME ordered? No   Prep survey completed? Yes            CE RODRIGUEZ - Registered Nurse

## 2024-02-24 NOTE — PLAN OF CARE
Goal Outcome Evaluation:  Plan of Care Reviewed With: patient        Progress: improving  Outcome Evaluation: VSS: Pt is alert and oriented; remains on RA; SR; dressing to back intact; ambulates with standby assist to the bathroom; cardio to see pt today; will continue to monitor

## 2024-02-24 NOTE — CASE MANAGEMENT/SOCIAL WORK
Case Management Discharge Note      Final Note: Home         Selected Continued Care - Discharged on 2/24/2024 Admission date: 2/21/2024 - Discharge disposition: Home or Self Care      Destination    No services have been selected for the patient.                Durable Medical Equipment    No services have been selected for the patient.                Dialysis/Infusion    No services have been selected for the patient.                Home Medical Care    No services have been selected for the patient.                Therapy    No services have been selected for the patient.                Community Resources    No services have been selected for the patient.                Community & DME    No services have been selected for the patient.                    Transportation Services  Private: Car    Final Discharge Disposition Code: 01 - home or self-care

## 2024-02-24 NOTE — CONSULTS
[facility] Consult    Patient Name: Veronica Royal  Age/Sex: 83 y.o. female  : 1940  MRN: [id number]    Date of Admission: 2024  Date of Encounter Visit: 24  Encounter Provider: Elaina Armstrong MD  Referring Provider: Dayne Solorzano MD  Place of Service: [facility] CARDIOLOGY  Patient Care Team:  Princess Cruz MD as PCP - General (Internal Medicine)  Sheila Yee MD as Consulting Physician (Hematology and Oncology)  Rosa Michael MD as Referring Physician (General Surgery)  Cesar Estrella MD as Consulting Physician (Nephrology)  Shaylee Spencer RD, DAVID as Dietitian (Nutrition)  Marie Tesfaye MD as Consulting Physician (Radiation Oncology)    Subjective:     Consulted for: Chest pain    Chief Complaint: Chest pain, shortness of breath    History of Present Illness:  Veronica Royal is a 83 y.o. female diabetes mellitus type 2, hypertension, hyperlipidemia, chronic kidney disease, right-sided breast cancer status post chemotherapy and radiation, coronary artery calcification who presented to the emergency room on 2024 with complaints of right-sided chest discomfort with deep breaths.  This was also associated with dyspnea on exertion.      The patient was actually seen by Dr. Maldondao recently on 2/15/2024.  At that time she denied any chest discomfort or shortness of breath out of the ordinary.  An echocardiogram had been performed on the same day of her office visit which showed normal left ventricular function but evidence of inferior and inferoseptal wall hypokinesis which was new compared to her prior echocardiogram a few months prior.  In light of these findings and known coronary artery calcifications further workup was recommended.  Since the patient was relatively asymptomatic stress test was recommended and is currently scheduled for 3/8/2024.    The patient reports 3 to 4 days ago she began noticing chest  heaviness and pressure on the right side of her chest when taking deep breaths.  She noticed this the most when she would get up and exerts herself and subsequently breathing harder.  The symptoms continued until she opted to come to the emergency room.  Following her arrival to the hospital her CBC showed a hemoglobin of 10 which is around her baseline.  Renal function was normal.  Potassium is mildly elevated at 5.5.  Troponins were unremarkable.  proBNP was normal.  Potassium improved on subsequent checks.  Chest x-ray showed evidence of a possible right-sided small effusion.  CT angiogram of the chest showed no evidence of pulmonary embolism and a moderate right-sided pleural effusion.  Incidentally she was also noted to have cholelithiasis.  She underwent a gallbladder ultrasound that again confirmed cholelithiasis but no evidence of acute cholecystitis.  She also reported some lower extremity swelling and underwent lower extremity venous Doppler ultrasounds that were unremarkable.  She was evaluated by pulmonary who recommended proceeding with a thoracentesis.  This was performed on 2/22/2024 and 550 mL was removed.    Initially following the thoracentesis the patient continued to complain of right-sided chest discomfort with inspiration.  As a result we are consulted for further evaluation.    This morning she indicates that the discomfort in the right side of her chest is largely improved.  She reports that she had both pressure and pain.  The pain is essentially resolved.  She still has some pressure when she takes a big deep breath but she is able to take a deeper breath than she previously was.  She otherwise does not feel like her shortness of breath has changed.  She is ready to go home today.      Past Medical History:  Past Medical History:   Diagnosis Date    Anxiety     Arthritis     Basal cell carcinoma     Left thumb    Breast cancer 2023    Right    Depression     Diabetes mellitus     Diarrhea      s/e chemo    High cholesterol     History of chemotherapy 08/2023    Hypertension     Rash 08/2023    s/e chemo    Sacral decubitus ulcer     cleaning with soap & water    Urinary incontinence     wears pads       Past Surgical History:   Procedure Laterality Date    CATARACT EXTRACTION EXTRACAPSULAR W/ INTRAOCULAR LENS IMPLANTATION Right     EYE SURGERY      Muscle repair age 21    MASTECTOMY W/ SENTINEL NODE BIOPSY Right 8/24/2023    Procedure: Right breast modified radical mastectomy;  Surgeon: Rosa Michael MD;  Location: [facility] OR [facility];  Service: General;  Laterality: Right;    TONSILLECTOMY      VENOUS ACCESS DEVICE (PORT) INSERTION N/A 05/19/2023    Procedure: INSERTION VENOUS ACCESS DEVICE;  Surgeon: Rosa Mihcael MD;  Location: [facility] OR [facility];  Service: General;  Laterality: N/A;       Home Medications:   Medications Prior to Admission   Medication Sig Dispense Refill Last Dose    Cholecalciferol 50 MCG (2000 UT) tablet Take 1 tablet by mouth Daily (Monday-Friday).       glipiZIDE-metFORMIN (METAGLIP) 2.5-500 MG per tablet Take 2 tablets by mouth 2 (Two) Times a Day Before Meals. (Patient taking differently: Take 2 tablets by mouth Every Morning.) 120 tablet 3     glucose blood test strip Check once per day, E11.65 30 each 5     glucose monitor monitoring kit Use 1 each Daily. Dispense glucometer with brand based off insurance coverage, E11.65, check once per day 30 each 5     Lancets misc Check BG once daily, E11.9 30 each 4     lidocaine-prilocaine (EMLA) 2.5-2.5 % cream Apply nickel size amount to port site 30 min before appt time do not rub in cover with plastic wrap 30 g 5     losartan (COZAAR) 25 MG tablet TAKE 1 TABLET BY MOUTH DAILY 90 tablet 1     ondansetron (ZOFRAN) 8 MG tablet Take 1 tablet by mouth 3 (Three) Times a Day As Needed for Nausea or Vomiting. 30 tablet 5     simvastatin (ZOCOR) 40 MG tablet Take 0.5 tablets by mouth Every Night. (Patient taking differently: Take 1 tablet  by mouth Every Night.) 90 tablet 2     Tradjenta 5 MG tablet tablet Take 1 tablet by mouth Every Morning. 90 tablet 5     valsartan-hydrochlorothiazide (DIOVAN-HCT) 160-12.5 MG per tablet Take 1 tablet by mouth Daily.          Allergies:  No Known Allergies    Past Social History:  Social History     Socioeconomic History    Marital status:    Tobacco Use    Smoking status: Never    Smokeless tobacco: Never   Vaping Use    Vaping Use: Never used   Substance and Sexual Activity    Alcohol use: Never    Drug use: Never    Sexual activity: Never       Past Family History:  Family History   Problem Relation Age of Onset    Alzheimer's disease Mother     Heart disease Father     Heart attack Father     Ovarian cancer Sister     Pancreatic cancer Brother     Malig Hyperthermia Neg Hx     Colon cancer Neg Hx     Colon polyps Neg Hx     Crohn's disease Neg Hx     Irritable bowel syndrome Neg Hx     Ulcerative colitis Neg Hx        Review of Systems:   All systems reviewed. Pertinent positives identified in HPI. All other systems are negative.    Objective:   Temp:  [98.4 °F (36.9 °C)-98.8 °F (37.1 °C)] 98.4 °F (36.9 °C)  Heart Rate:  [81-88] 81  Resp:  [18-20] 20  BP: (105-115)/(48-52) 105/48   No intake or output data in the 24 hours ending 02/24/24 0809  Body mass index is 22.41 kg/m².      02/23/24  0104 02/23/24  0540 02/24/24  0606   Weight: 59.8 kg (131 lb 13.4 oz) 56.3 kg (124 lb 3.2 oz) 55.6 kg (122 lb 8 oz)     Weight change: -4.234 kg (-9 lb 5.4 oz)    Physical Exam:   General Appearance:    Alert, cooperative, in no acute distress   Head:    Normocephalic, without obvious abnormality, atraumatic   Eyes:            Lids and lashes normal, conjunctivae and sclerae normal, no   icterus, no pallor, corneas clear, PERRLA   Ears:    Ears appear intact with no abnormalities noted   Neck:   No adenopathy, supple, trachea midline, no thyromegaly, no   carotid bruit, no JVD   Lungs:     Clear to  "auscultation,respirations regular, even and unlabored    Heart:    Regular rhythm and normal rate, normal S1 and S2, no murmur, no gallop, no rub, no click   Chest Wall:    No abnormalities observed   Abdomen:     Normal bowel sounds, no masses, no organomegaly, soft        non-tender, non-distended, no guarding, no rebound  tenderness   Extremities:   Moves all extremities well, no edema, no cyanosis, no redness   Pulses:   Pulses palpable and equal bilaterally. Normal radial, carotid, femoral, dorsalis pedis and posterior tibial pulses bilaterally. Normal abdominal aorta   Skin:  Psychiatric:   No bleeding, bruising or rash    Alert and oriented x 3, normal mood and affect       Lab Review:   Results from last 7 days   Lab Units 02/23/24  1547 02/23/24  0335 02/21/24  2111   SODIUM mmol/L  --  141 145   POTASSIUM mmol/L 4.5 3.4* 4.8   CHLORIDE mmol/L  --  105 107   CO2 mmol/L  --  23.3 23.0   BUN mg/dL  --  22 24*   CREATININE mg/dL  --  1.00 0.96   GLUCOSE mg/dL  --  80 207*   CALCIUM mg/dL  --  8.5* 9.4   AST (SGOT) U/L  --  20 26   ALT (SGPT) U/L  --  7 15     Results from last 7 days   Lab Units 02/21/24  2245 02/21/24  2111   HSTROP T ng/L 19* 18*     Results from last 7 days   Lab Units 02/24/24  0541 02/23/24  0335 02/22/24  0242 02/21/24  2149   WBC 10*3/mm3 5.57 5.87 8.43 7.04   HEMOGLOBIN g/dL 8.4* 8.4* 9.2* 8.8*   HEMATOCRIT % 27.2* 26.5* 29.1* 27.8*   PLATELETS 10*3/mm3 168 124* 132* 130*     Results from last 7 days   Lab Units 02/21/24  2219   INR  1.27*   APTT seconds 30.3     Results from last 7 days   Lab Units 02/24/24  0541   MAGNESIUM mg/dL 1.9           Invalid input(s): \"LDLCALC\"  Results from last 7 days   Lab Units 02/21/24  2111   PROBNP pg/mL 136.0           Echo EF Estimated  No results found for: \"ECHOEFEST\"    EKG:     Imaging:  Imaging Results (Most Recent)       Procedure Component Value Units Date/Time    US Thoracentesis [[id number]] Collected: 02/22/24 1549    Specimen: Body Fluid " Updated: 02/22/24 1604    Narrative:      ULTRASOUND-GUIDED RIGHT THORACENTESIS, 2/22/2024     HISTORY:   83-year-old female hospitalized with chest pain and shortness of breath.  CT chest identified moderate right pleural effusion.     CONSENT:   The procedure, risks and alternatives were discussed in detail with the  patient, emphasizing risks of pneumothorax, chest tube placement and  bleeding. Questions were entertained, and written informed consent was  obtained. Timeout was observed in the procedure room for patient  identification and procedure site verification, and the procedure site  was marked by me. Permanent images were recorded.     PROCEDURE:   Using sterile technique, 1% lidocaine local anesthetic and real-time  ultrasound guidance, a 5 Lithuanian thoracentesis catheter was placed into  the largest portion of the pleural effusion from a low posterior  intercostal approach in a single pass without difficulty. 550 mL of  josef-colored effusion fluid was removed. The patient remained stable in  the department during and after the procedure. Fluid was sent for  requested laboratory studies.       Impression:      Technically successful diagnostic and therapeutic ultrasound-guided  right thoracentesis with removal of 550 mL of josef-colored effusion  fluid.        By electronically signing this report, I, the supervising radiologist,  attest that I was not present for the procedure(s) but agree with the  final edited report.           This report was finalized on 2/22/2024 4:01 PM by Dr. Jack Ramos M.D  on Workstation: [id number]        Gallbladder [[id number]] Collected: 02/21/24 2321     Updated: 02/21/24 2327    Narrative:      GALLBLADDER ULTRASOUND     HISTORY: Right upper quadrant pain     COMPARISON: February 21, 2024     TECHNIQUE: Grayscale and color Doppler sonographic images were obtained  through the right upper quadrant.     FINDINGS:  Visualized portions of the pancreas are normal. Main  portal vein is  patent with hepatopetal flow. There is no intra or extrahepatic biliary  dilatation. Common bile duct measures 5 mm. The right kidney measures  8.2 x 4.8 x 4.0 cm. Inferior pole is incompletely visualized. The  visualized right kidney appears unremarkable. The patient is noted to  have cholelithiasis. There is nonspecific gallbladder wall edema and  thickening. Gallbladder wall measures up to 6 mm. No sonographic Pepper  sign was elicited. Right pleural effusion is partially visualized.       Impression:         1. Cholelithiasis, as well as some gallbladder wall edema and  thickening. Gallbladder wall edema and thickening is a nonspecific  finding, which can be associated with etiologies such as right-sided  heart failure and hepatitis, in addition to acute cholecystitis. No  sonographic Pepper sign was elicited. If clinical concern for acute  cholecystitis persists, consideration for HIDA scan is recommended.     This report was finalized on 2/21/2024 11:24 PM by Dr. Delores Espinosa M.D on Workstation: [id number]       CT Angiogram Chest Pulmonary Embolism [[id number]] Collected: 02/21/24 2219     Updated: 02/21/24 2233    Narrative:      CT ANGIOGRAM OF THE CHEST     HISTORY: Shortness of air     COMPARISON: May 11, 2023     TECHNIQUE: Axial CT images obtained through the thorax. IV contrast was  administered. Three-D reformatted images were obtained.     FINDINGS:  No acute pulmonary thromboembolus is seen. Thoracic aorta is normal in  caliber. There is no evidence of dissection. The patient has fairly  extensive ulcerated plaque of the descending thoracic aorta, similar to  the prior study. The thyroid gland and trachea are within normal limits.  There is a small hiatal hernia. There are coronary artery  calcifications. Mediastinal lymph nodes do not appear pathologically  enlarged. The patient has subpleural pulmonary opacities within the  right lung. These are located anteriorly.  Potentially, it may be related  to radiation. However, correlation with any evidence of pneumonia is  recommended. Follow-up exam to document resolution or stability is  recommended. The patient does have a moderate right pleural effusion.  There is some left lower lobe scarring. Patient has undergone right  mastectomy and axillary dissection. There is some edema noted within the  right anterolateral chest wall, likely related to radiation therapy.  Images through the upper abdomen demonstrate cholelithiasis. There is  some haziness in the gallbladder fossa, of uncertain clinical  significance. It is incompletely assessed on the CT angiogram of the  chest. There is potentially also some gallbladder wall edema.  Correlation with gallbladder ultrasound is recommended. A single  sclerotic lesion is seen within the T3 vertebral body, stable when  compared to the exam from May 2023.       Impression:         1. No acute pulmonary thromboembolus.  2. Moderate right pleural effusion.  3. Subpleural parenchymal opacities identified within the right lung in  an anterior distribution, favored to be related to radiation therapy.  However, correlation with any evidence of pneumonia, as well as  follow-up exam is recommended. The patient is noted to have edema within  the right anterolateral chest wall, again likely related to radiation  change.  4. There is cholelithiasis. Patient does appear to have inflammatory  stranding within the gallbladder fossa. Correlation with gallbladder  ultrasound is recommended.     Radiation dose reduction techniques were utilized, including automated  exposure control and exposure modulation based on body size.        This report was finalized on 2/21/2024 10:30 PM by Dr. Delores Espinosa M.D on Workstation: [id number]       XR Chest 1 View [[id number]] Collected: 02/21/24 2123     Updated: 02/21/24 2127    Narrative:      SINGLE VIEW OF THE CHEST     HISTORY: Chest pain     COMPARISON: May  19, 2023     FINDINGS:  There is cardiomegaly. There is no vascular congestion. Left internal  jugular vein Mediport appears to terminate within Speir vena cava. No  pneumothorax is seen. Left lung is clear. The patient does have blunted  right costophrenic angle, which may reflect small effusion. Patient is  status post right mastectomy.       Impression:      Blunting of the right costophrenic angle may reflect small effusion.     This report was finalized on 2/21/2024 9:24 PM by Dr. Delores Espinosa M.D on Workstation: [id number]               I personally viewed and interpreted the patient's EKG    Assessment/Plan:     1.  Right pleuritic chest pain.  Improved overall following thoracentesis.  Cardiac enzymes and EKG were unremarkable here.  Symptoms sound atypical for angina.  2.  Right pleural effusion.  Status postthoracentesis.  Cytology pending.  3.  Coronary artery calcification.  Recent echocardiogram with possible inferior and inferoseptal hypokinesis.  Plan for stress test which is scheduled for 3/8 as an outpatient.  4.  Breast cancer.  Status postmastectomy and radiation treatment.  Undergoing chemotherapy.  5.  Hypertension.  Well-controlled on current regimen medications.  6.  Diabetes mellitus type 2  7.  Hyperlipidemia    -With improvement in her right-sided pleuritic chest pain and the atypical sounding symptoms I think we can hold off on pursuing ischemic workup during this admission.  Recommend that she keep her appointment for outpatient stress test on 3/8.  If she has any recurrent or worsening symptoms in the meanwhile of asked her to notify us.  -She is otherwise okay for discharge from my standpoint.      Thank you for allowing me to participate in the care of Veronica Royal. Feel free to contact me directly with any further questions or concerns.    Elaina Armstrong MD  [facility]  02/24/24  08:09 EST

## 2024-02-25 LAB — BACTERIA SPEC ANAEROBE CULT: NORMAL

## 2024-02-26 LAB
BACTERIA SPEC AEROBE CULT: NORMAL
BACTERIA SPEC AEROBE CULT: NORMAL

## 2024-02-27 ENCOUNTER — READMISSION MANAGEMENT (OUTPATIENT)
Dept: CALL CENTER | Facility: HOSPITAL | Age: 84
End: 2024-02-27
Payer: MEDICARE

## 2024-02-27 LAB
BACTERIA FLD CULT: NORMAL
BACTERIA SPEC ANAEROBE CULT: NORMAL
CYTO UR: NORMAL
GRAM STN SPEC: NORMAL
GRAM STN SPEC: NORMAL
LAB AP CASE REPORT: NORMAL
LAB AP DIAGNOSIS COMMENT: NORMAL
PATH REPORT.FINAL DX SPEC: NORMAL
PATH REPORT.GROSS SPEC: NORMAL

## 2024-02-27 NOTE — OUTREACH NOTE
Medical Week 1 Survey      Flowsheet Row Responses   [facility] patient discharged from? [facility]   Does the patient have one of the following disease processes/diagnoses(primary or secondary)? Other   Week 1 attempt successful? Yes   Call start time 1354   Call end time 1355   Discharge diagnosis Pleural effusion   Person spoke with today (if not patient) and relationship daughter- darinel   Meds reviewed with patient/caregiver? Yes   Prescription comments no changes to medications   Does the patient have a primary care provider?  Yes   Comments regarding PCP CHEMO MD 03/06. Stress test with DR. Maldonado after. Advised to schedule fu with pulm   Has home health visited the patient within 72 hours of discharge? N/A   Psychosocial issues? No   Did the patient receive a copy of their discharge instructions? Yes   Nursing interventions Reviewed instructions with patient   What is the patient's perception of their health status since discharge? Improving   Is the patient/caregiver able to teach back signs and symptoms related to disease process for when to call PCP? Yes   Is the patient/caregiver able to teach back signs and symptoms related to disease process for when to call 911? Yes   Is the patient/caregiver able to teach back the hierarchy of who to call/visit for symptoms/problems? PCP, Specialist, Home health nurse, Urgent Care, ED, 911 Yes   Week 1 call completed? Yes   Graduated Yes   Wrap up additional comments Daughter reports her mother is doing well She is currently playing cards with friends. Fu appt scheduled no concerns or questions noted.   Call end time 1355            Iona PIERRE - Registered Nurse

## 2024-02-29 LAB — FUNGUS WND CULT: NORMAL

## 2024-03-01 ENCOUNTER — HOSPITAL ENCOUNTER (OUTPATIENT)
Dept: PHYSICAL THERAPY | Facility: HOSPITAL | Age: 84
Setting detail: THERAPIES SERIES
Discharge: HOME | End: 2024-03-01
Payer: MEDICARE

## 2024-03-01 ENCOUNTER — TELEPHONE (OUTPATIENT)
Dept: ONCOLOGY | Facility: CLINIC | Age: 84
End: 2024-03-01
Payer: MEDICARE

## 2024-03-01 DIAGNOSIS — C50.411 MALIGNANT NEOPLASM OF UPPER-OUTER QUADRANT OF RIGHT BREAST IN FEMALE, ESTROGEN RECEPTOR NEGATIVE: Primary | ICD-10-CM

## 2024-03-01 DIAGNOSIS — I89.0 LYMPHEDEMA OF RIGHT UPPER EXTREMITY: ICD-10-CM

## 2024-03-01 DIAGNOSIS — Z90.11 S/P RIGHT MASTECTOMY: ICD-10-CM

## 2024-03-01 DIAGNOSIS — Z17.1 MALIGNANT NEOPLASM OF UPPER-OUTER QUADRANT OF RIGHT BREAST IN FEMALE, ESTROGEN RECEPTOR NEGATIVE: Primary | ICD-10-CM

## 2024-03-01 PROCEDURE — 97535 SELF CARE MNGMENT TRAINING: CPT

## 2024-03-01 PROCEDURE — 97140 MANUAL THERAPY 1/> REGIONS: CPT

## 2024-03-01 NOTE — TELEPHONE ENCOUNTER
Caller: MAK THAKKAR    Relationship to patient: Emergency Contact    Best call back number: [phone]    Chief complaint: PT WANTED TO SEE DR MCDANIELS INSTEAD OF NP      If rescheduling, when is the original appointment: 3-6-24

## 2024-03-01 NOTE — THERAPY RE-EVALUATION
Physical Therapy Lymphedema Re-Evaluation  [facility]     Patient Name: Veronica Royal  : 1940  MRN: [id number]  Today's Date: 3/1/2024      Visit Date: 2024    Visit Dx:    ICD-10-CM ICD-9-CM   1. Malignant neoplasm of upper-outer quadrant of right breast in female, estrogen receptor negative  C50.411 174.4    Z17.1 V86.1   2. S/P right mastectomy  Z90.11 V45.71   3. Lymphedema of right upper extremity  I89.0 457.1       Patient Active Problem List   Diagnosis    Malignant neoplasm of upper-outer quadrant of right breast in female, estrogen receptor negative    Encounter for fitting and adjustment of vascular catheter    Hypertension    At high risk for malnutrition    Bilateral hearing loss    Malignant neoplasm of female breast    Advanced care planning/counseling discussion    Cirrhosis of liver    Type 2 diabetes mellitus    Anemia due to chemotherapy    Left renal mass    Severe malnutrition    Breast cancer    Driving safety issue    Slow transit constipation    Right-sided chest pain    Chest pain    Pleural effusion        Past Medical History:   Diagnosis Date    Anxiety     Arthritis     Basal cell carcinoma     Left thumb    Breast cancer     Right    Depression     Diabetes mellitus     Diarrhea     s/e chemo    High cholesterol     History of chemotherapy 2023    Hypertension     Rash 2023    s/e chemo    Sacral decubitus ulcer     cleaning with soap & water    Urinary incontinence     wears pads        Past Surgical History:   Procedure Laterality Date    CATARACT EXTRACTION EXTRACAPSULAR W/ INTRAOCULAR LENS IMPLANTATION Right     EYE SURGERY      Muscle repair age 21    MASTECTOMY W/ SENTINEL NODE BIOPSY Right 2023    Procedure: Right breast modified radical mastectomy;  Surgeon: Rosa Michael MD;  Location: [facility] OR [facility] [place];  Service: General;  Laterality: Right;    TONSILLECTOMY      VENOUS ACCESS DEVICE (PORT) INSERTION N/A 2023    Procedure: INSERTION  VENOUS ACCESS DEVICE;  Surgeon: Rosa Michael MD;  Location: [facility] OR [facility] [place];  Service: General;  Laterality: N/A;       Visit Dx:    ICD-10-CM ICD-9-CM   1. Malignant neoplasm of upper-outer quadrant of right breast in female, estrogen receptor negative  C50.411 174.4    Z17.1 V86.1   2. S/P right mastectomy  Z90.11 V45.71   3. Lymphedema of right upper extremity  I89.0 457.1            Lymphedema       Row Name 03/01/24 1000             Subjective Pain    Able to rate subjective pain? yes  -LB      Pre-Treatment Pain Level 0  -LB         Subjective    Subjective Comments I am doing ok. I was in the hospital last week. I wear my sleeve occasionally. I have noticed my R hand is swollen since Monday. I haven't worn my sleeve since then.  -LB         Lymphedema Assessment    Lymphedema Classification RUE:;at risk/stage 0  -LB      Lymphedema Cancer Related Sx right;axillary dissection;modified radical mastectomy  -LB      Lymphedema Surgery Comments 8/24/23  -LB      Lymph Nodes Removed # 19  -LB      Positive Lymph Nodes # 1  -LB      Chemo Received yes  -LB      Chemo Treatments #/Timeframe neoadjuvant  -LB      Radiation Therapy Received no  -LB      Infections or Cellulitis? no  -LB         Posture/Observations    Posture/Observations Comments B ankle edema  -LB         Lymphedema Edema Assessment    Edema Assessment Comment R dorsal hand and forearm edema noted, no obvious R hand/wrist bruising  -LB         Skin Changes/Observations    Skin Observations Comment L hand and forearm bruising due to recent IV insertion/blood draws  -LB         LUE Quick Girth (cm)    Axilla 25.7 cm  -LB      Mid upper arm 23.6 cm  -LB      Elbow 22.9 cm  -LB      Mid forearm 19.6 cm  -LB      Wrist crease 14.7 cm  -LB      Web space 18.5 cm  -LB      Met-heads 18.2 cm  -LB      LUE Quick Girth Total 143.2  -LB         RUE Quick Girth (cm)    Axilla 27.1 cm  -LB      Mid upper arm 25.4 cm  -LB      Elbow 24.6 cm  -LB      Mid  forearm 23.5 cm  -LB      Wrist crease 16.4 cm  -LB      Web space 20.2 cm  -LB      Met-heads 18.5 cm  -LB      RUE Quick Girth Total 155.7  -LB         L-Dex Bioimpedence Screening    L-Dex Measurement Extremity --  -LB      L-Dex Patient Position --  -LB      L-Dex UE Dominate Side --  -LB      L-Dex UE At Risk Side --  -LB      L-Dex UE Pre Surgical Value --  -LB      L-Dex UE Baseline Score --  -LB                User Key  (r) = Recorded By, (t) = Taken By, (c) = Cosigned By      Initials Name Provider Type    Denia Thompson PT Physical Therapist                                    Therapy Education  Education Details: lengthy discussion with pt and daughter reinforcing the importance of daily compression, self MLD, possible pneumatic pump, CDT if no improvement in condition  Given: Symptoms/condition management, Edema management  Program: Reinforced  How Provided: Verbal, Demonstration, Written  Provided to: Patient  Level of Understanding: Teach back education performed, Verbalized, Demonstrated  85174 - PT Self Care/Mgmt Minutes: 30       OP Exercises       Row Name 03/01/24 1300 03/01/24 1000          Subjective    Subjective Comments -- I am doing ok. I was in the hospital last week. I wear my sleeve occasionally. I have noticed my R hand is swollen since Monday. I haven't worn my sleeve since then.  -LB        Subjective Pain    Able to rate subjective pain? -- yes  -LB     Pre-Treatment Pain Level -- 0  -LB        Total Minutes    72377 - PT Manual Therapy Minutes 15  -LB --               User Key  (r) = Recorded By, (t) = Taken By, (c) = Cosigned By      Initials Name Provider Type    Denia Thompson PT Physical Therapist                            Manual Rx (last 36 hours)       Manual Treatments       Row Name 03/01/24 1300             Total Minutes    79474 - PT Manual Therapy Minutes 15  -LB         Manual Rx 1    Manual Rx 1 Location demonstrated and educated pt in self MLD with emphasis on R  forearm/hand  -LB      Manual Rx 1 Duration 15  -LB                User Key  (r) = Recorded By, (t) = Taken By, (c) = Cosigned By      Initials Name Provider Type    Denia Thompson PT Physical Therapist                     PT OP Goals       Row Name 03/01/24 1300          Long Term Goals    LTG Date to Achieve 10/14/23  -LB     LTG 1 Pt will demonstrate LDex bioimpedance WNL.  -LB     LTG 1 Progress Ongoing  -LB     LTG 1 Progress Comments did not repeat today due to obvious edema  -LB     LTG 2 Pt will acquire appropriately fitted compression garment and verbalize appropriate wear schedule.  -LB     LTG 2 Progress Met  -LB     LTG 3 Pt will demonstrate equal BUE AROM.  -LB     LTG 3 Progress Met  -LB               User Key  (r) = Recorded By, (t) = Taken By, (c) = Cosigned By      Initials Name Provider Type    Denia Thompson PT Physical Therapist                     PT Assessment/Plan       Row Name 03/01/24 1342          PT Assessment    Functional Limitations Limitations in functional capacity and performance;Performance in leisure activities;Limitation in home management;Performance in self-care ADL  -LB     Impairments Edema;Impaired flexibility;Impaired lymphatic circulation;Joint mobility;Muscle strength;Pain;Posture;Sensation  -LB     Assessment Comments Pt returns for one month follow up reporting dec compliance with compression garment wear and noted inc in RUE circumferential measurements. She did have a few day hospital admission due to pleural effusion with thoracentesis performed and symptoms have improved. She has not yet followed up with pulmonology. Noted improvement in gait speed and stability. Lengthy discussion today with pt and daughter due to inc in RUE lymphedema discussing options for care and strong need for daily compliance with MLD and compression garment use. Pt and daughter verbalize difficulty of initiating CDT due to transportation but agree to consider depending on results of  next month. Pt to perform daily MLD and wear compression daily and will return in 1 month for re-assessment. Performed and demonstrated self MLD today and issued written instructions for pt. Will consider trial of pneumatic pump if pt compliance with compression garments improve. Pt remains appropriate for continued skilled PT services to address RUE lymphedema.  -LB     Rehab Potential Good  -LB     Patient/caregiver participated in establishment of treatment plan and goals Yes  -LB     Patient would benefit from skilled therapy intervention Yes  -LB        PT Plan    PT Frequency Other (comment)  -LB     Predicted Duration of Therapy Intervention (PT) repeat bioimpedance in 3 months  -LB     Planned CPT's? PT EVAL LOW COMPLEXITY: 83677;PT RE-EVAL: 24961;PT THER PROC EA 15 MIN: 76709;PT THER ACT EA 15 MIN: 41836;PT MANUAL THERAPY EA 15 MIN: 31154;PT SELF CARE/HOME MGMT/TRAIN EA 15: 06244;PT BIS XTRACELL FLUID ANALYSIS: 25998  -LB     PT Plan Comments return in one month for repeat assessment, bio if needed, CDT vs. continued compression, possible pneumatic pump once compression compliance is noted  -LB               User Key  (r) = Recorded By, (t) = Taken By, (c) = Cosigned By      Initials Name Provider Type    LB Denia Tse, GENO Physical Therapist                                 Time Calculation:   Start Time: 1045  Stop Time: 1130  Time Calculation (min): 45 min  Total Timed Code Minutes- PT: 40 minute(s)  Timed Charges  89954 - PT Manual Therapy Minutes: 15  50085 - PT Self Care/Mgmt Minutes: 30  Total Minutes  Timed Charges Total Minutes: 30   Total Minutes: 30   Therapy Charges for Today       Code Description Service Date Service Provider Modifiers Qty    14680054923 HC PT MANUAL THERAPY EA 15 MIN 3/1/2024 Denia Tse, PT GP 1    58971070861 HC PT SELF CARE/MGMT/TRAIN EA 15 MIN 3/1/2024 Denia Tse, PT GP 2                      Denia Tse PT  3/1/2024

## 2024-03-06 ENCOUNTER — OFFICE VISIT (OUTPATIENT)
Dept: ONCOLOGY | Facility: CLINIC | Age: 84
End: 2024-03-06
Payer: MEDICARE

## 2024-03-06 ENCOUNTER — INFUSION (OUTPATIENT)
Dept: ONCOLOGY | Facility: HOSPITAL | Age: 84
End: 2024-03-06
Payer: MEDICARE

## 2024-03-06 VITALS
HEIGHT: 62 IN | OXYGEN SATURATION: 98 % | BODY MASS INDEX: 23.92 KG/M2 | TEMPERATURE: 98 F | DIASTOLIC BLOOD PRESSURE: 78 MMHG | SYSTOLIC BLOOD PRESSURE: 161 MMHG | RESPIRATION RATE: 16 BRPM | HEART RATE: 95 BPM | WEIGHT: 130 LBS

## 2024-03-06 DIAGNOSIS — C50.411 MALIGNANT NEOPLASM OF UPPER-OUTER QUADRANT OF RIGHT BREAST IN FEMALE, ESTROGEN RECEPTOR NEGATIVE: Primary | ICD-10-CM

## 2024-03-06 DIAGNOSIS — Z17.1 MALIGNANT NEOPLASM OF UPPER-OUTER QUADRANT OF RIGHT BREAST IN FEMALE, ESTROGEN RECEPTOR NEGATIVE: ICD-10-CM

## 2024-03-06 DIAGNOSIS — C50.411 MALIGNANT NEOPLASM OF UPPER-OUTER QUADRANT OF RIGHT BREAST IN FEMALE, ESTROGEN RECEPTOR NEGATIVE: ICD-10-CM

## 2024-03-06 DIAGNOSIS — Z79.899 HIGH RISK MEDICATION USE: ICD-10-CM

## 2024-03-06 DIAGNOSIS — Z17.1 MALIGNANT NEOPLASM OF UPPER-OUTER QUADRANT OF RIGHT BREAST IN FEMALE, ESTROGEN RECEPTOR NEGATIVE: Primary | ICD-10-CM

## 2024-03-06 LAB
ALBUMIN SERPL-MCNC: 3.4 G/DL (ref 3.5–5.2)
ALBUMIN/GLOB SERPL: 1.1 G/DL
ALP SERPL-CCNC: 68 U/L (ref 39–117)
ALT SERPL W P-5'-P-CCNC: 8 U/L (ref 1–33)
ANION GAP SERPL CALCULATED.3IONS-SCNC: 13.3 MMOL/L (ref 5–15)
AST SERPL-CCNC: 23 U/L (ref 1–32)
BASOPHILS # BLD AUTO: 0.02 10*3/MM3 (ref 0–0.2)
BASOPHILS NFR BLD AUTO: 0.4 % (ref 0–1.5)
BILIRUB SERPL-MCNC: 0.2 MG/DL (ref 0–1.2)
BUN SERPL-MCNC: 24 MG/DL (ref 8–23)
BUN/CREAT SERPL: 24.7 (ref 7–25)
CALCIUM SPEC-SCNC: 9.5 MG/DL (ref 8.6–10.5)
CHLORIDE SERPL-SCNC: 105 MMOL/L (ref 98–107)
CO2 SERPL-SCNC: 22.7 MMOL/L (ref 22–29)
CREAT SERPL-MCNC: 0.97 MG/DL (ref 0.57–1)
DEPRECATED RDW RBC AUTO: 51.5 FL (ref 37–54)
EGFRCR SERPLBLD CKD-EPI 2021: 58.1 ML/MIN/1.73
EOSINOPHIL # BLD AUTO: 0.18 10*3/MM3 (ref 0–0.4)
EOSINOPHIL NFR BLD AUTO: 3.5 % (ref 0.3–6.2)
ERYTHROCYTE [DISTWIDTH] IN BLOOD BY AUTOMATED COUNT: 16.4 % (ref 12.3–15.4)
GLOBULIN UR ELPH-MCNC: 3 GM/DL
GLUCOSE SERPL-MCNC: 176 MG/DL (ref 65–99)
HCT VFR BLD AUTO: 31.3 % (ref 34–46.6)
HGB BLD-MCNC: 9.3 G/DL (ref 12–15.9)
IMM GRANULOCYTES # BLD AUTO: 0.01 10*3/MM3 (ref 0–0.05)
IMM GRANULOCYTES NFR BLD AUTO: 0.2 % (ref 0–0.5)
LYMPHOCYTES # BLD AUTO: 0.77 10*3/MM3 (ref 0.7–3.1)
LYMPHOCYTES NFR BLD AUTO: 14.8 % (ref 19.6–45.3)
MCH RBC QN AUTO: 25.5 PG (ref 26.6–33)
MCHC RBC AUTO-ENTMCNC: 29.7 G/DL (ref 31.5–35.7)
MCV RBC AUTO: 86 FL (ref 79–97)
MONOCYTES # BLD AUTO: 0.49 10*3/MM3 (ref 0.1–0.9)
MONOCYTES NFR BLD AUTO: 9.4 % (ref 5–12)
NEUTROPHILS NFR BLD AUTO: 3.73 10*3/MM3 (ref 1.7–7)
NEUTROPHILS NFR BLD AUTO: 71.7 % (ref 42.7–76)
NRBC BLD AUTO-RTO: 0 /100 WBC (ref 0–0.2)
PLATELET # BLD AUTO: 180 10*3/MM3 (ref 140–450)
PMV BLD AUTO: 9.3 FL (ref 6–12)
POTASSIUM SERPL-SCNC: 4 MMOL/L (ref 3.5–5.2)
PROT SERPL-MCNC: 6.4 G/DL (ref 6–8.5)
RBC # BLD AUTO: 3.64 10*6/MM3 (ref 3.77–5.28)
SODIUM SERPL-SCNC: 141 MMOL/L (ref 136–145)
WBC NRBC COR # BLD AUTO: 5.2 10*3/MM3 (ref 3.4–10.8)

## 2024-03-06 PROCEDURE — 80053 COMPREHEN METABOLIC PANEL: CPT

## 2024-03-06 PROCEDURE — 25010000002 ADO-TRASTUZUMAB 100 MG RECONSTITUTED SOLUTION 1 EACH VIAL: Performed by: INTERNAL MEDICINE

## 2024-03-06 PROCEDURE — 85025 COMPLETE CBC W/AUTO DIFF WBC: CPT

## 2024-03-06 PROCEDURE — 25810000003 SODIUM CHLORIDE 0.9 % SOLUTION 250 ML FLEX CONT: Performed by: INTERNAL MEDICINE

## 2024-03-06 PROCEDURE — 96375 TX/PRO/DX INJ NEW DRUG ADDON: CPT

## 2024-03-06 PROCEDURE — 96413 CHEMO IV INFUSION 1 HR: CPT

## 2024-03-06 PROCEDURE — 25810000003 SODIUM CHLORIDE 0.9 % SOLUTION: Performed by: INTERNAL MEDICINE

## 2024-03-06 PROCEDURE — 25010000002 DEXAMETHASONE SODIUM PHOSPHATE 100 MG/10ML SOLUTION: Performed by: INTERNAL MEDICINE

## 2024-03-06 RX ORDER — SODIUM CHLORIDE 9 MG/ML
250 INJECTION, SOLUTION INTRAVENOUS ONCE
Status: CANCELLED | OUTPATIENT
Start: 2024-03-06

## 2024-03-06 RX ORDER — SODIUM CHLORIDE 9 MG/ML
250 INJECTION, SOLUTION INTRAVENOUS ONCE
Status: COMPLETED | OUTPATIENT
Start: 2024-03-06 | End: 2024-03-06

## 2024-03-06 RX ADMIN — ADO-TRASTUZUMAB EMTANSINE 175 MG: 20 INJECTION, POWDER, LYOPHILIZED, FOR SOLUTION INTRAVENOUS at 12:39

## 2024-03-06 RX ADMIN — DEXAMETHASONE SODIUM PHOSPHATE 12 MG: 10 INJECTION, SOLUTION INTRAMUSCULAR; INTRAVENOUS at 12:15

## 2024-03-06 RX ADMIN — SODIUM CHLORIDE 250 ML: 9 INJECTION, SOLUTION INTRAVENOUS at 12:15

## 2024-03-06 NOTE — PROGRESS NOTES
Subjective   Veronica Royal is a 83 y.o. female.  Referred by Dr. Michael for right breast inflammatory breast cancer    History of Present Illness   Ms. Royal is a 82-year-old postmenopausal  lady with hypertension, diabetes, hyperlipidemia, chronic kidney disease, hyperkalemia-chronic presented with a palpable abnormality in the right breast.  Subsequent imaging was performed.  Patient has not had a mammogram prior to this in the past 25 years.    4/21/2023-bilateral diagnostic mammogram-high density irregular mass measuring 44 mm with spiculated margins and amorphus and fine pleomorphic calcifications with skin thickening in the right breast at 9:00.  2 intramammary lymph nodes in the upper outer axillary tail region are slightly rounded  2 prominent right axillary lymph nodes largest of which measures 26 mm.  Asymmetry noted in the left breast.    Right breast ultrasound at 9 o'clock position, 3 cm from the nipple there is a 38 x 28 x 42 mm mass.  Skin thickness measuring 11 mm near the mass.  One of the 2 rounded axillary tail lymph nodes noted.  Borderline cortical thickening.  2 abnormal axillary lymph nodes.  1 lymph node displaced loss of normal morphology with a round heterogeneous appearance and echogenic foci.  Most consistent with calcified lymph node measuring 26 mm.  Second lymph node measures 12 mm.  Displaced cortical thickening.  Ultrasound-guided biopsy of the mass in the axillary lymph nodes recommended.    4/21/2023  1.right breast 9:00 biopsy-invasive ductal carcinoma with apocrine features  Grade 3  ER negative  MT negative  HER2 2+ on immunohistochemistry  HER2 amplified on FISH with HER2 copy number of 7.58, OPAL seventeen 3.18, HER2/OPAL 17 ratio of 2.4.  Ki-67 38.45%    2.right axillary biopsy-soft tissue involved by invasive ductal carcinoma with apocrine features  Associated microcalcification  No definite lymph node tissue identified.    4/29/2023-MRI of the breast-biopsy-proven  malignancy in the right breast at 9:00 measuring 4.3 cm in greatest dimension.  Attachment overlying skin and diffuse right breast edema noted.  Right axillary lymphadenopathy.  No suspicious findings in the left breast.    5/11/2023-CT of the chest abdomen and pelvis-no evidence of metastatic disease.  Liver is cirrhotic in configuration.    5/11/2023-bone scan negative    Patient presents today to the clinic for discussing neoadjuvant chemotherapy.  She is accompanied by her daughter.  Patient denies any recent changes in weight, she reports some pain at the site of malignancy.  A punch biopsy was performed and was consistent with inflammatory breast cancer.  At the site of punch biopsy there is an ulcerated lesion.    She has poorly controlled diabetes currently on glipizide metformin and Tradjenta.  Hemoglobin A1c recently was noted to be 9.9.    Also has chronic hyperkalemia, explanation unclear.    Blood pressure poorly controlled.    She reports good functional status lives independently.  Able to manage all her bills and independent of ADLs.  She has good support system in terms of her daughter.    Family history significant for brother with pancreatic cancer at the age of 68, sister with ovarian cancer at the age of 58    She received 6 weeks of Taxol Perjeta and Herceptin and subsequently admitted to the hospital due to severe nausea vomiting diarrhea poor oral intake, KAMILA, severe electrolyte abnormalities.      admission to the hospital for KAMILA, severe diarrhea, nausea vomiting and poor oral intake.She was in the hospital between 7/12/2023 to 7/20/2023.  During the hospitalization she was treated for acute UTI, electrolyte abnormalities and KAMILA.  Subsequently she was  discharged to a rehab.   During the hospitalization she was evaluated by Dr. Michael and a right breast ultrasound was obtained on 7/17/2023.  There was very little response noted with the 9 o'clock position mass 6 cm from the nipple  measuring 3.7 x 2.4 x 3.7 cm previously 3.8 x 2.5 x 4.2 cm.  In the right axilla the lymph node measured 2.6 x 1.5 x 1.9 cm previously 1.9 x 1.5 x 2.5 cm.  There was decrease in size of the adjacent lymph node measuring 0.7 cm previously 1.2 cm.    8/24/2023-right mastectomy and axillary lymph node dissection  Invasive ductal carcinoma, grade 2  The tumor measures 4 cm  Microcalcifications are seen in the tumor  Dermal lymphatic invasion present  Lymphatic invasion present  Tumor seen in the skin but no ulceration.  Residual cancer burden 3.454  RCB-3  Xiong Lima grade 3  Margins negative  18 lymph nodes total evaluated  1 with micrometastasis with a tumor measuring 2.5 mm with no chemotherapy effect  1 with chemotherapy change and isolated tumor cells  There is 1 node with chemotherapy change and a clip but no residual tumor.    Receptors repeated  ER negative  IA negative  HER2 2+ on immunohistochemistry  FISH nonamplified    Kadcyla dose decreased to 3 mg/kg.    Patient was hospitalized between 2/22/2024 to 2/24/2024.  She presented to the emergency room with complaints of chest pain.  CT angiogram was performed which did not show any evidence of pulmonary embolism.  There were changes consistent with radiation on the right side and there was a moderate pleural effusion on the right.  Thoracentesis was performed and 550 cc of fluid was drained.  Cytology was negative for any malignancy.  Cultures remain negative.  Chest pain improved after the thoracentesis.  She denies any dyspnea or cough.    Bilateral lower extremity Doppler was performed which was negative.    She is reporting lymphedema of the right upper extremity and has been somewhat noncompliant with the sleeve.  She continues to follow-up with lymphedema clinic.    She is tolerating the decreased dose of Kadcyla fairly well.  She felt a little weak immediately after the discharge from the hospital but subsequently felt better.    Echocardiogram  2/15/2024 shows a stable ejection fraction of 54.5% with a strain of -19%.    She continues to follow-up with cardio oncology and plans to perform a stress test.     The following portions of the patient's history were reviewed and updated as appropriate: allergies, current medications, past family history, past medical history, past social history, past surgical history and problem list.    Past Medical History:   Diagnosis Date    Anxiety     Arthritis     Basal cell carcinoma     Left thumb    Breast cancer 2023    Right    Depression     Diabetes mellitus     Diarrhea     s/e chemo    High cholesterol     History of chemotherapy 08/2023    Hypertension     Rash 08/2023    s/e chemo    Sacral decubitus ulcer     cleaning with soap & water    Urinary incontinence     wears pads        Past Surgical History:   Procedure Laterality Date    CATARACT EXTRACTION EXTRACAPSULAR W/ INTRAOCULAR LENS IMPLANTATION Right     EYE SURGERY      Muscle repair age 21    MASTECTOMY W/ SENTINEL NODE BIOPSY Right 8/24/2023    Procedure: Right breast modified radical mastectomy;  Surgeon: Rosa Michael MD;  Location[place] [facility] [place] [facility] [place];  Service: General;  Laterality: Right;    TONSILLECTOMY      VENOUS ACCESS DEVICE (PORT) INSERTION N/A 05/19/2023    Procedure: INSERTION VENOUS ACCESS DEVICE;  Surgeon: Rosa Michael MD;  Location: [facility] [place] [facility] [place];  Service: General;  Laterality: N/A;        Family History   Problem Relation Age of Onset    Alzheimer's disease Mother     Heart disease Father     Heart attack Father     Ovarian cancer Sister     Pancreatic cancer Brother     Malig Hyperthermia Neg Hx     Colon cancer Neg Hx     Colon polyps Neg Hx     Crohn's disease Neg Hx     Irritable bowel syndrome Neg Hx     Ulcerative colitis Neg Hx         Social History     Socioeconomic History    Marital status:    Tobacco Use    Smoking status: Never    Smokeless tobacco: Never   Vaping Use    Vaping status: Never Used   Substance  "and Sexual Activity    Alcohol use: Never    Drug use: Never    Sexual activity: Never        OB History    No obstetric history on file.      Age at menarche-11  Age at first live childbirth-35   2 para 1  1  Age at menopause-50  Oral conceptive pill use for 3 to 4 years    No Known Allergies     Review of Systems    Review of systems as mentioned HPI otherwise negative    Objective   Blood pressure 161/78, pulse 95, temperature 98 °F (36.7 °C), temperature source Temporal, resp. rate 16, height 157.5 cm (62.01\"), weight 59 kg (130 lb), SpO2 98%.     Physical Exam  Vitals reviewed.   Constitutional:       General: She is not in acute distress.     Appearance: Normal appearance. She is well-developed.   HENT:      Head: Normocephalic and atraumatic.      Right Ear: Decreased hearing noted.      Left Ear: Decreased hearing noted.   Eyes:      Pupils: Pupils are equal, round, and reactive to light.   Cardiovascular:      Rate and Rhythm: Normal rate and regular rhythm.      Heart sounds: Normal heart sounds. No murmur heard.  Pulmonary:      Effort: Pulmonary effort is normal. No respiratory distress.      Breath sounds: Normal breath sounds. No wheezing, rhonchi or rales.   Abdominal:      General: Bowel sounds are normal. There is no distension.      Palpations: Abdomen is soft.   Musculoskeletal:         General: Swelling (trace) present. Normal range of motion.      Cervical back: Normal range of motion.   Skin:     General: Skin is warm and dry.      Findings: No rash.   Neurological:      Mental Status: She is alert and oriented to person, place, and time.        Right chest wall carefully examined, status post mastectomy with incision well-healed, no signs or symptoms of infection.  She does currently have grade 1 radiation dermatitis of the right chest wall with dryness and some erythema though no open sores or signs of infection      I have reexamined the patient and the results are consistent " with the previously documented exam. Sheila Yee MD         Results from last 7 days   Lab Units 03/06/24  1100   WBC 10*3/mm3 5.20   NEUTROS ABS 10*3/mm3 3.73   HEMOGLOBIN g/dL 9.3*   HEMATOCRIT % 31.3*   PLATELETS 10*3/mm3 180         CBC reviewed and hemoglobin lower at 9.9, WBC 5.43 and platelets 153,000  CMP reviewed and BUN 22, creatinine 0.82, LFTs within normal limits            US Thoracentesis    Result Date: 2/22/2024  Technically successful diagnostic and therapeutic ultrasound-guided right thoracentesis with removal of 550 mL of josef-colored effusion fluid.   By electronically signing this report, I, the supervising radiologist, attest that I was not present for the procedure(s) but agree with the final edited report.    This report was finalized on 2/22/2024 4:01 PM by Dr. Jack Ramos M.D on Workstation: [id number]      US Gallbladder    Result Date: 2/21/2024   1. Cholelithiasis, as well as some gallbladder wall edema and thickening. Gallbladder wall edema and thickening is a nonspecific finding, which can be associated with etiologies such as right-sided heart failure and hepatitis, in addition to acute cholecystitis. No sonographic Pepper sign was elicited. If clinical concern for acute cholecystitis persists, consideration for HIDA scan is recommended.  This report was finalized on 2/21/2024 11:24 PM by Dr. Delores Espinosa M.D on Workstation: [id number]      CT Angiogram Chest Pulmonary Embolism    Result Date: 2/21/2024   1. No acute pulmonary thromboembolus. 2. Moderate right pleural effusion. 3. Subpleural parenchymal opacities identified within the right lung in an anterior distribution, favored to be related to radiation therapy. However, correlation with any evidence of pneumonia, as well as follow-up exam is recommended. The patient is noted to have edema within the right anterolateral chest wall, again likely related to radiation change. 4. There is cholelithiasis. Patient does  appear to have inflammatory stranding within the gallbladder fossa. Correlation with gallbladder ultrasound is recommended.  Radiation dose reduction techniques were utilized, including automated exposure control and exposure modulation based on body size.   This report was finalized on 2/21/2024 10:30 PM by Dr. Delores Espinosa M.D on Workstation: [id number] Voice [id number]      XR Chest 1 View    Result Date: 2/21/2024  Blunting of the right costophrenic angle may reflect small effusion.  This report was finalized on 2/21/2024 9:24 PM by Dr. Delores Espinosa M.D on Workstation: [id number]        CT of the chest 2/21/2024-images independently reviewed and interpreted by me in detail summarized in the HPI    Assessment & Plan       *Right breast inflammatory breast cancer  T4d N1 M0  Stage IIIb  ER negative, HI negative, HER2 2+ on immunohistochemistry but amplified on FISH.  Invasive ductal carcinoma with apocrine features, grade 3, Ki-67 38%  CT scans and bone scan without any obvious evidence of metastatic disease  Echocardiogram has been performed and ejection fraction normal.  Liver shows cirrhotic morphology.  LFTs otherwise normal and total bilirubin normal as well as protein normal.  It appears that the synthetic function of the liver is still within normal limits.  Mediport placed 5/19/2023  Taxol, Herceptin, Perjeta initiated 5/24/2023 5/31/2023 with C1D8 Taxol  6/28/2023-cycle 2-day 15 of Taxol.  She is overall tolerating therapy well with expected side effects.  She will proceed with Taxol today.  Scheduled for Taxol Herceptin and Perjeta.  7/12/2023-cycle 3-day 8 of TPH.  Chemotherapy held and patient was admitted to the hospital for management of severe dehydration, KAMILA, nausea vomiting diarrhea and decreased oral intake.  8/2/2023-she feels relatively well today.  Labs reviewed and stable to proceed with Herceptin only.  We will not administer Taxol and Perjeta as she has had significant issues with  chemotherapy.  Right mastectomy 8/24/2023 with 4 cm of residual disease, RCB-3, treatment effect present, axillary lymph node dissection with 1 lymph node with micrometastasis measuring 2.5 mm, 1 lymph node with isolated tumor cells and a third lymph node which showed treatment changes but no tumor cells.  Total of 18 lymph nodes removed   9/20/2023 to discuss pathology and adjuvant therapy.  We discussed Kadcyla every 3 weekly to finish a complete year.  Patient is definitely hesitant to resume any sort of chemotherapy due to her previous experience with diarrhea.  Started Kadcyla 9/20/2023, denies any diarrhea.  Tolerating treatment well so far  No evidence of recurrent disease  Adjuvant radiation completed 11/20/2023  She has received 5 doses of adjuvant Kadcyla.  Sixth dose has been delayed by 3 weeks due to worsening arthralgias, decreased strength in her lower extremities  Improvement in strength as well as arthralgias with holding chemotherapy.  We discussed about proceeding with the dose reduction of Kadcyla versus not doing any further Kadcyla.  Kadcyla dose decreased to 3 mg/kg  Patient hospitalized between 2/21/2024 to 2/24/2024 for right-sided pleural effusion and status post thoracentesis with 550 cc drained which was nonmalignant.  Etiology of the pleural fluid is unclear.  Continue follow-up with cardiology.  Currently no evidence of recurrent disease.  CTA performed 2/21/2024 without any evidence of metastatic disease.    *Diabetes  Poorly controlled  Evaluation by endocrinology 5/30/2023 with recommendations for dietary changes and home blood sugar monitoring.  The patient's daughter reports today she is struggling with compliance.  Continue follow-up with primary care physician    *KAMILA/CKD  8/17/2023 BUN 24 and creatinine 1.05  Patient's daughter reports that she has not been hydrating herself well.  Creatinine stable at 0.97    *?  Cirrhotic appearance of the liver on the CT scan  Referred to  gastroenterology  Synthetic function appears to be normal  We will start with Taxol to see if she would tolerate the chemotherapy   Slight elevation of intervention studies today with ALT 43, AST 55.  Continue to monitor weekly  6/28/2023-mild elevation in the LFTs.  Stable compared to previous labs.  Okay to proceed with chemotherapy.  11/21/2023-LFTs normal  12/13/2023: Liver enzymes are normals. Alkaline phosphatase is slightly more elevated at 244.   LFTs are normal    *Hypertension-currently on valsartan and hydrochlorothiazide.  Valsartan could be contributing to hyperkalemia.  Will refer to cardiology ASAP for management of medications and cardiac clearance for proceeding with chemotherapy  Recent echocardiogram normal  Stress test reviewed and negative.  9/27/2023-echocardiogram shows a normal ejection fraction of 59%.  Follow-up echocardiogram 10/20/2023 with ejection fraction of 54%.  When compared to baseline study 5/9/2023 ejection fraction is stable.  Left ventricular GLS is changed by 8%.  Discussed with cardiology, given the stability of ejection fraction when compared to baseline from May, we will proceed with Kadcyla   Cardiology evaluation 11/10/2023 with stable EF and strain.  Recommendations to continue Kadcyla with 3-month follow-up echocardiogram  12/13/2023: Being followed by cardio oncology. Has an ECHO scheduled soon that is to be rescheduled per her daughter.   Blood pressure elevated at 161/78    *Genetic testing-Invitae 9 gene stat panel negative,     *Decreased oral intake secondary to chemotherapy  Still not adequate oral intake  Encouraged her to drink boost and Ensure  Weight has increased and she now weighs 130 pounds    *Frequent belching  Continue Protonix 40 mg daily  Improved    *Cognitive impairment  It is unclear if this is the patient's baseline or mental status has been exacerbated by recent chemotherapy  The patient is struggling with compliance with her medications.  The  patient's daughter expresses frustration today as the patient is struggling to care for herself at home with managing medications and symptom management.  Evaluated by CARL Antonio     *Chemotherapy-induced diarrhea  6/5/2023 patient given Enterade (Wild Berry flavor).  She has been drinking 1 a day.  She does not necessarily like the flavor (currently mixing with sugar-free Aramis-Aid which does help).  Encouraged her to increase to 2 a day.  6/21/2023 patient reports that she had stopped drinking her Enterade because she was waking up in the middle the night having diarrhea although she did not know if it was due to the Enterade her protein shakes.  I have encouraged the patient to continue Enterade, drinking it in the morning.  Try discontinuing the protein shakes and see how she does.  Patient states that she will try this  Reports 2-3 loose stools.  Continue Enterade and Imodium.  7/5/2023 continuing to have issues with diarrhea up to 3-4 bowel movements a day, patient also recently prescribed Kayexalate for an apparently elevated potassium.  Potassium only 3.1 today.  She advised to discontinue the Kayexalate she was given IV hydration today.  We will also prescribe Lomotil to use if needed to help better control her diarrhea.  Patient is not drinking Enterade regularly.  8/2/2023-improved since discharge from the hospital and discontinuation of chemotherapy.  She received Herceptin on 8/2/2023 and reports a week of diarrhea.  Patient has not had any diarrhea since initiating Kadcyla.  Continue to monitor, she continues to note this is improved on Kadcyla  12/13/2023: Has improved. Currently has had some constipation. Encouraged senokot S every other night based on her current symptoms until bowel movements are more consistent.   Improved    Weakness and leg aching  12/13/2023: Patient denies any numbness or tingling in her legs and there is not localized edema, warmth, cording or tenderness. Continues  to report weakness and aching that sounds like a combination of deconditioning and possible restless leg syndrome. Magnesium level was checked and is normal. Home health to be consulted for physical therapy and a prescription for a walker provided.   1/3/2024-patient reports persistent weakness over the past 1 month.  She is also complaining of leg cramping and had a recent fall.  Although not entirely suggestive of neuropathy I do wonder if Kadcyla is contributing to the weakness.  I will delay treatment by 3 weeks to let her continue with physical therapy to see if that would help with improvement in the strength.  Arthralgias and lower extremity strength have improved significantly since dealing chemotherapy and increased work with physical therapy.  Recommend that she continue physical therapy  Improved with decreased dose of Kadcyla.  Improved significantly with physical therapy  Reports some increased weakness after discharge from the hospital but gradually improving.      *Right-sided pleural effusion  New on CT chest from 2/21/2024  Unclear etiology  We will repeat a chest x-ray in 3 weeks to see if there is any reaccumulation of fluid.    PLAN:  Cycle 8 Kadcyla, decreased dose of 3 mg/kg  Continue physical therapy  Chest x-ray in 3 weeks  MD follow-up in 3 weeks  Continue increased protein intake.   Utilize a walker for stability  Radiation completed 11/20/2023  Possible stress test with cardiology.    Patient is on a high risk medication requiring close monitoring for toxicity.  CT chest images independently reviewed and interpreted by me in detail summarized in HPI.  Neurology notes reviewed.  Labs from recent hospitalization reviewed.    Sheila Yee MD  03/06/2024

## 2024-03-07 ENCOUNTER — TELEPHONE (OUTPATIENT)
Dept: CARDIOLOGY | Facility: CLINIC | Age: 84
End: 2024-03-07
Payer: MEDICARE

## 2024-03-07 LAB — FUNGUS WND CULT: NORMAL

## 2024-03-08 ENCOUNTER — HOSPITAL ENCOUNTER (OUTPATIENT)
Dept: CARDIOLOGY | Facility: HOSPITAL | Age: 84
Discharge: HOME | End: 2024-03-08
Payer: MEDICARE

## 2024-03-08 ENCOUNTER — TELEPHONE (OUTPATIENT)
Dept: CARDIOLOGY | Facility: CLINIC | Age: 84
End: 2024-03-08
Payer: MEDICARE

## 2024-03-08 DIAGNOSIS — I25.810 CORONARY ARTERY DISEASE INVOLVING CORONARY BYPASS GRAFT OF NATIVE HEART, UNSPECIFIED WHETHER ANGINA PRESENT: ICD-10-CM

## 2024-03-08 DIAGNOSIS — I51.89 OTHER ILL-DEFINED HEART DISEASES: ICD-10-CM

## 2024-03-08 DIAGNOSIS — R93.1 ABNORMAL ECHOCARDIOGRAM: ICD-10-CM

## 2024-03-08 LAB
BH CV NUCLEAR PRIOR STUDY: 2
BH CV REST NUCLEAR ISOTOPE DOSE: 60 MCI
BH CV STRESS BP STAGE 1: NORMAL
BH CV STRESS COMMENTS STAGE 1: NORMAL
BH CV STRESS DOSE REGADENOSON STAGE 1: 0.4
BH CV STRESS DURATION MIN STAGE 1: 0
BH CV STRESS DURATION SEC STAGE 1: 10
BH CV STRESS HR STAGE 1: 114
BH CV STRESS NUCLEAR ISOTOPE DOSE: 60 MCI
BH CV STRESS PROTOCOL 1: NORMAL
BH CV STRESS RECOVERY BP: NORMAL MMHG
BH CV STRESS RECOVERY HR: 110 BPM
BH CV STRESS STAGE 1: 1
LV EF NUC BP: 64 %
MAXIMAL PREDICTED HEART RATE: 137 BPM
PERCENT MAX PREDICTED HR: 83.21 %
STRESS BASELINE BP: NORMAL MMHG
STRESS BASELINE HR: 98 BPM
STRESS PERCENT HR: 98 %
STRESS POST EXERCISE DUR MIN: 0 MIN
STRESS POST EXERCISE DUR SEC: 10 SEC
STRESS POST PEAK BP: NORMAL MMHG
STRESS POST PEAK HR: 114 BPM
STRESS TARGET HR: 116 BPM

## 2024-03-08 PROCEDURE — A9555 RB82 RUBIDIUM: HCPCS | Performed by: INTERNAL MEDICINE

## 2024-03-08 PROCEDURE — 0 RUBIDIUM CHLORIDE: Performed by: INTERNAL MEDICINE

## 2024-03-08 PROCEDURE — 78492 MYOCRD IMG PET MLT RST&STRS: CPT

## 2024-03-08 PROCEDURE — 25010000002 REGADENOSON 0.4 MG/5ML SOLUTION: Performed by: INTERNAL MEDICINE

## 2024-03-08 PROCEDURE — 93017 CV STRESS TEST TRACING ONLY: CPT

## 2024-03-08 RX ORDER — REGADENOSON 0.08 MG/ML
0.4 INJECTION, SOLUTION INTRAVENOUS
Status: COMPLETED | OUTPATIENT
Start: 2024-03-08 | End: 2024-03-08

## 2024-03-08 RX ADMIN — REGADENOSON 0.4 MG: 0.08 INJECTION, SOLUTION INTRAVENOUS at 10:00

## 2024-03-08 NOTE — TELEPHONE ENCOUNTER
Stress test with no evidence of ischemia.  She had recent admission end of February for right-sided chest pain with a right pleural effusion that required thoracentesis with about 500 mL removed.  She had pleuritic chest pain after that.  She has appointment with me in May.  Echo showed possible inferior and inferoseptal hypokinesis, is there anything else that we need to do?  Cardiac MRI?

## 2024-03-13 ENCOUNTER — OFFICE VISIT (OUTPATIENT)
Dept: SURGERY | Facility: CLINIC | Age: 84
End: 2024-03-13
Payer: MEDICARE

## 2024-03-13 VITALS
WEIGHT: 129 LBS | SYSTOLIC BLOOD PRESSURE: 140 MMHG | HEIGHT: 62 IN | BODY MASS INDEX: 23.74 KG/M2 | DIASTOLIC BLOOD PRESSURE: 74 MMHG

## 2024-03-13 DIAGNOSIS — Z85.3 HISTORY OF BREAST CANCER: Primary | ICD-10-CM

## 2024-03-13 PROCEDURE — 1159F MED LIST DOCD IN RCRD: CPT | Performed by: STUDENT IN AN ORGANIZED HEALTH CARE EDUCATION/TRAINING PROGRAM

## 2024-03-13 PROCEDURE — 99213 OFFICE O/P EST LOW 20 MIN: CPT | Performed by: STUDENT IN AN ORGANIZED HEALTH CARE EDUCATION/TRAINING PROGRAM

## 2024-03-13 PROCEDURE — 1160F RVW MEDS BY RX/DR IN RCRD: CPT | Performed by: STUDENT IN AN ORGANIZED HEALTH CARE EDUCATION/TRAINING PROGRAM

## 2024-03-13 PROCEDURE — 3077F SYST BP >= 140 MM HG: CPT | Performed by: STUDENT IN AN ORGANIZED HEALTH CARE EDUCATION/TRAINING PROGRAM

## 2024-03-13 PROCEDURE — 3078F DIAST BP <80 MM HG: CPT | Performed by: STUDENT IN AN ORGANIZED HEALTH CARE EDUCATION/TRAINING PROGRAM

## 2024-03-13 RX ORDER — VISMODEGIB 150 MG/1
1 CAPSULE ORAL DAILY
COMMUNITY
Start: 2024-03-07

## 2024-03-13 NOTE — PROGRESS NOTES
General Surgery Breast Cancer History and Physical Exam     Summary:    Veronica Royal is a 83 y.o. 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      A multidisciplinary plan has been formulated for the patient:    (1) Breast Surgical Oncology:  -Inv[facility]ae 9 panel genetic testing: negative.   -s/p right breast modified radical mastectomy 8/2023.   -Following with lymphedema clinic.   -Follow up in 1 year with Tameka Rios PA-C.    (2) Medical Oncology:  -Following with Dr. Yee. Underwent neoadjuvant chemotherapy. Early cessation due to toxicity.   -On Kadcyla.     (3) Radiation Oncology:  -Following with Dr. Tesfaye. Completed radiation 11/20/2023.    Referring Provider: No ref. provider found    Chief Complaint: breast mass    History of Present Illness: Ms. Veronica Royal is a 83 y.o. year old lady, seen at the request of No ref. provider found for a new diagnosis of right breast cancer.      This was initially detected as a palpable mass in her right breast. She first noticed it 2-3 months ago. Her last mammogram was 25 years ago. She denies any prior history of abnormal mammograms or breast biopsies. Her work-up is detailed in the oncologic history below.     She denies any breast lumps, pain, skin changes, or nipple discharge. She denies any family history of breast cancer. Her sister had ovarian cancer 25 years ago at age 58.      7/31/2023 She presents today for follow up. She has done well since leaving the hospital. She is slowly improving. Her strength is improving as well.     9/13/2023 She presents today for follow up. She is doing well since surgery. Her pain is controlled. She is getting back to her daily activities.     3/13/2024 She presents today for follow up.  She is overall doing well with no issues.  She has no concerns related to her breast exam. She is on dose-reduced  Kadcycla and doing well. She was having pain in her legs. She is seeing the lymphedema clinic. She has not been wearing her compression but is today. She has a basal cell carcinoma of her left thumb. She is seeing dermatology for this.    Workup of Current Diagnosis:    4/21/2023 bilateral diagnostic mammogram with ultrasound:  Finding 1: There is a high density irregular mass measuring 44 mm with spiculated margins and associated amorphous and fine pleomorphic calcifications, skin retraction, and skin and trabecular thickening in the right breast at 9:00.  On ultrasound there is an irregular mass with indistinct and spiculated margins in the right breast at 9:00 3 cm from the nipple.  The mass extends into the skin there is visible external ulceration and scabbing.  Diffuse skin thickening and focal skin retraction are noted with skin thickening up to 11 mm.  The mass measures 38 x 28 x 42 mm.  Highly suggestive of malignancy.  Ultrasound-guided biopsy is recommended.  Finding 2: There are true intramammary lymph nodes measuring 5 mm and 6 mm seen in the posterior upper outer axillary tail that are slightly round.  There is visualization of one of the 2 right axillary lymph nodes that measure 5 mm with borderline cortical thickening.  Suspicious.  Appropriate action should be taken.  Finding 3: There are 2 prominent right axillary lymph nodes largest which measures 26 mm and contains extensive coarse heterogeneous calcifications.  Suspicious.  Ultrasound-guided biopsy is recommended.  Finding 4: On ultrasound there is an asymmetry in the superior region of the left breast that resolved with spot compression benign.  BI-RADS Category 5    4/21/2023 right breast ultrasound-guided biopsy:  The right breast at 9:00 was imaged and the mass was localized.  8 cores were obtained.  A mini cork tissue marker was placed.  Clip was in the expected position.  Pathology returned as invasive ductal carcinoma grade 3 which is  malignant and concordant.  Surgical consult is recommended.    The patient's right breast at the axillary position was imaged and the abnormal lymph node was localized.  4 cores were obtained.  A spring shaped HydroMARK tissue marker was placed.  Clip was in the expected position.  Pathology returned as high-grade invasive mammary carcinoma with apron features.  The differential includes multifocal carcinoma versus a completely replaced lymph node.  Pathology is malignant and concordant.    4/21/2023 pathology:   1.  Breast, right, 9:00, biopsy:  Invasive mammary carcinoma, grade 3  2.  Axilla, right, biopsy:  High-grade invasive mammary carcinoma with apocrine features involving soft tissue and areas of necrosis    4/29/2023 Bilateral Breast MRI   IMPRESSION:  1. Biopsy-proven malignancy in the right breast in the posterior one third at the 9 o'clock position that measures on the order of 4.3 cm in greatest dimension and contains the internal mini cork-shaped metallic clip. The mass shows central hypoenhancement suggestive of central necrosis. Attachment to the overlying skin as described is noted and there is diffuse right breast skin edema and trabecular edema. Evidence of right axillary adenopathy is noted.  2. There are no findings suspicious for malignancy in the left breast.  BI-RADS category 6: Known biopsy-proven malignancy.    5/11/2023 CT Chest, Abdomen, Pelvis:   IMPRESSION:  1. Right breast cancer with right axillary lymphadenopathy  2. No convincing evidence of distant metastatic disease. Incidental findings as above.    5/11/2023 Bone Scan:   IMPRESSION:  Abnormal soft tissue activity in the right breast corresponding to known disease in that location. No evidence of osseous metastasis.    7/17/2023 Right Breast US:   IMPRESSION:  Biopsy-proven malignancy at 9:00 in the right breast is similar to slightly smaller when accounting for differences in technique. A 2.6 cm right axillary mass is not  significantly changed, although an adjacent lymph node has decreased in size. Continued oncologic and surgical management are recommended.   BI-RADS Category 6: Known biopsy-proven malignancy    8/24/2023 Right breast modified radical mastectomy   Final Diagnosis   1. Breast, Right, Modified Radical Mastectomy:    A. Invasive ductal adenocarcinoma with                            1. The tumor is Rogers grade II (tubular grade 3, nuclear grade 3, mitotic grade 1).               2. The tumor measures up to 4 cm (four consecutive 1 cm slices).                            3. Microcalcifications are seen in the area of the tumor.                            4. The tumor is in the area of 9:00 o'clock, 6 cm from nipple.                            5. Cork-shaped clip is identified.                            6. Treatment changes are present.                            7. Dermal lymphatic invasion is present.                            8. Capillary lymphatic invasion is present.                            9. Tumor seen in skin but ulceration through the skin is not identified.                            10. Residual cancer burden equals 3.454.                            11. Residual cancer burden class RCB-III.                            12. Xiong-Lima Grade 3                             13. The invasive tumor comes within 11 mm of the posterior margin, 55 mm of the inferior margin, 75 mm         of the superior margin and 80 mm of the medial and lateral margins.               B. Lymph nodes:                            1. Eighteen lymph nodes are identified, two with metastatic disease   i. One with macrometastisis (size of greatest metastasis 2.5 mm)      with no chemotherapy change).                                         ii. One with Individual tumor cells seen with chemotherapy change.                                        iii. There was also one node benign with chemotherapy change and a clip but no residual  tumor.               C. Additional findings:                            1. Focus of atypical ductal hyperplasia.        Comment: The pretreatment classification was cT4d (inflammatory carcinoma). Case [id number] was reviewed. The patient presented clinically with inflammatory carcinoma (cT4d). Based off microscopic findings (ie. Skin involvement without ulceration by tumor), this carcinoma is best classified as ypT2, N1a.     2. Lymph Node, Right Additional Axillary Tissue:  Single benign lymph node     Comment: There are no changes suggestive of treatment effect.     3. Breast, Right, Additional Lateral Skin Margin, Excision:  Benign skin and underlying connective tissue 15 mm deep     2/21/2024 CTA Chest:   IMPRESSION:  1. No acute pulmonary thromboembolus.  2. Moderate right pleural effusion.  3. Subpleural parenchymal opacities identified within the right lung in an anterior distribution, favored to be related to radiation therapy. However, correlation with any evidence of pneumonia, as well as follow-up exam is recommended. The patient is noted to have edema within the right anterolateral chest wall, again likely related to radiation change.  4. There is cholelithiasis. Patient does appear to have inflammatory stranding within the gallbladder fossa. Correlation with gallbladder ultrasound is recommended.    2/21/2024 RUQ US:   IMPRESSION:    1. Cholelithiasis, as well as some gallbladder wall edema and thickening. Gallbladder wall edema and thickening is a nonspecific finding, which can be associated with etiologies such as right-sided heart failure and hepatitis, in addition to acute cholecystitis. No sonographic Pepper sign was elicited. If clinical concern for acute cholecystitis persists, consideration for HIDA scan is recommended.    2/22/2024 US Thoracentesis:   IMPRESSION:  Technically successful diagnostic and therapeutic ultrasound-guided right thoracentesis with removal of 550 mL of josef-colored  effusion fluid.    Gynecologic History:   . P:1 AB:1  Age at first childbirth: 35  Lactation/How long: none  Age at menarche: 11  Age at menopause: 50  Total years of oral contraceptive use: 3-4 years previously  Total years of hormone replacement therapy: none    Past Medical History:   DM  HTN  HLD    Past Surgical History:    None    Family History:    As above    Social History:  Denies tobacco use  Occasional alcohol use    Allergies:   No Known Allergies    Medications:     Current Outpatient Medications:     Cholecalciferol 50 MCG (2000) tablet, Take 1 tablet by mouth Daily (Monday-Friday)., Disp: , Rfl:     glipiZIDE-metFORMIN (METAGLIP) 2.5-500 MG per tablet, Take 2 tablets by mouth 2 (Two) Times a Day Before Meals. (Patient taking differently: Take 2 tablets by mouth Every Morning.), Disp: 120 tablet, Rfl: 3    glucose blood test strip, Check once per day, E11.65, Disp: 30 each, Rfl: 5    glucose monitor monitoring kit, Use 1 each Daily. Dispense glucometer with brand based off insurance coverage, E11.65, check once per day, Disp: 30 each, Rfl: 5    Lancets misc, Check BG once daily, E11.9, Disp: 30 each, Rfl: 4    lidocaine-prilocaine (EMLA) 2.5-2.5 % cream, Apply nickel size amount to port site 30 min before appt time do not rub in cover with plastic wrap, Disp: 30 g, Rfl: 5    losartan (COZAAR) 25 MG tablet, TAKE 1 TABLET BY MOUTH DAILY, Disp: 90 tablet, Rfl: 1    ondansetron (ZOFRAN) 8 MG tablet, Take 1 tablet by mouth 3 (Three) Times a Day As Needed for Nausea or Vomiting., Disp: 30 tablet, Rfl: 5    simvastatin (ZOCOR) 40 MG tablet, Take 0.5 tablets by mouth Every Night. (Patient taking differently: Take 1 tablet by mouth Every Night.), Disp: 90 tablet, Rfl: 2    Tradjenta 5 MG tablet tablet, Take 1 tablet by mouth Every Morning., Disp: 90 tablet, Rfl: 5    valsartan-hydrochlorothiazide (DIOVAN-HCT) 160-12.5 MG per tablet, Take 1 tablet by mouth Daily., Disp: , Rfl:     Laboratory Values:     Labs from 2023 reviewed    Review of Systems:   Influenza-like illness: no fever, no  cough, no  sore throat, no  body aches, no loss of sense of taste or smell, no known exposure to person with Covid-19.  Constitutional: Negative for fevers or chills  HENT: Negative for hearing loss or runny nose  Eyes: Negative for vision changes or scleral icterus  Respiratory: Negative for cough or shortness of breath  Cardiovascular: Negative for chest pain or heart palpitations  Gastrointestinal: Negative for abdominal pain, nausea, vomiting, constipation, melena, or hematochezia  Genitourinary: Negative for hematuria or dysuria  Musculoskeletal: Negative for joint swelling or gait instability  Neurologic: Negative for tremors or seizures  Psychiatric: Negative for suicidal ideations or depression  All other systems reviewed and negative    Physical Exam:   ECO - Asymptomatic  Constitutional: Well-developed well-nourished, no acute distress  Eyes: Conjunctiva normal, sclera nonicteric  ENMT: Hearing grossly normal, oral mucosa moist  Neck: Supple, no palpable mass, trachea midline  Respiratory: Clear to auscultation, normal inspiratory effort  Cardiovascular: Regular rate, no peripheral edema, no jugular venous distention  Breast: symmetric  Right: Right mastectomy incision well-healed with no masses or skin changes  Left: No visible abnormalities on inspection while seated, with arms raised or hands on hips. No masses, skin changes, or nipple abnormalities.  No clinical chest wall involvement.  Gastrointestinal: Soft, nontender  Lymphatics (palpable nodes): No left sided cervical, supraclavicular or axillary lymphadenopathy  Skin:  Warm, dry, no rash on visualized skin surfaces  Musculoskeletal: Symmetric strength, normal gait  Psychiatric: Alert and oriented ×3, normal affect     SHAR MCELROY M.D.  General and Endoscopic Surgery  [facility]    [address], [address]  [place], [place], [address]  P:  [phone]  F: [phone]

## 2024-03-14 LAB — FUNGUS WND CULT: NORMAL

## 2024-03-21 LAB — FUNGUS WND CULT: NORMAL

## 2024-03-25 ENCOUNTER — HOSPITAL ENCOUNTER (OUTPATIENT)
Dept: GENERAL RADIOLOGY | Facility: HOSPITAL | Age: 84
Discharge: HOME | End: 2024-03-25
Admitting: INTERNAL MEDICINE
Payer: MEDICARE

## 2024-03-25 DIAGNOSIS — C50.411 MALIGNANT NEOPLASM OF UPPER-OUTER QUADRANT OF RIGHT BREAST IN FEMALE, ESTROGEN RECEPTOR NEGATIVE: ICD-10-CM

## 2024-03-25 DIAGNOSIS — Z17.1 MALIGNANT NEOPLASM OF UPPER-OUTER QUADRANT OF RIGHT BREAST IN FEMALE, ESTROGEN RECEPTOR NEGATIVE: ICD-10-CM

## 2024-03-25 DIAGNOSIS — Z79.899 HIGH RISK MEDICATION USE: ICD-10-CM

## 2024-03-25 PROCEDURE — 71046 X-RAY EXAM CHEST 2 VIEWS: CPT

## 2024-03-27 ENCOUNTER — INFUSION (OUTPATIENT)
Dept: ONCOLOGY | Facility: HOSPITAL | Age: 84
End: 2024-03-27
Payer: MEDICARE

## 2024-03-27 ENCOUNTER — OFFICE VISIT (OUTPATIENT)
Dept: ONCOLOGY | Facility: CLINIC | Age: 84
End: 2024-03-27
Payer: MEDICARE

## 2024-03-27 VITALS
HEART RATE: 89 BPM | RESPIRATION RATE: 16 BRPM | OXYGEN SATURATION: 99 % | HEIGHT: 62 IN | TEMPERATURE: 97.5 F | SYSTOLIC BLOOD PRESSURE: 157 MMHG | WEIGHT: 129 LBS | DIASTOLIC BLOOD PRESSURE: 84 MMHG | BODY MASS INDEX: 23.74 KG/M2

## 2024-03-27 DIAGNOSIS — Z45.2 ENCOUNTER FOR FITTING AND ADJUSTMENT OF VASCULAR CATHETER: ICD-10-CM

## 2024-03-27 DIAGNOSIS — Z17.1 MALIGNANT NEOPLASM OF UPPER-OUTER QUADRANT OF RIGHT BREAST IN FEMALE, ESTROGEN RECEPTOR NEGATIVE: Primary | ICD-10-CM

## 2024-03-27 DIAGNOSIS — Z12.31 ENCOUNTER FOR SCREENING MAMMOGRAM FOR BREAST CANCER: ICD-10-CM

## 2024-03-27 DIAGNOSIS — C50.411 MALIGNANT NEOPLASM OF UPPER-OUTER QUADRANT OF RIGHT BREAST IN FEMALE, ESTROGEN RECEPTOR NEGATIVE: ICD-10-CM

## 2024-03-27 DIAGNOSIS — C50.411 MALIGNANT NEOPLASM OF UPPER-OUTER QUADRANT OF RIGHT BREAST IN FEMALE, ESTROGEN RECEPTOR NEGATIVE: Primary | ICD-10-CM

## 2024-03-27 DIAGNOSIS — Z17.1 MALIGNANT NEOPLASM OF UPPER-OUTER QUADRANT OF RIGHT BREAST IN FEMALE, ESTROGEN RECEPTOR NEGATIVE: ICD-10-CM

## 2024-03-27 LAB
ALBUMIN SERPL-MCNC: 3.6 G/DL (ref 3.5–5.2)
ALBUMIN/GLOB SERPL: 1.2 G/DL
ALP SERPL-CCNC: 84 U/L (ref 39–117)
ALT SERPL W P-5'-P-CCNC: 6 U/L (ref 1–33)
ANION GAP SERPL CALCULATED.3IONS-SCNC: 13.1 MMOL/L (ref 5–15)
AST SERPL-CCNC: 27 U/L (ref 1–32)
BASOPHILS # BLD AUTO: 0.02 10*3/MM3 (ref 0–0.2)
BASOPHILS NFR BLD AUTO: 0.4 % (ref 0–1.5)
BILIRUB SERPL-MCNC: 0.2 MG/DL (ref 0–1.2)
BUN SERPL-MCNC: 31 MG/DL (ref 8–23)
BUN/CREAT SERPL: 28.7 (ref 7–25)
CALCIUM SPEC-SCNC: 9.3 MG/DL (ref 8.6–10.5)
CHLORIDE SERPL-SCNC: 107 MMOL/L (ref 98–107)
CO2 SERPL-SCNC: 20.9 MMOL/L (ref 22–29)
CREAT SERPL-MCNC: 1.08 MG/DL (ref 0.57–1)
DEPRECATED RDW RBC AUTO: 49.2 FL (ref 37–54)
EGFRCR SERPLBLD CKD-EPI 2021: 51.1 ML/MIN/1.73
EOSINOPHIL # BLD AUTO: 0.22 10*3/MM3 (ref 0–0.4)
EOSINOPHIL NFR BLD AUTO: 4 % (ref 0.3–6.2)
ERYTHROCYTE [DISTWIDTH] IN BLOOD BY AUTOMATED COUNT: 16.3 % (ref 12.3–15.4)
GLOBULIN UR ELPH-MCNC: 3 GM/DL
GLUCOSE SERPL-MCNC: 240 MG/DL (ref 65–99)
HCT VFR BLD AUTO: 27.7 % (ref 34–46.6)
HGB BLD-MCNC: 8.7 G/DL (ref 12–15.9)
IMM GRANULOCYTES # BLD AUTO: 0.02 10*3/MM3 (ref 0–0.05)
IMM GRANULOCYTES NFR BLD AUTO: 0.4 % (ref 0–0.5)
LYMPHOCYTES # BLD AUTO: 0.67 10*3/MM3 (ref 0.7–3.1)
LYMPHOCYTES NFR BLD AUTO: 12.2 % (ref 19.6–45.3)
MCH RBC QN AUTO: 26.1 PG (ref 26.6–33)
MCHC RBC AUTO-ENTMCNC: 31.4 G/DL (ref 31.5–35.7)
MCV RBC AUTO: 83.2 FL (ref 79–97)
MONOCYTES # BLD AUTO: 0.49 10*3/MM3 (ref 0.1–0.9)
MONOCYTES NFR BLD AUTO: 9 % (ref 5–12)
NEUTROPHILS NFR BLD AUTO: 4.05 10*3/MM3 (ref 1.7–7)
NEUTROPHILS NFR BLD AUTO: 74 % (ref 42.7–76)
NRBC BLD AUTO-RTO: 0 /100 WBC (ref 0–0.2)
PLATELET # BLD AUTO: 148 10*3/MM3 (ref 140–450)
PMV BLD AUTO: 9.2 FL (ref 6–12)
POTASSIUM SERPL-SCNC: 3.8 MMOL/L (ref 3.5–5.2)
PROT SERPL-MCNC: 6.6 G/DL (ref 6–8.5)
RBC # BLD AUTO: 3.33 10*6/MM3 (ref 3.77–5.28)
SODIUM SERPL-SCNC: 141 MMOL/L (ref 136–145)
WBC NRBC COR # BLD AUTO: 5.47 10*3/MM3 (ref 3.4–10.8)

## 2024-03-27 PROCEDURE — 25010000002 HEPARIN LOCK FLUSH PER 10 UNITS: Performed by: INTERNAL MEDICINE

## 2024-03-27 PROCEDURE — 25810000003 SODIUM CHLORIDE 0.9 % SOLUTION 250 ML FLEX CONT: Performed by: INTERNAL MEDICINE

## 2024-03-27 PROCEDURE — 25010000002 ADO-TRASTUZUMAB 100 MG RECONSTITUTED SOLUTION 1 EACH VIAL: Performed by: INTERNAL MEDICINE

## 2024-03-27 PROCEDURE — 96413 CHEMO IV INFUSION 1 HR: CPT

## 2024-03-27 PROCEDURE — 80053 COMPREHEN METABOLIC PANEL: CPT

## 2024-03-27 PROCEDURE — 25010000002 DEXAMETHASONE SODIUM PHOSPHATE 100 MG/10ML SOLUTION: Performed by: INTERNAL MEDICINE

## 2024-03-27 PROCEDURE — 96375 TX/PRO/DX INJ NEW DRUG ADDON: CPT

## 2024-03-27 PROCEDURE — 25810000003 SODIUM CHLORIDE 0.9 % SOLUTION: Performed by: INTERNAL MEDICINE

## 2024-03-27 PROCEDURE — 85025 COMPLETE CBC W/AUTO DIFF WBC: CPT

## 2024-03-27 RX ORDER — SODIUM CHLORIDE 0.9 % (FLUSH) 0.9 %
10 SYRINGE (ML) INJECTION AS NEEDED
OUTPATIENT
Start: 2024-03-27

## 2024-03-27 RX ORDER — SODIUM CHLORIDE 9 MG/ML
250 INJECTION, SOLUTION INTRAVENOUS ONCE
Status: COMPLETED | OUTPATIENT
Start: 2024-03-27 | End: 2024-03-27

## 2024-03-27 RX ORDER — SODIUM CHLORIDE 9 MG/ML
250 INJECTION, SOLUTION INTRAVENOUS ONCE
Status: CANCELLED | OUTPATIENT
Start: 2024-03-27

## 2024-03-27 RX ORDER — SODIUM CHLORIDE 0.9 % (FLUSH) 0.9 %
10 SYRINGE (ML) INJECTION AS NEEDED
Status: DISCONTINUED | OUTPATIENT
Start: 2024-03-27 | End: 2024-03-27 | Stop reason: HOSPADM

## 2024-03-27 RX ORDER — HEPARIN SODIUM (PORCINE) LOCK FLUSH IV SOLN 100 UNIT/ML 100 UNIT/ML
500 SOLUTION INTRAVENOUS AS NEEDED
Status: DISCONTINUED | OUTPATIENT
Start: 2024-03-27 | End: 2024-03-27 | Stop reason: HOSPADM

## 2024-03-27 RX ORDER — HEPARIN SODIUM (PORCINE) LOCK FLUSH IV SOLN 100 UNIT/ML 100 UNIT/ML
500 SOLUTION INTRAVENOUS AS NEEDED
OUTPATIENT
Start: 2024-03-27

## 2024-03-27 RX ADMIN — Medication 10 ML: at 11:04

## 2024-03-27 RX ADMIN — SODIUM CHLORIDE 250 ML: 9 INJECTION, SOLUTION INTRAVENOUS at 09:55

## 2024-03-27 RX ADMIN — ADO-TRASTUZUMAB EMTANSINE 175 MG: 20 INJECTION, POWDER, LYOPHILIZED, FOR SOLUTION INTRAVENOUS at 10:25

## 2024-03-27 RX ADMIN — Medication 500 UNITS: at 11:04

## 2024-03-27 RX ADMIN — DEXAMETHASONE SODIUM PHOSPHATE 12 MG: 10 INJECTION, SOLUTION INTRAMUSCULAR; INTRAVENOUS at 09:55

## 2024-03-27 NOTE — PROGRESS NOTES
Subjective   Veronica Royal is a 83 y.o. female.  Referred by Dr. Michael for right breast inflammatory breast cancer    History of Present Illness   Ms. Royal is a 82-year-old postmenopausal  lady with hypertension, diabetes, hyperlipidemia, chronic kidney disease, hyperkalemia-chronic presented with a palpable abnormality in the right breast.  Subsequent imaging was performed.  Patient has not had a mammogram prior to this in the past 25 years.    4/21/2023-bilateral diagnostic mammogram-high density irregular mass measuring 44 mm with spiculated margins and amorphus and fine pleomorphic calcifications with skin thickening in the right breast at 9:00.  2 intramammary lymph nodes in the upper outer axillary tail region are slightly rounded  2 prominent right axillary lymph nodes largest of which measures 26 mm.  Asymmetry noted in the left breast.    Right breast ultrasound at 9 o'clock position, 3 cm from the nipple there is a 38 x 28 x 42 mm mass.  Skin thickness measuring 11 mm near the mass.  One of the 2 rounded axillary tail lymph nodes noted.  Borderline cortical thickening.  2 abnormal axillary lymph nodes.  1 lymph node displaced loss of normal morphology with a round heterogeneous appearance and echogenic foci.  Most consistent with calcified lymph node measuring 26 mm.  Second lymph node measures 12 mm.  Displaced cortical thickening.  Ultrasound-guided biopsy of the mass in the axillary lymph nodes recommended.    4/21/2023  1.right breast 9:00 biopsy-invasive ductal carcinoma with apocrine features  Grade 3  ER negative  ND negative  HER2 2+ on immunohistochemistry  HER2 amplified on FISH with HER2 copy number of 7.58, OPAL seventeen 3.18, HER2/OPAL 17 ratio of 2.4.  Ki-67 38.45%    2.right axillary biopsy-soft tissue involved by invasive ductal carcinoma with apocrine features  Associated microcalcification  No definite lymph node tissue identified.    4/29/2023-MRI of the breast-biopsy-proven  malignancy in the right breast at 9:00 measuring 4.3 cm in greatest dimension.  Attachment overlying skin and diffuse right breast edema noted.  Right axillary lymphadenopathy.  No suspicious findings in the left breast.    5/11/2023-CT of the chest abdomen and pelvis-no evidence of metastatic disease.  Liver is cirrhotic in configuration.    5/11/2023-bone scan negative    Patient presents today to the clinic for discussing neoadjuvant chemotherapy.  She is accompanied by her daughter.  Patient denies any recent changes in weight, she reports some pain at the site of malignancy.  A punch biopsy was performed and was consistent with inflammatory breast cancer.  At the site of punch biopsy there is an ulcerated lesion.    She has poorly controlled diabetes currently on glipizide metformin and Tradjenta.  Hemoglobin A1c recently was noted to be 9.9.    Also has chronic hyperkalemia, explanation unclear.    Blood pressure poorly controlled.    She reports good functional status lives independently.  Able to manage all her bills and independent of ADLs.  She has good support system in terms of her daughter.    Family history significant for brother with pancreatic cancer at the age of 68, sister with ovarian cancer at the age of 58    She received 6 weeks of Taxol Perjeta and Herceptin and subsequently admitted to the hospital due to severe nausea vomiting diarrhea poor oral intake, KAMILA, severe electrolyte abnormalities.      admission to the hospital for KAMILA, severe diarrhea, nausea vomiting and poor oral intake.She was in the hospital between 7/12/2023 to 7/20/2023.  During the hospitalization she was treated for acute UTI, electrolyte abnormalities and KAMILA.  Subsequently she was  discharged to a rehab.   During the hospitalization she was evaluated by Dr. Michael and a right breast ultrasound was obtained on 7/17/2023.  There was very little response noted with the 9 o'clock position mass 6 cm from the nipple  measuring 3.7 x 2.4 x 3.7 cm previously 3.8 x 2.5 x 4.2 cm.  In the right axilla the lymph node measured 2.6 x 1.5 x 1.9 cm previously 1.9 x 1.5 x 2.5 cm.  There was decrease in size of the adjacent lymph node measuring 0.7 cm previously 1.2 cm.    8/24/2023-right mastectomy and axillary lymph node dissection  Invasive ductal carcinoma, grade 2  The tumor measures 4 cm  Microcalcifications are seen in the tumor  Dermal lymphatic invasion present  Lymphatic invasion present  Tumor seen in the skin but no ulceration.  Residual cancer burden 3.454  RCB-3  Xiong Lima grade 3  Margins negative  18 lymph nodes total evaluated  1 with micrometastasis with a tumor measuring 2.5 mm with no chemotherapy effect  1 with chemotherapy change and isolated tumor cells  There is 1 node with chemotherapy change and a clip but no residual tumor.    Receptors repeated  ER negative  AR negative  HER2 2+ on immunohistochemistry  FISH nonamplified    Kadcyla dose decreased to 3 mg/kg.    Patient was hospitalized between 2/22/2024 to 2/24/2024.  She presented to the emergency room with complaints of chest pain.  CT angiogram was performed which did not show any evidence of pulmonary embolism.  There were changes consistent with radiation on the right side and there was a moderate pleural effusion on the right.  Thoracentesis was performed and 550 cc of fluid was drained.  Cytology was negative for any malignancy.  Cultures remain negative.  Chest pain improved after the thoracentesis.  She denies any dyspnea or cough.    Bilateral lower extremity Doppler was performed which was negative.    She is reporting lymphedema of the right upper extremity and has been somewhat noncompliant with the sleeve.  She continues to follow-up with lymphedema clinic.    She is tolerating the decreased dose of Kadcyla fairly well.  She felt a little weak immediately after the discharge from the hospital but subsequently felt better.    Echocardiogram  2/15/2024 shows a stable ejection fraction of 54.5% with a strain of -19%.    3/8/2024-stress test-low risk study with normal myocardial perfusion.  Left ventricular fraction is normal at 64%.    Interval history  Ms. Cherry presents to the clinic today for follow-up.  She had a chest x-ray performed on 3/25/2024-images independently reviewed and interpreted by me.  No recurrence of right pleural effusion.  She reports feeling well with no recurrence of the right chest wall.  She continues to do the exercises which were demonstrated to her by physical therapy.  Overall she feels like her strength has improved however she has some residual weakness in her lower extremities.  She denies any diarrhea nausea vomiting.  Denies any new right chest wall lesions or left breast abnormalities.  She will be due for screening mammogram in April 2024.     The following portions of the patient's history were reviewed and updated as appropriate: allergies, current medications, past family history, past medical history, past social history, past surgical history and problem list.    Past Medical History:   Diagnosis Date    Anxiety     Arthritis     Basal cell carcinoma     Left thumb    Breast cancer 2023    Chronic kidney disease     Depression     Diabetes mellitus     Diarrhea     s/e chemo    High cholesterol     History of chemotherapy 08/2023    Hypertension     Rash 08/2023    s/e chemo    Sacral decubitus ulcer     cleaning with soap & water    Urinary incontinence     wears pads        Past Surgical History:   Procedure Laterality Date    BREAST BIOPSY      BREAST SURGERY      CATARACT EXTRACTION EXTRACAPSULAR W/ INTRAOCULAR LENS IMPLANTATION Right     EYE SURGERY      Muscle repair age 21    MASTECTOMY W/ SENTINEL NODE BIOPSY Right 08/24/2023    Procedure: Right breast modified radical mastectomy;  Surgeon: Rosa Michael MD;  Location: [facility] OR [facility] [place];  Service: General;  Laterality: Right;    TONSILLECTOMY       "VENOUS ACCESS DEVICE (PORT) INSERTION N/A 2023    Procedure: INSERTION VENOUS ACCESS DEVICE;  Surgeon: Rosa Michael MD;  Location: [facility] [place];  Service: General;  Laterality: N/A;        Family History   Problem Relation Age of Onset    Alzheimer's disease Mother     Heart disease Father     Heart attack Father     Ovarian cancer Sister     Pancreatic cancer Brother     Malig Hyperthermia Neg Hx     Colon cancer Neg Hx     Colon polyps Neg Hx     Crohn's disease Neg Hx     Irritable bowel syndrome Neg Hx     Ulcerative colitis Neg Hx         Social History     Socioeconomic History    Marital status:    Tobacco Use    Smoking status: Never    Smokeless tobacco: Never   Vaping Use    Vaping status: Never Used   Substance and Sexual Activity    Alcohol use: Never    Drug use: Never    Sexual activity: Never        OB History    No obstetric history on file.      Age at menarche-11  Age at first live childbirth-35   2 para 1  1  Age at menopause-50  Oral conceptive pill use for 3 to 4 years    No Known Allergies     Review of Systems    Review of systems as mentioned HPI otherwise negative  Objective   Blood pressure 157/84, pulse 89, temperature 97.5 °F (36.4 °C), temperature source Temporal, resp. rate 16, height 157.5 cm (62.01\"), weight 58.5 kg (129 lb), SpO2 99%.     Physical Exam  Vitals reviewed.   Constitutional:       General: She is not in acute distress.     Appearance: Normal appearance. She is well-developed.   HENT:      Head: Normocephalic and atraumatic.      Right Ear: Decreased hearing noted.      Left Ear: Decreased hearing noted.   Eyes:      Pupils: Pupils are equal, round, and reactive to light.   Cardiovascular:      Rate and Rhythm: Normal rate and regular rhythm.      Heart sounds: Normal heart sounds. No murmur heard.  Pulmonary:      Effort: Pulmonary effort is normal. No respiratory distress.      Breath sounds: Normal breath sounds. No wheezing, rhonchi " or rales.   Abdominal:      General: Bowel sounds are normal. There is no distension.      Palpations: Abdomen is soft.   Musculoskeletal:         General: Swelling (trace) present. Normal range of motion.      Cervical back: Normal range of motion.   Skin:     General: Skin is warm and dry.      Findings: No rash.   Neurological:      Mental Status: She is alert and oriented to person, place, and time.        Right chest wall carefully examined, status post mastectomy with incision well-healed, no signs or symptoms of infection.  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 Sheila Yee MD        Results from last 7 days   Lab Units 03/27/24  0900   WBC 10*3/mm3 5.47   NEUTROS ABS 10*3/mm3 4.05   HEMOGLOBIN g/dL 8.7*   HEMATOCRIT % 27.7*   PLATELETS 10*3/mm3 148         CBC reviewed and hemoglobin lower at 9.9, WBC 5.43 and platelets 153,000  CMP reviewed and BUN 22, creatinine 0.82, LFTs within normal limits            No radiology results for the last 30 days.     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  Invasive ductal carcinoma with apocrine features, grade 3, Ki-67 38%  CT scans and bone scan without any obvious evidence of metastatic disease  Echocardiogram has been performed and ejection fraction normal.  Liver shows cirrhotic morphology.  LFTs otherwise normal and total bilirubin normal as well as protein normal.  It appears that the synthetic function of the liver is still within normal limits.  Mediport placed 5/19/2023  Taxol, Herceptin, Perjeta initiated  5/24/2023 5/31/2023 with C1D8 Taxol  6/28/2023-cycle 2-day 15 of Taxol.  She is overall tolerating therapy well with expected side effects.  She will proceed with Taxol today.  Scheduled for Taxol Herceptin and Perjeta.  7/12/2023-cycle 3-day 8 of TPH.  Chemotherapy held and patient was admitted to the hospital for management of severe dehydration, KAMILA, nausea vomiting diarrhea and decreased oral intake.  8/2/2023-she feels relatively well today.  Labs reviewed and stable to proceed with Herceptin only.  We will not administer Taxol and Perjeta as she has had significant issues with chemotherapy.  Right mastectomy 8/24/2023 with 4 cm of residual disease, RCB-3, treatment effect present, axillary lymph node dissection with 1 lymph node with micrometastasis measuring 2.5 mm, 1 lymph node with isolated tumor cells and a third lymph node which showed treatment changes but no tumor cells.  Total of 18 lymph nodes removed   9/20/2023 to discuss pathology and adjuvant therapy.  We discussed Kadcyla every 3 weekly to finish a complete year.  Patient is definitely hesitant to resume any sort of chemotherapy due to her previous experience with diarrhea.  Started Kadcyla 9/20/2023, denies any diarrhea.  Tolerating treatment well so far  No evidence of recurrent disease  Adjuvant radiation completed 11/20/2023  She has received 5 doses of adjuvant Kadcyla.  Sixth dose has been delayed by 3 weeks due to worsening arthralgias, decreased strength in her lower extremities  Improvement in strength as well as arthralgias with holding chemotherapy.  We discussed about proceeding with the dose reduction of Kadcyla versus not doing any further Kadcyla.  Kadcyla dose decreased to 3 mg/kg  Patient hospitalized between 2/21/2024 to 2/24/2024 for right-sided pleural effusion and status post thoracentesis with 550 cc drained which was nonmalignant.  Etiology of the pleural fluid is unclear  CTA performed 2/21/2024 without any evidence of  metastatic disease.  Chest x-ray 3/25/2024-no recurrence of fluid  Currently no evidence of recurrent disease.  Continue with Kadcyla.  CBC reviewed and WBC 5.47, hemoglobin 8.7, platelets 148,000, absolute neutrophil count 4.05  CMP reviewed and BUN 31, creatinine 1.08, LFTs normal    *Anemia  Hemoglobin 8.7.  MCV 83.2.  Most likely secondary to chemotherapy.  Iron studies performed in May 2023 suggestive of anemia of chronic disease/inflammation.  We will repeat iron studies, B12 and folic acid.    *Diabetes  Poorly controlled  Evaluation by endocrinology 5/30/2023 with recommendations for dietary changes and home blood sugar monitoring.  The patient's daughter reports today she is struggling with compliance.  Continue follow-up with primary care physician    *KAMILA/CKD  8/17/2023 BUN 24 and creatinine 1.05  Patient's daughter reports that she has not been hydrating herself well.  Creatinine slightly elevated at 1.08    *?  Cirrhotic appearance of the liver on the CT scan  Referred to gastroenterology  Synthetic function appears to be normal  We will start with Taxol to see if she would tolerate the chemotherapy   Slight elevation of intervention studies today with ALT 43, AST 55.  Continue to monitor weekly  6/28/2023-mild elevation in the LFTs.  Stable compared to previous labs.  Okay to proceed with chemotherapy.  11/21/2023-LFTs normal  12/13/2023: Liver enzymes are normals. Alkaline phosphatase is slightly more elevated at 244.   LFTs remain normal    *Hypertension-currently on valsartan and hydrochlorothiazide.  Valsartan could be contributing to hyperkalemia.  Will refer to cardiology ASAP for management of medications and cardiac clearance for proceeding with chemotherapy  Recent echocardiogram normal  Stress test reviewed and negative.  9/27/2023-echocardiogram shows a normal ejection fraction of 59%.  Follow-up echocardiogram 10/20/2023 with ejection fraction of 54%.  When compared to baseline study  5/9/2023 ejection fraction is stable.  Left ventricular GLS is changed by 8%.  Discussed with cardiology, given the stability of ejection fraction when compared to baseline from May, we will proceed with Kadcyla   Cardiology evaluation 11/10/2023 with stable EF and strain.  Recommendations to continue Kadcyla with 3-month follow-up echocardiogram  12/13/2023: Being followed by cardio oncology. Has an ECHO scheduled soon that is to be rescheduled per her daughter.   Blood pressure elevated at 157/84    *Genetic testing-Invitae 9 gene stat panel negative,     *Decreased oral intake secondary to chemotherapy  Still not adequate oral intake  Encouraged her to drink boost and Ensure  Weight has increased and she now weighs 130 pounds    *Frequent belching  Continue Protonix 40 mg daily  Improved    *Cognitive impairment  It is unclear if this is the patient's baseline or mental status has been exacerbated by recent chemotherapy  The patient is struggling with compliance with her medications.  The patient's daughter expresses frustration today as the patient is struggling to care for herself at home with managing medications and symptom management.  Evaluated by CARL Antonio     *Chemotherapy-induced diarrhea  6/5/2023 patient given Enterade (Wild Berry flavor).  She has been drinking 1 a day.  She does not necessarily like the flavor (currently mixing with sugar-free Aramis-Aid which does help).  Encouraged her to increase to 2 a day.  6/21/2023 patient reports that she had stopped drinking her Enterade because she was waking up in the middle the night having diarrhea although she did not know if it was due to the Enterade her protein shakes.  I have encouraged the patient to continue Enterade, drinking it in the morning.  Try discontinuing the protein shakes and see how she does.  Patient states that she will try this  Reports 2-3 loose stools.  Continue Enterade and Imodium.  7/5/2023 continuing to have issues  with diarrhea up to 3-4 bowel movements a day, patient also recently prescribed Kayexalate for an apparently elevated potassium.  Potassium only 3.1 today.  She advised to discontinue the Kayexalate she was given IV hydration today.  We will also prescribe Lomotil to use if needed to help better control her diarrhea.  Patient is not drinking Enterade regularly.  8/2/2023-improved since discharge from the hospital and discontinuation of chemotherapy.  She received Herceptin on 8/2/2023 and reports a week of diarrhea.  Patient has not had any diarrhea since initiating Kadcyla.  Continue to monitor, she continues to note this is improved on Kadcyla  12/13/2023: Has improved. Currently has had some constipation. Encouraged senokot S every other night based on her current symptoms until bowel movements are more consistent.   Improved    Weakness and leg aching  12/13/2023: Patient denies any numbness or tingling in her legs and there is not localized edema, warmth, cording or tenderness. Continues to report weakness and aching that sounds like a combination of deconditioning and possible restless leg syndrome. Magnesium level was checked and is normal. Home health to be consulted for physical therapy and a prescription for a walker provided.   1/3/2024-patient reports persistent weakness over the past 1 month.  She is also complaining of leg cramping and had a recent fall.  Although not entirely suggestive of neuropathy I do wonder if Kadcyla is contributing to the weakness.  I will delay treatment by 3 weeks to let her continue with physical therapy to see if that would help with improvement in the strength.  Arthralgias and lower extremity strength have improved significantly since dealing chemotherapy and increased work with physical therapy.  Recommend that she continue physical therapy  Improved with decreased dose of Kadcyla.  Improved significantly with physical therapy  Strength continues to improve although she  reports some mild weakness in her lower extremities.  Encouraged her to continue physical therapy exercises and increased activity to keep up the strength.  Also encouraged her to consume a high-protein diet.      *Right-sided pleural effusion  New on CT chest from 2/21/2024  Unclear etiology  Chest x-ray 3/25/2024 without any evidence of recurrence of the pleural fluid    *Lymphedema  Continue follow-up with lymphedema clinic  Encouraged her to remain compliant with the sleeve    PLAN:  Cycle 9 of Kadcyla, dose has been decreased to 3 mg/kg  Continue physical therapy  Chest x-ray unremarkable, we will repeat only if she has recurrence of symptoms  Continue increased protein intake.   Radiation completed 11/20/2023  Stress test 3/8/2024 with low risk study  Follow-up in 6 weeks with APRN and 12 weeks with MD    Patient is on medications requiring close monitoring for toxicities. 41 minutes total spent on the encounter including reviewing the chest x-ray images independently and interpreting them independently, reviewing the stress test results, face-to-face time, documentation on the same day and care coordination.      Sheila Yee MD  03/27/2024

## 2024-03-27 NOTE — PROGRESS NOTES
Subjective   Veronica Royal is a 83 y.o. female.  Referred by Dr. Michael for right breast inflammatory breast cancer    History of Present Illness   Ms. Royal is a 82-year-old postmenopausal  lady with hypertension, diabetes, hyperlipidemia, chronic kidney disease, hyperkalemia-chronic presented with a palpable abnormality in the right breast.  Subsequent imaging was performed.  Patient has not had a mammogram prior to this in the past 25 years.    4/21/2023-bilateral diagnostic mammogram-high density irregular mass measuring 44 mm with spiculated margins and amorphus and fine pleomorphic calcifications with skin thickening in the right breast at 9:00.  2 intramammary lymph nodes in the upper outer axillary tail region are slightly rounded  2 prominent right axillary lymph nodes largest of which measures 26 mm.  Asymmetry noted in the left breast.    Right breast ultrasound at 9 o'clock position, 3 cm from the nipple there is a 38 x 28 x 42 mm mass.  Skin thickness measuring 11 mm near the mass.  One of the 2 rounded axillary tail lymph nodes noted.  Borderline cortical thickening.  2 abnormal axillary lymph nodes.  1 lymph node displaced loss of normal morphology with a round heterogeneous appearance and echogenic foci.  Most consistent with calcified lymph node measuring 26 mm.  Second lymph node measures 12 mm.  Displaced cortical thickening.  Ultrasound-guided biopsy of the mass in the axillary lymph nodes recommended.    4/21/2023  1.right breast 9:00 biopsy-invasive ductal carcinoma with apocrine features  Grade 3  ER negative  DC negative  HER2 2+ on immunohistochemistry  HER2 amplified on FISH with HER2 copy number of 7.58, OPAL seventeen 3.18, HER2/OPAL 17 ratio of 2.4.  Ki-67 38.45%    2.right axillary biopsy-soft tissue involved by invasive ductal carcinoma with apocrine features  Associated microcalcification  No definite lymph node tissue identified.    4/29/2023-MRI of the breast-biopsy-proven  malignancy in the right breast at 9:00 measuring 4.3 cm in greatest dimension.  Attachment overlying skin and diffuse right breast edema noted.  Right axillary lymphadenopathy.  No suspicious findings in the left breast.    5/11/2023-CT of the chest abdomen and pelvis-no evidence of metastatic disease.  Liver is cirrhotic in configuration.    5/11/2023-bone scan negative    Patient presents today to the clinic for discussing neoadjuvant chemotherapy.  She is accompanied by her daughter.  Patient denies any recent changes in weight, she reports some pain at the site of malignancy.  A punch biopsy was performed and was consistent with inflammatory breast cancer.  At the site of punch biopsy there is an ulcerated lesion.    She has poorly controlled diabetes currently on glipizide metformin and Tradjenta.  Hemoglobin A1c recently was noted to be 9.9.    Also has chronic hyperkalemia, explanation unclear.    Blood pressure poorly controlled.    She reports good functional status lives independently.  Able to manage all her bills and independent of ADLs.  She has good support system in terms of her daughter.    Family history significant for brother with pancreatic cancer at the age of 68, sister with ovarian cancer at the age of 58    She received 6 weeks of Taxol Perjeta and Herceptin and subsequently admitted to the hospital due to severe nausea vomiting diarrhea poor oral intake, KAMILA, severe electrolyte abnormalities.      admission to the hospital for KAMILA, severe diarrhea, nausea vomiting and poor oral intake.She was in the hospital between 7/12/2023 to 7/20/2023.  During the hospitalization she was treated for acute UTI, electrolyte abnormalities and KAMILA.  Subsequently she was  discharged to a rehab.   During the hospitalization she was evaluated by Dr. Michael and a right breast ultrasound was obtained on 7/17/2023.  There was very little response noted with the 9 o'clock position mass 6 cm from the nipple  measuring 3.7 x 2.4 x 3.7 cm previously 3.8 x 2.5 x 4.2 cm.  In the right axilla the lymph node measured 2.6 x 1.5 x 1.9 cm previously 1.9 x 1.5 x 2.5 cm.  There was decrease in size of the adjacent lymph node measuring 0.7 cm previously 1.2 cm.    8/24/2023-right mastectomy and axillary lymph node dissection  Invasive ductal carcinoma, grade 2  The tumor measures 4 cm  Microcalcifications are seen in the tumor  Dermal lymphatic invasion present  Lymphatic invasion present  Tumor seen in the skin but no ulceration.  Residual cancer burden 3.454  RCB-3  Xiong Lima grade 3  Margins negative  18 lymph nodes total evaluated  1 with micrometastasis with a tumor measuring 2.5 mm with no chemotherapy effect  1 with chemotherapy change and isolated tumor cells  There is 1 node with chemotherapy change and a clip but no residual tumor.    Receptors repeated  ER negative  WA negative  HER2 2+ on immunohistochemistry  FISH nonamplified    Kadcyla dose decreased to 3 mg/kg.    Patient was hospitalized between 2/22/2024 to 2/24/2024.  She presented to the emergency room with complaints of chest pain.  CT angiogram was performed which did not show any evidence of pulmonary embolism.  There were changes consistent with radiation on the right side and there was a moderate pleural effusion on the right.  Thoracentesis was performed and 550 cc of fluid was drained.  Cytology was negative for any malignancy.  Cultures remain negative.  Chest pain improved after the thoracentesis.  She denies any dyspnea or cough.    Bilateral lower extremity Doppler was performed which was negative.    She is reporting lymphedema of the right upper extremity and has been somewhat noncompliant with the sleeve.  She continues to follow-up with lymphedema clinic.    She is tolerating the decreased dose of Kadcyla fairly well.  She felt a little weak immediately after the discharge from the hospital but subsequently felt better.    Echocardiogram  2/15/2024 shows a stable ejection fraction of 54.5% with a strain of -19%.    She continues to follow-up with cardio oncology and plans to perform a stress test.     The following portions of the patient's history were reviewed and updated as appropriate: allergies, current medications, past family history, past medical history, past social history, past surgical history and problem list.    Past Medical History:   Diagnosis Date    Anxiety     Arthritis     Basal cell carcinoma     Left thumb    Breast cancer 2023    Chronic kidney disease     Depression     Diabetes mellitus     Diarrhea     s/e chemo    High cholesterol     History of chemotherapy 08/2023    Hypertension     Rash 08/2023    s/e chemo    Sacral decubitus ulcer     cleaning with soap & water    Urinary incontinence     wears pads        Past Surgical History:   Procedure Laterality Date    BREAST BIOPSY      BREAST SURGERY      CATARACT EXTRACTION EXTRACAPSULAR W/ INTRAOCULAR LENS IMPLANTATION Right     EYE SURGERY      Muscle repair age 21    MASTECTOMY W/ SENTINEL NODE BIOPSY Right 08/24/2023    Procedure: Right breast modified radical mastectomy;  Surgeon: Rosa Michael MD;  Location[place] [facility] [place] [facility] [place];  Service: General;  Laterality: Right;    TONSILLECTOMY      VENOUS ACCESS DEVICE (PORT) INSERTION N/A 05/19/2023    Procedure: INSERTION VENOUS ACCESS DEVICE;  Surgeon: Rosa Michael MD;  Location: [facility] [place] [facility] [place];  Service: General;  Laterality: N/A;        Family History   Problem Relation Age of Onset    Alzheimer's disease Mother     Heart disease Father     Heart attack Father     Ovarian cancer Sister     Pancreatic cancer Brother     Malig Hyperthermia Neg Hx     Colon cancer Neg Hx     Colon polyps Neg Hx     Crohn's disease Neg Hx     Irritable bowel syndrome Neg Hx     Ulcerative colitis Neg Hx         Social History     Socioeconomic History    Marital status:    Tobacco Use    Smoking status: Never    Smokeless tobacco:  Never   Vaping Use    Vaping status: Never Used   Substance and Sexual Activity    Alcohol use: Never    Drug use: Never    Sexual activity: Never        OB History    No obstetric history on file.      Age at menarche-11  Age at first live childbirth-35   2 para 1  1  Age at menopause-50  Oral conceptive pill use for 3 to 4 years    No Known Allergies     Review of Systems    Review of systems as mentioned HPI otherwise negative    Objective   There were no vitals taken for this visit.     Physical Exam  Vitals reviewed.   Constitutional:       General: She is not in acute distress.     Appearance: Normal appearance. She is well-developed.   HENT:      Head: Normocephalic and atraumatic.      Right Ear: Decreased hearing noted.      Left Ear: Decreased hearing noted.   Eyes:      Pupils: Pupils are equal, round, and reactive to light.   Cardiovascular:      Rate and Rhythm: Normal rate and regular rhythm.      Heart sounds: Normal heart sounds. No murmur heard.  Pulmonary:      Effort: Pulmonary effort is normal. No respiratory distress.      Breath sounds: Normal breath sounds. No wheezing, rhonchi or rales.   Abdominal:      General: Bowel sounds are normal. There is no distension.      Palpations: Abdomen is soft.   Musculoskeletal:         General: Swelling (trace) present. Normal range of motion.      Cervical back: Normal range of motion.   Skin:     General: Skin is warm and dry.      Findings: No rash.   Neurological:      Mental Status: She is alert and oriented to person, place, and time.        Right chest wall carefully examined, status post mastectomy with incision well-healed, no signs or symptoms of infection.  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 Deepthi Singh, PCT                    CBC reviewed and hemoglobin lower at 9.9, WBC 5.43 and platelets 153,000  CMP reviewed and BUN 22, creatinine 0.82, LFTs within normal limits            No radiology results for the last 30 days.     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  Invasive ductal carcinoma with apocrine features, grade 3, Ki-67 38%  CT scans and bone scan without any obvious evidence of metastatic disease  Echocardiogram has been performed and ejection fraction normal.  Liver shows cirrhotic morphology.  LFTs otherwise normal and total bilirubin normal as well as protein normal.  It appears that the synthetic function of the liver is still within normal limits.  Mediport placed 5/19/2023  Taxol, Herceptin, Perjeta initiated 5/24/2023 5/31/2023 with C1D8 Taxol  6/28/2023-cycle 2-day 15 of Taxol.  She is overall tolerating therapy well with expected side effects.  She will proceed with Taxol today.  Scheduled for Taxol Herceptin and Perjeta.  7/12/2023-cycle 3-day 8 of TPH.  Chemotherapy held and patient was admitted to the hospital for management of severe dehydration, KAMILA, nausea vomiting diarrhea and decreased oral intake.  8/2/2023-she feels relatively well today.  Labs reviewed and stable to proceed with Herceptin only.  We will not administer Taxol and Perjeta as she has had significant issues with chemotherapy.  Right mastectomy 8/24/2023 with 4 cm of residual disease, RCB-3, treatment effect present, axillary lymph node dissection with 1 lymph node with micrometastasis measuring 2.5 mm, 1 lymph node with isolated tumor cells and a third lymph node which showed treatment changes but no tumor cells.  Total of 18 lymph nodes removed   9/20/2023 to discuss pathology and adjuvant  therapy.  We discussed Kadcyla every 3 weekly to finish a complete year.  Patient is definitely hesitant to resume any sort of chemotherapy due to her previous experience with diarrhea.  Started Kadcyla 9/20/2023, denies any diarrhea.  Tolerating treatment well so far  No evidence of recurrent disease  Adjuvant radiation completed 11/20/2023  She has received 5 doses of adjuvant Kadcyla.  Sixth dose has been delayed by 3 weeks due to worsening arthralgias, decreased strength in her lower extremities  Improvement in strength as well as arthralgias with holding chemotherapy.  We discussed about proceeding with the dose reduction of Kadcyla versus not doing any further Kadcyla.  Kadcyla dose decreased to 3 mg/kg  Patient hospitalized between 2/21/2024 to 2/24/2024 for right-sided pleural effusion and status post thoracentesis with 550 cc drained which was nonmalignant.  Etiology of the pleural fluid is unclear.  Continue follow-up with cardiology.  Currently no evidence of recurrent disease.  CTA performed 2/21/2024 without any evidence of metastatic disease.    *Diabetes  Poorly controlled  Evaluation by endocrinology 5/30/2023 with recommendations for dietary changes and home blood sugar monitoring.  The patient's daughter reports today she is struggling with compliance.  Continue follow-up with primary care physician    *KAMILA/CKD  8/17/2023 BUN 24 and creatinine 1.05  Patient's daughter reports that she has not been hydrating herself well.  Creatinine stable at 0.97    *?  Cirrhotic appearance of the liver on the CT scan  Referred to gastroenterology  Synthetic function appears to be normal  We will start with Taxol to see if she would tolerate the chemotherapy   Slight elevation of intervention studies today with ALT 43, AST 55.  Continue to monitor weekly  6/28/2023-mild elevation in the LFTs.  Stable compared to previous labs.  Okay to proceed with chemotherapy.  11/21/2023-LFTs normal  12/13/2023: Liver enzymes  are normals. Alkaline phosphatase is slightly more elevated at 244.   LFTs are normal    *Hypertension-currently on valsartan and hydrochlorothiazide.  Valsartan could be contributing to hyperkalemia.  Will refer to cardiology ASAP for management of medications and cardiac clearance for proceeding with chemotherapy  Recent echocardiogram normal  Stress test reviewed and negative.  9/27/2023-echocardiogram shows a normal ejection fraction of 59%.  Follow-up echocardiogram 10/20/2023 with ejection fraction of 54%.  When compared to baseline study 5/9/2023 ejection fraction is stable.  Left ventricular GLS is changed by 8%.  Discussed with cardiology, given the stability of ejection fraction when compared to baseline from May, we will proceed with Kadcyla   Cardiology evaluation 11/10/2023 with stable EF and strain.  Recommendations to continue Kadcyla with 3-month follow-up echocardiogram  12/13/2023: Being followed by cardio oncology. Has an ECHO scheduled soon that is to be rescheduled per her daughter.   Blood pressure elevated at 161/78    *Genetic testing-Inv[employer]ae 9 gene stat panel negative,     *Decreased oral intake secondary to chemotherapy  Still not adequate oral intake  Encouraged her to drink boost and Ensure  Weight has increased and she now weighs 130 pounds    *Frequent belching  Continue Protonix 40 mg daily  Improved    *Cognitive impairment  It is unclear if this is the patient's baseline or mental status has been exacerbated by recent chemotherapy  The patient is struggling with compliance with her medications.  The patient's daughter expresses frustration today as the patient is struggling to care for herself at home with managing medications and symptom management.  Evaluated by CARL Antonio     *Chemotherapy-induced diarrhea  6/5/2023 patient given Enterade (Wild Berry flavor).  She has been drinking 1 a day.  She does not necessarily like the flavor (currently mixing with sugar-free  Aramis-Aid which does help).  Encouraged her to increase to 2 a day.  6/21/2023 patient reports that she had stopped drinking her Enterade because she was waking up in the middle the night having diarrhea although she did not know if it was due to the Enterade her protein shakes.  I have encouraged the patient to continue Enterade, drinking it in the morning.  Try discontinuing the protein shakes and see how she does.  Patient states that she will try this  Reports 2-3 loose stools.  Continue Enterade and Imodium.  7/5/2023 continuing to have issues with diarrhea up to 3-4 bowel movements a day, patient also recently prescribed Kayexalate for an apparently elevated potassium.  Potassium only 3.1 today.  She advised to discontinue the Kayexalate she was given IV hydration today.  We will also prescribe Lomotil to use if needed to help better control her diarrhea.  Patient is not drinking Enterade regularly.  8/2/2023-improved since discharge from the hospital and discontinuation of chemotherapy.  She received Herceptin on 8/2/2023 and reports a week of diarrhea.  Patient has not had any diarrhea since initiating Kadcyla.  Continue to monitor, she continues to note this is improved on Kadcyla  12/13/2023: Has improved. Currently has had some constipation. Encouraged senokot S every other night based on her current symptoms until bowel movements are more consistent.   Improved    Weakness and leg aching  12/13/2023: Patient denies any numbness or tingling in her legs and there is not localized edema, warmth, cording or tenderness. Continues to report weakness and aching that sounds like a combination of deconditioning and possible restless leg syndrome. Magnesium level was checked and is normal. Home health to be consulted for physical therapy and a prescription for a walker provided.   1/3/2024-patient reports persistent weakness over the past 1 month.  She is also complaining of leg cramping and had a recent  fall.  Although not entirely suggestive of neuropathy I do wonder if Kadcyla is contributing to the weakness.  I will delay treatment by 3 weeks to let her continue with physical therapy to see if that would help with improvement in the strength.  Arthralgias and lower extremity strength have improved significantly since dealing chemotherapy and increased work with physical therapy.  Recommend that she continue physical therapy  Improved with decreased dose of Kadcyla.  Improved significantly with physical therapy  Reports some increased weakness after discharge from the hospital but gradually improving.      *Right-sided pleural effusion  New on CT chest from 2/21/2024  Unclear etiology  We will repeat a chest x-ray in 3 weeks to see if there is any reaccumulation of fluid.    PLAN:  Cycle 8 Kadcyla, decreased dose of 3 mg/kg  Continue physical therapy  Chest x-ray in 3 weeks  MD follow-up in 3 weeks  Continue increased protein intake.   Utilize a walker for stability  Radiation completed 11/20/2023  Possible stress test with cardiology.    Patient is on a high risk medication requiring close monitoring for toxicity.  CT chest images independently reviewed and interpreted by me in detail summarized in HPI.  Neurology notes reviewed.  Labs from recent hospitalization reviewed.    Deepthi Singh, PCT  03/27/2024

## 2024-04-05 ENCOUNTER — HOSPITAL ENCOUNTER (OUTPATIENT)
Dept: PHYSICAL THERAPY | Facility: HOSPITAL | Age: 84
Setting detail: THERAPIES SERIES
Discharge: HOME | End: 2024-04-05
Payer: MEDICARE

## 2024-04-05 DIAGNOSIS — C50.411 MALIGNANT NEOPLASM OF UPPER-OUTER QUADRANT OF RIGHT BREAST IN FEMALE, ESTROGEN RECEPTOR NEGATIVE: Primary | ICD-10-CM

## 2024-04-05 DIAGNOSIS — I89.0 LYMPHEDEMA OF RIGHT UPPER EXTREMITY: ICD-10-CM

## 2024-04-05 DIAGNOSIS — Z91.89 AT RISK FOR LYMPHEDEMA: ICD-10-CM

## 2024-04-05 DIAGNOSIS — Z17.1 MALIGNANT NEOPLASM OF UPPER-OUTER QUADRANT OF RIGHT BREAST IN FEMALE, ESTROGEN RECEPTOR NEGATIVE: Primary | ICD-10-CM

## 2024-04-05 DIAGNOSIS — Z90.11 S/P RIGHT MASTECTOMY: ICD-10-CM

## 2024-04-05 PROCEDURE — 97535 SELF CARE MNGMENT TRAINING: CPT

## 2024-04-05 NOTE — THERAPY RE-EVALUATION
Physical Therapy Lymphedema Re-Evaluation  [facility]     Patient Name: Veronica Royal  : 1940  MRN: [id number]  Today's Date: 2024      Visit Date: 2024    Visit Dx:    ICD-10-CM ICD-9-CM   1. Malignant neoplasm of upper-outer quadrant of right breast in female, estrogen receptor negative  C50.411 174.4    Z17.1 V86.1   2. Lymphedema of right upper extremity  I89.0 457.1   3. At risk for lymphedema  Z91.89 V49.89   4. S/P right mastectomy  Z90.11 V45.71       Patient Active Problem List   Diagnosis    Malignant neoplasm of upper-outer quadrant of right breast in female, estrogen receptor negative    Encounter for fitting and adjustment of vascular catheter    Hypertension    At high risk for malnutrition    Bilateral hearing loss    Malignant neoplasm of female breast    Advanced care planning/counseling discussion    Cirrhosis of liver    Type 2 diabetes mellitus    Anemia due to chemotherapy    Left renal mass    Severe malnutrition    Breast cancer    Driving safety issue    Slow transit constipation    Right-sided chest pain    Chest pain    Pleural effusion        Past Medical History:   Diagnosis Date    Anxiety     Arthritis     Basal cell carcinoma     Left thumb    Breast cancer     Chronic kidney disease     Depression     Diabetes mellitus     Diarrhea     s/e chemo    High cholesterol     History of chemotherapy 2023    Hypertension     Rash 2023    s/e chemo    Sacral decubitus ulcer     cleaning with soap & water    Urinary incontinence     wears pads        Past Surgical History:   Procedure Laterality Date    BREAST BIOPSY      BREAST SURGERY      CATARACT EXTRACTION EXTRACAPSULAR W/ INTRAOCULAR LENS IMPLANTATION Right     EYE SURGERY      Muscle repair age 21    MASTECTOMY W/ SENTINEL NODE BIOPSY Right 2023    Procedure: Right breast modified radical mastectomy;  Surgeon: Rosa Michael MD;  Location: [facility] [place];  Service: General;  Laterality: Right;     TONSILLECTOMY      VENOUS ACCESS DEVICE (PORT) INSERTION N/A 05/19/2023    Procedure: INSERTION VENOUS ACCESS DEVICE;  Surgeon: Rosa Michael MD;  Location: [facility] [place] [facility] [place];  Service: General;  Laterality: N/A;       Visit Dx:    ICD-10-CM ICD-9-CM   1. Malignant neoplasm of upper-outer quadrant of right breast in female, estrogen receptor negative  C50.411 174.4    Z17.1 V86.1   2. Lymphedema of right upper extremity  I89.0 457.1   3. At risk for lymphedema  Z91.89 V49.89   4. S/P right mastectomy  Z90.11 V45.71            Lymphedema       Row Name 04/05/24 1000             Subjective Pain    Able to rate subjective pain? yes  -LB      Pre-Treatment Pain Level 0  -LB         Subjective    Subjective Comments I have been doing my massage daily before and after putting on the sleeve. I have worn the sleeve and glove every day.  -LB         Lymphedema Assessment    Lymphedema Classification RUE:;at risk/stage 0  -LB      Lymphedema Cancer Related Sx right;axillary dissection;modified radical mastectomy  -LB      Lymphedema Surgery Comments 8/24/23  -LB      Lymph Nodes Removed # 19  -LB      Positive Lymph Nodes # 1  -LB      Chemo Received yes  -LB      Chemo Treatments #/Timeframe neoadjuvant  -LB      Radiation Therapy Received no  -LB      Infections or Cellulitis? no  -LB         Posture/Observations    Posture/Observations Comments B ankle edema  -LB         Lymphedema Edema Assessment    Edema Assessment Comment continued R hand edema, reducing R forearm  -LB         LUE Quick Girth (cm)    Mid upper arm --  -LB      Elbow --  -LB      Mid forearm --  -LB      Wrist crease --  -LB      Web space --  -LB      Met-heads --  -LB      LUE Quick Girth Total --  -LB         RUE Quick Girth (cm)    Axilla 26.6 cm  -LB      Mid upper arm 25.8 cm  -LB      Elbow 23.6 cm  -LB      Mid forearm 22 cm  -LB      Wrist crease 16.5 cm  -LB      Web space 19 cm  -LB      Met-heads 18 cm  -LB      RUE Quick Girth Total  151.5  -LB                User Key  (r) = Recorded By, (t) = Taken By, (c) = Cosigned By      Initials Name Provider Type    Denia Thompson PT Physical Therapist                                    Therapy Education  Education Details: reviewed continued self management, use of sleeve/glove and continued massage, discussed possible pneumatic pump  Given: Symptoms/condition management, Edema management  Program: Reinforced  How Provided: Verbal  Provided to: Patient  Level of Understanding: Verbalized  12864 - PT Self Care/Mgmt Minutes: 30       OP Exercises       Row Name 04/05/24 1000             Subjective    Subjective Comments I have been doing my massage daily before and after putting on the sleeve. I have worn the sleeve and glove every day.  -LB         Subjective Pain    Able to rate subjective pain? yes  -LB      Pre-Treatment Pain Level 0  -LB                User Key  (r) = Recorded By, (t) = Taken By, (c) = Cosigned By      Initials Name Provider Type    Denia Thompson, PT Physical Therapist                                 PT OP Goals       Row Name 04/05/24 1000          Long Term Goals    LTG Date to Achieve 10/14/23  -LB     LTG 1 Pt will demonstrate LDex bioimpedance WNL.  -LB     LTG 1 Progress Ongoing  -LB     LTG 2 Pt will acquire appropriately fitted compression garment and verbalize appropriate wear schedule.  -LB     LTG 2 Progress Met  -LB     LTG 3 Pt will demonstrate equal BUE AROM.  -LB     LTG 3 Progress Met  -LB               User Key  (r) = Recorded By, (t) = Taken By, (c) = Cosigned By      Initials Name Provider Type    Denia Thompson PT Physical Therapist                     PT Assessment/Plan       Row Name 04/05/24 1036          PT Assessment    Functional Limitations Limitations in functional capacity and performance;Performance in leisure activities  -LB     Impairments Edema;Impaired flexibility;Impaired lymphatic circulation;Muscle strength;Posture;Sensation  -LB      Assessment Comments Pt has now worn RUE compression garment 20-30 mmHG sleeve and glove, performed daily exercise, massage, and elevation for RUE for last 4 weeks. RUE lymphedema persists. Noted hyperpigmentation especially in R hand. Slightly improved net circumferential measurements RUE compared to last treatment but overall remaining elevated and obvious swelling noted R hand forearm. Pt states she is feeling stronger and intermittently performing LE strengthening exercises. Reviewed POC and self MLD. Pt remains appropriate for continued skilled PT services to address remaining deficits as needed and monitor RUE lymphedema.  -LB     Rehab Potential Good  -LB     Patient/caregiver participated in establishment of treatment plan and goals Yes  -LB     Patient would benefit from skilled therapy intervention Yes  -LB        PT Plan    PT Frequency Other (comment)  -LB     Predicted Duration of Therapy Intervention (PT) return in 1 month for reassessment, consider bioimpedance  -LB     Planned CPT's? PT EVAL LOW COMPLEXITY: 39663;PT RE-EVAL: 16470;PT THER PROC EA 15 MIN: 44848;PT THER ACT EA 15 MIN: 12086;PT MANUAL THERAPY EA 15 MIN: 01475;PT NEUROMUSC RE-EDUCATION EA 15 MIN: 58472;PT SELF CARE/HOME MGMT/TRAIN EA 15: 59348;PT BIS XTRACELL FLUID ANALYSIS: 91995  -LB     PT Plan Comments return in one month, pneumatic pump, consider bioimpedance, order more sleeves?  -LB               User Key  (r) = Recorded By, (t) = Taken By, (c) = Cosigned By      Initials Name Provider Type    Denia Thompson, PT Physical Therapist                                 Time Calculation:   Start Time: 1000  Stop Time: 1030  Time Calculation (min): 30 min  Total Timed Code Minutes- PT: 30 minute(s)  Timed Charges  64317 - PT Self Care/Mgmt Minutes: 30  Total Minutes  Timed Charges Total Minutes: 30   Total Minutes: 30   Therapy Charges for Today       Code Description Service Date Service Provider Modifiers Qty    [id number]  PT SELF  CARE/MGMT/TRAIN EA 15 MIN 4/5/2024 Denia Tse, PT GP 2                      Denia Tse, PT  4/5/2024

## 2024-04-10 NOTE — PLAN OF CARE
Goal Outcome Evaluation:  Plan of Care Reviewed With: patient        Progress: improving  Outcome Evaluation: Magnesium replaced this shift. L-chest port needle and dressing changed; CDI with good blood return. 2L NC when sleeping d/t O2 sats <90%.          PAST MEDICAL HISTORY:  Atrial fibrillation dx 7/09 on coumadin    Cerebrovascular accident (CVA)     HLD (hyperlipidemia)     HTN (hypertension)     OA (osteoarthritis)     YOVANI on CPAP     Thyroid nodule followed by endocrinologist    UTI (urinary tract infection) frequent last was 1/15

## 2024-04-17 ENCOUNTER — INFUSION (OUTPATIENT)
Dept: ONCOLOGY | Facility: HOSPITAL | Age: 84
End: 2024-04-17
Payer: MEDICARE

## 2024-04-17 VITALS
HEART RATE: 101 BPM | OXYGEN SATURATION: 98 % | TEMPERATURE: 98.2 F | RESPIRATION RATE: 16 BRPM | BODY MASS INDEX: 23 KG/M2 | DIASTOLIC BLOOD PRESSURE: 69 MMHG | SYSTOLIC BLOOD PRESSURE: 128 MMHG | WEIGHT: 125.8 LBS

## 2024-04-17 DIAGNOSIS — C50.411 MALIGNANT NEOPLASM OF UPPER-OUTER QUADRANT OF RIGHT BREAST IN FEMALE, ESTROGEN RECEPTOR NEGATIVE: ICD-10-CM

## 2024-04-17 DIAGNOSIS — Z17.1 MALIGNANT NEOPLASM OF UPPER-OUTER QUADRANT OF RIGHT BREAST IN FEMALE, ESTROGEN RECEPTOR NEGATIVE: ICD-10-CM

## 2024-04-17 DIAGNOSIS — Z45.2 ENCOUNTER FOR FITTING AND ADJUSTMENT OF VASCULAR CATHETER: Primary | ICD-10-CM

## 2024-04-17 LAB
ALBUMIN SERPL-MCNC: 3.5 G/DL (ref 3.5–5.2)
ALBUMIN/GLOB SERPL: 1.2 G/DL
ALP SERPL-CCNC: 84 U/L (ref 39–117)
ALT SERPL W P-5'-P-CCNC: 17 U/L (ref 1–33)
ANION GAP SERPL CALCULATED.3IONS-SCNC: 11.1 MMOL/L (ref 5–15)
AST SERPL-CCNC: 30 U/L (ref 1–32)
BASOPHILS # BLD AUTO: 0.01 10*3/MM3 (ref 0–0.2)
BASOPHILS NFR BLD AUTO: 0.2 % (ref 0–1.5)
BILIRUB SERPL-MCNC: 0.3 MG/DL (ref 0–1.2)
BUN SERPL-MCNC: 32 MG/DL (ref 8–23)
BUN/CREAT SERPL: 29.9 (ref 7–25)
CALCIUM SPEC-SCNC: 9.7 MG/DL (ref 8.6–10.5)
CHLORIDE SERPL-SCNC: 108 MMOL/L (ref 98–107)
CO2 SERPL-SCNC: 23.9 MMOL/L (ref 22–29)
CREAT SERPL-MCNC: 1.07 MG/DL (ref 0.57–1)
DEPRECATED RDW RBC AUTO: 50.1 FL (ref 37–54)
EGFRCR SERPLBLD CKD-EPI 2021: 51.6 ML/MIN/1.73
EOSINOPHIL # BLD AUTO: 0.21 10*3/MM3 (ref 0–0.4)
EOSINOPHIL NFR BLD AUTO: 3.8 % (ref 0.3–6.2)
ERYTHROCYTE [DISTWIDTH] IN BLOOD BY AUTOMATED COUNT: 16.6 % (ref 12.3–15.4)
GLOBULIN UR ELPH-MCNC: 3 GM/DL
GLUCOSE SERPL-MCNC: 110 MG/DL (ref 65–99)
HCT VFR BLD AUTO: 29.1 % (ref 34–46.6)
HGB BLD-MCNC: 8.9 G/DL (ref 12–15.9)
IMM GRANULOCYTES # BLD AUTO: 0.01 10*3/MM3 (ref 0–0.05)
IMM GRANULOCYTES NFR BLD AUTO: 0.2 % (ref 0–0.5)
LYMPHOCYTES # BLD AUTO: 0.78 10*3/MM3 (ref 0.7–3.1)
LYMPHOCYTES NFR BLD AUTO: 14.3 % (ref 19.6–45.3)
MCH RBC QN AUTO: 25.1 PG (ref 26.6–33)
MCHC RBC AUTO-ENTMCNC: 30.6 G/DL (ref 31.5–35.7)
MCV RBC AUTO: 82 FL (ref 79–97)
MONOCYTES # BLD AUTO: 0.39 10*3/MM3 (ref 0.1–0.9)
MONOCYTES NFR BLD AUTO: 7.1 % (ref 5–12)
NEUTROPHILS NFR BLD AUTO: 4.06 10*3/MM3 (ref 1.7–7)
NEUTROPHILS NFR BLD AUTO: 74.4 % (ref 42.7–76)
NRBC BLD AUTO-RTO: 0 /100 WBC (ref 0–0.2)
PLATELET # BLD AUTO: 143 10*3/MM3 (ref 140–450)
PMV BLD AUTO: 9.7 FL (ref 6–12)
POTASSIUM SERPL-SCNC: 3.8 MMOL/L (ref 3.5–5.2)
PROT SERPL-MCNC: 6.5 G/DL (ref 6–8.5)
RBC # BLD AUTO: 3.55 10*6/MM3 (ref 3.77–5.28)
SODIUM SERPL-SCNC: 143 MMOL/L (ref 136–145)
WBC NRBC COR # BLD AUTO: 5.46 10*3/MM3 (ref 3.4–10.8)

## 2024-04-17 PROCEDURE — 25010000002 ADO-TRASTUZUMAB 100 MG RECONSTITUTED SOLUTION 1 EACH VIAL: Performed by: NURSE PRACTITIONER

## 2024-04-17 PROCEDURE — 80053 COMPREHEN METABOLIC PANEL: CPT

## 2024-04-17 PROCEDURE — 25010000002 DEXAMETHASONE SODIUM PHOSPHATE 100 MG/10ML SOLUTION: Performed by: NURSE PRACTITIONER

## 2024-04-17 PROCEDURE — 96375 TX/PRO/DX INJ NEW DRUG ADDON: CPT

## 2024-04-17 PROCEDURE — 25810000003 SODIUM CHLORIDE 0.9 % SOLUTION: Performed by: NURSE PRACTITIONER

## 2024-04-17 PROCEDURE — 25010000002 ADO-TRASTUZUMAB 160 MG RECONSTITUTED SOLUTION 160 MG VIAL: Performed by: NURSE PRACTITIONER

## 2024-04-17 PROCEDURE — 96413 CHEMO IV INFUSION 1 HR: CPT

## 2024-04-17 PROCEDURE — 25810000003 SODIUM CHLORIDE 0.9 % SOLUTION 250 ML FLEX CONT: Performed by: NURSE PRACTITIONER

## 2024-04-17 PROCEDURE — 85025 COMPLETE CBC W/AUTO DIFF WBC: CPT

## 2024-04-17 PROCEDURE — 25010000002 HEPARIN LOCK FLUSH PER 10 UNITS: Performed by: INTERNAL MEDICINE

## 2024-04-17 RX ORDER — SODIUM CHLORIDE 0.9 % (FLUSH) 0.9 %
10 SYRINGE (ML) INJECTION AS NEEDED
OUTPATIENT
Start: 2024-04-17

## 2024-04-17 RX ORDER — SODIUM CHLORIDE 9 MG/ML
250 INJECTION, SOLUTION INTRAVENOUS ONCE
Status: COMPLETED | OUTPATIENT
Start: 2024-04-17 | End: 2024-04-17

## 2024-04-17 RX ORDER — SODIUM CHLORIDE 0.9 % (FLUSH) 0.9 %
10 SYRINGE (ML) INJECTION AS NEEDED
Status: DISCONTINUED | OUTPATIENT
Start: 2024-04-17 | End: 2024-04-17 | Stop reason: HOSPADM

## 2024-04-17 RX ORDER — HEPARIN SODIUM (PORCINE) LOCK FLUSH IV SOLN 100 UNIT/ML 100 UNIT/ML
500 SOLUTION INTRAVENOUS AS NEEDED
Status: DISCONTINUED | OUTPATIENT
Start: 2024-04-17 | End: 2024-04-17 | Stop reason: HOSPADM

## 2024-04-17 RX ORDER — HEPARIN SODIUM (PORCINE) LOCK FLUSH IV SOLN 100 UNIT/ML 100 UNIT/ML
500 SOLUTION INTRAVENOUS AS NEEDED
OUTPATIENT
Start: 2024-04-17

## 2024-04-17 RX ADMIN — SODIUM CHLORIDE 250 ML: 9 INJECTION, SOLUTION INTRAVENOUS at 14:13

## 2024-04-17 RX ADMIN — ADO-TRASTUZUMAB EMTANSINE 175 MG: 20 INJECTION, POWDER, LYOPHILIZED, FOR SOLUTION INTRAVENOUS at 15:05

## 2024-04-17 RX ADMIN — Medication 10 ML: at 15:37

## 2024-04-17 RX ADMIN — Medication 500 UNITS: at 15:37

## 2024-04-17 RX ADMIN — DEXAMETHASONE SODIUM PHOSPHATE 12 MG: 10 INJECTION, SOLUTION INTRAMUSCULAR; INTRAVENOUS at 14:13

## 2024-04-18 ENCOUNTER — OFFICE VISIT (OUTPATIENT)
Dept: ENDOCRINOLOGY | Age: 84
End: 2024-04-18
Payer: MEDICARE

## 2024-04-18 VITALS
OXYGEN SATURATION: 98 % | BODY MASS INDEX: 23.15 KG/M2 | WEIGHT: 125.8 LBS | SYSTOLIC BLOOD PRESSURE: 132 MMHG | HEIGHT: 62 IN | DIASTOLIC BLOOD PRESSURE: 74 MMHG | HEART RATE: 91 BPM

## 2024-04-18 DIAGNOSIS — E78.5 HYPERLIPIDEMIA, UNSPECIFIED HYPERLIPIDEMIA TYPE: ICD-10-CM

## 2024-04-18 DIAGNOSIS — E11.65 TYPE 2 DIABETES MELLITUS WITH HYPERGLYCEMIA, WITHOUT LONG-TERM CURRENT USE OF INSULIN: Primary | ICD-10-CM

## 2024-04-18 NOTE — PROGRESS NOTES
Chief complaint/Reason for consult:  T2DM    HPI:   - 83 year old female here for management of diabetes mellitus type 2  - Last seen in 10/2023  - Since her last visit she states she was hospitalized for a pleural effusion  - She states she is eating less now than she was before  - Has had diabetes for over 10 years  - Complications include CKD 3  - Is currently taking Tradjenta 5 mg daily and metformin 500 mg daily/glipizide 2.5 mg, 2 tablets in the am and 1 tablet in the pm  - She is on simvastatin 10 mg daily    The following portions of the patient's history were reviewed and updated as appropriate: allergies, current medications, past family history, past medical history, past social history, past surgical history, and problem list.    Objective     Vitals:    04/18/24 1037   BP: 132/74   Pulse: 91   SpO2: 98%        Physical Exam  Vitals reviewed.   Constitutional:       Appearance: Normal appearance.   HENT:      Head: Normocephalic and atraumatic.   Eyes:      General: No scleral icterus.  Pulmonary:      Effort: Pulmonary effort is normal. No respiratory distress.   Neurological:      Mental Status: She is alert.      Gait: Gait normal.   Psychiatric:         Mood and Affect: Mood normal.         Thought Content: Thought content normal.         Judgment: Judgment normal.         Labs/Imaging:  A1c was 6.8% in 2/2024    Assessment & Plan   1. T2DM, controlled  - Cont. Tradjenta 5 mg daily and metformin 500 mg daily/glipizide 2.5 mg, 2 tablets in the am and 1 tablet in the pm  - Patient is very hesitant to check her BG or take insulin  - I would like to check a hemoglobin A1c to get a better idea what her glycemic control is before making any changes     2. Hyperlipidemia  - She is on simvastatin 10 mg daily     - Return to clinic in 6 months

## 2024-04-23 ENCOUNTER — APPOINTMENT (OUTPATIENT)
Dept: WOMENS IMAGING | Facility: HOSPITAL | Age: 84
End: 2024-04-23
Payer: MEDICARE

## 2024-04-23 PROCEDURE — 77067 SCR MAMMO BI INCL CAD: CPT | Performed by: RADIOLOGY

## 2024-04-23 PROCEDURE — 77063 BREAST TOMOSYNTHESIS BI: CPT | Performed by: RADIOLOGY

## 2024-05-03 NOTE — PATIENT INSTRUCTIONS
Check lab work today to evaluate for cause of cirrhosis and for further evaluation of cirrhosis.    Schedule EGD to evaluate for esophageal varices.    Recommend vaccination for hepatitis a and B if these have not previously been received.    Due to presence of cirrhosis, do not eat raw shellfish.    For fatty liver, weight loss is recommended. Recommend following a low fat and low sugar diet. Recommend management of diabetes and elevated cholesterol with primary care provider if indicated. Regular exercise is recommended. Alcohol avoidance is recommended.        This apparently was done for BP control, but given her cardiac history, renal insufficiency, would recommend that she follow up with either cardiology or nephrology for additional recommendations.

## 2024-05-08 ENCOUNTER — INFUSION (OUTPATIENT)
Dept: ONCOLOGY | Facility: HOSPITAL | Age: 84
End: 2024-05-08
Payer: MEDICARE

## 2024-05-08 ENCOUNTER — OFFICE VISIT (OUTPATIENT)
Dept: ONCOLOGY | Facility: CLINIC | Age: 84
End: 2024-05-08
Payer: MEDICARE

## 2024-05-08 VITALS
DIASTOLIC BLOOD PRESSURE: 78 MMHG | WEIGHT: 126.8 LBS | HEART RATE: 87 BPM | OXYGEN SATURATION: 98 % | RESPIRATION RATE: 16 BRPM | SYSTOLIC BLOOD PRESSURE: 136 MMHG | BODY MASS INDEX: 23.34 KG/M2 | TEMPERATURE: 97.3 F | HEIGHT: 62 IN

## 2024-05-08 DIAGNOSIS — C50.411 MALIGNANT NEOPLASM OF UPPER-OUTER QUADRANT OF RIGHT BREAST IN FEMALE, ESTROGEN RECEPTOR NEGATIVE: Primary | ICD-10-CM

## 2024-05-08 DIAGNOSIS — Z17.1 MALIGNANT NEOPLASM OF UPPER-OUTER QUADRANT OF RIGHT BREAST IN FEMALE, ESTROGEN RECEPTOR NEGATIVE: ICD-10-CM

## 2024-05-08 DIAGNOSIS — C50.411 MALIGNANT NEOPLASM OF UPPER-OUTER QUADRANT OF RIGHT BREAST IN FEMALE, ESTROGEN RECEPTOR NEGATIVE: ICD-10-CM

## 2024-05-08 DIAGNOSIS — Z45.2 ENCOUNTER FOR FITTING AND ADJUSTMENT OF VASCULAR CATHETER: Primary | ICD-10-CM

## 2024-05-08 DIAGNOSIS — Z17.1 MALIGNANT NEOPLASM OF UPPER-OUTER QUADRANT OF RIGHT BREAST IN FEMALE, ESTROGEN RECEPTOR NEGATIVE: Primary | ICD-10-CM

## 2024-05-08 LAB
ALBUMIN SERPL-MCNC: 3.5 G/DL (ref 3.5–5.2)
ALBUMIN/GLOB SERPL: 1.1 G/DL
ALP SERPL-CCNC: 84 U/L (ref 39–117)
ALT SERPL W P-5'-P-CCNC: 18 U/L (ref 1–33)
ANION GAP SERPL CALCULATED.3IONS-SCNC: 13.8 MMOL/L (ref 5–15)
AST SERPL-CCNC: 35 U/L (ref 1–32)
BASOPHILS # BLD AUTO: 0.03 10*3/MM3 (ref 0–0.2)
BASOPHILS NFR BLD AUTO: 0.5 % (ref 0–1.5)
BILIRUB SERPL-MCNC: 0.3 MG/DL (ref 0–1.2)
BUN SERPL-MCNC: 28 MG/DL (ref 8–23)
BUN/CREAT SERPL: 27.2 (ref 7–25)
CALCIUM SPEC-SCNC: 9.4 MG/DL (ref 8.6–10.5)
CHLORIDE SERPL-SCNC: 109 MMOL/L (ref 98–107)
CO2 SERPL-SCNC: 20.2 MMOL/L (ref 22–29)
CREAT SERPL-MCNC: 1.03 MG/DL (ref 0.57–1)
DEPRECATED RDW RBC AUTO: 52.9 FL (ref 37–54)
EGFRCR SERPLBLD CKD-EPI 2021: 54.1 ML/MIN/1.73
EOSINOPHIL # BLD AUTO: 0.16 10*3/MM3 (ref 0–0.4)
EOSINOPHIL NFR BLD AUTO: 2.6 % (ref 0.3–6.2)
ERYTHROCYTE [DISTWIDTH] IN BLOOD BY AUTOMATED COUNT: 17.3 % (ref 12.3–15.4)
GLOBULIN UR ELPH-MCNC: 3.1 GM/DL
GLUCOSE SERPL-MCNC: 230 MG/DL (ref 65–99)
HCT VFR BLD AUTO: 30.3 % (ref 34–46.6)
HGB BLD-MCNC: 9.2 G/DL (ref 12–15.9)
IMM GRANULOCYTES # BLD AUTO: 0.03 10*3/MM3 (ref 0–0.05)
IMM GRANULOCYTES NFR BLD AUTO: 0.5 % (ref 0–0.5)
LYMPHOCYTES # BLD AUTO: 0.62 10*3/MM3 (ref 0.7–3.1)
LYMPHOCYTES NFR BLD AUTO: 10 % (ref 19.6–45.3)
MCH RBC QN AUTO: 25.3 PG (ref 26.6–33)
MCHC RBC AUTO-ENTMCNC: 30.4 G/DL (ref 31.5–35.7)
MCV RBC AUTO: 83.2 FL (ref 79–97)
MONOCYTES # BLD AUTO: 0.34 10*3/MM3 (ref 0.1–0.9)
MONOCYTES NFR BLD AUTO: 5.5 % (ref 5–12)
NEUTROPHILS NFR BLD AUTO: 5 10*3/MM3 (ref 1.7–7)
NEUTROPHILS NFR BLD AUTO: 80.9 % (ref 42.7–76)
NRBC BLD AUTO-RTO: 0 /100 WBC (ref 0–0.2)
PLATELET # BLD AUTO: 133 10*3/MM3 (ref 140–450)
PMV BLD AUTO: 9.9 FL (ref 6–12)
POTASSIUM SERPL-SCNC: 4 MMOL/L (ref 3.5–5.2)
PROT SERPL-MCNC: 6.6 G/DL (ref 6–8.5)
RBC # BLD AUTO: 3.64 10*6/MM3 (ref 3.77–5.28)
SODIUM SERPL-SCNC: 143 MMOL/L (ref 136–145)
WBC NRBC COR # BLD AUTO: 6.18 10*3/MM3 (ref 3.4–10.8)

## 2024-05-08 PROCEDURE — 25010000002 ADO-TRASTUZUMAB 100 MG RECONSTITUTED SOLUTION 1 EACH VIAL: Performed by: INTERNAL MEDICINE

## 2024-05-08 PROCEDURE — 96413 CHEMO IV INFUSION 1 HR: CPT

## 2024-05-08 PROCEDURE — 85025 COMPLETE CBC W/AUTO DIFF WBC: CPT

## 2024-05-08 PROCEDURE — 25810000003 SODIUM CHLORIDE 0.9 % SOLUTION: Performed by: INTERNAL MEDICINE

## 2024-05-08 PROCEDURE — 80053 COMPREHEN METABOLIC PANEL: CPT

## 2024-05-08 PROCEDURE — 25010000002 HEPARIN LOCK FLUSH PER 10 UNITS: Performed by: INTERNAL MEDICINE

## 2024-05-08 PROCEDURE — 25010000002 DEXAMETHASONE SODIUM PHOSPHATE 100 MG/10ML SOLUTION: Performed by: INTERNAL MEDICINE

## 2024-05-08 PROCEDURE — 25810000003 SODIUM CHLORIDE 0.9 % SOLUTION 250 ML FLEX CONT: Performed by: INTERNAL MEDICINE

## 2024-05-08 PROCEDURE — 96375 TX/PRO/DX INJ NEW DRUG ADDON: CPT

## 2024-05-08 RX ORDER — SODIUM CHLORIDE 9 MG/ML
250 INJECTION, SOLUTION INTRAVENOUS ONCE
Status: COMPLETED | OUTPATIENT
Start: 2024-05-08 | End: 2024-05-08

## 2024-05-08 RX ORDER — HEPARIN SODIUM (PORCINE) LOCK FLUSH IV SOLN 100 UNIT/ML 100 UNIT/ML
500 SOLUTION INTRAVENOUS AS NEEDED
Status: DISCONTINUED | OUTPATIENT
Start: 2024-05-08 | End: 2024-05-08 | Stop reason: HOSPADM

## 2024-05-08 RX ORDER — SODIUM CHLORIDE 0.9 % (FLUSH) 0.9 %
10 SYRINGE (ML) INJECTION AS NEEDED
Status: DISCONTINUED | OUTPATIENT
Start: 2024-05-08 | End: 2024-05-08 | Stop reason: HOSPADM

## 2024-05-08 RX ORDER — HEPARIN SODIUM (PORCINE) LOCK FLUSH IV SOLN 100 UNIT/ML 100 UNIT/ML
500 SOLUTION INTRAVENOUS AS NEEDED
OUTPATIENT
Start: 2024-05-08

## 2024-05-08 RX ORDER — SODIUM CHLORIDE 9 MG/ML
250 INJECTION, SOLUTION INTRAVENOUS ONCE
Status: CANCELLED | OUTPATIENT
Start: 2024-05-08

## 2024-05-08 RX ORDER — SODIUM CHLORIDE 0.9 % (FLUSH) 0.9 %
10 SYRINGE (ML) INJECTION AS NEEDED
OUTPATIENT
Start: 2024-05-08

## 2024-05-08 RX ADMIN — Medication 10 ML: at 11:42

## 2024-05-08 RX ADMIN — SODIUM CHLORIDE 250 ML: 9 INJECTION, SOLUTION INTRAVENOUS at 10:43

## 2024-05-08 RX ADMIN — Medication 500 UNITS: at 11:42

## 2024-05-08 RX ADMIN — DEXAMETHASONE SODIUM PHOSPHATE 12 MG: 10 INJECTION, SOLUTION INTRAMUSCULAR; INTRAVENOUS at 10:43

## 2024-05-08 RX ADMIN — ADO-TRASTUZUMAB EMTANSINE 175 MG: 20 INJECTION, POWDER, LYOPHILIZED, FOR SOLUTION INTRAVENOUS at 11:07

## 2024-05-15 ENCOUNTER — HOSPITAL ENCOUNTER (OUTPATIENT)
Dept: CARDIOLOGY | Facility: HOSPITAL | Age: 84
Discharge: HOME | End: 2024-05-15
Admitting: INTERNAL MEDICINE
Payer: MEDICARE

## 2024-05-15 ENCOUNTER — TELEPHONE (OUTPATIENT)
Dept: CARDIOLOGY | Facility: CLINIC | Age: 84
End: 2024-05-15
Payer: MEDICARE

## 2024-05-15 VITALS
DIASTOLIC BLOOD PRESSURE: 58 MMHG | WEIGHT: 126 LBS | HEIGHT: 62 IN | BODY MASS INDEX: 23.19 KG/M2 | SYSTOLIC BLOOD PRESSURE: 100 MMHG | HEART RATE: 68 BPM

## 2024-05-15 DIAGNOSIS — Z51.81 ENCOUNTER FOR MONITORING CARDIOTOXIC DRUG THERAPY: ICD-10-CM

## 2024-05-15 DIAGNOSIS — Z79.899 ENCOUNTER FOR MONITORING CARDIOTOXIC DRUG THERAPY: ICD-10-CM

## 2024-05-15 LAB
BH CV ECHO LEFT VENTRICLE GLOBAL LONGITUDINAL STRAIN: -19.8 %
BH CV ECHO MEAS - EDV(CUBED): 117.6 ML
BH CV ECHO MEAS - EDV(MOD-SP2): 70 ML
BH CV ECHO MEAS - EDV(MOD-SP4): 55 ML
BH CV ECHO MEAS - EF(MOD-BP): 55.7 %
BH CV ECHO MEAS - EF(MOD-SP2): 52.9 %
BH CV ECHO MEAS - EF(MOD-SP4): 54.5 %
BH CV ECHO MEAS - EF_3D-VOL: 56 %
BH CV ECHO MEAS - ESV(CUBED): 37 ML
BH CV ECHO MEAS - ESV(MOD-SP2): 33 ML
BH CV ECHO MEAS - ESV(MOD-SP4): 25 ML
BH CV ECHO MEAS - FS: 32 %
BH CV ECHO MEAS - IVS/LVPW: 0.89 CM
BH CV ECHO MEAS - IVSD: 0.8 CM
BH CV ECHO MEAS - LAT PEAK E' VEL: 7.2 CM/SEC
BH CV ECHO MEAS - LV DIASTOLIC VOL/BSA (35-75): 35 CM2
BH CV ECHO MEAS - LV MASS(C)D: 141.9 GRAMS
BH CV ECHO MEAS - LV SYSTOLIC VOL/BSA (12-30): 15.9 CM2
BH CV ECHO MEAS - LVIDD: 4.9 CM
BH CV ECHO MEAS - LVIDS: 3.3 CM
BH CV ECHO MEAS - LVPWD: 0.9 CM
BH CV ECHO MEAS - MED PEAK E' VEL: 5.8 CM/SEC
BH CV ECHO MEAS - MR MAX PG: 37.3 MMHG
BH CV ECHO MEAS - MR MAX VEL: 305.4 CM/SEC
BH CV ECHO MEAS - MV A DUR: 0.12 SEC
BH CV ECHO MEAS - MV A MAX VEL: 99.8 CM/SEC
BH CV ECHO MEAS - MV DEC TIME: 0.22 SEC
BH CV ECHO MEAS - MV E MAX VEL: 99.8 CM/SEC
BH CV ECHO MEAS - MV E/A: 1
BH CV ECHO MEAS - PULM A REVS DUR: 0.09 SEC
BH CV ECHO MEAS - PULM A REVS VEL: 16.3 CM/SEC
BH CV ECHO MEAS - PULM DIAS VEL: 58.3 CM/SEC
BH CV ECHO MEAS - PULM S/D: 0.92
BH CV ECHO MEAS - PULM SYS VEL: 53.6 CM/SEC
BH CV ECHO MEAS - SV(MOD-SP2): 37 ML
BH CV ECHO MEAS - SV(MOD-SP4): 30 ML
BH CV ECHO MEAS - SVI(MOD-SP2): 23.6 ML/M2
BH CV ECHO MEAS - SVI(MOD-SP4): 19.1 ML/M2
BH CV ECHO MEASUREMENTS AVERAGE E/E' RATIO: 15.35
BH CV XLRA - TDI S': 9.8 CM/SEC
LEFT ATRIUM VOLUME INDEX: 30.3 ML/M2

## 2024-05-15 PROCEDURE — 93356 MYOCRD STRAIN IMG SPCKL TRCK: CPT

## 2024-05-15 PROCEDURE — 93325 DOPPLER ECHO COLOR FLOW MAPG: CPT

## 2024-05-15 PROCEDURE — 93321 DOPPLER ECHO F-UP/LMTD STD: CPT

## 2024-05-15 PROCEDURE — 93308 TTE F-UP OR LMTD: CPT

## 2024-05-15 NOTE — TELEPHONE ENCOUNTER
Please let her know that echo looks good.  Her heart is strong and functioning well.  This is stable to slightly better since her echo in February.  Would continue current medications and keep currently scheduled follow-up appointment  
Results and recommendations called to pt's daughter.  Instructed to call with any further questions or concerns.  Verbalized understanding.    Shauna Simmons RN  Triage Nurse, [facility]  05/15/24 10:20 EDT    
no
Physical Exam    Vital Signs: I have reviewed the initial vital signs.  Constitutional: well-nourished, appears stated age, no acute distress  Eyes: Conjunctiva pink, Sclera clear  Cardiovascular: S1 and S2, regular rate, regular rhythm, well-perfused extremities, radial pulses equal and 2+  Respiratory: unlabored respiratory effort, clear to auscultation bilaterally no wheezing, rales and rhonchi  Gastrointestinal: soft, mild rlq ttp, no guarding or rebound, no pulsatile mass, normal bowl sounds  Musculoskeletal: supple neck, no lower extremity edema, no midline tenderness  Integumentary: warm, dry, no rash  Neurologic: awake, alert, nvi

## 2024-05-16 ENCOUNTER — OFFICE VISIT (OUTPATIENT)
Dept: CARDIOLOGY | Facility: CLINIC | Age: 84
End: 2024-05-16
Payer: MEDICARE

## 2024-05-16 VITALS
SYSTOLIC BLOOD PRESSURE: 140 MMHG | DIASTOLIC BLOOD PRESSURE: 68 MMHG | HEIGHT: 62 IN | WEIGHT: 124 LBS | HEART RATE: 81 BPM | BODY MASS INDEX: 22.82 KG/M2

## 2024-05-16 DIAGNOSIS — N28.9 RENAL INSUFFICIENCY: ICD-10-CM

## 2024-05-16 DIAGNOSIS — R79.89 ELEVATED TROPONIN: ICD-10-CM

## 2024-05-16 DIAGNOSIS — Z79.899 ENCOUNTER FOR MONITORING CARDIOTOXIC DRUG THERAPY: Primary | ICD-10-CM

## 2024-05-16 DIAGNOSIS — E11.59 TYPE 2 DIABETES MELLITUS WITH OTHER CIRCULATORY COMPLICATION, WITHOUT LONG-TERM CURRENT USE OF INSULIN: ICD-10-CM

## 2024-05-16 DIAGNOSIS — Z51.81 ENCOUNTER FOR MONITORING CARDIOTOXIC DRUG THERAPY: Primary | ICD-10-CM

## 2024-05-16 DIAGNOSIS — Z17.1 MALIGNANT NEOPLASM OF UPPER-OUTER QUADRANT OF RIGHT BREAST IN FEMALE, ESTROGEN RECEPTOR NEGATIVE: ICD-10-CM

## 2024-05-16 DIAGNOSIS — C50.411 MALIGNANT NEOPLASM OF UPPER-OUTER QUADRANT OF RIGHT BREAST IN FEMALE, ESTROGEN RECEPTOR NEGATIVE: ICD-10-CM

## 2024-05-16 DIAGNOSIS — I10 PRIMARY HYPERTENSION: ICD-10-CM

## 2024-05-16 NOTE — PROGRESS NOTES
Date of Office Visit: 2024  Encounter Provider: CARL Andrews  Place of Service: [facility] CARDIOLOGY  Patient Name: Veronica Royal  :1940    Chief complaint  Cardio oncology     History of Present Illness  Patient is a 83 y.o. year old female  patient of Dr. Maldonado. Past medical history includes diabetes, hypertension, hyperlipidemia, chronic renal insufficiency, hyperkalemia and cirrhosis..  She was diagnosed with right-sided inflammatory breast cancer 2023.  She started chemotherapy with THP on 2023, she has been switched to Kadcyla.  She is also completed right-sided radiation therapy.       Baseline echo with EF 56 to 60% with GLS of -20.3% with grade 1A diastolic dysfunction, aortic valve calcification without stenosis, trivial mitral and tricuspid valve regurgitation with normal right-sided pressures.  She was noted to have coronary artery calcification and on 2023 stress perfusion study was negative for ischemia.  Echo 2023 showed an ejection fraction 59%, GLS -17.9%.  (12.% drop from 2023 ).  Echo on 11/10/2023 shows an ejection fraction of 54% with GLS -20.2% trivial valve regurgitation, mild mitral regurgitation. Echo on 2/15/2024 showed an ejection fraction of 54.5%, GLS -19.0% with inferior and inferoseptal wall hypokinesis. Lexiscan Cardiolite stress test 3/8/2024 was negative for ischemia. Echocardiogram 5/15/2024 showed LVEF 55.7%, GLS -19.8%.  Grade 2 diastolic dysfunction, mildly dilated left atrial cavity, mild bileaflet mitral valve thickening with mild regurgitation and no stenosis.  Prior inferior and inferoseptal wall hypokinesis was not seen on this echocardiogram.    Interval history  Patient presents today for routine follow-up.  I will visit with her today and have reviewed her medical record.  Her daughter is present for visit today per her preference.  She continues on Kadcyla.  Echocardiogram completed yesterday.   Patient denies palpitations, shortness of breath, dizziness, chest pain or chest pressure, fatigue, syncope or presyncope.  Kadcyla dose was initially decreased due to myalgias that have significantly improved.  She does have bilateral lower extremity edema that is at her baseline and also right arm lymphedema for which she is following with lymphedema clinic.  Blood pressure today is slightly elevated though she brings with her readings from home that show well-controlled blood pressure with average about 125/70.    Past Medical History:   Diagnosis Date    Anxiety     Arthritis     Basal cell carcinoma     Left thumb    Breast cancer 2023    Chronic kidney disease     Depression     Diabetes mellitus     Diarrhea     s/e chemo    High cholesterol     History of chemotherapy 08/2023    Hypertension     Rash 08/2023    s/e chemo    Sacral decubitus ulcer     cleaning with soap & water    Urinary incontinence     wears pads    Vitamin D deficiency      Past Surgical History:   Procedure Laterality Date    BREAST BIOPSY      BREAST SURGERY      CATARACT EXTRACTION EXTRACAPSULAR W/ INTRAOCULAR LENS IMPLANTATION Right     EYE SURGERY      Muscle repair age 21    MASTECTOMY W/ SENTINEL NODE BIOPSY Right 08/24/2023    Procedure: Right breast modified radical mastectomy;  Surgeon: Rosa Michael MD;  [place] [facility] [place] [facility] [place]  Service: General;  Laterality: Right;    TONSILLECTOMY      VENOUS ACCESS DEVICE (PORT) INSERTION N/A 05/19/2023    Procedure: INSERTION VENOUS ACCESS DEVICE;  Surgeon: Rosa Michael MD;  Location: [facility] [place] [facility] [place];  Service: General;  Laterality: N/A;     Outpatient Medications Prior to Visit   Medication Sig Dispense Refill    Cholecalciferol 50 MCG (2000 UT) tablet Take 1 tablet by mouth Daily (Monday-Friday).      Diclofenac Sodium (VOLTAREN) 1 % gel gel Apply 4 g topically to the appropriate area as directed 4 (Four) Times a Day.      Erivedge 150 MG chemo capsule Take 1 capsule by mouth  Daily.      glipiZIDE-metFORMIN (METAGLIP) 2.5-500 MG per tablet Take 2 tablets by mouth 2 (Two) Times a Day Before Meals. (Patient taking differently: Take 2 tablets by mouth Every Morning.) 120 tablet 3    lidocaine-prilocaine (EMLA) 2.5-2.5 % cream Apply nickel size amount to port site 30 min before appt time do not rub in cover with plastic wrap 30 g 5    losartan (COZAAR) 25 MG tablet TAKE 1 TABLET BY MOUTH DAILY 90 tablet 1    simvastatin (ZOCOR) 40 MG tablet Take 0.5 tablets by mouth Every Night. (Patient taking differently: Take 1 tablet by mouth Every Night.) 90 tablet 2    Tradjenta 5 MG tablet tablet Take 1 tablet by mouth Every Morning. 90 tablet 5    valsartan-hydrochlorothiazide (DIOVAN-HCT) 160-12.5 MG per tablet Take 1 tablet by mouth Daily.      VITAMIN E PO Take  by mouth.       No facility-administered medications prior to visit.       Allergies as of 05/16/2024    (No Known Allergies)     Social History     Socioeconomic History    Marital status:    Tobacco Use    Smoking status: Never    Smokeless tobacco: Never   Vaping Use    Vaping status: Never Used   Substance and Sexual Activity    Alcohol use: Never    Drug use: Never    Sexual activity: Never     Family History   Problem Relation Age of Onset    Alzheimer's disease Mother     Heart disease Father     Heart attack Father     Ovarian cancer Sister     Pancreatic cancer Brother     Malig Hyperthermia Neg Hx     Colon cancer Neg Hx     Colon polyps Neg Hx     Crohn's disease Neg Hx     Irritable bowel syndrome Neg Hx     Ulcerative colitis Neg Hx      Review of Systems   Constitutional: Negative for malaise/fatigue.   Cardiovascular:  Positive for leg swelling. Negative for chest pain, claudication, dyspnea on exertion, near-syncope, orthopnea, palpitations, paroxysmal nocturnal dyspnea and syncope.   Respiratory:  Negative for shortness of breath.    Neurological:  Negative for brief paralysis, dizziness, headaches and  "light-headedness.   All other systems reviewed and are negative.       Objective:     Vitals:    05/16/24 0902   BP: 140/68   BP Location: Left arm   Patient Position: Sitting   Pulse: 81   Weight: 56.2 kg (124 lb)   Height: 157.5 cm (62\")     Body mass index is 22.68 kg/m².    Vitals reviewed.   Constitutional:       General: Not in acute distress.     Appearance: Well-developed and not in distress. Not diaphoretic.   HENT:      Head: Normocephalic.   Pulmonary:      Effort: Pulmonary effort is normal. No respiratory distress.      Breath sounds: Normal breath sounds. No wheezing. No rhonchi. No rales.   Cardiovascular:      Normal rate. Regular rhythm.      Murmurs: There is no murmur.      Comments: Right hand and arm in lymphedema sleeve  Pulses:     Radial: 2+ bilaterally.  Edema:     Peripheral edema present.     Pretibial: bilateral trace edema of the pretibial area.     Ankle: bilateral trace edema of the ankle.     Feet: bilateral trace edema of the feet.  Skin:     General: Skin is warm and dry. There is no cyanosis.      Findings: No rash.   Neurological:      Mental Status: Alert and oriented to person, place, and time.   Psychiatric:         Behavior: Behavior normal. Behavior is cooperative.         Thought Content: Thought content normal.         Judgment: Judgment normal.       Lab Review:     Lab Results   Component Value Date     05/08/2024     04/17/2024    K 4.0 05/08/2024    K 3.8 04/17/2024     (H) 05/08/2024     (H) 04/17/2024    CO2 20.2 (L) 05/08/2024    CO2 23.9 04/17/2024    BUN 28 (H) 05/08/2024    BUN 32 (H) 04/17/2024    CREATININE 1.03 (H) 05/08/2024    CREATININE 1.07 (H) 04/17/2024    EGFRIFAFRI 57 (L) 01/09/2023    EGFRIFAFRI 53 (L) 01/05/2023    GLUCOSE 230 (H) 05/08/2024    GLUCOSE 110 (H) 04/17/2024    CALCIUM 9.4 05/08/2024    CALCIUM 9.7 04/17/2024    ALBUMIN 3.5 05/08/2024    ALBUMIN 3.5 04/17/2024    BILITOT 0.3 05/08/2024    BILITOT 0.3 04/17/2024    " "AST 35 (H) 05/08/2024    AST 30 04/17/2024    ALT 18 05/08/2024    ALT 17 04/17/2024     Lab Results   Component Value Date    WBC 6.18 05/08/2024    WBC 5.46 04/17/2024    HGB 9.2 (L) 05/08/2024    HGB 8.9 (L) 04/17/2024    HCT 30.3 (L) 05/08/2024    HCT 29.1 (L) 04/17/2024    MCV 83.2 05/08/2024    MCV 82.0 04/17/2024     (L) 05/08/2024     04/17/2024     Lab Results   Component Value Date    PROBNP 136.0 02/21/2024    PROBNP 116.0 02/14/2024     Lab Results   Component Value Date    TROPONINT 19 (H) 02/21/2024     No results found for: \"TSH\"   Lipid Panel          11/21/2023    09:57   Lipid Panel   Total Cholesterol 147    Triglycerides 143    HDL Cholesterol 36    VLDL Cholesterol 25    LDL Cholesterol  86    LDL/HDL Ratio 2.29           ECG 12 Lead    Date/Time: 5/16/2024 12:51 PM  Performed by: Aislinn Joseph APRN    Authorized by: Aislinn Joseph APRN  Comparison: compared with previous ECG   Similar to previous ECG  Rhythm: sinus rhythm  Rate: normal  BPM: 81  QRS axis: normal  Comments: Similar to prior        Assessment:       Diagnosis Plan   1. Encounter for monitoring cardiotoxic drug therapy  Adult Transthoracic Echo Complete w/ Color, Spectral and Contrast if Necessary Per Protocol      2. Type 2 diabetes mellitus with other circulatory complication, without long-term current use of insulin  Adult Transthoracic Echo Complete w/ Color, Spectral and Contrast if Necessary Per Protocol      3. Malignant neoplasm of upper-outer quadrant of right breast in female, estrogen receptor negative  Adult Transthoracic Echo Complete w/ Color, Spectral and Contrast if Necessary Per Protocol      4. Elevated troponin  Adult Transthoracic Echo Complete w/ Color, Spectral and Contrast if Necessary Per Protocol      5. Renal insufficiency  Adult Transthoracic Echo Complete w/ Color, Spectral and Contrast if Necessary Per Protocol      6. Primary hypertension  Adult Transthoracic Echo Complete w/ " Color, Spectral and Contrast if Necessary Per Protocol        Plan:       1.  Right-sided inflammatory breast cancer.  Per ESC guidelines she is considered high risk.  She has been treated with THP starting 5/2023 and is now on maintenance Kadcyla.  Strain imaging had altered 12% in September and it is back to baseline with stable LVEF and strain on most recent echo.  2.  Cardio oncology care.  Given dyspnea with her high CV risk and evidence of coronary artery calcification, stress perfusion study was performed on 6/2022 was negative for ischemia.  Echo in February with new wall motion abnormalities and poorly controlled risk factors including diabetes.  Questioned patient in detail with daughter and she clearly denies any symptoms of chest pain.  She had dyspnea with more strenuous activities.  This is chronic.  Lexiscan Cardiolite stress test in March 2024 was negative for ischemia.  Wall motion changes have resolved on most recent echo done yesterday.  3.  Coronary artery disease.  Coronary calcification noted on CT scan of the chest on 5/2023.  As above   4.  Hypertension, home readings are typically lower. Log from home with labile readings, but around 125/70s on average.  5.  Hyperlipidemia.  She states she will contact PCP to obtain lipids.  6.  Diabetes.  Now following with endocrinology  7   Chronic renal insufficiency baseline creatinine 1.3.   8.  Chronic mild anemia. Stable      Time Spent: I spent 30 minutes caring for Veronica on this date of service. This time includes time spent by me in the following activities: preparing for the visit, reviewing tests, performing a medically appropriate examination and/or evaluations, counseling and educating the patient/family/caregiver, ordering medications, tests, or procedures, documenting information in the medical record, and independently interpreting results and communicating that information with the patient/family/caregiver.   I spent 1 minutes on the  separately reported service of ECG. This time is not included in the time used to support the E/M service also reported today.        Your medication list            Accurate as of May 16, 2024 12:52 PM. If you have any questions, ask your nurse or doctor.                CHANGE how you take these medications        Instructions Last Dose Given Next Dose Due   glipiZIDE-metFORMIN 2.5-500 MG per tablet  Commonly known as: METAGLIP  What changed: when to take this      Take 2 tablets by mouth 2 (Two) Times a Day Before Meals.       simvastatin 40 MG tablet  Commonly known as: ZOCOR  What changed: how much to take      Take 0.5 tablets by mouth Every Night.              CONTINUE taking these medications        Instructions Last Dose Given Next Dose Due   Cholecalciferol 50 MCG (2000 UT) tablet      Take 1 tablet by mouth Daily (Monday-Friday).       Diclofenac Sodium 1 % gel gel  Commonly known as: VOLTAREN      Apply 4 g topically to the appropriate area as directed 4 (Four) Times a Day.       Erivedge 150 MG chemo capsule  Generic drug: vismodegib      Take 1 capsule by mouth Daily.       lidocaine-prilocaine 2.5-2.5 % cream  Commonly known as: EMLA      Apply nickel size amount to port site 30 min before appt time do not rub in cover with plastic wrap       losartan 25 MG tablet  Commonly known as: COZAAR      TAKE 1 TABLET BY MOUTH DAILY       Tradjenta 5 MG tablet tablet  Generic drug: linagliptin      Take 1 tablet by mouth Every Morning.       valsartan-hydrochlorothiazide 160-12.5 MG per tablet  Commonly known as: DIOVAN-HCT      Take 1 tablet by mouth Daily.       VITAMIN E PO      Take  by mouth.                Patient is no longer taking -.  I corrected the med list to reflect this.  I did not stop these medications.    Return in about 3 months (around 8/16/2024) for with Dr. Maldonado with echo same day.      Dictated utilizing Dragon dictation

## 2024-05-22 ENCOUNTER — HOSPITAL ENCOUNTER (OUTPATIENT)
Dept: PHYSICAL THERAPY | Facility: HOSPITAL | Age: 84
Discharge: HOME | End: 2024-05-22
Admitting: NURSE PRACTITIONER
Payer: MEDICARE

## 2024-05-22 DIAGNOSIS — Z17.1 MALIGNANT NEOPLASM OF UPPER-OUTER QUADRANT OF RIGHT BREAST IN FEMALE, ESTROGEN RECEPTOR NEGATIVE: Primary | ICD-10-CM

## 2024-05-22 DIAGNOSIS — Z90.11 S/P RIGHT MASTECTOMY: ICD-10-CM

## 2024-05-22 DIAGNOSIS — C50.411 MALIGNANT NEOPLASM OF UPPER-OUTER QUADRANT OF RIGHT BREAST IN FEMALE, ESTROGEN RECEPTOR NEGATIVE: Primary | ICD-10-CM

## 2024-05-22 DIAGNOSIS — I89.0 LYMPHEDEMA OF RIGHT UPPER EXTREMITY: ICD-10-CM

## 2024-05-22 DIAGNOSIS — Z91.89 AT RISK FOR LYMPHEDEMA: ICD-10-CM

## 2024-05-22 PROCEDURE — 97535 SELF CARE MNGMENT TRAINING: CPT | Performed by: PHYSICAL THERAPIST

## 2024-05-22 PROCEDURE — 97140 MANUAL THERAPY 1/> REGIONS: CPT | Performed by: PHYSICAL THERAPIST

## 2024-05-22 NOTE — THERAPY TREATMENT NOTE
Outpatient Physical Therapy Lymphedema Progress Note  [facility]     Patient Name: Veronica Royal  : 1940  MRN: [id number]  Today's Date: 2024        Visit Date: 2024    Visit Dx:    ICD-10-CM ICD-9-CM   1. Malignant neoplasm of upper-outer quadrant of right breast in female, estrogen receptor negative  C50.411 174.4    Z17.1 V86.1   2. Lymphedema of right upper extremity  I89.0 457.1   3. At risk for lymphedema  Z91.89 V49.89   4. S/P right mastectomy  Z90.11 V45.71       Patient Active Problem List   Diagnosis    Malignant neoplasm of upper-outer quadrant of right breast in female, estrogen receptor negative    Encounter for fitting and adjustment of vascular catheter    Hypertension    At high risk for malnutrition    Bilateral hearing loss    Malignant neoplasm of female breast    Advanced care planning/counseling discussion    Cirrhosis of liver    Type 2 diabetes mellitus    Anemia due to chemotherapy    Left renal mass    Severe malnutrition    Breast cancer    Driving safety issue    Slow transit constipation    Right-sided chest pain    Chest pain    Pleural effusion         Lymphedema       Row Name 24 0830             Subjective Pain    Able to rate subjective pain? yes  -JS      Pre-Treatment Pain Level 0  -JS         Subjective    Subjective Comments Using sleeve & glove daily and performing self massage. Fitted for pump at home, scheduled to receive pump in the upcoming weeks.  -JS         Lymphedema Assessment    Lymphedema Classification RUE:;at risk/stage 0  -JS      Lymphedema Cancer Related Sx right;axillary dissection;modified radical mastectomy  -JS      Lymphedema Surgery Comments 23  -JS      Lymph Nodes Removed # 19  -JS      Positive Lymph Nodes # 1  -JS      Chemo Received yes  -JS      Chemo Treatments #/Timeframe neoadjuvant  -JS      Radiation Therapy Received no  -JS      Infections or Cellulitis? no  -JS         Posture/Observations     Posture/Observations Comments B ankle & foot edema  -JS         General ROM    GENERAL ROM COMMENTS B UE WFL  -JS         MMT (Manual Muscle Testing)    General MMT Comments B UE 5/5  -JS         Lymphedema Edema Assessment    Edema Assessment Comment R hand & forearm swelling compared to opp side  -JS         Skin Changes/Observations    Skin Observations Comment L hand brusing (onset 1 day ago, per pt, of unknown onset)  -JS         LUE Quick Girth (cm)    Axilla 26 cm  -JS      Mid upper arm 24 cm  -JS      Elbow 23 cm  -JS      Mid forearm 19.5 cm  -JS      Wrist crease 14.7 cm  -JS      Web space 18.5 cm  -JS      Met-heads 18.2 cm  -JS      LUE Quick Girth Total 143.9  -JS         RUE Quick Girth (cm)    Axilla 27.5 cm  -JS      Mid upper arm 26 cm  -JS      Elbow 24 cm  -JS      Mid forearm 21.2 cm  -JS      Wrist crease 16.7 cm  -JS      Web space 19.5 cm  -JS      Met-heads 18.8 cm  -JS      RUE Quick Girth Total 153.7  -JS         L-Dex Bioimpedence Screening    L-Dex UE Comment Unable to attain a reading today with attempted bioimpedance.  -JS                User Key  (r) = Recorded By, (t) = Taken By, (c) = Cosigned By      Initials Name Provider Type    Lyssa Hou PT Physical Therapist                                   PT Assessment/Plan       Row Name 05/22/24 1253          PT Assessment    Assessment Comments Patient is an 84 y/o female s/p R mastectomy, ax dissection (1/19) on 8/24/23 being seen for treatment of R UE lymphedema.  Despite compliance with daily use of UE compression sleeve & glove, patient continues to demonstrate increased R circumfrential measurements and swelling of UE.  Unable to obtain reading with attempted bioimpedance today.  Reviewed self MLD techniques and encouraged patient to continue consistent performance.  Patient has received a home visit for a pump, and expects to receive the unit within the next month, then patient will begin daily use.  Patient will benefit from  ongoing skilled PT in the lymphedema clinic.  Plan for patient to return to PT in 2 months to assess status following initiation of use of pump.  -JS        PT Plan    Predicted Duration of Therapy Intervention (PT) Return in 2 months for reassessment  -JS     PT Plan Comments Patient will continue daily use of sleeve, self MLD and begin use of pneumatic pump once received.  Return in 2 months to reassess status.  -JS               User Key  (r) = Recorded By, (t) = Taken By, (c) = Cosigned By      Initials Name Provider Type    Lyssa Hou, PT Physical Therapist                        OP Exercises       Row Name 05/22/24 0830             Subjective    Subjective Comments Using sleeve & glove daily and performing self massage. Fitted for pump at home, scheduled to receive pump in the upcoming weeks.  -JS         Subjective Pain    Able to rate subjective pain? yes  -JS      Pre-Treatment Pain Level 0  -JS         Total Minutes    80872 - PT Manual Therapy Minutes 10  -JS                User Key  (r) = Recorded By, (t) = Taken By, (c) = Cosigned By      Initials Name Provider Type    Lyssa Hou, PT Physical Therapist                            Manual Rx (Last 36 Hours)       Manual Treatments       Row Name 05/22/24 0830             Total Minutes    89257 - PT Manual Therapy Minutes 10  -JS         Manual Rx 1    Manual Rx 1 Location Demonstrated and reviewed self MLD techniques for R UE  -JS                User Key  (r) = Recorded By, (t) = Taken By, (c) = Cosigned By      Initials Name Provider Type    Lyssa Hou, PT Physical Therapist                     PT OP Goals       Row Name 05/22/24 0830          Long Term Goals    LTG Date to Achieve 10/14/23  -JS     LTG 1 Pt will demonstrate LDex bioimpedance WNL.  -JS     LTG 1 Progress Ongoing  -JS     LTG 1 Progress Comments Unable to attain reading today  -JS     LTG 2 Pt will acquire appropriately fitted compression garment and verbalize appropriate wear  schedule.  -ROBY     LTG 2 Progress Met  -ROBY     LTG 3 Pt will demonstrate equal BUE AROM.  -ROBY     LTG 3 Progress Met  -ROBY               User Key  (r) = Recorded By, (t) = Taken By, (c) = Cosigned By      Initials Name Provider Type    Lyssa Hou, PT Physical Therapist                    Therapy Education  Education Details: reviewed s/s of lymphedema, steps to prevention, recommend ongoing daily use of compression sleeve & glove. reviewed self MLD.  Discussed use of pump once it is delivered.  Given: Symptoms/condition management, Edema management  Program: Reinforced  How Provided: Verbal  Provided to: Patient  Level of Understanding: Verbalized  32948 - PT Self Care/Mgmt Minutes: 10              Time Calculation:   Start Time: 0840  Stop Time: 0925  Time Calculation (min): 45 min  Timed Charges  07246 - PT Manual Therapy Minutes: 10  34010 - PT Self Care/Mgmt Minutes: 10  Total Minutes  Timed Charges Total Minutes: 20   Total Minutes: 20   Therapy Charges for Today       Code Description Service Date Service Provider Modifiers Qty    [id number]  PT SELF CARE/MGMT/TRAIN EA 15 MIN 5/22/2024 Lyssa Caballero, PT GP 2    [id number] HC PT MANUAL THERAPY EA 15 MIN 5/22/2024 Lyssa Caballero, PT GP 1                      Lyssa Caballero PT  5/22/2024

## 2024-05-24 NOTE — PROGRESS NOTES
Continued Stay Note  [facility]     Patient Name: Veronica Royal  MRN: [id number]  Today's Date: 7/14/2023    Admit Date: 7/12/2023    Plan: SNF placement   Discharge Plan       Row Name 07/14/23 1613       Plan    Plan Comments Norman agreeable to referaal to [facility] SNF, Tari received the referral. Johnie      Row Name 07/14/23 1419       Plan    Plan SNF placement    Plan Comments Discussed with patient and her daughter, Norman Root, PT eval and recommendation that patient will require assistance at discharge. They are agreeable to SNF referral for short term rehab to [facility] and [place], and [facility]. [facility] is not in network with her insurance. [facility] has no bed available today, but may have some opening up over weekend or by Tuesday. Will f/u with Norman for other choices. Johnie                   Discharge Codes    No documentation.                 Expected Discharge Date and Time       Expected Discharge Date Expected Discharge Time    Jul 17, 2023               Renu Lemons RN     [facility] - Surgery ([facility])  Brief Operative Note    Surgery Date: 5/24/2024     Surgeons and Role:     * Farhan Rios MD - Primary     * Coni Diaz MD - Resident - Assisting     * Remedios Vargas MD - Resident - Assisting        Pre-op Diagnosis:  Right inguinal hernia [K40.90]    Post-op Diagnosis:  Post-Op Diagnosis Codes:     * Right inguinal hernia [K40.90]    Procedure(s) (LRB):  REPAIR, HERNIA, INGUINAL, WITHOUT HISTORY OF PRIOR REPAIR, AGE 5 YEARS OR OLDER, Open, Right, with Mesh (Right)    Anesthesia: General    Operative Findings: Right sided direct inguinal hernia repair with mesh. Excision of cord lipoma. See Op note for full details.    Estimated Blood Loss: Minimal    Specimens:   Specimen (24h ago, onward)       Start     Ordered    05/24/24 0757  Specimen to Pathology, Surgery General Surgery  Once        Comments: Pre-op Diagnosis: Right inguinal hernia [K40.90]Procedure(s):REPAIR, HERNIA, INGUINAL, WITHOUT HISTORY OF PRIOR REPAIR, AGE 5 YEARS OR OLDER, Open, Right, with Mesh Number of specimens: 1.Name of specimens: 1. CORD LIPOMA - PERMANENT     References:    Click here for ordering Quick Tip   Question Answer Comment   Procedure Type: General Surgery    Specimen Class: Routine/Screening    Release to patient Immediate        05/24/24 0830                      Discharge Note    OUTCOME: Patient tolerated treatment/procedure well without complication and is now ready for discharge.    DISPOSITION: Home or Self Care    FINAL DIAGNOSIS:  Right inguinal hernia    FOLLOWUP: In clinic    DISCHARGE INSTRUCTIONS:    Discharge Procedure Orders   Diet Adult Regular     No driving until:   Order Comments: No driving while still taking pain medications daily.   Take a stool softener while taking pain medications daily.     Lifting restrictions   Order Comments: No lifting more than 10 lbs for 6 weeks.     Notify your health care provider if you experience any of the following:  increased  confusion or weakness     Notify your health care provider if you experience any of the following:  persistent dizziness, light-headedness, or visual disturbances     Notify your health care provider if you experience any of the following:  worsening rash     Notify your health care provider if you experience any of the following:  severe persistent headache     Notify your health care provider if you experience any of the following:  difficulty breathing or increased cough     Notify your health care provider if you experience any of the following:  redness, tenderness, or signs of infection (pain, swelling, redness, odor or green/yellow discharge around incision site)     Notify your health care provider if you experience any of the following:  severe uncontrolled pain     Notify your health care provider if you experience any of the following:  persistent nausea and vomiting or diarrhea     Notify your health care provider if you experience any of the following:  temperature >100.4     No dressing needed   Order Comments: Okay to take shower and get incision wet in 48 hours. Do NOT soak/submerge incision in water (aka no bathing, swimming, etc) for 2 weeks. Skin glue overlying incision will fall off on its own in 2-3 weeks.

## 2024-05-29 ENCOUNTER — INFUSION (OUTPATIENT)
Dept: ONCOLOGY | Facility: HOSPITAL | Age: 84
End: 2024-05-29
Payer: MEDICARE

## 2024-05-29 VITALS
OXYGEN SATURATION: 98 % | TEMPERATURE: 97.1 F | HEIGHT: 61 IN | DIASTOLIC BLOOD PRESSURE: 68 MMHG | HEART RATE: 77 BPM | RESPIRATION RATE: 15 BRPM | SYSTOLIC BLOOD PRESSURE: 155 MMHG | BODY MASS INDEX: 23.53 KG/M2 | WEIGHT: 124.6 LBS

## 2024-05-29 DIAGNOSIS — C50.411 MALIGNANT NEOPLASM OF UPPER-OUTER QUADRANT OF RIGHT BREAST IN FEMALE, ESTROGEN RECEPTOR NEGATIVE: Primary | ICD-10-CM

## 2024-05-29 DIAGNOSIS — Z17.1 MALIGNANT NEOPLASM OF UPPER-OUTER QUADRANT OF RIGHT BREAST IN FEMALE, ESTROGEN RECEPTOR NEGATIVE: Primary | ICD-10-CM

## 2024-05-29 DIAGNOSIS — Z45.2 ENCOUNTER FOR FITTING AND ADJUSTMENT OF VASCULAR CATHETER: ICD-10-CM

## 2024-05-29 LAB
ALBUMIN SERPL-MCNC: 3.7 G/DL (ref 3.5–5.2)
ALBUMIN/GLOB SERPL: 1.2 G/DL
ALP SERPL-CCNC: 86 U/L (ref 39–117)
ALT SERPL W P-5'-P-CCNC: 20 U/L (ref 1–33)
ANION GAP SERPL CALCULATED.3IONS-SCNC: 13.3 MMOL/L (ref 5–15)
AST SERPL-CCNC: 33 U/L (ref 1–32)
BASOPHILS # BLD AUTO: 0.02 10*3/MM3 (ref 0–0.2)
BASOPHILS NFR BLD AUTO: 0.4 % (ref 0–1.5)
BILIRUB SERPL-MCNC: 0.3 MG/DL (ref 0–1.2)
BUN SERPL-MCNC: 28 MG/DL (ref 8–23)
BUN/CREAT SERPL: 24.3 (ref 7–25)
CALCIUM SPEC-SCNC: 9.6 MG/DL (ref 8.6–10.5)
CHLORIDE SERPL-SCNC: 106 MMOL/L (ref 98–107)
CO2 SERPL-SCNC: 22.7 MMOL/L (ref 22–29)
CREAT SERPL-MCNC: 1.15 MG/DL (ref 0.57–1)
DEPRECATED RDW RBC AUTO: 52.9 FL (ref 37–54)
EGFRCR SERPLBLD CKD-EPI 2021: 47.4 ML/MIN/1.73
EOSINOPHIL # BLD AUTO: 0.22 10*3/MM3 (ref 0–0.4)
EOSINOPHIL NFR BLD AUTO: 4.1 % (ref 0.3–6.2)
ERYTHROCYTE [DISTWIDTH] IN BLOOD BY AUTOMATED COUNT: 17.6 % (ref 12.3–15.4)
GLOBULIN UR ELPH-MCNC: 3.1 GM/DL
GLUCOSE SERPL-MCNC: 110 MG/DL (ref 65–99)
HCT VFR BLD AUTO: 29.7 % (ref 34–46.6)
HGB BLD-MCNC: 9.1 G/DL (ref 12–15.9)
IMM GRANULOCYTES # BLD AUTO: 0.02 10*3/MM3 (ref 0–0.05)
IMM GRANULOCYTES NFR BLD AUTO: 0.4 % (ref 0–0.5)
LYMPHOCYTES # BLD AUTO: 0.84 10*3/MM3 (ref 0.7–3.1)
LYMPHOCYTES NFR BLD AUTO: 15.8 % (ref 19.6–45.3)
MCH RBC QN AUTO: 25.3 PG (ref 26.6–33)
MCHC RBC AUTO-ENTMCNC: 30.6 G/DL (ref 31.5–35.7)
MCV RBC AUTO: 82.5 FL (ref 79–97)
MONOCYTES # BLD AUTO: 0.44 10*3/MM3 (ref 0.1–0.9)
MONOCYTES NFR BLD AUTO: 8.3 % (ref 5–12)
NEUTROPHILS NFR BLD AUTO: 3.78 10*3/MM3 (ref 1.7–7)
NEUTROPHILS NFR BLD AUTO: 71 % (ref 42.7–76)
NRBC BLD AUTO-RTO: 0 /100 WBC (ref 0–0.2)
PLATELET # BLD AUTO: 121 10*3/MM3 (ref 140–450)
PMV BLD AUTO: 9.2 FL (ref 6–12)
POTASSIUM SERPL-SCNC: 3.7 MMOL/L (ref 3.5–5.2)
PROT SERPL-MCNC: 6.8 G/DL (ref 6–8.5)
RBC # BLD AUTO: 3.6 10*6/MM3 (ref 3.77–5.28)
SODIUM SERPL-SCNC: 142 MMOL/L (ref 136–145)
WBC NRBC COR # BLD AUTO: 5.32 10*3/MM3 (ref 3.4–10.8)

## 2024-05-29 PROCEDURE — 25010000002 ADO-TRASTUZUMAB 100 MG RECONSTITUTED SOLUTION 1 EACH VIAL: Performed by: NURSE PRACTITIONER

## 2024-05-29 PROCEDURE — 25010000002 DEXAMETHASONE SODIUM PHOSPHATE 100 MG/10ML SOLUTION: Performed by: NURSE PRACTITIONER

## 2024-05-29 PROCEDURE — 80053 COMPREHEN METABOLIC PANEL: CPT

## 2024-05-29 PROCEDURE — 25810000003 SODIUM CHLORIDE 0.9 % SOLUTION 250 ML FLEX CONT: Performed by: NURSE PRACTITIONER

## 2024-05-29 PROCEDURE — 96375 TX/PRO/DX INJ NEW DRUG ADDON: CPT

## 2024-05-29 PROCEDURE — 25810000003 SODIUM CHLORIDE 0.9 % SOLUTION: Performed by: NURSE PRACTITIONER

## 2024-05-29 PROCEDURE — 96413 CHEMO IV INFUSION 1 HR: CPT

## 2024-05-29 PROCEDURE — 96361 HYDRATE IV INFUSION ADD-ON: CPT

## 2024-05-29 PROCEDURE — 25010000002 HEPARIN LOCK FLUSH PER 10 UNITS: Performed by: NURSE PRACTITIONER

## 2024-05-29 PROCEDURE — 85025 COMPLETE CBC W/AUTO DIFF WBC: CPT

## 2024-05-29 RX ORDER — SODIUM CHLORIDE 9 MG/ML
250 INJECTION, SOLUTION INTRAVENOUS ONCE
Status: COMPLETED | OUTPATIENT
Start: 2024-05-29 | End: 2024-05-29

## 2024-05-29 RX ORDER — SODIUM CHLORIDE 0.9 % (FLUSH) 0.9 %
10 SYRINGE (ML) INJECTION AS NEEDED
Status: DISCONTINUED | OUTPATIENT
Start: 2024-05-29 | End: 2024-05-29 | Stop reason: HOSPADM

## 2024-05-29 RX ORDER — HEPARIN SODIUM (PORCINE) LOCK FLUSH IV SOLN 100 UNIT/ML 100 UNIT/ML
500 SOLUTION INTRAVENOUS AS NEEDED
Status: DISCONTINUED | OUTPATIENT
Start: 2024-05-29 | End: 2024-05-29 | Stop reason: HOSPADM

## 2024-05-29 RX ORDER — SODIUM CHLORIDE 9 MG/ML
500 INJECTION, SOLUTION INTRAVENOUS ONCE
Status: COMPLETED | OUTPATIENT
Start: 2024-05-29 | End: 2024-05-29

## 2024-05-29 RX ADMIN — SODIUM CHLORIDE 250 ML: 9 INJECTION, SOLUTION INTRAVENOUS at 13:30

## 2024-05-29 RX ADMIN — ADO-TRASTUZUMAB EMTANSINE 175 MG: 20 INJECTION, POWDER, LYOPHILIZED, FOR SOLUTION INTRAVENOUS at 15:04

## 2024-05-29 RX ADMIN — DEXAMETHASONE SODIUM PHOSPHATE 12 MG: 100 INJECTION INTRAMUSCULAR; INTRAVENOUS at 14:29

## 2024-05-29 RX ADMIN — Medication 500 UNITS: at 15:36

## 2024-05-29 RX ADMIN — Medication 10 ML: at 15:36

## 2024-05-29 RX ADMIN — SODIUM CHLORIDE 500 ML: 9 INJECTION, SOLUTION INTRAVENOUS at 14:15

## 2024-05-29 NOTE — NURSING NOTE
ECHO from 5/15/24 reviewed - LVEF noted at 55.7%.     CrCl and creatinine reviewed with CARL Mittal - order for 500ml NS then okay to treat. Patient and daughter notified, educated to increase fluid intake, and verbalize understanding.     Patient tolerated treatment without complaints. Discharged in stable condition.

## 2024-06-19 ENCOUNTER — OFFICE VISIT (OUTPATIENT)
Dept: ONCOLOGY | Facility: CLINIC | Age: 84
End: 2024-06-19
Payer: MEDICARE

## 2024-06-19 ENCOUNTER — INFUSION (OUTPATIENT)
Dept: ONCOLOGY | Facility: HOSPITAL | Age: 84
End: 2024-06-19
Payer: MEDICARE

## 2024-06-19 VITALS
OXYGEN SATURATION: 99 % | TEMPERATURE: 97.8 F | HEIGHT: 61 IN | RESPIRATION RATE: 16 BRPM | WEIGHT: 121.7 LBS | DIASTOLIC BLOOD PRESSURE: 72 MMHG | HEART RATE: 98 BPM | SYSTOLIC BLOOD PRESSURE: 128 MMHG | BODY MASS INDEX: 22.98 KG/M2

## 2024-06-19 DIAGNOSIS — Z45.2 ENCOUNTER FOR FITTING AND ADJUSTMENT OF VASCULAR CATHETER: ICD-10-CM

## 2024-06-19 DIAGNOSIS — Z17.1 MALIGNANT NEOPLASM OF UPPER-OUTER QUADRANT OF RIGHT BREAST IN FEMALE, ESTROGEN RECEPTOR NEGATIVE: Primary | ICD-10-CM

## 2024-06-19 DIAGNOSIS — C50.411 MALIGNANT NEOPLASM OF UPPER-OUTER QUADRANT OF RIGHT BREAST IN FEMALE, ESTROGEN RECEPTOR NEGATIVE: ICD-10-CM

## 2024-06-19 DIAGNOSIS — C50.411 MALIGNANT NEOPLASM OF UPPER-OUTER QUADRANT OF RIGHT BREAST IN FEMALE, ESTROGEN RECEPTOR NEGATIVE: Primary | ICD-10-CM

## 2024-06-19 DIAGNOSIS — Z79.899 HIGH RISK MEDICATION USE: Primary | ICD-10-CM

## 2024-06-19 DIAGNOSIS — Z17.1 MALIGNANT NEOPLASM OF UPPER-OUTER QUADRANT OF RIGHT BREAST IN FEMALE, ESTROGEN RECEPTOR NEGATIVE: ICD-10-CM

## 2024-06-19 LAB
ALBUMIN SERPL-MCNC: 3.5 G/DL (ref 3.5–5.2)
ALBUMIN/GLOB SERPL: 1.1 G/DL
ALP SERPL-CCNC: 77 U/L (ref 39–117)
ALT SERPL W P-5'-P-CCNC: 17 U/L (ref 1–33)
ANION GAP SERPL CALCULATED.3IONS-SCNC: 14.5 MMOL/L (ref 5–15)
AST SERPL-CCNC: 37 U/L (ref 1–32)
BASOPHILS # BLD AUTO: 0.02 10*3/MM3 (ref 0–0.2)
BASOPHILS NFR BLD AUTO: 0.4 % (ref 0–1.5)
BILIRUB SERPL-MCNC: 0.3 MG/DL (ref 0–1.2)
BUN SERPL-MCNC: 32 MG/DL (ref 8–23)
BUN/CREAT SERPL: 26 (ref 7–25)
CALCIUM SPEC-SCNC: 8.9 MG/DL (ref 8.6–10.5)
CHLORIDE SERPL-SCNC: 107 MMOL/L (ref 98–107)
CO2 SERPL-SCNC: 19.5 MMOL/L (ref 22–29)
CREAT SERPL-MCNC: 1.23 MG/DL (ref 0.57–1)
DEPRECATED RDW RBC AUTO: 53.1 FL (ref 37–54)
EGFRCR SERPLBLD CKD-EPI 2021: 43.7 ML/MIN/1.73
EOSINOPHIL # BLD AUTO: 0.09 10*3/MM3 (ref 0–0.4)
EOSINOPHIL NFR BLD AUTO: 1.8 % (ref 0.3–6.2)
ERYTHROCYTE [DISTWIDTH] IN BLOOD BY AUTOMATED COUNT: 17.9 % (ref 12.3–15.4)
GLOBULIN UR ELPH-MCNC: 3.1 GM/DL
GLUCOSE SERPL-MCNC: 145 MG/DL (ref 65–99)
HCT VFR BLD AUTO: 28.9 % (ref 34–46.6)
HGB BLD-MCNC: 8.9 G/DL (ref 12–15.9)
IMM GRANULOCYTES # BLD AUTO: 0.01 10*3/MM3 (ref 0–0.05)
IMM GRANULOCYTES NFR BLD AUTO: 0.2 % (ref 0–0.5)
LYMPHOCYTES # BLD AUTO: 1.14 10*3/MM3 (ref 0.7–3.1)
LYMPHOCYTES NFR BLD AUTO: 23.2 % (ref 19.6–45.3)
MCH RBC QN AUTO: 25.2 PG (ref 26.6–33)
MCHC RBC AUTO-ENTMCNC: 30.8 G/DL (ref 31.5–35.7)
MCV RBC AUTO: 81.9 FL (ref 79–97)
MONOCYTES # BLD AUTO: 0.58 10*3/MM3 (ref 0.1–0.9)
MONOCYTES NFR BLD AUTO: 11.8 % (ref 5–12)
NEUTROPHILS NFR BLD AUTO: 3.08 10*3/MM3 (ref 1.7–7)
NEUTROPHILS NFR BLD AUTO: 62.6 % (ref 42.7–76)
NRBC BLD AUTO-RTO: 0 /100 WBC (ref 0–0.2)
PLATELET # BLD AUTO: 126 10*3/MM3 (ref 140–450)
PMV BLD AUTO: 9.4 FL (ref 6–12)
POTASSIUM SERPL-SCNC: 3.5 MMOL/L (ref 3.5–5.2)
PROT SERPL-MCNC: 6.6 G/DL (ref 6–8.5)
RBC # BLD AUTO: 3.53 10*6/MM3 (ref 3.77–5.28)
SODIUM SERPL-SCNC: 141 MMOL/L (ref 136–145)
WBC NRBC COR # BLD AUTO: 4.92 10*3/MM3 (ref 3.4–10.8)

## 2024-06-19 PROCEDURE — 85025 COMPLETE CBC W/AUTO DIFF WBC: CPT

## 2024-06-19 PROCEDURE — 25810000003 SODIUM CHLORIDE 0.9 % SOLUTION 250 ML FLEX CONT: Performed by: INTERNAL MEDICINE

## 2024-06-19 PROCEDURE — 96413 CHEMO IV INFUSION 1 HR: CPT

## 2024-06-19 PROCEDURE — 25010000002 ADO-TRASTUZUMAB 100 MG RECONSTITUTED SOLUTION 1 EACH VIAL: Performed by: INTERNAL MEDICINE

## 2024-06-19 PROCEDURE — 96361 HYDRATE IV INFUSION ADD-ON: CPT

## 2024-06-19 PROCEDURE — 25810000003 SODIUM CHLORIDE 0.9 % SOLUTION: Performed by: INTERNAL MEDICINE

## 2024-06-19 PROCEDURE — 96375 TX/PRO/DX INJ NEW DRUG ADDON: CPT

## 2024-06-19 PROCEDURE — 25010000002 HEPARIN LOCK FLUSH PER 10 UNITS: Performed by: INTERNAL MEDICINE

## 2024-06-19 PROCEDURE — 80053 COMPREHEN METABOLIC PANEL: CPT

## 2024-06-19 PROCEDURE — 25010000002 DEXAMETHASONE SODIUM PHOSPHATE 100 MG/10ML SOLUTION: Performed by: INTERNAL MEDICINE

## 2024-06-19 RX ORDER — SODIUM CHLORIDE 0.9 % (FLUSH) 0.9 %
10 SYRINGE (ML) INJECTION AS NEEDED
Status: DISCONTINUED | OUTPATIENT
Start: 2024-06-19 | End: 2024-06-19 | Stop reason: HOSPADM

## 2024-06-19 RX ORDER — IMIQUIMOD 12.5 MG/.25G
CREAM TOPICAL
COMMUNITY
Start: 2024-06-07

## 2024-06-19 RX ORDER — SODIUM CHLORIDE 9 MG/ML
1000 INJECTION, SOLUTION INTRAVENOUS ONCE
Status: COMPLETED | OUTPATIENT
Start: 2024-06-19 | End: 2024-06-19

## 2024-06-19 RX ORDER — SODIUM CHLORIDE 9 MG/ML
250 INJECTION, SOLUTION INTRAVENOUS ONCE
Status: CANCELLED | OUTPATIENT
Start: 2024-06-19

## 2024-06-19 RX ORDER — SIMVASTATIN 20 MG
20 TABLET ORAL NIGHTLY
COMMUNITY
Start: 2024-05-30

## 2024-06-19 RX ORDER — HEPARIN SODIUM (PORCINE) LOCK FLUSH IV SOLN 100 UNIT/ML 100 UNIT/ML
500 SOLUTION INTRAVENOUS AS NEEDED
Status: DISCONTINUED | OUTPATIENT
Start: 2024-06-19 | End: 2024-06-19 | Stop reason: HOSPADM

## 2024-06-19 RX ORDER — SODIUM CHLORIDE 9 MG/ML
250 INJECTION, SOLUTION INTRAVENOUS ONCE
Status: COMPLETED | OUTPATIENT
Start: 2024-06-19 | End: 2024-06-19

## 2024-06-19 RX ADMIN — SODIUM CHLORIDE 250 ML: 9 INJECTION, SOLUTION INTRAVENOUS at 13:38

## 2024-06-19 RX ADMIN — SODIUM CHLORIDE 1000 ML: 9 INJECTION, SOLUTION INTRAVENOUS at 14:38

## 2024-06-19 RX ADMIN — ADO-TRASTUZUMAB EMTANSINE 175 MG: 20 INJECTION, POWDER, LYOPHILIZED, FOR SOLUTION INTRAVENOUS at 14:02

## 2024-06-19 RX ADMIN — Medication 500 UNITS: at 15:57

## 2024-06-19 RX ADMIN — DEXAMETHASONE SODIUM PHOSPHATE 12 MG: 10 INJECTION, SOLUTION INTRAMUSCULAR; INTRAVENOUS at 13:38

## 2024-06-19 RX ADMIN — Medication 10 ML: at 15:57

## 2024-06-19 NOTE — PROGRESS NOTES
Subjective   Veronica Royal is a 83 y.o. female.  Referred by Dr. Michael for right breast inflammatory breast cancer    History of Present Illness   Ms. Royal is a 82-year-old postmenopausal  lady with hypertension, diabetes, hyperlipidemia, chronic kidney disease, hyperkalemia-chronic presented with a palpable abnormality in the right breast.  Subsequent imaging was performed.  Patient has not had a mammogram prior to this in the past 25 years.    4/21/2023-bilateral diagnostic mammogram-high density irregular mass measuring 44 mm with spiculated margins and amorphus and fine pleomorphic calcifications with skin thickening in the right breast at 9:00.  2 intramammary lymph nodes in the upper outer axillary tail region are slightly rounded  2 prominent right axillary lymph nodes largest of which measures 26 mm.  Asymmetry noted in the left breast.    Right breast ultrasound at 9 o'clock position, 3 cm from the nipple there is a 38 x 28 x 42 mm mass.  Skin thickness measuring 11 mm near the mass.  One of the 2 rounded axillary tail lymph nodes noted.  Borderline cortical thickening.  2 abnormal axillary lymph nodes.  1 lymph node displaced loss of normal morphology with a round heterogeneous appearance and echogenic foci.  Most consistent with calcified lymph node measuring 26 mm.  Second lymph node measures 12 mm.  Displaced cortical thickening.  Ultrasound-guided biopsy of the mass in the axillary lymph nodes recommended.    4/21/2023  1.right breast 9:00 biopsy-invasive ductal carcinoma with apocrine features  Grade 3  ER negative  UT negative  HER2 2+ on immunohistochemistry  HER2 amplified on FISH with HER2 copy number of 7.58, OPAL seventeen 3.18, HER2/OPAL 17 ratio of 2.4.  Ki-67 38.45%    2.right axillary biopsy-soft tissue involved by invasive ductal carcinoma with apocrine features  Associated microcalcification  No definite lymph node tissue identified.    4/29/2023-MRI of the breast-biopsy-proven  malignancy in the right breast at 9:00 measuring 4.3 cm in greatest dimension.  Attachment overlying skin and diffuse right breast edema noted.  Right axillary lymphadenopathy.  No suspicious findings in the left breast.    5/11/2023-CT of the chest abdomen and pelvis-no evidence of metastatic disease.  Liver is cirrhotic in configuration.    5/11/2023-bone scan negative    Patient presents today to the clinic for discussing neoadjuvant chemotherapy.  She is accompanied by her daughter.  Patient denies any recent changes in weight, she reports some pain at the site of malignancy.  A punch biopsy was performed and was consistent with inflammatory breast cancer.  At the site of punch biopsy there is an ulcerated lesion.    She has poorly controlled diabetes currently on glipizide metformin and Tradjenta.  Hemoglobin A1c recently was noted to be 9.9.    Also has chronic hyperkalemia, explanation unclear.    Blood pressure poorly controlled.    She reports good functional status lives independently.  Able to manage all her bills and independent of ADLs.  She has good support system in terms of her daughter.    Family history significant for brother with pancreatic cancer at the age of 68, sister with ovarian cancer at the age of 58    She received 6 weeks of Taxol Perjeta and Herceptin and subsequently admitted to the hospital due to severe nausea vomiting diarrhea poor oral intake, KAMILA, severe electrolyte abnormalities.      admission to the hospital for KAMILA, severe diarrhea, nausea vomiting and poor oral intake.She was in the hospital between 7/12/2023 to 7/20/2023.  During the hospitalization she was treated for acute UTI, electrolyte abnormalities and KAMILA.  Subsequently she was  discharged to a rehab.   During the hospitalization she was evaluated by Dr. Michael and a right breast ultrasound was obtained on 7/17/2023.  There was very little response noted with the 9 o'clock position mass 6 cm from the nipple  measuring 3.7 x 2.4 x 3.7 cm previously 3.8 x 2.5 x 4.2 cm.  In the right axilla the lymph node measured 2.6 x 1.5 x 1.9 cm previously 1.9 x 1.5 x 2.5 cm.  There was decrease in size of the adjacent lymph node measuring 0.7 cm previously 1.2 cm.    8/24/2023-right mastectomy and axillary lymph node dissection  Invasive ductal carcinoma, grade 2  The tumor measures 4 cm  Microcalcifications are seen in the tumor  Dermal lymphatic invasion present  Lymphatic invasion present  Tumor seen in the skin but no ulceration.  Residual cancer burden 3.454  RCB-3  Xiong Lima grade 3  Margins negative  18 lymph nodes total evaluated  1 with micrometastasis with a tumor measuring 2.5 mm with no chemotherapy effect  1 with chemotherapy change and isolated tumor cells  There is 1 node with chemotherapy change and a clip but no residual tumor.    Receptors repeated  ER negative  LA negative  HER2 2+ on immunohistochemistry  FISH nonamplified    Kadcyla dose decreased to 3 mg/kg.    Patient was hospitalized between 2/22/2024 to 2/24/2024.  She presented to the emergency room with complaints of chest pain.  CT angiogram was performed which did not show any evidence of pulmonary embolism.  There were changes consistent with radiation on the right side and there was a moderate pleural effusion on the right.  Thoracentesis was performed and 550 cc of fluid was drained.  Cytology was negative for any malignancy.  Cultures remain negative.  Chest pain improved after the thoracentesis.  She denies any dyspnea or cough.    Bilateral lower extremity Doppler was performed which was negative.    She is reporting lymphedema of the right upper extremity and has been somewhat noncompliant with the sleeve.  She continues to follow-up with lymphedema clinic.    She is tolerating the decreased dose of Kadcyla fairly well.  She felt a little weak immediately after the discharge from the hospital but subsequently felt better.    Echocardiogram  2/15/2024 shows a stable ejection fraction of 54.5% with a strain of -19%.    3/8/2024-stress test-low risk study with normal myocardial perfusion.  Left ventricular fraction is normal at 64%.    Interval history  Ms. Royal presents to the clinic today for follow-up.  She is scheduled for the 13th cycle of Kadcyla.  She had some diarrhea this morning.  Reports altered taste and therefore decreased oral intake.  She has lost some weight since her last visit.  Denies any worsening neuropathy.     The following portions of the patient's history were reviewed and updated as appropriate: allergies, current medications, past family history, past medical history, past social history, past surgical history and problem list.    Past Medical History:   Diagnosis Date    Anxiety     Arthritis     Basal cell carcinoma     Left thumb    Breast cancer 2023    Chronic kidney disease     Depression     Diabetes mellitus     Diarrhea     s/e chemo    High cholesterol     History of chemotherapy 08/2023    Hypertension     Rash 08/2023    s/e chemo    Sacral decubitus ulcer     cleaning with soap & water    Urinary incontinence     wears pads    Vitamin D deficiency         Past Surgical History:   Procedure Laterality Date    BREAST BIOPSY      BREAST SURGERY      CATARACT EXTRACTION EXTRACAPSULAR W/ INTRAOCULAR LENS IMPLANTATION Right     EYE SURGERY      Muscle repair age 21    MASTECTOMY W/ SENTINEL NODE BIOPSY Right 08/24/2023    Procedure: Right breast modified radical mastectomy;  Surgeon: Rosa Michael MD;  Location: [facility] [place];  Service: General;  Laterality: Right;    TONSILLECTOMY      VENOUS ACCESS DEVICE (PORT) INSERTION N/A 05/19/2023    Procedure: INSERTION VENOUS ACCESS DEVICE;  Surgeon: Rosa Michael MD;  Location: [facility];  Service: General;  Laterality: N/A;        Family History   Problem Relation Age of Onset    Alzheimer's disease Mother     Heart disease Father     Heart attack Father      "Ovarian cancer Sister     Pancreatic cancer Brother     Tiffanie Hyperthermia Neg Hx     Colon cancer Neg Hx     Colon polyps Neg Hx     Crohn's disease Neg Hx     Irritable bowel syndrome Neg Hx     Ulcerative colitis Neg Hx         Social History     Socioeconomic History    Marital status:    Tobacco Use    Smoking status: Never    Smokeless tobacco: Never   Vaping Use    Vaping status: Never Used   Substance and Sexual Activity    Alcohol use: Never    Drug use: Never    Sexual activity: Never        OB History    No obstetric history on file.      Age at menarche-11  Age at first live childbirth-35   2 para 1  1  Age at menopause-50  Oral conceptive pill use for 3 to 4 years    No Known Allergies     Review of Systems    Review of systems as mentioned HPI otherwise negative    Objective   Blood pressure 128/72, pulse 98, temperature 97.8 °F (36.6 °C), temperature source Oral, resp. rate 16, height 154.9 cm (60.98\"), weight 55.2 kg (121 lb 11.2 oz), SpO2 99%.     Physical Exam  Vitals reviewed.   Constitutional:       General: She is not in acute distress.     Appearance: Normal appearance. She is well-developed.   HENT:      Head: Normocephalic and atraumatic.      Right Ear: Decreased hearing noted.      Left Ear: Decreased hearing noted.   Eyes:      Pupils: Pupils are equal, round, and reactive to light.   Cardiovascular:      Rate and Rhythm: Normal rate and regular rhythm.      Heart sounds: Normal heart sounds. No murmur heard.  Pulmonary:      Effort: Pulmonary effort is normal. No respiratory distress.      Breath sounds: Normal breath sounds. No wheezing, rhonchi or rales.   Abdominal:      General: Bowel sounds are normal. There is no distension.      Palpations: Abdomen is soft.   Musculoskeletal:         General: Swelling (trace) present. Normal range of motion.      Cervical back: Normal range of motion.   Skin:     General: Skin is warm and dry.      Findings: No rash. "   Neurological:      Mental Status: She is alert and oriented to person, place, and time.      Right chest wall carefully examined, status post mastectomy with incision well-healed, no signs or symptoms of infection.  She does currently have grade 1 radiation dermatitis of the right chest wall with dryness and some erythema though no open sores or signs of infection    I have reexamined the patient and the results are consistent with the previously documented exam. Sheila Yee MD          Results from last 7 days   Lab Units 06/19/24  1218   WBC 10*3/mm3 4.92   NEUTROS ABS 10*3/mm3 3.08   HEMOGLOBIN g/dL 8.9*   HEMATOCRIT % 28.9*   PLATELETS 10*3/mm3 126*     Results from last 7 days   Lab Units 06/19/24  1218   SODIUM mmol/L 141   POTASSIUM mmol/L 3.5   CHLORIDE mmol/L 107   CO2 mmol/L 19.5*   BUN mg/dL 32*   CREATININE mg/dL 1.23*   CALCIUM mg/dL 8.9   ALBUMIN g/dL 3.5   BILIRUBIN mg/dL 0.3   ALK PHOS U/L 77   ALT (SGPT) U/L 17   AST (SGOT) U/L 37*   GLUCOSE mg/dL 145*     CBC reviewed and hemoglobin lower at 9.9, WBC 5.43 and platelets 153,000  CMP reviewed and BUN 22, creatinine 0.82, LFTs within normal limits            No radiology results for the last 30 days.     6/19/2024 CBC reviewed and WBC 4.92, hemoglobin 8.9 platelets 126,000  CMP reviewed and BUN 32, creatinine 1.23, LFTs elevated with an ALT of 17, AST of 37, alkaline phosphatase 77 and total bilirubin of 0.3.      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  Invasive ductal carcinoma with apocrine features, grade 3, Ki-67 38%  CT scans and bone scan without any obvious evidence of metastatic disease  Echocardiogram has been performed and ejection fraction normal.  Liver shows cirrhotic morphology.  LFTs otherwise normal and total bilirubin normal as well as protein normal.  It appears that the synthetic function of the  liver is still within normal limits.  Mediport placed 5/19/2023  Taxol, Herceptin, Perjeta initiated 5/24/2023 5/31/2023 with C1D8 Taxol  6/28/2023-cycle 2-day 15 of Taxol.  She is overall tolerating therapy well with expected side effects.  She will proceed with Taxol today.  Scheduled for Taxol Herceptin and Perjeta.  7/12/2023-cycle 3-day 8 of TPH.  Chemotherapy held and patient was admitted to the hospital for management of severe dehydration, KAMILA, nausea vomiting diarrhea and decreased oral intake.  8/2/2023-she feels relatively well today.  Labs reviewed and stable to proceed with Herceptin only.  We will not administer Taxol and Perjeta as she has had significant issues with chemotherapy.  Right mastectomy 8/24/2023 with 4 cm of residual disease, RCB-3, treatment effect present, axillary lymph node dissection with 1 lymph node with micrometastasis measuring 2.5 mm, 1 lymph node with isolated tumor cells and a third lymph node which showed treatment changes but no tumor cells.  Total of 18 lymph nodes removed   9/20/2023 to discuss pathology and adjuvant therapy.  We discussed Kadcyla every 3 weekly to finish a complete year.  Patient is definitely hesitant to resume any sort of chemotherapy due to her previous experience with diarrhea.  Started Kadcyla 9/20/2023, denies any diarrhea.  Tolerating treatment well so far  No evidence of recurrent disease  Adjuvant radiation completed 11/20/2023  She has received 5 doses of adjuvant Kadcyla.  Sixth dose has been delayed by 3 weeks due to worsening arthralgias, decreased strength in her lower extremities  Improvement in strength as well as arthralgias with holding chemotherapy.  We discussed about proceeding with the dose reduction of Kadcyla versus not doing any further Kadcyla.  Kadcyla dose decreased to 3 mg/kg  Patient hospitalized between 2/21/2024 to 2/24/2024 for right-sided pleural effusion and status post thoracentesis with 550 cc drained which was  nonmalignant.  Etiology of the pleural fluid is unclear  CTA performed 2/21/2024 without any evidence of metastatic disease.  Chest x-ray 3/25/2024-no recurrence of fluid  No evidence of recurrent disease  Today is the last dose of Kadcyla.  Continue with dose reduced Kadcyla.  Labs reviewed and stable to proceed    *Anemia  Most likely secondary to chemotherapy.  Iron studies performed in May 2023 suggestive of anemia of chronic disease/inflammation.  Hemoglobin 8.9.    *Diabetes  Poorly controlled  Evaluation by endocrinology 5/30/2023 with recommendations for dietary changes and home blood sugar monitoring.  The patient's daughter reports today she is struggling with compliance.  Continue follow-up with primary care physician    *KAMILA/CKD  8/17/2023 BUN 24 and creatinine 1.05  Patient's daughter reports that she has not been hydrating herself well.  Creatinine elevated at 1.24, patient reports some diarrhea this morning  Proceed with 1 L normal saline.    *?  Cirrhotic appearance of the liver on the CT scan  Referred to gastroenterology  Synthetic function appears to be normal  We will start with Taxol to see if she would tolerate the chemotherapy   Slight elevation of intervention studies today with ALT 43, AST 55.  Continue to monitor weekly  6/28/2023-mild elevation in the LFTs.  Stable compared to previous labs.  Okay to proceed with chemotherapy.  11/21/2023-LFTs normal  12/13/2023: Liver enzymes are normals. Alkaline phosphatase is slightly more elevated at 244.   LFTs slightly abnormal, continue to monitor closely    *Hypertension-currently on valsartan and hydrochlorothiazide.  Valsartan could be contributing to hyperkalemia.  Will refer to cardiology ASAP for management of medications and cardiac clearance for proceeding with chemotherapy  Recent echocardiogram normal  Stress test reviewed and negative.  9/27/2023-echocardiogram shows a normal ejection fraction of 59%.  Follow-up echocardiogram 10/20/2023  with ejection fraction of 54%.  When compared to baseline study 5/9/2023 ejection fraction is stable.  Left ventricular GLS is changed by 8%.  Discussed with cardiology, given the stability of ejection fraction when compared to baseline from May, we will proceed with Kadcyla   Cardiology evaluation 11/10/2023 with stable EF and strain.  Recommendations to continue Kadcyla with 3-month follow-up echocardiogram  12/13/2023: Being followed by cardio oncology. Has an ECHO scheduled soon that is to be rescheduled per her daughter.   Blood pressure elevated at 128/72    *Genetic testing-Inv[employer]ae 9 gene stat panel negative,     *Decreased oral intake secondary to chemotherapy  Still not adequate oral intake  Encouraged her to drink boost and Ensure  Weight lower today at 121 pounds.  Patient's daughter reports decreased oral intake due to altered taste from the chemotherapy drug she is taking for the skin cancer.  Patient has quit taking that medication.      *Cognitive impairment  It is unclear if this is the patient's baseline or mental status has been exacerbated by recent chemotherapy  The patient is struggling with compliance with her medications.  The patient's daughter expresses frustration today as the patient is struggling to care for herself at home with managing medications and symptom management.  Evaluated by CARL Antonio     *Chemotherapy-induced diarrhea  6/5/2023 patient given Enterade (Wild Berry flavor).  She has been drinking 1 a day.  She does not necessarily like the flavor (currently mixing with sugar-free Aramis-Aid which does help).  Encouraged her to increase to 2 a day.  6/21/2023 patient reports that she had stopped drinking her Enterade because she was waking up in the middle the night having diarrhea although she did not know if it was due to the Enterade her protein shakes.  I have encouraged the patient to continue Enterade, drinking it in the morning.  Try discontinuing the protein  shakes and see how she does.  Patient states that she will try this  Reports 2-3 loose stools.  Continue Enterade and Imodium.  7/5/2023 continuing to have issues with diarrhea up to 3-4 bowel movements a day, patient also recently prescribed Kayexalate for an apparently elevated potassium.  Potassium only 3.1 today.  She advised to discontinue the Kayexalate she was given IV hydration today.  We will also prescribe Lomotil to use if needed to help better control her diarrhea.  Patient is not drinking Enterade regularly.  8/2/2023-improved since discharge from the hospital and discontinuation of chemotherapy.  She received Herceptin on 8/2/2023 and reports a week of diarrhea.  Patient has not had any diarrhea since initiating Kadcyla.  Continue to monitor, she continues to note this is improved on Kadcyla  12/13/2023: Has improved. Currently has had some constipation. Encouraged senokot S every other night based on her current symptoms until bowel movements are more consistent.   Had some diarrhea this morning.  IV fluids will be administered    Weakness and leg aching  12/13/2023: Patient denies any numbness or tingling in her legs and there is not localized edema, warmth, cording or tenderness. Continues to report weakness and aching that sounds like a combination of deconditioning and possible restless leg syndrome. Magnesium level was checked and is normal. Home health to be consulted for physical therapy and a prescription for a walker provided.   1/3/2024-patient reports persistent weakness over the past 1 month.  She is also complaining of leg cramping and had a recent fall.  Although not entirely suggestive of neuropathy I do wonder if Kadcyla is contributing to the weakness.  I will delay treatment by 3 weeks to let her continue with physical therapy to see if that would help with improvement in the strength.  Arthralgias and lower extremity strength have improved significantly since dealing chemotherapy  and increased work with physical therapy.  Recommend that she continue physical therapy  Improved with decreased dose of Kadcyla.  Improved significantly with physical therapy  Strength continues to improve although she reports some mild weakness in her lower extremities.  Encouraged her to continue physical therapy exercises and increased activity to keep up the strength.  Improved significantly.      *Right-sided pleural effusion  New on CT chest from 2/21/2024  Unclear etiology  Chest x-ray 3/25/2024 without any evidence of recurrence of the pleural fluid    *Lymphedema  Continue follow-up with lymphedema clinic  Encouraged her to remain compliant with the sleeve  She will be fitted with a pump for the right upper extremity lymphedema.    *Epistaxis  Recommend using nasal saline spray as well as Afrin as needed.  Mild thrombocytopenia could be accounting for this in combination with Advil use.  Recommend that patient quit using Advil.    PLAN:  Cycle 13 Kadcyla.  Last dose today.  Continue physical therapy  Chest x-ray unremarkable, we will repeat only if she has recurrence of symptoms  Continue increased protein intake.   Radiation completed 11/20/2023  Stress test 3/8/2024 with low risk study  Follow-up with cardio oncology, echocardiogram scheduled  APRN in 4 weeks and MD in 4 months.  CBC and CMP on follow-up    Patient is on medications requiring close monitoring for toxicities.  Kadcyla dose has been reduced by 20% due to weakness in her lower extremities.  Patient experiencing diarrhea and as a result of that KAMILA likely secondary to Kadcyla.      Sheila Yee MD  06/19/2024

## 2024-06-21 ENCOUNTER — TELEPHONE (OUTPATIENT)
Dept: ONCOLOGY | Facility: CLINIC | Age: 84
End: 2024-06-21
Payer: MEDICARE

## 2024-07-03 DIAGNOSIS — Z17.1 MALIGNANT NEOPLASM OF UPPER-OUTER QUADRANT OF RIGHT BREAST IN FEMALE, ESTROGEN RECEPTOR NEGATIVE: Primary | ICD-10-CM

## 2024-07-03 DIAGNOSIS — Z91.89 DRIVING SAFETY ISSUE: ICD-10-CM

## 2024-07-03 DIAGNOSIS — C50.411 MALIGNANT NEOPLASM OF UPPER-OUTER QUADRANT OF RIGHT BREAST IN FEMALE, ESTROGEN RECEPTOR NEGATIVE: Primary | ICD-10-CM

## 2024-07-11 RX ORDER — LOSARTAN POTASSIUM 25 MG/1
25 TABLET ORAL DAILY
Qty: 90 TABLET | Refills: 1 | Status: SHIPPED | OUTPATIENT
Start: 2024-07-11

## 2024-07-18 ENCOUNTER — INFUSION (OUTPATIENT)
Dept: ONCOLOGY | Facility: HOSPITAL | Age: 84
End: 2024-07-18
Payer: MEDICARE

## 2024-07-18 ENCOUNTER — LAB (OUTPATIENT)
Dept: LAB | Facility: HOSPITAL | Age: 84
End: 2024-07-18
Payer: MEDICARE

## 2024-07-18 ENCOUNTER — OFFICE VISIT (OUTPATIENT)
Dept: ONCOLOGY | Facility: CLINIC | Age: 84
End: 2024-07-18
Payer: MEDICARE

## 2024-07-18 VITALS
WEIGHT: 124.7 LBS | SYSTOLIC BLOOD PRESSURE: 151 MMHG | HEART RATE: 91 BPM | OXYGEN SATURATION: 98 % | DIASTOLIC BLOOD PRESSURE: 74 MMHG | BODY MASS INDEX: 23.57 KG/M2 | TEMPERATURE: 98 F

## 2024-07-18 DIAGNOSIS — Z45.2 ENCOUNTER FOR FITTING AND ADJUSTMENT OF VASCULAR CATHETER: Primary | ICD-10-CM

## 2024-07-18 DIAGNOSIS — N17.9 AKI (ACUTE KIDNEY INJURY): ICD-10-CM

## 2024-07-18 DIAGNOSIS — C50.411 MALIGNANT NEOPLASM OF UPPER-OUTER QUADRANT OF RIGHT BREAST IN FEMALE, ESTROGEN RECEPTOR NEGATIVE: Primary | ICD-10-CM

## 2024-07-18 DIAGNOSIS — Z17.1 MALIGNANT NEOPLASM OF UPPER-OUTER QUADRANT OF RIGHT BREAST IN FEMALE, ESTROGEN RECEPTOR NEGATIVE: ICD-10-CM

## 2024-07-18 DIAGNOSIS — Z17.1 MALIGNANT NEOPLASM OF UPPER-OUTER QUADRANT OF RIGHT BREAST IN FEMALE, ESTROGEN RECEPTOR NEGATIVE: Primary | ICD-10-CM

## 2024-07-18 DIAGNOSIS — C50.411 MALIGNANT NEOPLASM OF UPPER-OUTER QUADRANT OF RIGHT BREAST IN FEMALE, ESTROGEN RECEPTOR NEGATIVE: ICD-10-CM

## 2024-07-18 LAB
ALBUMIN SERPL-MCNC: 3.4 G/DL (ref 3.5–5.2)
ALBUMIN/GLOB SERPL: 1 G/DL
ALP SERPL-CCNC: 82 U/L (ref 39–117)
ALT SERPL W P-5'-P-CCNC: 9 U/L (ref 1–33)
ANION GAP SERPL CALCULATED.3IONS-SCNC: 12.6 MMOL/L (ref 5–15)
AST SERPL-CCNC: 27 U/L (ref 1–32)
BASOPHILS # BLD AUTO: 0.01 10*3/MM3 (ref 0–0.2)
BASOPHILS NFR BLD AUTO: 0.1 % (ref 0–1.5)
BILIRUB SERPL-MCNC: 0.6 MG/DL (ref 0–1.2)
BUN SERPL-MCNC: 30 MG/DL (ref 8–23)
BUN/CREAT SERPL: 24.4 (ref 7–25)
CALCIUM SPEC-SCNC: 9.5 MG/DL (ref 8.6–10.5)
CHLORIDE SERPL-SCNC: 110 MMOL/L (ref 98–107)
CO2 SERPL-SCNC: 20.4 MMOL/L (ref 22–29)
CREAT SERPL-MCNC: 1.23 MG/DL (ref 0.57–1)
DEPRECATED RDW RBC AUTO: 55.1 FL (ref 37–54)
EGFRCR SERPLBLD CKD-EPI 2021: 43.7 ML/MIN/1.73
EOSINOPHIL # BLD AUTO: 0.16 10*3/MM3 (ref 0–0.4)
EOSINOPHIL NFR BLD AUTO: 1.9 % (ref 0.3–6.2)
ERYTHROCYTE [DISTWIDTH] IN BLOOD BY AUTOMATED COUNT: 18.1 % (ref 12.3–15.4)
GLOBULIN UR ELPH-MCNC: 3.4 GM/DL
GLUCOSE SERPL-MCNC: 119 MG/DL (ref 65–99)
HCT VFR BLD AUTO: 30.1 % (ref 34–46.6)
HGB BLD-MCNC: 9 G/DL (ref 12–15.9)
IMM GRANULOCYTES # BLD AUTO: 0.03 10*3/MM3 (ref 0–0.05)
IMM GRANULOCYTES NFR BLD AUTO: 0.4 % (ref 0–0.5)
LYMPHOCYTES # BLD AUTO: 1 10*3/MM3 (ref 0.7–3.1)
LYMPHOCYTES NFR BLD AUTO: 11.9 % (ref 19.6–45.3)
MCH RBC QN AUTO: 25.4 PG (ref 26.6–33)
MCHC RBC AUTO-ENTMCNC: 29.9 G/DL (ref 31.5–35.7)
MCV RBC AUTO: 85 FL (ref 79–97)
MONOCYTES # BLD AUTO: 0.7 10*3/MM3 (ref 0.1–0.9)
MONOCYTES NFR BLD AUTO: 8.3 % (ref 5–12)
NEUTROPHILS NFR BLD AUTO: 6.53 10*3/MM3 (ref 1.7–7)
NEUTROPHILS NFR BLD AUTO: 77.4 % (ref 42.7–76)
NRBC BLD AUTO-RTO: 0 /100 WBC (ref 0–0.2)
PLATELET # BLD AUTO: 128 10*3/MM3 (ref 140–450)
PMV BLD AUTO: 9.8 FL (ref 6–12)
POTASSIUM SERPL-SCNC: 5.3 MMOL/L (ref 3.5–5.2)
PROT SERPL-MCNC: 6.8 G/DL (ref 6–8.5)
RBC # BLD AUTO: 3.54 10*6/MM3 (ref 3.77–5.28)
SODIUM SERPL-SCNC: 143 MMOL/L (ref 136–145)
WBC NRBC COR # BLD AUTO: 8.43 10*3/MM3 (ref 3.4–10.8)

## 2024-07-18 PROCEDURE — 36415 COLL VENOUS BLD VENIPUNCTURE: CPT

## 2024-07-18 PROCEDURE — 25810000003 SODIUM CHLORIDE 0.9 % SOLUTION: Performed by: NURSE PRACTITIONER

## 2024-07-18 PROCEDURE — 96360 HYDRATION IV INFUSION INIT: CPT

## 2024-07-18 PROCEDURE — 80053 COMPREHEN METABOLIC PANEL: CPT

## 2024-07-18 PROCEDURE — 25010000002 HEPARIN LOCK FLUSH PER 10 UNITS: Performed by: NURSE PRACTITIONER

## 2024-07-18 PROCEDURE — 85025 COMPLETE CBC W/AUTO DIFF WBC: CPT

## 2024-07-18 RX ORDER — HEPARIN SODIUM (PORCINE) LOCK FLUSH IV SOLN 100 UNIT/ML 100 UNIT/ML
500 SOLUTION INTRAVENOUS AS NEEDED
Status: DISCONTINUED | OUTPATIENT
Start: 2024-07-18 | End: 2024-07-18 | Stop reason: HOSPADM

## 2024-07-18 RX ORDER — SODIUM CHLORIDE 0.9 % (FLUSH) 0.9 %
10 SYRINGE (ML) INJECTION AS NEEDED
Status: DISCONTINUED | OUTPATIENT
Start: 2024-07-18 | End: 2024-07-18 | Stop reason: HOSPADM

## 2024-07-18 RX ORDER — SODIUM CHLORIDE 9 MG/ML
1000 INJECTION, SOLUTION INTRAVENOUS ONCE
Status: COMPLETED | OUTPATIENT
Start: 2024-07-18 | End: 2024-07-18

## 2024-07-18 RX ADMIN — SODIUM CHLORIDE 1000 ML: 9 INJECTION, SOLUTION INTRAVENOUS at 14:15

## 2024-07-18 RX ADMIN — Medication 10 ML: at 15:30

## 2024-07-18 RX ADMIN — Medication 500 UNITS: at 15:30

## 2024-07-18 NOTE — PROGRESS NOTES
Subjective   Veronica Royal is a 83 y.o. female.  Referred by Dr. Michael for right breast inflammatory breast cancer    History of Present Illness   Ms. Royal is a 82-year-old postmenopausal  lady with hypertension, diabetes, hyperlipidemia, chronic kidney disease, hyperkalemia-chronic presented with a palpable abnormality in the right breast.  Subsequent imaging was performed.  Patient has not had a mammogram prior to this in the past 25 years.    4/21/2023-bilateral diagnostic mammogram-high density irregular mass measuring 44 mm with spiculated margins and amorphus and fine pleomorphic calcifications with skin thickening in the right breast at 9:00.  2 intramammary lymph nodes in the upper outer axillary tail region are slightly rounded  2 prominent right axillary lymph nodes largest of which measures 26 mm.  Asymmetry noted in the left breast.    Right breast ultrasound at 9 o'clock position, 3 cm from the nipple there is a 38 x 28 x 42 mm mass.  Skin thickness measuring 11 mm near the mass.  One of the 2 rounded axillary tail lymph nodes noted.  Borderline cortical thickening.  2 abnormal axillary lymph nodes.  1 lymph node displaced loss of normal morphology with a round heterogeneous appearance and echogenic foci.  Most consistent with calcified lymph node measuring 26 mm.  Second lymph node measures 12 mm.  Displaced cortical thickening.  Ultrasound-guided biopsy of the mass in the axillary lymph nodes recommended.    4/21/2023  1.right breast 9:00 biopsy-invasive ductal carcinoma with apocrine features  Grade 3  ER negative  HI negative  HER2 2+ on immunohistochemistry  HER2 amplified on FISH with HER2 copy number of 7.58, OPAL seventeen 3.18, HER2/OPAL 17 ratio of 2.4.  Ki-67 38.45%    2.right axillary biopsy-soft tissue involved by invasive ductal carcinoma with apocrine features  Associated microcalcification  No definite lymph node tissue identified.    4/29/2023-MRI of the breast-biopsy-proven  malignancy in the right breast at 9:00 measuring 4.3 cm in greatest dimension.  Attachment overlying skin and diffuse right breast edema noted.  Right axillary lymphadenopathy.  No suspicious findings in the left breast.    5/11/2023-CT of the chest abdomen and pelvis-no evidence of metastatic disease.  Liver is cirrhotic in configuration.    5/11/2023-bone scan negative    Patient presents today to the clinic for discussing neoadjuvant chemotherapy.  She is accompanied by her daughter.  Patient denies any recent changes in weight, she reports some pain at the site of malignancy.  A punch biopsy was performed and was consistent with inflammatory breast cancer.  At the site of punch biopsy there is an ulcerated lesion.    She has poorly controlled diabetes currently on glipizide metformin and Tradjenta.  Hemoglobin A1c recently was noted to be 9.9.    Also has chronic hyperkalemia, explanation unclear.    Blood pressure poorly controlled.    She reports good functional status lives independently.  Able to manage all her bills and independent of ADLs.  She has good support system in terms of her daughter.    Family history significant for brother with pancreatic cancer at the age of 68, sister with ovarian cancer at the age of 58    She received 6 weeks of Taxol Perjeta and Herceptin and subsequently admitted to the hospital due to severe nausea vomiting diarrhea poor oral intake, KAMILA, severe electrolyte abnormalities.      admission to the hospital for KAMILA, severe diarrhea, nausea vomiting and poor oral intake.She was in the hospital between 7/12/2023 to 7/20/2023.  During the hospitalization she was treated for acute UTI, electrolyte abnormalities and KAMILA.  Subsequently she was  discharged to a rehab.   During the hospitalization she was evaluated by Dr. Michael and a right breast ultrasound was obtained on 7/17/2023.  There was very little response noted with the 9 o'clock position mass 6 cm from the nipple  measuring 3.7 x 2.4 x 3.7 cm previously 3.8 x 2.5 x 4.2 cm.  In the right axilla the lymph node measured 2.6 x 1.5 x 1.9 cm previously 1.9 x 1.5 x 2.5 cm.  There was decrease in size of the adjacent lymph node measuring 0.7 cm previously 1.2 cm.    8/24/2023-right mastectomy and axillary lymph node dissection  Invasive ductal carcinoma, grade 2  The tumor measures 4 cm  Microcalcifications are seen in the tumor  Dermal lymphatic invasion present  Lymphatic invasion present  Tumor seen in the skin but no ulceration.  Residual cancer burden 3.454  RCB-3  Xiong Lima grade 3  Margins negative  18 lymph nodes total evaluated  1 with micrometastasis with a tumor measuring 2.5 mm with no chemotherapy effect  1 with chemotherapy change and isolated tumor cells  There is 1 node with chemotherapy change and a clip but no residual tumor.    Receptors repeated  ER negative  UT negative  HER2 2+ on immunohistochemistry  FISH nonamplified    Kadcyla dose decreased to 3 mg/kg.    Patient was hospitalized between 2/22/2024 to 2/24/2024.  She presented to the emergency room with complaints of chest pain.  CT angiogram was performed which did not show any evidence of pulmonary embolism.  There were changes consistent with radiation on the right side and there was a moderate pleural effusion on the right.  Thoracentesis was performed and 550 cc of fluid was drained.  Cytology was negative for any malignancy.  Cultures remain negative.  Chest pain improved after the thoracentesis.  She denies any dyspnea or cough.    Bilateral lower extremity Doppler was performed which was negative.    She is reporting lymphedema of the right upper extremity and has been somewhat noncompliant with the sleeve.  She continues to follow-up with lymphedema clinic.    She is tolerating the decreased dose of Kadcyla fairly well.  She felt a little weak immediately after the discharge from the hospital but subsequently felt better.    Echocardiogram  2/15/2024 shows a stable ejection fraction of 54.5% with a strain of -19%.    3/8/2024-stress test-low risk study with normal myocardial perfusion.  Left ventricular fraction is normal at 64%.    Interval History:  Patient is seen back today in 1 month follow-up accompanied by her daughter.  She completed her final dose of Kadcyla 6/19/2024.  That same day she was noted to have worsening renal insufficiency with BUN of 32, creatinine 1.2.  She was given IV fluids that day.  Unfortunately as she is reviewed back today her situation remains about the same.  BUN 30, creatinine 1.23.  Patient admittedly does not really drink much in the way of fluids at home.  She continues to have loose stools though notes having no bowel movement in the last 3 days and was considering taking a stool softener.  She states when she does have a bowel movement however it is still liquidy.  Patient was previously on oral chemotherapy agent for skin cancer of the right hand but this was affecting her appetite and causing additional weight loss and this was discontinued.  She is now using a topical prescription cream through dermatology.  Patient continues to live alone currently.       The following portions of the patient's history were reviewed and updated as appropriate: allergies, current medications, past family history, past medical history, past social history, past surgical history and problem list.    Past Medical History:   Diagnosis Date    Anxiety     Arthritis     Basal cell carcinoma     Left thumb    Breast cancer 2023    Chronic kidney disease     Depression     Diabetes mellitus     Diarrhea     s/e chemo    High cholesterol     History of chemotherapy 08/2023    Hypertension     Rash 08/2023    s/e chemo    Sacral decubitus ulcer     cleaning with soap & water    Urinary incontinence     wears pads    Vitamin D deficiency         Past Surgical History:   Procedure Laterality Date    BREAST BIOPSY      BREAST SURGERY       CATARACT EXTRACTION EXTRACAPSULAR W/ INTRAOCULAR LENS IMPLANTATION Right     EYE SURGERY      Muscle repair age 21    MASTECTOMY W/ SENTINEL NODE BIOPSY Right 2023    Procedure: Right breast modified radical mastectomy;  Surgeon: Rosa Michael MD;  Location: [facility];  Service: General;  Laterality: Right;    TONSILLECTOMY      VENOUS ACCESS DEVICE (PORT) INSERTION N/A 2023    Procedure: INSERTION VENOUS ACCESS DEVICE;  Surgeon: Rosa Michael MD;  Location: [facility];  Service: General;  Laterality: N/A;        Family History   Problem Relation Age of Onset    Alzheimer's disease Mother     Heart disease Father     Heart attack Father     Ovarian cancer Sister     Pancreatic cancer Brother     Malig Hyperthermia Neg Hx     Colon cancer Neg Hx     Colon polyps Neg Hx     Crohn's disease Neg Hx     Irritable bowel syndrome Neg Hx     Ulcerative colitis Neg Hx         Social History     Socioeconomic History    Marital status:    Tobacco Use    Smoking status: Never    Smokeless tobacco: Never   Vaping Use    Vaping status: Never Used   Substance and Sexual Activity    Alcohol use: Never    Drug use: Never    Sexual activity: Never        OB History    No obstetric history on file.      Age at menarche-11  Age at first live childbirth-35   2 para 1  1  Age at menopause-50  Oral conceptive pill use for 3 to 4 years    No Known Allergies     Review of Systems    Review of systems as mentioned HPI otherwise negative    Objective   Blood pressure 151/74, pulse 91, temperature 98 °F (36.7 °C), temperature source Oral, weight 56.6 kg (124 lb 11.2 oz), SpO2 98%.     Physical Exam  Vitals reviewed.   Constitutional:       General: She is not in acute distress.     Appearance: Normal appearance. She is well-developed.   HENT:      Head: Normocephalic and atraumatic.      Right Ear: Decreased hearing noted.      Left Ear: Decreased hearing noted.   Eyes:      Pupils: Pupils  are equal, round, and reactive to light.   Cardiovascular:      Rate and Rhythm: Normal rate and regular rhythm.      Heart sounds: Normal heart sounds. No murmur heard.  Pulmonary:      Effort: Pulmonary effort is normal. No respiratory distress.      Breath sounds: Normal breath sounds. No wheezing, rhonchi or rales.   Abdominal:      General: Bowel sounds are normal. There is no distension.      Palpations: Abdomen is soft.   Musculoskeletal:         General: Swelling (trace) present. Normal range of motion.      Cervical back: Normal range of motion.   Skin:     General: Skin is warm and dry.      Findings: No rash.   Neurological:      Mental Status: She is alert and oriented to person, place, and time.        Right chest wall carefully examined, status post mastectomy with incision well-healed, no signs or symptoms of infection.  She does currently have grade 1 radiation dermatitis of the right chest wall with dryness and some erythema though no open sores or signs of infection    I have reexamined the patient and the results are consistent with the previously documented exam. Yasmine Cisneros, APRN          Results from last 7 days   Lab Units 07/18/24  1300   WBC 10*3/mm3 8.43   NEUTROS ABS 10*3/mm3 6.53   HEMOGLOBIN g/dL 9.0*   HEMATOCRIT % 30.1*   PLATELETS 10*3/mm3 128*     Results from last 7 days   Lab Units 07/18/24  1300   SODIUM mmol/L 143   POTASSIUM mmol/L 5.3*   CHLORIDE mmol/L 110*   CO2 mmol/L 20.4*   BUN mg/dL 30*   CREATININE mg/dL 1.23*   CALCIUM mg/dL 9.5   ALBUMIN g/dL 3.4*   BILIRUBIN mg/dL 0.6   ALK PHOS U/L 82   ALT (SGPT) U/L 9   AST (SGOT) U/L 27   GLUCOSE mg/dL 119*               No radiology results for the last 30 days.         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  Invasive ductal carcinoma with apocrine features, grade 3, Ki-67 38%  CT scans and  bone scan without any obvious evidence of metastatic disease  Echocardiogram has been performed and ejection fraction normal.  Liver shows cirrhotic morphology.  LFTs otherwise normal and total bilirubin normal as well as protein normal.  It appears that the synthetic function of the liver is still within normal limits.  Mediport placed 5/19/2023  Taxol, Herceptin, Perjeta initiated 5/24/2023 5/31/2023 with C1D8 Taxol  6/28/2023-cycle 2-day 15 of Taxol.  She is overall tolerating therapy well with expected side effects.  She will proceed with Taxol today.  Scheduled for Taxol Herceptin and Perjeta.  7/12/2023-cycle 3-day 8 of TPH.  Chemotherapy held and patient was admitted to the hospital for management of severe dehydration, KAMILA, nausea vomiting diarrhea and decreased oral intake.  8/2/2023-she feels relatively well today.  Labs reviewed and stable to proceed with Herceptin only.  We will not administer Taxol and Perjeta as she has had significant issues with chemotherapy.  Right mastectomy 8/24/2023 with 4 cm of residual disease, RCB-3, treatment effect present, axillary lymph node dissection with 1 lymph node with micrometastasis measuring 2.5 mm, 1 lymph node with isolated tumor cells and a third lymph node which showed treatment changes but no tumor cells.  Total of 18 lymph nodes removed   9/20/2023 to discuss pathology and adjuvant therapy.  We discussed Kadcyla every 3 weekly to finish a complete year.  Patient is definitely hesitant to resume any sort of chemotherapy due to her previous experience with diarrhea.  Started Kadcyla 9/20/2023, denies any diarrhea.  Tolerating treatment well so far  No evidence of recurrent disease  Adjuvant radiation completed 11/20/2023  She has received 5 doses of adjuvant Kadcyla.  Sixth dose has been delayed by 3 weeks due to worsening arthralgias, decreased strength in her lower extremities  Improvement in strength as well as arthralgias with holding chemotherapy.  We  discussed about proceeding with the dose reduction of Kadcyla versus not doing any further Kadcyla.  Kadcyla dose decreased to 3 mg/kg  Patient hospitalized between 2/21/2024 to 2/24/2024 for right-sided pleural effusion and status post thoracentesis with 550 cc drained which was nonmalignant.  Etiology of the pleural fluid is unclear  CTA performed 2/21/2024 without any evidence of metastatic disease.  Chest x-ray 3/25/2024-no recurrence of fluid  No evidence of recurrent disease  6/19/2024 last dose of Kadcyla.      *Anemia  Most likely secondary to chemotherapy.  Iron studies performed in May 2023 suggestive of anemia of chronic disease/inflammation.  Hemoglobin 9.0.    *Diabetes  Poorly controlled  Evaluation by endocrinology 5/30/2023 with recommendations for dietary changes and home blood sugar monitoring.  The patient's daughter reports today she is struggling with compliance.  Continue follow-up with primary care physician    *KAMILA/CKD  Patient's daughter reports that she has not been hydrating herself well.  6/19/2024 Creatinine 1.24, continued diarrhea from Kadcyla. Proceed with 1 L normal saline.  7/18/2024 reviewed today 1 month out from finishing Kadcyla.  She is still having intermittent loose stool and poor hydration at home.  On discussion about this today.  Patient lives alone which may ultimately not be the best situation for her.    *?  Cirrhotic appearance of the liver on the CT scan  Referred to gastroenterology  Synthetic function appears to be normal  We will start with Taxol to see if she would tolerate the chemotherapy   Slight elevation of intervention studies today with ALT 43, AST 55.  Continue to monitor weekly  6/28/2023-mild elevation in the LFTs.  Stable compared to previous labs.  Okay to proceed with chemotherapy.  11/21/2023-LFTs normal  12/13/2023: Liver enzymes are normals. Alkaline phosphatase is slightly more elevated at 244.   LFTs have normalized.      *Hypertension-currently  on valsartan and hydrochlorothiazide.  Valsartan could be contributing to hyperkalemia.  Will refer to cardiology ASAP for management of medications and cardiac clearance for proceeding with chemotherapy  Recent echocardiogram normal  Stress test reviewed and negative.  9/27/2023-echocardiogram shows a normal ejection fraction of 59%.  Follow-up echocardiogram 10/20/2023 with ejection fraction of 54%.  When compared to baseline study 5/9/2023 ejection fraction is stable.  Left ventricular GLS is changed by 8%.  Discussed with cardiology, given the stability of ejection fraction when compared to baseline from May, we will proceed with [facility]   Cardiology evaluation 11/10/2023 with stable EF and strain.  [facility]  12/13/2023: Being followed by cardio oncology. Has an ECHO scheduled soon that is to be rescheduled per her daughter.   Blood pressure elevated at 151/74.  She continues on losartan as well as Cozaar.    *Genetic testing-Invitae 9 gene stat panel negative,     *Decreased oral intake secondary to chemotherapy  Still not adequate oral intake  Encouraged her to drink boost and Ensure  Weight is slowly improving, up 3 pounds in the last month however she is not drinking enough fluids as outlined above.  She is no longer on the oral medication for skin cancer and her appetite is slowly coming back.  We will schedule her for more frequent fluids and monitoring.      *Cognitive impairment  It is unclear if this is the patient's baseline or mental status has been exacerbated by recent chemotherapy  The patient is struggling with compliance with her medications.  The patient's daughter expresses frustration today as the patient is struggling to care for herself at home with managing medications and symptom management.  Evaluated by CARL Antonio   7/18/2024 this remains a concern at her visit today.  She does have an upcoming neurocognitive  evaluation with [facility].  I am concerned that she may be better off in an assisted living facility if she cannot take better care of herself.    *Chemotherapy-induced diarrhea  6/5/2023 patient given Enterade (Wild Berry flavor).  She has been drinking 1 a day.  She does not necessarily like the flavor (currently mixing with sugar-free Aramis-Aid which does help).  Encouraged her to increase to 2 a day.  6/21/2023 patient reports that she had stopped drinking her Enterade because she was waking up in the middle the night having diarrhea although she did not know if it was due to the Enterade her protein shakes.  I have encouraged the patient to continue Enterade, drinking it in the morning.  Try discontinuing the protein shakes and see how she does.  Patient states that she will try this  Reports 2-3 loose stools.  Continue Enterade and Imodium.  7/5/2023 continuing to have issues with diarrhea up to 3-4 bowel movements a day, patient also recently prescribed Kayexalate for an apparently elevated potassium.  Potassium only 3.1 today.  She advised to discontinue the Kayexalate she was given IV hydration today.  We will also prescribe Lomotil to use if needed to help better control her diarrhea.  Patient is not drinking Enterade regularly.  8/2/2023-improved since discharge from the hospital and discontinuation of chemotherapy.  She received Herceptin on 8/2/2023 and reports a week of diarrhea.  Patient has not had any diarrhea since initiating Kadcyla.  Continue to monitor, she continues to note this is improved on Kadcyla  12/13/2023: Has improved. Currently has had some constipation. Encouraged senokot S every other night based on her current symptoms until bowel movements are more consistent.   7/18/2024 still having loose bowels.  Encouraged increasing hydration, adding fiber.    *Right-sided pleural effusion  New on CT chest from 2/21/2024  Unclear etiology  Chest x-ray 3/25/2024 without any evidence of  recurrence of the pleural fluid    *Lymphedema  Continue follow-up with lymphedema clinic  Encouraged her to remain compliant with the sleeve  She still in the process of being fitted for a pump for the right upper extremity lymphedema.      PLAN:  Proceed with 1 L normal saline today.  Long conversation regarding the need for improved hydration at home.  Patient add fiber to her diet to help with her bowels.  Her appetite is slowly improving off oral skin cancer medication.  Patient will return weekly for stat CBC, CMP and possible IV fluids.  Patient did not have KAMILA prior to receiving chemotherapy.  If this does not improve with hydration and IV fluids in the office we may need to refer her to urology.  Will have patient reviewed in 4 weeks by Dr. Yee to reassess how she is doing overall.  Patient continues to live alone which I am concerned may be problematic for her overall wellbeing.  Her daughter has concerns regarding this as well.  [facility]      I spent 50 minutes caring for Veronica on this date of service. This time includes time spent by me in the following activities: preparing for the visit, reviewing tests, performing a medically appropriate examination and/or evaluation, counseling and educating the patient/family/caregiver, referring and communicating with other health care professionals, documenting information in the medical record, independently interpreting results and communicating that information with the patient/family/caregiver, care coordination, ordering medications, ordering test(s), obtaining a separately obtained history, and reviewing a separately obtained history        Yasmine Cisneros, APRJAMEL  07/18/2024

## 2024-07-25 ENCOUNTER — APPOINTMENT (OUTPATIENT)
Dept: LAB | Facility: HOSPITAL | Age: 84
End: 2024-07-25
Payer: MEDICARE

## 2024-07-25 ENCOUNTER — INFUSION (OUTPATIENT)
Dept: ONCOLOGY | Facility: HOSPITAL | Age: 84
End: 2024-07-25
Payer: MEDICARE

## 2024-07-25 VITALS
DIASTOLIC BLOOD PRESSURE: 65 MMHG | WEIGHT: 129.8 LBS | HEART RATE: 93 BPM | SYSTOLIC BLOOD PRESSURE: 169 MMHG | TEMPERATURE: 97.8 F | RESPIRATION RATE: 16 BRPM | BODY MASS INDEX: 24.54 KG/M2 | OXYGEN SATURATION: 100 %

## 2024-07-25 DIAGNOSIS — N17.9 AKI (ACUTE KIDNEY INJURY): ICD-10-CM

## 2024-07-25 DIAGNOSIS — C50.411 MALIGNANT NEOPLASM OF UPPER-OUTER QUADRANT OF RIGHT BREAST IN FEMALE, ESTROGEN RECEPTOR NEGATIVE: ICD-10-CM

## 2024-07-25 DIAGNOSIS — Z17.1 MALIGNANT NEOPLASM OF UPPER-OUTER QUADRANT OF RIGHT BREAST IN FEMALE, ESTROGEN RECEPTOR NEGATIVE: ICD-10-CM

## 2024-07-25 LAB
ALBUMIN SERPL-MCNC: 3.2 G/DL (ref 3.5–5.2)
ALBUMIN/GLOB SERPL: 1 G/DL
ALP SERPL-CCNC: 74 U/L (ref 39–117)
ALT SERPL W P-5'-P-CCNC: <5 U/L (ref 1–33)
ANION GAP SERPL CALCULATED.3IONS-SCNC: 10.9 MMOL/L (ref 5–15)
AST SERPL-CCNC: 22 U/L (ref 1–32)
BASOPHILS # BLD AUTO: 0.02 10*3/MM3 (ref 0–0.2)
BASOPHILS NFR BLD AUTO: 0.5 % (ref 0–1.5)
BILIRUB SERPL-MCNC: 0.3 MG/DL (ref 0–1.2)
BUN SERPL-MCNC: 32 MG/DL (ref 8–23)
BUN/CREAT SERPL: 26.9 (ref 7–25)
CALCIUM SPEC-SCNC: 9.1 MG/DL (ref 8.6–10.5)
CHLORIDE SERPL-SCNC: 106 MMOL/L (ref 98–107)
CO2 SERPL-SCNC: 21.1 MMOL/L (ref 22–29)
CREAT SERPL-MCNC: 1.19 MG/DL (ref 0.57–1)
DEPRECATED RDW RBC AUTO: 53.4 FL (ref 37–54)
EGFRCR SERPLBLD CKD-EPI 2021: 45.5 ML/MIN/1.73
EOSINOPHIL # BLD AUTO: 0.29 10*3/MM3 (ref 0–0.4)
EOSINOPHIL NFR BLD AUTO: 6.7 % (ref 0.3–6.2)
ERYTHROCYTE [DISTWIDTH] IN BLOOD BY AUTOMATED COUNT: 17.6 % (ref 12.3–15.4)
GLOBULIN UR ELPH-MCNC: 3.2 GM/DL
GLUCOSE SERPL-MCNC: 211 MG/DL (ref 65–99)
HCT VFR BLD AUTO: 27 % (ref 34–46.6)
HGB BLD-MCNC: 8.2 G/DL (ref 12–15.9)
IMM GRANULOCYTES # BLD AUTO: 0.02 10*3/MM3 (ref 0–0.05)
IMM GRANULOCYTES NFR BLD AUTO: 0.5 % (ref 0–0.5)
LYMPHOCYTES # BLD AUTO: 0.67 10*3/MM3 (ref 0.7–3.1)
LYMPHOCYTES NFR BLD AUTO: 15.5 % (ref 19.6–45.3)
MCH RBC QN AUTO: 25.3 PG (ref 26.6–33)
MCHC RBC AUTO-ENTMCNC: 30.4 G/DL (ref 31.5–35.7)
MCV RBC AUTO: 83.3 FL (ref 79–97)
MONOCYTES # BLD AUTO: 0.39 10*3/MM3 (ref 0.1–0.9)
MONOCYTES NFR BLD AUTO: 9 % (ref 5–12)
NEUTROPHILS NFR BLD AUTO: 2.92 10*3/MM3 (ref 1.7–7)
NEUTROPHILS NFR BLD AUTO: 67.8 % (ref 42.7–76)
NRBC BLD AUTO-RTO: 0 /100 WBC (ref 0–0.2)
PLATELET # BLD AUTO: 159 10*3/MM3 (ref 140–450)
PMV BLD AUTO: 9.3 FL (ref 6–12)
POTASSIUM SERPL-SCNC: 3.9 MMOL/L (ref 3.5–5.2)
PROT SERPL-MCNC: 6.4 G/DL (ref 6–8.5)
RBC # BLD AUTO: 3.24 10*6/MM3 (ref 3.77–5.28)
SODIUM SERPL-SCNC: 138 MMOL/L (ref 136–145)
WBC NRBC COR # BLD AUTO: 4.31 10*3/MM3 (ref 3.4–10.8)

## 2024-07-25 PROCEDURE — 96360 HYDRATION IV INFUSION INIT: CPT

## 2024-07-25 PROCEDURE — 25810000003 SODIUM CHLORIDE 0.9 % SOLUTION: Performed by: NURSE PRACTITIONER

## 2024-07-25 PROCEDURE — 85025 COMPLETE CBC W/AUTO DIFF WBC: CPT

## 2024-07-25 PROCEDURE — 96361 HYDRATE IV INFUSION ADD-ON: CPT

## 2024-07-25 PROCEDURE — 80053 COMPREHEN METABOLIC PANEL: CPT

## 2024-07-25 RX ADMIN — SODIUM CHLORIDE 500 ML: 9 INJECTION, SOLUTION INTRAVENOUS at 14:42

## 2024-07-25 RX ADMIN — SODIUM CHLORIDE 500 ML: 9 INJECTION, SOLUTION INTRAVENOUS at 13:50

## 2024-07-25 NOTE — NURSING NOTE
Reviewed labs with Meri HENRY would like pt to receive 1L today. AVS printed and given to pt.     Lab Results   Component Value Date    GLUCOSE 211 (H) 07/25/2024    BUN 32 (H) 07/25/2024    CREATININE 1.19 (H) 07/25/2024    EGFR 45.5 (L) 07/25/2024    BCR 26.9 (H) 07/25/2024    K 3.9 07/25/2024    CO2 21.1 (L) 07/25/2024    CALCIUM 9.1 07/25/2024    ALBUMIN 3.2 (L) 07/25/2024    BILITOT 0.3 07/25/2024    AST 22 07/25/2024    ALT <5 07/25/2024

## 2024-08-01 ENCOUNTER — INFUSION (OUTPATIENT)
Dept: ONCOLOGY | Facility: HOSPITAL | Age: 84
End: 2024-08-01
Payer: MEDICARE

## 2024-08-01 ENCOUNTER — LAB (OUTPATIENT)
Dept: LAB | Facility: HOSPITAL | Age: 84
End: 2024-08-01
Payer: MEDICARE

## 2024-08-01 VITALS
TEMPERATURE: 98.2 F | RESPIRATION RATE: 16 BRPM | WEIGHT: 128 LBS | HEART RATE: 100 BPM | SYSTOLIC BLOOD PRESSURE: 123 MMHG | OXYGEN SATURATION: 97 % | DIASTOLIC BLOOD PRESSURE: 54 MMHG | BODY MASS INDEX: 24.2 KG/M2

## 2024-08-01 DIAGNOSIS — Z17.1 MALIGNANT NEOPLASM OF UPPER-OUTER QUADRANT OF RIGHT BREAST IN FEMALE, ESTROGEN RECEPTOR NEGATIVE: ICD-10-CM

## 2024-08-01 DIAGNOSIS — C50.411 MALIGNANT NEOPLASM OF UPPER-OUTER QUADRANT OF RIGHT BREAST IN FEMALE, ESTROGEN RECEPTOR NEGATIVE: ICD-10-CM

## 2024-08-01 DIAGNOSIS — N17.9 AKI (ACUTE KIDNEY INJURY): ICD-10-CM

## 2024-08-01 LAB
ALBUMIN SERPL-MCNC: 3.4 G/DL (ref 3.5–5.2)
ALBUMIN/GLOB SERPL: 1 G/DL
ALP SERPL-CCNC: 72 U/L (ref 39–117)
ALT SERPL W P-5'-P-CCNC: <5 U/L (ref 1–33)
ANION GAP SERPL CALCULATED.3IONS-SCNC: 14.5 MMOL/L (ref 5–15)
AST SERPL-CCNC: 28 U/L (ref 1–32)
BASOPHILS # BLD AUTO: 0.02 10*3/MM3 (ref 0–0.2)
BASOPHILS NFR BLD AUTO: 0.3 % (ref 0–1.5)
BILIRUB SERPL-MCNC: 0.4 MG/DL (ref 0–1.2)
BUN SERPL-MCNC: 25 MG/DL (ref 8–23)
BUN/CREAT SERPL: 19.2 (ref 7–25)
CALCIUM SPEC-SCNC: 9.1 MG/DL (ref 8.6–10.5)
CHLORIDE SERPL-SCNC: 104 MMOL/L (ref 98–107)
CO2 SERPL-SCNC: 22.5 MMOL/L (ref 22–29)
CREAT SERPL-MCNC: 1.3 MG/DL (ref 0.57–1)
DEPRECATED RDW RBC AUTO: 54.5 FL (ref 37–54)
EGFRCR SERPLBLD CKD-EPI 2021: 40.9 ML/MIN/1.73
EOSINOPHIL # BLD AUTO: 0.13 10*3/MM3 (ref 0–0.4)
EOSINOPHIL NFR BLD AUTO: 1.8 % (ref 0.3–6.2)
ERYTHROCYTE [DISTWIDTH] IN BLOOD BY AUTOMATED COUNT: 18.1 % (ref 12.3–15.4)
GLOBULIN UR ELPH-MCNC: 3.3 GM/DL
GLUCOSE SERPL-MCNC: 55 MG/DL (ref 65–99)
HCT VFR BLD AUTO: 28.6 % (ref 34–46.6)
HGB BLD-MCNC: 8.8 G/DL (ref 12–15.9)
IMM GRANULOCYTES # BLD AUTO: 0.02 10*3/MM3 (ref 0–0.05)
IMM GRANULOCYTES NFR BLD AUTO: 0.3 % (ref 0–0.5)
LYMPHOCYTES # BLD AUTO: 1.13 10*3/MM3 (ref 0.7–3.1)
LYMPHOCYTES NFR BLD AUTO: 15.2 % (ref 19.6–45.3)
MCH RBC QN AUTO: 25.6 PG (ref 26.6–33)
MCHC RBC AUTO-ENTMCNC: 30.8 G/DL (ref 31.5–35.7)
MCV RBC AUTO: 83.1 FL (ref 79–97)
MONOCYTES # BLD AUTO: 0.73 10*3/MM3 (ref 0.1–0.9)
MONOCYTES NFR BLD AUTO: 9.8 % (ref 5–12)
NEUTROPHILS NFR BLD AUTO: 5.39 10*3/MM3 (ref 1.7–7)
NEUTROPHILS NFR BLD AUTO: 72.6 % (ref 42.7–76)
NRBC BLD AUTO-RTO: 0 /100 WBC (ref 0–0.2)
PLATELET # BLD AUTO: 135 10*3/MM3 (ref 140–450)
PMV BLD AUTO: 9 FL (ref 6–12)
POTASSIUM SERPL-SCNC: 3.5 MMOL/L (ref 3.5–5.2)
PROT SERPL-MCNC: 6.7 G/DL (ref 6–8.5)
RBC # BLD AUTO: 3.44 10*6/MM3 (ref 3.77–5.28)
SODIUM SERPL-SCNC: 141 MMOL/L (ref 136–145)
WBC NRBC COR # BLD AUTO: 7.42 10*3/MM3 (ref 3.4–10.8)

## 2024-08-01 PROCEDURE — 25810000003 SODIUM CHLORIDE 0.9 % SOLUTION: Performed by: NURSE PRACTITIONER

## 2024-08-01 PROCEDURE — 96360 HYDRATION IV INFUSION INIT: CPT

## 2024-08-01 PROCEDURE — 80053 COMPREHEN METABOLIC PANEL: CPT

## 2024-08-01 PROCEDURE — 85025 COMPLETE CBC W/AUTO DIFF WBC: CPT

## 2024-08-01 RX ORDER — SODIUM CHLORIDE 9 MG/ML
1000 INJECTION, SOLUTION INTRAVENOUS ONCE
Status: COMPLETED | OUTPATIENT
Start: 2024-08-01 | End: 2024-08-01

## 2024-08-01 RX ADMIN — SODIUM CHLORIDE 1000 ML/HR: 9 INJECTION, SOLUTION INTRAVENOUS at 14:12

## 2024-08-01 NOTE — NURSING NOTE
Lab Results   Component Value Date    GLUCOSE 55 (C) 08/01/2024    BUN 25 (H) 08/01/2024    CREATININE 1.30 (H) 08/01/2024    EGFR 40.9 (L) 08/01/2024    BCR 19.2 08/01/2024    K 3.5 08/01/2024    CO2 22.5 08/01/2024    CALCIUM 9.1 08/01/2024    ALBUMIN 3.4 (L) 08/01/2024    BILITOT 0.4 08/01/2024    AST 28 08/01/2024    ALT <5 08/01/2024    Pt receiving 1L IVFs for her elevated creatinine and BUN. Pt currently drinking an apple juice and eating some peanut butter crackers for her blood glucose. Upon discharge, pt denies feeling of dizziness or lightheadedness. Pt discharged home with her daughter.

## 2024-08-07 ENCOUNTER — LAB (OUTPATIENT)
Dept: LAB | Facility: HOSPITAL | Age: 84
End: 2024-08-07
Payer: MEDICARE

## 2024-08-07 ENCOUNTER — OFFICE VISIT (OUTPATIENT)
Dept: ONCOLOGY | Facility: CLINIC | Age: 84
End: 2024-08-07
Payer: MEDICARE

## 2024-08-07 ENCOUNTER — APPOINTMENT (OUTPATIENT)
Dept: ONCOLOGY | Facility: HOSPITAL | Age: 84
End: 2024-08-07
Payer: MEDICARE

## 2024-08-07 VITALS
SYSTOLIC BLOOD PRESSURE: 156 MMHG | DIASTOLIC BLOOD PRESSURE: 79 MMHG | OXYGEN SATURATION: 99 % | HEART RATE: 97 BPM | TEMPERATURE: 97.8 F | HEIGHT: 61 IN | WEIGHT: 126.7 LBS | BODY MASS INDEX: 23.92 KG/M2

## 2024-08-07 DIAGNOSIS — N17.9 AKI (ACUTE KIDNEY INJURY): ICD-10-CM

## 2024-08-07 DIAGNOSIS — Z17.1 MALIGNANT NEOPLASM OF UPPER-OUTER QUADRANT OF RIGHT BREAST IN FEMALE, ESTROGEN RECEPTOR NEGATIVE: ICD-10-CM

## 2024-08-07 DIAGNOSIS — C50.411 MALIGNANT NEOPLASM OF UPPER-OUTER QUADRANT OF RIGHT BREAST IN FEMALE, ESTROGEN RECEPTOR NEGATIVE: Primary | ICD-10-CM

## 2024-08-07 DIAGNOSIS — C50.411 MALIGNANT NEOPLASM OF UPPER-OUTER QUADRANT OF RIGHT BREAST IN FEMALE, ESTROGEN RECEPTOR NEGATIVE: ICD-10-CM

## 2024-08-07 DIAGNOSIS — Z17.1 MALIGNANT NEOPLASM OF UPPER-OUTER QUADRANT OF RIGHT BREAST IN FEMALE, ESTROGEN RECEPTOR NEGATIVE: Primary | ICD-10-CM

## 2024-08-07 LAB
ALBUMIN SERPL-MCNC: 3.3 G/DL (ref 3.5–5.2)
ALBUMIN/GLOB SERPL: 0.9 G/DL
ALP SERPL-CCNC: 74 U/L (ref 39–117)
ALT SERPL W P-5'-P-CCNC: 5 U/L (ref 1–33)
ANION GAP SERPL CALCULATED.3IONS-SCNC: 12 MMOL/L (ref 5–15)
AST SERPL-CCNC: 28 U/L (ref 1–32)
BASOPHILS # BLD AUTO: 0.02 10*3/MM3 (ref 0–0.2)
BASOPHILS NFR BLD AUTO: 0.4 % (ref 0–1.5)
BILIRUB SERPL-MCNC: 0.4 MG/DL (ref 0–1.2)
BUN SERPL-MCNC: 17 MG/DL (ref 8–23)
BUN/CREAT SERPL: 15.2 (ref 7–25)
CALCIUM SPEC-SCNC: 9 MG/DL (ref 8.6–10.5)
CHLORIDE SERPL-SCNC: 106 MMOL/L (ref 98–107)
CO2 SERPL-SCNC: 23 MMOL/L (ref 22–29)
CREAT SERPL-MCNC: 1.12 MG/DL (ref 0.57–1)
DEPRECATED RDW RBC AUTO: 52.7 FL (ref 37–54)
EGFRCR SERPLBLD CKD-EPI 2021: 48.9 ML/MIN/1.73
EOSINOPHIL # BLD AUTO: 0.18 10*3/MM3 (ref 0–0.4)
EOSINOPHIL NFR BLD AUTO: 3.4 % (ref 0.3–6.2)
ERYTHROCYTE [DISTWIDTH] IN BLOOD BY AUTOMATED COUNT: 17.2 % (ref 12.3–15.4)
GLOBULIN UR ELPH-MCNC: 3.6 GM/DL
GLUCOSE SERPL-MCNC: 75 MG/DL (ref 65–99)
HCT VFR BLD AUTO: 29 % (ref 34–46.6)
HGB BLD-MCNC: 8.7 G/DL (ref 12–15.9)
IMM GRANULOCYTES # BLD AUTO: 0.02 10*3/MM3 (ref 0–0.05)
IMM GRANULOCYTES NFR BLD AUTO: 0.4 % (ref 0–0.5)
LYMPHOCYTES # BLD AUTO: 0.73 10*3/MM3 (ref 0.7–3.1)
LYMPHOCYTES NFR BLD AUTO: 13.8 % (ref 19.6–45.3)
MCH RBC QN AUTO: 24.9 PG (ref 26.6–33)
MCHC RBC AUTO-ENTMCNC: 30 G/DL (ref 31.5–35.7)
MCV RBC AUTO: 83.1 FL (ref 79–97)
MONOCYTES # BLD AUTO: 0.34 10*3/MM3 (ref 0.1–0.9)
MONOCYTES NFR BLD AUTO: 6.4 % (ref 5–12)
NEUTROPHILS NFR BLD AUTO: 3.99 10*3/MM3 (ref 1.7–7)
NEUTROPHILS NFR BLD AUTO: 75.6 % (ref 42.7–76)
NRBC BLD AUTO-RTO: 0 /100 WBC (ref 0–0.2)
PLATELET # BLD AUTO: 126 10*3/MM3 (ref 140–450)
PMV BLD AUTO: 9.4 FL (ref 6–12)
POTASSIUM SERPL-SCNC: 3.8 MMOL/L (ref 3.5–5.2)
PROT SERPL-MCNC: 6.9 G/DL (ref 6–8.5)
RBC # BLD AUTO: 3.49 10*6/MM3 (ref 3.77–5.28)
SODIUM SERPL-SCNC: 141 MMOL/L (ref 136–145)
WBC NRBC COR # BLD AUTO: 5.28 10*3/MM3 (ref 3.4–10.8)

## 2024-08-07 PROCEDURE — 1159F MED LIST DOCD IN RCRD: CPT | Performed by: INTERNAL MEDICINE

## 2024-08-07 PROCEDURE — 85025 COMPLETE CBC W/AUTO DIFF WBC: CPT

## 2024-08-07 PROCEDURE — 80053 COMPREHEN METABOLIC PANEL: CPT

## 2024-08-07 PROCEDURE — 36415 COLL VENOUS BLD VENIPUNCTURE: CPT

## 2024-08-07 PROCEDURE — G2211 COMPLEX E/M VISIT ADD ON: HCPCS | Performed by: INTERNAL MEDICINE

## 2024-08-07 PROCEDURE — 3078F DIAST BP <80 MM HG: CPT | Performed by: INTERNAL MEDICINE

## 2024-08-07 PROCEDURE — 3077F SYST BP >= 140 MM HG: CPT | Performed by: INTERNAL MEDICINE

## 2024-08-07 PROCEDURE — 1160F RVW MEDS BY RX/DR IN RCRD: CPT | Performed by: INTERNAL MEDICINE

## 2024-08-07 PROCEDURE — 99214 OFFICE O/P EST MOD 30 MIN: CPT | Performed by: INTERNAL MEDICINE

## 2024-08-07 PROCEDURE — 1126F AMNT PAIN NOTED NONE PRSNT: CPT | Performed by: INTERNAL MEDICINE

## 2024-08-07 NOTE — PROGRESS NOTES
Subjective   Veronica Royal is a 83 y.o. female.  Referred by Dr. Michael for right breast inflammatory breast cancer    History of Present Illness   Ms. Royal is a 82-year-old postmenopausal  lady with hypertension, diabetes, hyperlipidemia, chronic kidney disease, hyperkalemia-chronic presented with a palpable abnormality in the right breast.  Subsequent imaging was performed.  Patient has not had a mammogram prior to this in the past 25 years.    4/21/2023-bilateral diagnostic mammogram-high density irregular mass measuring 44 mm with spiculated margins and amorphus and fine pleomorphic calcifications with skin thickening in the right breast at 9:00.  2 intramammary lymph nodes in the upper outer axillary tail region are slightly rounded  2 prominent right axillary lymph nodes largest of which measures 26 mm.  Asymmetry noted in the left breast.    Right breast ultrasound at 9 o'clock position, 3 cm from the nipple there is a 38 x 28 x 42 mm mass.  Skin thickness measuring 11 mm near the mass.  One of the 2 rounded axillary tail lymph nodes noted.  Borderline cortical thickening.  2 abnormal axillary lymph nodes.  1 lymph node displaced loss of normal morphology with a round heterogeneous appearance and echogenic foci.  Most consistent with calcified lymph node measuring 26 mm.  Second lymph node measures 12 mm.  Displaced cortical thickening.  Ultrasound-guided biopsy of the mass in the axillary lymph nodes recommended.    4/21/2023  1.right breast 9:00 biopsy-invasive ductal carcinoma with apocrine features  Grade 3  ER negative  AL negative  HER2 2+ on immunohistochemistry  HER2 amplified on FISH with HER2 copy number of 7.58, OPAL seventeen 3.18, HER2/OPAL 17 ratio of 2.4.  Ki-67 38.45%    2.right axillary biopsy-soft tissue involved by invasive ductal carcinoma with apocrine features  Associated microcalcification  No definite lymph node tissue identified.    4/29/2023-MRI of the breast-biopsy-proven  malignancy in the right breast at 9:00 measuring 4.3 cm in greatest dimension.  Attachment overlying skin and diffuse right breast edema noted.  Right axillary lymphadenopathy.  No suspicious findings in the left breast.    5/11/2023-CT of the chest abdomen and pelvis-no evidence of metastatic disease.  Liver is cirrhotic in configuration.    5/11/2023-bone scan negative    Patient presents today to the clinic for discussing neoadjuvant chemotherapy.  She is accompanied by her daughter.  Patient denies any recent changes in weight, she reports some pain at the site of malignancy.  A punch biopsy was performed and was consistent with inflammatory breast cancer.  At the site of punch biopsy there is an ulcerated lesion.    She has poorly controlled diabetes currently on glipizide metformin and Tradjenta.  Hemoglobin A1c recently was noted to be 9.9.    Also has chronic hyperkalemia, explanation unclear.    Blood pressure poorly controlled.    She reports good functional status lives independently.  Able to manage all her bills and independent of ADLs.  She has good support system in terms of her daughter.    Family history significant for brother with pancreatic cancer at the age of 68, sister with ovarian cancer at the age of 58    She received 6 weeks of Taxol Perjeta and Herceptin and subsequently admitted to the hospital due to severe nausea vomiting diarrhea poor oral intake, KAMILA, severe electrolyte abnormalities.      admission to the hospital for KAMILA, severe diarrhea, nausea vomiting and poor oral intake.She was in the hospital between 7/12/2023 to 7/20/2023.  During the hospitalization she was treated for acute UTI, electrolyte abnormalities and KAMILA.  Subsequently she was  discharged to a rehab.   During the hospitalization she was evaluated by Dr. Michael and a right breast ultrasound was obtained on 7/17/2023.  There was very little response noted with the 9 o'clock position mass 6 cm from the nipple  measuring 3.7 x 2.4 x 3.7 cm previously 3.8 x 2.5 x 4.2 cm.  In the right axilla the lymph node measured 2.6 x 1.5 x 1.9 cm previously 1.9 x 1.5 x 2.5 cm.  There was decrease in size of the adjacent lymph node measuring 0.7 cm previously 1.2 cm.    8/24/2023-right mastectomy and axillary lymph node dissection  Invasive ductal carcinoma, grade 2  The tumor measures 4 cm  Microcalcifications are seen in the tumor  Dermal lymphatic invasion present  Lymphatic invasion present  Tumor seen in the skin but no ulceration.  Residual cancer burden 3.454  RCB-3  Xiong Lima grade 3  Margins negative  18 lymph nodes total evaluated  1 with micrometastasis with a tumor measuring 2.5 mm with no chemotherapy effect  1 with chemotherapy change and isolated tumor cells  There is 1 node with chemotherapy change and a clip but no residual tumor.    Receptors repeated  ER negative  TN negative  HER2 2+ on immunohistochemistry  FISH nonamplified    Kadcyla dose decreased to 3 mg/kg.    Patient was hospitalized between 2/22/2024 to 2/24/2024.  She presented to the emergency room with complaints of chest pain.  CT angiogram was performed which did not show any evidence of pulmonary embolism.  There were changes consistent with radiation on the right side and there was a moderate pleural effusion on the right.  Thoracentesis was performed and 550 cc of fluid was drained.  Cytology was negative for any malignancy.  Cultures remain negative.  Chest pain improved after the thoracentesis.  She denies any dyspnea or cough.    Bilateral lower extremity Doppler was performed which was negative.    She is reporting lymphedema of the right upper extremity and has been somewhat noncompliant with the sleeve.  She continues to follow-up with lymphedema clinic.    She is tolerating the decreased dose of Kadcyla fairly well.  She felt a little weak immediately after the discharge from the hospital but subsequently felt better.    Echocardiogram  2/15/2024 shows a stable ejection fraction of 54.5% with a strain of -19%.    3/8/2024-stress test-low risk study with normal myocardial perfusion.  Left ventricular fraction is normal at 64%.    Last dose of Kadcyla 6/19/2024    Interval History:  Patient returns today for follow-up and reports feeling well.  She has been receiving weekly IV fluids for the past 3 weeks due to dehydration and creatinine finally has improved at 1.12.  This is almost at baseline.  She had 2 days of diarrhea associated with the URI but other than that she has been having regular bowel movements.  Her weight remains stable.  Reports good appetite.  However she still does not have adequate fluid intake.  Denies any new chest wall lesions or left breast masses.       The following portions of the patient's history were reviewed and updated as appropriate: allergies, current medications, past family history, past medical history, past social history, past surgical history and problem list.    Past Medical History:   Diagnosis Date    Anxiety     Arthritis     Basal cell carcinoma     Left thumb    Breast cancer 2023    Chronic kidney disease     Depression     Diabetes mellitus     Diarrhea     s/e chemo    High cholesterol     History of chemotherapy 08/2023    Hypertension     Rash 08/2023    s/e chemo    Sacral decubitus ulcer     cleaning with soap & water    Urinary incontinence     wears pads    Vitamin D deficiency         Past Surgical History:   Procedure Laterality Date    BREAST BIOPSY      BREAST SURGERY      CATARACT EXTRACTION EXTRACAPSULAR W/ INTRAOCULAR LENS IMPLANTATION Right     EYE SURGERY      Muscle repair age 21    MASTECTOMY W/ SENTINEL NODE BIOPSY Right 08/24/2023    Procedure: Right breast modified radical mastectomy;  Surgeon: Rosa Michael MD;  [place] [facility] [place] [facility] [place]  Service: General;  Laterality: Right;    TONSILLECTOMY      VENOUS ACCESS DEVICE (PORT) INSERTION N/A 05/19/2023    Procedure: INSERTION  "VENOUS ACCESS DEVICE;  Surgeon: Rosa Michael MD;  [place] [facility] [place] [facility] [place]  Service: General;  Laterality: N/A;        Family History   Problem Relation Age of Onset    Alzheimer's disease Mother     Heart disease Father     Heart attack Father     Ovarian cancer Sister     Pancreatic cancer Brother     Malaxel Hyperthermia Neg Hx     Colon cancer Neg Hx     Colon polyps Neg Hx     Crohn's disease Neg Hx     Irritable bowel syndrome Neg Hx     Ulcerative colitis Neg Hx         Social History     Socioeconomic History    Marital status:    Tobacco Use    Smoking status: Never    Smokeless tobacco: Never   Vaping Use    Vaping status: Never Used   Substance and Sexual Activity    Alcohol use: Never    Drug use: Never    Sexual activity: Never        OB History    No obstetric history on file.      Age at menarche-11  Age at first live childbirth-35   2 para 1  1  Age at menopause-50  Oral conceptive pill use for 3 to 4 years    No Known Allergies     Review of Systems    Review of systems mentioned in the HPI otherwise negative    Objective   Blood pressure 156/79, pulse 97, temperature 97.8 °F (36.6 °C), temperature source Oral, height 154.9 cm (60.98\"), weight 57.5 kg (126 lb 11.2 oz), SpO2 99%.     Physical Exam  Vitals reviewed.   Constitutional:       General: She is not in acute distress.     Appearance: Normal appearance. She is well-developed.   HENT:      Head: Normocephalic and atraumatic.      Right Ear: Decreased hearing noted.      Left Ear: Decreased hearing noted.   Eyes:      Pupils: Pupils are equal, round, and reactive to light.   Cardiovascular:      Rate and Rhythm: Normal rate and regular rhythm.      Heart sounds: Normal heart sounds. No murmur heard.  Pulmonary:      Effort: Pulmonary effort is normal. No respiratory distress.      Breath sounds: Normal breath sounds. No wheezing, rhonchi or rales.   Abdominal:      General: Bowel sounds are normal. There is no " distension.      Palpations: Abdomen is soft.   Musculoskeletal:         General: Swelling (trace) present. Normal range of motion.      Cervical back: Normal range of motion.   Skin:     General: Skin is warm and dry.      Findings: No rash.   Neurological:      Mental Status: She is alert and oriented to person, place, and time.        Right chest wall carefully examined, status post mastectomy with incision well-healed, left breast appears normal inspection.  No palpable abnormalities of the left breast    I have reexamined the patient and the results are consistent with the previously documented exam. Sheila Yee MD          Results from last 7 days   Lab Units 08/07/24  1339 08/01/24  1416   WBC 10*3/mm3 5.28 7.42   NEUTROS ABS 10*3/mm3 3.99 5.39   HEMOGLOBIN g/dL 8.7* 8.8*   HEMATOCRIT % 29.0* 28.6*   PLATELETS 10*3/mm3 126* 135*     Results from last 7 days   Lab Units 08/07/24  1339 08/01/24  1414   SODIUM mmol/L 141 141   POTASSIUM mmol/L 3.8 3.5   CHLORIDE mmol/L 106 104   CO2 mmol/L 23.0 22.5   BUN mg/dL 17 25*   CREATININE mg/dL 1.12* 1.30*   CALCIUM mg/dL 9.0 9.1   ALBUMIN g/dL 3.3* 3.4*   BILIRUBIN mg/dL 0.4 0.4   ALK PHOS U/L 74 72   ALT (SGPT) U/L 5 <5   AST (SGOT) U/L 28 28   GLUCOSE mg/dL 75 55*               No radiology results for the last 30 days.         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  Invasive ductal carcinoma with apocrine features, grade 3, Ki-67 38%  CT scans and bone scan without any obvious evidence of metastatic disease  Echocardiogram has been performed and ejection fraction normal.  Liver shows cirrhotic morphology.  LFTs otherwise normal and total bilirubin normal as well as protein normal.  It appears that the synthetic function of the liver is still within normal limits.  Mediport placed 5/19/2023  Taxol, Herceptin, Perjeta initiated  5/24/2023 5/31/2023 with C1D8 Taxol  6/28/2023-cycle 2-day 15 of Taxol.  She is overall tolerating therapy well with expected side effects.  She will proceed with Taxol today.  Scheduled for Taxol Herceptin and Perjeta.  7/12/2023-cycle 3-day 8 of TPH.  Chemotherapy held and patient was admitted to the hospital for management of severe dehydration, KAMILA, nausea vomiting diarrhea and decreased oral intake.  8/2/2023-she feels relatively well today.  Labs reviewed and stable to proceed with Herceptin only.  We will not administer Taxol and Perjeta as she has had significant issues with chemotherapy.  Right mastectomy 8/24/2023 with 4 cm of residual disease, RCB-3, treatment effect present, axillary lymph node dissection with 1 lymph node with micrometastasis measuring 2.5 mm, 1 lymph node with isolated tumor cells and a third lymph node which showed treatment changes but no tumor cells.  Total of 18 lymph nodes removed   9/20/2023 to discuss pathology and adjuvant therapy.  We discussed Kadcyla every 3 weekly to finish a complete year.  Patient is definitely hesitant to resume any sort of chemotherapy due to her previous experience with diarrhea.  Started Kadcyla 9/20/2023, denies any diarrhea.  Tolerating treatment well so far  No evidence of recurrent disease  Adjuvant radiation completed 11/20/2023  She has received 5 doses of adjuvant Kadcyla.  Sixth dose has been delayed by 3 weeks due to worsening arthralgias, decreased strength in her lower extremities  Improvement in strength as well as arthralgias with holding chemotherapy.  We discussed about proceeding with the dose reduction of Kadcyla versus not doing any further Kadcyla.  Kadcyla dose decreased to 3 mg/kg  Patient hospitalized between 2/21/2024 to 2/24/2024 for right-sided pleural effusion and status post thoracentesis with 550 cc drained which was nonmalignant.  Etiology of the pleural fluid is unclear  CTA performed 2/21/2024 without any evidence of  metastatic disease.  Chest x-ray 3/25/2024-no recurrence of fluid  6/19/2024 last dose of Kadcyla.    No evidence of recurrent disease    *Anemia  Most likely secondary to chemotherapy.  Iron studies performed in May 2023 suggestive of anemia of chronic disease/inflammation.  Hemoglobin stable at 8.7  Recommend that she take a multivitamin every day  Likely residual effect of chemotherapy, continue to monitor CBC at each visit    *Diabetes  Poorly controlled  Evaluation by endocrinology 5/30/2023 with recommendations for dietary changes and home blood sugar monitoring.  The patient's daughter reports today she is struggling with compliance.  Continue follow-up with primary care physician    *KAMILA/CKD  Patient's daughter reports that she has not been hydrating herself well.  6/19/2024 Creatinine 1.24, continued diarrhea from Kadcyla. Proceed with 1 L normal saline.  7/18/2024 reviewed today 1 month out from finishing Kadcyla.  She is still having intermittent loose stool and poor hydration at home.  On discussion about this today.  Patient lives alone which may ultimately not be the best situation for her.  Creatinine improved at 1.12, she has been receiving weekly IV fluids.  Recheck creatinine in 2 weeks and IV fluids if elevated.  Encouraged her to drink plenty of fluids    *?  Cirrhotic appearance of the liver on the CT scan  Referred to gastroenterology  Synthetic function appears to be normal  We will start with Taxol to see if she would tolerate the chemotherapy   Slight elevation of intervention studies today with ALT 43, AST 55.  Continue to monitor weekly  6/28/2023-mild elevation in the LFTs.  Stable compared to previous labs.  Okay to proceed with chemotherapy.  11/21/2023-LFTs normal  12/13/2023: Liver enzymes are normals. Alkaline phosphatase is slightly more elevated at 244.   LFTs normal today    *Hypertension-currently on valsartan and hydrochlorothiazide.  Valsartan could be contributing to  hyperkalemia.  Will refer to cardiology ASAP for management of medications and cardiac clearance for proceeding with chemotherapy  Recent echocardiogram normal  Stress test reviewed and negative.  9/27/2023-echocardiogram shows a normal ejection fraction of 59%.  Follow-up echocardiogram 10/20/2023 with ejection fraction of 54%.  When compared to baseline study 5/9/2023 ejection fraction is stable.  Left ventricular GLS is changed by 8%.  Discussed with cardiology, given the stability of ejection fraction when compared to baseline from May, we will proceed with Kadcyla   Cardiology evaluation 11/10/2023 with stable EF and strain.  Recommendations to continue Kadcyla with 3-month follow-up echocardiogram  12/13/2023: Being followed by cardio oncology. Has an ECHO scheduled soon that is to be rescheduled per her daughter.   Continue losartan and Cozaar, blood pressure 156/79  Patient maintained a log of the blood pressure and her blood pressure has actually been low in the mornings.    *Genetic testing-Invitae 9 gene stat panel negative,     *Decreased oral intake secondary to chemotherapy  Still not adequate oral intake  Encouraged her to drink boost and Ensure  Weight is slowly improving, up 3 pounds in the last month however she is not drinking enough fluids as outlined above.  She is no longer on the oral medication for skin cancer and her appetite is slowly coming back.  We will schedule her for more frequent fluids and monitoring.      *Cognitive impairment  It is unclear if this is the patient's baseline or mental status has been exacerbated by recent chemotherapy  The patient is struggling with compliance with her medications.  The patient's daughter expresses frustration today as the patient is struggling to care for herself at home with managing medications and symptom management.  Evaluated by CARL Antonio   7/18/2024 this remains a concern at her visit today.  She does have an upcoming  neurocognitive evaluation with [facility].  I am concerned that she may be better off in an assisted living facility if she cannot take better care of herself.  They had to reschedule the appointment with [facility] due to recent diarrhea and dehydration    *Chemotherapy-induced diarrhea  6/5/2023 patient given Enterade (Wild Berry flavor).  She has been drinking 1 a day.  She does not necessarily like the flavor (currently mixing with sugar-free Aramis-Aid which does help).  Encouraged her to increase to 2 a day.  6/21/2023 patient reports that she had stopped drinking her Enterade because she was waking up in the middle the night having diarrhea although she did not know if it was due to the Enterade her protein shakes.  I have encouraged the patient to continue Enterade, drinking it in the morning.  Try discontinuing the protein shakes and see how she does.  Patient states that she will try this  Reports 2-3 loose stools.  Continue Enterade and Imodium.  7/5/2023 continuing to have issues with diarrhea up to 3-4 bowel movements a day, patient also recently prescribed Kayexalate for an apparently elevated potassium.  Potassium only 3.1 today.  She advised to discontinue the Kayexalate she was given IV hydration today.  We will also prescribe Lomotil to use if needed to help better control her diarrhea.  Patient is not drinking Enterade regularly.  8/2/2023-improved since discharge from the hospital and discontinuation of chemotherapy.  She received Herceptin on 8/2/2023 and reports a week of diarrhea.  Patient has not had any diarrhea since initiating Kadcyla.  Continue to monitor, she continues to note this is improved on Kadcyla  12/13/2023: Has improved. Currently has had some constipation. Encouraged senokot S every other night based on her current symptoms until bowel movements are more consistent.   7/18/2024 still having loose bowels.  Encouraged increasing hydration, adding fiber.    *Right-sided pleural  effusion  New on CT chest from 2/21/2024  Unclear etiology  Chest x-ray 3/25/2024 without any evidence of recurrence of the pleural fluid    *Lymphedema  Continue follow-up with lymphedema clinic  Encouraged her to remain compliant with the sleeve  She still in the process of being fitted for a pump for the right upper extremity lymphedema.      PLAN:  Repeat BMP in 2 weeks and IV fluids if creatinine elevated  Continue surveillance  [facility] and [facility]  MD follow-up in 12 weeks          Sheila Yee MD  08/07/2024

## 2024-08-21 ENCOUNTER — INFUSION (OUTPATIENT)
Dept: ONCOLOGY | Facility: HOSPITAL | Age: 84
End: 2024-08-21
Payer: MEDICARE

## 2024-08-21 VITALS
OXYGEN SATURATION: 98 % | BODY MASS INDEX: 24.5 KG/M2 | SYSTOLIC BLOOD PRESSURE: 161 MMHG | RESPIRATION RATE: 16 BRPM | DIASTOLIC BLOOD PRESSURE: 68 MMHG | WEIGHT: 129.6 LBS | TEMPERATURE: 97.7 F | HEART RATE: 105 BPM

## 2024-08-21 DIAGNOSIS — C50.411 MALIGNANT NEOPLASM OF UPPER-OUTER QUADRANT OF RIGHT BREAST IN FEMALE, ESTROGEN RECEPTOR NEGATIVE: ICD-10-CM

## 2024-08-21 DIAGNOSIS — Z17.1 MALIGNANT NEOPLASM OF UPPER-OUTER QUADRANT OF RIGHT BREAST IN FEMALE, ESTROGEN RECEPTOR NEGATIVE: ICD-10-CM

## 2024-08-21 LAB
ANION GAP SERPL CALCULATED.3IONS-SCNC: 13.5 MMOL/L (ref 5–15)
BUN SERPL-MCNC: 33 MG/DL (ref 8–23)
BUN/CREAT SERPL: 28.7 (ref 7–25)
CALCIUM SPEC-SCNC: 8.9 MG/DL (ref 8.6–10.5)
CHLORIDE SERPL-SCNC: 109 MMOL/L (ref 98–107)
CO2 SERPL-SCNC: 19.5 MMOL/L (ref 22–29)
CREAT SERPL-MCNC: 1.15 MG/DL (ref 0.57–1)
EGFRCR SERPLBLD CKD-EPI 2021: 47.4 ML/MIN/1.73
GLUCOSE SERPL-MCNC: 123 MG/DL (ref 65–99)
POTASSIUM SERPL-SCNC: 3.8 MMOL/L (ref 3.5–5.2)
SODIUM SERPL-SCNC: 142 MMOL/L (ref 136–145)

## 2024-08-21 PROCEDURE — 80048 BASIC METABOLIC PNL TOTAL CA: CPT

## 2024-08-21 PROCEDURE — 36591 DRAW BLOOD OFF VENOUS DEVICE: CPT

## 2024-08-21 PROCEDURE — G0463 HOSPITAL OUTPT CLINIC VISIT: HCPCS

## 2024-08-21 NOTE — NURSING NOTE
No fluids today per Dr. Yee.     Lab Results   Component Value Date    GLUCOSE 123 (H) 08/21/2024    CALCIUM 8.9 08/21/2024     08/21/2024    K 3.8 08/21/2024    CO2 19.5 (L) 08/21/2024     (H) 08/21/2024    BUN 33 (H) 08/21/2024    CREATININE 1.15 (H) 08/21/2024    EGFR 47.4 (L) 08/21/2024    BCR 28.7 (H) 08/21/2024    ANIONGAP 13.5 08/21/2024

## 2024-08-23 ENCOUNTER — HOSPITAL ENCOUNTER (OUTPATIENT)
Dept: CARDIOLOGY | Facility: HOSPITAL | Age: 84
Discharge: HOME | End: 2024-08-23
Payer: MEDICARE

## 2024-08-23 VITALS
SYSTOLIC BLOOD PRESSURE: 146 MMHG | WEIGHT: 129 LBS | HEART RATE: 70 BPM | BODY MASS INDEX: 25.32 KG/M2 | DIASTOLIC BLOOD PRESSURE: 82 MMHG | HEIGHT: 60 IN

## 2024-08-23 DIAGNOSIS — R79.89 ELEVATED TROPONIN: ICD-10-CM

## 2024-08-23 DIAGNOSIS — I10 PRIMARY HYPERTENSION: ICD-10-CM

## 2024-08-23 DIAGNOSIS — C50.411 MALIGNANT NEOPLASM OF UPPER-OUTER QUADRANT OF RIGHT BREAST IN FEMALE, ESTROGEN RECEPTOR NEGATIVE: ICD-10-CM

## 2024-08-23 DIAGNOSIS — Z17.1 MALIGNANT NEOPLASM OF UPPER-OUTER QUADRANT OF RIGHT BREAST IN FEMALE, ESTROGEN RECEPTOR NEGATIVE: ICD-10-CM

## 2024-08-23 DIAGNOSIS — N28.9 RENAL INSUFFICIENCY: ICD-10-CM

## 2024-08-23 DIAGNOSIS — Z51.81 ENCOUNTER FOR MONITORING CARDIOTOXIC DRUG THERAPY: ICD-10-CM

## 2024-08-23 DIAGNOSIS — E11.59 TYPE 2 DIABETES MELLITUS WITH OTHER CIRCULATORY COMPLICATION, WITHOUT LONG-TERM CURRENT USE OF INSULIN: ICD-10-CM

## 2024-08-23 DIAGNOSIS — Z79.899 ENCOUNTER FOR MONITORING CARDIOTOXIC DRUG THERAPY: ICD-10-CM

## 2024-08-23 LAB
AORTIC DIMENSIONLESS INDEX: 0.5 (DI)
ASCENDING AORTA: 2.8 CM
BH CV ECHO LEFT VENTRICLE GLOBAL LONGITUDINAL STRAIN: -19.1 %
BH CV ECHO MEAS - ACS: 1.27 CM
BH CV ECHO MEAS - AO MAX PG: 8.8 MMHG
BH CV ECHO MEAS - AO MEAN PG: 4.6 MMHG
BH CV ECHO MEAS - AO ROOT DIAM: 2.3 CM
BH CV ECHO MEAS - AO V2 MAX: 148.6 CM/SEC
BH CV ECHO MEAS - AO V2 VTI: 35.2 CM
BH CV ECHO MEAS - AVA(I,D): 1.49 CM2
BH CV ECHO MEAS - EDV(CUBED): 111.5 ML
BH CV ECHO MEAS - EF(MOD-BP): 55 %
BH CV ECHO MEAS - EF_3D-VOL: 53 %
BH CV ECHO MEAS - FS: 21.6 %
BH CV ECHO MEAS - IVS/LVPW: 1.18 CM
BH CV ECHO MEAS - IVSD: 0.73 CM
BH CV ECHO MEAS - LAT PEAK E' VEL: 7.4 CM/SEC
BH CV ECHO MEAS - LV MASS(C)D: 102 GRAMS
BH CV ECHO MEAS - LV MAX PG: 2.7 MMHG
BH CV ECHO MEAS - LV MEAN PG: 1.38 MMHG
BH CV ECHO MEAS - LV V1 MAX: 81.5 CM/SEC
BH CV ECHO MEAS - LV V1 VTI: 18.4 CM
BH CV ECHO MEAS - LVIDD: 4.8 CM
BH CV ECHO MEAS - LVIDS: 3.8 CM
BH CV ECHO MEAS - LVOT AREA: 2.8 CM2
BH CV ECHO MEAS - LVOT DIAM: 1.9 CM
BH CV ECHO MEAS - LVPWD: 0.62 CM
BH CV ECHO MEAS - MED PEAK E' VEL: 7.6 CM/SEC
BH CV ECHO MEAS - MR MAX PG: 47.7 MMHG
BH CV ECHO MEAS - MR MAX VEL: 345.3 CM/SEC
BH CV ECHO MEAS - MV A DUR: 0.13 SEC
BH CV ECHO MEAS - MV A MAX VEL: 102.8 CM/SEC
BH CV ECHO MEAS - MV DEC SLOPE: 592.3 CM/SEC2
BH CV ECHO MEAS - MV DEC TIME: 0.15 SEC
BH CV ECHO MEAS - MV E MAX VEL: 106 CM/SEC
BH CV ECHO MEAS - MV E/A: 1.03
BH CV ECHO MEAS - MV MAX PG: 8.2 MMHG
BH CV ECHO MEAS - MV MEAN PG: 4 MMHG
BH CV ECHO MEAS - MV P1/2T: 69.3 MSEC
BH CV ECHO MEAS - MV V2 VTI: 44 CM
BH CV ECHO MEAS - MVA(P1/2T): 3.2 CM2
BH CV ECHO MEAS - MVA(VTI): 1.19 CM2
BH CV ECHO MEAS - PA V2 MAX: 105.3 CM/SEC
BH CV ECHO MEAS - PULM A REVS DUR: 0.12 SEC
BH CV ECHO MEAS - PULM A REVS VEL: 25.7 CM/SEC
BH CV ECHO MEAS - PULM DIAS VEL: 22.6 CM/SEC
BH CV ECHO MEAS - PULM S/D: 1.34
BH CV ECHO MEAS - PULM SYS VEL: 30.3 CM/SEC
BH CV ECHO MEAS - QP/QS: 0.59
BH CV ECHO MEAS - RAP SYSTOLE: 3 MMHG
BH CV ECHO MEAS - RV MAX PG: 1.5 MMHG
BH CV ECHO MEAS - RV V1 MAX: 61.3 CM/SEC
BH CV ECHO MEAS - RV V1 VTI: 12.8 CM
BH CV ECHO MEAS - RVOT DIAM: 1.76 CM
BH CV ECHO MEAS - RVSP: 28 MMHG
BH CV ECHO MEAS - SV(LVOT): 52.5 ML
BH CV ECHO MEAS - SV(RVOT): 31.2 ML
BH CV ECHO MEAS - SVI(LVOT): 205.1 ML/M2
BH CV ECHO MEAS - TAPSE (>1.6): 2 CM
BH CV ECHO MEAS - TR MAX PG: 25.1 MMHG
BH CV ECHO MEAS - TR MAX VEL: 250.4 CM/SEC
BH CV ECHO MEASUREMENTS AVERAGE E/E' RATIO: 14.13
BH CV XLRA - RV BASE: 3.1 CM
BH CV XLRA - RV LENGTH: 5.7 CM
BH CV XLRA - RV MID: 2.6 CM
BH CV XLRA - TDI S': 7.2 CM/SEC
SINUS: 2.7 CM
STJ: 2.6 CM

## 2024-08-23 PROCEDURE — 93306 TTE W/DOPPLER COMPLETE: CPT

## 2024-08-23 PROCEDURE — 93356 MYOCRD STRAIN IMG SPCKL TRCK: CPT

## 2024-08-25 NOTE — PROGRESS NOTES
Date of Office Visit: 2024  Encounter Provider: Cassidy Maldonado MD  Place of Service: [facility] CARDIOLOGY  Patient Name: Veronica Royal  :1940    Chief complaint  Cardio oncology care    History of Present Illness  Patient is a 83-year-old female with diabetes, hypertension, hyperlipidemia, chronic renal insufficiency, hyperkalemia and cirrhosis..  She was diagnosed with right-sided inflammatory breast cancer 2023.  She started chemotherapy with THP on 2023, she has been switched to Kadcyla.  She also completed right-sided radiation therapy.      Baseline echo with EF 51.4 % with GLS of -20.3% with grade 1A diastolic dysfunction, aortic valve calcification without stenosis, trivial mitral and tricuspid valve regurgitation with normal right-sided pressures.  She was noted to have coronary artery calcification and on 2023 stress perfusion study was negative for ischemia.  Echo 2023 showed an ejection fraction 59%, GLS -17.9%.  Echo on 11/10/2023 shows an ejection fraction of 54% with GLS -20.2% trivial valve regurgitation, mild mitral regurgitation.  On 2024 with ejection fraction 55%, GLS -19%, grade 2 diastolic dysfunction with wall motion abnormality and trivial tricuspid regurgitation.  The wall motion changes were new from November.  A stress perfusion study on 3/2024 showed normal systolic function ejection fraction 64%.  Echo 2024 showed an ejection fraction of 58%, GLS -19.8%, mild-moderate regurgitation without stenosis though mitral thickening is present there is grade 2 diastolic dysfunction and normal right-sided pressures.  More recent echo 2024 showed an ejection fraction 55%,, strain imaging -19.1%.  Normal diastolic function normal right-sided pressures.    Since last visit she feels well denies chest pain shortness breath palpitation syncope near syncope.  She is not exercising regularly.  Blood pressure slightly elevated.  Patient states  that she has been troubled over the past month with feeling cold and intermittent low glucose levels.  She has also had intermittent edema.  She is listed as taking Diovan hydrochlorothiazide as well as diet losartan but she does not know which she is actually taking currently.      Past Medical History:   Diagnosis Date    Anxiety     Arthritis     Basal cell carcinoma     Left thumb    Breast cancer 2023    Chronic kidney disease     Depression     Diabetes mellitus     Diarrhea     s/e chemo    High cholesterol     History of chemotherapy 08/2023    Hypertension     Rash 08/2023    s/e chemo    Sacral decubitus ulcer     cleaning with soap & water    Urinary incontinence     wears pads    Vitamin D deficiency      Past Surgical History:   Procedure Laterality Date    BREAST BIOPSY      BREAST SURGERY      CATARACT EXTRACTION EXTRACAPSULAR W/ INTRAOCULAR LENS IMPLANTATION Right     EYE SURGERY      Muscle repair age 21    MASTECTOMY W/ SENTINEL NODE BIOPSY Right 08/24/2023    Procedure: Right breast modified radical mastectomy;  Surgeon: Rosa Michael MD;  [facility] [facility] [place] [facility]  Service: General;  Laterality: Right;    TONSILLECTOMY      VENOUS ACCESS DEVICE (PORT) INSERTION N/A 05/19/2023    Procedure: INSERTION VENOUS ACCESS DEVICE;  Surgeon: Rosa Michael MD;  [place] [facility] [place] [facility];  Service: General;  Laterality: N/A;     Outpatient Medications Prior to Visit   Medication Sig Dispense Refill    Cholecalciferol 50 MCG (2000 UT) tablet Take 1 tablet by mouth Daily (Monday-Friday).      Diclofenac Sodium (VOLTAREN) 1 % gel gel Apply 4 g topically to the appropriate area as directed 4 (Four) Times a Day.      glipiZIDE-metFORMIN (METAGLIP) 2.5-500 MG per tablet Take 2 tablets by mouth 2 (Two) Times a Day Before Meals. (Patient taking differently: Take 2 tablets by mouth Every Morning.) 120 tablet 3    imiquimod (ALDARA) 5 % cream Apply  topically to the appropriate area as directed.       lidocaine-prilocaine (EMLA) 2.5-2.5 % cream Apply nickel size amount to port site 30 min before appt time do not rub in cover with plastic wrap 30 g 5    losartan (COZAAR) 25 MG tablet TAKE 1 TABLET BY MOUTH DAILY 90 tablet 1    simvastatin (ZOCOR) 10 MG tablet Take 1 tablet by mouth Every Night.      Tradjenta 5 MG tablet tablet Take 1 tablet by mouth Every Morning. 90 tablet 5    valsartan-hydrochlorothiazide (DIOVAN-HCT) 160-12.5 MG per tablet Take 1 tablet by mouth Daily.      VITAMIN E PO Take  by mouth.      simvastatin (ZOCOR) 20 MG tablet Take 1 tablet by mouth Every Night. (Patient not taking: Reported on 8/26/2024)       No facility-administered medications prior to visit.       Allergies as of 08/26/2024    (No Known Allergies)     Social History     Socioeconomic History    Marital status:    Tobacco Use    Smoking status: Never    Smokeless tobacco: Never   Vaping Use    Vaping status: Never Used   Substance and Sexual Activity    Alcohol use: Never    Drug use: Never    Sexual activity: Never     Family History   Problem Relation Age of Onset    Alzheimer's disease Mother     Heart disease Father     Heart attack Father     Ovarian cancer Sister     Pancreatic cancer Brother     Malig Hyperthermia Neg Hx     Colon cancer Neg Hx     Colon polyps Neg Hx     Crohn's disease Neg Hx     Irritable bowel syndrome Neg Hx     Ulcerative colitis Neg Hx      Review of Systems   Constitutional: Negative for chills, fever, weight gain and weight loss.   Cardiovascular:  Positive for leg swelling.   Respiratory:  Negative for cough, snoring and wheezing.    Hematologic/Lymphatic: Negative for bleeding problem. Does not bruise/bleed easily.   Skin:  Negative for color change.   Musculoskeletal:  Negative for falls, joint pain and myalgias.   Gastrointestinal:  Negative for melena.   Genitourinary:  Negative for hematuria.   Neurological:  Negative for excessive daytime sleepiness.   Psychiatric/Behavioral:   "Negative for depression. The patient is not nervous/anxious.         Objective:     Vitals:    08/26/24 1026   BP: 142/78   Pulse: 87   Weight: 58.5 kg (129 lb)   Height: 152.4 cm (60\")     Body mass index is 25.19 kg/m².    Vitals reviewed.   Constitutional:       Appearance: Well-developed.   Eyes:      General: No scleral icterus.        Right eye: No discharge.      Conjunctiva/sclera: Conjunctivae normal.      Pupils: Pupils are equal, round, and reactive to light.   HENT:      Head: Normocephalic.      Nose: Nose normal.   Neck:      Thyroid: No thyromegaly.      Vascular: No JVD.   Pulmonary:      Effort: Pulmonary effort is normal. No respiratory distress.      Breath sounds: Normal breath sounds. No wheezing. No rales.   Cardiovascular:      Normal rate. Regular rhythm. Normal S1. Normal S2.       Murmurs: There is no murmur.      No gallop.    Pulses:     Intact distal pulses.      Carotid: 2+ bilaterally.     Radial: 2+ bilaterally.     Femoral: 2+ bilaterally.     Popliteal: 2+ bilaterally.     Dorsalis pedis: 2+ bilaterally.     Posterior tibial: 2+ bilaterally.  Edema:     Peripheral edema present.     Pretibial: bilateral trace edema of the pretibial area.     Ankle: bilateral 1+ edema of the ankle.  Abdominal:      General: Bowel sounds are normal. There is no distension.      Palpations: Abdomen is soft.      Tenderness: There is no abdominal tenderness. There is no rebound.   Musculoskeletal: Normal range of motion.         General: No tenderness.      Cervical back: Normal range of motion and neck supple. Skin:     General: Skin is warm and dry.      Findings: No erythema or rash.   Neurological:      Mental Status: Alert and oriented to person, place, and time.   Psychiatric:         Behavior: Behavior normal.         Thought Content: Thought content normal.         Judgment: Judgment normal.       Lab Review:   Lab Results - Last 18 Months   Lab Units 08/07/24  1339 08/01/24  1416   WBC 10*3/mm3 " "5.28 7.42   RBC 10*6/mm3 3.49* 3.44*   HEMOGLOBIN g/dL 8.7* 8.8*   HEMATOCRIT % 29.0* 28.6*   MCV fL 83.1 83.1   MCH pg 24.9* 25.6*   MCHC g/dL 30.0* 30.8*   RDW % 17.2* 18.1*   PLATELETS 10*3/mm3 126* 135*   NEUTROPHIL % % 75.6 72.6   LYMPHOCYTE % % 13.8* 15.2*   MONOCYTES % % 6.4 9.8   EOSINOPHIL % % 3.4 1.8   BASOPHIL % % 0.4 0.3   NEUTROS ABS 10*3/mm3 3.99 5.39   LYMPHS ABS 10*3/mm3 0.73 1.13   MONOS ABS 10*3/mm3 0.34 0.73   EOS ABS 10*3/mm3 0.18 0.13   BASOS ABS 10*3/mm3 0.02 0.02   RDW-SD fl 52.7 54.5*   MPV fL 9.4 9.0       Lab Results - Last 18 Months   Lab Units 08/21/24  1319 08/07/24  1339 08/01/24  1414   GLUCOSE mg/dL 123* 75 55*   BUN mg/dL 33* 17 25*   CREATININE mg/dL 1.15* 1.12* 1.30*   SODIUM mmol/L 142 141 141   POTASSIUM mmol/L 3.8 3.8 3.5   CHLORIDE mmol/L 109* 106 104   CO2 mmol/L 19.5* 23.0 22.5   CALCIUM mg/dL 8.9 9.0 9.1   TOTAL PROTEIN g/dL  --  6.9 6.7   ALBUMIN g/dL  --  3.3* 3.4*   ALT (SGPT) U/L  --  5 <5   AST (SGOT) U/L  --  28 28   ALK PHOS U/L  --  74 72   BILIRUBIN mg/dL  --  0.4 0.4   GLOBULIN gm/dL  --  3.6 3.3   A/G RATIO g/dL  --  0.9 1.0   BUN / CREAT RATIO  28.7* 15.2 19.2   ANION GAP mmol/L 13.5 12.0 14.5   EGFR mL/min/1.73 47.4* 48.9* 40.9*     Lab Results - Last 18 Months   Lab Units 11/21/23  0957   CHOLESTEROL mg/dL 147   TRIGLYCERIDES mg/dL 143   HDL CHOL mg/dL 36*   LDL CHOL mg/dL 86   VLDL CHOL mg/dL 25   LDL/HDL RATIO  2.29     Lab Results - Last 18 Months   Lab Units 02/21/24 2111 02/14/24  0954   PROBNP pg/mL 136.0 116.0     Lab Results - Last 18 Months   Lab Units 02/21/24 2245 02/21/24  2111   HSTROP T ng/L 19* 18*     No results for input(s): \"TSH\" in the last 38190 hours.  Lab Results - Last 18 Months   Lab Units 02/21/24 2219 05/25/23  1105   PROTIME Seconds 16.1* 13.9   APTT seconds 30.3  --            ECG 12 Lead    Date/Time: 8/26/2024 10:42 AM  Performed by: Cassidy Maldonado MD    Authorized by: Cassidy Maldonado MD  Comparison: compared with previous ECG "   Comparison to previous ECG: PACs present  Rhythm: sinus rhythm  Ectopy: atrial premature contractions    Clinical impression: normal ECG            Diagnosis Plan   1. Encounter for monitoring cardiotoxic drug therapy  Adult Transthoracic Echo Complete w/ Color, Spectral and Contrast if Necessary Per Protocol        Plan:         1.  Right-sided inflammatory breast cancer.  Per ESC guidelines she is considered high CV risk.  She has been treated with THP starting 5/2023 and is now on maintenance Kadcycla.  Strain imaging had by 12% in 9/2023 and it is back to baseline.  Echo in August 2024 was stable.  With EF 55% and GLS -19.1% (baseline).  Clinically stable.  2.  Cardio oncology care.  She has had intermittent dyspnea and her stress test was abnormal although cardiac cath showed no obstructive disease.  Symptoms have now resolved.  3.  Coronary artery disease.  Coronary calcification noted on CT scan of the chest on 5/2023.  As above   4.  Hypertension, controlled  5.  Hyperlipidemia.  She states she will contact PCP to obtain lipids.  6.  Diabetes.  Recommendations per endocrinology  7   Chronic renal insufficiency.  Relatively stable but may need to watch closely if diuretics increased further.  8.  Chronic mild anemia.  Likely contributing to feeling cold.  9.  Edema.  This looks to be more due to venous insufficiency.  Will continue to elevate legs.  They will call back once they get home with whether she is on a diuretic currently or not.  May need to cautiously adjust regimen further.  Once her current regimen is available we will contact nephrology to see if we can increase diuretics further.  She does not think she can wear support stockings.  Had troubles with these previously      Time Spent: I spent 35 minutes caring for Veronica on this date of service. This time includes time spent by me in the following activities: preparing for the visit, reviewing tests, obtaining and/or reviewing a separately  obtained history, performing a medically appropriate examination and/or evaluation, counseling and educating the patient/family/caregiver, documenting information in the medical record, independently interpreting results and communicating that information with the patient/family/caregiver, and care coordination.   I spent 1 minutes on the separately reported service of ECG. This time is not included in the time used to support the E/M service also reported today.        Your medication list            Accurate as of August 26, 2024 11:59 PM. If you have any questions, ask your nurse or doctor.                CHANGE how you take these medications        Instructions Last Dose Given Next Dose Due   glipiZIDE-metFORMIN 2.5-500 MG per tablet  Commonly known as: METAGLIP  What changed: when to take this      Take 2 tablets by mouth 2 (Two) Times a Day Before Meals.              CONTINUE taking these medications        Instructions Last Dose Given Next Dose Due   Cholecalciferol 50 MCG (2000 UT) tablet      Take 1 tablet by mouth Daily (Monday-Friday).       Diclofenac Sodium 1 % gel gel  Commonly known as: VOLTAREN      Apply 4 g topically to the appropriate area as directed 4 (Four) Times a Day.       imiquimod 5 % cream  Commonly known as: ALDARA      Apply  topically to the appropriate area as directed.       lidocaine-prilocaine 2.5-2.5 % cream  Commonly known as: EMLA      Apply nickel size amount to port site 30 min before appt time do not rub in cover with plastic wrap       losartan 25 MG tablet  Commonly known as: COZAAR      TAKE 1 TABLET BY MOUTH DAILY       simvastatin 20 MG tablet  Commonly known as: ZOCOR      Take 1 tablet by mouth Every Night.       simvastatin 10 MG tablet  Commonly known as: ZOCOR      Take 1 tablet by mouth Every Night.       Tradjenta 5 MG tablet tablet  Generic drug: linagliptin      Take 1 tablet by mouth Every Morning.       valsartan-hydrochlorothiazide 160-12.5 MG per  tablet  Commonly known as: DIOVAN-HCT      Take 1 tablet by mouth Daily.       VITAMIN E PO      Take  by mouth.                Patient is no longer taking -.  I corrected the med list to reflect this.  I did not stop these medications.      Dictated utilizing Dragon dictation

## 2024-08-26 ENCOUNTER — OFFICE VISIT (OUTPATIENT)
Dept: CARDIOLOGY | Facility: CLINIC | Age: 84
End: 2024-08-26
Payer: MEDICARE

## 2024-08-26 ENCOUNTER — PATIENT MESSAGE (OUTPATIENT)
Dept: CARDIOLOGY | Facility: CLINIC | Age: 84
End: 2024-08-26

## 2024-08-26 VITALS
WEIGHT: 129 LBS | SYSTOLIC BLOOD PRESSURE: 142 MMHG | HEIGHT: 60 IN | BODY MASS INDEX: 25.32 KG/M2 | HEART RATE: 87 BPM | DIASTOLIC BLOOD PRESSURE: 78 MMHG

## 2024-08-26 DIAGNOSIS — Z79.899 ENCOUNTER FOR MONITORING CARDIOTOXIC DRUG THERAPY: Primary | ICD-10-CM

## 2024-08-26 DIAGNOSIS — Z51.81 ENCOUNTER FOR MONITORING CARDIOTOXIC DRUG THERAPY: Primary | ICD-10-CM

## 2024-08-26 RX ORDER — SIMVASTATIN 10 MG
1 TABLET ORAL NIGHTLY
COMMUNITY
Start: 2024-07-11

## 2024-08-27 ENCOUNTER — TELEPHONE (OUTPATIENT)
Dept: CARDIOLOGY | Facility: CLINIC | Age: 84
End: 2024-08-27
Payer: MEDICARE

## 2024-08-27 NOTE — TELEPHONE ENCOUNTER
----- Message from [facility] sent at 8/26/2024  2:47 PM EDT -----  Regarding: Veronica woodson medication   Contact: [phone]  She is taking Losartin 25mg one daily.

## 2024-08-28 NOTE — TELEPHONE ENCOUNTER
Her kidney function is abnormal and she is high risk for kidney disease due to her diabetes, hypertension, and also chemotherapy. She should follow up with nephrology. I believe she saw Dr. Estrella in the past.

## 2024-08-28 NOTE — TELEPHONE ENCOUNTER
Called [facility] ([phone]) and spoke with Britney.  Reviewed Dr. Maldonado' message and request with her.  Britney stated she would send message to the provider and will return call to office once she receives a response.    Thank you,  Michelle COCHRAN RN  Triage Nurse [facility]  08/28/24 10:16 EDT

## 2024-08-28 NOTE — TELEPHONE ENCOUNTER
Britney returned call.  Their NP at nephrology is not able to advise because the patient has not seen them in over a year.    Audelia Martinez RN  [facility] Triage  08/28/24 10:57 EDT

## 2024-08-28 NOTE — TELEPHONE ENCOUNTER
SHANTI: pt's daughter said she is not taking Valsartan-HCTZ.  I took this off her med list.      Let us know if adding HCTZ or does she need to followup with Nephrologist since it has been over an year since seen?

## 2024-08-28 NOTE — TELEPHONE ENCOUNTER
Please check with nephrology and let them know she is having struggles with more edema.  See if they are okay with adding hydrochlorothiazide 12.5 mg to her current regimen.  If they are okay with this please let patient and daughter know this and start hydrochlorothiazide 12.5 mg daily and recheck a BMP in 1 week

## 2024-09-18 ENCOUNTER — TRANSCRIBE ORDERS (OUTPATIENT)
Dept: ADMINISTRATIVE | Facility: HOSPITAL | Age: 84
End: 2024-09-18
Payer: MEDICARE

## 2024-09-18 DIAGNOSIS — N28.89 RENAL MASS: Primary | ICD-10-CM

## 2024-09-24 ENCOUNTER — HOSPITAL ENCOUNTER (OUTPATIENT)
Facility: HOSPITAL | Age: 84
Discharge: HOME | End: 2024-09-24
Payer: MEDICARE

## 2024-09-24 DIAGNOSIS — K74.60 CIRRHOSIS OF LIVER WITHOUT ASCITES, UNSPECIFIED HEPATIC CIRRHOSIS TYPE: ICD-10-CM

## 2024-09-24 PROCEDURE — 76705 ECHO EXAM OF ABDOMEN: CPT

## 2024-10-17 ENCOUNTER — HOSPITAL ENCOUNTER (OUTPATIENT)
Dept: MRI IMAGING | Facility: HOSPITAL | Age: 84
Discharge: HOME | End: 2024-10-17
Admitting: NURSE PRACTITIONER
Payer: MEDICARE

## 2024-10-17 DIAGNOSIS — N28.89 RENAL MASS: ICD-10-CM

## 2024-10-17 PROCEDURE — 74183 MRI ABD W/O CNTR FLWD CNTR: CPT

## 2024-10-17 PROCEDURE — A9577 INJ MULTIHANCE: HCPCS | Performed by: NURSE PRACTITIONER

## 2024-10-17 PROCEDURE — 0 GADOBENATE DIMEGLUMINE 529 MG/ML SOLUTION: Performed by: NURSE PRACTITIONER

## 2024-10-17 RX ADMIN — GADOBENATE DIMEGLUMINE 11 ML: 529 INJECTION, SOLUTION INTRAVENOUS at 08:53

## 2024-10-18 ENCOUNTER — OFFICE VISIT (OUTPATIENT)
Dept: ENDOCRINOLOGY | Age: 84
End: 2024-10-18
Payer: MEDICARE

## 2024-10-18 VITALS
OXYGEN SATURATION: 100 % | WEIGHT: 129.8 LBS | BODY MASS INDEX: 25.48 KG/M2 | HEART RATE: 56 BPM | HEIGHT: 60 IN | DIASTOLIC BLOOD PRESSURE: 84 MMHG | SYSTOLIC BLOOD PRESSURE: 132 MMHG

## 2024-10-18 DIAGNOSIS — E11.65 TYPE 2 DIABETES MELLITUS WITH HYPERGLYCEMIA, WITHOUT LONG-TERM CURRENT USE OF INSULIN: Primary | ICD-10-CM

## 2024-10-18 LAB — HBA1C MFR BLD: 6.8 % (ref 4.8–5.6)

## 2024-10-18 RX ORDER — LINAGLIPTIN 5 MG/1
5 TABLET, FILM COATED ORAL
Qty: 90 TABLET | Refills: 1 | Status: SHIPPED | OUTPATIENT
Start: 2024-10-18

## 2024-10-18 RX ORDER — GLIPIZIDE AND METFORMIN HCL 2.5; 5 MG/1; MG/1
TABLET, FILM COATED ORAL
Qty: 270 TABLET | Refills: 1 | Status: SHIPPED | OUTPATIENT
Start: 2024-10-18

## 2024-10-18 NOTE — PROGRESS NOTES
Chief complaint/Reason for consult:  T2DM    HPI:   - 84 year old female here for management of diabetes mellitus type 2  - Last seen in 4/2024  - She has been started on a diuretic since her last visit  - She states she is eating less now than she was before  - Has had diabetes for over 10 years  - Complications include CKD 3  - Is currently taking Tradjenta 5 mg daily and metformin 500 mg daily/glipizide 2.5 mg, 2 tablets in the am and 1 tablet in the pm  - She is on simvastatin 10 mg daily    The following portions of the patient's history were reviewed and updated as appropriate: allergies, current medications, past family history, past medical history, past social history, past surgical history, and problem list.      Objective     Vitals:    10/18/24 1030   BP: 132/84   Pulse: 56   SpO2: 100%        Physical Exam  Vitals reviewed.   Constitutional:       Appearance: Normal appearance.   HENT:      Head: Normocephalic and atraumatic.   Eyes:      General: No scleral icterus.  Pulmonary:      Effort: Pulmonary effort is normal. No respiratory distress.   Neurological:      Mental Status: She is alert.      Gait: Gait normal.   Psychiatric:         Mood and Affect: Mood normal.         Behavior: Behavior normal.         Thought Content: Thought content normal.         Judgment: Judgment normal.         Assessment & Plan   1. T2DM, controlled  - Cont. Tradjenta 5 mg daily and metformin 500 mg daily/glipizide 2.5 mg, 2 tablets in the am and 1 tablet in the pm  - Patient is very hesitant to check her BG or take insulin  - I would like to check a hemoglobin A1c to get a better idea what her glycemic control is before making any changes     2. Hyperlipidemia  - She is on simvastatin 10 mg daily     - Return to clinic in 6 months

## 2024-10-29 DIAGNOSIS — E11.65 TYPE 2 DIABETES MELLITUS WITH HYPERGLYCEMIA, WITHOUT LONG-TERM CURRENT USE OF INSULIN: ICD-10-CM

## 2024-10-29 RX ORDER — LINAGLIPTIN 5 MG/1
5 TABLET, FILM COATED ORAL
Qty: 90 TABLET | Refills: 1 | OUTPATIENT
Start: 2024-10-29

## 2024-10-29 NOTE — TELEPHONE ENCOUNTER
Rx Refill Note  Requested Prescriptions     Pending Prescriptions Disp Refills    Tradjenta 5 MG tablet tablet [Pharmacy Med Name: TRADJENTA 5MG TABLETS] 90 tablet 1     Sig: TAKE 1 TABLET BY MOUTH EVERY MORNING      Last office visit with prescribing clinician: 10/18/2024   Last telemedicine visit with prescribing clinician: Visit date not found   Next office visit with prescribing clinician: 4/18/2025                         Would you like a call back once the refill request has been completed: [] Yes [] No    If the office needs to give you a call back, can they leave a voicemail: [] Yes [] No    Cely Castro MA  10/29/24, 07:21 EDT

## 2024-11-06 NOTE — PROGRESS NOTES
Subjective   Veronica Royal is a 84 y.o. female.  Referred by Dr. Michael for right breast inflammatory breast cancer    History of Present Illness   Ms. Royal is a 82-year-old postmenopausal  lady with hypertension, diabetes, hyperlipidemia, chronic kidney disease, hyperkalemia-chronic presented with a palpable abnormality in the right breast.  Subsequent imaging was performed.  Patient has not had a mammogram prior to this in the past 25 years.    4/21/2023-bilateral diagnostic mammogram-high density irregular mass measuring 44 mm with spiculated margins and amorphus and fine pleomorphic calcifications with skin thickening in the right breast at 9:00.  2 intramammary lymph nodes in the upper outer axillary tail region are slightly rounded  2 prominent right axillary lymph nodes largest of which measures 26 mm.  Asymmetry noted in the left breast.    Right breast ultrasound at 9 o'clock position, 3 cm from the nipple there is a 38 x 28 x 42 mm mass.  Skin thickness measuring 11 mm near the mass.  One of the 2 rounded axillary tail lymph nodes noted.  Borderline cortical thickening.  2 abnormal axillary lymph nodes.  1 lymph node displaced loss of normal morphology with a round heterogeneous appearance and echogenic foci.  Most consistent with calcified lymph node measuring 26 mm.  Second lymph node measures 12 mm.  Displaced cortical thickening.  Ultrasound-guided biopsy of the mass in the axillary lymph nodes recommended.    4/21/2023  1.right breast 9:00 biopsy-invasive ductal carcinoma with apocrine features  Grade 3  ER negative  NC negative  HER2 2+ on immunohistochemistry  HER2 amplified on FISH with HER2 copy number of 7.58, OPAL seventeen 3.18, HER2/OPAL 17 ratio of 2.4.  Ki-67 38.45%    2.right axillary biopsy-soft tissue involved by invasive ductal carcinoma with apocrine features  Associated microcalcification  No definite lymph node tissue identified.    4/29/2023-MRI of the breast-biopsy-proven  malignancy in the right breast at 9:00 measuring 4.3 cm in greatest dimension.  Attachment overlying skin and diffuse right breast edema noted.  Right axillary lymphadenopathy.  No suspicious findings in the left breast.    5/11/2023-CT of the chest abdomen and pelvis-no evidence of metastatic disease.  Liver is cirrhotic in configuration.    5/11/2023-bone scan negative    Patient presents today to the clinic for discussing neoadjuvant chemotherapy.  She is accompanied by her daughter.  Patient denies any recent changes in weight, she reports some pain at the site of malignancy.  A punch biopsy was performed and was consistent with inflammatory breast cancer.  At the site of punch biopsy there is an ulcerated lesion.    She has poorly controlled diabetes currently on glipizide metformin and Tradjenta.  Hemoglobin A1c recently was noted to be 9.9.    Also has chronic hyperkalemia, explanation unclear.    Blood pressure poorly controlled.    She reports good functional status lives independently.  Able to manage all her bills and independent of ADLs.  She has good support system in terms of her daughter.    Family history significant for brother with pancreatic cancer at the age of 68, sister with ovarian cancer at the age of 58    She received 6 weeks of Taxol Perjeta and Herceptin and subsequently admitted to the hospital due to severe nausea vomiting diarrhea poor oral intake, KAMILA, severe electrolyte abnormalities.      admission to the hospital for KAMILA, severe diarrhea, nausea vomiting and poor oral intake.She was in the hospital between 7/12/2023 to 7/20/2023.  During the hospitalization she was treated for acute UTI, electrolyte abnormalities and KAMILA.  Subsequently she was  discharged to a rehab.   During the hospitalization she was evaluated by Dr. Michael and a right breast ultrasound was obtained on 7/17/2023.  There was very little response noted with the 9 o'clock position mass 6 cm from the nipple  measuring 3.7 x 2.4 x 3.7 cm previously 3.8 x 2.5 x 4.2 cm.  In the right axilla the lymph node measured 2.6 x 1.5 x 1.9 cm previously 1.9 x 1.5 x 2.5 cm.  There was decrease in size of the adjacent lymph node measuring 0.7 cm previously 1.2 cm.    8/24/2023-right mastectomy and axillary lymph node dissection  Invasive ductal carcinoma, grade 2  The tumor measures 4 cm  Microcalcifications are seen in the tumor  Dermal lymphatic invasion present  Lymphatic invasion present  Tumor seen in the skin but no ulceration.  Residual cancer burden 3.454  RCB-3  Xiong Lima grade 3  Margins negative  18 lymph nodes total evaluated  1 with micrometastasis with a tumor measuring 2.5 mm with no chemotherapy effect  1 with chemotherapy change and isolated tumor cells  There is 1 node with chemotherapy change and a clip but no residual tumor.    Receptors repeated  ER negative  OR negative  HER2 2+ on immunohistochemistry  FISH nonamplified    Kadcyla dose decreased to 3 mg/kg.    Patient was hospitalized between 2/22/2024 to 2/24/2024.  She presented to the emergency room with complaints of chest pain.  CT angiogram was performed which did not show any evidence of pulmonary embolism.  There were changes consistent with radiation on the right side and there was a moderate pleural effusion on the right.  Thoracentesis was performed and 550 cc of fluid was drained.  Cytology was negative for any malignancy.  Cultures remain negative.  Chest pain improved after the thoracentesis.  She denies any dyspnea or cough.    Bilateral lower extremity Doppler was performed which was negative.    She is reporting lymphedema of the right upper extremity and has been somewhat noncompliant with the sleeve.  She continues to follow-up with lymphedema clinic.    She is tolerating the decreased dose of Kadcyla fairly well.  She felt a little weak immediately after the discharge from the hospital but subsequently felt better.    Echocardiogram  2/15/2024 shows a stable ejection fraction of 54.5% with a strain of -19%.    3/8/2024-stress test-low risk study with normal myocardial perfusion.  Left ventricular fraction is normal at 64%.    Last dose of Kadcyla 6/19/2024    Interval History:  She presents today for 3 month follow up while on observation for the above diagnosis. She previously was required IV fluids due to dehydration and elevated creatinine, but thankfully that has improved. She denies new chest wall lesions or breast masses. She states she is feeling much better. She recently went on a cruise with her son and really enjoyed that. Denies chest pain, shortness of breath, headaches/vision changes or diarrhea.      The following portions of the patient's history were reviewed and updated as appropriate: allergies, current medications, past family history, past medical history, past social history, past surgical history and problem list.    Past Medical History:   Diagnosis Date    Anxiety     Arthritis     Basal cell carcinoma     Left thumb    Breast cancer 2023    Chronic kidney disease     Depression     Diabetes mellitus     Diarrhea     s/e chemo    High cholesterol     History of chemotherapy 08/2023    Hypertension     Rash 08/2023    s/e chemo    Sacral decubitus ulcer     cleaning with soap & water    Urinary incontinence     wears pads    Vitamin D deficiency         Past Surgical History:   Procedure Laterality Date    BREAST BIOPSY      BREAST SURGERY      CATARACT EXTRACTION EXTRACAPSULAR W/ INTRAOCULAR LENS IMPLANTATION Right     EYE SURGERY      Muscle repair age 21    MASTECTOMY W/ SENTINEL NODE BIOPSY Right 08/24/2023    Procedure: Right breast modified radical mastectomy;  Surgeon: Rosa Michael MD;  Location: [facility] OR [facility] [place];  Service: General;  Laterality: Right;    TONSILLECTOMY      VENOUS ACCESS DEVICE (PORT) INSERTION N/A 05/19/2023    Procedure: INSERTION VENOUS ACCESS DEVICE;  Surgeon: Rosa Michael MD;   "Location: [facility] OR [facility] [place];  Service: General;  Laterality: N/A;        Family History   Problem Relation Age of Onset    Alzheimer's disease Mother     Heart disease Father     Heart attack Father     Ovarian cancer Sister     Pancreatic cancer Brother     Cancer Brother     Cancer Sister         Ovarian    Malig Hyperthermia Neg Hx     Colon cancer Neg Hx     Colon polyps Neg Hx     Crohn's disease Neg Hx     Irritable bowel syndrome Neg Hx     Ulcerative colitis Neg Hx         Social History     Socioeconomic History    Marital status:    Tobacco Use    Smoking status: Never    Smokeless tobacco: Never   Vaping Use    Vaping status: Never Used   Substance and Sexual Activity    Alcohol use: Never    Drug use: Never    Sexual activity: Never        OB History    No obstetric history on file.      Age at menarche-11  Age at first live childbirth-35   2 para 1  1  Age at menopause-50  Oral conceptive pill use for 3 to 4 years    No Known Allergies     Objective   Blood pressure 135/78, pulse 79, temperature 97.4 °F (36.3 °C), temperature source Oral, resp. rate 16, height 152.4 cm (60\"), weight 62.2 kg (137 lb 1.6 oz), SpO2 100%.     Physical Exam  Vitals reviewed.   Constitutional:       Appearance: Normal appearance. She is well-developed.   HENT:      Head: Normocephalic and atraumatic.      Right Ear: Decreased hearing noted.      Left Ear: Decreased hearing noted.   Eyes:      Pupils: Pupils are equal, round, and reactive to light.   Cardiovascular:      Rate and Rhythm: Normal rate and regular rhythm.      Heart sounds: Normal heart sounds. No murmur heard.  Pulmonary:      Effort: Pulmonary effort is normal.   Abdominal:      General: Bowel sounds are normal.      Palpations: Abdomen is soft.   Musculoskeletal:         General: Normal range of motion.      Cervical back: Normal range of motion.   Skin:     General: Skin is warm and dry.      Findings: No rash.   Neurological:      Mental " Status: She is alert and oriented to person, place, and time.        Breast exam:  Right breast: s/p mastectomy. Chest wall exam without palpable abnormalities  Left breast: Appears normal on inspection. No palpable abnormalities.     Assessment & Plan   *Right breast inflammatory breast cancer  T4d N1 M0  Stage IIIb  ER negative, MT negative, HER2 2+ on immunohistochemistry but amplified on FISH.  Invasive ductal carcinoma with apocrine features, grade 3, Ki-67 38%  CT scans and bone scan without any obvious evidence of metastatic disease  Echocardiogram has been performed and ejection fraction normal.  Liver shows cirrhotic morphology.  LFTs otherwise normal and total bilirubin normal as well as protein normal.  It appears that the synthetic function of the liver is still within normal limits.  Mediport placed 5/19/2023  Taxol, Herceptin, Perjeta initiated 5/24/2023 5/31/2023 with C1D8 Taxol  6/28/2023-cycle 2-day 15 of Taxol.  She is overall tolerating therapy well with expected side effects.  She will proceed with Taxol today.  Scheduled for Taxol Herceptin and Perjeta.  7/12/2023-cycle 3-day 8 of TPH.  Chemotherapy held and patient was admitted to the hospital for management of severe dehydration, KAMILA, nausea vomiting diarrhea and decreased oral intake.  8/2/2023-she feels relatively well today.  Labs reviewed and stable to proceed with Herceptin only.  We will not administer Taxol and Perjeta as she has had significant issues with chemotherapy.  Right mastectomy 8/24/2023 with 4 cm of residual disease, RCB-3, treatment effect present, axillary lymph node dissection with 1 lymph node with micrometastasis measuring 2.5 mm, 1 lymph node with isolated tumor cells and a third lymph node which showed treatment changes but no tumor cells.  Total of 18 lymph nodes removed   9/20/2023 to discuss pathology and adjuvant therapy.  We discussed Kadcyla every 3 weekly to finish a complete year.  Patient is definitely  hesitant to resume any sort of chemotherapy due to her previous experience with diarrhea.  Started Kadcyla 9/20/2023, denies any diarrhea.  Tolerating treatment well so far  No evidence of recurrent disease  Adjuvant radiation completed 11/20/2023  She has received 5 doses of adjuvant Kadcyla.  Sixth dose has been delayed by 3 weeks due to worsening arthralgias, decreased strength in her lower extremities  Improvement in strength as well as arthralgias with holding chemotherapy.  We discussed about proceeding with the dose reduction of Kadcyla versus not doing any further Kadcyla.  Kadcyla dose decreased to 3 mg/kg  Patient hospitalized between 2/21/2024 to 2/24/2024 for right-sided pleural effusion and status post thoracentesis with 550 cc drained which was nonmalignant.  Etiology of the pleural fluid is unclear  CTA performed 2/21/2024 without any evidence of metastatic disease.  Chest x-ray 3/25/2024-no recurrence of fluid  6/19/2024 last dose of Kadcyla.    No evidence of recurrent disease  11/7/2024: No clinical evidence of recurrent disease.     *Anemia  Most likely secondary to chemotherapy.  Iron studies performed in May 2023 suggestive of anemia of chronic disease/inflammation.  Hemoglobin stable at 8.7  Recommend that she take a multivitamin every day  Likely residual effect of chemotherapy, continue to monitor CBC at each visit  11/7/2024: Hemoglobin 8.8 which is stable from prior visits. Iron saturation 11% and ferritin 13.00. Will start oral ferrous sulfate 325mg 1 tablet daily.     *Diabetes  Poorly controlled  Evaluation by endocrinology 5/30/2023 with recommendations for dietary changes and home blood sugar monitoring.  The patient's daughter reports today she is struggling with compliance.  Continue follow-up with primary care physician    *KAMILA/CKD  Patient's daughter reports that she has not been hydrating herself well.  6/19/2024 Creatinine 1.24, continued diarrhea from Kadcyla. Proceed with 1 L  normal saline.  7/18/2024 reviewed today 1 month out from finishing Kadcyla.  She is still having intermittent loose stool and poor hydration at home.  On discussion about this today.  Patient lives alone which may ultimately not be the best situation for her.  Creatinine improved at 1.12, she has been receiving weekly IV fluids.  Recheck creatinine in 2 weeks and IV fluids if elevated.  Encouraged her to drink plenty of fluids  11/7/2024: Creatinine 0.99 today. Much improvement. She continues to follow with nephrology.     *?  Cirrhotic appearance of the liver on the CT scan  Referred to gastroenterology  Synthetic function appears to be normal  We will start with Taxol to see if she would tolerate the chemotherapy   Slight elevation of intervention studies today with ALT 43, AST 55.  Continue to monitor weekly  6/28/2023-mild elevation in the LFTs.  Stable compared to previous labs.  Okay to proceed with chemotherapy.  11/21/2023-LFTs normal  12/13/2023: Liver enzymes are normals. Alkaline phosphatase is slightly more elevated at 244.   LFTs normal today    *Hypertension-currently on valsartan and hydrochlorothiazide.  Valsartan could be contributing to hyperkalemia.  Will refer to cardiology ASAP for management of medications and cardiac clearance for proceeding with chemotherapy  Recent echocardiogram normal  Stress test reviewed and negative.  9/27/2023-echocardiogram shows a normal ejection fraction of 59%.  Follow-up echocardiogram 10/20/2023 with ejection fraction of 54%.  When compared to baseline study 5/9/2023 ejection fraction is stable.  Left ventricular GLS is changed by 8%.  Discussed with cardiology, given the stability of ejection fraction when compared to baseline from May, we will proceed with Kadcyla   Cardiology evaluation 11/10/2023 with stable EF and strain.  Recommendations to continue Kadcyla with 3-month follow-up echocardiogram  12/13/2023: Being followed by cardio oncology. Has an ECHO  scheduled soon that is to be rescheduled per her daughter.   Continue losartan and Cozaar, blood pressure 156/79  Patient maintained a log of the blood pressure and her blood pressure has actually been low in the mornings.    *Genetic testing-Invitae 9 gene stat panel negative,     *Decreased oral intake secondary to chemotherapy  Still not adequate oral intake  Encouraged her to drink boost and Ensure  Weight is slowly improving, up 3 pounds in the last month however she is not drinking enough fluids as outlined above.  She is no longer on the oral medication for skin cancer and her appetite is slowly coming back.  We will schedule her for more frequent fluids and monitoring.      *Cognitive impairment  It is unclear if this is the patient's baseline or mental status has been exacerbated by recent chemotherapy  The patient is struggling with compliance with her medications.  The patient's daughter expresses frustration today as the patient is struggling to care for herself at home with managing medications and symptom management.  Evaluated by CARL Antonio   7/18/2024 this remains a concern at her visit today.  She does have an upcoming neurocognitive evaluation with [facility].  I am concerned that she may be better off in an assisted living facility if she cannot take better care of herself.  They had to reschedule the appointment with [facility] due to recent diarrhea and dehydration  11/7/2024: Improved with resolution of dehydration and diarrhea.     *Chemotherapy-induced diarrhea  6/5/2023 patient given Enterade (Wild Berry flavor).  She has been drinking 1 a day.  She does not necessarily like the flavor (currently mixing with sugar-free Aramis-Aid which does help).  Encouraged her to increase to 2 a day.  6/21/2023 patient reports that she had stopped drinking her Enterade because she was waking up in the middle the night having diarrhea although she did not know if it was due to the Enterade her  protein shakes.  I have encouraged the patient to continue Enterade, drinking it in the morning.  Try discontinuing the protein shakes and see how she does.  Patient states that she will try this  Reports 2-3 loose stools.  Continue Enterade and Imodium.  7/5/2023 continuing to have issues with diarrhea up to 3-4 bowel movements a day, patient also recently prescribed Kayexalate for an apparently elevated potassium.  Potassium only 3.1 today.  She advised to discontinue the Kayexalate she was given IV hydration today.  We will also prescribe Lomotil to use if needed to help better control her diarrhea.  Patient is not drinking Enterade regularly.  8/2/2023-improved since discharge from the hospital and discontinuation of chemotherapy.  She received Herceptin on 8/2/2023 and reports a week of diarrhea.  Patient has not had any diarrhea since initiating Kadcyla.  Continue to monitor, she continues to note this is improved on Kadcyla  12/13/2023: Has improved. Currently has had some constipation. Encouraged senokot S every other night based on her current symptoms until bowel movements are more consistent.   7/18/2024 still having loose bowels.  Encouraged increasing hydration, adding fiber.  11/7/2024: Resolved.     *Right-sided pleural effusion  New on CT chest from 2/21/2024  Unclear etiology  Chest x-ray 3/25/2024 without any evidence of recurrence of the pleural fluid    *Lymphedema  Continue follow-up with lymphedema clinic  Encouraged her to remain compliant with the sleeve  She still in the process of being fitted for a pump for the right upper extremity lymphedema.    PLAN:  No clinical evidence of recurrent disease on exam today.   Continue to follow with [facility]   Return to clinic in 6 weeks for NP visit, cbc/iron studies/ferritin.   Return to clinic in 3 months for MD visit.   Instructed to reach out sooner with any new concerns.       CARL Oreilly  11/07/2024

## 2024-11-07 ENCOUNTER — OFFICE VISIT (OUTPATIENT)
Dept: ONCOLOGY | Facility: CLINIC | Age: 84
End: 2024-11-07
Payer: MEDICARE

## 2024-11-07 ENCOUNTER — LAB (OUTPATIENT)
Dept: ONCOLOGY | Facility: HOSPITAL | Age: 84
End: 2024-11-07
Payer: MEDICARE

## 2024-11-07 VITALS
BODY MASS INDEX: 26.92 KG/M2 | OXYGEN SATURATION: 100 % | WEIGHT: 137.1 LBS | RESPIRATION RATE: 16 BRPM | DIASTOLIC BLOOD PRESSURE: 78 MMHG | HEIGHT: 60 IN | TEMPERATURE: 97.4 F | HEART RATE: 79 BPM | SYSTOLIC BLOOD PRESSURE: 135 MMHG

## 2024-11-07 DIAGNOSIS — D64.9 ANEMIA, UNSPECIFIED TYPE: ICD-10-CM

## 2024-11-07 DIAGNOSIS — C50.411 MALIGNANT NEOPLASM OF UPPER-OUTER QUADRANT OF RIGHT BREAST IN FEMALE, ESTROGEN RECEPTOR NEGATIVE: ICD-10-CM

## 2024-11-07 DIAGNOSIS — D64.9 ANEMIA, UNSPECIFIED TYPE: Primary | ICD-10-CM

## 2024-11-07 DIAGNOSIS — N17.9 AKI (ACUTE KIDNEY INJURY): ICD-10-CM

## 2024-11-07 DIAGNOSIS — Z17.1 MALIGNANT NEOPLASM OF UPPER-OUTER QUADRANT OF RIGHT BREAST IN FEMALE, ESTROGEN RECEPTOR NEGATIVE: ICD-10-CM

## 2024-11-07 DIAGNOSIS — Z45.2 ENCOUNTER FOR FITTING AND ADJUSTMENT OF VASCULAR CATHETER: Primary | ICD-10-CM

## 2024-11-07 LAB
ALBUMIN SERPL-MCNC: 3.4 G/DL (ref 3.5–5.2)
ALBUMIN/GLOB SERPL: 1 G/DL
ALP SERPL-CCNC: 78 U/L (ref 39–117)
ALT SERPL W P-5'-P-CCNC: 14 U/L (ref 1–33)
ANION GAP SERPL CALCULATED.3IONS-SCNC: 15 MMOL/L (ref 5–15)
AST SERPL-CCNC: 26 U/L (ref 1–32)
BASOPHILS # BLD AUTO: 0.01 10*3/MM3 (ref 0–0.2)
BASOPHILS NFR BLD AUTO: 0.2 % (ref 0–1.5)
BILIRUB SERPL-MCNC: 0.2 MG/DL (ref 0–1.2)
BUN SERPL-MCNC: 26 MG/DL (ref 8–23)
BUN/CREAT SERPL: 26.3 (ref 7–25)
CALCIUM SPEC-SCNC: 8.6 MG/DL (ref 8.6–10.5)
CHLORIDE SERPL-SCNC: 109 MMOL/L (ref 98–107)
CO2 SERPL-SCNC: 19 MMOL/L (ref 22–29)
CREAT SERPL-MCNC: 0.99 MG/DL (ref 0.57–1)
DEPRECATED RDW RBC AUTO: 52.4 FL (ref 37–54)
EGFRCR SERPLBLD CKD-EPI 2021: 56.3 ML/MIN/1.73
EOSINOPHIL # BLD AUTO: 0.27 10*3/MM3 (ref 0–0.4)
EOSINOPHIL NFR BLD AUTO: 5.5 % (ref 0.3–6.2)
ERYTHROCYTE [DISTWIDTH] IN BLOOD BY AUTOMATED COUNT: 16.5 % (ref 12.3–15.4)
FERRITIN SERPL-MCNC: 13.8 NG/ML (ref 13–150)
FOLATE SERPL-MCNC: 10.5 NG/ML (ref 4.78–24.2)
GLOBULIN UR ELPH-MCNC: 3.4 GM/DL
GLUCOSE SERPL-MCNC: 180 MG/DL (ref 65–99)
HCT VFR BLD AUTO: 28.3 % (ref 34–46.6)
HGB BLD-MCNC: 8.8 G/DL (ref 12–15.9)
IMM GRANULOCYTES # BLD AUTO: 0.01 10*3/MM3 (ref 0–0.05)
IMM GRANULOCYTES NFR BLD AUTO: 0.2 % (ref 0–0.5)
IRON 24H UR-MRATE: 45 MCG/DL (ref 37–145)
IRON SATN MFR SERPL: 11 % (ref 20–50)
LYMPHOCYTES # BLD AUTO: 1.18 10*3/MM3 (ref 0.7–3.1)
LYMPHOCYTES NFR BLD AUTO: 24.2 % (ref 19.6–45.3)
MCH RBC QN AUTO: 26.8 PG (ref 26.6–33)
MCHC RBC AUTO-ENTMCNC: 31.1 G/DL (ref 31.5–35.7)
MCV RBC AUTO: 86.3 FL (ref 79–97)
MONOCYTES # BLD AUTO: 0.4 10*3/MM3 (ref 0.1–0.9)
MONOCYTES NFR BLD AUTO: 8.2 % (ref 5–12)
NEUTROPHILS NFR BLD AUTO: 3.01 10*3/MM3 (ref 1.7–7)
NEUTROPHILS NFR BLD AUTO: 61.7 % (ref 42.7–76)
NRBC BLD AUTO-RTO: 0 /100 WBC (ref 0–0.2)
PLATELET # BLD AUTO: 107 10*3/MM3 (ref 140–450)
PMV BLD AUTO: 9 FL (ref 6–12)
POTASSIUM SERPL-SCNC: 4.2 MMOL/L (ref 3.5–5.2)
PROT SERPL-MCNC: 6.8 G/DL (ref 6–8.5)
RBC # BLD AUTO: 3.28 10*6/MM3 (ref 3.77–5.28)
SODIUM SERPL-SCNC: 143 MMOL/L (ref 136–145)
TIBC SERPL-MCNC: 420 MCG/DL (ref 298–536)
TRANSFERRIN SERPL-MCNC: 282 MG/DL (ref 200–360)
VIT B12 BLD-MCNC: 1112 PG/ML (ref 211–946)
WBC NRBC COR # BLD AUTO: 4.88 10*3/MM3 (ref 3.4–10.8)

## 2024-11-07 PROCEDURE — 82746 ASSAY OF FOLIC ACID SERUM: CPT

## 2024-11-07 PROCEDURE — 36591 DRAW BLOOD OFF VENOUS DEVICE: CPT

## 2024-11-07 PROCEDURE — 83540 ASSAY OF IRON: CPT

## 2024-11-07 PROCEDURE — 25010000002 HEPARIN LOCK FLUSH PER 10 UNITS

## 2024-11-07 PROCEDURE — 82728 ASSAY OF FERRITIN: CPT

## 2024-11-07 PROCEDURE — 84466 ASSAY OF TRANSFERRIN: CPT

## 2024-11-07 PROCEDURE — 80053 COMPREHEN METABOLIC PANEL: CPT

## 2024-11-07 PROCEDURE — 36415 COLL VENOUS BLD VENIPUNCTURE: CPT

## 2024-11-07 PROCEDURE — 82607 VITAMIN B-12: CPT

## 2024-11-07 PROCEDURE — 85025 COMPLETE CBC W/AUTO DIFF WBC: CPT

## 2024-11-07 RX ORDER — HYDROCODONE BITARTRATE AND ACETAMINOPHEN 5; 325 MG/1; MG/1
TABLET ORAL
COMMUNITY
Start: 2024-11-04

## 2024-11-07 RX ORDER — SODIUM CHLORIDE 0.9 % (FLUSH) 0.9 %
10 SYRINGE (ML) INJECTION AS NEEDED
Status: DISCONTINUED | OUTPATIENT
Start: 2024-11-07 | End: 2024-11-07 | Stop reason: HOSPADM

## 2024-11-07 RX ORDER — HEPARIN SODIUM (PORCINE) LOCK FLUSH IV SOLN 100 UNIT/ML 100 UNIT/ML
500 SOLUTION INTRAVENOUS AS NEEDED
Status: DISCONTINUED | OUTPATIENT
Start: 2024-11-07 | End: 2024-11-07 | Stop reason: HOSPADM

## 2024-11-07 RX ADMIN — Medication 500 UNITS: at 14:29

## 2024-11-07 RX ADMIN — Medication 10 ML: at 14:29

## 2024-12-19 ENCOUNTER — OFFICE VISIT (OUTPATIENT)
Dept: ONCOLOGY | Facility: CLINIC | Age: 84
End: 2024-12-19
Payer: MEDICARE

## 2024-12-19 ENCOUNTER — INFUSION (OUTPATIENT)
Dept: ONCOLOGY | Facility: HOSPITAL | Age: 84
End: 2024-12-19
Payer: MEDICARE

## 2024-12-19 VITALS
DIASTOLIC BLOOD PRESSURE: 84 MMHG | WEIGHT: 137 LBS | TEMPERATURE: 97.7 F | BODY MASS INDEX: 26.9 KG/M2 | HEIGHT: 60 IN | HEART RATE: 100 BPM | OXYGEN SATURATION: 100 % | RESPIRATION RATE: 16 BRPM | SYSTOLIC BLOOD PRESSURE: 184 MMHG

## 2024-12-19 DIAGNOSIS — D64.9 ANEMIA, UNSPECIFIED TYPE: ICD-10-CM

## 2024-12-19 DIAGNOSIS — Z45.2 ENCOUNTER FOR FITTING AND ADJUSTMENT OF VASCULAR CATHETER: Primary | ICD-10-CM

## 2024-12-19 DIAGNOSIS — Z17.1 MALIGNANT NEOPLASM OF UPPER-OUTER QUADRANT OF RIGHT BREAST IN FEMALE, ESTROGEN RECEPTOR NEGATIVE: Primary | ICD-10-CM

## 2024-12-19 DIAGNOSIS — C50.411 MALIGNANT NEOPLASM OF UPPER-OUTER QUADRANT OF RIGHT BREAST IN FEMALE, ESTROGEN RECEPTOR NEGATIVE: Primary | ICD-10-CM

## 2024-12-19 LAB
BASOPHILS # BLD AUTO: 0.01 10*3/MM3 (ref 0–0.2)
BASOPHILS NFR BLD AUTO: 0.2 % (ref 0–1.5)
DEPRECATED RDW RBC AUTO: 51.4 FL (ref 37–54)
EOSINOPHIL # BLD AUTO: 0.29 10*3/MM3 (ref 0–0.4)
EOSINOPHIL NFR BLD AUTO: 5.4 % (ref 0.3–6.2)
ERYTHROCYTE [DISTWIDTH] IN BLOOD BY AUTOMATED COUNT: 16 % (ref 12.3–15.4)
FERRITIN SERPL-MCNC: 24.2 NG/ML (ref 13–150)
HCT VFR BLD AUTO: 30.9 % (ref 34–46.6)
HGB BLD-MCNC: 9.5 G/DL (ref 12–15.9)
IMM GRANULOCYTES # BLD AUTO: 0.01 10*3/MM3 (ref 0–0.05)
IMM GRANULOCYTES NFR BLD AUTO: 0.2 % (ref 0–0.5)
IRON 24H UR-MRATE: 45 MCG/DL (ref 37–145)
IRON SATN MFR SERPL: 10 % (ref 20–50)
LYMPHOCYTES # BLD AUTO: 1.09 10*3/MM3 (ref 0.7–3.1)
LYMPHOCYTES NFR BLD AUTO: 20.1 % (ref 19.6–45.3)
MCH RBC QN AUTO: 27.1 PG (ref 26.6–33)
MCHC RBC AUTO-ENTMCNC: 30.7 G/DL (ref 31.5–35.7)
MCV RBC AUTO: 88.3 FL (ref 79–97)
MONOCYTES # BLD AUTO: 0.43 10*3/MM3 (ref 0.1–0.9)
MONOCYTES NFR BLD AUTO: 7.9 % (ref 5–12)
NEUTROPHILS NFR BLD AUTO: 3.58 10*3/MM3 (ref 1.7–7)
NEUTROPHILS NFR BLD AUTO: 66.2 % (ref 42.7–76)
NRBC BLD AUTO-RTO: 0 /100 WBC (ref 0–0.2)
PLATELET # BLD AUTO: 104 10*3/MM3 (ref 140–450)
PMV BLD AUTO: 9.8 FL (ref 6–12)
RBC # BLD AUTO: 3.5 10*6/MM3 (ref 3.77–5.28)
TIBC SERPL-MCNC: 441 MCG/DL (ref 298–536)
TRANSFERRIN SERPL-MCNC: 296 MG/DL (ref 200–360)
WBC NRBC COR # BLD AUTO: 5.41 10*3/MM3 (ref 3.4–10.8)

## 2024-12-19 PROCEDURE — 83540 ASSAY OF IRON: CPT

## 2024-12-19 PROCEDURE — 84466 ASSAY OF TRANSFERRIN: CPT

## 2024-12-19 PROCEDURE — 25010000002 HEPARIN LOCK FLUSH PER 10 UNITS: Performed by: INTERNAL MEDICINE

## 2024-12-19 PROCEDURE — 85025 COMPLETE CBC W/AUTO DIFF WBC: CPT

## 2024-12-19 PROCEDURE — 36591 DRAW BLOOD OFF VENOUS DEVICE: CPT

## 2024-12-19 PROCEDURE — 82728 ASSAY OF FERRITIN: CPT

## 2024-12-19 RX ORDER — SODIUM CHLORIDE 0.9 % (FLUSH) 0.9 %
10 SYRINGE (ML) INJECTION AS NEEDED
Status: DISCONTINUED | OUTPATIENT
Start: 2024-12-19 | End: 2024-12-19 | Stop reason: HOSPADM

## 2024-12-19 RX ORDER — HEPARIN SODIUM (PORCINE) LOCK FLUSH IV SOLN 100 UNIT/ML 100 UNIT/ML
500 SOLUTION INTRAVENOUS AS NEEDED
Status: DISCONTINUED | OUTPATIENT
Start: 2024-12-19 | End: 2024-12-19 | Stop reason: HOSPADM

## 2024-12-19 RX ADMIN — Medication 10 ML: at 13:39

## 2024-12-19 RX ADMIN — Medication 500 UNITS: at 13:39

## 2024-12-19 NOTE — PROGRESS NOTES
Subjective   Veronica Royal is a 84 y.o. female.  Referred by Dr. Michael for right breast inflammatory breast cancer    History of Present Illness   Ms. Royal is a 82-year-old postmenopausal  lady with hypertension, diabetes, hyperlipidemia, chronic kidney disease, hyperkalemia-chronic presented with a palpable abnormality in the right breast.  Subsequent imaging was performed.  Patient has not had a mammogram prior to this in the past 25 years.    4/21/2023-bilateral diagnostic mammogram-high density irregular mass measuring 44 mm with spiculated margins and amorphus and fine pleomorphic calcifications with skin thickening in the right breast at 9:00.  2 intramammary lymph nodes in the upper outer axillary tail region are slightly rounded  2 prominent right axillary lymph nodes largest of which measures 26 mm.  Asymmetry noted in the left breast.    Right breast ultrasound at 9 o'clock position, 3 cm from the nipple there is a 38 x 28 x 42 mm mass.  Skin thickness measuring 11 mm near the mass.  One of the 2 rounded axillary tail lymph nodes noted.  Borderline cortical thickening.  2 abnormal axillary lymph nodes.  1 lymph node displaced loss of normal morphology with a round heterogeneous appearance and echogenic foci.  Most consistent with calcified lymph node measuring 26 mm.  Second lymph node measures 12 mm.  Displaced cortical thickening.  Ultrasound-guided biopsy of the mass in the axillary lymph nodes recommended.    4/21/2023  1.right breast 9:00 biopsy-invasive ductal carcinoma with apocrine features  Grade 3  ER negative  DE negative  HER2 2+ on immunohistochemistry  HER2 amplified on FISH with HER2 copy number of 7.58, OPAL seventeen 3.18, HER2/OPAL 17 ratio of 2.4.  Ki-67 38.45%    2.right axillary biopsy-soft tissue involved by invasive ductal carcinoma with apocrine features  Associated microcalcification  No definite lymph node tissue identified.    4/29/2023-MRI of the breast-biopsy-proven  malignancy in the right breast at 9:00 measuring 4.3 cm in greatest dimension.  Attachment overlying skin and diffuse right breast edema noted.  Right axillary lymphadenopathy.  No suspicious findings in the left breast.    5/11/2023-CT of the chest abdomen and pelvis-no evidence of metastatic disease.  Liver is cirrhotic in configuration.    5/11/2023-bone scan negative    Patient presents today to the clinic for discussing neoadjuvant chemotherapy.  She is accompanied by her daughter.  Patient denies any recent changes in weight, she reports some pain at the site of malignancy.  A punch biopsy was performed and was consistent with inflammatory breast cancer.  At the site of punch biopsy there is an ulcerated lesion.    She has poorly controlled diabetes currently on glipizide metformin and Tradjenta.  Hemoglobin A1c recently was noted to be 9.9.    Also has chronic hyperkalemia, explanation unclear.    Blood pressure poorly controlled.    She reports good functional status lives independently.  Able to manage all her bills and independent of ADLs.  She has good support system in terms of her daughter.    Family history significant for brother with pancreatic cancer at the age of 68, sister with ovarian cancer at the age of 58    She received 6 weeks of Taxol Perjeta and Herceptin and subsequently admitted to the hospital due to severe nausea vomiting diarrhea poor oral intake, KAMILA, severe electrolyte abnormalities.      admission to the hospital for KAMILA, severe diarrhea, nausea vomiting and poor oral intake.She was in the hospital between 7/12/2023 to 7/20/2023.  During the hospitalization she was treated for acute UTI, electrolyte abnormalities and KAMILA.  Subsequently she was  discharged to a rehab.   During the hospitalization she was evaluated by Dr. Michael and a right breast ultrasound was obtained on 7/17/2023.  There was very little response noted with the 9 o'clock position mass 6 cm from the nipple  measuring 3.7 x 2.4 x 3.7 cm previously 3.8 x 2.5 x 4.2 cm.  In the right axilla the lymph node measured 2.6 x 1.5 x 1.9 cm previously 1.9 x 1.5 x 2.5 cm.  There was decrease in size of the adjacent lymph node measuring 0.7 cm previously 1.2 cm.    8/24/2023-right mastectomy and axillary lymph node dissection  Invasive ductal carcinoma, grade 2  The tumor measures 4 cm  Microcalcifications are seen in the tumor  Dermal lymphatic invasion present  Lymphatic invasion present  Tumor seen in the skin but no ulceration.  Residual cancer burden 3.454  RCB-3  Xiong Lima grade 3  Margins negative  18 lymph nodes total evaluated  1 with micrometastasis with a tumor measuring 2.5 mm with no chemotherapy effect  1 with chemotherapy change and isolated tumor cells  There is 1 node with chemotherapy change and a clip but no residual tumor.    Receptors repeated  ER negative  ME negative  HER2 2+ on immunohistochemistry  FISH nonamplified    Kadcyla dose decreased to 3 mg/kg.    Patient was hospitalized between 2/22/2024 to 2/24/2024.  She presented to the emergency room with complaints of chest pain.  CT angiogram was performed which did not show any evidence of pulmonary embolism.  There were changes consistent with radiation on the right side and there was a moderate pleural effusion on the right.  Thoracentesis was performed and 550 cc of fluid was drained.  Cytology was negative for any malignancy.  Cultures remain negative.  Chest pain improved after the thoracentesis.  She denies any dyspnea or cough.    Bilateral lower extremity Doppler was performed which was negative.    She is reporting lymphedema of the right upper extremity and has been somewhat noncompliant with the sleeve.  She continues to follow-up with lymphedema clinic.    She is tolerating the decreased dose of Kadcyla fairly well.  She felt a little weak immediately after the discharge from the hospital but subsequently felt better.    Echocardiogram  2/15/2024 shows a stable ejection fraction of 54.5% with a strain of -19%.    3/8/2024-stress test-low risk study with normal myocardial perfusion.  Left ventricular fraction is normal at 64%.    Last dose of Kadcyla 6/19/2024    Interval History:  She presents today for 6 week follow up for lab review after starting oral iron. She is doing well. She continues on oral iron although does not report compliance and is not sure how often she has been taking it, maybe 3 times a week. She states she tolerates it well when she does take it and denies constipation. She denies chest pain or shortness of breath.      The following portions of the patient's history were reviewed and updated as appropriate: allergies, current medications, past family history, past medical history, past social history, past surgical history and problem list.    Past Medical History:   Diagnosis Date    Anxiety     Arthritis     Basal cell carcinoma     Left thumb    Breast cancer 2023    Chronic kidney disease     Depression     Diabetes mellitus     Diarrhea     s/e chemo    High cholesterol     History of chemotherapy 08/2023    Hypertension     Rash 08/2023    s/e chemo    Sacral decubitus ulcer     cleaning with soap & water    Urinary incontinence     wears pads    Vitamin D deficiency         Past Surgical History:   Procedure Laterality Date    BREAST BIOPSY      BREAST SURGERY      CATARACT EXTRACTION EXTRACAPSULAR W/ INTRAOCULAR LENS IMPLANTATION Right     EYE SURGERY      Muscle repair age 21    MASTECTOMY W/ SENTINEL NODE BIOPSY Right 08/24/2023    Procedure: Right breast modified radical mastectomy;  Surgeon: Rosa Michael MD;  Location: [facility];  Service: General;  Laterality: Right;    TONSILLECTOMY      VENOUS ACCESS DEVICE (PORT) INSERTION N/A 05/19/2023    Procedure: INSERTION VENOUS ACCESS DEVICE;  Surgeon: Rosa Michael MD;  Location: [facility];  Service: General;  Laterality: N/A;        Family History  "  Problem Relation Age of Onset    Alzheimer's disease Mother     Heart disease Father     Heart attack Father     Ovarian cancer Sister     Pancreatic cancer Brother     Cancer Brother     Cancer Sister         Ovarian    Malig Hyperthermia Neg Hx     Colon cancer Neg Hx     Colon polyps Neg Hx     Crohn's disease Neg Hx     Irritable bowel syndrome Neg Hx     Ulcerative colitis Neg Hx         Social History     Socioeconomic History    Marital status:    Tobacco Use    Smoking status: Never    Smokeless tobacco: Never   Vaping Use    Vaping status: Never Used   Substance and Sexual Activity    Alcohol use: Never    Drug use: Never    Sexual activity: Never        OB History    No obstetric history on file.      Age at menarche-11  Age at first live childbirth-35   2 para 1  1  Age at menopause-50  Oral conceptive pill use for 3 to 4 years    No Known Allergies     Objective   Blood pressure (!) 184/84, pulse 100, temperature 97.7 °F (36.5 °C), temperature source Oral, resp. rate 16, height 152.4 cm (60\"), weight 62.1 kg (137 lb), SpO2 100%.   She takes her blood pressure at home and it usually runs 130-70-80s. She denies headache or vision changes. Advised rechecking BP once she is home. She did take her BP meds although a little later today than she usually does.      Physical Exam  Vitals reviewed.   Constitutional:       Appearance: Normal appearance. She is well-developed.   HENT:      Head: Normocephalic and atraumatic.   Eyes:      Pupils: Pupils are equal, round, and reactive to light.   Cardiovascular:      Rate and Rhythm: Normal rate and regular rhythm.      Heart sounds: Normal heart sounds.   Pulmonary:      Effort: Pulmonary effort is normal.   Abdominal:      General: Bowel sounds are normal.      Palpations: Abdomen is soft.   Musculoskeletal:         General: Normal range of motion.      Cervical back: Normal range of motion.   Skin:     General: Skin is warm and dry. "   Neurological:      Mental Status: She is alert and oriented to person, place, and time.        Previous breast exam:  Right breast: s/p mastectomy. Chest wall exam without palpable abnormalities  Left breast: Appears normal on inspection. No palpable abnormalities.     Assessment & Plan   *Right breast inflammatory breast cancer  T4d N1 M0  Stage IIIb  ER negative, IL negative, HER2 2+ on immunohistochemistry but amplified on FISH.  Invasive ductal carcinoma with apocrine features, grade 3, Ki-67 38%  CT scans and bone scan without any obvious evidence of metastatic disease  Echocardiogram has been performed and ejection fraction normal.  Liver shows cirrhotic morphology.  LFTs otherwise normal and total bilirubin normal as well as protein normal.  It appears that the synthetic function of the liver is still within normal limits.  Mediport placed 5/19/2023  Taxol, Herceptin, Perjeta initiated 5/24/2023 5/31/2023 with C1D8 Taxol  6/28/2023-cycle 2-day 15 of Taxol.  She is overall tolerating therapy well with expected side effects.  She will proceed with Taxol today.  Scheduled for Taxol Herceptin and Perjeta.  7/12/2023-cycle 3-day 8 of TPH.  Chemotherapy held and patient was admitted to the hospital for management of severe dehydration, KAMILA, nausea vomiting diarrhea and decreased oral intake.  8/2/2023-she feels relatively well today.  Labs reviewed and stable to proceed with Herceptin only.  We will not administer Taxol and Perjeta as she has had significant issues with chemotherapy.  Right mastectomy 8/24/2023 with 4 cm of residual disease, RCB-3, treatment effect present, axillary lymph node dissection with 1 lymph node with micrometastasis measuring 2.5 mm, 1 lymph node with isolated tumor cells and a third lymph node which showed treatment changes but no tumor cells.  Total of 18 lymph nodes removed   9/20/2023 to discuss pathology and adjuvant therapy.  We discussed Kadcyla every 3 weekly to finish a  complete year.  Patient is definitely hesitant to resume any sort of chemotherapy due to her previous experience with diarrhea.  Started Kadcyla 9/20/2023, denies any diarrhea.  Tolerating treatment well so far  No evidence of recurrent disease  Adjuvant radiation completed 11/20/2023  She has received 5 doses of adjuvant Kadcyla.  Sixth dose has been delayed by 3 weeks due to worsening arthralgias, decreased strength in her lower extremities  Improvement in strength as well as arthralgias with holding chemotherapy.  We discussed about proceeding with the dose reduction of Kadcyla versus not doing any further Kadcyla.  Kadcyla dose decreased to 3 mg/kg  Patient hospitalized between 2/21/2024 to 2/24/2024 for right-sided pleural effusion and status post thoracentesis with 550 cc drained which was nonmalignant.  Etiology of the pleural fluid is unclear  CTA performed 2/21/2024 without any evidence of metastatic disease.  Chest x-ray 3/25/2024-no recurrence of fluid  6/19/2024 last dose of Kadcyla.    No evidence of recurrent disease  11/7/2024: No clinical evidence of recurrent disease.     *Anemia  Most likely secondary to chemotherapy.  Iron studies performed in May 2023 suggestive of anemia of chronic disease/inflammation.  Hemoglobin stable at 8.7  Recommend that she take a multivitamin every day  Likely residual effect of chemotherapy, continue to monitor CBC at each visit  11/7/2024: Hemoglobin 8.8 which is stable from prior visits. Iron saturation 11% and ferritin 13.00. Will start oral ferrous sulfate 325mg 1 tablet daily.   12/19/2024: Hemoglobin 9.5 today. Iron saturation 10%, mild improvement in ferritin to 24.20. Advised increasing oral iron to 325mg daily as she reports she's maybe been taking it 2-3 times a week. Encouraged her to reach out if she struggles with constipation and we can then discuss possible IV iron if needed.     *Diabetes  Poorly controlled  Evaluation by endocrinology 5/30/2023 with  recommendations for dietary changes and home blood sugar monitoring.  The patient's daughter reports today she is struggling with compliance.  Continue follow-up with primary care physician    *KAMILA/CKD  Patient's daughter reports that she has not been hydrating herself well.  6/19/2024 Creatinine 1.24, continued diarrhea from Kadcyla. Proceed with 1 L normal saline.  7/18/2024 reviewed today 1 month out from finishing Kadcyla.  She is still having intermittent loose stool and poor hydration at home.  On discussion about this today.  Patient lives alone which may ultimately not be the best situation for her.  Creatinine improved at 1.12, she has been receiving weekly IV fluids.  Recheck creatinine in 2 weeks and IV fluids if elevated.  Encouraged her to drink plenty of fluids  11/7/2024: Creatinine 0.99 today. Much improvement. She continues to follow with nephrology.     *?  Cirrhotic appearance of the liver on the CT scan  Referred to gastroenterology  Synthetic function appears to be normal  We will start with Taxol to see if she would tolerate the chemotherapy   Slight elevation of intervention studies today with ALT 43, AST 55.  Continue to monitor weekly  6/28/2023-mild elevation in the LFTs.  Stable compared to previous labs.  Okay to proceed with chemotherapy.  11/21/2023-LFTs normal  12/13/2023: Liver enzymes are normals. Alkaline phosphatase is slightly more elevated at 244.   LFTs normal today    *Hypertension-currently on valsartan and hydrochlorothiazide.  Valsartan could be contributing to hyperkalemia.  Will refer to cardiology ASAP for management of medications and cardiac clearance for proceeding with chemotherapy  Recent echocardiogram normal  Stress test reviewed and negative.  9/27/2023-echocardiogram shows a normal ejection fraction of 59%.  Follow-up echocardiogram 10/20/2023 with ejection fraction of 54%.  When compared to baseline study 5/9/2023 ejection fraction is stable.  Left ventricular  GLS is changed by 8%.  Discussed with cardiology, given the stability of ejection fraction when compared to baseline from May, we will proceed with Kadcyla   Cardiology evaluation 11/10/2023 with stable EF and strain.  Recommendations to continue Kadcyla with 3-month follow-up echocardiogram  12/13/2023: Being followed by cardio oncology. Has an ECHO scheduled soon that is to be rescheduled per her daughter.   Continue losartan and Cozaar, blood pressure 156/79  Patient maintained a log of the blood pressure and her blood pressure has actually been low in the mornings.    *Genetic testing-Invitae 9 gene stat panel negative,     *Decreased oral intake secondary to chemotherapy  Still not adequate oral intake  Encouraged her to drink boost and Ensure  Weight is slowly improving, up 3 pounds in the last month however she is not drinking enough fluids as outlined above.  She is no longer on the oral medication for skin cancer and her appetite is slowly coming back.  We will schedule her for more frequent fluids and monitoring.      *Cognitive impairment  It is unclear if this is the patient's baseline or mental status has been exacerbated by recent chemotherapy  The patient is struggling with compliance with her medications.  The patient's daughter expresses frustration today as the patient is struggling to care for herself at home with managing medications and symptom management.  Evaluated by CARL Antonio   7/18/2024 this remains a concern at her visit today.  She does have an upcoming neurocognitive evaluation with [facility].  I am concerned that she may be better off in an assisted living facility if she cannot take better care of herself.  They had to reschedule the appointment with [facility] due to recent diarrhea and dehydration  11/7/2024: Improved with resolution of dehydration and diarrhea.     *Chemotherapy-induced diarrhea  6/5/2023 patient given Enterade (Wild Berry flavor).  She has been  drinking 1 a day.  She does not necessarily like the flavor (currently mixing with sugar-free Aramis-Aid which does help).  Encouraged her to increase to 2 a day.  6/21/2023 patient reports that she had stopped drinking her Enterade because she was waking up in the middle the night having diarrhea although she did not know if it was due to the Enterade her protein shakes.  I have encouraged the patient to continue Enterade, drinking it in the morning.  Try discontinuing the protein shakes and see how she does.  Patient states that she will try this  Reports 2-3 loose stools.  Continue Enterade and Imodium.  7/5/2023 continuing to have issues with diarrhea up to 3-4 bowel movements a day, patient also recently prescribed Kayexalate for an apparently elevated potassium.  Potassium only 3.1 today.  She advised to discontinue the Kayexalate she was given IV hydration today.  We will also prescribe Lomotil to use if needed to help better control her diarrhea.  Patient is not drinking Enterade regularly.  8/2/2023-improved since discharge from the hospital and discontinuation of chemotherapy.  She received Herceptin on 8/2/2023 and reports a week of diarrhea.  Patient has not had any diarrhea since initiating Kadcyla.  Continue to monitor, she continues to note this is improved on Kadcyla  12/13/2023: Has improved. Currently has had some constipation. Encouraged senokot S every other night based on her current symptoms until bowel movements are more consistent.   7/18/2024 still having loose bowels.  Encouraged increasing hydration, adding fiber.  11/7/2024: Resolved.     *Right-sided pleural effusion  New on CT chest from 2/21/2024  Unclear etiology  Chest x-ray 3/25/2024 without any evidence of recurrence of the pleural fluid    *Lymphedema  Continue follow-up with lymphedema clinic  Encouraged her to remain compliant with the sleeve  She still in the process of being fitted for a pump for the right upper extremity  lymphedema.    PLAN:  Increase ferrous sulfate to 325mg daily   Return to clinic in 2 months for MD visit, cbc/iron profile/ferritin.  Instructed to reach out sooner with any concerns      CARL Oreilly  12/19/2024

## 2024-12-24 RX ORDER — SIMVASTATIN 20 MG
20 TABLET ORAL NIGHTLY
Qty: 90 TABLET | OUTPATIENT
Start: 2024-12-24

## 2025-01-30 ENCOUNTER — INFUSION (OUTPATIENT)
Dept: ONCOLOGY | Facility: HOSPITAL | Age: 85
End: 2025-01-30
Payer: MEDICARE

## 2025-01-30 DIAGNOSIS — Z45.2 ENCOUNTER FOR FITTING AND ADJUSTMENT OF VASCULAR CATHETER: Primary | ICD-10-CM

## 2025-01-30 PROCEDURE — 96523 IRRIG DRUG DELIVERY DEVICE: CPT

## 2025-01-30 PROCEDURE — 25010000002 HEPARIN LOCK FLUSH PER 10 UNITS: Performed by: INTERNAL MEDICINE

## 2025-01-30 RX ORDER — HEPARIN SODIUM (PORCINE) LOCK FLUSH IV SOLN 100 UNIT/ML 100 UNIT/ML
500 SOLUTION INTRAVENOUS AS NEEDED
Status: DISCONTINUED | OUTPATIENT
Start: 2025-01-30 | End: 2025-01-30 | Stop reason: HOSPADM

## 2025-01-30 RX ORDER — SODIUM CHLORIDE 0.9 % (FLUSH) 0.9 %
10 SYRINGE (ML) INJECTION AS NEEDED
Status: DISCONTINUED | OUTPATIENT
Start: 2025-01-30 | End: 2025-01-30 | Stop reason: HOSPADM

## 2025-01-30 RX ADMIN — Medication 10 ML: at 14:12

## 2025-01-30 RX ADMIN — Medication 500 UNITS: at 14:12

## 2025-02-14 ENCOUNTER — HOSPITAL ENCOUNTER (OUTPATIENT)
Dept: GENERAL RADIOLOGY | Facility: HOSPITAL | Age: 85
Discharge: HOME | End: 2025-02-14
Payer: MEDICARE

## 2025-02-14 ENCOUNTER — INFUSION (OUTPATIENT)
Dept: ONCOLOGY | Facility: HOSPITAL | Age: 85
End: 2025-02-14
Payer: MEDICARE

## 2025-02-14 ENCOUNTER — OFFICE VISIT (OUTPATIENT)
Dept: ONCOLOGY | Facility: CLINIC | Age: 85
End: 2025-02-14
Payer: MEDICARE

## 2025-02-14 VITALS
BODY MASS INDEX: 26.74 KG/M2 | DIASTOLIC BLOOD PRESSURE: 84 MMHG | WEIGHT: 136.2 LBS | SYSTOLIC BLOOD PRESSURE: 172 MMHG | RESPIRATION RATE: 16 BRPM | HEIGHT: 60 IN | OXYGEN SATURATION: 99 % | TEMPERATURE: 97.8 F | HEART RATE: 107 BPM

## 2025-02-14 DIAGNOSIS — M79.602 LEFT ARM PAIN: ICD-10-CM

## 2025-02-14 DIAGNOSIS — D64.9 ANEMIA, UNSPECIFIED TYPE: ICD-10-CM

## 2025-02-14 DIAGNOSIS — M79.602 LEFT ARM PAIN: Primary | ICD-10-CM

## 2025-02-14 DIAGNOSIS — Z45.2 ENCOUNTER FOR FITTING AND ADJUSTMENT OF VASCULAR CATHETER: Primary | ICD-10-CM

## 2025-02-14 DIAGNOSIS — Z45.2 ENCOUNTER FOR FITTING AND ADJUSTMENT OF VASCULAR CATHETER: ICD-10-CM

## 2025-02-14 LAB
BASOPHILS # BLD AUTO: 0.03 10*3/MM3 (ref 0–0.2)
BASOPHILS NFR BLD AUTO: 0.5 % (ref 0–1.5)
DEPRECATED RDW RBC AUTO: 46.5 FL (ref 37–54)
EOSINOPHIL # BLD AUTO: 0.3 10*3/MM3 (ref 0–0.4)
EOSINOPHIL NFR BLD AUTO: 4.7 % (ref 0.3–6.2)
ERYTHROCYTE [DISTWIDTH] IN BLOOD BY AUTOMATED COUNT: 14.6 % (ref 12.3–15.4)
FERRITIN SERPL-MCNC: 38.2 NG/ML (ref 13–150)
HCT VFR BLD AUTO: 33.4 % (ref 34–46.6)
HGB BLD-MCNC: 10.7 G/DL (ref 12–15.9)
IMM GRANULOCYTES # BLD AUTO: 0.01 10*3/MM3 (ref 0–0.05)
IMM GRANULOCYTES NFR BLD AUTO: 0.2 % (ref 0–0.5)
IRON 24H UR-MRATE: 54 MCG/DL (ref 37–145)
IRON SATN MFR SERPL: 14 % (ref 20–50)
LYMPHOCYTES # BLD AUTO: 1.46 10*3/MM3 (ref 0.7–3.1)
LYMPHOCYTES NFR BLD AUTO: 22.8 % (ref 19.6–45.3)
MCH RBC QN AUTO: 27.9 PG (ref 26.6–33)
MCHC RBC AUTO-ENTMCNC: 32 G/DL (ref 31.5–35.7)
MCV RBC AUTO: 87 FL (ref 79–97)
MONOCYTES # BLD AUTO: 0.46 10*3/MM3 (ref 0.1–0.9)
MONOCYTES NFR BLD AUTO: 7.2 % (ref 5–12)
NEUTROPHILS NFR BLD AUTO: 4.14 10*3/MM3 (ref 1.7–7)
NEUTROPHILS NFR BLD AUTO: 64.6 % (ref 42.7–76)
NRBC BLD AUTO-RTO: 0 /100 WBC (ref 0–0.2)
PLATELET # BLD AUTO: 108 10*3/MM3 (ref 140–450)
PMV BLD AUTO: 9.4 FL (ref 6–12)
RBC # BLD AUTO: 3.84 10*6/MM3 (ref 3.77–5.28)
TIBC SERPL-MCNC: 389 MCG/DL (ref 298–536)
TRANSFERRIN SERPL-MCNC: 261 MG/DL (ref 200–360)
WBC NRBC COR # BLD AUTO: 6.4 10*3/MM3 (ref 3.4–10.8)

## 2025-02-14 PROCEDURE — 82728 ASSAY OF FERRITIN: CPT

## 2025-02-14 PROCEDURE — 25010000002 HEPARIN LOCK FLUSH PER 10 UNITS: Performed by: INTERNAL MEDICINE

## 2025-02-14 PROCEDURE — 73060 X-RAY EXAM OF HUMERUS: CPT

## 2025-02-14 PROCEDURE — 36591 DRAW BLOOD OFF VENOUS DEVICE: CPT

## 2025-02-14 PROCEDURE — 84466 ASSAY OF TRANSFERRIN: CPT

## 2025-02-14 PROCEDURE — 85025 COMPLETE CBC W/AUTO DIFF WBC: CPT

## 2025-02-14 PROCEDURE — 83540 ASSAY OF IRON: CPT

## 2025-02-14 RX ORDER — HEPARIN SODIUM (PORCINE) LOCK FLUSH IV SOLN 100 UNIT/ML 100 UNIT/ML
500 SOLUTION INTRAVENOUS AS NEEDED
Status: DISCONTINUED | OUTPATIENT
Start: 2025-02-14 | End: 2025-02-14 | Stop reason: HOSPADM

## 2025-02-14 RX ORDER — SODIUM CHLORIDE 0.9 % (FLUSH) 0.9 %
10 SYRINGE (ML) INJECTION AS NEEDED
Status: DISCONTINUED | OUTPATIENT
Start: 2025-02-14 | End: 2025-02-14 | Stop reason: HOSPADM

## 2025-02-14 RX ADMIN — Medication 500 UNITS: at 13:45

## 2025-02-14 RX ADMIN — Medication 10 ML: at 13:45

## 2025-02-14 NOTE — PROGRESS NOTES
Subjective   Veronica Royal is a 84 y.o. female.  Referred by Dr. Michael for right breast inflammatory breast cancer    History of Present Illness   Ms. Royal is a 82-year-old postmenopausal  lady with hypertension, diabetes, hyperlipidemia, chronic kidney disease, hyperkalemia-chronic presented with a palpable abnormality in the right breast.  Subsequent imaging was performed.  Patient has not had a mammogram prior to this in the past 25 years.    4/21/2023-bilateral diagnostic mammogram-high density irregular mass measuring 44 mm with spiculated margins and amorphus and fine pleomorphic calcifications with skin thickening in the right breast at 9:00.  2 intramammary lymph nodes in the upper outer axillary tail region are slightly rounded  2 prominent right axillary lymph nodes largest of which measures 26 mm.  Asymmetry noted in the left breast.    Right breast ultrasound at 9 o'clock position, 3 cm from the nipple there is a 38 x 28 x 42 mm mass.  Skin thickness measuring 11 mm near the mass.  One of the 2 rounded axillary tail lymph nodes noted.  Borderline cortical thickening.  2 abnormal axillary lymph nodes.  1 lymph node displaced loss of normal morphology with a round heterogeneous appearance and echogenic foci.  Most consistent with calcified lymph node measuring 26 mm.  Second lymph node measures 12 mm.  Displaced cortical thickening.  Ultrasound-guided biopsy of the mass in the axillary lymph nodes recommended.    4/21/2023  1.right breast 9:00 biopsy-invasive ductal carcinoma with apocrine features  Grade 3  ER negative  IA negative  HER2 2+ on immunohistochemistry  HER2 amplified on FISH with HER2 copy number of 7.58, OPAL seventeen 3.18, HER2/OPAL 17 ratio of 2.4.  Ki-67 38.45%    2.right axillary biopsy-soft tissue involved by invasive ductal carcinoma with apocrine features  Associated microcalcification  No definite lymph node tissue identified.    4/29/2023-MRI of the breast-biopsy-proven  malignancy in the right breast at 9:00 measuring 4.3 cm in greatest dimension.  Attachment overlying skin and diffuse right breast edema noted.  Right axillary lymphadenopathy.  No suspicious findings in the left breast.    5/11/2023-CT of the chest abdomen and pelvis-no evidence of metastatic disease.  Liver is cirrhotic in configuration.    5/11/2023-bone scan negative    Patient presents today to the clinic for discussing neoadjuvant chemotherapy.  She is accompanied by her daughter.  Patient denies any recent changes in weight, she reports some pain at the site of malignancy.  A punch biopsy was performed and was consistent with inflammatory breast cancer.  At the site of punch biopsy there is an ulcerated lesion.    She has poorly controlled diabetes currently on glipizide metformin and Tradjenta.  Hemoglobin A1c recently was noted to be 9.9.    Also has chronic hyperkalemia, explanation unclear.    Blood pressure poorly controlled.    She reports good functional status lives independently.  Able to manage all her bills and independent of ADLs.  She has good support system in terms of her daughter.    Family history significant for brother with pancreatic cancer at the age of 68, sister with ovarian cancer at the age of 58    She received 6 weeks of Taxol Perjeta and Herceptin and subsequently admitted to the hospital due to severe nausea vomiting diarrhea poor oral intake, KAMILA, severe electrolyte abnormalities.      admission to the hospital for KAMILA, severe diarrhea, nausea vomiting and poor oral intake.She was in the hospital between 7/12/2023 to 7/20/2023.  During the hospitalization she was treated for acute UTI, electrolyte abnormalities and KAMILA.  Subsequently she was  discharged to a rehab.   During the hospitalization she was evaluated by Dr. Michael and a right breast ultrasound was obtained on 7/17/2023.  There was very little response noted with the 9 o'clock position mass 6 cm from the nipple  measuring 3.7 x 2.4 x 3.7 cm previously 3.8 x 2.5 x 4.2 cm.  In the right axilla the lymph node measured 2.6 x 1.5 x 1.9 cm previously 1.9 x 1.5 x 2.5 cm.  There was decrease in size of the adjacent lymph node measuring 0.7 cm previously 1.2 cm.    8/24/2023-right mastectomy and axillary lymph node dissection  Invasive ductal carcinoma, grade 2  The tumor measures 4 cm  Microcalcifications are seen in the tumor  Dermal lymphatic invasion present  Lymphatic invasion present  Tumor seen in the skin but no ulceration.  Residual cancer burden 3.454  RCB-3  Xiong Lima grade 3  Margins negative  18 lymph nodes total evaluated  1 with micrometastasis with a tumor measuring 2.5 mm with no chemotherapy effect  1 with chemotherapy change and isolated tumor cells  There is 1 node with chemotherapy change and a clip but no residual tumor.    Receptors repeated  ER negative  IL negative  HER2 2+ on immunohistochemistry  FISH nonamplified    Kadcyla dose decreased to 3 mg/kg.    Patient was hospitalized between 2/22/2024 to 2/24/2024.  She presented to the emergency room with complaints of chest pain.  CT angiogram was performed which did not show any evidence of pulmonary embolism.  There were changes consistent with radiation on the right side and there was a moderate pleural effusion on the right.  Thoracentesis was performed and 550 cc of fluid was drained.  Cytology was negative for any malignancy.  Cultures remain negative.  Chest pain improved after the thoracentesis.  She denies any dyspnea or cough.    Bilateral lower extremity Doppler was performed which was negative.    She is reporting lymphedema of the right upper extremity and has been somewhat noncompliant with the sleeve.  She continues to follow-up with lymphedema clinic.    She is tolerating the decreased dose of Kadcyla fairly well.  She felt a little weak immediately after the discharge from the hospital but subsequently felt better.    Echocardiogram  2/15/2024 shows a stable ejection fraction of 54.5% with a strain of -19%.    3/8/2024-stress test-low risk study with normal myocardial perfusion.  Left ventricular fraction is normal at 64%.    Last dose of Kadcyla 6/19/2024    Interval History:  Patient presents today for follow-up accompanied by her sister.  She is reporting left upper extremity pain as well as neck pain.  She is having decreased range of motion of the left upper extremity.  No other new bone pains, cough, abdominal pain nausea vomiting.  No new chest wall lesions on the right  4/23/2024-left breast [place] mammogram benign.       The following portions of the patient's history were reviewed and updated as appropriate: allergies, current medications, past family history, past medical history, past social history, past surgical history and problem list.    Past Medical History:   Diagnosis Date    Anxiety     Arthritis     Basal cell carcinoma     Left thumb    Breast cancer 2023    Chronic kidney disease     Depression     Diabetes mellitus     Diarrhea     s/e chemo    High cholesterol     History of chemotherapy 08/2023    Hypertension     Rash 08/2023    s/e chemo    Sacral decubitus ulcer     cleaning with soap & water    Urinary incontinence     wears pads    Vitamin D deficiency         Past Surgical History:   Procedure Laterality Date    BREAST BIOPSY      BREAST SURGERY      CATARACT EXTRACTION EXTRACAPSULAR W/ INTRAOCULAR LENS IMPLANTATION Right     EYE SURGERY      Muscle repair age 21    MASTECTOMY W/ SENTINEL NODE BIOPSY Right 08/24/2023    Procedure: Right breast modified radical mastectomy;  Surgeon: Rosa Michael MD;  Location: [facility];  Service: General;  Laterality: Right;    TONSILLECTOMY      VENOUS ACCESS DEVICE (PORT) INSERTION N/A 05/19/2023    Procedure: INSERTION VENOUS ACCESS DEVICE;  Surgeon: Rosa Michael MD;  Location: [facility];  Service: General;  Laterality: N/A;        Family History  "  Problem Relation Age of Onset    Alzheimer's disease Mother     Heart disease Father     Heart attack Father     Ovarian cancer Sister     Pancreatic cancer Brother     Cancer Brother     Cancer Sister         Ovarian    Malig Hyperthermia Neg Hx     Colon cancer Neg Hx     Colon polyps Neg Hx     Crohn's disease Neg Hx     Irritable bowel syndrome Neg Hx     Ulcerative colitis Neg Hx         Social History     Socioeconomic History    Marital status:    Tobacco Use    Smoking status: Never    Smokeless tobacco: Never   Vaping Use    Vaping status: Never Used   Substance and Sexual Activity    Alcohol use: Never    Drug use: Never    Sexual activity: Never        OB History    No obstetric history on file.      Age at menarche-11  Age at first live childbirth-35   2 para 1  1  Age at menopause-50  Oral conceptive pill use for 3 to 4 years    No Known Allergies     Objective   Blood pressure 172/84, pulse 107, temperature 97.8 °F (36.6 °C), temperature source Oral, resp. rate 16, height 152.4 cm (60\"), weight 61.8 kg (136 lb 3.2 oz), SpO2 99%.   She takes her blood pressure at home and it usually runs 130-70-80s. She denies headache or vision changes. Advised rechecking BP once she is home. She did take her BP meds although a little later today than she usually does.      Physical Exam  Vitals reviewed.   Constitutional:       Appearance: Normal appearance. She is well-developed.   HENT:      Head: Normocephalic and atraumatic.   Eyes:      Pupils: Pupils are equal, round, and reactive to light.   Cardiovascular:      Rate and Rhythm: Normal rate and regular rhythm.      Heart sounds: Normal heart sounds.   Pulmonary:      Effort: Pulmonary effort is normal.   Abdominal:      General: Bowel sounds are normal.      Palpations: Abdomen is soft.   Musculoskeletal:         General: Normal range of motion.      Cervical back: Normal range of motion.   Skin:     General: Skin is warm and dry. "   Neurological:      Mental Status: She is alert and oriented to person, place, and time.        Previous breast exam:  Right breast: s/p mastectomy. Chest wall exam without palpable abnormalities  Left breast: Appears normal on inspection. No palpable abnormalities.     Assessment & Plan   *Right breast inflammatory breast cancer  T4d N1 M0  Stage IIIb  ER negative, OK negative, HER2 2+ on immunohistochemistry but amplified on FISH.  Invasive ductal carcinoma with apocrine features, grade 3, Ki-67 38%  CT scans and bone scan without any obvious evidence of metastatic disease  Echocardiogram has been performed and ejection fraction normal.  Liver shows cirrhotic morphology.  LFTs otherwise normal and total bilirubin normal as well as protein normal.  It appears that the synthetic function of the liver is still within normal limits.  Mediport placed 5/19/2023  Taxol, Herceptin, Perjeta initiated 5/24/2023 5/31/2023 with C1D8 Taxol  6/28/2023-cycle 2-day 15 of Taxol.  She is overall tolerating therapy well with expected side effects.  She will proceed with Taxol today.  Scheduled for Taxol Herceptin and Perjeta.  7/12/2023-cycle 3-day 8 of TPH.  Chemotherapy held and patient was admitted to the hospital for management of severe dehydration, KAMILA, nausea vomiting diarrhea and decreased oral intake.  8/2/2023-she feels relatively well today.  Labs reviewed and stable to proceed with Herceptin only.  We will not administer Taxol and Perjeta as she has had significant issues with chemotherapy.  Right mastectomy 8/24/2023 with 4 cm of residual disease, RCB-3, treatment effect present, axillary lymph node dissection with 1 lymph node with micrometastasis measuring 2.5 mm, 1 lymph node with isolated tumor cells and a third lymph node which showed treatment changes but no tumor cells.  Total of 18 lymph nodes removed   9/20/2023 to discuss pathology and adjuvant therapy.  We discussed Kadcyla every 3 weekly to finish a  complete year.  Patient is definitely hesitant to resume any sort of chemotherapy due to her previous experience with diarrhea.  Started Kadcyla 9/20/2023, denies any diarrhea.  Tolerating treatment well so far  No evidence of recurrent disease  Adjuvant radiation completed 11/20/2023  She has received 5 doses of adjuvant Kadcyla.  Sixth dose has been delayed by 3 weeks due to worsening arthralgias, decreased strength in her lower extremities  Improvement in strength as well as arthralgias with holding chemotherapy.  We discussed about proceeding with the dose reduction of Kadcyla versus not doing any further Kadcyla.  Kadcyla dose decreased to 3 mg/kg  Patient hospitalized between 2/21/2024 to 2/24/2024 for right-sided pleural effusion and status post thoracentesis with 550 cc drained which was nonmalignant.  Etiology of the pleural fluid is unclear  CTA performed 2/21/2024 without any evidence of metastatic disease.  Chest x-ray 3/25/2024-no recurrence of fluid  6/19/2024 last dose of Kadcyla.    2/14/2025-patient reporting left upper extremity pain as well as neck pain.  Most likely musculoskeletal due to position of sleeping but will rule out any pathology with an x-ray of the left upper extremity.  Patient would benefit from physical therapy and this will be ordered    *Anemia  Most likely secondary to chemotherapy.  Iron studies performed in May 2023 suggestive of anemia of chronic disease/inflammation.  Hemoglobin stable at 8.7  Recommend that she take a multivitamin every day  Likely residual effect of chemotherapy, continue to monitor CBC at each visit  11/7/2024: Hemoglobin 8.8 which is stable from prior visits. Iron saturation 11% and ferritin 13.00. Will start oral ferrous sulfate 325mg 1 tablet daily.   12/19/2024: Hemoglobin 9.5 today. Iron saturation 10%, mild improvement in ferritin to 24.20. Advised increasing oral iron to 325mg daily as she reports she's maybe been taking it 2-3 times a week.  Encouraged her to reach out if she struggles with constipation and we can then discuss possible IV iron if needed.   Hemoglobin 10.2, iron saturation 13%, ferritin 38, patient not very compliant with oral iron, encouraged her to be compliant.    *Diabetes  Poorly controlled  Evaluation by endocrinology 5/30/2023 with recommendations for dietary changes and home blood sugar monitoring.  The patient's daughter reports today she is struggling with compliance.  Continue follow-up with primary care physician    *KAMILA/CKD  Patient's daughter reports that she has not been hydrating herself well.  6/19/2024 Creatinine 1.24, continued diarrhea from Kadcyla. Proceed with 1 L normal saline.  7/18/2024 reviewed today 1 month out from finishing Kadcyla.  She is still having intermittent loose stool and poor hydration at home.  On discussion about this today.  Patient lives alone which may ultimately not be the best situation for her.  Creatinine improved at 1.12, she has been receiving weekly IV fluids.  Recheck creatinine in 2 weeks and IV fluids if elevated.  Encouraged her to drink plenty of fluids  11/7/2024: Creatinine 0.99 today. Much improvement. She continues to follow with nephrology.     *?  Cirrhotic appearance of the liver on the CT scan  Referred to gastroenterology  Synthetic function appears to be normal  We will start with Taxol to see if she would tolerate the chemotherapy   Slight elevation of intervention studies today with ALT 43, AST 55.  Continue to monitor weekly  6/28/2023-mild elevation in the LFTs.  Stable compared to previous labs.  Okay to proceed with chemotherapy.  11/21/2023-LFTs normal  12/13/2023: Liver enzymes are normals. Alkaline phosphatase is slightly more elevated at 244.   LFTs normal today    *Hypertension-currently on valsartan and hydrochlorothiazide.  Valsartan could be contributing to hyperkalemia.  Will refer to cardiology ASAP for management of medications and cardiac clearance for  proceeding with chemotherapy  Recent echocardiogram normal  Stress test reviewed and negative.  9/27/2023-echocardiogram shows a normal ejection fraction of 59%.  Follow-up echocardiogram 10/20/2023 with ejection fraction of 54%.  When compared to baseline study 5/9/2023 ejection fraction is stable.  Left ventricular GLS is changed by 8%.  Discussed with cardiology, given the stability of ejection fraction when compared to baseline from May, we will proceed with Kadcyla   Cardiology evaluation 11/10/2023 with stable EF and strain.  Recommendations to continue Kadcyla with 3-month follow-up echocardiogram  12/13/2023: Being followed by cardio oncology. Has an ECHO scheduled soon that is to be rescheduled per her daughter.   Continue losartan and Cozaar, blood pressure 156/79  Patient maintained a log of the blood pressure and her blood pressure has actually been low in the mornings.  Blood pressure 172/84    *Genetic testing-Inv[employer]ae 9 gene stat panel negative,     *Decreased oral intake secondary to chemotherapy  Still not adequate oral intake  Encouraged her to drink boost and Ensure  Weight is slowly improving, up 3 pounds in the last month however she is not drinking enough fluids as outlined above.  She is no longer on the oral medication for skin cancer and her appetite is slowly coming back.  We will schedule her for more frequent fluids and monitoring.    Resolved    *Cognitive impairment  It is unclear if this is the patient's baseline or mental status has been exacerbated by recent chemotherapy  The patient is struggling with compliance with her medications.  The patient's daughter expresses frustration today as the patient is struggling to care for herself at home with managing medications and symptom management.  Evaluated by CARL Antonio   7/18/2024 this remains a concern at her visit today.  [facility]  I am concerned that she may be  better off in an assisted living facility if she cannot take better care of herself.  They had to reschedule the appointment with Rehan due to recent diarrhea and dehydration  11/7/2024: Improved with resolution of dehydration and diarrhea.     *Chemotherapy-induced diarrhea  6/5/2023 patient given Enterade (Wild Berry flavor).  She has been drinking 1 a day.  She does not necessarily like the flavor (currently mixing with sugar-free Aramis-Aid which does help).  Encouraged her to increase to 2 a day.  6/21/2023 patient reports that she had stopped drinking her Enterade because she was waking up in the middle the night having diarrhea although she did not know if it was due to the Enterade her protein shakes.  I have encouraged the patient to continue Enterade, drinking it in the morning.  Try discontinuing the protein shakes and see how she does.  Patient states that she will try this  Reports 2-3 loose stools.  Continue Enterade and Imodium.  7/5/2023 continuing to have issues with diarrhea up to 3-4 bowel movements a day, patient also recently prescribed Kayexalate for an apparently elevated potassium.  Potassium only 3.1 today.  She advised to discontinue the Kayexalate she was given IV hydration today.  We will also prescribe Lomotil to use if needed to help better control her diarrhea.  Patient is not drinking Enterade regularly.  8/2/2023-improved since discharge from the hospital and discontinuation of chemotherapy.  She received Herceptin on 8/2/2023 and reports a week of diarrhea.  Patient has not had any diarrhea since initiating Kadcyla.  Continue to monitor, she continues to note this is improved on Kadcyla  12/13/2023: Has improved. Currently has had some constipation. Encouraged senokot S every other night based on her current symptoms until bowel movements are more consistent.   7/18/2024 still having loose bowels.  Encouraged increasing hydration, adding fiber.  11/7/2024: Resolved.     *Right-sided  pleural effusion  New on CT chest from 2/21/2024  Unclear etiology  Chest x-ray 3/25/2024 without any evidence of recurrence of the pleural fluid    *Lymphedema  Continue follow-up with lymphedema clinic  Encouraged her to remain compliant with the sleeve  She still in the process of being fitted for a pump for the right upper extremity lymphedema.    PLAN:  Continue ferrous sulfate  APRN in 3 months and MD in 6 months  Physical therapy for left upper extremity decreased wrist range of motion  Left humerus x-ray      Sheila Yee MD  02/14/2025

## 2025-02-18 ENCOUNTER — TELEPHONE (OUTPATIENT)
Dept: ONCOLOGY | Facility: CLINIC | Age: 85
End: 2025-02-18
Payer: MEDICARE

## 2025-02-18 NOTE — TELEPHONE ENCOUNTER
Notified Ms Royal's daughter Norman that xray was normal.  Per Dr Yee's last office visit, will refer her for home PT.  Advised Norman would send to [facility] but may need to send elsewhere depending on staffing. She voiced understanding.

## 2025-02-21 ENCOUNTER — OFFICE VISIT (OUTPATIENT)
Dept: SURGERY | Facility: CLINIC | Age: 85
End: 2025-02-21
Payer: MEDICARE

## 2025-02-21 VITALS
DIASTOLIC BLOOD PRESSURE: 84 MMHG | OXYGEN SATURATION: 100 % | HEIGHT: 60 IN | SYSTOLIC BLOOD PRESSURE: 146 MMHG | WEIGHT: 137 LBS | HEART RATE: 111 BPM | BODY MASS INDEX: 26.9 KG/M2

## 2025-02-21 DIAGNOSIS — Z85.3 HISTORY OF BREAST CANCER: Primary | ICD-10-CM

## 2025-02-21 DIAGNOSIS — Z12.31 ENCOUNTER FOR SCREENING MAMMOGRAM FOR MALIGNANT NEOPLASM OF BREAST: ICD-10-CM

## 2025-02-21 PROCEDURE — 1160F RVW MEDS BY RX/DR IN RCRD: CPT | Performed by: STUDENT IN AN ORGANIZED HEALTH CARE EDUCATION/TRAINING PROGRAM

## 2025-02-21 PROCEDURE — 1159F MED LIST DOCD IN RCRD: CPT | Performed by: STUDENT IN AN ORGANIZED HEALTH CARE EDUCATION/TRAINING PROGRAM

## 2025-02-21 PROCEDURE — 99213 OFFICE O/P EST LOW 20 MIN: CPT | Performed by: STUDENT IN AN ORGANIZED HEALTH CARE EDUCATION/TRAINING PROGRAM

## 2025-02-21 PROCEDURE — 3079F DIAST BP 80-89 MM HG: CPT | Performed by: STUDENT IN AN ORGANIZED HEALTH CARE EDUCATION/TRAINING PROGRAM

## 2025-02-21 PROCEDURE — 3077F SYST BP >= 140 MM HG: CPT | Performed by: STUDENT IN AN ORGANIZED HEALTH CARE EDUCATION/TRAINING PROGRAM

## 2025-02-21 PROCEDURE — G2211 COMPLEX E/M VISIT ADD ON: HCPCS | Performed by: STUDENT IN AN ORGANIZED HEALTH CARE EDUCATION/TRAINING PROGRAM

## 2025-02-24 NOTE — PROGRESS NOTES
General Surgery Breast Cancer History and Physical Exam     Summary:    Veronica Royal is a 84 y.o. 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      A multidisciplinary plan has been formulated for the patient:    (1) Breast Surgical Oncology:  -Inv[facility]ae 9 panel genetic testing: negative.   -s/p right breast modified radical mastectomy 8/2023.   -Following with lymphedema clinic.   -Mammogram due April 2025 for the left breast.  Will schedule today.  -Follow up in 1 year.    (2) Medical Oncology:  -Following with Dr. Yee. Underwent neoadjuvant chemotherapy. Early cessation due to toxicity.  Completed Kadcyla.   -Message sent to ask about port removal.    (3) Radiation Oncology:  -Following with Dr. Tesfaye. Completed radiation 11/20/2023.    Referring Provider: No ref. provider found    Chief Complaint: breast mass    History of Present Illness: Ms. Veronica Royal is a 84 y.o. year old lady, seen at the request of No ref. provider found for a new diagnosis of right breast cancer.      This was initially detected as a palpable mass in her right breast. She first noticed it 2-3 months ago. Her last mammogram was 25 years ago. She denies any prior history of abnormal mammograms or breast biopsies. Her work-up is detailed in the oncologic history below.     She denies any breast lumps, pain, skin changes, or nipple discharge. She denies any family history of breast cancer. Her sister had ovarian cancer 25 years ago at age 58.      7/31/2023 She presents today for follow up. She has done well since leaving the hospital. She is slowly improving. Her strength is improving as well.     9/13/2023 She presents today for follow up. She is doing well since surgery. Her pain is controlled. She is getting back to her daily activities.     3/13/2024 She presents today for follow up.  She is overall doing  well with no issues.  She has no concerns related to her breast exam. She is on dose-reduced Kadcycla and doing well. She was having pain in her legs. She is seeing the lymphedema clinic. She has not been wearing her compression but is today. She has a basal cell carcinoma of her left thumb. She is seeing dermatology for this.    2/21/2025 she presents today for follow-up.  She is done well since I last saw her.  She is due for mammogram in April and this is not set up yet.  Will order today.  She has no concerns with her breast exam.  She denies masses, skin changes, or nipple discharge.  She asked if she still needed her port.    Workup of Current Diagnosis:    4/21/2023 bilateral diagnostic mammogram with ultrasound:  Finding 1: There is a high density irregular mass measuring 44 mm with spiculated margins and associated amorphous and fine pleomorphic calcifications, skin retraction, and skin and trabecular thickening in the right breast at 9:00.  On ultrasound there is an irregular mass with indistinct and spiculated margins in the right breast at 9:00 3 cm from the nipple.  The mass extends into the skin there is visible external ulceration and scabbing.  Diffuse skin thickening and focal skin retraction are noted with skin thickening up to 11 mm.  The mass measures 38 x 28 x 42 mm.  Highly suggestive of malignancy.  Ultrasound-guided biopsy is recommended.  Finding 2: There are true intramammary lymph nodes measuring 5 mm and 6 mm seen in the posterior upper outer axillary tail that are slightly round.  There is visualization of one of the 2 right axillary lymph nodes that measure 5 mm with borderline cortical thickening.  Suspicious.  Appropriate action should be taken.  Finding 3: There are 2 prominent right axillary lymph nodes largest which measures 26 mm and contains extensive coarse heterogeneous calcifications.  Suspicious.  Ultrasound-guided biopsy is recommended.  Finding 4: On ultrasound there is an  asymmetry in the superior region of the left breast that resolved with spot compression benign.  BI-RADS Category 5    4/21/2023 right breast ultrasound-guided biopsy:  The right breast at 9:00 was imaged and the mass was localized.  8 cores were obtained.  A mini cork tissue marker was placed.  Clip was in the expected position.  Pathology returned as invasive ductal carcinoma grade 3 which is malignant and concordant.  Surgical consult is recommended.    The patient's right breast at the axillary position was imaged and the abnormal lymph node was localized.  4 cores were obtained.  A spring shaped HydroMARK tissue marker was placed.  Clip was in the expected position.  Pathology returned as high-grade invasive mammary carcinoma with apron features.  The differential includes multifocal carcinoma versus a completely replaced lymph node.  Pathology is malignant and concordant.    4/21/2023 pathology:   1.  Breast, right, 9:00, biopsy:  Invasive mammary carcinoma, grade 3  2.  Axilla, right, biopsy:  High-grade invasive mammary carcinoma with apocrine features involving soft tissue and areas of necrosis    4/29/2023 Bilateral Breast MRI   IMPRESSION:  1. Biopsy-proven malignancy in the right breast in the posterior one third at the 9 o'clock position that measures on the order of 4.3 cm in greatest dimension and contains the internal mini cork-shaped metallic clip. The mass shows central hypoenhancement suggestive of central necrosis. Attachment to the overlying skin as described is noted and there is diffuse right breast skin edema and trabecular edema. Evidence of right axillary adenopathy is noted.  2. There are no findings suspicious for malignancy in the left breast.  BI-RADS category 6: Known biopsy-proven malignancy.    5/11/2023 CT Chest, Abdomen, Pelvis:   IMPRESSION:  1. Right breast cancer with right axillary lymphadenopathy  2. No convincing evidence of distant metastatic disease. Incidental findings as  above.    5/11/2023 Bone Scan:   IMPRESSION:  Abnormal soft tissue activity in the right breast corresponding to known disease in that location. No evidence of osseous metastasis.    7/17/2023 Right Breast US:   IMPRESSION:  Biopsy-proven malignancy at 9:00 in the right breast is similar to slightly smaller when accounting for differences in technique. A 2.6 cm right axillary mass is not significantly changed, although an adjacent lymph node has decreased in size. Continued oncologic and surgical management are recommended.   BI-RADS Category 6: Known biopsy-proven malignancy    8/24/2023 Right breast modified radical mastectomy   Final Diagnosis   1. Breast, Right, Modified Radical Mastectomy:    A. Invasive ductal adenocarcinoma with                            1. The tumor is [place] grade II (tubular grade 3, nuclear grade 3, mitotic grade 1).               2. The tumor measures up to 4 cm (four consecutive 1 cm slices).                            3. Microcalcifications are seen in the area of the tumor.                            4. The tumor is in the area of 9:00 o'clock, 6 cm from nipple.                            5. Cork-shaped clip is identified.                            6. Treatment changes are present.                            7. Dermal lymphatic invasion is present.                            8. Capillary lymphatic invasion is present.                            9. Tumor seen in skin but ulceration through the skin is not identified.                            10. Residual cancer burden equals 3.454.                            11. Residual cancer burden class RCB-III.                            12. Xiong-Lima Grade 3                             13. The invasive tumor comes within 11 mm of the posterior margin, 55 mm of the inferior margin, 75 mm         of the superior margin and 80 mm of the medial and lateral margins.               B. Lymph nodes:                            1. Eighteen lymph  nodes are identified, two with metastatic disease   i. One with macrometastisis (size of greatest metastasis 2.5 mm)      with no chemotherapy change).                                         ii. One with Individual tumor cells seen with chemotherapy change.                                        iii. There was also one node benign with chemotherapy change and a clip but no residual tumor.               C. Additional findings:                            1. Focus of atypical ductal hyperplasia.        Comment: The pretreatment classification was cT4d (inflammatory carcinoma). Case [id number] was reviewed. The patient presented clinically with inflammatory carcinoma (cT4d). Based off microscopic findings (ie. Skin involvement without ulceration by tumor), this carcinoma is best classified as ypT2, N1a.     2. Lymph Node, Right Additional Axillary Tissue:  Single benign lymph node     Comment: There are no changes suggestive of treatment effect.     3. Breast, Right, Additional Lateral Skin Margin, Excision:  Benign skin and underlying connective tissue 15 mm deep     2/21/2024 CTA Chest:   IMPRESSION:  1. No acute pulmonary thromboembolus.  2. Moderate right pleural effusion.  3. Subpleural parenchymal opacities identified within the right lung in an anterior distribution, favored to be related to radiation therapy. However, correlation with any evidence of pneumonia, as well as follow-up exam is recommended. The patient is noted to have edema within the right anterolateral chest wall, again likely related to radiation change.  4. There is cholelithiasis. Patient does appear to have inflammatory stranding within the gallbladder fossa. Correlation with gallbladder ultrasound is recommended.    2/21/2024 RUQ US:   IMPRESSION:    1. Cholelithiasis, as well as some gallbladder wall edema and thickening. Gallbladder wall edema and thickening is a nonspecific finding, which can be associated with etiologies such as  right-sided heart failure and hepatitis, in addition to acute cholecystitis. No sonographic Pepper sign was elicited. If clinical concern for acute cholecystitis persists, consideration for HIDA scan is recommended.    2024 US Thoracentesis:   IMPRESSION:  Technically successful diagnostic and therapeutic ultrasound-guided right thoracentesis with removal of 550 mL of josef-colored effusion fluid.    2024 left breast screening mammogram:  There are scattered areas of fibroglandular density.  No suspicious masses, calcifications, or other abnormalities.  BI-RADS Category 1    Gynecologic History:   . P:1 AB:1  Age at first childbirth: 35  Lactation/How long: none  Age at menarche: 11  Age at menopause: 50  Total years of oral contraceptive use: 3-4 years previously  Total years of hormone replacement therapy: none    Past Medical History:   DM  HTN  HLD    Past Surgical History:    None    Family History:    As above    Social History:  Denies tobacco use  Occasional alcohol use    Allergies:   No Known Allergies    Medications:     Current Outpatient Medications:     Cholecalciferol 50 MCG ( UT) tablet, Take 1 tablet by mouth Daily (Monday-Friday)., Disp: , Rfl:     Diclofenac Sodium (VOLTAREN) 1 % gel gel, Apply 4 g topically to the appropriate area as directed 4 (Four) Times a Day., Disp: , Rfl:     glipiZIDE-metFORMIN (METAGLIP) 2.5-500 MG per tablet, 2 tablets in the am and 1 in the pm, Disp: 270 tablet, Rfl: 1    imiquimod (ALDARA) 5 % cream, Apply  topically to the appropriate area as directed., Disp: , Rfl:     Iron, Ferrous Sulfate, 325 (65 Fe) MG tablet, Take 1 tablet by mouth Daily., Disp: , Rfl:     losartan (COZAAR) 25 MG tablet, TAKE 1 TABLET BY MOUTH DAILY (Patient taking differently: Take 1 tablet by mouth Every 12 (Twelve) Hours.), Disp: 90 tablet, Rfl: 1    simvastatin (ZOCOR) 10 MG tablet, Take 1 tablet by mouth Every Night., Disp: , Rfl:     simvastatin (ZOCOR) 20 MG tablet, Take  1 tablet by mouth Every Night., Disp: , Rfl:     Tradjenta 5 MG tablet tablet, Take 1 tablet by mouth Every Morning., Disp: 90 tablet, Rfl: 1    VITAMIN E PO, Take  by mouth., Disp: , Rfl:     lidocaine-prilocaine (EMLA) 2.5-2.5 % cream, Apply nickel size amount to port site 30 min before appt time do not rub in cover with plastic wrap (Patient not taking: Reported on 2025), Disp: 30 g, Rfl: 5    Laboratory Values:    Labs from 2025 reviewed    Review of Systems:   Influenza-like illness: no fever, no  cough, no  sore throat, no  body aches, no loss of sense of taste or smell, no known exposure to person with Covid-19.  Constitutional: Negative for fevers or chills  HENT: Negative for hearing loss or runny nose  Eyes: Negative for vision changes or scleral icterus  Respiratory: Negative for cough or shortness of breath  Cardiovascular: Negative for chest pain or heart palpitations  Gastrointestinal: Negative for abdominal pain, nausea, vomiting, constipation, melena, or hematochezia  Genitourinary: Negative for hematuria or dysuria  Musculoskeletal: Negative for joint swelling or gait instability  Neurologic: Negative for tremors or seizures  Psychiatric: Negative for suicidal ideations or depression  All other systems reviewed and negative    Physical Exam:   ECO - Asymptomatic  Constitutional: Well-developed well-nourished, no acute distress  Eyes: Conjunctiva normal, sclera nonicteric  ENMT: Hearing grossly normal, oral mucosa moist  Neck: Supple, no palpable mass, trachea midline  Respiratory: Clear to auscultation, normal inspiratory effort  Cardiovascular: Regular rate, no peripheral edema, no jugular venous distention  Breast: symmetric  Right: Right mastectomy incision well-healed with no masses or skin changes  Left: No visible abnormalities on inspection while seated, with arms raised or hands on hips. No masses, skin changes, or nipple abnormalities.  No clinical chest wall  involvement.  Gastrointestinal: Soft, nontender  Lymphatics (palpable nodes): No left sided cervical, supraclavicular or axillary lymphadenopathy  Skin:  Warm, dry, no rash on visualized skin surfaces  Musculoskeletal: Symmetric strength, normal gait  Psychiatric: Alert and oriented ×3, normal affect     SHAR MCELROY M.D.  General and Endoscopic Surgery  [facility]    [address], [address]  [place], [place], [address]  P: [phone]  F: [phone]

## 2025-02-26 ENCOUNTER — OFFICE VISIT (OUTPATIENT)
Dept: SURGERY | Facility: CLINIC | Age: 85
End: 2025-02-26
Payer: MEDICARE

## 2025-02-26 DIAGNOSIS — Z85.3 HISTORY OF BREAST CANCER: Primary | ICD-10-CM

## 2025-03-06 ENCOUNTER — HOSPITAL ENCOUNTER (OUTPATIENT)
Dept: CARDIOLOGY | Facility: HOSPITAL | Age: 85
Discharge: HOME | End: 2025-03-06
Admitting: INTERNAL MEDICINE
Payer: MEDICARE

## 2025-03-06 ENCOUNTER — TELEPHONE (OUTPATIENT)
Dept: CARDIOLOGY | Facility: CLINIC | Age: 85
End: 2025-03-06

## 2025-03-06 ENCOUNTER — OFFICE VISIT (OUTPATIENT)
Dept: CARDIOLOGY | Facility: CLINIC | Age: 85
End: 2025-03-06
Payer: MEDICARE

## 2025-03-06 VITALS
SYSTOLIC BLOOD PRESSURE: 150 MMHG | BODY MASS INDEX: 26.9 KG/M2 | HEIGHT: 60 IN | DIASTOLIC BLOOD PRESSURE: 84 MMHG | WEIGHT: 137 LBS | HEART RATE: 96 BPM

## 2025-03-06 VITALS
SYSTOLIC BLOOD PRESSURE: 160 MMHG | DIASTOLIC BLOOD PRESSURE: 90 MMHG | HEART RATE: 100 BPM | HEIGHT: 60 IN | BODY MASS INDEX: 26.9 KG/M2 | WEIGHT: 137 LBS

## 2025-03-06 DIAGNOSIS — Z79.899 ENCOUNTER FOR MONITORING CARDIOTOXIC DRUG THERAPY: ICD-10-CM

## 2025-03-06 DIAGNOSIS — Z79.899 ENCOUNTER FOR MONITORING CARDIOTOXIC DRUG THERAPY: Primary | ICD-10-CM

## 2025-03-06 DIAGNOSIS — Z51.81 ENCOUNTER FOR MONITORING CARDIOTOXIC DRUG THERAPY: Primary | ICD-10-CM

## 2025-03-06 DIAGNOSIS — Z53.21 PATIENT LEFT WITHOUT BEING SEEN: ICD-10-CM

## 2025-03-06 DIAGNOSIS — Z51.81 ENCOUNTER FOR MONITORING CARDIOTOXIC DRUG THERAPY: ICD-10-CM

## 2025-03-06 LAB
AORTIC ARCH: 1.6 CM
ASCENDING AORTA: 2.9 CM
AV MEAN PRESS GRAD SYS DOP V1V2: 6 MMHG
AV VMAX SYS DOP: 169 CM/SEC
BH CV ECHO LEFT VENTRICLE GLOBAL LONGITUDINAL STRAIN: -19.5 %
BH CV ECHO MEAS - ACS: 1.33 CM
BH CV ECHO MEAS - AO MAX PG: 11.4 MMHG
BH CV ECHO MEAS - AO ROOT DIAM: 2.7 CM
BH CV ECHO MEAS - AO V2 VTI: 30.7 CM
BH CV ECHO MEAS - AVA(I,D): 1.29 CM2
BH CV ECHO MEAS - EDV(CUBED): 85.2 ML
BH CV ECHO MEAS - EDV(MOD-SP2): 55 ML
BH CV ECHO MEAS - EDV(MOD-SP4): 69 ML
BH CV ECHO MEAS - EF(MOD-SP2): 56.4 %
BH CV ECHO MEAS - EF(MOD-SP4): 56.5 %
BH CV ECHO MEAS - ESV(CUBED): 30.5 ML
BH CV ECHO MEAS - ESV(MOD-SP2): 24 ML
BH CV ECHO MEAS - ESV(MOD-SP4): 30 ML
BH CV ECHO MEAS - FS: 29 %
BH CV ECHO MEAS - IVS/LVPW: 0.75 CM
BH CV ECHO MEAS - IVSD: 0.9 CM
BH CV ECHO MEAS - LAT PEAK E' VEL: 7.9 CM/SEC
BH CV ECHO MEAS - LV DIASTOLIC VOL/BSA (35-75): 43.4 CM2
BH CV ECHO MEAS - LV MASS(C)D: 158.2 GRAMS
BH CV ECHO MEAS - LV MAX PG: 2.9 MMHG
BH CV ECHO MEAS - LV MEAN PG: 2 MMHG
BH CV ECHO MEAS - LV SYSTOLIC VOL/BSA (12-30): 18.9 CM2
BH CV ECHO MEAS - LV V1 MAX: 84.8 CM/SEC
BH CV ECHO MEAS - LV V1 VTI: 17.3 CM
BH CV ECHO MEAS - LVIDD: 4.4 CM
BH CV ECHO MEAS - LVIDS: 3.1 CM
BH CV ECHO MEAS - LVOT AREA: 2.28 CM2
BH CV ECHO MEAS - LVOT DIAM: 1.71 CM
BH CV ECHO MEAS - LVPWD: 1.2 CM
BH CV ECHO MEAS - MED PEAK E' VEL: 7.1 CM/SEC
BH CV ECHO MEAS - MV A DUR: 0.09 SEC
BH CV ECHO MEAS - MV A MAX VEL: 136.3 CM/SEC
BH CV ECHO MEAS - MV DEC SLOPE: 1176 CM/SEC2
BH CV ECHO MEAS - MV DEC TIME: 0.17 SEC
BH CV ECHO MEAS - MV E MAX VEL: 105.4 CM/SEC
BH CV ECHO MEAS - MV E/A: 0.77
BH CV ECHO MEAS - MV MAX PG: 14 MMHG
BH CV ECHO MEAS - MV MEAN PG: 5.2 MMHG
BH CV ECHO MEAS - MV P1/2T: 31.9 MSEC
BH CV ECHO MEAS - MV V2 VTI: 27.7 CM
BH CV ECHO MEAS - MVA(P1/2T): 6.9 CM2
BH CV ECHO MEAS - MVA(VTI): 1.43 CM2
BH CV ECHO MEAS - PA ACC TIME: 0.1 SEC
BH CV ECHO MEAS - PA V2 MAX: 120.2 CM/SEC
BH CV ECHO MEAS - PULM A REVS DUR: 0.07 SEC
BH CV ECHO MEAS - PULM A REVS VEL: 30.8 CM/SEC
BH CV ECHO MEAS - PULM DIAS VEL: 54.4 CM/SEC
BH CV ECHO MEAS - PULM S/D: 1.69
BH CV ECHO MEAS - PULM SYS VEL: 91.7 CM/SEC
BH CV ECHO MEAS - QP/QS: 1
BH CV ECHO MEAS - RV MAX PG: 2.41 MMHG
BH CV ECHO MEAS - RV V1 MAX: 77.6 CM/SEC
BH CV ECHO MEAS - RV V1 VTI: 15.3 CM
BH CV ECHO MEAS - RVOT DIAM: 1.82 CM
BH CV ECHO MEAS - SUP REN AO DIAM: 2 CM
BH CV ECHO MEAS - SV(LVOT): 39.5 ML
BH CV ECHO MEAS - SV(MOD-SP2): 31 ML
BH CV ECHO MEAS - SV(MOD-SP4): 39 ML
BH CV ECHO MEAS - SV(RVOT): 39.6 ML
BH CV ECHO MEAS - SVI(LVOT): 24.9 ML/M2
BH CV ECHO MEAS - SVI(MOD-SP2): 19.5 ML/M2
BH CV ECHO MEAS - SVI(MOD-SP4): 24.5 ML/M2
BH CV ECHO MEAS - TAPSE (>1.6): 2.12 CM
BH CV ECHO MEASUREMENTS AVERAGE E/E' RATIO: 14.05
BH CV XLRA - RV BASE: 2.8 CM
BH CV XLRA - RV LENGTH: 6.3 CM
BH CV XLRA - RV MID: 1.96 CM
BH CV XLRA - TDI S': 13.1 CM/SEC
LEFT ATRIUM VOLUME INDEX: 29.4 ML/M2
LV EF 3D SEGMENTATION: 56 %
LV EF BIPLANE MOD: 55.9 %
SINUS: 2.5 CM
STJ: 2.36 CM

## 2025-03-06 PROCEDURE — 93356 MYOCRD STRAIN IMG SPCKL TRCK: CPT

## 2025-03-06 PROCEDURE — 93306 TTE W/DOPPLER COMPLETE: CPT

## 2025-03-06 NOTE — TELEPHONE ENCOUNTER
Please let patient know I am sorry she could not stay for her appointment and that we are running behind but patient's ahead of her had troubles.  Please let her know that her echocardiogram is stable.  If she is doing well would recommend follow-up in 6 months with repeat echo (should be the routine last echo following use of Kadcycla).  If she wishes to proceed please have her schedule for echo and follow-up in 6 months.  They may wish to do the echo the day before so they do not have to wait so long for follow-up on the day of the visit

## 2025-03-06 NOTE — PROGRESS NOTES
Date of Office Visit: 2025  Encounter Provider: Cassidy Maldonado MD  Place of Service: [facility] CARDIOLOGY  Patient Name: Veronica Royal  :1940    Chief complaint  Cardio oncology care    History of Present Illness  Patient is a 84-year-old female with diabetes, hypertension, hyperlipidemia, chronic renal insufficiency, hyperkalemia and cirrhosis..  She was diagnosed with right-sided inflammatory breast cancer 2023.  She started chemotherapy with THP on 2023, she has been switched to Kadcyla which was completed on 2024..  She also completed right-sided radiation therapy.      Baseline echo with EF 51.4 % with GLS of -20.3% with grade 1A diastolic dysfunction, aortic valve calcification without stenosis, trivial mitral and tricuspid valve regurgitation with normal right-sided pressures.  She was noted to have coronary artery calcification and on 2023 stress perfusion study was negative for ischemia.  Echo 2023 showed an ejection fraction 59%, GLS -17.9%.  Echo on 11/10/2023 shows an ejection fraction of 54% with GLS -20.2% trivial valve regurgitation, mild mitral regurgitation.  On 2024 with ejection fraction 55%, GLS -19%, grade 2 diastolic dysfunction with wall motion abnormality and trivial tricuspid regurgitation.  The wall motion changes were new from November.  A stress perfusion study on 3/2024 showed normal systolic function ejection fraction 64%.  Echo 2024 showed an ejection fraction of 58%, GLS -19.8%, mild-moderate regurgitation without stenosis though mitral thickening is present there is grade 2 diastolic dysfunction and normal right-sided pressures. Echo 2024 showed an ejection fraction 55%,, strain imaging -19.1%.  Normal diastolic function normal right-sided pressures.  Echo today stable with no change in EF or strain imaging.      Patient left the office without being seen.  She noted to the medical assistant that she had not had any  palpitation shortness of breath edema dizziness or chest pain.  She was not exercising.  Blood pressure elevated in the office    Past Medical History:   Diagnosis Date    Anxiety     Arthritis     Basal cell carcinoma     Left thumb    Breast cancer 2023    Chronic kidney disease     Depression     Diabetes mellitus     Diarrhea     s/e chemo    High cholesterol     History of chemotherapy 08/2023    Hypertension     Rash 08/2023    s/e chemo    Sacral decubitus ulcer     cleaning with soap & water    Urinary incontinence     wears pads    Vitamin D deficiency      Past Surgical History:   Procedure Laterality Date    BREAST BIOPSY      BREAST SURGERY      CATARACT EXTRACTION EXTRACAPSULAR W/ INTRAOCULAR LENS IMPLANTATION Right     EYE SURGERY      Muscle repair age 21    MASTECTOMY W/ SENTINEL NODE BIOPSY Right 08/24/2023    Procedure: Right breast modified radical mastectomy;  Surgeon: Rosa Michael MD;  Location: [facility] OR [facility] [place];  Service: General;  Laterality: Right;    TONSILLECTOMY      VENOUS ACCESS DEVICE (PORT) INSERTION N/A 05/19/2023    Procedure: INSERTION VENOUS ACCESS DEVICE;  Surgeon: Rosa Michael MD;  Location: [facility] OR [facility] [place];  Service: General;  Laterality: N/A;     Outpatient Medications Prior to Visit   Medication Sig Dispense Refill    Cholecalciferol 50 MCG (2000 UT) tablet Take 1 tablet by mouth Daily (Monday-Friday).      Diclofenac Sodium (VOLTAREN) 1 % gel gel Apply 4 g topically to the appropriate area as directed 4 (Four) Times a Day.      glipiZIDE-metFORMIN (METAGLIP) 2.5-500 MG per tablet 2 tablets in the am and 1 in the pm 270 tablet 1    imiquimod (ALDARA) 5 % cream Apply  topically to the appropriate area as directed.      Iron, Ferrous Sulfate, 325 (65 Fe) MG tablet Take 1 tablet by mouth Daily.      losartan (COZAAR) 25 MG tablet TAKE 1 TABLET BY MOUTH DAILY (Patient taking differently: Take 2 tablets by mouth Daily. Morning and night) 90 tablet 1    simvastatin (ZOCOR) 10  "MG tablet Take 1 tablet by mouth Every Night.      Tradjenta 5 MG tablet tablet Take 1 tablet by mouth Every Morning. 90 tablet 1    VITAMIN E PO Take  by mouth.      lidocaine-prilocaine (EMLA) 2.5-2.5 % cream Apply nickel size amount to port site 30 min before appt time do not rub in cover with plastic wrap (Patient not taking: Reported on 3/6/2025) 30 g 5    simvastatin (ZOCOR) 20 MG tablet Take 1 tablet by mouth Every Night. (Patient not taking: Reported on 3/6/2025)       No facility-administered medications prior to visit.       Allergies as of 03/06/2025    (No Known Allergies)     Social History     Socioeconomic History    Marital status:    Tobacco Use    Smoking status: Never    Smokeless tobacco: Never   Vaping Use    Vaping status: Never Used   Substance and Sexual Activity    Alcohol use: Never    Drug use: Never    Sexual activity: Never     Family History   Problem Relation Age of Onset    Alzheimer's disease Mother     Heart disease Father     Heart attack Father     Ovarian cancer Sister     Pancreatic cancer Brother     Cancer Brother     Cancer Sister         Ovarian    Malig Hyperthermia Neg Hx     Colon cancer Neg Hx     Colon polyps Neg Hx     Crohn's disease Neg Hx     Irritable bowel syndrome Neg Hx     Ulcerative colitis Neg Hx      ROS     Objective:     Vitals:    03/06/25 1149   BP: 150/84   Pulse: 96   Weight: 62.1 kg (137 lb)   Height: 152.4 cm (60\")     Body mass index is 26.76 kg/m².    Physical Exam  Lab Review:   Lab Results - Last 18 Months   Lab Units 02/14/25  1341 12/19/24  1335   WBC 10*3/mm3 6.40 5.41   RBC 10*6/mm3 3.84 3.50*   HEMOGLOBIN g/dL 10.7* 9.5*   HEMATOCRIT % 33.4* 30.9*   MCV fL 87.0 88.3   MCH pg 27.9 27.1   MCHC g/dL 32.0 30.7*   RDW % 14.6 16.0*   PLATELETS 10*3/mm3 108* 104*   NEUTROPHIL % % 64.6 66.2   LYMPHOCYTE % % 22.8 20.1   MONOCYTES % % 7.2 7.9   EOSINOPHIL % % 4.7 5.4   BASOPHIL % % 0.5 0.2   NEUTROS ABS 10*3/mm3 4.14 3.58   LYMPHS ABS 10*3/mm3 " "1.46 1.09   MONOS ABS 10*3/mm3 0.46 0.43   EOS ABS 10*3/mm3 0.30 0.29   BASOS ABS 10*3/mm3 0.03 0.01   RDW-SD fl 46.5 51.4   MPV fL 9.4 9.8       Lab Results - Last 18 Months   Lab Units 11/07/24  1425 08/21/24  1319 08/07/24  1339   GLUCOSE mg/dL 180* 123* 75   BUN mg/dL 26* 33* 17   CREATININE mg/dL 0.99 1.15* 1.12*   SODIUM mmol/L 143 142 141   POTASSIUM mmol/L 4.2 3.8 3.8   CHLORIDE mmol/L 109* 109* 106   CO2 mmol/L 19.0* 19.5* 23.0   CALCIUM mg/dL 8.6 8.9 9.0   TOTAL PROTEIN g/dL 6.8  --  6.9   ALBUMIN g/dL 3.4*  --  3.3*   ALT (SGPT) U/L 14  --  5   AST (SGOT) U/L 26  --  28   ALK PHOS U/L 78  --  74   BILIRUBIN mg/dL 0.2  --  0.4   GLOBULIN gm/dL 3.4  --  3.6   A/G RATIO g/dL 1.0  --  0.9   BUN / CREAT RATIO  26.3* 28.7* 15.2   ANION GAP mmol/L 15.0 13.5 12.0   EGFR mL/min/1.73 56.3* 47.4* 48.9*     Lab Results - Last 18 Months   Lab Units 11/21/23  0957   CHOLESTEROL mg/dL 147   TRIGLYCERIDES mg/dL 143   HDL CHOL mg/dL 36*   LDL CHOL mg/dL 86   VLDL CHOL mg/dL 25   LDL/HDL RATIO  2.29     Lab Results - Last 18 Months   Lab Units 02/21/24 2111 02/14/24  0954   PROBNP pg/mL 136.0 116.0     Lab Results - Last 18 Months   Lab Units 02/21/24 2245 02/21/24 2111   HSTROP T ng/L 19* 18*     No results for input(s): \"TSH\" in the last 92192 hours.  Lab Results - Last 18 Months   Lab Units 02/21/24 2219   PROTIME Seconds 16.1*   APTT seconds 30.3           ECG 12 Lead    Date/Time: 3/6/2025 1:08 PM  Performed by: Cassidy Maldonado MD    Authorized by: Cassidy Maldonado MD  Comparison: compared with previous ECG   Similar to previous ECG  Comparison to previous ECG: PACs resolved.  Rhythm: sinus rhythm    Clinical impression: normal ECG        Assessment:       Diagnosis Plan   1. Patient left without being seen          Plan:       1.  Right-sided inflammatory breast cancer.  Per ESC guidelines she is considered high CV risk.  She has been treated with THP starting 5/2023 and is now on maintenance Kadcycla.  Strain imaging " had by 12% in 9/2023 and it is back to baseline.  Echo in August 2024 was stable.  With EF 55% and GLS -19.1% (baseline).    2.  Cardio oncology care.  She has had intermittent dyspnea and her stress test was abnormal although cardiac cath showed no obstructive disease.    3.  Coronary artery disease.  Coronary calcification noted on CT scan of the chest on 5/2023.   4.  Hypertension.  5.  Hyperlipidemia.   6.  Diabetes.   7   Chronic renal insufficiency.  8.  Chronic mild anemia.   9.  Edema.      Patient left without being seen.  Echo showed normal function with no change in LVEF or strength.  Will contact patient regarding this and follow           Your medication list            Accurate as of March 6, 2025 12:49 PM. If you have any questions, ask your nurse or doctor.                CHANGE how you take these medications        Instructions Last Dose Given Next Dose Due   losartan 25 MG tablet  Commonly known as: COZAAR  What changed:   how much to take  additional instructions      TAKE 1 TABLET BY MOUTH DAILY              CONTINUE taking these medications        Instructions Last Dose Given Next Dose Due   Cholecalciferol 50 MCG (2000 UT) tablet      Take 1 tablet by mouth Daily (Monday-Friday).       Diclofenac Sodium 1 % gel gel  Commonly known as: VOLTAREN      Apply 4 g topically to the appropriate area as directed 4 (Four) Times a Day.       glipiZIDE-metFORMIN 2.5-500 MG per tablet  Commonly known as: METAGLIP      2 tablets in the am and 1 in the pm       imiquimod 5 % cream  Commonly known as: ALDARA      Apply  topically to the appropriate area as directed.       Iron (Ferrous Sulfate) 325 (65 Fe) MG tablet      Take 1 tablet by mouth Daily.       simvastatin 10 MG tablet  Commonly known as: ZOCOR      Take 1 tablet by mouth Every Night.       Tradjenta 5 MG tablet tablet  Generic drug: linagliptin      Take 1 tablet by mouth Every Morning.       VITAMIN E PO      Take  by mouth.                 Patient is no longer taking -.  I corrected the med list to reflect this.  I did not stop these medications.      Dictated utilizing Dragon dictation

## 2025-03-06 NOTE — PROGRESS NOTES
Date of Office Visit: 2025  Encounter Provider: Gabriella Stone CMA  Place of Service: [facility] CARDIOLOGY  Patient Name: Veronica Royal  :1940    Chief complaint      History of Present Illness      Past Medical History:   Diagnosis Date   • Anxiety    • Arthritis    • Basal cell carcinoma     Left thumb   • Breast cancer    • Chronic kidney disease    • Depression    • Diabetes mellitus    • Diarrhea     s/e chemo   • High cholesterol    • History of chemotherapy 2023   • Hypertension    • Rash 2023    s/e chemo   • Sacral decubitus ulcer     cleaning with soap & water   • Urinary incontinence     wears pads   • Vitamin D deficiency      Past Surgical History:   Procedure Laterality Date   • BREAST BIOPSY     • BREAST SURGERY     • CATARACT EXTRACTION EXTRACAPSULAR W/ INTRAOCULAR LENS IMPLANTATION Right    • EYE SURGERY      Muscle repair age 21   • MASTECTOMY W/ SENTINEL NODE BIOPSY Right 2023    Procedure: Right breast modified radical mastectomy;  Surgeon: Rosa Michael MD;  Location: [facility] [place] [facility] [place];  Service: General;  Laterality: Right;   • TONSILLECTOMY     • VENOUS ACCESS DEVICE (PORT) INSERTION N/A 2023    Procedure: INSERTION VENOUS ACCESS DEVICE;  Surgeon: Rosa Michael MD;  Location: [facility] [place];  Service: General;  Laterality: N/A;     Outpatient Medications Prior to Visit   Medication Sig Dispense Refill   • Cholecalciferol 50 MCG ( UT) tablet Take 1 tablet by mouth Daily (Monday-Friday).     • Diclofenac Sodium (VOLTAREN) 1 % gel gel Apply 4 g topically to the appropriate area as directed 4 (Four) Times a Day.     • glipiZIDE-metFORMIN (METAGLIP) 2.5-500 MG per tablet 2 tablets in the am and 1 in the pm 270 tablet 1   • imiquimod (ALDARA) 5 % cream Apply  topically to the appropriate area as directed.     • Iron, Ferrous Sulfate, 325 (65 Fe) MG tablet Take 1 tablet by mouth Daily.     • losartan (COZAAR) 25 MG tablet  "TAKE 1 TABLET BY MOUTH DAILY (Patient taking differently: Take 2 tablets by mouth Daily. Morning and night) 90 tablet 1   • simvastatin (ZOCOR) 10 MG tablet Take 1 tablet by mouth Every Night.     • Tradjenta 5 MG tablet tablet Take 1 tablet by mouth Every Morning. 90 tablet 1   • VITAMIN E PO Take  by mouth.     • lidocaine-prilocaine (EMLA) 2.5-2.5 % cream Apply nickel size amount to port site 30 min before appt time do not rub in cover with plastic wrap (Patient not taking: Reported on 3/6/2025) 30 g 5   • simvastatin (ZOCOR) 20 MG tablet Take 1 tablet by mouth Every Night. (Patient not taking: Reported on 3/6/2025)       No facility-administered medications prior to visit.       Allergies as of 03/06/2025   • (No Known Allergies)     Social History     Socioeconomic History   • Marital status:    Tobacco Use   • Smoking status: Never   • Smokeless tobacco: Never   Vaping Use   • Vaping status: Never Used   Substance and Sexual Activity   • Alcohol use: Never   • Drug use: Never   • Sexual activity: Never     Family History   Problem Relation Age of Onset   • Alzheimer's disease Mother    • Heart disease Father    • Heart attack Father    • Ovarian cancer Sister    • Pancreatic cancer Brother    • Cancer Brother    • Cancer Sister         Ovarian   • Malig Hyperthermia Neg Hx    • Colon cancer Neg Hx    • Colon polyps Neg Hx    • Crohn's disease Neg Hx    • Irritable bowel syndrome Neg Hx    • Ulcerative colitis Neg Hx      ROS     Objective:     Vitals:    03/06/25 1149   BP: 150/84   Pulse: 96   Weight: 62.1 kg (137 lb)   Height: 152.4 cm (60\")     Body mass index is 26.76 kg/m².    Physical Exam  Lab Review:   Lab Results - Last 18 Months   Lab Units 02/14/25  1341 12/19/24  1335   WBC 10*3/mm3 6.40 5.41   RBC 10*6/mm3 3.84 3.50*   HEMOGLOBIN g/dL 10.7* 9.5*   HEMATOCRIT % 33.4* 30.9*   MCV fL 87.0 88.3   MCH pg 27.9 27.1   MCHC g/dL 32.0 30.7*   RDW % 14.6 16.0*   PLATELETS 10*3/mm3 108* 104* " "  NEUTROPHIL % % 64.6 66.2   LYMPHOCYTE % % 22.8 20.1   MONOCYTES % % 7.2 7.9   EOSINOPHIL % % 4.7 5.4   BASOPHIL % % 0.5 0.2   NEUTROS ABS 10*3/mm3 4.14 3.58   LYMPHS ABS 10*3/mm3 1.46 1.09   MONOS ABS 10*3/mm3 0.46 0.43   EOS ABS 10*3/mm3 0.30 0.29   BASOS ABS 10*3/mm3 0.03 0.01   RDW-SD fl 46.5 51.4   MPV fL 9.4 9.8     Lab Results - Last 18 Months   Lab Units 11/07/24  1425 08/21/24  1319   GLUCOSE mg/dL 180* 123*   BUN mg/dL 26* 33*   CREATININE mg/dL 0.99 1.15*   SODIUM mmol/L 143 142   POTASSIUM mmol/L 4.2 3.8   CHLORIDE mmol/L 109* 109*   CO2 mmol/L 19.0* 19.5*   CALCIUM mg/dL 8.6 8.9   BUN / CREAT RATIO  26.3* 28.7*   ANION GAP mmol/L 15.0 13.5   EGFR mL/min/1.73 56.3* 47.4*     Lab Results - Last 18 Months   Lab Units 11/07/24  1425 08/21/24  1319 08/07/24  1339   GLUCOSE mg/dL 180* 123* 75   BUN mg/dL 26* 33* 17   CREATININE mg/dL 0.99 1.15* 1.12*   SODIUM mmol/L 143 142 141   POTASSIUM mmol/L 4.2 3.8 3.8   CHLORIDE mmol/L 109* 109* 106   CO2 mmol/L 19.0* 19.5* 23.0   CALCIUM mg/dL 8.6 8.9 9.0   TOTAL PROTEIN g/dL 6.8  --  6.9   ALBUMIN g/dL 3.4*  --  3.3*   ALT (SGPT) U/L 14  --  5   AST (SGOT) U/L 26  --  28   ALK PHOS U/L 78  --  74   BILIRUBIN mg/dL 0.2  --  0.4   GLOBULIN gm/dL 3.4  --  3.6   A/G RATIO g/dL 1.0  --  0.9   BUN / CREAT RATIO  26.3* 28.7* 15.2   ANION GAP mmol/L 15.0 13.5 12.0   EGFR mL/min/1.73 56.3* 47.4* 48.9*     Lab Results - Last 18 Months   Lab Units 11/21/23  0957   CHOLESTEROL mg/dL 147   TRIGLYCERIDES mg/dL 143   HDL CHOL mg/dL 36*   LDL CHOL mg/dL 86   VLDL CHOL mg/dL 25   LDL/HDL RATIO  2.29     Lab Results - Last 18 Months   Lab Units 02/21/24  2111 02/14/24  0954   PROBNP pg/mL 136.0 116.0     Lab Results - Last 18 Months   Lab Units 02/21/24  2245 02/21/24  2111   HSTROP T ng/L 19* 18*     No results for input(s): \"TSH\" in the last 70417 hours.  Lab Results - Last 18 Months   Lab Units 02/21/24  2219   PROTIME Seconds 16.1*   APTT seconds 30.3           ECG 12 " Lead    Date/Time: 3/6/2025 12:25 PM  Performed by: Cassidy Maldonado MD    Authorized by: Cassidy Maldonado MD    Assessment:    No diagnosis found.  Plan:             {Time Spent (Optional):17716}  {Cardio Separate Service Time (Optional):72261}        Your medication list            Accurate as of March 6, 2025 12:25 PM. If you have any questions, ask your nurse or doctor.                CHANGE how you take these medications        Instructions Last Dose Given Next Dose Due   losartan 25 MG tablet  Commonly known as: COZAAR  What changed:   how much to take  additional instructions      TAKE 1 TABLET BY MOUTH DAILY              CONTINUE taking these medications        Instructions Last Dose Given Next Dose Due   Cholecalciferol 50 MCG (2000 UT) tablet      Take 1 tablet by mouth Daily (Monday-Friday).       Diclofenac Sodium 1 % gel gel  Commonly known as: VOLTAREN      Apply 4 g topically to the appropriate area as directed 4 (Four) Times a Day.       glipiZIDE-metFORMIN 2.5-500 MG per tablet  Commonly known as: METAGLIP      2 tablets in the am and 1 in the pm       imiquimod 5 % cream  Commonly known as: ALDARA      Apply  topically to the appropriate area as directed.       Iron (Ferrous Sulfate) 325 (65 Fe) MG tablet      Take 1 tablet by mouth Daily.       simvastatin 10 MG tablet  Commonly known as: ZOCOR      Take 1 tablet by mouth Every Night.       Tradjenta 5 MG tablet tablet  Generic drug: linagliptin      Take 1 tablet by mouth Every Morning.       VITAMIN E PO      Take  by mouth.                Patient is no longer taking -.  I corrected the med list to reflect this.  I did not stop these medications.      Dictated utilizing Dragon dictation  The patient left the office {Time of Visit:49344} care was provided and did not complete the visit {LWBS Reason:24103}.

## 2025-03-07 ENCOUNTER — TELEPHONE (OUTPATIENT)
Dept: CARDIOLOGY | Facility: CLINIC | Age: 85
End: 2025-03-07
Payer: MEDICARE

## 2025-03-07 NOTE — TELEPHONE ENCOUNTER
Please let her know that echo overall looks good.  Unchanged from her baseline.  There is calcification of the aortic valve that is stable as well as mild thickening of the heart muscle that is also stable.

## 2025-03-07 NOTE — TELEPHONE ENCOUNTER
Notified patient's daughter, Norman, of results/recommendations, allowed per MADELYN. She verbalized understanding.    Scheduling: please call patient to schedule echo and FU appt with GERTRUDE in 6 months.     Edwina Mendiola RN  Triage [facility] [place]

## 2025-03-11 NOTE — PROGRESS NOTES
General Surgery Breast Cancer History and Physical Exam     Summary:    Veronica Royal is a 84 y.o. 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      A multidisciplinary plan has been formulated for the patient:    (1) Breast Surgical Oncology:  -Invitae 9 panel genetic testing: negative.   -s/p right breast modified radical mastectomy 8/2023.   -Following with lymphedema clinic.   -Mammogram due April 2025 for the left breast.  Will schedule today.  -Follow up in 1 year.    (2) Medical Oncology:  -Following with Dr. Yee. Underwent neoadjuvant chemotherapy. Early cessation due to toxicity.  Completed Kadcyla.     (3) Radiation Oncology:  -Following with Dr. Tesfaye. Completed radiation 11/20/2023.    Referring Provider: No ref. provider found    Chief Complaint: breast mass    History of Present Illness: Ms. Veronica Royal is a 84 y.o. year old lady, seen at the request of No ref. provider found for a new diagnosis of right breast cancer.      This was initially detected as a palpable mass in her right breast. She first noticed it 2-3 months ago. Her last mammogram was 25 years ago. She denies any prior history of abnormal mammograms or breast biopsies. Her work-up is detailed in the oncologic history below.     She denies any breast lumps, pain, skin changes, or nipple discharge. She denies any family history of breast cancer. Her sister had ovarian cancer 25 years ago at age 58.      7/31/2023 She presents today for follow up. She has done well since leaving the hospital. She is slowly improving. Her strength is improving as well.     9/13/2023 She presents today for follow up. She is doing well since surgery. Her pain is controlled. She is getting back to her daily activities.     3/13/2024 She presents today for follow up.  She is overall doing well with no issues.  She has no concerns  related to her breast exam. She is on dose-reduced Kadcycla and doing well. She was having pain in her legs. She is seeing the lymphedema clinic. She has not been wearing her compression but is today. She has a basal cell carcinoma of her left thumb. She is seeing dermatology for this.    2/21/2025 she presents today for follow-up.  She is done well since I last saw her.  She is due for mammogram in April and this is not set up yet.  Will order today.  She has no concerns with her breast exam.  She denies masses, skin changes, or nipple discharge.  She asked if she still needed her port.    Workup of Current Diagnosis:    4/21/2023 bilateral diagnostic mammogram with ultrasound:  Finding 1: There is a high density irregular mass measuring 44 mm with spiculated margins and associated amorphous and fine pleomorphic calcifications, skin retraction, and skin and trabecular thickening in the right breast at 9:00.  On ultrasound there is an irregular mass with indistinct and spiculated margins in the right breast at 9:00 3 cm from the nipple.  The mass extends into the skin there is visible external ulceration and scabbing.  Diffuse skin thickening and focal skin retraction are noted with skin thickening up to 11 mm.  The mass measures 38 x 28 x 42 mm.  Highly suggestive of malignancy.  Ultrasound-guided biopsy is recommended.  Finding 2: There are true intramammary lymph nodes measuring 5 mm and 6 mm seen in the posterior upper outer axillary tail that are slightly round.  There is visualization of one of the 2 right axillary lymph nodes that measure 5 mm with borderline cortical thickening.  Suspicious.  Appropriate action should be taken.  Finding 3: There are 2 prominent right axillary lymph nodes largest which measures 26 mm and contains extensive coarse heterogeneous calcifications.  Suspicious.  Ultrasound-guided biopsy is recommended.  Finding 4: On ultrasound there is an asymmetry in the superior region of the  left breast that resolved with spot compression benign.  BI-RADS Category 5    4/21/2023 right breast ultrasound-guided biopsy:  The right breast at 9:00 was imaged and the mass was localized.  8 cores were obtained.  A mini cork tissue marker was placed.  Clip was in the expected position.  Pathology returned as invasive ductal carcinoma grade 3 which is malignant and concordant.  Surgical consult is recommended.    The patient's right breast at the axillary position was imaged and the abnormal lymph node was localized.  4 cores were obtained.  A spring shaped HydroMARK tissue marker was placed.  Clip was in the expected position.  Pathology returned as high-grade invasive mammary carcinoma with apron features.  The differential includes multifocal carcinoma versus a completely replaced lymph node.  Pathology is malignant and concordant.    4/21/2023 pathology:   1.  Breast, right, 9:00, biopsy:  Invasive mammary carcinoma, grade 3  2.  Axilla, right, biopsy:  High-grade invasive mammary carcinoma with apocrine features involving soft tissue and areas of necrosis    4/29/2023 Bilateral Breast MRI   IMPRESSION:  1. Biopsy-proven malignancy in the right breast in the posterior one third at the 9 o'clock position that measures on the order of 4.3 cm in greatest dimension and contains the internal mini cork-shaped metallic clip. The mass shows central hypoenhancement suggestive of central necrosis. Attachment to the overlying skin as described is noted and there is diffuse right breast skin edema and trabecular edema. Evidence of right axillary adenopathy is noted.  2. There are no findings suspicious for malignancy in the left breast.  BI-RADS category 6: Known biopsy-proven malignancy.    5/11/2023 CT Chest, Abdomen, Pelvis:   IMPRESSION:  1. Right breast cancer with right axillary lymphadenopathy  2. No convincing evidence of distant metastatic disease. Incidental findings as above.    5/11/2023 Bone Scan:    IMPRESSION:  Abnormal soft tissue activity in the right breast corresponding to known disease in that location. No evidence of osseous metastasis.    7/17/2023 Right Breast US:   IMPRESSION:  Biopsy-proven malignancy at 9:00 in the right breast is similar to slightly smaller when accounting for differences in technique. A 2.6 cm right axillary mass is not significantly changed, although an adjacent lymph node has decreased in size. Continued oncologic and surgical management are recommended.   BI-RADS Category 6: Known biopsy-proven malignancy    8/24/2023 Right breast modified radical mastectomy   Final Diagnosis   1. Breast, Right, Modified Radical Mastectomy:    A. Invasive ductal adenocarcinoma with                            1. The tumor is Rogers grade II (tubular grade 3, nuclear grade 3, mitotic grade 1).               2. The tumor measures up to 4 cm (four consecutive 1 cm slices).                            3. Microcalcifications are seen in the area of the tumor.                            4. The tumor is in the area of 9:00 o'clock, 6 cm from nipple.                            5. Cork-shaped clip is identified.                            6. Treatment changes are present.                            7. Dermal lymphatic invasion is present.                            8. Capillary lymphatic invasion is present.                            9. Tumor seen in skin but ulceration through the skin is not identified.                            10. Residual cancer burden equals 3.454.                            11. Residual cancer burden class RCB-III.                            12. Xiong-Lima Grade 3                             13. The invasive tumor comes within 11 mm of the posterior margin, 55 mm of the inferior margin, 75 mm         of the superior margin and 80 mm of the medial and lateral margins.               B. Lymph nodes:                            1. Eighteen lymph nodes are identified, two with  metastatic disease   i. One with macrometastisis (size of greatest metastasis 2.5 mm)      with no chemotherapy change).                                         ii. One with Individual tumor cells seen with chemotherapy change.                                        iii. There was also one node benign with chemotherapy change and a clip but no residual tumor.               C. Additional findings:                            1. Focus of atypical ductal hyperplasia.        Comment: The pretreatment classification was cT4d (inflammatory carcinoma). Case [id number] was reviewed. The patient presented clinically with inflammatory carcinoma (cT4d). Based off microscopic findings (ie. Skin involvement without ulceration by tumor), this carcinoma is best classified as ypT2, N1a.     2. Lymph Node, Right Additional Axillary Tissue:  Single benign lymph node     Comment: There are no changes suggestive of treatment effect.     3. Breast, Right, Additional Lateral Skin Margin, Excision:  Benign skin and underlying connective tissue 15 mm deep     2/21/2024 CTA Chest:   IMPRESSION:  1. No acute pulmonary thromboembolus.  2. Moderate right pleural effusion.  3. Subpleural parenchymal opacities identified within the right lung in an anterior distribution, favored to be related to radiation therapy. However, correlation with any evidence of pneumonia, as well as follow-up exam is recommended. The patient is noted to have edema within the right anterolateral chest wall, again likely related to radiation change.  4. There is cholelithiasis. Patient does appear to have inflammatory stranding within the gallbladder fossa. Correlation with gallbladder ultrasound is recommended.    2/21/2024 RUQ US:   IMPRESSION:    1. Cholelithiasis, as well as some gallbladder wall edema and thickening. Gallbladder wall edema and thickening is a nonspecific finding, which can be associated with etiologies such as right-sided heart failure and hepatitis,  in addition to acute cholecystitis. No sonographic Pepper sign was elicited. If clinical concern for acute cholecystitis persists, consideration for HIDA scan is recommended.    2024 US Thoracentesis:   IMPRESSION:  Technically successful diagnostic and therapeutic ultrasound-guided right thoracentesis with removal of 550 mL of josef-colored effusion fluid.    2024 left breast screening mammogram:  There are scattered areas of fibroglandular density.  No suspicious masses, calcifications, or other abnormalities.  BI-RADS Category 1    Gynecologic History:   . P:1 AB:1  Age at first childbirth: 35  Lactation/How long: none  Age at menarche: 11  Age at menopause: 50  Total years of oral contraceptive use: 3-4 years previously  Total years of hormone replacement therapy: none    Past Medical History:   DM  HTN  HLD    Past Surgical History:    None    Family History:    As above    Social History:  Denies tobacco use  Occasional alcohol use    Allergies:   No Known Allergies    Medications:     Current Outpatient Medications:     Cholecalciferol 50 MCG (2000) tablet, Take 1 tablet by mouth Daily (Monday-Friday)., Disp: , Rfl:     Diclofenac Sodium (VOLTAREN) 1 % gel gel, Apply 4 g topically to the appropriate area as directed 4 (Four) Times a Day., Disp: , Rfl:     glipiZIDE-metFORMIN (METAGLIP) 2.5-500 MG per tablet, 2 tablets in the am and 1 in the pm, Disp: 270 tablet, Rfl: 1    imiquimod (ALDARA) 5 % cream, Apply  topically to the appropriate area as directed., Disp: , Rfl:     Iron, Ferrous Sulfate, 325 (65 Fe) MG tablet, Take 1 tablet by mouth Daily., Disp: , Rfl:     losartan (COZAAR) 25 MG tablet, TAKE 1 TABLET BY MOUTH DAILY (Patient taking differently: Take 2 tablets by mouth Daily. Morning and night), Disp: 90 tablet, Rfl: 1    simvastatin (ZOCOR) 10 MG tablet, Take 1 tablet by mouth Every Night., Disp: , Rfl:     Tradjenta 5 MG tablet tablet, Take 1 tablet by mouth Every Morning., Disp: 90  tablet, Rfl: 1    VITAMIN E PO, Take  by mouth., Disp: , Rfl:     Laboratory Values:    Labs from 2025 reviewed    Review of Systems:   Influenza-like illness: no fever, no  cough, no  sore throat, no  body aches, no loss of sense of taste or smell, no known exposure to person with Covid-19.  Constitutional: Negative for fevers or chills  HENT: Negative for hearing loss or runny nose  Eyes: Negative for vision changes or scleral icterus  Respiratory: Negative for cough or shortness of breath  Cardiovascular: Negative for chest pain or heart palpitations  Gastrointestinal: Negative for abdominal pain, nausea, vomiting, constipation, melena, or hematochezia  Genitourinary: Negative for hematuria or dysuria  Musculoskeletal: Negative for joint swelling or gait instability  Neurologic: Negative for tremors or seizures  Psychiatric: Negative for suicidal ideations or depression  All other systems reviewed and negative    Physical Exam:   ECO - Asymptomatic  Constitutional: Well-developed well-nourished, no acute distress  Eyes: Conjunctiva normal, sclera nonicteric  ENMT: Hearing grossly normal, oral mucosa moist  Neck: Supple, no palpable mass, trachea midline  Respiratory: Clear to auscultation, normal inspiratory effort  Cardiovascular: Regular rate, no peripheral edema, no jugular venous distention  Breast: symmetric  Right: Right mastectomy incision well-healed with no masses or skin changes  Left: No visible abnormalities on inspection while seated, with arms raised or hands on hips. No masses, skin changes, or nipple abnormalities.  No clinical chest wall involvement.  Gastrointestinal: Soft, nontender  Lymphatics (palpable nodes): No left sided cervical, supraclavicular or axillary lymphadenopathy  Skin:  Warm, dry, no rash on visualized skin surfaces  Musculoskeletal: Symmetric strength, normal gait  Psychiatric: Alert and oriented ×3, normal affect     Procedure note:  Area prepped and draped in  sterile fashion.  1% lidocaine injected into the skin and subcutaneous tissues.  The prior port incision was reopened.  The port was dissected free of the capsule.  The stay sutures were cut.  The port and tubing were completely removed together.  The tract was ligated with a 3-0 Vicryl suture.  The incision was closed with interrupted 3-0 Vicryl suture and skin glue.  The patient tolerated the procedure well.    SHAR MCELROY M.D.  General and Endoscopic Surgery  [facility]    [address] [place] [address], [address]  [place], [place], [address]  P: [phone]  F: [phone]

## 2025-04-18 ENCOUNTER — OFFICE VISIT (OUTPATIENT)
Dept: ENDOCRINOLOGY | Age: 85
End: 2025-04-18
Payer: MEDICARE

## 2025-04-18 VITALS
DIASTOLIC BLOOD PRESSURE: 86 MMHG | HEART RATE: 72 BPM | BODY MASS INDEX: 28.03 KG/M2 | HEIGHT: 60 IN | OXYGEN SATURATION: 99 % | WEIGHT: 142.8 LBS | SYSTOLIC BLOOD PRESSURE: 144 MMHG

## 2025-04-18 DIAGNOSIS — E11.65 TYPE 2 DIABETES MELLITUS WITH HYPERGLYCEMIA, WITHOUT LONG-TERM CURRENT USE OF INSULIN: Primary | ICD-10-CM

## 2025-04-18 LAB — HBA1C MFR BLD: 8.3 % (ref 4.8–5.6)

## 2025-04-18 RX ORDER — GLIPIZIDE AND METFORMIN HCL 2.5; 5 MG/1; MG/1
TABLET, FILM COATED ORAL
Qty: 270 TABLET | Refills: 1 | Status: SHIPPED | OUTPATIENT
Start: 2025-04-18

## 2025-04-18 RX ORDER — LINAGLIPTIN 5 MG/1
5 TABLET, FILM COATED ORAL
Qty: 90 TABLET | Refills: 1 | Status: SHIPPED | OUTPATIENT
Start: 2025-04-18

## 2025-04-18 NOTE — PROGRESS NOTES
Chief complaint/Reason for consult: T2DM    HPI:   - 84 year old female here for management of diabetes mellitus type 2  - Last seen in 10/2024  - She had surgery on her thumb for a skin cancer since last visit  - Has had diabetes for over 10 years  - Complications include CKD 3  - Is currently taking Tradjenta 5 mg daily and metformin 500 mg daily/glipizide 2.5 mg, 2 tablets in the am and 1 tablet in the pm  - She is on simvastatin 10 mg daily  - She does not check her BG    The following portions of the patient's history were reviewed and updated as appropriate: allergies, current medications, past family history, past medical history, past social history, past surgical history, and problem list.      Objective     Vitals:    04/18/25 1133   BP: 144/86   Pulse: 72   SpO2: 99%        Physical Exam  Vitals reviewed.   Constitutional:       Appearance: Normal appearance.   HENT:      Head: Normocephalic and atraumatic.   Eyes:      General: No scleral icterus.  Pulmonary:      Effort: Pulmonary effort is normal. No respiratory distress.   Neurological:      Mental Status: She is alert.      Gait: Gait normal.   Psychiatric:         Mood and Affect: Mood normal.         Behavior: Behavior normal.         Thought Content: Thought content normal.         Judgment: Judgment normal.         Assessment & Plan   1. T2DM   - Cont. Tradjenta 5 mg daily and metformin 500 mg daily/glipizide 2.5 mg, 2 tablets in the am and 1 tablet in the pm  - Patient is very hesitant to check her BG or take insulin  - I would like to check a hemoglobin A1c to get a better idea what her glycemic control is before making any changes     2. Hyperlipidemia  - She is on simvastatin 10 mg daily    3. Overweight (BMI of 27.89)  - Dietary changes and exercise will help glycemic control       - Return to clinic in 6 months

## 2025-04-24 ENCOUNTER — APPOINTMENT (OUTPATIENT)
Dept: WOMENS IMAGING | Facility: HOSPITAL | Age: 85
End: 2025-04-24
Payer: MEDICARE

## 2025-04-24 PROCEDURE — 77067 SCR MAMMO BI INCL CAD: CPT | Performed by: RADIOLOGY

## 2025-04-24 PROCEDURE — 77063 BREAST TOMOSYNTHESIS BI: CPT | Performed by: RADIOLOGY

## 2025-05-21 ENCOUNTER — LAB (OUTPATIENT)
Dept: LAB | Facility: HOSPITAL | Age: 85
End: 2025-05-21
Payer: MEDICARE

## 2025-05-21 ENCOUNTER — OFFICE VISIT (OUTPATIENT)
Dept: ONCOLOGY | Facility: CLINIC | Age: 85
End: 2025-05-21
Payer: MEDICARE

## 2025-05-21 VITALS
DIASTOLIC BLOOD PRESSURE: 89 MMHG | BODY MASS INDEX: 28.74 KG/M2 | WEIGHT: 146.4 LBS | SYSTOLIC BLOOD PRESSURE: 165 MMHG | TEMPERATURE: 98 F | HEART RATE: 62 BPM | OXYGEN SATURATION: 100 % | HEIGHT: 60 IN

## 2025-05-21 DIAGNOSIS — Z45.2 ENCOUNTER FOR FITTING AND ADJUSTMENT OF VASCULAR CATHETER: ICD-10-CM

## 2025-05-21 DIAGNOSIS — Z17.1 MALIGNANT NEOPLASM OF UPPER-OUTER QUADRANT OF RIGHT BREAST IN FEMALE, ESTROGEN RECEPTOR NEGATIVE: Primary | ICD-10-CM

## 2025-05-21 DIAGNOSIS — M79.602 LEFT ARM PAIN: ICD-10-CM

## 2025-05-21 DIAGNOSIS — C50.411 MALIGNANT NEOPLASM OF UPPER-OUTER QUADRANT OF RIGHT BREAST IN FEMALE, ESTROGEN RECEPTOR NEGATIVE: Primary | ICD-10-CM

## 2025-05-21 DIAGNOSIS — D64.9 ANEMIA, UNSPECIFIED TYPE: ICD-10-CM

## 2025-05-21 DIAGNOSIS — D50.8 OTHER IRON DEFICIENCY ANEMIA: ICD-10-CM

## 2025-05-21 LAB
ALBUMIN SERPL-MCNC: 4.2 G/DL (ref 3.5–5.2)
ALBUMIN/GLOB SERPL: 1.4 G/DL
ALP SERPL-CCNC: 85 U/L (ref 39–117)
ALT SERPL W P-5'-P-CCNC: 15 U/L (ref 1–33)
ANION GAP SERPL CALCULATED.3IONS-SCNC: 14.8 MMOL/L (ref 5–15)
AST SERPL-CCNC: 22 U/L (ref 1–32)
BASOPHILS # BLD AUTO: 0.03 10*3/MM3 (ref 0–0.2)
BASOPHILS NFR BLD AUTO: 0.5 % (ref 0–1.5)
BILIRUB SERPL-MCNC: 0.5 MG/DL (ref 0–1.2)
BUN SERPL-MCNC: 25 MG/DL (ref 8–23)
BUN/CREAT SERPL: 18.2 (ref 7–25)
CALCIUM SPEC-SCNC: 9.9 MG/DL (ref 8.6–10.5)
CHLORIDE SERPL-SCNC: 109 MMOL/L (ref 98–107)
CO2 SERPL-SCNC: 23.2 MMOL/L (ref 22–29)
CREAT SERPL-MCNC: 1.37 MG/DL (ref 0.57–1)
DEPRECATED RDW RBC AUTO: 45.8 FL (ref 37–54)
EGFRCR SERPLBLD CKD-EPI 2021: 38.2 ML/MIN/1.73
EOSINOPHIL # BLD AUTO: 0.4 10*3/MM3 (ref 0–0.4)
EOSINOPHIL NFR BLD AUTO: 6.7 % (ref 0.3–6.2)
ERYTHROCYTE [DISTWIDTH] IN BLOOD BY AUTOMATED COUNT: 14.2 % (ref 12.3–15.4)
FERRITIN SERPL-MCNC: 39 NG/ML (ref 13–150)
GLOBULIN UR ELPH-MCNC: 3 GM/DL
GLUCOSE SERPL-MCNC: 189 MG/DL (ref 65–99)
HCT VFR BLD AUTO: 36.5 % (ref 34–46.6)
HGB BLD-MCNC: 11.4 G/DL (ref 12–15.9)
IMM GRANULOCYTES # BLD AUTO: 0.02 10*3/MM3 (ref 0–0.05)
IMM GRANULOCYTES NFR BLD AUTO: 0.3 % (ref 0–0.5)
IRON 24H UR-MRATE: 54 MCG/DL (ref 37–145)
IRON SATN MFR SERPL: 14 % (ref 20–50)
LYMPHOCYTES # BLD AUTO: 1.44 10*3/MM3 (ref 0.7–3.1)
LYMPHOCYTES NFR BLD AUTO: 24.1 % (ref 19.6–45.3)
MCH RBC QN AUTO: 27.9 PG (ref 26.6–33)
MCHC RBC AUTO-ENTMCNC: 31.2 G/DL (ref 31.5–35.7)
MCV RBC AUTO: 89.2 FL (ref 79–97)
MONOCYTES # BLD AUTO: 0.43 10*3/MM3 (ref 0.1–0.9)
MONOCYTES NFR BLD AUTO: 7.2 % (ref 5–12)
NEUTROPHILS NFR BLD AUTO: 3.66 10*3/MM3 (ref 1.7–7)
NEUTROPHILS NFR BLD AUTO: 61.2 % (ref 42.7–76)
NRBC BLD AUTO-RTO: 0 /100 WBC (ref 0–0.2)
PLATELET # BLD AUTO: 101 10*3/MM3 (ref 140–450)
PMV BLD AUTO: 10.1 FL (ref 6–12)
POTASSIUM SERPL-SCNC: 4.9 MMOL/L (ref 3.5–5.2)
PROT SERPL-MCNC: 7.2 G/DL (ref 6–8.5)
RBC # BLD AUTO: 4.09 10*6/MM3 (ref 3.77–5.28)
SODIUM SERPL-SCNC: 147 MMOL/L (ref 136–145)
TIBC SERPL-MCNC: 377 MCG/DL (ref 298–536)
TRANSFERRIN SERPL-MCNC: 253 MG/DL (ref 200–360)
WBC NRBC COR # BLD AUTO: 5.98 10*3/MM3 (ref 3.4–10.8)

## 2025-05-21 PROCEDURE — 36415 COLL VENOUS BLD VENIPUNCTURE: CPT

## 2025-05-21 PROCEDURE — 85025 COMPLETE CBC W/AUTO DIFF WBC: CPT

## 2025-05-21 PROCEDURE — 84466 ASSAY OF TRANSFERRIN: CPT

## 2025-05-21 PROCEDURE — 82728 ASSAY OF FERRITIN: CPT

## 2025-05-21 PROCEDURE — 83540 ASSAY OF IRON: CPT

## 2025-05-21 PROCEDURE — 80053 COMPREHEN METABOLIC PANEL: CPT

## 2025-05-21 NOTE — PROGRESS NOTES
Subjective   Veronica Royal is a 84 y.o. female.  Referred by Dr. Michael for right breast inflammatory breast cancer    History of Present Illness   Ms. Royal is a 82-year-old postmenopausal  lady with hypertension, diabetes, hyperlipidemia, chronic kidney disease, hyperkalemia-chronic presented with a palpable abnormality in the right breast.  Subsequent imaging was performed.  Patient has not had a mammogram prior to this in the past 25 years.    4/21/2023-bilateral diagnostic mammogram-high density irregular mass measuring 44 mm with spiculated margins and amorphus and fine pleomorphic calcifications with skin thickening in the right breast at 9:00.  2 intramammary lymph nodes in the upper outer axillary tail region are slightly rounded  2 prominent right axillary lymph nodes largest of which measures 26 mm.  Asymmetry noted in the left breast.    Right breast ultrasound at 9 o'clock position, 3 cm from the nipple there is a 38 x 28 x 42 mm mass.  Skin thickness measuring 11 mm near the mass.  One of the 2 rounded axillary tail lymph nodes noted.  Borderline cortical thickening.  2 abnormal axillary lymph nodes.  1 lymph node displaced loss of normal morphology with a round heterogeneous appearance and echogenic foci.  Most consistent with calcified lymph node measuring 26 mm.  Second lymph node measures 12 mm.  Displaced cortical thickening.  Ultrasound-guided biopsy of the mass in the axillary lymph nodes recommended.    4/21/2023  1.right breast 9:00 biopsy-invasive ductal carcinoma with apocrine features  Grade 3  ER negative  OK negative  HER2 2+ on immunohistochemistry  HER2 amplified on FISH with HER2 copy number of 7.58, OPAL seventeen 3.18, HER2/OPAL 17 ratio of 2.4.  Ki-67 38.45%    2.right axillary biopsy-soft tissue involved by invasive ductal carcinoma with apocrine features  Associated microcalcification  No definite lymph node tissue identified.    4/29/2023-MRI of the breast-biopsy-proven  malignancy in the right breast at 9:00 measuring 4.3 cm in greatest dimension.  Attachment overlying skin and diffuse right breast edema noted.  Right axillary lymphadenopathy.  No suspicious findings in the left breast.    5/11/2023-CT of the chest abdomen and pelvis-no evidence of metastatic disease.  Liver is cirrhotic in configuration.    5/11/2023-bone scan negative    Patient presents today to the clinic for discussing neoadjuvant chemotherapy.  She is accompanied by her daughter.  Patient denies any recent changes in weight, she reports some pain at the site of malignancy.  A punch biopsy was performed and was consistent with inflammatory breast cancer.  At the site of punch biopsy there is an ulcerated lesion.    She has poorly controlled diabetes currently on glipizide metformin and Tradjenta.  Hemoglobin A1c recently was noted to be 9.9.    Also has chronic hyperkalemia, explanation unclear.    Blood pressure poorly controlled.    She reports good functional status lives independently.  Able to manage all her bills and independent of ADLs.  She has good support system in terms of her daughter.    Family history significant for brother with pancreatic cancer at the age of 68, sister with ovarian cancer at the age of 58    She received 6 weeks of Taxol Perjeta and Herceptin and subsequently admitted to the hospital due to severe nausea vomiting diarrhea poor oral intake, KAMILA, severe electrolyte abnormalities.      admission to the hospital for KAMILA, severe diarrhea, nausea vomiting and poor oral intake.She was in the hospital between 7/12/2023 to 7/20/2023.  During the hospitalization she was treated for acute UTI, electrolyte abnormalities and KAMILA.  Subsequently she was  discharged to a rehab.   During the hospitalization she was evaluated by Dr. Michael and a right breast ultrasound was obtained on 7/17/2023.  There was very little response noted with the 9 o'clock position mass 6 cm from the nipple  measuring 3.7 x 2.4 x 3.7 cm previously 3.8 x 2.5 x 4.2 cm.  In the right axilla the lymph node measured 2.6 x 1.5 x 1.9 cm previously 1.9 x 1.5 x 2.5 cm.  There was decrease in size of the adjacent lymph node measuring 0.7 cm previously 1.2 cm.    8/24/2023-right mastectomy and axillary lymph node dissection  Invasive ductal carcinoma, grade 2  The tumor measures 4 cm  Microcalcifications are seen in the tumor  Dermal lymphatic invasion present  Lymphatic invasion present  Tumor seen in the skin but no ulceration.  Residual cancer burden 3.454  RCB-3  Xiong Lima grade 3  Margins negative  18 lymph nodes total evaluated  1 with micrometastasis with a tumor measuring 2.5 mm with no chemotherapy effect  1 with chemotherapy change and isolated tumor cells  There is 1 node with chemotherapy change and a clip but no residual tumor.    Receptors repeated  ER negative  KY negative  HER2 2+ on immunohistochemistry  FISH nonamplified    Kadcyla dose decreased to 3 mg/kg.    Patient was hospitalized between 2/22/2024 to 2/24/2024.  She presented to the emergency room with complaints of chest pain.  CT angiogram was performed which did not show any evidence of pulmonary embolism.  There were changes consistent with radiation on the right side and there was a moderate pleural effusion on the right.  Thoracentesis was performed and 550 cc of fluid was drained.  Cytology was negative for any malignancy.  Cultures remain negative.  Chest pain improved after the thoracentesis.  She denies any dyspnea or cough.    Bilateral lower extremity Doppler was performed which was negative.    She is reporting lymphedema of the right upper extremity and has been somewhat noncompliant with the sleeve.  She continues to follow-up with lymphedema clinic.    She is tolerating the decreased dose of Kadcyla fairly well.  She felt a little weak immediately after the discharge from the hospital but subsequently felt better.    Echocardiogram  2/15/2024 shows a stable ejection fraction of 54.5% with a strain of -19%.    3/8/2024-stress test-low risk study with normal myocardial perfusion.  Left ventricular fraction is normal at 64%.    Last dose of Kadcyla 6/19/2024    Interval History:  Patient presents today for follow-up accompanied by her sister.  She is reporting left upper extremity pain as well as neck pain.  She is having decreased range of motion of the left upper extremity.  No other new bone pains, cough, abdominal pain nausea vomiting.  No new chest wall lesions on the right  4/23/2024-left breast screening mammogram benign.       The following portions of the patient's history were reviewed and updated as appropriate: allergies, current medications, past family history, past medical history, past social history, past surgical history and problem list.    Past Medical History:   Diagnosis Date    Anxiety     Arthritis     Basal cell carcinoma     Left thumb    Breast cancer 2023    Chronic kidney disease     Depression     Diabetes mellitus     Diarrhea     s/e chemo    High cholesterol     History of chemotherapy 08/2023    Hypertension     Rash 08/2023    s/e chemo    Sacral decubitus ulcer     cleaning with soap & water    Urinary incontinence     wears pads    Vitamin D deficiency         Past Surgical History:   Procedure Laterality Date    BREAST BIOPSY      BREAST SURGERY      CATARACT EXTRACTION EXTRACAPSULAR W/ INTRAOCULAR LENS IMPLANTATION Right     EYE SURGERY      Muscle repair age 21    MASTECTOMY W/ SENTINEL NODE BIOPSY Right 08/24/2023    Procedure: Right breast modified radical mastectomy;  Surgeon: Rosa Michael MD;  Location: [facility];  Service: General;  Laterality: Right;    TONSILLECTOMY      VENOUS ACCESS DEVICE (PORT) INSERTION N/A 05/19/2023    Procedure: INSERTION VENOUS ACCESS DEVICE;  Surgeon: Rosa Michael MD;  Location: [facility];  Service: General;  Laterality: N/A;        Family History  "  Problem Relation Age of Onset    Alzheimer's disease Mother     Heart disease Father     Heart attack Father     Heart failure Father     Ovarian cancer Sister     Pancreatic cancer Brother     Cancer Brother     Cancer Sister         Ovarian    Malig Hyperthermia Neg Hx     Colon cancer Neg Hx     Colon polyps Neg Hx     Crohn's disease Neg Hx     Irritable bowel syndrome Neg Hx     Ulcerative colitis Neg Hx         Social History     Socioeconomic History    Marital status:    Tobacco Use    Smoking status: Never    Smokeless tobacco: Never   Vaping Use    Vaping status: Never Used   Substance and Sexual Activity    Alcohol use: Never    Drug use: Never    Sexual activity: Never        OB History    No obstetric history on file.      Age at menarche-11  Age at first live childbirth-35   2 para 1  1  Age at menopause-50  Oral conceptive pill use for 3 to 4 years    No Known Allergies     Objective   Blood pressure 165/89, pulse 62, temperature 98 °F (36.7 °C), temperature source Skin, height 152.4 cm (60\"), weight 66.4 kg (146 lb 6.4 oz), SpO2 100%.   She takes her blood pressure at home and it usually runs 130-70-80s. She denies headache or vision changes. Advised rechecking BP once she is home. She did take her BP meds although a little later today than she usually does.      Physical Exam  Vitals reviewed.   Constitutional:       Appearance: Normal appearance. She is well-developed.   HENT:      Head: Normocephalic and atraumatic.   Eyes:      Pupils: Pupils are equal, round, and reactive to light.   Cardiovascular:      Rate and Rhythm: Normal rate and regular rhythm.      Heart sounds: Normal heart sounds.   Pulmonary:      Effort: Pulmonary effort is normal.   Abdominal:      General: Bowel sounds are normal.      Palpations: Abdomen is soft.   Musculoskeletal:         General: Normal range of motion.      Cervical back: Normal range of motion.   Skin:     General: Skin is warm and " dry.   Neurological:      Mental Status: She is alert and oriented to person, place, and time.        Previous breast exam:  Right breast: s/p mastectomy. Chest wall exam without palpable abnormalities  Left breast: Appears normal on inspection. No palpable abnormalities.     Assessment & Plan   *Right breast inflammatory breast cancer  T4d N1 M0  Stage IIIb  ER negative, HI negative, HER2 2+ on immunohistochemistry but amplified on FISH.  Invasive ductal carcinoma with apocrine features, grade 3, Ki-67 38%  CT scans and bone scan without any obvious evidence of metastatic disease  Echocardiogram has been performed and ejection fraction normal.  Liver shows cirrhotic morphology.  LFTs otherwise normal and total bilirubin normal as well as protein normal.  It appears that the synthetic function of the liver is still within normal limits.  Mediport placed 5/19/2023  Taxol, Herceptin, Perjeta initiated 5/24/2023 5/31/2023 with C1D8 Taxol  6/28/2023-cycle 2-day 15 of Taxol.  She is overall tolerating therapy well with expected side effects.  She will proceed with Taxol today.  Scheduled for Taxol Herceptin and Perjeta.  7/12/2023-cycle 3-day 8 of TPH.  Chemotherapy held and patient was admitted to the hospital for management of severe dehydration, KAMILA, nausea vomiting diarrhea and decreased oral intake.  8/2/2023-she feels relatively well today.  Labs reviewed and stable to proceed with Herceptin only.  We will not administer Taxol and Perjeta as she has had significant issues with chemotherapy.  Right mastectomy 8/24/2023 with 4 cm of residual disease, RCB-3, treatment effect present, axillary lymph node dissection with 1 lymph node with micrometastasis measuring 2.5 mm, 1 lymph node with isolated tumor cells and a third lymph node which showed treatment changes but no tumor cells.  Total of 18 lymph nodes removed   9/20/2023 to discuss pathology and adjuvant therapy.  We discussed Kadcyla every 3 weekly to finish a  complete year.  Patient is definitely hesitant to resume any sort of chemotherapy due to her previous experience with diarrhea.  Started Kadcyla 9/20/2023, denies any diarrhea.  Tolerating treatment well so far  No evidence of recurrent disease  Adjuvant radiation completed 11/20/2023  She has received 5 doses of adjuvant Kadcyla.  Sixth dose has been delayed by 3 weeks due to worsening arthralgias, decreased strength in her lower extremities  Improvement in strength as well as arthralgias with holding chemotherapy.  We discussed about proceeding with the dose reduction of Kadcyla versus not doing any further Kadcyla.  Kadcyla dose decreased to 3 mg/kg  Patient hospitalized between 2/21/2024 to 2/24/2024 for right-sided pleural effusion and status post thoracentesis with 550 cc drained which was nonmalignant.  Etiology of the pleural fluid is unclear  CTA performed 2/21/2024 without any evidence of metastatic disease.  Chest x-ray 3/25/2024-no recurrence of fluid  6/19/2024 last dose of Kadcyla.    2/14/2025-patient reporting left upper extremity pain as well as neck pain. Most likely musculoskeletal due to position of sleeping. Xray showing osteopenia but no acute process.  5/21/2025 patient reports shoulder pain improving.  She did not actually end up doing physical therapy but feels at this point that she does not need it.  She has no signs of recurrence for exam today.  Doing well.    *Anemia  Most likely secondary to chemotherapy.  Iron studies performed in May 2023 suggestive of anemia of chronic disease/inflammation.  Hemoglobin stable at 8.7  Recommend that she take a multivitamin every day  Likely residual effect of chemotherapy, continue to monitor CBC at each visit  11/7/2024: Hemoglobin 8.8 which is stable from prior visits. Iron saturation 11% and ferritin 13.00. Will start oral ferrous sulfate 325mg 1 tablet daily.   12/19/2024: Hemoglobin 9.5 today. Iron saturation 10%, mild improvement in ferritin to  24.20. Advised increasing oral iron to 325mg daily as she reports she's maybe been taking it 2-3 times a week. Encouraged her to reach out if she struggles with constipation and we can then discuss possible IV iron if needed.   Hemoglobin 10.2, iron saturation 13%, ferritin 38, patient not very compliant with oral iron, encouraged her to be compliant.  5/21/2025 Hgb up to 11.4.  Ferritin 39, iron sat 14%.  Continue oral iron daily along with vitamin C to help absorption.  Patient denies any difficulty with constipation.      *Diabetes  Poorly controlled  Evaluation by endocrinology 5/30/2023 with recommendations for dietary changes and home blood sugar monitoring.  The patient's daughter reports today she is struggling with compliance.  Continue follow-up with primary care physician    *KAMILA/CKD  Patient's daughter reports that she has not been hydrating herself well.  6/19/2024 Creatinine 1.24, continued diarrhea from Kadcyla. Proceed with 1 L normal saline.  7/18/2024 reviewed today 1 month out from finishing Kadcyla.  She is still having intermittent loose stool and poor hydration at home.  On discussion about this today.  Patient lives alone which may ultimately not be the best situation for her.  Creatinine improved at 1.12, she has been receiving weekly IV fluids.  Recheck creatinine in 2 weeks and IV fluids if elevated.  Encouraged her to drink plenty of fluids  11/7/2024: Creatinine 0.99 today. Much improvement. She continues to follow with nephrology.   5/21/2025: Creatinine 1.37, BUN 25.  eGFR 38.  Patient encouraged to follow-up with nephrology.  Results will be sent to Dr. Estrella.    *?  Cirrhotic appearance of the liver on the CT scan  Referred to gastroenterology  Synthetic function appears to be normal  We will start with Taxol to see if she would tolerate the chemotherapy   Slight elevation of intervention studies today with ALT 43, AST 55.  Continue to monitor weekly  6/28/2023-mild elevation in  the LFTs.  Stable compared to previous labs.  Okay to proceed with chemotherapy.  11/21/2023-LFTs normal  12/13/2023: Liver enzymes are normals. Alkaline phosphatase is slightly more elevated at 244.   LFTs normal today    *Hypertension-currently on valsartan and hydrochlorothiazide.  Valsartan could be contributing to hyperkalemia.  Will refer to cardiology ASAP for management of medications and cardiac clearance for proceeding with chemotherapy  Recent echocardiogram normal  Stress test reviewed and negative.  9/27/2023-echocardiogram shows a normal ejection fraction of 59%.  Follow-up echocardiogram 10/20/2023 with ejection fraction of 54%.  When compared to baseline study 5/9/2023 ejection fraction is stable.  Left ventricular GLS is changed by 8%.  Discussed with cardiology, given the stability of ejection fraction when compared to baseline from May, we will proceed with Kadcyla   Cardiology evaluation 11/10/2023 with stable EF and strain.  Recommendations to continue Kadcyla with 3-month follow-up echocardiogram  12/13/2023: Being followed by cardio oncology. Has an ECHO scheduled soon that is to be rescheduled per her daughter.   Continue losartan and Cozaar, blood pressure 156/79  Patient maintained a log of the blood pressure and her blood pressure has actually been low in the mornings.  Blood pressure BP: 165/89     *Genetic testing-[employer]ae 9 gene stat panel negative,     *Cognitive impairment  It is unclear if this is the patient's baseline or mental status has been exacerbated by recent chemotherapy  The patient is struggling with compliance with her medications.  The patient's daughter expresses frustration today as the patient is struggling to care for herself at home with managing medications and symptom management.  Evaluated by CARL Antonio   7/18/2024 this remains a concern at her visit today.  She does have an upcoming neurocognitive evaluation with [facility].  I am concerned that she  may be better off in an assisted living facility if she cannot take better care of herself.  They had to reschedule the appointment with Rehan due to recent diarrhea and dehydration  11/7/2024: Improved with resolution of dehydration and diarrhea.   Stable.    *Right-sided pleural effusion  New on CT chest from 2/21/2024  Unclear etiology  Chest x-ray 3/25/2024 without any evidence of recurrence of the pleural fluid    *Lymphedema  Continue follow-up with lymphedema clinic  Encouraged her to remain compliant with the sleeve  She still in the process of being fitted for a pump for the right upper extremity lymphedema.    PLAN:  GEORGES on exam.  CMP results from today will be scanned to patient's nephrologist, Dr. Gallardo.  Patient reports improvement in left shoulder/arm discomfort.  She did not ever pursue physical therapy but denies wanting the referral now.  She is planning to begin an exercise routine for seniors through her program at her [facility].  Continue ferrous sulfate  Follow-up with Dr. Yee in 3 months with CBC, ferritin, iron profile, CMP.      I spent 35 minutes caring for Veronica on this date of service. This time includes time spent by me in the following activities: preparing for the visit, reviewing tests, performing a medically appropriate examination and/or evaluation, counseling and educating the patient/family/caregiver, documenting information in the medical record, care coordination, ordering test(s), obtaining a separately obtained history, and reviewing a separately obtained history        Yasmine Cisneros, APRN  05/21/2025

## (undated) DEVICE — ADHS SKIN SURG TISS VISC PREMIERPRO EXOFIN HI/VISC FAST/DRY

## (undated) DEVICE — SYR LUERLOK 20CC BX/50

## (undated) DEVICE — GLV SURG BIOGEL LTX PF 6 1/2

## (undated) DEVICE — INTENDED FOR TISSUE SEPARATION, AND OTHER PROCEDURES THAT REQUIRE A SHARP SURGICAL BLADE TO PUNCTURE OR CUT.: Brand: BARD-PARKER ® CARBON RIB-BACK BLADES

## (undated) DEVICE — JACKSON-PRATT 100CC BULB RESERVOIR: Brand: CARDINAL HEALTH

## (undated) DEVICE — TBG PENCL TELESCP MEGADYNE SMOKE EVAC 10FT

## (undated) DEVICE — TRAP FLD MINIVAC MEGADYNE 100ML

## (undated) DEVICE — ELECTRD BLD EZ CLN MOD 2.5IN

## (undated) DEVICE — APPL CHLORAPREP HI/LITE 26ML ORNG

## (undated) DEVICE — TOWEL,OR,DSP,ST,BLUE,STD,4/PK,20PK/CS: Brand: MEDLINE

## (undated) DEVICE — Device

## (undated) DEVICE — DRSNG PAD ABD 8X10IN STRL

## (undated) DEVICE — ANTIBACTERIAL UNDYED BRAIDED (POLYGLACTIN 910), SYNTHETIC ABSORBABLE SUTURE: Brand: COATED VICRYL

## (undated) DEVICE — DRP C/ARM 41X74IN

## (undated) DEVICE — INTENDED FOR TISSUE SEPARATION, AND OTHER PROCEDURES THAT REQUIRE A SHARP SURGICAL BLADE TO PUNCTURE OR CUT.: Brand: BARD-PARKER ® STAINLESS STEEL BLADES

## (undated) DEVICE — SUT SILK 2/0 SH 30IN K833H

## (undated) DEVICE — SUT VIC 3/0 SH CR8 18IN J864D

## (undated) DEVICE — DECANTER BAG 9": Brand: MEDLINE INDUSTRIES, INC.

## (undated) DEVICE — BANDAGE,GAUZE,BULKEE II,4.5"X4.1YD,STRL: Brand: MEDLINE

## (undated) DEVICE — SUT MNCRYL PLS ANTIB UD 4/0 PS2 18IN

## (undated) DEVICE — STCKNT IMPERV 9X36IN STRL

## (undated) DEVICE — HARMONIC FOCUS SHEARS 9CM LENGTH + ADAPTIVE TISSUE TECHNOLOGY FOR USE WITH BLUE HAND PIECE ONLY: Brand: HARMONIC FOCUS

## (undated) DEVICE — NDL HYPO PRECISIONGLIDE REG 25G 1 1/2

## (undated) DEVICE — SUT PROLN 2/0 SH 36IN 8523H

## (undated) DEVICE — SPNG LAP 18X18IN LF STRL PK/5

## (undated) DEVICE — STPLR SKIN VISISTAT WD 35CT

## (undated) DEVICE — SUT VIC 3/0 TIES 18IN J110T

## (undated) DEVICE — DRAPE,CHEST,FENES,15X10,STERIL: Brand: MEDLINE

## (undated) DEVICE — PK PROC MINOR TOWER 40

## (undated) DEVICE — PATIENT RETURN ELECTRODE, SINGLE-USE, CONTACT QUALITY MONITORING, ADULT, WITH 9FT CORD, FOR PATIENTS WEIGING OVER 33LBS. (15KG): Brand: MEGADYNE

## (undated) DEVICE — CVR PROB GEN PURP W ISOSILK 6X48

## (undated) DEVICE — SUT MNCRYL 4/0 PS2 18 IN

## (undated) DEVICE — ST. SORBAVIEW ULTIMATE IJ SYSTEM A,C: Brand: CENTURION

## (undated) DEVICE — SUT VIC 3/0 SH 27IN J416H

## (undated) DEVICE — LEGGINGS, PAIR, 31X48, STERILE: Brand: MEDLINE

## (undated) DEVICE — UNIVERSAL PACK: Brand: CARDINAL HEALTH

## (undated) DEVICE — SOL NACL 0.9PCT 100ML SGL